# Patient Record
Sex: FEMALE | Race: WHITE | NOT HISPANIC OR LATINO | Employment: OTHER | URBAN - METROPOLITAN AREA
[De-identification: names, ages, dates, MRNs, and addresses within clinical notes are randomized per-mention and may not be internally consistent; named-entity substitution may affect disease eponyms.]

---

## 2017-01-05 ENCOUNTER — OFFICE VISIT (OUTPATIENT)
Dept: FAMILY MEDICINE | Facility: CLINIC | Age: 42
End: 2017-01-05
Payer: COMMERCIAL

## 2017-01-05 VITALS
OXYGEN SATURATION: 99 % | TEMPERATURE: 97.9 F | HEIGHT: 66 IN | DIASTOLIC BLOOD PRESSURE: 76 MMHG | BODY MASS INDEX: 26.36 KG/M2 | SYSTOLIC BLOOD PRESSURE: 114 MMHG | WEIGHT: 164 LBS | HEART RATE: 78 BPM

## 2017-01-05 DIAGNOSIS — N89.8 VAGINAL DISCHARGE: Primary | ICD-10-CM

## 2017-01-05 LAB
MICRO REPORT STATUS: NORMAL
SPECIMEN SOURCE: NORMAL
WET PREP SPEC: NORMAL

## 2017-01-05 PROCEDURE — 87210 SMEAR WET MOUNT SALINE/INK: CPT | Performed by: PHYSICIAN ASSISTANT

## 2017-01-05 PROCEDURE — 87491 CHLMYD TRACH DNA AMP PROBE: CPT | Mod: 90 | Performed by: PHYSICIAN ASSISTANT

## 2017-01-05 PROCEDURE — 99213 OFFICE O/P EST LOW 20 MIN: CPT | Performed by: PHYSICIAN ASSISTANT

## 2017-01-05 PROCEDURE — 87591 N.GONORRHOEAE DNA AMP PROB: CPT | Mod: 90 | Performed by: PHYSICIAN ASSISTANT

## 2017-01-05 PROCEDURE — 99000 SPECIMEN HANDLING OFFICE-LAB: CPT | Performed by: PHYSICIAN ASSISTANT

## 2017-01-05 NOTE — PROGRESS NOTES
"  SUBJECTIVE:                                                    Sosa Wolf is a 41 year old female who presents to clinic today for the following health issues:      Vaginal Symptoms     Onset: 2 days      Description:  Vaginal Discharge: none   Itching (Pruritis): YES  Burning sensation:  YES  Odor: YES    Accompanying Signs & Symptoms:  Pain with Urination: no   Abdominal Pain: YES  Fever: no    History:   Sexually active: YES  New Partner: YES  Possibility of Pregnancy:  No    Precipitating factors:   Recent Antibiotic Use: YES    Alleviating factors:  Nothing      Therapies Tried and outcome: used a monistat 1 day over 1 week ago.     Had chlamydia recently and now has a new partner and worried he cheated on her.   No real discharge.   Problem list and histories reviewed & adjusted, as indicated.  Additional history: as documented    Problem list, Medication list, Allergies, and Medical/Social/Surgical histories reviewed in EPIC and updated as appropriate.    ROS:  Constitutional, HEENT, cardiovascular, pulmonary, gi and gu systems are negative, except as otherwise noted.    OBJECTIVE:                                                    /76 mmHg  Pulse 78  Temp(Src) 97.9  F (36.6  C) (Oral)  Ht 5' 5.7\" (1.669 m)  Wt 164 lb (74.39 kg)  BMI 26.71 kg/m2  SpO2 99%  LMP 12/21/2016  Body mass index is 26.71 kg/(m^2).  GENERAL: healthy, alert and no distress   (female): normal female external genitalia, normal urethral meatus, vaginal mucosa, normal cervix with thick white discharge    Diagnostic Test Results:  Results for orders placed or performed in visit on 01/05/17 (from the past 24 hour(s))   Wet prep   Result Value Ref Range    Specimen Description Vagina     Wet Prep       No Trichomonas seen  No clue cells seen  No yeast seen      Micro Report Status FINAL 01/05/2017         ASSESSMENT/PLAN:                                                        ICD-10-CM    1. Vaginal discharge N89.8 " Chlamydia trachomatis PCR     Neisseria gonorrhoeae PCR     Wet prep   Wet prep was negative. Await chlamydia and gonorrhea testing.     See Patient Instructions    Arlene Gomez PA-C  [facility]

## 2017-01-05 NOTE — NURSING NOTE
"Chief Complaint   Patient presents with     Vaginal Problem       Initial /76 mmHg  Pulse 78  Temp(Src) 97.9  F (36.6  C) (Oral)  Ht 5' 5.7\" (1.669 m)  Wt 164 lb (74.39 kg)  BMI 26.71 kg/m2  SpO2 99%  LMP 12/21/2016 Estimated body mass index is 26.71 kg/(m^2) as calculated from the following:    Height as of this encounter: 5' 5.7\" (1.669 m).    Weight as of this encounter: 164 lb (74.39 kg).  BP completed using cuff size: nancie Wyman CMA      "

## 2017-01-06 LAB
C TRACH DNA SPEC QL NAA+PROBE: NORMAL
N GONORRHOEA DNA SPEC QL NAA+PROBE: NORMAL
SPECIMEN SOURCE: NORMAL
SPECIMEN SOURCE: NORMAL

## 2017-01-13 DIAGNOSIS — Z79.899 HIGH RISK MEDICATION USE: ICD-10-CM

## 2017-01-13 DIAGNOSIS — M35.9 UNDIFFERENTIATED CONNECTIVE TISSUE DISEASE (H): ICD-10-CM

## 2017-01-13 LAB
ALBUMIN SERPL-MCNC: 3.9 G/DL (ref 3.4–5)
ALBUMIN UR-MCNC: NEGATIVE MG/DL
ALP SERPL-CCNC: 26 U/L (ref 40–150)
ALT SERPL W P-5'-P-CCNC: 37 U/L (ref 0–50)
ANION GAP SERPL CALCULATED.3IONS-SCNC: 8 MMOL/L (ref 3–14)
APPEARANCE UR: CLEAR
AST SERPL W P-5'-P-CCNC: 26 U/L (ref 0–45)
BASOPHILS # BLD AUTO: 0 10E9/L (ref 0–0.2)
BASOPHILS NFR BLD AUTO: 0.4 %
BILIRUB SERPL-MCNC: 0.3 MG/DL (ref 0.2–1.3)
BILIRUB UR QL STRIP: NEGATIVE
BUN SERPL-MCNC: 21 MG/DL (ref 7–30)
CALCIUM SERPL-MCNC: 8.9 MG/DL (ref 8.5–10.1)
CHLORIDE SERPL-SCNC: 104 MMOL/L (ref 94–109)
CK SERPL-CCNC: 174 U/L (ref 30–225)
CO2 SERPL-SCNC: 25 MMOL/L (ref 20–32)
COLOR UR AUTO: YELLOW
CREAT SERPL-MCNC: 0.68 MG/DL (ref 0.52–1.04)
CRP SERPL-MCNC: <2.9 MG/L (ref 0–8)
DIFFERENTIAL METHOD BLD: NORMAL
EOSINOPHIL # BLD AUTO: 0.3 10E9/L (ref 0–0.7)
EOSINOPHIL NFR BLD AUTO: 3.4 %
ERYTHROCYTE [DISTWIDTH] IN BLOOD BY AUTOMATED COUNT: 14 % (ref 10–15)
ERYTHROCYTE [SEDIMENTATION RATE] IN BLOOD BY WESTERGREN METHOD: 6 MM/H (ref 0–20)
GFR SERPL CREATININE-BSD FRML MDRD: ABNORMAL ML/MIN/1.7M2
GLUCOSE SERPL-MCNC: 87 MG/DL (ref 70–99)
GLUCOSE UR STRIP-MCNC: NEGATIVE MG/DL
HCT VFR BLD AUTO: 38.7 % (ref 35–47)
HGB BLD-MCNC: 13.4 G/DL (ref 11.7–15.7)
HGB UR QL STRIP: NEGATIVE
KETONES UR STRIP-MCNC: NEGATIVE MG/DL
LEUKOCYTE ESTERASE UR QL STRIP: NEGATIVE
LYMPHOCYTES # BLD AUTO: 2.3 10E9/L (ref 0.8–5.3)
LYMPHOCYTES NFR BLD AUTO: 30.3 %
MCH RBC QN AUTO: 28.9 PG (ref 26.5–33)
MCHC RBC AUTO-ENTMCNC: 34.6 G/DL (ref 31.5–36.5)
MCV RBC AUTO: 84 FL (ref 78–100)
MONOCYTES # BLD AUTO: 0.6 10E9/L (ref 0–1.3)
MONOCYTES NFR BLD AUTO: 7.9 %
NEUTROPHILS # BLD AUTO: 4.3 10E9/L (ref 1.6–8.3)
NEUTROPHILS NFR BLD AUTO: 58 %
NITRATE UR QL: NEGATIVE
NON-SQ EPI CELLS #/AREA URNS LPF: NORMAL /LPF
PH UR STRIP: 6.5 PH (ref 5–7)
PLATELET # BLD AUTO: 215 10E9/L (ref 150–450)
POTASSIUM SERPL-SCNC: 4 MMOL/L (ref 3.4–5.3)
PROT SERPL-MCNC: 6.9 G/DL (ref 6.8–8.8)
PROT UR-MCNC: 0.07 G/L
PROT/CREAT 24H UR: 0.27 G/G CR (ref 0–0.2)
RBC # BLD AUTO: 4.63 10E12/L (ref 3.8–5.2)
RBC #/AREA URNS AUTO: NORMAL /HPF (ref 0–2)
SODIUM SERPL-SCNC: 137 MMOL/L (ref 133–144)
SP GR UR STRIP: 1.01 (ref 1–1.03)
URN SPEC COLLECT METH UR: NORMAL
UROBILINOGEN UR STRIP-ACNC: 0.2 EU/DL (ref 0.2–1)
WBC # BLD AUTO: 7.5 10E9/L (ref 4–11)
WBC #/AREA URNS AUTO: NORMAL /HPF (ref 0–2)

## 2017-01-13 PROCEDURE — 85652 RBC SED RATE AUTOMATED: CPT | Performed by: FAMILY MEDICINE

## 2017-01-13 PROCEDURE — 84156 ASSAY OF PROTEIN URINE: CPT | Performed by: FAMILY MEDICINE

## 2017-01-13 PROCEDURE — 81001 URINALYSIS AUTO W/SCOPE: CPT | Performed by: FAMILY MEDICINE

## 2017-01-13 PROCEDURE — 80053 COMPREHEN METABOLIC PANEL: CPT | Performed by: FAMILY MEDICINE

## 2017-01-13 PROCEDURE — 36415 COLL VENOUS BLD VENIPUNCTURE: CPT | Performed by: FAMILY MEDICINE

## 2017-01-13 PROCEDURE — 86160 COMPLEMENT ANTIGEN: CPT | Performed by: FAMILY MEDICINE

## 2017-01-13 PROCEDURE — 82550 ASSAY OF CK (CPK): CPT | Performed by: FAMILY MEDICINE

## 2017-01-13 PROCEDURE — 86140 C-REACTIVE PROTEIN: CPT | Performed by: FAMILY MEDICINE

## 2017-01-13 PROCEDURE — 85025 COMPLETE CBC W/AUTO DIFF WBC: CPT | Performed by: FAMILY MEDICINE

## 2017-01-13 PROCEDURE — 86225 DNA ANTIBODY NATIVE: CPT | Performed by: FAMILY MEDICINE

## 2017-01-16 LAB
C3 SERPL-MCNC: 87 MG/DL (ref 76–169)
C4 SERPL-MCNC: 22 MG/DL (ref 15–50)

## 2017-01-17 LAB — DSDNA AB SER-ACNC: 45 IU/ML

## 2017-01-18 ENCOUNTER — OFFICE VISIT (OUTPATIENT)
Dept: RHEUMATOLOGY | Facility: CLINIC | Age: 42
End: 2017-01-18
Payer: COMMERCIAL

## 2017-01-18 VITALS
TEMPERATURE: 97.3 F | DIASTOLIC BLOOD PRESSURE: 68 MMHG | SYSTOLIC BLOOD PRESSURE: 120 MMHG | HEIGHT: 66 IN | OXYGEN SATURATION: 100 % | HEART RATE: 77 BPM | BODY MASS INDEX: 27.58 KG/M2 | WEIGHT: 171.6 LBS

## 2017-01-18 DIAGNOSIS — F17.200 TOBACCO USE DISORDER: ICD-10-CM

## 2017-01-18 DIAGNOSIS — M32.9 SYSTEMIC LUPUS ERYTHEMATOSUS (H): Primary | ICD-10-CM

## 2017-01-18 DIAGNOSIS — Z79.899 HIGH RISK MEDICATION USE: ICD-10-CM

## 2017-01-18 PROCEDURE — 99214 OFFICE O/P EST MOD 30 MIN: CPT | Performed by: INTERNAL MEDICINE

## 2017-01-18 RX ORDER — METHOTREXATE 2.5 MG/1
15 TABLET ORAL WEEKLY
Qty: 32 TABLET | Refills: 3 | Status: SHIPPED | OUTPATIENT
Start: 2017-01-18 | End: 2017-09-21

## 2017-01-18 RX ORDER — HYDROXYCHLOROQUINE SULFATE 200 MG/1
400 TABLET, FILM COATED ORAL DAILY
Qty: 180 TABLET | Refills: 3 | Status: SHIPPED | OUTPATIENT
Start: 2017-01-18 | End: 2017-06-01

## 2017-01-18 RX ORDER — FOLIC ACID 1 MG/1
1 TABLET ORAL DAILY
Qty: 100 TABLET | Refills: 3 | Status: SHIPPED | OUTPATIENT
Start: 2017-01-18 | End: 2017-09-21

## 2017-01-18 NOTE — MR AVS SNAPSHOT
After Visit Summary   1/18/2017    Sosa Wolf    MRN: [id number]           Patient Information     Date Of Birth          1975        Visit Information        Provider Department      1/18/2017 11:20 AM Jack Arvizu MD [facility]        Today's Diagnoses     Systemic lupus erythematosus (H)    -  1     Undifferentiated connective tissue disease (H)         High risk medication use            Follow-ups after your visit        Your next 10 appointments already scheduled     Apr 14, 2017  9:15 AM   LAB with [facility] LAB   [facility]    [address] [id number] [phone]           Patient must bring picture ID.  Patient should be prepared to give a urine specimen  Please do not eat 10-12 hours before your appointment if you are coming in fasting for labs on lipids, cholesterol, or glucose (sugar).  Pregnant women should follow their Care Team instructions. Water with medications is okay. Do not drink coffee or other fluids.   If you have concerns about taking  your medications, please ask at office or if scheduling via PrimeRevenue, send a message by clicking on Secure Messaging, Message Your Care Team.            Apr 21, 2017  9:20 AM   Return Visit with MD [facility] ([facility] [place])    [address]  [place] [address] [phone]              Future tests that were ordered for you today     Open Future Orders        Priority Expected Expires Ordered    Complement C3 Routine 4/11/2017 5/6/2017 1/18/2017    DNA double stranded antibodies Routine 4/11/2017 5/6/2017 1/18/2017    CRP inflammation Routine 4/11/2017 5/6/2017 1/18/2017    Comprehensive metabolic panel Routine 4/11/2017 5/6/2017 1/18/2017    Complement C4 Routine 4/11/2017 5/6/2017 1/18/2017    Erythrocyte sedimentation rate auto Routine 4/11/2017 5/6/2017 1/18/2017    Protein  " random urine Routine 4/11/2017 5/6/2017 1/18/2017    UA with Microscopic reflex to Culture Routine 4/11/2017 5/6/2017 1/18/2017    CBC with platelets differential Routine 4/11/2017 5/6/2017 1/18/2017    CK total Routine 4/11/2017 5/6/2017 1/18/2017            Who to contact     If you have questions or need follow up information about today's clinic visit or your schedule please contact [facility] [place] directly at [phone].  Normal or non-critical lab and imaging results will be communicated to you by Saiseihart, letter or phone within 4 business days after the clinic has received the results. If you do not hear from us within 7 days, please contact the clinic through Idea2t or phone. If you have a critical or abnormal lab result, we will notify you by phone as soon as possible.  Submit refill requests through EnWave or call your pharmacy and they will forward the refill request to us. Please allow 3 business days for your refill to be completed.          Additional Information About Your Visit        SaiseiharYouTern Information     EnWave gives you secure access to your electronic health record. If you see a primary care provider, you can also send messages to your care team and make appointments. If you have questions, please call your primary care clinic.  If you do not have a primary care provider, please call [phone] and they will assist you.        Care EveryWhere ID     This is your Care EveryWhere ID. This could be used by other organizations to access your [facility] medical records  [id number]        Your Vitals Were     Pulse Temperature Height BMI (Body Mass Index) Pulse Oximetry Last Period    77 97.3  F (36.3  C) (Oral) 1.67 m (5' 5.75\") 27.91 kg/m2 100% 12/21/2016       Blood Pressure from Last 3 Encounters:   01/18/17 120/68   01/05/17 114/76   11/04/16 102/68    Weight from Last 3 Encounters:   01/18/17 77.837 kg (171 lb 9.6 oz)   01/05/17 74.39 kg (164 lb)   11/04/16 65.318 kg (144 lb) "                 Today's Medication Changes          These changes are accurate as of: 1/18/17 11:43 AM.  If you have any questions, ask your nurse or doctor.               Start taking these medicines.        Dose/Directions    folic acid 1 MG tablet   Commonly known as:  FOLVITE   Used for:  Systemic lupus erythematosus (H)   Started by:  Jack Arvizu MD        Dose:  1 mg   Take 1 tablet (1 mg) by mouth daily   Quantity:  100 tablet   Refills:  3       methotrexate sodium 2.5 MG Tabs   Used for:  Systemic lupus erythematosus (H)   Started by:  Jack Arvizu MD        Dose:  15 mg   Take 15 mg by mouth once a week . Take all 6 tablets on the same day of each week.   Quantity:  32 tablet   Refills:  3         These medicines have changed or have updated prescriptions.        Dose/Directions    hydroxychloroquine 200 MG tablet   Commonly known as:  PLAQUENIL   This may have changed:    - how much to take  - when to take this   Used for:  Undifferentiated connective tissue disease (H), High risk medication use, Systemic lupus erythematosus (H)   Changed by:  Jack Arvizu MD        Dose:  400 mg   Take 2 tablets (400 mg) by mouth daily   Quantity:  180 tablet   Refills:  3            Where to get your medicines      These medications were sent to [facility] [address] [facility] - [place], [place] - [address]  [address] [place] [address]     Phone:  [phone]    - folic acid 1 MG tablet  - hydroxychloroquine 200 MG tablet  - methotrexate sodium 2.5 MG Tabs             Primary Care Provider Office Phone # Fax #    Cytnhia Scott PA-C [phone]       [facility] [address]  [facility] [address]        Thank you!     Thank you for choosing [facility]  for your care. Our goal is always to provide you with excellent care. Hearing back from our patients is one way we can continue to improve our services. Please take a few minutes to complete the written  survey that you may receive in the mail after your visit with us. Thank you!             Your Updated Medication List - Protect others around you: Learn how to safely use, store and throw away your medicines at www.disposemymeds.org.          This list is accurate as of: 1/18/17 11:43 AM.  Always use your most recent med list.                   Brand Name Dispense Instructions for use    ABILIFY 10 MG tablet   Generic drug:  ARIPiprazole      Take 10 mg by mouth daily       buPROPion 150 MG 12 hr tablet    WELLBUTRIN SR     Take 150 mg by mouth daily       folic acid 1 MG tablet    FOLVITE    100 tablet    Take 1 tablet (1 mg) by mouth daily       hydroxychloroquine 200 MG tablet    PLAQUENIL    180 tablet    Take 2 tablets (400 mg) by mouth daily       medroxyPROGESTERone 150 MG/ML injection    DEPO-PROVERA    1 mL    Inject 1 mL (150 mg) into the muscle every 3 months       methotrexate sodium 2.5 MG Tabs     32 tablet    Take 15 mg by mouth once a week . Take all 6 tablets on the same day of each week.       sertraline 100 MG tablet    ZOLOFT     2 TABLET DAILY       valACYclovir 500 MG tablet    VALTREX    6 tablet    Take 1 tablet (500 mg) by mouth 2 times daily

## 2017-01-18 NOTE — PROGRESS NOTES
Rheumatology Clinic Visit      Sosa Wolf MRN# [id number]   YOB: 1975 Age: 41 year old      Date of visit: 1/18/17   PCP: Cynthia Scott    Chief Complaint   Patient presents with:  RECHECK: 3 follow up. right hip has been bothering her when she goes to bed.      Assessment and Plan     1. Systemic Lupus Erythematosus (JESSE+, dsDNA+, arthralgias, oral sores, proteinuria): Initially with arthralgias and morning stiffness lasted for 15-30 minutes.  Now with synovitis on exam, improvement with Plaquenil. Continue hydroxychloroquine. Ophthalmology hydroxychloroquine toxicity monitor exam pending currently. Start methotrexate. Note that she has oral sores that may be secondary to SLE or HSV. There is a risk for worsening oral sores with methotrexate and this was reviewed with her today.  - Start methotrexate 15 mg once weekly  - Start folic acid 1 mg daily  - Continue hydroxychloroquine 400 mg daily  - Chest X-ray today  - Labs monthly ×2 months: CBC, creatinine, hepatic panel  - Labs 1 week prior to next rheumatology clinic visit: CBC, CMP, ESR, CRP, CK, C3, C4, dsDNA, UA, Uprotein:creatinine    # Methotrexate Risks and Benefits: The risks and benefits of methotrexate were discussed in detail and the patient verbalized understanding.  The risks discussed include, but are not limited to, the risk for hypersensitivity, anaphylaxis, anaphylactoid reactions, infections, bone marrow suppression, renal toxicity, hepatotoxicity, pulmonary toxicity, malignancy, impaired fertility, GI upset, alopecia, and oral and nasal sores.  Folic acid supplementation is recommended during methotrexate therapy to help prevent some of the side effects. Pregnancy prevention and planning was discussed; it is recommended that women of childbearing potential use reliable contraception during therapy.  Routine laboratory monitoring is required during methotrexate therapy. Taking MTX once weekly, all within a 24 hour period was  stressed and the patient verbalized this instruction back to me.  I encouraged reviewing the package insert and asking any questions about the medication.    2. Cigarette Smoking:  Encouraged complete cessation and discussed the health benefits.      3. Cocaine use: Currently in outpatient treatment now and is sober.      Ms. Wolf verbalized agreement with and understanding of the rational for the diagnosis and treatment plan.  All questions were answered to best of my ability and the patient's satisfaction. Ms. Wolf was advised to contact the clinic with any questions that may arise after the clinic visit.      Thank you for involving me in the care of the patient    Return to clinic: 3 months      HPI   Sosa Wolf is a 41 year old female with a medical history significant for tobacco abuse, genital warts, and HSV type II who presents for follow-up of systemic lupus erythematosus.    Today, she reports doing better since being on hydroxychloroquine. She also reports doing better since stopping cocaine use. She is currently in an outpatient treatment program for the cocaine use. She continues to smoke cigarettes and is thinking about stopping after she has been sober from cocaine use. She still is sober with regards to alcohol use. More recently with pain and swelling in her bilateral MCPs but is worse in the morning and improves with activity. Morning stiffness for about 30 minutes. Positive gelling phenomenon. Also with some right hip pain that is worse when she lies down.  Occasional oral sore area    Denies fevers, chills, nausea, vomiting, constipation, diarrhea. No abdominal pain. No chest pain/pressure, palpitations, or shortness of breath. No LE swelling. No neck pain.   No rash. No sicca symptoms. No photosensitivity or photophobia. No eye pain or redness. No history of inflammatory eye disease.  No history of DVT, pulmonary embolism, or miscarriage.   No history of serositis.  No history of Raynaud's  Phenomenon.  No seizure history.  No known renal disorder.      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 No oral or nasal ulcers.  CV: No chest pain, pressure, palpitations, or dyspnea on exertion.  PULM: No SOB, wheeze, cough.  GI: No nausea, vomiting, constipation, diarrhea. No blood in stool. No abdominal pain.  : No blood in urine.  MSK: See HPI.  NEURO: No numbness, tingling, or weakness.  ENDO: No cold intolerance; see history of present illness   EXT: No LE swelling  PSYCH: Negative    Active Problem List     Patient Active Problem List   Diagnosis     Herpes simplex type 2 infection     Genital warts     CARDIOVASCULAR SCREENING; LDL GOAL LESS THAN 160     H/O intravenous drug use in remission     Tobacco abuse     Surveillance for Depo-Provera contraception     JESSE positive     Multiple joint pain     Cocaine use     Past Medical History     Past Medical History   Diagnosis Date     H/O: depression      SUICIDE ATTEMPT AGE 15     HPV (human papillomavirus)      Bipolar disorder (H) 12/04     Dx'd     Past Surgical History     Past Surgical History   Procedure Laterality Date     Bunionectomy rt/lt       Hc enlarge breast with implant  2000     Allergy   No Known Allergies  Current Medication List     Current Outpatient Prescriptions   Medication Sig     hydroxychloroquine (PLAQUENIL) 200 MG tablet Take 1 tablet (200 mg) by mouth 2 times daily     ARIPiprazole (ABILIFY) 10 MG tablet Take 10 mg by mouth daily     buPROPion (WELLBUTRIN SR) 150 MG 12 hr tablet Take 150 mg by mouth daily     SERTRALINE  MG PO TABS 2 TABLET DAILY     valACYclovir (VALTREX) 500 MG tablet Take 1 tablet (500 mg) by mouth 2 times daily     medroxyPROGESTERone (DEPO-PROVERA) 150 MG/ML injection Inject 1 mL (150  "mg) into the muscle every 3 months     No current facility-administered medications for this visit.     Social History   See HPI    Family History     Family History   Problem Relation Age of Onset     CANCER Father      lung cancer     DIABETES Maternal Grandmother      Depression Maternal Grandmother      Lipids Maternal Grandmother      CEREBROVASCULAR DISEASE Maternal Grandfather      Lipids Maternal Grandfather      Circulatory Maternal Grandfather      MI in 50's     Depression Brother      depression and OCD     GASTROINTESTINAL DISEASE Brother      IBS     Depression Mother      Thyroid Disease No family hx of      Asthma No family hx of      Denies family history of autoimmune disease     Physical Exam     Temp Readings from Last 3 Encounters:   01/18/17 97.3  F (36.3  C) Oral   01/05/17 97.9  F (36.6  C) Oral   11/04/16 98.3  F (36.8  C) Oral     BP Readings from Last 5 Encounters:   01/18/17 120/68   01/05/17 114/76   11/04/16 102/68   10/19/16 109/68   10/11/16 116/70     Pulse Readings from Last 1 Encounters:   01/18/17 77     Resp Readings from Last 1 Encounters:   11/04/16 18     Estimated body mass index is 27.91 kg/(m^2) as calculated from the following:    Height as of this encounter: 1.67 m (5' 5.75\").    Weight as of this encounter: 77.837 kg (171 lb 9.6 oz).    GEN: NAD  HEENT: MMM. No oral lesions. Anicteric, noninjected sclera  CV: S1, S2. RRR. No m/r/g.  PULM: CTA bilaterally. No w/c.  MSK:  Tenderness to palpation with synovial swelling of the bilateral second and third MCPs. Tenderness to palpation without synovial swelling of the bilateral second-fourth PIPs. Wrists, elbows, and shoulders without swelling or tenderness to palpation. Hips nontender to palpation or with range of motion; mildly tender to palpation just lateral to the right ASIS.  Knees and ankles without swelling or tenderness to palpation. Positive MTP squeeze bilaterally, but no swelling of the MTPs.   NEURO: UE and LE " strengths 5/5 and equal bilaterally.   SKIN: No rash  EXT: No LE edema  PSYCH: Alert. Appropriate.    Labs / Imaging (select studies)   RF/CCP  Recent Labs   Lab Test  10/11/16   1043  08/10/16   1059   CCPIGG  1   --    RHF   --   <20     JESSE/RNP/Sm/SSA/SSB/dsDNA  Recent Labs   Lab Test  01/13/17   1015  10/11/16   1043  08/10/16   1059  12/10/13   1118   ROBYN   --    --   1.7*   --    RNPIGG   --   0.3   --    --    SMIGG   --   <0.2  Negative   Antibody index (AI) values reflect qualitative changes in antibody   concentration that cannot be directly associated with clinical condition or   disease state.     --    --    SSAIGG   --   <0.2  Negative   Antibody index (AI) values reflect qualitative changes in antibody   concentration that cannot be directly associated with clinical condition or   disease state.     --    --    SSBIGG   --   <0.2  Negative   Antibody index (AI) values reflect qualitative changes in antibody   concentration that cannot be directly associated with clinical condition or   disease state.     --    --    SCLIGG   --   <0.2  Negative   Antibody index (AI) values reflect qualitative changes in antibody   concentration that cannot be directly associated with clinical condition or   disease state.     --    --    TREPAB   --    --    --   Negative   V3YTJQV  87  83   --    --    R5PWBFT  22  15   --    --      Recent Labs   Lab Test  01/13/17   1015  10/11/16   1043   DNA  45*  70*     CBC  Recent Labs   Lab Test  01/13/17   1015  10/11/16   1043  08/10/16   1059   WBC  7.5  8.8  8.0   RBC  4.63  4.88  4.70   HGB  13.4  13.9  13.5   HCT  38.7  39.8  39.0   MCV  84  82  83   RDW  14.0  13.9  13.5   PLT  215  223  196   MCH  28.9  28.5  28.7   MCHC  34.6  34.9  34.6   NEUTROPHIL  58.0  62.1   --    LYMPH  30.3  30.3   --    MONOCYTE  7.9  4.3   --    EOSINOPHIL  3.4  3.0   --    BASOPHIL  0.4  0.3   --    ANEU  4.3  5.5   --    ALYM  2.3  2.7   --    DAQUAN  0.6  0.4   --    AEOS  0.3  0.3   --     ABAS  0.0  0.0   --      CMP  Recent Labs   Lab Test  01/13/17   1015  10/11/16   1043  08/31/16   0950  08/10/16   1059   NA  137   --   141  142   POTASSIUM  4.0   --   3.5  3.3*   CHLORIDE  104   --   110*  110*   CO2  25   --   25  24   ANIONGAP  8   --   6  8   GLC  87   --   60*  80   BUN  21   --   18  14   CR  0.68  0.74  0.91  0.85   GFRESTIMATED  >90  Non  GFR Calc    86  67  73   GFRESTBLACK  >90   GFR Calc    >90   GFR Calc    82  89   RICHARD  8.9   --   9.1  8.6   BILITOTAL  0.3  0.3   --   0.3   ALBUMIN  3.9  4.0   --   3.7   PROTTOTAL  6.9  7.2   --   6.5*   ALKPHOS  26*  29*   --   31*   AST  26  14   --   11   ALT  37  24   --   17     HgA1c  Recent Labs   Lab Test  04/14/09   1404   A1C  5.1     Calcium/VitaminD  Recent Labs   Lab Test  01/13/17   1015  08/31/16   0950  08/10/16   1059   RICHARD  8.9  9.1  8.6     ESR/CRP  Recent Labs   Lab Test  01/13/17   1015  08/10/16   1059  10/16/14   0834   SED  6  6  4   CRP  <2.9  <2.9  <2.9     CK/Aldolase  Recent Labs   Lab Test  01/13/17   1015  10/11/16   1043   CKT  174  65   ALDOLASE   --   4.5     TSH/T4  Recent Labs   Lab Test  10/11/16   1043  04/14/09   1404   TSH  1.48  0.44     Lipid Panel  Recent Labs   Lab Test  03/06/13   1411  06/18/10   1021  04/14/09   1404   CHOL  190  185  176   TRIG  187*  106  74   HDL  38*  44*  49*   LDL  114  120  112   VLDL  37*  21  15   CHOLHDLRATIO  5.0  4.2  3.6     Hepatitis B  Recent Labs   Lab Test  10/11/16   1043   HBCAB  Nonreactive   HEPBANG  Nonreactive     Hepatitis C  Recent Labs   Lab Test  10/11/16   1043  12/10/13   1118  03/06/13   1411   HCVAB  Nonreactive   Assay performance characteristics have not been established for newborns,   infants, and children    Negative  Negative     HIV Screening  Recent Labs   Lab Test  10/11/16   1043   HIAGAB  Nonreactive   HIV-1 p24 Ag & HIV-1/HIV-2 Ab Not Detected       UA  Recent Labs   Lab Test  01/13/17   1033    COLOR  Yellow   APPEARANCE  Clear   URINEGLC  Negative   URINEBILI  Negative   SG  1.010   URINEPH  6.5   PROTEIN  Negative   UROBILINOGEN  0.2   NITRITE  Negative   UBLD  Negative   LEUKEST  Negative   WBCU  O - 2   RBCU  O - 2   SQUAMOUSEPI  Few     Urine Microscopic  Recent Labs   Lab Test  01/13/17   1033   WBCU  O - 2   RBCU  O - 2   SQUAMOUSEPI  Few     Urine Protein  Recent Labs   Lab Test  01/13/17   1033   UTP  0.07   UTPG  0.27*        Recent Results (from the past 744 hour(s))   XR Hand Bilateral G/E 3 Views    Narrative    XR HAND BILATERAL G/E 3 VW 10/11/2016 12:41 PM    HISTORY: Pain in unspecified joint      Impression    IMPRESSION: Negative.    JULIAN LORD MD   XR Foot Bilateral G/E 3 Views    Narrative    XR FOOT BILATERAL G/E 3 VW 10/11/2016 12:39 PM    HISTORY: Pain in unspecified joint      Impression    IMPRESSION: Deformity of the head of the right first metatarsal most  likely related to previous surgery or trauma. Some erosive change in  this area cannot be excluded. Bilateral exam otherwise unremarkable.    JULIAN LORD MD   XR Hip Left 2-3 Views    Narrative    XR HIP LEFT 2-3 VIEWS 10/11/2016 12:39 PM    HISTORY: Pain in unspecified joint      Impression    IMPRESSION: Negative.    JULIAN LORD MD       Immunization History     Immunization History   Administered Date(s) Administered     Influenza Vaccine IM 3yrs+ 4 Valent IIV4 12/19/2014, 10/16/2015, 11/04/2016     TD (ADULT, 7+) 02/23/2005     TDAP (ADACEL AGES 11-64) 04/14/2009          Chart documentation done in part with Dragon Voice recognition Software. Although reviewed after completion, some word and grammatical error may remain.    Jack Arvizu MD

## 2017-01-18 NOTE — NURSING NOTE
"Chief Complaint   Patient presents with     RECHECK     3 follow up. right hip has been bothering her when she goes to bed.       Initial /68 mmHg  Pulse 77  Temp(Src) 97.3  F (36.3  C) (Oral)  Ht 1.67 m (5' 5.75\")  Wt 77.837 kg (171 lb 9.6 oz)  BMI 27.91 kg/m2  SpO2 100%  LMP 12/21/2016 Estimated body mass index is 27.91 kg/(m^2) as calculated from the following:    Height as of this encounter: 1.67 m (5' 5.75\").    Weight as of this encounter: 77.837 kg (171 lb 9.6 oz).  BP completed using cuff size: regular  Shana Lion MA           RAPID3 (0-30) Cumulative Score  18.2          RAPID3 Weighted Score (divide #4 by 3 and that is the weighted score)  6.1             "

## 2017-01-30 ENCOUNTER — OFFICE VISIT (OUTPATIENT)
Dept: FAMILY MEDICINE | Facility: CLINIC | Age: 42
End: 2017-01-30
Payer: COMMERCIAL

## 2017-01-30 VITALS
SYSTOLIC BLOOD PRESSURE: 102 MMHG | WEIGHT: 171 LBS | BODY MASS INDEX: 27.48 KG/M2 | DIASTOLIC BLOOD PRESSURE: 61 MMHG | TEMPERATURE: 98.2 F | HEART RATE: 74 BPM | HEIGHT: 66 IN | OXYGEN SATURATION: 95 %

## 2017-01-30 DIAGNOSIS — M32.9 SYSTEMIC LUPUS ERYTHEMATOSUS (H): ICD-10-CM

## 2017-01-30 DIAGNOSIS — J20.9 ACUTE BRONCHITIS WITH SYMPTOMS > 10 DAYS: Primary | ICD-10-CM

## 2017-01-30 PROCEDURE — 99213 OFFICE O/P EST LOW 20 MIN: CPT | Performed by: FAMILY MEDICINE

## 2017-01-30 RX ORDER — ALBUTEROL SULFATE 90 UG/1
AEROSOL, METERED RESPIRATORY (INHALATION)
Qty: 1 INHALER | Refills: 0 | Status: SHIPPED | OUTPATIENT
Start: 2017-01-30 | End: 2018-09-10

## 2017-01-30 RX ORDER — AZITHROMYCIN 250 MG/1
TABLET, FILM COATED ORAL
Qty: 6 TABLET | Refills: 0 | Status: SHIPPED | OUTPATIENT
Start: 2017-01-30 | End: 2017-09-29

## 2017-01-30 NOTE — NURSING NOTE
"Chief Complaint   Patient presents with     URI       Initial /61 mmHg  Pulse 74  Temp(Src) 98.2  F (36.8  C) (Oral)  Ht 5' 5.75\" (1.67 m)  Wt 171 lb (77.565 kg)  BMI 27.81 kg/m2  SpO2 95%  LMP 12/21/2016  Breastfeeding? No Estimated body mass index is 27.81 kg/(m^2) as calculated from the following:    Height as of this encounter: 5' 5.75\" (1.67 m).    Weight as of this encounter: 171 lb (77.565 kg).  BP completed using cuff size: regular  Ramonita Phipps CMA       "

## 2017-01-30 NOTE — PROGRESS NOTES
"  SUBJECTIVE:                                                    Sosa Wolf is a 41 year old female who presents to clinic today for the following health issues:    Acute Illness   Acute illness concerns: URI  Onset: one and half weeks ago     Fever: YES    Chills/Sweats: YES    Headache (location?): YES    Sinus Pressure:no    Conjunctivitis:  no    Ear Pain: no    Rhinorrhea: no    Congestion: YES    Sore Throat: no     Cough: YES-productive of yellow sputum, productive of green sputum    Wheeze: YES    Decreased Appetite: YES    Nausea: no    Vomiting: no    Diarrhea:  YES    Dysuria/Freq.: no    Fatigue/Achiness: YES    Sick/Strep Exposure: YES- momther     Therapies Tried and outcome: OTC cough meds, tylenol, cough drops    Pt was started on Methotrexate last week.     Problem list and histories reviewed & adjusted, as indicated.  Additional history: as documented    BP Readings from Last 3 Encounters:   01/30/17 102/61   01/18/17 120/68   01/05/17 114/76    Wt Readings from Last 3 Encounters:   01/30/17 171 lb (77.565 kg)   01/18/17 171 lb 9.6 oz (77.837 kg)   01/05/17 164 lb (74.39 kg)         Problem list, Medication list, Allergies, and Medical/Social/Surgical histories reviewed in [facility] and updated as appropriate.    ROS:  Constitutional, HEENT, cardiovascular, pulmonary, gi and gu systems are negative, except as otherwise noted.    OBJECTIVE:                                                    /61 mmHg  Pulse 74  Temp(Src) 98.2  F (36.8  C) (Oral)  Ht 5' 5.75\" (1.67 m)  Wt 171 lb (77.565 kg)  BMI 27.81 kg/m2  SpO2 95%  LMP 12/21/2016  Breastfeeding? No  Body mass index is 27.81 kg/(m^2).  GENERAL: healthy, alert and no distress  HENT: ear canals and TM's normal, nose and mouth without ulcers or lesions  NECK: no adenopathy, no asymmetry, masses, or scars and thyroid normal to palpation  RESP: lungs with bilateral expiratory wheezes and rales.   CV: regular rate and rhythm.   "     ASSESSMENT/PLAN:                                                        ICD-10-CM    1. Acute bronchitis with symptoms > 10 days J20.9 azithromycin (ZITHROMAX) 250 MG tablet     albuterol (PROAIR HFA/PROVENTIL HFA/VENTOLIN HFA) 108 (90 BASE) MCG/ACT Inhaler   2. Systemic lupus erythematosus (H) M32.9        Follow up if not better.     Nadia Black MD  [facility]

## 2017-01-30 NOTE — MR AVS SNAPSHOT
After Visit Summary   1/30/2017    Sosa Wolf    MRN: [id number]           Patient Information     Date Of Birth          1975        Visit Information        Provider Department      1/30/2017 11:40 AM Nadia Black MD [facility]        Today's Diagnoses     Acute bronchitis with symptoms > 10 days    -  1     Systemic lupus erythematosus (H)            Follow-ups after your visit        Your next 10 appointments already scheduled     Apr 14, 2017  9:15 AM   LAB with CP LAB   [facility] ([facility])    [address] [id number] [phone]           Patient must bring picture ID.  Patient should be prepared to give a urine specimen  Please do not eat 10-12 hours before your appointment if you are coming in fasting for labs on lipids, cholesterol, or glucose (sugar).  Pregnant women should follow their Care Team instructions. Water with medications is okay. Do not drink coffee or other fluids.   If you have concerns about taking  your medications, please ask at office or if scheduling via [employer], send a message by clicking on Secure Messaging, Message Your Care Team.            Apr 21, 2017  9:20 AM   Return Visit with Jack Arvizu MD   [facility]    [address] [place] [id number] [phone]              Who to contact     If you have questions or need follow up information about today's clinic visit or your schedule please contact [facility] directly at [phone].  Normal or non-critical lab and imaging results will be communicated to you by MyChart, letter or phone within 4 business days after the clinic has received the results. If you do not hear from us within 7 days, please contact the clinic through MyChart or phone. If you have a critical or abnormal lab result, we will notify you by  "phone as soon as possible.  Submit refill requests through [employer] or call your pharmacy and they will forward the refill request to us. Please allow 3 business days for your refill to be completed.          Additional Information About Your Visit        [employer]nis Information     [employer] gives you secure access to your electronic health record. If you see a primary care provider, you can also send messages to your care team and make appointments. If you have questions, please call your primary care clinic.  If you do not have a primary care provider, please call [phone] and they will assist you.        Care EveryWhere ID     This is your Care EveryWhere ID. This could be used by other organizations to access your [place] medical records  [id number]        Your Vitals Were     Pulse Temperature Height BMI (Body Mass Index) Pulse Oximetry Last Period    74 98.2  F (36.8  C) (Oral) 5' 5.75\" (1.67 m) 27.81 kg/m2 95% 12/21/2016    Breastfeeding?                   No            Blood Pressure from Last 3 Encounters:   01/30/17 102/61   01/18/17 120/68   01/05/17 114/76    Weight from Last 3 Encounters:   01/30/17 171 lb (77.565 kg)   01/18/17 171 lb 9.6 oz (77.837 kg)   01/05/17 164 lb (74.39 kg)              Today, you had the following     No orders found for display         Today's Medication Changes          These changes are accurate as of: 1/30/17 12:19 PM.  If you have any questions, ask your nurse or doctor.               Start taking these medicines.        Dose/Directions    albuterol 108 (90 BASE) MCG/ACT Inhaler   Commonly known as:  PROAIR HFA/PROVENTIL HFA/VENTOLIN HFA   Used for:  Acute bronchitis with symptoms > 10 days   Started by:  Nadia Black MD        2 puffs in mouth every 6 hrs for next 2 days and then every 6 hrs as needed for shortness of breath and wheezing.   Quantity:  1 Inhaler   Refills:  0       azithromycin 250 MG tablet   Commonly known as:  ZITHROMAX   Used for:  Acute " bronchitis with symptoms > 10 days   Started by:  Nadia Black MD        Two tablets first day, then one tablet daily for four days.   Quantity:  6 tablet   Refills:  0            Where to get your medicines      These [place] [facility] [place] [address]  [address], [facility] [address]     Phone:  [phone]    - albuterol 108 (90 BASE) MCG/ACT Inhaler  - azithromycin 250 MG tablet             Primary Care Provider Office Phone # Fax #    Cynthia Scott PA-C [phone]       [facility] [address]  [facility] [address]        Thank you!     Thank you for choosing [facility]  for your care. Our goal is always to provide you with excellent care. Hearing back from our patients is one way we can continue to improve our services. Please take a few minutes to complete the written survey that you may receive in the mail after your visit with us. Thank you!             Your Updated Medication List - Protect others around you: Learn how to safely use, store and throw away your medicines at www.disposemymeds.org.          This list is accurate as of: 1/30/17 12:19 PM.  Always use your most recent med list.                   Brand Name Dispense Instructions for use    ABILIFY 10 MG tablet   Generic drug:  ARIPiprazole      Take 10 mg by mouth daily       albuterol 108 (90 BASE) MCG/ACT Inhaler    PROAIR HFA/PROVENTIL HFA/VENTOLIN HFA    1 Inhaler    2 puffs in mouth every 6 hrs for next 2 days and then every 6 hrs as needed for shortness of breath and wheezing.       azithromycin 250 MG tablet    ZITHROMAX    6 tablet    Two tablets first day, then one tablet daily for four days.       buPROPion 150 MG 12 hr tablet    WELLBUTRIN SR     Take 150 mg by mouth daily       folic acid 1 MG tablet    FOLVITE    100 tablet    Take 1 tablet (1 mg) by mouth daily        hydroxychloroquine 200 MG tablet    PLAQUENIL    180 tablet    Take 2 tablets (400 mg) by mouth daily       medroxyPROGESTERone 150 MG/ML injection    DEPO-PROVERA    1 mL    Inject 1 mL (150 mg) into the muscle every 3 months       methotrexate sodium 2.5 MG Tabs     32 tablet    Take 15 mg by mouth once a week . Take all 6 tablets on the same day of each week.       sertraline 100 MG tablet    ZOLOFT     2 TABLET DAILY       valACYclovir 500 MG tablet    VALTREX    6 tablet    Take 1 tablet (500 mg) by mouth 2 times daily

## 2017-03-10 ENCOUNTER — TELEPHONE (OUTPATIENT)
Dept: FAMILY MEDICINE | Facility: CLINIC | Age: 42
End: 2017-03-10

## 2017-03-10 NOTE — TELEPHONE ENCOUNTER
Reason for Call:  Depo    Detailed comments: Patient is past her 3 months for her depo shot. I advised her she would need to schedule with a doctor to get that started up again and she said she didn't have to last time, that she just saw the nurse. Please call to advise patient.    Phone Number Patient can be reached at: Cell number on file:    Telephone Information:   Mobile [phone]       Best Time: anytime    Can we leave a detailed message on this number? YES    Call taken on 3/10/2017 at 10:50 AM by Peggy Tapia

## 2017-03-10 NOTE — TELEPHONE ENCOUNTER
Routed to provider to advise if patient needs to come in for a doctor's appointment to get her depo shot as she is past her 3 months. She says she would rather not have to come in to see a doc for it. Can she just see ancillary?    Tiffany James RN  [facility]

## 2017-03-13 NOTE — TELEPHONE ENCOUNTER
Message left on home number for patient to call back TC line.  NTBS with Ancillary for Depo injection.    Tracy GARCIA

## 2017-03-13 NOTE — TELEPHONE ENCOUNTER
She doesn't.  She can be on axillary's schedule and they can order the pregnancy test.    Cynthia Scott PA-C

## 2017-03-17 ENCOUNTER — ALLIED HEALTH/NURSE VISIT (OUTPATIENT)
Dept: NURSING | Facility: CLINIC | Age: 42
End: 2017-03-17
Payer: COMMERCIAL

## 2017-03-17 LAB — BETA HCG QUAL IFA URINE: NEGATIVE

## 2017-03-17 PROCEDURE — 84703 CHORIONIC GONADOTROPIN ASSAY: CPT | Performed by: PHYSICIAN ASSISTANT

## 2017-03-17 PROCEDURE — 99207 ZZC NO CHARGE NURSE ONLY: CPT

## 2017-03-17 NOTE — MR AVS SNAPSHOT
After Visit Summary   3/17/2017    Sosa Wolf    MRN: [id number]           Patient Information     Date Of Birth          1975        Visit Information        Provider Department      3/17/2017 10:15 AM CP ANCILLARY [facility]        Today's Diagnoses     Contraception    -  1       Follow-ups after your visit        Your next 10 appointments already scheduled     Mar 22, 2017 10:15 AM CDT   Nurse Only with CP ANCILLARY   [facility] ([facility])    [address] [facility] [id number] [phone]            Apr 14, 2017  9:15 AM CDT   LAB with CP LAB   [facility] ([facility])    [address] [address]   [phone]           Patient must bring picture ID.  Patient should be prepared to give a urine specimen  Please do not eat 10-12 hours before your appointment if you are coming in fasting for labs on lipids, cholesterol, or glucose (sugar).  Pregnant women should follow their Care Team instructions. Water with medications is okay. Do not drink coffee or other fluids.   If you have concerns about taking  your medications, please ask at office or if scheduling via Flit, send a message by clicking on Secure Messaging, Message Your Care Team.            Apr 21, 2017  9:20 AM CDT   Return Visit with Jack Arvizu MD   [facility] [place] ([facility])    [address] [place] [address]  [place] [id number] [phone]              Who to contact     If you have questions or need follow up information about today's clinic visit or your schedule please contact [facility] directly at [phone].  Normal or non-critical lab and imaging results will be communicated to you by MyChart, letter or phone within 4 business days after the clinic has received the results. If you do not hear  from us within 7 days, please contact the clinic through [employer] or phone. If you have a critical or abnormal lab result, we will notify you by phone as soon as possible.  Submit refill requests through [employer] or call your pharmacy and they will forward the refill request to us. Please allow 3 business days for your refill to be completed.          Additional Information About Your Visit        [employer] Information     [employer] gives you secure access to your electronic health record. If you see a primary care provider, you can also send messages to your care team and make appointments. If you have questions, please call your primary care clinic.  If you do not have a primary care provider, please call [phone] and they will assist you.        Care EveryWhere ID     This is your Care EveryWhere ID. This could be used by other organizations to access your [place] medical records  [id number]         Blood Pressure from Last 3 Encounters:   01/30/17 102/61   01/18/17 120/68   01/05/17 114/76    Weight from Last 3 Encounters:   01/30/17 171 lb (77.6 kg)   01/18/17 171 lb 9.6 oz (77.8 kg)   01/05/17 164 lb (74.4 kg)              We Performed the Following     Beta HCG qual IFA urine        Primary Care Provider Office Phone # Fax #    Cynthia Scott PA-C [phone]       [facility] [address] [facility] [address]        Thank you!     Thank you for choosing [facility]  for your care. Our goal is always to provide you with excellent care. Hearing back from our patients is one way we can continue to improve our services. Please take a few minutes to complete the written survey that you may receive in the mail after your visit with us. Thank you!             Your Updated Medication List - Protect others around you: Learn how to safely use, store and throw away your medicines at www.disposemymeds.org.          This list is accurate as of:  3/17/17 10:49 AM.  Always use your most recent med list.                   Brand Name Dispense Instructions for use    ABILIFY 10 MG tablet   Generic drug:  ARIPiprazole      Take 10 mg by mouth daily       albuterol 108 (90 BASE) MCG/ACT Inhaler    PROAIR HFA/PROVENTIL HFA/VENTOLIN HFA    1 Inhaler    2 puffs in mouth every 6 hrs for next 2 days and then every 6 hrs as needed for shortness of breath and wheezing.       azithromycin 250 MG tablet    ZITHROMAX    6 tablet    Two tablets first day, then one tablet daily for four days.       buPROPion 150 MG 12 hr tablet    WELLBUTRIN SR     Take 150 mg by mouth daily       folic acid 1 MG tablet    FOLVITE    100 tablet    Take 1 tablet (1 mg) by mouth daily       hydroxychloroquine 200 MG tablet    PLAQUENIL    180 tablet    Take 2 tablets (400 mg) by mouth daily       medroxyPROGESTERone 150 MG/ML injection    DEPO-PROVERA    1 mL    Inject 1 mL (150 mg) into the muscle every 3 months       methotrexate sodium 2.5 MG Tabs     32 tablet    Take 15 mg by mouth once a week . Take all 6 tablets on the same day of each week.       sertraline 100 MG tablet    ZOLOFT     2 TABLET DAILY       valACYclovir 500 MG tablet    VALTREX    6 tablet    Take 1 tablet (500 mg) by mouth 2 times daily

## 2017-03-17 NOTE — NURSING NOTE
"Completed UPT it was negative. However, pt left before receiving her Depo shot. Called patient she stated she has another appointment and rescheduled her for another Depo appointment with ancillary on 3/22/17.  Linda Garcia MA      Called pt back to inform her that she would have to complete another pregnancy test at her next appt too. She asked \"Why\", I stated because we have to have a negative pregnancy test the day we give you your depo. She said \"I'm not even fucking have sex, so just fucking forget it\". She then hung op the phone on me.   Linda Garcia MA    "

## 2017-04-26 DIAGNOSIS — Z30.42 SURVEILLANCE FOR DEPO-PROVERA CONTRACEPTION: Primary | ICD-10-CM

## 2017-04-26 RX ORDER — MEDROXYPROGESTERONE ACETATE 150 MG/ML
150 INJECTION, SUSPENSION INTRAMUSCULAR
Qty: 1 ML | Refills: 3 | OUTPATIENT
Start: 2017-04-26 | End: 2018-09-10

## 2017-04-26 NOTE — TELEPHONE ENCOUNTER
Patient is coming in tomorrow for a UPT and depo injection. Her order in the chart is 08/31/2016 but without an end date we can not give this medication. I pended a new order with end date could you sign please and thank you  Rosario Ponce CMA

## 2017-04-26 NOTE — NURSING NOTE
BLOOD PRESSURE: 114/72    DATE OF LAST PAP or ANNUAL EXAM:   Lab Results   Component Value Date    PAP NIL 10/16/2015     URINE HCG:negative    The following medication was given:     MEDICATION: Depo Provera 150mg  ROUTE: IM  SITE: Deltoid - Left  : [employer]  LOT #: [id number]  EXPIRATION:11/30/19  NEXT INJECTION DUE: July 17-31  Provider: Sonia Su MA

## 2017-05-01 ENCOUNTER — ALLIED HEALTH/NURSE VISIT (OUTPATIENT)
Dept: NURSING | Facility: CLINIC | Age: 42
End: 2017-05-01
Payer: COMMERCIAL

## 2017-05-01 VITALS — WEIGHT: 147.9 LBS | SYSTOLIC BLOOD PRESSURE: 114 MMHG | BODY MASS INDEX: 24.05 KG/M2 | DIASTOLIC BLOOD PRESSURE: 72 MMHG

## 2017-05-01 DIAGNOSIS — Z32.00 PREGNANCY EXAMINATION OR TEST, PREGNANCY UNCONFIRMED: Primary | ICD-10-CM

## 2017-05-01 DIAGNOSIS — M32.9 SYSTEMIC LUPUS ERYTHEMATOSUS (H): ICD-10-CM

## 2017-05-01 DIAGNOSIS — Z30.42 SURVEILLANCE FOR DEPO-PROVERA CONTRACEPTION: ICD-10-CM

## 2017-05-01 LAB
ALBUMIN SERPL-MCNC: 3.6 G/DL (ref 3.4–5)
ALBUMIN UR-MCNC: ABNORMAL MG/DL
ALP SERPL-CCNC: 24 U/L (ref 40–150)
ALT SERPL W P-5'-P-CCNC: 23 U/L (ref 0–50)
ANION GAP SERPL CALCULATED.3IONS-SCNC: 8 MMOL/L (ref 3–14)
APPEARANCE UR: CLEAR
AST SERPL W P-5'-P-CCNC: 16 U/L (ref 0–45)
BASOPHILS # BLD AUTO: 0 10E9/L (ref 0–0.2)
BASOPHILS NFR BLD AUTO: 0.5 %
BETA HCG QUAL IFA URINE: NEGATIVE
BILIRUB SERPL-MCNC: 0.4 MG/DL (ref 0.2–1.3)
BILIRUB UR QL STRIP: ABNORMAL
BUN SERPL-MCNC: 16 MG/DL (ref 7–30)
CALCIUM SERPL-MCNC: 8.9 MG/DL (ref 8.5–10.1)
CHLORIDE SERPL-SCNC: 110 MMOL/L (ref 94–109)
CK SERPL-CCNC: 81 U/L (ref 30–225)
CO2 SERPL-SCNC: 24 MMOL/L (ref 20–32)
COLOR UR AUTO: YELLOW
CREAT SERPL-MCNC: 0.83 MG/DL (ref 0.52–1.04)
CREAT UR-MCNC: 324 MG/DL
CRP SERPL-MCNC: <2.9 MG/L (ref 0–8)
DIFFERENTIAL METHOD BLD: NORMAL
EOSINOPHIL # BLD AUTO: 0.2 10E9/L (ref 0–0.7)
EOSINOPHIL NFR BLD AUTO: 2.5 %
ERYTHROCYTE [DISTWIDTH] IN BLOOD BY AUTOMATED COUNT: 13.8 % (ref 10–15)
ERYTHROCYTE [SEDIMENTATION RATE] IN BLOOD BY WESTERGREN METHOD: 4 MM/H (ref 0–20)
GFR SERPL CREATININE-BSD FRML MDRD: 75 ML/MIN/1.7M2
GLUCOSE SERPL-MCNC: 91 MG/DL (ref 70–99)
GLUCOSE UR STRIP-MCNC: NEGATIVE MG/DL
HCT VFR BLD AUTO: 40.6 % (ref 35–47)
HGB BLD-MCNC: 14 G/DL (ref 11.7–15.7)
HGB UR QL STRIP: ABNORMAL
KETONES UR STRIP-MCNC: ABNORMAL MG/DL
LEUKOCYTE ESTERASE UR QL STRIP: NEGATIVE
LYMPHOCYTES # BLD AUTO: 2 10E9/L (ref 0.8–5.3)
LYMPHOCYTES NFR BLD AUTO: 33.2 %
MCH RBC QN AUTO: 29.3 PG (ref 26.5–33)
MCHC RBC AUTO-ENTMCNC: 34.5 G/DL (ref 31.5–36.5)
MCV RBC AUTO: 85 FL (ref 78–100)
MONOCYTES # BLD AUTO: 0.4 10E9/L (ref 0–1.3)
MONOCYTES NFR BLD AUTO: 6.2 %
MUCOUS THREADS #/AREA URNS LPF: PRESENT /LPF
NEUTROPHILS # BLD AUTO: 3.5 10E9/L (ref 1.6–8.3)
NEUTROPHILS NFR BLD AUTO: 57.6 %
NITRATE UR QL: NEGATIVE
NON-SQ EPI CELLS #/AREA URNS LPF: ABNORMAL /LPF
PH UR STRIP: 6 PH (ref 5–7)
PLATELET # BLD AUTO: 163 10E9/L (ref 150–450)
POTASSIUM SERPL-SCNC: 3.6 MMOL/L (ref 3.4–5.3)
PROT SERPL-MCNC: 6.5 G/DL (ref 6.8–8.8)
PROT UR-MCNC: 1.14 G/L
PROT/CREAT 24H UR: 0.35 G/G CR (ref 0–0.2)
RBC # BLD AUTO: 4.78 10E12/L (ref 3.8–5.2)
RBC #/AREA URNS AUTO: ABNORMAL /HPF (ref 0–2)
SODIUM SERPL-SCNC: 142 MMOL/L (ref 133–144)
SP GR UR STRIP: >1.03 (ref 1–1.03)
URN SPEC COLLECT METH UR: ABNORMAL
UROBILINOGEN UR STRIP-ACNC: 0.2 EU/DL (ref 0.2–1)
WBC # BLD AUTO: 6 10E9/L (ref 4–11)
WBC #/AREA URNS AUTO: ABNORMAL /HPF (ref 0–2)

## 2017-05-01 PROCEDURE — 85652 RBC SED RATE AUTOMATED: CPT | Performed by: FAMILY MEDICINE

## 2017-05-01 PROCEDURE — 86225 DNA ANTIBODY NATIVE: CPT | Performed by: FAMILY MEDICINE

## 2017-05-01 PROCEDURE — 81001 URINALYSIS AUTO W/SCOPE: CPT | Performed by: FAMILY MEDICINE

## 2017-05-01 PROCEDURE — 80053 COMPREHEN METABOLIC PANEL: CPT | Performed by: FAMILY MEDICINE

## 2017-05-01 PROCEDURE — 82550 ASSAY OF CK (CPK): CPT | Performed by: FAMILY MEDICINE

## 2017-05-01 PROCEDURE — 86160 COMPLEMENT ANTIGEN: CPT | Performed by: FAMILY MEDICINE

## 2017-05-01 PROCEDURE — 36415 COLL VENOUS BLD VENIPUNCTURE: CPT | Performed by: FAMILY MEDICINE

## 2017-05-01 PROCEDURE — 99207 ZZC NO CHARGE NURSE ONLY: CPT

## 2017-05-01 PROCEDURE — 96372 THER/PROPH/DIAG INJ SC/IM: CPT

## 2017-05-01 PROCEDURE — 86140 C-REACTIVE PROTEIN: CPT | Performed by: FAMILY MEDICINE

## 2017-05-01 PROCEDURE — 85025 COMPLETE CBC W/AUTO DIFF WBC: CPT | Performed by: FAMILY MEDICINE

## 2017-05-01 PROCEDURE — 84156 ASSAY OF PROTEIN URINE: CPT | Performed by: FAMILY MEDICINE

## 2017-05-01 PROCEDURE — 84703 CHORIONIC GONADOTROPIN ASSAY: CPT | Performed by: PHYSICIAN ASSISTANT

## 2017-05-01 NOTE — MR AVS SNAPSHOT
After Visit Summary   5/1/2017    Sosa Wolf    MRN: [id number]           Patient Information     Date Of Birth          1975        Visit Information        Provider Department      5/1/2017 9:45 AM CP ANCILLARY [facility]        Today's Diagnoses     Pregnancy examination or test, pregnancy unconfirmed    -  1    Surveillance for Depo-Provera contraception           Follow-ups after your visit        Your next 10 appointments already scheduled     May 04, 2017  9:20 AM CDT   Return Visit with Jack Arvizu MD   [facility] ([facility])    [address]  [place] [id number] [phone]              Who to contact     If you have questions or need follow up information about today's clinic visit or your schedule please contact [facility] directly at [phone].  Normal or non-critical lab and imaging results will be communicated to you by MyChart, letter or phone within 4 business days after the clinic has received the results. If you do not hear from us within 7 days, please contact the clinic through MyChart or phone. If you have a critical or abnormal lab result, we will notify you by phone as soon as possible.  Submit refill requests through [employer] or call your pharmacy and they will forward the refill request to us. Please allow 3 business days for your refill to be completed.          Additional Information About Your Visit        MyChart Information     [employer] gives you secure access to your electronic health record. If you see a primary care provider, you can also send messages to your care team and make appointments. If you have questions, please call your primary care clinic.  If you do not have a primary care provider, please call [phone] and they will assist you.        Care EveryWhere ID     This is your Care EveryWhere ID. This could be used by other organizations to access your  [place] medical records  [id number]        Your Vitals Were     BMI (Body Mass Index)                   24.05 kg/m2            Blood Pressure from Last 3 Encounters:   05/01/17 114/72   01/30/17 102/61   01/18/17 120/68    Weight from Last 3 Encounters:   05/01/17 147 lb 14.4 oz (67.1 kg)   01/30/17 171 lb (77.6 kg)   01/18/17 171 lb 9.6 oz (77.8 kg)              We Performed the Following     Beta HCG qual IFA urine     Medroxyprogesterone inj/1mg (Depo Provera J-Code)        Primary Care Provider Office Phone # Fax #    Cynthia Scott PA-C [phone]       [facility] [address]  [facility] [place] [address]        Thank you!     Thank you for choosing [facility]  for your care. Our goal is always to provide you with excellent care. Hearing back from our patients is one way we can continue to improve our services. Please take a few minutes to complete the written survey that you may receive in the mail after your visit with us. Thank you!             Your Updated Medication List - Protect others around you: Learn how to safely use, store and throw away your medicines at www.disposemymeds.org.          This list is accurate as of: 5/1/17 10:16 AM.  Always use your most recent med list.                   Brand Name Dispense Instructions for use    ABILIFY 10 MG tablet   Generic drug:  ARIPiprazole      Take 10 mg by mouth daily       albuterol 108 (90 BASE) MCG/ACT Inhaler    PROAIR HFA/PROVENTIL HFA/VENTOLIN HFA    1 Inhaler    2 puffs in mouth every 6 hrs for next 2 days and then every 6 hrs as needed for shortness of breath and wheezing.       azithromycin 250 MG tablet    ZITHROMAX    6 tablet    Two tablets first day, then one tablet daily for four days.       buPROPion 150 MG 12 hr tablet    WELLBUTRIN SR     Take 150 mg by mouth daily       folic acid 1 MG tablet    FOLVITE    100 tablet    Take 1 tablet (1 mg) by mouth daily        hydroxychloroquine 200 MG tablet    PLAQUENIL    180 tablet    Take 2 tablets (400 mg) by mouth daily       * medroxyPROGESTERone 150 MG/ML injection    DEPO-PROVERA    1 mL    Inject 1 mL (150 mg) into the muscle every 3 months       * medroxyPROGESTERone 150 MG/ML injection    DEPO-PROVERA    1 mL    Inject 1 mL (150 mg) into the muscle every 3 months       methotrexate sodium 2.5 MG Tabs     32 tablet    Take 15 mg by mouth once a week . Take all 6 tablets on the same day of each week.       sertraline 100 MG tablet    ZOLOFT     2 TABLET DAILY       valACYclovir 500 MG tablet    VALTREX    6 tablet    Take 1 tablet (500 mg) by mouth 2 times daily       * Notice:  This list has 2 medication(s) that are the same as other medications prescribed for you. Read the directions carefully, and ask your doctor or other care provider to review them with you.

## 2017-05-02 LAB
C3 SERPL-MCNC: 80 MG/DL (ref 76–169)
C4 SERPL-MCNC: 17 MG/DL (ref 15–50)
DSDNA AB SER-ACNC: 37 IU/ML

## 2017-05-05 DIAGNOSIS — R80.9 PROTEINURIA: ICD-10-CM

## 2017-05-05 DIAGNOSIS — M32.9 SYSTEMIC LUPUS ERYTHEMATOSUS (H): Primary | ICD-10-CM

## 2017-05-05 NOTE — PROGRESS NOTES
Rheumatology team: Please call to notify Ms. Wolf that her labs showed a slightly elevated urine protein level that will need to be re-evaluated with repeat urine studies; orders have been placed; please assist with scheduling a lab appointment.  It is reassuring that her creatinine is stable.  Also, she is overdue for follow up; please see if she would like to schedule an appointment.    Jack Arvizu MD  5/5/2017 6:27 AM

## 2017-06-01 DIAGNOSIS — Z79.899 HIGH RISK MEDICATION USE: ICD-10-CM

## 2017-06-01 DIAGNOSIS — M32.9 SYSTEMIC LUPUS ERYTHEMATOSUS (H): ICD-10-CM

## 2017-06-01 NOTE — TELEPHONE ENCOUNTER
Hydroxychloroquine      Last Written Prescription Date:  1/18/2017  Last Fill Quantity: 180,   # refills: 3  Last Office Visit with [facility], [facility] or  Health prescribing provider: 1/18/2017  Future Office visit:       Routing refill request to provider for review/approval because:  Drug not on the [facility], [facility] or  [employer] refill protocol or controlled substance

## 2017-06-05 RX ORDER — HYDROXYCHLOROQUINE SULFATE 200 MG/1
400 TABLET, FILM COATED ORAL DAILY
Qty: 60 TABLET | Refills: 0 | Status: SHIPPED | OUTPATIENT
Start: 2017-06-05 | End: 2017-07-13

## 2017-06-05 NOTE — TELEPHONE ENCOUNTER
Rheumatology team: Please call to notify Ms. Wolf that the refill request for hydroxychloroquine was provided for one month. She needs a follow-up appointment for additional refills.    Jack Arvizu MD  6/5/2017 5:42 PM

## 2017-06-06 NOTE — TELEPHONE ENCOUNTER
Left voice message for patient (ok per demographics) that Dr. Arvizu did refill her hydroxychloroquine for 1 month but will need to make a follow up appointment for any more refills.  Bailey Resendez CMA  6/6/2017 10:58 AM

## 2017-07-07 ENCOUNTER — TELEPHONE (OUTPATIENT)
Dept: RHEUMATOLOGY | Facility: CLINIC | Age: 42
End: 2017-07-07

## 2017-07-07 NOTE — TELEPHONE ENCOUNTER
hydroxychloroquine (PLAQUENIL) 200 MG tablet      Last Written Prescription Date:  6/5/17  Last Fill Quantity: 60,   # refills: 0  Last Office Visit with [facility], [facility] or [facility] prescribing provider: 1/18/17  Future Office visit:    Next 5 appointments (look out 90 days)     Jul 17, 2017  9:00 AM CDT   Nurse Only with CP ANCILLARY   [facility] ([facility]    [address] [place] [id number] [phone]                   Routing refill request to provider for review/approval because:  Drug not active on patient's medication list

## 2017-07-07 NOTE — TELEPHONE ENCOUNTER
Routing refill request to provider for review/approval because:  Ysabel given x1 and patient did not follow up, please advise  Patient needs to be seen because:  Patient is due for f/u and did not schedule.     Calista Silver RN

## 2017-07-13 DIAGNOSIS — M32.19 OTHER SYSTEMIC LUPUS ERYTHEMATOSUS WITH OTHER ORGAN INVOLVEMENT (H): ICD-10-CM

## 2017-07-13 DIAGNOSIS — Z79.899 HIGH RISK MEDICATION USE: ICD-10-CM

## 2017-07-13 RX ORDER — HYDROXYCHLOROQUINE SULFATE 200 MG/1
400 TABLET, FILM COATED ORAL DAILY
Qty: 60 TABLET | Refills: 2 | Status: SHIPPED | OUTPATIENT
Start: 2017-07-13 | End: 2017-09-21

## 2017-07-13 NOTE — TELEPHONE ENCOUNTER
Rheumatology team: please inform Ms. Wolf that a refill of hydroxychloroquine was sent to her pharmacy.     Jack Arvizu MD  7/13/2017 4:54 AM

## 2017-07-13 NOTE — TELEPHONE ENCOUNTER
Called and left detailed VM with messages below that Rx was refilled and needs to schedule lab appt for further refills. Gave instructions on how to schedule lab appt.    Loulou Avina RN

## 2017-07-13 NOTE — TELEPHONE ENCOUNTER
Rheumatology team: Please inform Ms. Wolf that repeat labs are still needed.  Follow up appointment is needed.     Jack Arvizu MD  7/13/2017 4:52 AM

## 2017-07-17 ENCOUNTER — ALLIED HEALTH/NURSE VISIT (OUTPATIENT)
Dept: NURSING | Facility: CLINIC | Age: 42
End: 2017-07-17
Payer: COMMERCIAL

## 2017-07-17 VITALS
DIASTOLIC BLOOD PRESSURE: 72 MMHG | SYSTOLIC BLOOD PRESSURE: 113 MMHG | HEART RATE: 76 BPM | WEIGHT: 134.4 LBS | BODY MASS INDEX: 21.86 KG/M2

## 2017-07-17 DIAGNOSIS — Z30.42 SURVEILLANCE FOR DEPO-PROVERA CONTRACEPTION: Primary | ICD-10-CM

## 2017-07-17 PROCEDURE — 99207 ZZC NO CHARGE NURSE ONLY: CPT

## 2017-07-17 PROCEDURE — 96372 THER/PROPH/DIAG INJ SC/IM: CPT

## 2017-07-17 NOTE — MR AVS SNAPSHOT
After Visit Summary   7/17/2017    Sosa Wolf    MRN: [id number]           Patient Information     Date Of Birth          1975        Visit Information        Provider Department      7/17/2017 9:00 AM CP ANCILLARY [facility]        Today's Diagnoses     Surveillance for Depo-Provera contraception    -  1       Follow-ups after your visit        Your next 10 appointments already scheduled     Jul 17, 2017  9:00 AM CDT   Nurse Only with CP ANCILLARY   [facility] ([facility])    [address] [facility] [id number] [phone]              Who to contact     If you have questions or need follow up information about today's clinic visit or your schedule please contact [facility] directly at [phone].  Normal or non-critical lab and imaging results will be communicated to you by MyChart, letter or phone within 4 business days after the clinic has received the results. If you do not hear from us within 7 days, please contact the clinic through MyChart or phone. If you have a critical or abnormal lab result, we will notify you by phone as soon as possible.  Submit refill requests through Brightfish or call your pharmacy and they will forward the refill request to us. Please allow 3 business days for your refill to be completed.          Additional Information About Your Visit        MyChart Information     Brightfish gives you secure access to your electronic health record. If you see a primary care provider, you can also send messages to your care team and make appointments. If you have questions, please call your primary care clinic.  If you do not have a primary care provider, please call [phone] and they will assist you.        Care EveryWhere ID     This is your Care EveryWhere ID. This could be used by other organizations to access your [facility]  records  [id number]        Your Vitals Were     Pulse BMI (Body Mass Index)                76 21.86 kg/m2           Blood Pressure from Last 3 Encounters:   07/17/17 113/72   05/01/17 114/72   01/30/17 102/61    Weight from Last 3 Encounters:   07/17/17 134 lb 6.4 oz (61 kg)   05/01/17 147 lb 14.4 oz (67.1 kg)   01/30/17 171 lb (77.6 kg)              We Performed the Following     MEDROXYPROGESTERONE INJ        Primary Care Provider Office Phone # Fax #    Cynthia Scott PA-C [phone]       [facility] [address]  [facility] [place] [address]        Equal Access to Services     MAI OLMEDO : Hadii aad ku hadasho Soomaali, waaxda luqadaha, qaybta kaalmada adeegyada, waxay idiin hayyurin mary bah . So [facility] [phone].    ATENCIÓN: Si habla español, tiene a bernstein disposición servicios gratuitos de asistencia lingüística. Llame al [phone].    We comply with applicable federal civil rights laws and [place] laws. We do not discriminate on the basis of race, color, national origin, age, disability sex, sexual orientation or gender identity.            Thank you!     Thank you for choosing [facility]  for your care. Our goal is always to provide you with excellent care. Hearing back from our patients is one way we can continue to improve our services. Please take a few minutes to complete the written survey that you may receive in the mail after your visit with us. Thank you!             Your Updated Medication List - Protect others around you: Learn how to safely use, store and throw away your medicines at www.disposemymeds.org.          This list is accurate as of: 7/17/17  8:52 AM.  Always use your most recent med list.                   Brand Name Dispense Instructions for use Diagnosis    ABILIFY 10 MG tablet   Generic drug:  ARIPiprazole      Take 10 mg by mouth daily        albuterol 108 (90 BASE) MCG/ACT Inhaler    PROAIR  HFA/PROVENTIL HFA/VENTOLIN HFA    1 Inhaler    2 puffs in mouth every 6 hrs for next 2 days and then every 6 hrs as needed for shortness of breath and wheezing.    Acute bronchitis with symptoms > 10 days       azithromycin 250 MG tablet    ZITHROMAX    6 tablet    Two tablets first day, then one tablet daily for four days.    Acute bronchitis with symptoms > 10 days       buPROPion 150 MG 12 hr tablet    WELLBUTRIN SR     Take 150 mg by mouth daily        folic acid 1 MG tablet    FOLVITE    100 tablet    Take 1 tablet (1 mg) by mouth daily    Systemic lupus erythematosus (H)       hydroxychloroquine 200 MG tablet    PLAQUENIL    60 tablet    Take 2 tablets (400 mg) by mouth daily . Rheumatology clinic follow up and labs are needed prior to future refills.    High risk medication use, Other systemic lupus erythematosus with other organ involvement (H)       * medroxyPROGESTERone 150 MG/ML injection    DEPO-PROVERA    1 mL    Inject 1 mL (150 mg) into the muscle every 3 months    Encounter for initial prescription of injectable contraceptive       * medroxyPROGESTERone 150 MG/ML injection    DEPO-PROVERA    1 mL    Inject 1 mL (150 mg) into the muscle every 3 months    Surveillance for Depo-Provera contraception       methotrexate sodium 2.5 MG Tabs     32 tablet    Take 15 mg by mouth once a week . Take all 6 tablets on the same day of each week.    Systemic lupus erythematosus (H)       sertraline 100 MG tablet    ZOLOFT     2 TABLET DAILY        valACYclovir 500 MG tablet    VALTREX    6 tablet    Take 1 tablet (500 mg) by mouth 2 times daily    Herpes simplex type 2 infection       * Notice:  This list has 2 medication(s) that are the same as other medications prescribed for you. Read the directions carefully, and ask your doctor or other care provider to review them with you.

## 2017-07-17 NOTE — NURSING NOTE
BLOOD PRESSURE: Data Unavailable    DATE OF LAST PAP or ANNUAL EXAM:   Lab Results   Component Value Date    PAP NIL 10/16/2015     URINE HCG:not indicated    The following medication was given:     MEDICATION: Depo Provera 150mg  ROUTE: IM  SITE: Deltoid - Right  : Gogo  LOT #: [id number]  EXPIRATION:1/30/2020  NEXT INJECTION DUE: 10/2-10/16  Provider: Sonia Su MA

## 2017-08-14 ENCOUNTER — NURSE TRIAGE (OUTPATIENT)
Dept: NURSING | Facility: CLINIC | Age: 42
End: 2017-08-14

## 2017-08-14 NOTE — TELEPHONE ENCOUNTER
"\"Everything is blurry and I'm having trouble focusing.\"   Onset 08/12/17 of change in vision. Patient states she has had similar symptoms in the past, but has not reported to physician.     Reason for Disposition    [1] Blurred vision or visual changes AND [2] present now AND [3] sudden onset or new (e.g., minutes, hours, days)  (Exception: previously diagnosed migraine headaches with same symptoms)    Additional Information    Negative: Followed getting substance in the eye    Negative: Foreign body or object is or was lodged in the eye    Negative: Followed an eye injury    Negative: Followed sun lamp or sun exposure (UV keratitis)    Negative: Yellow or green discharge (pus) in the eye    Negative: Pregnant    Negative: Postpartum    Negative: Complete loss of vision in 1 or both eyes    Negative: Severe eye pain    Negative: Severe headache    Negative: Double vision    Protocols used: VISION LOSS OR CHANGE-ADULT-AH    "

## 2017-08-28 ENCOUNTER — DOCUMENTATION ONLY (OUTPATIENT)
Dept: LAB | Facility: CLINIC | Age: 42
End: 2017-08-28

## 2017-08-28 NOTE — PROGRESS NOTES
Hi! Sosa is coming to the [facility] on  for lab orders from Dr. Arvizu. All orders in Epic have . Please review her chart and place what labs are needed. Thank you! Hayley

## 2017-08-30 NOTE — PROGRESS NOTES
Left detailed message on patient's cell phone per OK in demographics section with message below.     Loulou Avina RN

## 2017-08-30 NOTE — PROGRESS NOTES
Ms. Wolf will need to be seen in clinic prior to additional labs.  Please advise her that the previsit lab appointment is not needed for rheumatology.    Jack Arvizu MD  8/29/2017 10:22 PM

## 2017-09-21 ENCOUNTER — OFFICE VISIT (OUTPATIENT)
Dept: RHEUMATOLOGY | Facility: CLINIC | Age: 42
End: 2017-09-21
Payer: COMMERCIAL

## 2017-09-21 VITALS
HEIGHT: 66 IN | WEIGHT: 155.2 LBS | SYSTOLIC BLOOD PRESSURE: 119 MMHG | DIASTOLIC BLOOD PRESSURE: 75 MMHG | TEMPERATURE: 97.1 F | HEART RATE: 94 BPM | BODY MASS INDEX: 24.94 KG/M2 | OXYGEN SATURATION: 98 %

## 2017-09-21 DIAGNOSIS — M35.00 SICCA SYNDROME (H): ICD-10-CM

## 2017-09-21 DIAGNOSIS — Z79.899 HIGH RISK MEDICATIONS (NOT ANTICOAGULANTS) LONG-TERM USE: ICD-10-CM

## 2017-09-21 DIAGNOSIS — M32.19 OTHER SYSTEMIC LUPUS ERYTHEMATOSUS WITH OTHER ORGAN INVOLVEMENT (H): Primary | ICD-10-CM

## 2017-09-21 LAB
ALBUMIN SERPL-MCNC: 3.6 G/DL (ref 3.4–5)
ALBUMIN UR-MCNC: NEGATIVE MG/DL
ALP SERPL-CCNC: 30 U/L (ref 40–150)
ALT SERPL W P-5'-P-CCNC: 30 U/L (ref 0–50)
ANION GAP SERPL CALCULATED.3IONS-SCNC: 4 MMOL/L (ref 3–14)
APPEARANCE UR: CLEAR
AST SERPL W P-5'-P-CCNC: 12 U/L (ref 0–45)
BASOPHILS # BLD AUTO: 0 10E9/L (ref 0–0.2)
BASOPHILS NFR BLD AUTO: 0.2 %
BILIRUB SERPL-MCNC: 0.5 MG/DL (ref 0.2–1.3)
BILIRUB UR QL STRIP: NEGATIVE
BUN SERPL-MCNC: 19 MG/DL (ref 7–30)
CALCIUM SERPL-MCNC: 8.9 MG/DL (ref 8.5–10.1)
CHLORIDE SERPL-SCNC: 104 MMOL/L (ref 94–109)
CK SERPL-CCNC: 62 U/L (ref 30–225)
CO2 SERPL-SCNC: 29 MMOL/L (ref 20–32)
COLOR UR AUTO: YELLOW
CREAT SERPL-MCNC: 0.68 MG/DL (ref 0.52–1.04)
CREAT UR-MCNC: 100 MG/DL
CRP SERPL-MCNC: 55.1 MG/L (ref 0–8)
DIFFERENTIAL METHOD BLD: ABNORMAL
EOSINOPHIL # BLD AUTO: 0.2 10E9/L (ref 0–0.7)
EOSINOPHIL NFR BLD AUTO: 1.6 %
ERYTHROCYTE [DISTWIDTH] IN BLOOD BY AUTOMATED COUNT: 13.6 % (ref 10–15)
ERYTHROCYTE [SEDIMENTATION RATE] IN BLOOD BY WESTERGREN METHOD: 15 MM/H (ref 0–20)
GFR SERPL CREATININE-BSD FRML MDRD: >90 ML/MIN/1.7M2
GLUCOSE SERPL-MCNC: 88 MG/DL (ref 70–99)
GLUCOSE UR STRIP-MCNC: NEGATIVE MG/DL
HCT VFR BLD AUTO: 37.2 % (ref 35–47)
HGB BLD-MCNC: 12.5 G/DL (ref 11.7–15.7)
HGB UR QL STRIP: ABNORMAL
KETONES UR STRIP-MCNC: NEGATIVE MG/DL
LEUKOCYTE ESTERASE UR QL STRIP: NEGATIVE
LYMPHOCYTES # BLD AUTO: 1.4 10E9/L (ref 0.8–5.3)
LYMPHOCYTES NFR BLD AUTO: 11.9 %
MCH RBC QN AUTO: 28.5 PG (ref 26.5–33)
MCHC RBC AUTO-ENTMCNC: 33.6 G/DL (ref 31.5–36.5)
MCV RBC AUTO: 85 FL (ref 78–100)
MONOCYTES # BLD AUTO: 1.2 10E9/L (ref 0–1.3)
MONOCYTES NFR BLD AUTO: 9.8 %
NEUTROPHILS # BLD AUTO: 9 10E9/L (ref 1.6–8.3)
NEUTROPHILS NFR BLD AUTO: 76.5 %
NITRATE UR QL: NEGATIVE
NON-SQ EPI CELLS #/AREA URNS LPF: ABNORMAL /LPF
PH UR STRIP: 6 PH (ref 5–7)
PLATELET # BLD AUTO: 194 10E9/L (ref 150–450)
POTASSIUM SERPL-SCNC: 4.3 MMOL/L (ref 3.4–5.3)
PROT SERPL-MCNC: 7.2 G/DL (ref 6.8–8.8)
PROT UR-MCNC: 0.25 G/L
PROT/CREAT 24H UR: 0.25 G/G CR (ref 0–0.2)
RBC # BLD AUTO: 4.39 10E12/L (ref 3.8–5.2)
RBC #/AREA URNS AUTO: ABNORMAL /HPF
SODIUM SERPL-SCNC: 137 MMOL/L (ref 133–144)
SOURCE: ABNORMAL
SP GR UR STRIP: >1.03 (ref 1–1.03)
UROBILINOGEN UR STRIP-ACNC: 0.2 EU/DL (ref 0.2–1)
WBC # BLD AUTO: 11.8 10E9/L (ref 4–11)
WBC #/AREA URNS AUTO: ABNORMAL /HPF

## 2017-09-21 PROCEDURE — 86225 DNA ANTIBODY NATIVE: CPT | Performed by: INTERNAL MEDICINE

## 2017-09-21 PROCEDURE — 85025 COMPLETE CBC W/AUTO DIFF WBC: CPT | Performed by: INTERNAL MEDICINE

## 2017-09-21 PROCEDURE — 80053 COMPREHEN METABOLIC PANEL: CPT | Performed by: INTERNAL MEDICINE

## 2017-09-21 PROCEDURE — 86160 COMPLEMENT ANTIGEN: CPT | Performed by: INTERNAL MEDICINE

## 2017-09-21 PROCEDURE — 36415 COLL VENOUS BLD VENIPUNCTURE: CPT | Performed by: INTERNAL MEDICINE

## 2017-09-21 PROCEDURE — 86140 C-REACTIVE PROTEIN: CPT | Performed by: INTERNAL MEDICINE

## 2017-09-21 PROCEDURE — 81001 URINALYSIS AUTO W/SCOPE: CPT | Performed by: INTERNAL MEDICINE

## 2017-09-21 PROCEDURE — 82550 ASSAY OF CK (CPK): CPT | Performed by: INTERNAL MEDICINE

## 2017-09-21 PROCEDURE — 85652 RBC SED RATE AUTOMATED: CPT | Performed by: INTERNAL MEDICINE

## 2017-09-21 PROCEDURE — 99214 OFFICE O/P EST MOD 30 MIN: CPT | Performed by: INTERNAL MEDICINE

## 2017-09-21 PROCEDURE — 84156 ASSAY OF PROTEIN URINE: CPT | Performed by: INTERNAL MEDICINE

## 2017-09-21 RX ORDER — LEFLUNOMIDE 20 MG/1
20 TABLET ORAL DAILY
Qty: 30 TABLET | Refills: 2 | Status: SHIPPED | OUTPATIENT
Start: 2017-09-21 | End: 2018-09-10

## 2017-09-21 NOTE — NURSING NOTE
"Chief Complaint   Patient presents with     Systemic Lupus Erythematous     patient has had a cold, been tired       Initial /75 (BP Location: Left arm, Patient Position: Chair, Cuff Size: Adult Regular)  Pulse 94  Temp 97.1  F (36.2  C) (Oral)  Ht 1.67 m (5' 5.75\")  Wt 70.4 kg (155 lb 3.2 oz)  SpO2 98%  BMI 25.24 kg/m2 Estimated body mass index is 25.24 kg/(m^2) as calculated from the following:    Height as of this encounter: 1.67 m (5' 5.75\").    Weight as of this encounter: 70.4 kg (155 lb 3.2 oz).  BP completed using cuff size: regular         RAPID3 (0-30) Cumulative Score  18.0          RAPID3 Weighted Score (divide #4 by 3 and that is the weighted score)  6.0         "

## 2017-09-21 NOTE — MR AVS SNAPSHOT
After Visit Summary   9/21/2017    Sosa Wolf    MRN: [id number]           Patient Information     Date Of Birth          1975        Visit Information        Provider Department      9/21/2017 7:40 AM Jack Arvizu MD [facility]        Today's Diagnoses     Other systemic lupus erythematosus with other organ involvement (H)    -  1    High risk medications (not anticoagulants) long-term use           Follow-ups after your visit        Your next 10 appointments already scheduled     Oct 17, 2017  7:15 AM CDT   LAB with [facility] LAB   [facility]    [address] [address]   [phone]           Patient must bring picture ID. Patient should be prepared to give a urine specimen  Please do not eat 10-12 hours before your appointment if you are coming in fasting for labs on lipids, cholesterol, or glucose (sugar). Pregnant women should follow their Care Team instructions. Water with medications is okay. Do not drink coffee or other fluids. If you have concerns about taking  your medications, please ask at office or if scheduling via Siperian, send a message by clicking on Secure Messaging, Message Your Care Team.            Nov 21, 2017  7:15 AM CST   LAB with [facility] LAB   [facility]    [address] [address]   [phone]           Patient must bring picture ID. Patient should be prepared to give a urine specimen  Please do not eat 10-12 hours before your appointment if you are coming in fasting for labs on lipids, cholesterol, or glucose (sugar). Pregnant women should follow their Care Team instructions. Water with medications is okay. Do not drink coffee or other fluids. If you have concerns about taking  your medications, please ask at office or if scheduling via [employer], send a message by clicking on Secure Messaging, Message Your Care Team.            Dec 14,  2017  7:15 AM CST   LAB with [facility] LAB   [facility] ([facility])    [address]  [place] [address]   [phone]           Patient must bring picture ID. Patient should be prepared to give a urine specimen  Please do not eat 10-12 hours before your appointment if you are coming in fasting for labs on lipids, cholesterol, or glucose (sugar). Pregnant women should follow their Care Team instructions. Water with medications is okay. Do not drink coffee or other fluids. If you have concerns about taking  your medications, please ask at office or if scheduling via [facility] [employer], send a message by clicking on Secure Messaging, Message Your Care Team.            Dec 21, 2017  7:20 AM CST   Return Visit with Jack Arvizu MD   [facility] ([facility])    [address]  [place] [address]   [phone]              Future tests that were ordered for you today     Open Standing Orders        Priority Remaining Interval Expires Ordered    CBC with platelets differential Routine 2/2 Every 4 Weeks 3/20/2018 9/21/2017    Creatinine Routine 2/2 Every 4 Weeks 3/20/2018 9/21/2017    Hepatic panel Routine 2/2 Every 4 Weeks 3/20/2018 9/21/2017          Open Future Orders        Priority Expected Expires Ordered    CBC with platelets differential Routine 12/13/2017 12/30/2017 9/21/2017    CK total Routine 12/13/2017 12/30/2017 9/21/2017    Complement C3 Routine 12/13/2017 12/30/2017 9/21/2017    DNA double stranded antibodies Routine 12/13/2017 12/30/2017 9/21/2017    CRP inflammation Routine 12/13/2017 12/30/2017 9/21/2017    Comprehensive metabolic panel Routine 12/13/2017 12/30/2017 9/21/2017    Complement C4 Routine 12/13/2017 12/30/2017 9/21/2017    Erythrocyte sedimentation rate auto Routine 12/13/2017 12/30/2017 9/21/2017    Protein  random urine with Creat Ratio Routine 12/13/2017 12/30/2017 9/21/2017    UA with Microscopic reflex to Culture Routine  "12/13/2017 12/30/2017 9/21/2017            Who to contact     If you have questions or need follow up information about today's clinic visit or your schedule please contact [facility] directly at [phone].  Normal or non-critical lab and imaging results will be communicated to you by MyChart, letter or phone within 4 business days after the clinic has received the results. If you do not hear from us within 7 days, please contact the clinic through Instapagehart or phone. If you have a critical or abnormal lab result, we will notify you by phone as soon as possible.  Submit refill requests through Moviepilot or call your pharmacy and they will forward the refill request to us. Please allow 3 business days for your refill to be completed.          Additional Information About Your Visit        InstapageharTherative Information     Moviepilot gives you secure access to your electronic health record. If you see a primary care provider, you can also send messages to your care team and make appointments. If you have questions, please call your primary care clinic.  If you do not have a primary care provider, please call [phone] and they will assist you.        Care EveryWhere ID     This is your Care EveryWhere ID. This could be used by other organizations to access your [facility] medical records  [id number]        Your Vitals Were     Pulse Temperature Height Pulse Oximetry BMI (Body Mass Index)       94 97.1  F (36.2  C) (Oral) 1.67 m (5' 5.75\") 98% 25.24 kg/m2        Blood Pressure from Last 3 Encounters:   09/21/17 119/75   07/17/17 113/72   05/01/17 114/72    Weight from Last 3 Encounters:   09/21/17 70.4 kg (155 lb 3.2 oz)   07/17/17 61 kg (134 lb 6.4 oz)   05/01/17 67.1 kg (147 lb 14.4 oz)              We Performed the Following     CBC with platelets differential     CK total     Complement C3     Complement C4     Comprehensive metabolic panel     CRP inflammation     DNA double stranded antibodies     Erythrocyte " sedimentation rate auto     Protein  random urine with Creat Ratio     UA with Microscopic reflex to Culture          Today's Medication Changes          These changes are accurate as of: 9/21/17  8:14 AM.  If you have any questions, ask your nurse or doctor.               Start taking these medicines.        Dose/Directions    leflunomide 20 MG tablet   Commonly known as:  ARAVA   Used for:  Other systemic lupus erythematosus with other organ involvement (H)   Started by:  Jack Arvizu MD        Dose:  20 mg   Take 1 tablet (20 mg) by mouth daily   Quantity:  30 tablet   Refills:  2         Stop taking these medicines if you haven't already. Please contact your care team if you have questions.     hydroxychloroquine 200 MG tablet   Commonly known as:  PLAQUENIL   Stopped by:  Jack Arvizu MD                Where to get your medicines      These medications were sent to [facility] - [place], [place] - [address]  [address], [facility] [address]     Phone:  [phone]     leflunomide 20 MG tablet                Primary Care Provider Office Phone # Fax #    Cynthia Scott PA-C [phone]       [address] [address]        Equal Access to Services     IRISH OLMEDO AH: Hadii aad ku hadasho Soomaali, waaxda luqadaha, qaybta kaalmada adeegyada, key iqbal. So [facility] [phone].    ATENCIÓN: Si habla español, tiene a bernstein disposición servicios gratuitos de asistencia lingüística. Tonya al [phone].    We comply with applicable federal civil rights laws and [place] laws. We do not discriminate on the basis of race, color, national origin, age, disability sex, sexual orientation or gender identity.            Thank you!     Thank you for choosing [facility]  for your care. Our goal is always to provide you with excellent care. Hearing back from our patients is one way we can  continue to improve our services. Please take a few minutes to complete the written survey that you may receive in the mail after your visit with us. Thank you!             Your Updated Medication List - Protect others around you: Learn how to safely use, store and throw away your medicines at www.disposemymeds.org.          This list is accurate as of: 9/21/17  8:14 AM.  Always use your most recent med list.                   Brand Name Dispense Instructions for use Diagnosis    ABILIFY 10 MG tablet   Generic drug:  ARIPiprazole      Take 10 mg by mouth daily        albuterol 108 (90 BASE) MCG/ACT Inhaler    PROAIR HFA/PROVENTIL HFA/VENTOLIN HFA    1 Inhaler    2 puffs in mouth every 6 hrs for next 2 days and then every 6 hrs as needed for shortness of breath and wheezing.    Acute bronchitis with symptoms > 10 days       azithromycin 250 MG tablet    ZITHROMAX    6 tablet    Two tablets first day, then one tablet daily for four days.    Acute bronchitis with symptoms > 10 days       buPROPion 150 MG 12 hr tablet    WELLBUTRIN SR     Take 150 mg by mouth daily        folic acid 1 MG tablet    FOLVITE    100 tablet    Take 1 tablet (1 mg) by mouth daily    Systemic lupus erythematosus (H)       leflunomide 20 MG tablet    ARAVA    30 tablet    Take 1 tablet (20 mg) by mouth daily    Other systemic lupus erythematosus with other organ involvement (H)       * medroxyPROGESTERone 150 MG/ML injection    DEPO-PROVERA    1 mL    Inject 1 mL (150 mg) into the muscle every 3 months    Encounter for initial prescription of injectable contraceptive       * medroxyPROGESTERone 150 MG/ML injection    DEPO-PROVERA    1 mL    Inject 1 mL (150 mg) into the muscle every 3 months    Surveillance for Depo-Provera contraception       methotrexate sodium 2.5 MG Tabs     32 tablet    Take 15 mg by mouth once a week . Take all 6 tablets on the same day of each week.    Systemic lupus erythematosus (H)       sertraline 100 MG tablet     ZOLOFT     2 TABLET DAILY        valACYclovir 500 MG tablet    VALTREX    6 tablet    Take 1 tablet (500 mg) by mouth 2 times daily    Herpes simplex type 2 infection       * Notice:  This list has 2 medication(s) that are the same as other medications prescribed for you. Read the directions carefully, and ask your doctor or other care provider to review them with you.

## 2017-09-22 LAB
C3 SERPL-MCNC: 124 MG/DL (ref 76–169)
C4 SERPL-MCNC: 24 MG/DL (ref 15–50)
DSDNA AB SER-ACNC: 25 IU/ML

## 2017-09-22 NOTE — PROGRESS NOTES
"Rheumatology team: I don't see any Cornice messages being read by Ms. Wolf, so please call her to relay the information below.     Cornice message sent:  \"Ms. Wolf,    Labs show an elevated white blood cell count and CRP that is consistent with your current upper respiratory infection.      Sincerely,  Jack Arvizu MD  9/22/2017 6:32 AM\""

## 2017-09-25 ENCOUNTER — TELEPHONE (OUTPATIENT)
Dept: RHEUMATOLOGY | Facility: CLINIC | Age: 42
End: 2017-09-25

## 2017-09-25 NOTE — TELEPHONE ENCOUNTER
Reason for Call:  Other FYI    Detailed comments: patient wants provider to know that she has Pneumonia. Patient was diagnosed on Saturday and now taking anti-biotics. No return call needed. Please advise.    Phone Number Patient can be reached at: Cell number on file:    Telephone Information:   Mobile [phone]       Best Time: any time    Can we leave a detailed message on this number? YES    Call taken on 9/25/2017 at 4:06 PM by Adelina Cowan

## 2017-09-26 NOTE — TELEPHONE ENCOUNTER
Sheyladled with Dr. Arvizu:  Hope you feel better soon. You may continue the leflunomide.  Bailey Resendez CMA  9/26/2017 3:32 PM

## 2017-09-26 NOTE — TELEPHONE ENCOUNTER
Left message for patient to return call to the [facility] location, provided clinic phone number [phone], and deepa Lion

## 2017-09-29 ENCOUNTER — OFFICE VISIT (OUTPATIENT)
Dept: FAMILY MEDICINE | Facility: CLINIC | Age: 42
End: 2017-09-29
Payer: COMMERCIAL

## 2017-09-29 VITALS
WEIGHT: 151 LBS | TEMPERATURE: 98.3 F | DIASTOLIC BLOOD PRESSURE: 69 MMHG | BODY MASS INDEX: 24.56 KG/M2 | HEART RATE: 79 BPM | OXYGEN SATURATION: 98 % | SYSTOLIC BLOOD PRESSURE: 112 MMHG

## 2017-09-29 DIAGNOSIS — J18.9 PNEUMONIA DUE TO INFECTIOUS ORGANISM, UNSPECIFIED LATERALITY, UNSPECIFIED PART OF LUNG: Primary | ICD-10-CM

## 2017-09-29 DIAGNOSIS — M32.19 OTHER SYSTEMIC LUPUS ERYTHEMATOSUS WITH OTHER ORGAN INVOLVEMENT (H): ICD-10-CM

## 2017-09-29 PROCEDURE — 99213 OFFICE O/P EST LOW 20 MIN: CPT | Performed by: PHYSICIAN ASSISTANT

## 2017-09-29 ASSESSMENT — PAIN SCALES - GENERAL: PAINLEVEL: NO PAIN (0)

## 2017-09-29 NOTE — PROGRESS NOTES
SUBJECTIVE:   Sosa Wolf is a 42 year old female who presents to clinic today for the following health issues:    Was dx about 6 days ago and xray showed pneumonia.  Was sick 5 days before that.  Was on zpack and was better and now worse again.  Chest tightness today.  Before was having this and pain and cough, fatigue, fever.  Cough is better.  Not using albuterol.    Needs a letter for return to work   Recently seen at [facility] for pnuemonia        Problem list and histories reviewed & adjusted, as indicated.  Additional history: as documented    Patient Active Problem List   Diagnosis     Herpes simplex type 2 infection     Genital warts     CARDIOVASCULAR SCREENING; LDL GOAL LESS THAN 160     H/O intravenous drug use in remission     Tobacco abuse     Surveillance for Depo-Provera contraception     JESSE positive     Multiple joint pain     Cocaine use     Systemic lupus erythematosus (H)     High risk medications (not anticoagulants) long-term use     Sicca syndrome (H)     Past Surgical History:   Procedure Laterality Date     BUNIONECTOMY RT/LT       HC ENLARGE BREAST WITH IMPLANT  2000       Social History   Substance Use Topics     Smoking status: Current Every Day Smoker     Packs/day: 0.10     Years: 23.00     Types: Cigarettes     Last attempt to quit: 3/3/2013     Smokeless tobacco: Never Used     Alcohol use No      Comment: Sober as of 5/16/10     Family History   Problem Relation Age of Onset     CANCER Father      lung cancer     DIABETES Maternal Grandmother      Depression Maternal Grandmother      Lipids Maternal Grandmother      CEREBROVASCULAR DISEASE Maternal Grandfather      Lipids Maternal Grandfather      Circulatory Maternal Grandfather      MI in 50's     Depression Brother      depression and OCD     GASTROINTESTINAL DISEASE Brother      IBS     Depression Mother      Thyroid Disease No family hx of      Asthma No family hx of              Reviewed and updated as needed  this visit by clinical staffTobacco  Allergies  Meds  Med Hx  Surg Hx  Fam Hx  Soc Hx      Reviewed and updated as needed this visit by Provider         ROS:  As above    OBJECTIVE:     /69 (BP Location: Right arm, Patient Position: Chair, Cuff Size: Adult Regular)  Pulse 79  Temp 98.3  F (36.8  C) (Oral)  Wt 151 lb (68.5 kg)  SpO2 98%  Breastfeeding? No  BMI 24.56 kg/m2  Body mass index is 24.56 kg/(m^2).  GENERAL: healthy, alert and no distress  HENT: ear canals and TM's normal, oropharynx clear and oral mucous membranes moist  NECK: no adenopathy and no asymmetry, masses, or scars  RESP: lungs clear to auscultation - no rales, rhonchi or wheezes  CV: regular rates and rhythm, normal S1 S2, no S3 or S4 and no murmur, click or rub    Diagnostic Test Results:  none     ASSESSMENT/PLAN:       1. Pneumonia due to infectious organism, unspecified laterality, unspecified part of lung  Seems to be resolving.  Use albuterol every 6 hours for the next 1-2 days.  If not continuing to improve or worsening return to clinic.      2. Other systemic lupus erythematosus with other organ involvement (H)  zpack likely effective but may need longer treatment.        FUTURE APPOINTMENTS:       - Follow-up for annual visit or as needed    Cynthia Scott PA-C  [facility]

## 2017-09-29 NOTE — MR AVS SNAPSHOT
After Visit Summary   9/29/2017    Sosa Wolf    MRN: [id number]           Patient Information     Date Of Birth          1975        Visit Information        Provider Department      9/29/2017 1:40 PM Cynthia Scott PA-C [facility]        Care Instructions    Call to get immunizations-can be just put on the ancillary schedule           Follow-ups after your visit        Your next 10 appointments already scheduled     Oct 17, 2017  7:15 AM CDT   LAB with FZ LAB   [facility]    [address] [address]   [phone]           Patient must bring picture ID. Patient should be prepared to give a urine specimen  Please do not eat 10-12 hours before your appointment if you are coming in fasting for labs on lipids, cholesterol, or glucose (sugar). Pregnant women should follow their Care Team instructions. Water with medications is okay. Do not drink coffee or other fluids. If you have concerns about taking  your medications, please ask at office or if scheduling via [employer], send a message by clicking on Secure Messaging, Message Your Care Team.            Oct 20, 2017  8:45 AM CDT   Return Visit with Josefa Contreras MD   [facility] ([facility]    [address] [address]   [phone]            Nov 21, 2017  7:15 AM CST   LAB with FZ LAB   [facility] ([facility])    [address] [address]   [phone]           Patient must bring picture ID. Patient should be prepared to give a urine specimen  Please do not eat 10-12 hours before your appointment if you are coming in fasting for labs on lipids, cholesterol, or glucose (sugar). Pregnant women should follow their Care Team instructions. Water with medications is okay. Do not drink coffee or other fluids. If you have concerns about taking  your  medications, please ask at office or if scheduling via [employer], send a message by clicking on Secure Messaging, Message Your Care Team.            Dec 14, 2017  7:15 AM CST   LAB with FZ LAB   [place] [id number] [facility]  [place] [address]   [phone]           Patient must bring picture ID. Patient should be prepared to give a urine specimen  Please do not eat 10-12 hours before your appointment if you are coming in fasting for labs on lipids, cholesterol, or glucose (sugar). Pregnant women should follow their Care Team instructions. Water with medications is okay. Do not drink coffee or other fluids. If you have concerns about taking  your medications, please ask at office or if scheduling via [employer], send a message by clicking on Secure Messaging, Message Your Care Team.            Dec 21, 2017  7:20 AM CST   Return Visit with Jack Arvizu MD   [facility] ([address]  [place] [id number] [phone]              Who to contact     If you have questions or need follow up information about today's clinic visit or your schedule please contact [facility] directly at [phone].  Normal or non-critical lab and imaging results will be communicated to you by ITYZhart, letter or phone within 4 business days after the clinic has received the results. If you do not hear from us within 7 days, please contact the clinic through MyChart or phone. If you have a critical or abnormal lab result, we will notify you by phone as soon as possible.  Submit refill requests through [employer] or call your pharmacy and they will forward the refill request to us. Please allow 3 business days for your refill to be completed.          Additional Information About Your Visit        [employer] Information     [employer] gives you secure access to your electronic health record. If you see a primary care  provider, you can also send messages to your care team and make appointments. If you have questions, please call your primary care clinic.  If you do not have a primary care provider, please call [phone] and they will assist you.        Care EveryWhere ID     This is your Care EveryWhere ID. This could be used by other organizations to access your [place] medical records  [id number]        Your Vitals Were     Pulse Temperature Pulse Oximetry Breastfeeding? BMI (Body Mass Index)       79 98.3  F (36.8  C) (Oral) 98% No 24.56 kg/m2        Blood Pressure from Last 3 Encounters:   09/29/17 112/69   09/21/17 119/75   07/17/17 113/72    Weight from Last 3 Encounters:   09/29/17 151 lb (68.5 kg)   09/21/17 155 lb 3.2 oz (70.4 kg)   07/17/17 134 lb 6.4 oz (61 kg)              Today, you had the following     No orders found for display         Today's Medication Changes          These changes are accurate as of: 9/29/17  2:04 PM.  If you have any questions, ask your nurse or doctor.               These medicines have changed or have updated prescriptions.        Dose/Directions    medroxyPROGESTERone 150 MG/ML injection   Commonly known as:  DEPO-PROVERA   This may have changed:  Another medication with the same name was removed. Continue taking this medication, and follow the directions you see here.   Used for:  Surveillance for Depo-Provera contraception   Changed by:  Cynthia Scott PA-C        Dose:  150 mg   Inject 1 mL (150 mg) into the muscle every 3 months   Quantity:  1 mL   Refills:  3                Primary Care Provider Office Phone # Fax #    Cynthia Scott PA-C [phone]       [address] [address]        Equal Access to Services     MAI OLMEDO : Jen Whittaker, brett ferrer, key jones. So [facility] [phone].    ATENCIÓN: Si habla español, tiene a bernstein disposición  servicios gratuitos de asistencia lingüística. Tonya plata [phone].    We comply with applicable federal civil rights laws and [place] laws. We do not discriminate on the basis of race, color, national origin, age, disability, sex, sexual orientation, or gender identity.            Thank you!     Thank you for choosing [facility]  for your care. Our goal is always to provide you with excellent care. Hearing back from our patients is one way we can continue to improve our services. Please take a few minutes to complete the written survey that you may receive in the mail after your visit with us. Thank you!             Your Updated Medication List - Protect others around you: Learn how to safely use, store and throw away your medicines at www.disposemymeds.org.          This list is accurate as of: 9/29/17  2:04 PM.  Always use your most recent med list.                   Brand Name Dispense Instructions for use Diagnosis    ABILIFY 10 MG tablet   Generic drug:  ARIPiprazole      Take 10 mg by mouth daily        albuterol 108 (90 BASE) MCG/ACT Inhaler    PROAIR HFA/PROVENTIL HFA/VENTOLIN HFA    1 Inhaler    2 puffs in mouth every 6 hrs for next 2 days and then every 6 hrs as needed for shortness of breath and wheezing.    Acute bronchitis with symptoms > 10 days       buPROPion 150 MG 12 hr tablet    WELLBUTRIN SR     Take 150 mg by mouth daily        leflunomide 20 MG tablet    ARAVA    30 tablet    Take 1 tablet (20 mg) by mouth daily    Other systemic lupus erythematosus with other organ involvement (H)       medroxyPROGESTERone 150 MG/ML injection    DEPO-PROVERA    1 mL    Inject 1 mL (150 mg) into the muscle every 3 months    Surveillance for Depo-Provera contraception       sertraline 100 MG tablet    ZOLOFT     2 TABLET DAILY

## 2017-09-29 NOTE — NURSING NOTE
"Chief Complaint   Patient presents with     Form Request     Work letter        Initial /69 (BP Location: Right arm, Patient Position: Chair, Cuff Size: Adult Regular)  Pulse 79  Temp 98.3  F (36.8  C) (Oral)  Wt 151 lb (68.5 kg)  SpO2 98%  Breastfeeding? No  BMI 24.56 kg/m2 Estimated body mass index is 24.56 kg/(m^2) as calculated from the following:    Height as of 9/21/17: 5' 5.75\" (1.67 m).    Weight as of this encounter: 151 lb (68.5 kg).  Medication Reconciliation: complete   Linda Garcia MA      "

## 2017-09-29 NOTE — LETTER
September 29, 2017        Sosa Wolf  [address]   [place] [address]          To whom it may concern:    RE: Sosa Wolf    Patient was seen and treated today at our clinic.  She was seen at urgent care 9/23/17 and she can return to work on 10/2/17.    Please contact me for questions or concerns.      Sincerely,        Cynthia Scott PA-C

## 2017-10-16 ENCOUNTER — ALLIED HEALTH/NURSE VISIT (OUTPATIENT)
Dept: NURSING | Facility: CLINIC | Age: 42
End: 2017-10-16
Payer: COMMERCIAL

## 2017-10-16 DIAGNOSIS — Z30.9 ENCOUNTER FOR CONTRACEPTIVE MANAGEMENT: Primary | ICD-10-CM

## 2017-10-16 PROCEDURE — 99207 ZZC NO CHARGE NURSE ONLY: CPT

## 2017-10-16 PROCEDURE — 96372 THER/PROPH/DIAG INJ SC/IM: CPT

## 2017-10-17 DIAGNOSIS — Z79.899 HIGH RISK MEDICATIONS (NOT ANTICOAGULANTS) LONG-TERM USE: ICD-10-CM

## 2017-10-17 DIAGNOSIS — M32.19 OTHER SYSTEMIC LUPUS ERYTHEMATOSUS WITH OTHER ORGAN INVOLVEMENT (H): ICD-10-CM

## 2017-10-17 LAB
ALBUMIN SERPL-MCNC: 4 G/DL (ref 3.4–5)
ALP SERPL-CCNC: 27 U/L (ref 40–150)
ALT SERPL W P-5'-P-CCNC: 32 U/L (ref 0–50)
AST SERPL W P-5'-P-CCNC: 17 U/L (ref 0–45)
BASOPHILS # BLD AUTO: 0 10E9/L (ref 0–0.2)
BASOPHILS NFR BLD AUTO: 0.3 %
BILIRUB DIRECT SERPL-MCNC: 0.1 MG/DL (ref 0–0.2)
BILIRUB SERPL-MCNC: 0.3 MG/DL (ref 0.2–1.3)
CREAT SERPL-MCNC: 0.99 MG/DL (ref 0.52–1.04)
DIFFERENTIAL METHOD BLD: NORMAL
EOSINOPHIL # BLD AUTO: 0.3 10E9/L (ref 0–0.7)
EOSINOPHIL NFR BLD AUTO: 4.1 %
ERYTHROCYTE [DISTWIDTH] IN BLOOD BY AUTOMATED COUNT: 14.1 % (ref 10–15)
GFR SERPL CREATININE-BSD FRML MDRD: 61 ML/MIN/1.7M2
HCT VFR BLD AUTO: 37.2 % (ref 35–47)
HGB BLD-MCNC: 12.5 G/DL (ref 11.7–15.7)
LYMPHOCYTES # BLD AUTO: 2.2 10E9/L (ref 0.8–5.3)
LYMPHOCYTES NFR BLD AUTO: 35.5 %
MCH RBC QN AUTO: 28.2 PG (ref 26.5–33)
MCHC RBC AUTO-ENTMCNC: 33.6 G/DL (ref 31.5–36.5)
MCV RBC AUTO: 84 FL (ref 78–100)
MONOCYTES # BLD AUTO: 0.6 10E9/L (ref 0–1.3)
MONOCYTES NFR BLD AUTO: 8.9 %
NEUTROPHILS # BLD AUTO: 3.2 10E9/L (ref 1.6–8.3)
NEUTROPHILS NFR BLD AUTO: 51.2 %
PLATELET # BLD AUTO: 198 10E9/L (ref 150–450)
PROT SERPL-MCNC: 7.1 G/DL (ref 6.8–8.8)
RBC # BLD AUTO: 4.44 10E12/L (ref 3.8–5.2)
WBC # BLD AUTO: 6.2 10E9/L (ref 4–11)

## 2017-10-17 PROCEDURE — 82565 ASSAY OF CREATININE: CPT | Performed by: INTERNAL MEDICINE

## 2017-10-17 PROCEDURE — 80076 HEPATIC FUNCTION PANEL: CPT | Performed by: INTERNAL MEDICINE

## 2017-10-17 PROCEDURE — 85025 COMPLETE CBC W/AUTO DIFF WBC: CPT | Performed by: INTERNAL MEDICINE

## 2017-10-17 PROCEDURE — 36415 COLL VENOUS BLD VENIPUNCTURE: CPT | Performed by: INTERNAL MEDICINE

## 2017-10-18 ENCOUNTER — TELEPHONE (OUTPATIENT)
Dept: RHEUMATOLOGY | Facility: CLINIC | Age: 42
End: 2017-10-18

## 2017-10-18 ENCOUNTER — OFFICE VISIT (OUTPATIENT)
Dept: OPHTHALMOLOGY | Facility: CLINIC | Age: 42
End: 2017-10-18
Payer: COMMERCIAL

## 2017-10-18 DIAGNOSIS — Z79.899 HIGH RISK MEDICATION USE: Primary | ICD-10-CM

## 2017-10-18 DIAGNOSIS — M32.19 OTHER SYSTEMIC LUPUS ERYTHEMATOSUS WITH OTHER ORGAN INVOLVEMENT (H): Primary | ICD-10-CM

## 2017-10-18 DIAGNOSIS — M32.19 OTHER SYSTEMIC LUPUS ERYTHEMATOSUS WITH OTHER ORGAN INVOLVEMENT (H): ICD-10-CM

## 2017-10-18 PROCEDURE — 92083 EXTENDED VISUAL FIELD XM: CPT | Performed by: STUDENT IN AN ORGANIZED HEALTH CARE EDUCATION/TRAINING PROGRAM

## 2017-10-18 PROCEDURE — 92134 CPTRZ OPH DX IMG PST SGM RTA: CPT | Performed by: STUDENT IN AN ORGANIZED HEALTH CARE EDUCATION/TRAINING PROGRAM

## 2017-10-18 PROCEDURE — 92012 INTRM OPH EXAM EST PATIENT: CPT | Performed by: STUDENT IN AN ORGANIZED HEALTH CARE EDUCATION/TRAINING PROGRAM

## 2017-10-18 RX ORDER — HYDROXYCHLOROQUINE SULFATE 200 MG/1
200 TABLET, FILM COATED ORAL DAILY
Qty: 180 TABLET | Refills: 1 | Status: SHIPPED | OUTPATIENT
Start: 2017-10-18 | End: 2017-12-26

## 2017-10-18 ASSESSMENT — CUP TO DISC RATIO
OS_RATIO: 0.45 UD
OD_RATIO: 0.4 UD

## 2017-10-18 ASSESSMENT — VISUAL ACUITY
OS_SC+: -2
METHOD: SNELLEN - LINEAR
OS_SC: 20/25
OD_SC+: -2
OD_SC: 20/25

## 2017-10-18 ASSESSMENT — SLIT LAMP EXAM - LIDS
COMMENTS: NORMAL
COMMENTS: NORMAL

## 2017-10-18 ASSESSMENT — EXTERNAL EXAM - RIGHT EYE: OD_EXAM: NORMAL

## 2017-10-18 ASSESSMENT — EXTERNAL EXAM - LEFT EYE: OS_EXAM: NORMAL

## 2017-10-18 NOTE — Clinical Note
Ms. Wolf's Plaquenil eye tests looked normal today. Her symptoms of having an episode of blurred vision for a couple of hours isn't a common Plaquenil side effect, so from an ophthalmic perspective, it's ok to resume Plaquenil. Thanks!

## 2017-10-18 NOTE — PATIENT INSTRUCTIONS
"Ok to resume medication (will let Dr. Arvizu know)  Use artificial tears up to 4 times per day (Refresh Plus, Systane Balance, or generic artificial tears are ok. Avoid \"get the red out\" drops).     Josefa Contreras MD  [phone]    "

## 2017-10-18 NOTE — TELEPHONE ENCOUNTER
Rheumatology team: Please call to notify Ms. Wolf that I received the results of the ophthalmology exam. I have prescribed hydroxychloroquine 200 mg daily to the [facility] pharmacy.    Jack Arvizu MD  10/18/2017 5:28 PM

## 2017-10-18 NOTE — PROGRESS NOTES
" Current Eye Medications:  Artificial tears as needed.      Subjective:  Follow up Plaquenil.  Patient is here for a Plaquenil visual field and OCT.   She was taken off Plaquenil by Dr. Arvizu, pending her eye tests, because she had an episode of an inability to focus.  About 1.5 months ago, while driving, her eyes would not focus which remained for about 1-2 hours.  She called Dr. Arvizu and the nurse told her to discontinue the Plaquenil until she had her eyes checked.  She has had 2 other similar episodes in the past.       Objective:  See Ophthalmology Exam.      Assessment:  Sosa Wolf is a 42 year old female who presents with:   Encounter Diagnoses   Name Primary?     High risk medication use - Plaquenil, started ~9/2016 OCT and Dueñas visual field (HVF) within normal limits both eyes.   Normal appearing optic disc and macula in both eyes.      Other systemic lupus erythematosus with other organ involvement (H)        Plan:  Ok to resume medication (will let Dr. Arvizu know)  Use artificial tears up to 4 times per day (Refresh Plus, Systane Balance, or generic artificial tears are ok. Avoid \"get the red out\" drops).     Josefa Contreras MD  [phone]      "

## 2017-10-18 NOTE — MR AVS SNAPSHOT
"              After Visit Summary   10/18/2017    Sosa Wolf    MRN: [id number]           Patient Information     Date Of Birth          1975        Visit Information        Provider Department      10/18/2017 12:45 PM Josefa Contreras MD [facility]        Care Instructions    Ok to resume medication  Use artificial tears up to 4 times per day (Refresh Plus, Systane Balance, or generic artificial tears are ok. Avoid \"get the red out\" drops).     Josefa Contreras MD  [phone]            Follow-ups after your visit        Follow-up notes from your care team     Return if symptoms worsen or fail to improve.      Your next 10 appointments already scheduled     Oct 27, 2017  7:00 AM CDT   MA SCREENING DIGITAL BILATERAL with FKMA1   [facility] ([facility])    [address] [id number] [phone]           Do not use any powder, lotion or deodorant under your arms or on your breast. If you do, we will ask you to remove it before your exam.  Wear comfortable, two-piece clothing.  If you have any allergies, tell your care team.  Bring any previous mammograms from other facilities or have them mailed to the breast center.            Nov 21, 2017  7:15 AM CST   LAB with FZ LAB   [facility] ([facility])    [address] [address] [phone]           Patient must bring picture ID. Patient should be prepared to give a urine specimen  Please do not eat 10-12 hours before your appointment if you are coming in fasting for labs on lipids, cholesterol, or glucose (sugar). Pregnant women should follow their Care Team instructions. Water with medications is okay. Do not drink coffee or other fluids. If you have concerns about taking  your medications, please ask at office or if scheduling via [employer], send a message by clicking on Secure Messaging, Message Your Care Team.            Dec 14, 2017  " 7:15 AM CST   LAB with FZ LAB   [facility]    [address]  [place] [address] [phone]           Patient must bring picture ID. Patient should be prepared to give a urine specimen  Please do not eat 10-12 hours before your appointment if you are coming in fasting for labs on lipids, cholesterol, or glucose (sugar). Pregnant women should follow their Care Team instructions. Water with medications is okay. Do not drink coffee or other fluids. If you have concerns about taking  your medications, please ask at office or if scheduling via [employer], send a message by clicking on Secure Messaging, Message Your Care Team.            Dec 21, 2017  7:20 AM CST   Return Visit with Jack Arvizu MD   [facility]    [address]  [place] [address] [phone]              Who to contact     If you have questions or need follow up information about today's clinic visit or your schedule please contact [facility] directly at [phone].  Normal or non-critical lab and imaging results will be communicated to you by Caring.comhart, letter or phone within 4 business days after the clinic has received the results. If you do not hear from us within 7 days, please contact the clinic through [employer] or phone. If you have a critical or abnormal lab result, we will notify you by phone as soon as possible.  Submit refill requests through [employer] or call your pharmacy and they will forward the refill request to us. Please allow 3 business days for your refill to be completed.          Additional Information About Your Visit        [email or web address] Information     [employer] gives you secure access to your electronic health record. If you see a primary care provider, you can also send messages to your care team and make appointments. If you have questions, please call your primary care clinic.  If you do not have a primary care  provider, please call [phone] and they will assist you.        Care EveryWhere ID     This is your Care EveryWhere ID. This could be used by other organizations to access your [place] medical records  [id number]         Blood Pressure from Last 3 Encounters:   09/29/17 112/69   09/21/17 119/75   07/17/17 113/72    Weight from Last 3 Encounters:   09/29/17 68.5 kg (151 lb)   09/21/17 70.4 kg (155 lb 3.2 oz)   07/17/17 61 kg (134 lb 6.4 oz)              Today, you had the following     No orders found for display       Primary Care Provider Office Phone # Fax #    Cynthia Scott PA-C [phone]       [address] [facility] [address]        Equal Access to Services     MAI OLMEDO : Hadii aad ku hadasho Soomaali, waaxda luqadaha, qaybta kaalmada adeegyada, key bah . So [facility] [phone].    ATENCIÓN: Si habla español, tiene a bernstein disposición servicios gratuitos de asistencia lingüística. [facility] [phone].    We comply with applicable federal civil rights laws and [place] laws. We do not discriminate on the basis of race, color, national origin, age, disability, sex, sexual orientation, or gender identity.            Thank you!     Thank you for choosing [facility] [place]  for your care. Our goal is always to provide you with excellent care. Hearing back from our patients is one way we can continue to improve our services. Please take a few minutes to complete the written survey that you may receive in the mail after your visit with us. Thank you!             Your Updated Medication List - Protect others around you: Learn how to safely use, store and throw away your medicines at www.disposemymeds.org.          This list is accurate as of: 10/18/17  1:43 PM.  Always use your most recent med list.                   Brand Name Dispense Instructions for use Diagnosis    ABILIFY 10 MG tablet   Generic drug:  ARIPiprazole      Take 10  mg by mouth daily        albuterol 108 (90 BASE) MCG/ACT Inhaler    PROAIR HFA/PROVENTIL HFA/VENTOLIN HFA    1 Inhaler    2 puffs in mouth every 6 hrs for next 2 days and then every 6 hrs as needed for shortness of breath and wheezing.    Acute bronchitis with symptoms > 10 days       buPROPion 150 MG 12 hr tablet    WELLBUTRIN SR     Take 150 mg by mouth daily        leflunomide 20 MG tablet    ARAVA    30 tablet    Take 1 tablet (20 mg) by mouth daily    Other systemic lupus erythematosus with other organ involvement (H)       medroxyPROGESTERone 150 MG/ML injection    DEPO-PROVERA    1 mL    Inject 1 mL (150 mg) into the muscle every 3 months    Surveillance for Depo-Provera contraception       sertraline 100 MG tablet    ZOLOFT     2 TABLET DAILY

## 2017-10-19 NOTE — TELEPHONE ENCOUNTER
Called and left detailed message for patient (per demographics) for patient that her hydroxychloroquine.  Bailey Resendez CMA  10/19/2017 2:38 PM

## 2018-01-29 NOTE — MR AVS SNAPSHOT
After Visit Summary   1/5/2017    Sosa Wolf    MRN: [id number]           Patient Information     Date Of Birth          1975        Visit Information        Provider Department      1/5/2017 11:20 AM Arlene Gomez PA-C [facility]        Today's Diagnoses     Vaginal discharge    -  1        Follow-ups after your visit        Your next 10 appointments already scheduled     Jan 11, 2017 10:00 AM   LAB with CP LAB   [facility] ([facility])    [address] [facility] [id number] [phone]           Patient must bring picture ID.  Patient should be prepared to give a urine specimen  Please do not eat 10-12 hours before your appointment if you are coming in fasting for labs on lipids, cholesterol, or glucose (sugar).  Pregnant women should follow their Care Team instructions. Water with medications is okay. Do not drink coffee or other fluids.   If you have concerns about taking  your medications, please ask at office or if scheduling via [employer], send a message by clicking on Secure Messaging, Message Your Care Team.            Jan 18, 2017 11:20 AM   Return Visit with Jack Arvizu MD   [facility]    [address] [place] [id number] [phone]              Who to contact     If you have questions or need follow up information about today's clinic visit or your schedule please contact [facility] directly at [phone].  Normal or non-critical lab and imaging results will be communicated to you by MyChart, letter or phone within 4 business days after the clinic has received the results. If you do not hear from us within 7 days, please contact the clinic through MyChart or phone. If you have a critical or abnormal lab result, we will notify you by phone as soon as possible.  Submit refill requests through [employer] or  ----- Message from BEVERLY Bass sent at 1/26/2018  8:39 AM CST -----  Regarding: FW: Monique      ----- Message -----  From: BEVERLY Hawkins  Sent: 1/24/2018   2:04 PM  To: BEVERLY Bass  Subject: FW: Monique                                            ----- Message -----  From: Sandy Donis  Sent: 1/24/2018   1:54 PM  To:  Benefits Verification Msg Pool  Subject: Monique                                            The benefits listed below are for internal use only and should not be disclosed to anyone, including patients.    Please advise callers to direct questions about benefits directly to the insurance carrier.    Per Giovani at [facility]  Phone # [phone]  Effective date of coverage 01/01/18    No hearing aid coverage    No discount program    Reference # [id number]    Hold Harmless   Member can be billed for copayments, coinsurance and deductibles.  Non-covered services (which include experimental, investigational &  unproven services denials) can be billed to the member.  \"No prior authorization / notification\" denials can be billed to the patient.  For medical necessity denials, if [facility]'s Utilization Review disagrees with  the denial, the claim should be appealed. If  agrees with the denial, the  patient should not be billed for the service.    ##Sent message/info to HA Benefit Verification pool##    Thank you Sandy   "call your pharmacy and they will forward the refill request to us. Please allow 3 business days for your refill to be completed.          Additional Information About Your Visit        [employer] Information     [employer] gives you secure access to your electronic health record. If you see a primary care provider, you can also send messages to your care team and make appointments. If you have questions, please call your primary care clinic.  If you do not have a primary care provider, please call [phone] and they will assist you.        Care EveryWhere ID     This is your Care EveryWhere ID. This could be used by other organizations to access your [facility] medical records  [id number]        Your Vitals Were     Pulse Temperature Height BMI (Body Mass Index) Pulse Oximetry Last Period    78 97.9  F (36.6  C) (Oral) 5' 5.7\" (1.669 m) 26.71 kg/m2 99% 12/21/2016       Blood Pressure from Last 3 Encounters:   01/05/17 114/76   11/04/16 102/68   10/19/16 109/68    Weight from Last 3 Encounters:   01/05/17 164 lb (74.39 kg)   11/04/16 144 lb (65.318 kg)   10/19/16 147 lb (66.679 kg)              We Performed the Following     Chlamydia trachomatis PCR     Neisseria gonorrhoeae PCR     Wet prep        Primary Care Provider Office Phone # Fax #    Cynthia Scott PA-C [phone]       [facility] [address] [facility] [address]        Thank you!     Thank you for choosing [facility]  for your care. Our goal is always to provide you with excellent care. Hearing back from our patients is one way we can continue to improve our services. Please take a few minutes to complete the written survey that you may receive in the mail after your visit with us. Thank you!             Your Updated Medication List - Protect others around you: Learn how to safely use, store and throw away your medicines at www.disposemymeds.org.          This list is accurate as of: " 1/5/17 11:37 AM.  Always use your most recent med list.                   Brand Name Dispense Instructions for use    ABILIFY 10 MG tablet   Generic drug:  ARIPiprazole      Take 10 mg by mouth daily       buPROPion 150 MG 12 hr tablet    WELLBUTRIN SR     Take 150 mg by mouth daily       hydroxychloroquine 200 MG tablet    PLAQUENIL    180 tablet    Take 1 tablet (200 mg) by mouth 2 times daily       medroxyPROGESTERone 150 MG/ML injection    DEPO-PROVERA    1 mL    Inject 1 mL (150 mg) into the muscle every 3 months       sertraline 100 MG tablet    ZOLOFT     2 TABLET DAILY       valACYclovir 500 MG tablet    VALTREX    6 tablet    Take 1 tablet (500 mg) by mouth 2 times daily

## 2018-06-20 DIAGNOSIS — M32.19 OTHER SYSTEMIC LUPUS ERYTHEMATOSUS WITH OTHER ORGAN INVOLVEMENT (H): ICD-10-CM

## 2018-06-20 DIAGNOSIS — Z79.899 HIGH RISK MEDICATIONS (NOT ANTICOAGULANTS) LONG-TERM USE: ICD-10-CM

## 2018-06-20 LAB
ALBUMIN SERPL-MCNC: 3.9 G/DL (ref 3.4–5)
ALP SERPL-CCNC: 30 U/L (ref 40–150)
ALT SERPL W P-5'-P-CCNC: 17 U/L (ref 0–50)
AST SERPL W P-5'-P-CCNC: 15 U/L (ref 0–45)
BASOPHILS # BLD AUTO: 0 10E9/L (ref 0–0.2)
BASOPHILS NFR BLD AUTO: 0.3 %
BILIRUB DIRECT SERPL-MCNC: <0.1 MG/DL (ref 0–0.2)
BILIRUB SERPL-MCNC: 0.3 MG/DL (ref 0.2–1.3)
CREAT SERPL-MCNC: 0.87 MG/DL (ref 0.52–1.04)
DIFFERENTIAL METHOD BLD: NORMAL
EOSINOPHIL # BLD AUTO: 0.2 10E9/L (ref 0–0.7)
EOSINOPHIL NFR BLD AUTO: 2.3 %
ERYTHROCYTE [DISTWIDTH] IN BLOOD BY AUTOMATED COUNT: 13.3 % (ref 10–15)
GFR SERPL CREATININE-BSD FRML MDRD: 71 ML/MIN/1.7M2
HCT VFR BLD AUTO: 38.8 % (ref 35–47)
HGB BLD-MCNC: 13.2 G/DL (ref 11.7–15.7)
LYMPHOCYTES # BLD AUTO: 3.7 10E9/L (ref 0.8–5.3)
LYMPHOCYTES NFR BLD AUTO: 34.3 %
MCH RBC QN AUTO: 28.7 PG (ref 26.5–33)
MCHC RBC AUTO-ENTMCNC: 34 G/DL (ref 31.5–36.5)
MCV RBC AUTO: 84 FL (ref 78–100)
MONOCYTES # BLD AUTO: 0.9 10E9/L (ref 0–1.3)
MONOCYTES NFR BLD AUTO: 8.6 %
NEUTROPHILS # BLD AUTO: 5.8 10E9/L (ref 1.6–8.3)
NEUTROPHILS NFR BLD AUTO: 54.5 %
PLATELET # BLD AUTO: 211 10E9/L (ref 150–450)
PROT SERPL-MCNC: 6.9 G/DL (ref 6.8–8.8)
RBC # BLD AUTO: 4.6 10E12/L (ref 3.8–5.2)
WBC # BLD AUTO: 10.6 10E9/L (ref 4–11)

## 2018-06-20 PROCEDURE — 80076 HEPATIC FUNCTION PANEL: CPT | Performed by: INTERNAL MEDICINE

## 2018-06-20 PROCEDURE — 36415 COLL VENOUS BLD VENIPUNCTURE: CPT | Performed by: INTERNAL MEDICINE

## 2018-06-20 PROCEDURE — 82565 ASSAY OF CREATININE: CPT | Performed by: INTERNAL MEDICINE

## 2018-06-20 PROCEDURE — 85025 COMPLETE CBC W/AUTO DIFF WBC: CPT | Performed by: INTERNAL MEDICINE

## 2018-07-30 ENCOUNTER — ALLIED HEALTH/NURSE VISIT (OUTPATIENT)
Dept: NURSING | Facility: CLINIC | Age: 43
End: 2018-07-30
Payer: COMMERCIAL

## 2018-07-30 DIAGNOSIS — Z11.1 SCREENING EXAMINATION FOR PULMONARY TUBERCULOSIS: Primary | ICD-10-CM

## 2018-07-30 PROCEDURE — 99207 ZZC NO CHARGE LOS: CPT

## 2018-07-30 PROCEDURE — 90471 IMMUNIZATION ADMIN: CPT

## 2018-07-30 PROCEDURE — 86580 TB INTRADERMAL TEST: CPT

## 2018-07-30 NOTE — MR AVS SNAPSHOT
After Visit Summary   7/30/2018    Sosa Wolf    MRN: [id number]           Patient Information     Date Of Birth          1975        Visit Information        Provider Department      7/30/2018 10:30 AM FRANCISCO ANCILLARY [facility]        Today's Diagnoses     Screening examination for pulmonary tuberculosis    -  1       Follow-ups after your visit        Your next 10 appointments already scheduled     Aug 01, 2018 10:30 AM CDT   Nurse Only with FRANCISCO RN   [facility] ([facility]    [address] [facility]  [place] [address]   [phone]            Nov 13, 2018  5:45 PM CST   LAB with FRANCISCO LAB   [facility] ([facility]    [address] [facility] [address] [facility] [address] [phone]           Please do not eat 10-12 hours before your appointment if you are coming in fasting for labs on lipids, cholesterol, or glucose (sugar). This does not apply to pregnant women. Water, hot tea and black coffee (with nothing added) are okay. Do not drink other fluids, diet soda or chew gum.            Nov 21, 2018  2:40 PM CST   Return Visit with Jack Arvizu MD   [facility]    [address]  [place] [address] [phone]              Who to contact     If you have questions or need follow up information about today's clinic visit or your schedule please contact [facility] directly at [phone].  Normal or non-critical lab and imaging results will be communicated to you by MyChart, letter or phone within 4 business days after the clinic has received the results. If you do not hear from us within 7 days, please contact the clinic through MyChart or phone. If you have a critical or abnormal lab result, we will notify you by phone as soon as possible.  Submit refill requests through "[employer] or call your pharmacy and they will forward the refill request to us. Please allow 3  business days for your refill to be completed.          Additional Information About Your Visit        MyChart Information     [employer] gives you secure access to your electronic health record. If you see a primary care provider, you can also send messages to your care team and make appointments. If you have questions, please call your primary care clinic.  If you do not have a primary care provider, please call [phone] and they will assist you.        Care EveryWhere ID     This is your Care EveryWhere ID. This could be used by other organizations to access your [place] medical records  [id number]         Blood Pressure from Last 3 Encounters:   09/29/17 112/69   09/21/17 119/75   07/17/17 113/72    Weight from Last 3 Encounters:   09/29/17 151 lb (68.5 kg)   09/21/17 155 lb 3.2 oz (70.4 kg)   07/17/17 134 lb 6.4 oz (61 kg)              We Performed the Following     ADMIN 1st VACCINE     TB INTRADERMAL TEST        Primary Care Provider Office Phone # Fax #    Cynthia Scott PA-C [phone]       [address] [facility] [address]        Equal Access to Services     [facility]: Hadii aad ku hadasho Soomaali, waaxda luqadaha, qaybta kaalmada adeegyada, key medinain hayaan adeantoinette bah . So [facility] [phone].    ATENCIÓN: Si habla español, tiene a bernstein disposición servicios gratuitos de asistencia lingüística. LlK[facility]tering [facility] [phone].    We comply with applicable federal civil rights laws and [place] laws. We do not discriminate on the basis of race, color, national origin, age, disability, sex, sexual orientation, or gender identity.            Thank you!     Thank you for choosing [facility]  for your care. Our goal is always to provide you with excellent care. Hearing back from our patients is one way we can continue to improve our services. Please take a few minutes to complete the written survey that you may receive in the mail after your visit  with us. Thank you!             Your Updated Medication List - Protect others around you: Learn how to safely use, store and throw away your medicines at www.disposemymeds.org.          This list is accurate as of 7/30/18 10:37 AM.  Always use your most recent med list.                   Brand Name Dispense Instructions for use Diagnosis    ABILIFY 10 MG tablet   Generic drug:  ARIPiprazole      Take 10 mg by mouth daily        albuterol 108 (90 Base) MCG/ACT Inhaler    PROAIR HFA/PROVENTIL HFA/VENTOLIN HFA    1 Inhaler    2 puffs in mouth every 6 hrs for next 2 days and then every 6 hrs as needed for shortness of breath and wheezing.    Acute bronchitis with symptoms > 10 days       buPROPion 150 MG 12 hr tablet    WELLBUTRIN SR     Take 150 mg by mouth daily        hydroxychloroquine 200 MG tablet    PLAQUENIL    90 tablet    Take 1 tablet (200 mg) by mouth daily    Other systemic lupus erythematosus with other organ involvement (H)       leflunomide 20 MG tablet    ARAVA    30 tablet    Take 1 tablet (20 mg) by mouth daily    Other systemic lupus erythematosus with other organ involvement (H)       medroxyPROGESTERone 150 MG/ML injection    DEPO-PROVERA    1 mL    Inject 1 mL (150 mg) into the muscle every 3 months    Surveillance for Depo-Provera contraception       sertraline 100 MG tablet    ZOLOFT     2 TABLET DAILY

## 2018-07-30 NOTE — NURSING NOTE
Prior to injection verified patient identity using patient's name and date of birth.    The patient is asked the following questions today and these are her answers:    -Have you had a mantoux administered in the past 30 days?    No  -Have you had a previous positive Mantoux.  No  -Have you received BCG in the past.  No  -Have you had a live vaccine  (MMR, Varicella, OPV, Yellow Fever) in the last 6 weeks.  No  -Have you had and active  viral or bacterial infection in the past 6 weeks.  No  -Have you received corticosteroids or immunosuppressive agents in the past 6 weeks.  No  -Have you been diagnosed with HIV?  No  -Do you have a maglinancy?  No   Azul CHEUNG CMA ([facility])

## 2018-08-02 ENCOUNTER — ALLIED HEALTH/NURSE VISIT (OUTPATIENT)
Dept: NURSING | Facility: CLINIC | Age: 43
End: 2018-08-02
Payer: COMMERCIAL

## 2018-08-02 DIAGNOSIS — Z11.1 SCREENING EXAMINATION FOR PULMONARY TUBERCULOSIS: Primary | ICD-10-CM

## 2018-08-02 LAB
PPDINDURATION: 0 MM (ref 0–5)
PPDREDNESS: 0 MM

## 2018-08-02 PROCEDURE — 99207 ZZC NO CHARGE NURSE ONLY: CPT

## 2018-08-02 NOTE — PATIENT INSTRUCTIONS
Mantoux result:  Lab Results   Component Value Date    PPDREDNESS 0 08/02/2018    PPDINDURATIO 0 08/02/2018     Calista Silver RN

## 2018-08-02 NOTE — MR AVS SNAPSHOT
After Visit Summary   8/2/2018    Sosa Wolf    MRN: [id number]           Patient Information     Date Of Birth          1975        Visit Information        Provider Department      8/2/2018 8:00 AM FRANCISCO OLIVEIRA [facility] [place]        Today's Diagnoses     Screening examination for pulmonary tuberculosis    -  1      Care Instructions    Mantoux result:  Lab Results   Component Value Date    PPDREDNESS 0 08/02/2018    PPDINDURATIO 0 08/02/2018     Calista Silver RN            Follow-ups after your visit        Your next 10 appointments already scheduled     Aug 02, 2018  8:00 AM CDT   Nurse Only with FRANCISCO OLIVEIRA   [facility] [place] ([facility])    [address] [facility] [address]  [place] [address]   [phone]            Nov 13, 2018  5:45 PM CST   LAB with FRANCISCO LAB   [facility] [place] ([facility])    [address] [place] [place] [address] [address]   [phone]           Please do not eat 10-12 hours before your appointment if you are coming in fasting for labs on lipids, cholesterol, or glucose (sugar). This does not apply to pregnant women. Water, hot tea and black coffee (with nothing added) are okay. Do not drink other fluids, diet soda or chew gum.            Nov 21, 2018  2:40 PM CST   Return Visit with Jack Arvizu MD   [facility] [place] ([facility])    [address] [place] [address]  [place] [address]   [phone]              Who to contact     If you have questions or need follow up information about today's clinic visit or your schedule please contact [facility] [place] directly at [phone].  Normal or non-critical lab and imaging results will be communicated to you by MyChart, letter or phone within 4 business days after the clinic has received the results. If you do not hear from us within 7 days, please contact the clinic through MyChart or phone. If you have a critical or abnormal lab result, we will notify you by phone as  soon as possible.  Submit refill requests through [employer] or call your pharmacy and they will forward the refill request to us. Please allow 3 business days for your refill to be completed.          Additional Information About Your Visit        "[employer] Fast [employer]t [employer] App"hart Information     [employer] gives you secure access to your electronic health record. If you see a primary care provider, you can also send messages to your care team and make appointments. If you have questions, please call your primary care clinic.  If you do not have a primary care provider, please call [phone] and they will assist you.        Care EveryWhere ID     This is your Care EveryWhere ID. This could be used by other organizations to access your [place] medical records  [id number]         Blood Pressure from Last 3 Encounters:   09/29/17 112/69   09/21/17 119/75   07/17/17 113/72    Weight from Last 3 Encounters:   09/29/17 151 lb (68.5 kg)   09/21/17 155 lb 3.2 oz (70.4 kg)   07/17/17 134 lb 6.4 oz (61 kg)              Today, you had the following     No orders found for display       Primary Care Provider Office Phone # Fax #    Cynthia Scott PA-C [phone]       [address] \[facility]"" [id number]        Equal Access to Services     [facility] : Hadii aad ku hadasho Soomaali, waaxda luqadaha, qaybta kaalmada adeegyada, waxchristiano fuentes hayteodoro iqbal. So [facility] [phone].    ATENCIÓN: Si habla español, tiene a bernstein disposición servicios gratuitos de asistencia lingüística. Llame al [phone].    We comply with applicable federal civil rights laws and [place] laws. We do not discriminate on the basis of race, color, national origin, age, disability, sex, sexual orientation, or gender identity.            Thank you!     Thank you for choosing [facility] [place]  for your care. Our goal is always to provide you with excellent care. Hearing back from our patients is one way we can continue to  improve our services. Please take a few minutes to complete the written survey that you may receive in the mail after your visit with us. Thank you!             Your Updated Medication List - Protect others around you: Learn how to safely use, store and throw away your medicines at www.disposemymeds.org.          This list is accurate as of 8/2/18  7:59 AM.  Always use your most recent med list.                   Brand Name Dispense Instructions for use Diagnosis    ABILIFY 10 MG tablet   Generic drug:  ARIPiprazole      Take 10 mg by mouth daily        albuterol 108 (90 Base) MCG/ACT Inhaler    PROAIR HFA/PROVENTIL HFA/VENTOLIN HFA    1 Inhaler    2 puffs in mouth every 6 hrs for next 2 days and then every 6 hrs as needed for shortness of breath and wheezing.    Acute bronchitis with symptoms > 10 days       buPROPion 150 MG 12 hr tablet    WELLBUTRIN SR     Take 150 mg by mouth daily        hydroxychloroquine 200 MG tablet    PLAQUENIL    90 tablet    Take 1 tablet (200 mg) by mouth daily    Other systemic lupus erythematosus with other organ involvement (H)       leflunomide 20 MG tablet    ARAVA    30 tablet    Take 1 tablet (20 mg) by mouth daily    Other systemic lupus erythematosus with other organ involvement (H)       medroxyPROGESTERone 150 MG/ML injection    DEPO-PROVERA    1 mL    Inject 1 mL (150 mg) into the muscle every 3 months    Surveillance for Depo-Provera contraception       sertraline 100 MG tablet    ZOLOFT     2 TABLET DAILY

## 2018-09-10 ENCOUNTER — OFFICE VISIT (OUTPATIENT)
Dept: FAMILY MEDICINE | Facility: CLINIC | Age: 43
End: 2018-09-10
Payer: COMMERCIAL

## 2018-09-10 VITALS
WEIGHT: 158 LBS | BODY MASS INDEX: 25.7 KG/M2 | OXYGEN SATURATION: 96 % | HEART RATE: 71 BPM | TEMPERATURE: 98.3 F | DIASTOLIC BLOOD PRESSURE: 55 MMHG | SYSTOLIC BLOOD PRESSURE: 90 MMHG

## 2018-09-10 DIAGNOSIS — L98.9 SKIN LESION: ICD-10-CM

## 2018-09-10 DIAGNOSIS — F33.41 RECURRENT MAJOR DEPRESSIVE DISORDER, IN PARTIAL REMISSION (H): ICD-10-CM

## 2018-09-10 DIAGNOSIS — M32.19 OTHER SYSTEMIC LUPUS ERYTHEMATOSUS WITH OTHER ORGAN INVOLVEMENT (H): Primary | ICD-10-CM

## 2018-09-10 DIAGNOSIS — F51.04 PSYCHOPHYSIOLOGICAL INSOMNIA: ICD-10-CM

## 2018-09-10 DIAGNOSIS — Z79.899 HIGH RISK MEDICATIONS (NOT ANTICOAGULANTS) LONG-TERM USE: ICD-10-CM

## 2018-09-10 LAB
ALBUMIN SERPL-MCNC: 4 G/DL (ref 3.4–5)
ALP SERPL-CCNC: 26 U/L (ref 40–150)
ALT SERPL W P-5'-P-CCNC: 29 U/L (ref 0–50)
ANION GAP SERPL CALCULATED.3IONS-SCNC: 7 MMOL/L (ref 3–14)
AST SERPL W P-5'-P-CCNC: 16 U/L (ref 0–45)
BILIRUB DIRECT SERPL-MCNC: <0.1 MG/DL (ref 0–0.2)
BILIRUB SERPL-MCNC: 0.4 MG/DL (ref 0.2–1.3)
BUN SERPL-MCNC: 17 MG/DL (ref 7–30)
CALCIUM SERPL-MCNC: 8.8 MG/DL (ref 8.5–10.1)
CHLORIDE SERPL-SCNC: 106 MMOL/L (ref 94–109)
CO2 SERPL-SCNC: 25 MMOL/L (ref 20–32)
CREAT SERPL-MCNC: 0.72 MG/DL (ref 0.52–1.04)
ERYTHROCYTE [DISTWIDTH] IN BLOOD BY AUTOMATED COUNT: 13.4 % (ref 10–15)
ERYTHROCYTE [SEDIMENTATION RATE] IN BLOOD BY WESTERGREN METHOD: 4 MM/H (ref 0–20)
GFR SERPL CREATININE-BSD FRML MDRD: 88 ML/MIN/1.7M2
GLUCOSE SERPL-MCNC: 93 MG/DL (ref 70–99)
HCT VFR BLD AUTO: 41.8 % (ref 35–47)
HGB BLD-MCNC: 14.4 G/DL (ref 11.7–15.7)
MCH RBC QN AUTO: 29 PG (ref 26.5–33)
MCHC RBC AUTO-ENTMCNC: 34.4 G/DL (ref 31.5–36.5)
MCV RBC AUTO: 84 FL (ref 78–100)
PLATELET # BLD AUTO: 206 10E9/L (ref 150–450)
POTASSIUM SERPL-SCNC: 4 MMOL/L (ref 3.4–5.3)
PROT SERPL-MCNC: 7.2 G/DL (ref 6.8–8.8)
RBC # BLD AUTO: 4.96 10E12/L (ref 3.8–5.2)
SODIUM SERPL-SCNC: 138 MMOL/L (ref 133–144)
WBC # BLD AUTO: 8 10E9/L (ref 4–11)

## 2018-09-10 PROCEDURE — 99214 OFFICE O/P EST MOD 30 MIN: CPT | Performed by: FAMILY MEDICINE

## 2018-09-10 PROCEDURE — 36415 COLL VENOUS BLD VENIPUNCTURE: CPT | Performed by: FAMILY MEDICINE

## 2018-09-10 PROCEDURE — 85652 RBC SED RATE AUTOMATED: CPT | Performed by: FAMILY MEDICINE

## 2018-09-10 PROCEDURE — 80048 BASIC METABOLIC PNL TOTAL CA: CPT | Performed by: FAMILY MEDICINE

## 2018-09-10 PROCEDURE — 80076 HEPATIC FUNCTION PANEL: CPT | Performed by: FAMILY MEDICINE

## 2018-09-10 PROCEDURE — 85027 COMPLETE CBC AUTOMATED: CPT | Performed by: FAMILY MEDICINE

## 2018-09-10 RX ORDER — TRIAMCINOLONE ACETONIDE 1 MG/G
OINTMENT TOPICAL
Qty: 80 G | Refills: 0 | Status: SHIPPED | OUTPATIENT
Start: 2018-09-10 | End: 2021-01-25

## 2018-09-10 RX ORDER — BUPROPION HYDROCHLORIDE 150 MG/1
150 TABLET, EXTENDED RELEASE ORAL DAILY
Qty: 90 TABLET | Refills: 0 | Status: SHIPPED | OUTPATIENT
Start: 2018-09-10 | End: 2018-12-21

## 2018-09-10 RX ORDER — HYDROXYCHLOROQUINE SULFATE 200 MG/1
200 TABLET, FILM COATED ORAL DAILY
Qty: 90 TABLET | Refills: 0 | Status: SHIPPED | OUTPATIENT
Start: 2018-09-10 | End: 2018-09-24

## 2018-09-10 RX ORDER — TRAZODONE HYDROCHLORIDE 50 MG/1
50 TABLET, FILM COATED ORAL
Qty: 30 TABLET | Refills: 1 | Status: SHIPPED | OUTPATIENT
Start: 2018-09-10 | End: 2018-12-21

## 2018-09-10 ASSESSMENT — PAIN SCALES - GENERAL: PAINLEVEL: NO PAIN (0)

## 2018-09-10 NOTE — PROGRESS NOTES
SUBJECTIVE:   Sosa Wolf is a 42 year old female who presents to clinic today for the following health issues:    Medication Followup of Hydrocychloroquine    Taking Medication as prescribed: yes    Side Effects:  Itching    Medication Helping Symptoms:  yes     Hasn't been to rheumatology in a long time. Needs labs and meds refilled prior to that appointment.   Using cocaine, sober now but not for long.   Living with parents.     Depression and Anxiety Follow-Up    Status since last visit: No change    Other associated symptoms:None    Complicating factors: RA, pain issues    Significant life event: Yes-  Recent sobriety     Current substance abuse: Cocaine    No flowsheet data found.  No flowsheet data found.  In the past two weeks have you had thoughts of suicide or self-harm?  No.    Do you have concerns about your personal safety or the safety of others?   No  PHQ-9  English  PHQ-9   Any Language  FREDY-7  Suicide Assessment Five-step Evaluation and Treatment (SAFE-T)    Problem list and histories reviewed & adjusted, as indicated.  Additional history: as documented    Patient Active Problem List   Diagnosis     Herpes simplex type 2 infection     Genital warts     CARDIOVASCULAR SCREENING; LDL GOAL LESS THAN 160     H/O intravenous drug use in remission     Tobacco abuse     Surveillance for Depo-Provera contraception     JESSE positive     Multiple joint pain     Cocaine use     Systemic lupus erythematosus (H)     High risk medications (not anticoagulants) long-term use     Sicca syndrome (H)     Past Surgical History:   Procedure Laterality Date     BUNIONECTOMY RT/LT       HC ENLARGE BREAST WITH IMPLANT  2000       Social History   Substance Use Topics     Smoking status: Former Smoker     Packs/day: 0.10     Years: 23.00     Types: Cigarettes     Quit date: 3/3/2013     Smokeless tobacco: Never Used      Comment: now using a vap     Alcohol use No      Comment: Sober as of 5/16/10     Family History    Problem Relation Age of Onset     Cancer Father      lung cancer     Diabetes Maternal Grandmother      Depression Maternal Grandmother      Lipids Maternal Grandmother      Cerebrovascular Disease Maternal Grandfather      Lipids Maternal Grandfather      Circulatory Maternal Grandfather      MI in 50's     Depression Brother      depression and OCD     GASTROINTESTINAL DISEASE Brother      IBS     Depression Mother      Thyroid Disease No family hx of      Asthma No family hx of            Reviewed and updated as needed this visit by clinical staff  Tobacco  Allergies  Meds  Med Hx  Surg Hx  Fam Hx  Soc Hx      Reviewed and updated as needed this visit by Provider         ROS:  Constitutional, HEENT, cardiovascular, pulmonary, gi and gu systems are negative, except as otherwise noted.    OBJECTIVE:     BP 90/55 (BP Location: Right arm, Patient Position: Chair, Cuff Size: Adult Regular)  Pulse 71  Temp 98.3  F (36.8  C) (Oral)  Wt 158 lb (71.7 kg)  LMP 08/27/2018  SpO2 96%  BMI 25.7 kg/m2  Body mass index is 25.7 kg/(m^2).  GENERAL: healthy, alert and no distress  NECK: no adenopathy, no asymmetry, masses, or scars and thyroid normal to palpation  RESP: lungs clear to auscultation - no rales, rhonchi or wheezes  CV: regular rate and rhythm, normal S1 S2, no S3 or S4, no murmur, click or rub, no peripheral edema and peripheral pulses strong  ABDOMEN: soft, nontender, no hepatosplenomegaly, no masses and bowel sounds normal  MS: no gross musculoskeletal defects noted, no edema  PSYCH: mentation appears normal, tangential, affect normal/bright, judgement and insight intact and appearance disheveled    Diagnostic Test Results:  No results found for this or any previous visit (from the past 24 hour(s)).    ASSESSMENT/PLAN:       ICD-10-CM    1. Other systemic lupus erythematosus with other organ involvement (H) M32.19 hydroxychloroquine (PLAQUENIL) 200 MG tablet     CBC with platelets     Basic metabolic  panel     ESR: Erythrocyte sedimentation rate   2. High risk medications (not anticoagulants) long-term use Z79.899 Hepatic panel (Albumin, ALT, AST, Bili, Alk Phos, TP)   3. Recurrent major depressive disorder, in partial remission (H) F33.41 buPROPion (WELLBUTRIN SR) 150 MG 12 hr tablet     traZODone (DESYREL) 50 MG tablet   4. Psychophysiological insomnia F51.04    5. Skin lesion L98.9 triamcinolone (KENALOG) 0.1 % ointment     Hepatic panel (Albumin, ALT, AST, Bili, Alk Phos, TP)     Advised that I will only fill meds until she sees her rheumatologist in November.   Labs as above given high risk med use.   Continue prozac, wellbutrin for depression, trazodone at bedtime.   Long discussion regarding sobriety resources, she declines outpatient addiction med consult.     See Patient Instructions    Lauren A. Engelmann, MD  [facility]

## 2018-09-10 NOTE — MR AVS SNAPSHOT
After Visit Summary   9/10/2018    Sosa Wolf    MRN: [id number]           Patient Information     Date Of Birth          1975        Visit Information        Provider Department      9/10/2018 1:20 PM Engelmann, Lauren Anneliese, MD [facility]        Today's Diagnoses     Other systemic lupus erythematosus with other organ involvement (H)    -  1    High risk medications (not anticoagulants) long-term use        Recurrent major depressive disorder, in partial remission (H)        Psychophysiological insomnia        Skin lesion          Care Instructions    Keep your follow up with Dr. Arvizu.   We will call with lab results.           Follow-ups after your visit        Follow-up notes from your care team     Return in about 3 months (around 12/10/2018) for Mood Followup.      Your next 10 appointments already scheduled     Nov 13, 2018  5:45 PM CST   LAB with  LAB   [facility]    [address] [place] [id number] [phone]           Please do not eat 10-12 hours before your appointment if you are coming in fasting for labs on lipids, cholesterol, or glucose (sugar). This does not apply to pregnant women. Water, hot tea and black coffee (with nothing added) are okay. Do not drink other fluids, diet soda or chew gum.            Nov 21, 2018  2:40 PM CST   Return Visit with Jack Arvizu MD   [facility] ([facility])    [address] [place] [id number] [phone]              Who to contact     If you have questions or need follow up information about today's clinic visit or your schedule please contact [facility] directly at [phone].  Normal or non-critical lab and imaging results will be communicated to you by MyChart, letter or phone within 4 business days after the clinic has received the results. If you do not hear from us within 7 days,  please contact the clinic through [employer] or phone. If you have a critical or abnormal lab result, we will notify you by phone as soon as possible.  Submit refill requests through [employer] or call your pharmacy and they will forward the refill request to us. Please allow 3 business days for your refill to be completed.          Additional Information About Your Visit        [employer] Information     [employer] gives you secure access to your electronic health record. If you see a primary care provider, you can also send messages to your care team and make appointments. If you have questions, please call your primary care clinic.  If you do not have a primary care provider, please call [phone] and they will assist you.        Care EveryWhere ID     This is your Care EveryWhere ID. This could be used by other organizations to access your [facility] medical records  [id number]        Your Vitals Were     Pulse Temperature Last Period Pulse Oximetry BMI (Body Mass Index)       71 98.3  F (36.8  C) (Oral) 08/27/2018 96% 25.7 kg/m2        Blood Pressure from Last 3 Encounters:   09/10/18 90/55   09/29/17 112/69   09/21/17 119/75    Weight from Last 3 Encounters:   09/10/18 158 lb (71.7 kg)   09/29/17 151 lb (68.5 kg)   09/21/17 155 lb 3.2 oz (70.4 kg)              We Performed the Following     Basic metabolic panel     CBC with platelets     ESR: Erythrocyte sedimentation rate     Hepatic panel (Albumin, ALT, AST, Bili, Alk Phos, TP)          Today's Medication Changes          These changes are accurate as of 9/10/18  2:03 PM.  If you have any questions, ask your nurse or doctor.               Start taking these medicines.        Dose/Directions    traZODone 50 MG tablet   Commonly known as:  DESYREL   Used for:  Recurrent major depressive disorder, in partial remission (H)   Started by:  Engelmann, Lauren Anneliese, MD        Dose:  50 mg   Take 1 tablet (50 mg) by mouth nightly as needed for sleep   Quantity:  30  tablet   Refills:  1       triamcinolone 0.1 % ointment   Commonly known as:  KENALOG   Used for:  Skin lesion   Started by:  Engelmann, Lauren Anneliese, MD        Apply sparingly to affected area three times daily for 14 days.   Quantity:  80 g   Refills:  0            Where to get your medicines      [place] [facility] [place] [address]  [address], [facility] [address]     Phone:  [phone]     buPROPion 150 MG 12 hr tablet    hydroxychloroquine 200 MG tablet    traZODone 50 MG tablet    triamcinolone 0.1 % ointment                Primary Care Provider Office Phone # Fax #    Cynthia Scott PA-C [phone]       [address]  [facility] [address]        Equal Access to Services     MAI OLMEDO : Hadii jia ku hadasho Soomaali, waaxda luqadaha, qaybta kaalmada adeegyada, waxay adamin hayteodoro bah . So [facility] [phone].    ATENCIÓN: Si habla español, tiene a bernstein disposición servicios gratuitos de asistencia lingüística. [facility] [phone].    We comply with applicable federal civil rights laws and [place] laws. We do not discriminate on the basis of race, color, national origin, age, disability, sex, sexual orientation, or gender identity.            Thank you!     Thank you for choosing [facility]  for your care. Our goal is always to provide you with excellent care. Hearing back from our patients is one way we can continue to improve our services. Please take a few minutes to complete the written survey that you may receive in the mail after your visit with us. Thank you!             Your Updated Medication List - Protect others around you: Learn how to safely use, store and throw away your medicines at www.disposemymeds.org.          This list is accurate as of 9/10/18  2:03 PM.  Always use your most recent med list.                   Brand  Name Dispense Instructions for use Diagnosis    buPROPion 150 MG 12 hr tablet    WELLBUTRIN SR    90 tablet    Take 1 tablet (150 mg) by mouth daily    Recurrent major depressive disorder, in partial remission (H)       hydroxychloroquine 200 MG tablet    PLAQUENIL    90 tablet    Take 1 tablet (200 mg) by mouth daily    Other systemic lupus erythematosus with other organ involvement (H)       PROZAC PO      Take 40 mg by mouth        traZODone 50 MG tablet    DESYREL    30 tablet    Take 1 tablet (50 mg) by mouth nightly as needed for sleep    Recurrent major depressive disorder, in partial remission (H)       triamcinolone 0.1 % ointment    KENALOG    80 g    Apply sparingly to affected area three times daily for 14 days.    Skin lesion

## 2018-09-24 DIAGNOSIS — M32.19 OTHER SYSTEMIC LUPUS ERYTHEMATOSUS WITH OTHER ORGAN INVOLVEMENT (H): ICD-10-CM

## 2018-09-24 NOTE — TELEPHONE ENCOUNTER
Patient returned the call and stated she did schedule with rheumatology apt for 11/21/18 however she has not had a recent eye exam. Encouraged her to get an eye exam and have the results sent to us. Patient stated she will do this and has no further questions.

## 2018-09-24 NOTE — TELEPHONE ENCOUNTER
/Requested Prescriptions/   Pending Prescriptions Disp Refills     hydroxychloroquine (PLAQUENIL) 200 MG tablet 90 tablet 0     Sig: Take 1 tablet (200 mg) by mouth daily    There is no refill protocol information for this order        Last Written Prescription Date:  9/10/2018  Last Fill Quantity: 90,  # refills: 0   Last office visit: 9/21/2017 with prescribing provider:  Ally   Future Office Visit:   Next 5 appointments (look out 90 days)     Nov 21, 2018  2:40 PM CST   Return Visit with Jack Arvizu MD   [facility] [place] ([facility])    [address] [place] [address]  [place] [address]   [phone]

## 2018-09-24 NOTE — TELEPHONE ENCOUNTER
Patient no showed her last 2 apts and was last seen on 9/21/17.  There is also no evidence of a recent eye exam.  Left  for pt to return call to [phone].    Joel Carrington RN....9/24/2018 4:28 PM

## 2018-11-06 RX ORDER — HYDROXYCHLOROQUINE SULFATE 200 MG/1
200 TABLET, FILM COATED ORAL DAILY
Qty: 90 TABLET | Refills: 0 | Status: SHIPPED | OUTPATIENT
Start: 2018-11-06 | End: 2018-12-13

## 2018-11-06 NOTE — TELEPHONE ENCOUNTER
Rheumatology team: Please call to notify Ms. Wolf that hydroxychloroquine has been refilled. Rheumatology follow-up needed.  Jack Arvizu MD  11/6/2018 4:36 PM

## 2018-11-09 NOTE — TELEPHONE ENCOUNTER
Left  notifying patient of Dr. Arvizu's message below.    Joel Carrington RN....11/9/2018 1:09 PM

## 2018-11-16 DIAGNOSIS — F39 MOOD DISORDER (H): ICD-10-CM

## 2018-11-16 RX ORDER — FLUOXETINE 40 MG/1
40 CAPSULE ORAL DAILY
Qty: 30 CAPSULE | Refills: 3 | Status: SHIPPED | OUTPATIENT
Start: 2018-11-16 | End: 2019-06-13

## 2018-11-16 NOTE — TELEPHONE ENCOUNTER
"Requested Prescriptions   Pending Prescriptions Disp Refills     FLUoxetine (PROZAC) 40 MG capsule 30 capsule 0    Last Written Prescription Date:  10/10/18  Last Fill Quantity: 30,  # refills: 0   Last office visit: 9/10/2018 with prescribing provider:     Future Office Visit:   Next 5 appointments (look out 90 days)     Nov 21, 2018  2:40 PM CST   Return Visit with Jack Arvizu MD   [facility] ([place] [id number] [facility] [address]   [phone]                  Sig: Take 1 capsule (40 mg) by mouth daily    SSRIs Protocol Passed    11/16/2018  4:23 PM       Passed - Recent (12 mo) or future (30 days) visit within the authorizing provider's specialty    Patient had office visit in the last 12 months or has a visit in the next 30 days with authorizing provider or within the authorizing provider's specialty.  See \"Patient Info\" tab in inbasket, or \"Choose Columns\" in Meds & Orders section of the refill encounter.             Passed - Patient is age 18 or older       Passed - No active pregnancy on record       Passed - No positive pregnancy test in last 12 months          "

## 2018-11-16 NOTE — TELEPHONE ENCOUNTER
"Requested Prescriptions   Pending Prescriptions Disp Refills     FLUoxetine (PROZAC) 40 MG capsule 30 capsule 0     Sig: Take 1 capsule (40 mg) by mouth daily    SSRIs Protocol Passed    11/16/2018  4:23 PM       Passed - Recent (12 mo) or future (30 days) visit within the authorizing provider's specialty    Patient had office visit in the last 12 months or has a visit in the next 30 days with authorizing provider or within the authorizing provider's specialty.  See \"Patient Info\" tab in inbasket, or \"Choose Columns\" in Meds & Orders section of the refill encounter.             Passed - Patient is age 18 or older       Passed - No active pregnancy on record       Passed - No positive pregnancy test in last 12 months          "

## 2018-11-19 ENCOUNTER — DOCUMENTATION ONLY (OUTPATIENT)
Dept: LAB | Facility: CLINIC | Age: 43
End: 2018-11-19

## 2018-11-20 NOTE — PROGRESS NOTES
Ms. Wolf has not been seen in >1year.  Needs clinic visit.  I have not ordered labs.    Jack Arvizu MD  11/20/2018 7:14 AM

## 2018-12-13 ENCOUNTER — OFFICE VISIT (OUTPATIENT)
Dept: RHEUMATOLOGY | Facility: CLINIC | Age: 43
End: 2018-12-13
Payer: COMMERCIAL

## 2018-12-13 VITALS
SYSTOLIC BLOOD PRESSURE: 118 MMHG | WEIGHT: 158.8 LBS | TEMPERATURE: 97.3 F | OXYGEN SATURATION: 98 % | HEART RATE: 90 BPM | DIASTOLIC BLOOD PRESSURE: 67 MMHG | BODY MASS INDEX: 25.83 KG/M2

## 2018-12-13 DIAGNOSIS — M32.9 SYSTEMIC LUPUS ERYTHEMATOSUS, UNSPECIFIED SLE TYPE, UNSPECIFIED ORGAN INVOLVEMENT STATUS (H): Primary | ICD-10-CM

## 2018-12-13 DIAGNOSIS — Z23 NEED FOR PNEUMOCOCCAL VACCINATION: ICD-10-CM

## 2018-12-13 DIAGNOSIS — M35.00 SECONDARY SJOGREN'S SYNDROME (H): ICD-10-CM

## 2018-12-13 DIAGNOSIS — Z23 NEED FOR PROPHYLACTIC VACCINATION AND INOCULATION AGAINST INFLUENZA: ICD-10-CM

## 2018-12-13 DIAGNOSIS — R06.02 SHORTNESS OF BREATH: ICD-10-CM

## 2018-12-13 DIAGNOSIS — L93.0 DISCOID LUPUS: ICD-10-CM

## 2018-12-13 LAB
ALBUMIN SERPL-MCNC: 3.7 G/DL (ref 3.4–5)
ALBUMIN UR-MCNC: NEGATIVE MG/DL
ALP SERPL-CCNC: 31 U/L (ref 40–150)
ALT SERPL W P-5'-P-CCNC: 18 U/L (ref 0–50)
ANION GAP SERPL CALCULATED.3IONS-SCNC: 8 MMOL/L (ref 3–14)
APPEARANCE UR: CLEAR
AST SERPL W P-5'-P-CCNC: 16 U/L (ref 0–45)
BASOPHILS # BLD AUTO: 0 10E9/L (ref 0–0.2)
BASOPHILS NFR BLD AUTO: 0.3 %
BILIRUB SERPL-MCNC: 0.3 MG/DL (ref 0.2–1.3)
BILIRUB UR QL STRIP: NEGATIVE
BUN SERPL-MCNC: 17 MG/DL (ref 7–30)
CALCIUM SERPL-MCNC: 8.5 MG/DL (ref 8.5–10.1)
CHLORIDE SERPL-SCNC: 106 MMOL/L (ref 94–109)
CK SERPL-CCNC: 89 U/L (ref 30–225)
CO2 SERPL-SCNC: 25 MMOL/L (ref 20–32)
COLOR UR AUTO: YELLOW
CREAT SERPL-MCNC: 0.82 MG/DL (ref 0.52–1.04)
CRP SERPL-MCNC: <2.9 MG/L (ref 0–8)
DIFFERENTIAL METHOD BLD: NORMAL
EOSINOPHIL # BLD AUTO: 0.1 10E9/L (ref 0–0.7)
EOSINOPHIL NFR BLD AUTO: 1.5 %
ERYTHROCYTE [DISTWIDTH] IN BLOOD BY AUTOMATED COUNT: 13.4 % (ref 10–15)
ERYTHROCYTE [SEDIMENTATION RATE] IN BLOOD BY WESTERGREN METHOD: 5 MM/H (ref 0–20)
GFR SERPL CREATININE-BSD FRML MDRD: 76 ML/MIN/1.7M2
GLUCOSE SERPL-MCNC: 70 MG/DL (ref 70–99)
GLUCOSE UR STRIP-MCNC: NEGATIVE MG/DL
HCT VFR BLD AUTO: 39.7 % (ref 35–47)
HGB BLD-MCNC: 13.5 G/DL (ref 11.7–15.7)
HGB UR QL STRIP: ABNORMAL
KETONES UR STRIP-MCNC: NEGATIVE MG/DL
LEUKOCYTE ESTERASE UR QL STRIP: NEGATIVE
LYMPHOCYTES # BLD AUTO: 1.6 10E9/L (ref 0.8–5.3)
LYMPHOCYTES NFR BLD AUTO: 18.2 %
MCH RBC QN AUTO: 29 PG (ref 26.5–33)
MCHC RBC AUTO-ENTMCNC: 34 G/DL (ref 31.5–36.5)
MCV RBC AUTO: 85 FL (ref 78–100)
MONOCYTES # BLD AUTO: 0.8 10E9/L (ref 0–1.3)
MONOCYTES NFR BLD AUTO: 8.7 %
NEUTROPHILS # BLD AUTO: 6.4 10E9/L (ref 1.6–8.3)
NEUTROPHILS NFR BLD AUTO: 71.3 %
NITRATE UR QL: NEGATIVE
NON-SQ EPI CELLS #/AREA URNS LPF: ABNORMAL /LPF
PH UR STRIP: 5.5 PH (ref 5–7)
PLATELET # BLD AUTO: 226 10E9/L (ref 150–450)
POTASSIUM SERPL-SCNC: 3.6 MMOL/L (ref 3.4–5.3)
PROT SERPL-MCNC: 6.8 G/DL (ref 6.8–8.8)
PROT UR-MCNC: 0.25 G/L
PROT/CREAT 24H UR: 0.17 G/G CR (ref 0–0.2)
RBC # BLD AUTO: 4.66 10E12/L (ref 3.8–5.2)
RBC #/AREA URNS AUTO: ABNORMAL /HPF
SODIUM SERPL-SCNC: 139 MMOL/L (ref 133–144)
SOURCE: ABNORMAL
SP GR UR STRIP: >1.03 (ref 1–1.03)
UROBILINOGEN UR STRIP-ACNC: 0.2 EU/DL (ref 0.2–1)
WBC # BLD AUTO: 9 10E9/L (ref 4–11)
WBC #/AREA URNS AUTO: ABNORMAL /HPF

## 2018-12-13 PROCEDURE — 86140 C-REACTIVE PROTEIN: CPT | Performed by: INTERNAL MEDICINE

## 2018-12-13 PROCEDURE — 86160 COMPLEMENT ANTIGEN: CPT | Performed by: INTERNAL MEDICINE

## 2018-12-13 PROCEDURE — 90686 IIV4 VACC NO PRSV 0.5 ML IM: CPT | Performed by: INTERNAL MEDICINE

## 2018-12-13 PROCEDURE — 99215 OFFICE O/P EST HI 40 MIN: CPT | Mod: 25 | Performed by: INTERNAL MEDICINE

## 2018-12-13 PROCEDURE — 85652 RBC SED RATE AUTOMATED: CPT | Performed by: INTERNAL MEDICINE

## 2018-12-13 PROCEDURE — 81001 URINALYSIS AUTO W/SCOPE: CPT | Performed by: INTERNAL MEDICINE

## 2018-12-13 PROCEDURE — 90471 IMMUNIZATION ADMIN: CPT | Performed by: INTERNAL MEDICINE

## 2018-12-13 PROCEDURE — 86225 DNA ANTIBODY NATIVE: CPT | Performed by: INTERNAL MEDICINE

## 2018-12-13 PROCEDURE — 82550 ASSAY OF CK (CPK): CPT | Performed by: INTERNAL MEDICINE

## 2018-12-13 PROCEDURE — 90670 PCV13 VACCINE IM: CPT | Performed by: INTERNAL MEDICINE

## 2018-12-13 PROCEDURE — 84156 ASSAY OF PROTEIN URINE: CPT | Performed by: INTERNAL MEDICINE

## 2018-12-13 PROCEDURE — 85025 COMPLETE CBC W/AUTO DIFF WBC: CPT | Performed by: INTERNAL MEDICINE

## 2018-12-13 PROCEDURE — 90472 IMMUNIZATION ADMIN EACH ADD: CPT | Performed by: INTERNAL MEDICINE

## 2018-12-13 PROCEDURE — 36415 COLL VENOUS BLD VENIPUNCTURE: CPT | Performed by: INTERNAL MEDICINE

## 2018-12-13 PROCEDURE — 80053 COMPREHEN METABOLIC PANEL: CPT | Performed by: INTERNAL MEDICINE

## 2018-12-13 RX ORDER — HYDROXYCHLOROQUINE SULFATE 200 MG/1
200 TABLET, FILM COATED ORAL DAILY
Qty: 90 TABLET | Refills: 1 | Status: SHIPPED | OUTPATIENT
Start: 2018-12-13 | End: 2019-04-04

## 2018-12-13 RX ORDER — MYCOPHENOLATE MOFETIL 500 MG/1
500 TABLET ORAL 2 TIMES DAILY
Qty: 60 TABLET | Refills: 3 | Status: SHIPPED | OUTPATIENT
Start: 2018-12-13 | End: 2019-04-04

## 2018-12-13 ASSESSMENT — PAIN SCALES - GENERAL: PAINLEVEL: SEVERE PAIN (6)

## 2018-12-13 NOTE — PROGRESS NOTES

## 2018-12-13 NOTE — PROGRESS NOTES
"Rheumatology Clinic Visit      Sosa Wolf MRN# [id number]   YOB: 1975 Age: 43 year old      Date of visit: 12/13/18   PCP: Cynthia Scott    Chief Complaint   Patient presents with:  Autoimmune Disease: Recheck lupus. last seen 9/2017. Things have got a lot worse. Sxs: Recently been having chest pain, Joint pain, Muscle fatigue, Itchy, fatigue      Assessment and Plan     1. Systemic Lupus Erythematosus (JESSE+, dsDNA+, arthritis, oral sores [SLE versus HSV related], proteinuria): Dx'd 10/2016; initially with arthritis that improved with HCQ.  Previously on MTX (fatigue, effective), leflunomide (effective for arthritis, stopped when she ran out and did not return to clinic for a while).  Currently with active inflammatory arthritis, \"brain fog\" referring to forgetting things often; and skin lesions on her left leg and left hand consistent with discoid lesions.  Based on these findings will start MMF.  Also see #2.  - Continue hydroxychloroquine 200mg daily (last eye exam 10/17/2017; advised that this be updated ASAP)  - Start mycophenolate mofetil 500 mg twice daily  - Labs: CBC, CMP, ESR, CRP, CK, C3, C4, dsDNA, UA, Uprotein:creatinine  - Labs monthly [id number]: CBC, Cr, Hepatic Panel  - Labs in 3 months: CBC, CMP, ESR, CRP, CK, C3, C4, dsDNA, UA, Uprotein:creatinine    # Hydroxychloroquine (Plaquenil) Risks and Benefits:  The risks and benefits of hydroxychloroquine were discussed in detail and the patient verbalized understanding; the patient also verbalized agreement to get the required ophthalmologic toxicity monitoring.  The risks discussed include, but are not limited to, the risk for hypersensitivity, anaphylaxis, anaphylactoid reactions, irreversible retinal damage, rare hematologic reactions, and rare cardiomyopathy.  Patients with G6PD deficiency or hepatic impairment may be at an increased risk for adverse effects.  I encouraged reviewing the package insert and asking any questions about " the medication.      # Mycophenolate Mofetil and Mycophenolic Acid Risks and Benefits: The risks and benefits were discussed in detail and the patient verbalized understanding.  The risks discussed include, but are not limited to, the risk for hypersensitivity, anaphylaxis, anaphylactoid reactions, infections, bone marrow suppression, renal toxicity, hepatotoxicity, malignancy, nausea, vomiting, and GI upset.  Pregnancy prevention and planning was discussed; it is recommended that women of childbearing potential use reliable contraception during therapy. I encouraged reviewing the package insert and asking any questions about the medication.      # Pregnancy Plans: on birth control (depo-provera)    2.  Shortness of breath and chest discomfort when she lies flat: Has been a symptom for about 1 year now per patient.  Concern for cardiac involvement and will check an echocardiogram.  Advised that she follow-up with her PCP ASAP.  Noted that she denies dyspnea on exertion, lightheadedness, dizziness, chest pressure throughout the day, nausea, or exercise intolerance.  If she lays flat for a long enough time the symptom resolves.   - Echocardiogram    3.  Secondary Sjogren's syndrome: Mild symptoms that may be due to other factors such as caffeine intake, smoking, etc., but secondary Sjogren's Syndrome is possible.  We discussed the importance of good oral hygiene, frequent sips of water, avoiding high sugar foods and liquids, avoiding oral irritants such as coffee, alcohol, and nicotine, and avoiding acidic drinks such as carbonated and high sugar beverages. She is already seeing a dentist regularly. Dry eyes are not being treated with anything right now; I advised her to use artificial tears as needed.     4. Discoid lupus: Lesions on her left hand and left lower leg are consistent with discoid lesions.  Advised that she see her dermatologist for further evaluation    5. Cigarette Smoking:  Encouraged complete  cessation and discussed the health benefits.  She has already cut down significantly; positive reinforcement given.     6. Cocaine use history: No sober    7. Vaccinations: Vaccinations reviewed with Ms. Wolf.  Risks and benefits of vaccinations were discussed.  - Influenza: will receive today  - [id number]: will receive today  - [id number]: to receive at least 8 weeks after [id number] is administered     8. Noncompliance history: I stressed the importance of compliance and toxicity monitoring today.  She says that before her disease is not so bad so she was not so worried about things but now she recognizes the importance of following through with her treatment plan.    9.  Depression: Advised that she follow-up with her PCP for this issue.     Ms. Wolf verbalized agreement with and understanding of the rational for the diagnosis and treatment plan.  All questions were answered to best of my ability and the patient's satisfaction. Ms. Wolf was advised to contact the clinic with any questions that may arise after the clinic visit.      Thank you for involving me in the care of the patient    Return to clinic: 3 months      HPI   Sosa Wolf is a 43 year old female with a medical history significant for tobacco abuse, genital warts, and HSV type II who presents for follow-up of systemic lupus erythematosus.    Ms. Wolf was last seen in January 2017 and then Sep 2017.      Today, Ms. Wolf reports that when she was first diagnosed with lupus she did not have many symptoms so she was not that worried about it.  Now she is more symptomatic and realizes the importance of following up and being treated.  She is currently on hydroxychloroquine and says that she needs to update her eye exam.  She is not taking leflunomide anymore because she ran out of it; it did work for her arthritis.  Currently with pain in her hands, wrists, and right knee that are worse in the morning, and associated with morning stiffness for at least  1 hour.  Pain and stiffness improved with activity and worsened with inactivity.  She also has round skin lesions that are nonpainful appear to have thickened skin on the dorsal aspect of her left hand and of her left lower leg.  She feels more depressed recently.  She has a headache daily.  She has some chest discomfort and shortness of breath when she lays down flat, but if she lays down flat long enough this symptom resolves.  Other than the symptoms she has when she first lays down, she does not experience chest pain, shortness of breath, nausea, lightheadedness, dizziness, exertion, or exercise intolerance today.  Dry eyes are treated with artificial tears as needed, which is not very frequently, typically less than once a week.  Dry mouth is treated with frequent sips of water and she says that she follows with a dentist regularly.      Denies fevers, chills, nausea, vomiting, constipation, diarrhea. No abdominal pain. No LE swelling. No neck pain.   No photosensitivity or photophobia. No eye pain or redness. No history of inflammatory eye disease.  No history of DVT, pulmonary embolism, or miscarriage.   No history of serositis.  No history of Raynaud's Phenomenon.  No seizure history.  No known renal disorder.      Tobacco: trying to quit, rare cigarette now, using vapor  EtOH: none, sober since 5/16/2010  Drugs: cocaine history; now sober  Occupation: counselor for mentally disabled people    ROS   GEN: No fevers, chills, night sweats, or weight change  SKIN: See HPI  HEENT: No oral or nasal ulcers.  CV:  see HPI  PULM: See HPI  GI: No nausea, vomiting, constipation, diarrhea. No blood in stool. No abdominal pain.  : No blood in urine.  MSK: See HPI.  NEURO: No numbness, tingling, or weakness.  ENDO: No cold intolerance; see history of present illness   EXT: No LE swelling  PSYCH: Depression    Active Problem List     Patient Active Problem List   Diagnosis     Herpes simplex type 2 infection     Genital  warts     CARDIOVASCULAR SCREENING; LDL GOAL LESS THAN 160     H/O intravenous drug use in remission     Tobacco abuse     Surveillance for Depo-Provera contraception     JESSE positive     Multiple joint pain     Cocaine use     Systemic lupus erythematosus (H)     High risk medications (not anticoagulants) long-term use     Sicca syndrome (H)     Past Medical History     Past Medical History:   Diagnosis Date     Bipolar disorder (H) 12/04    Dx'd     H/O: depression     SUICIDE ATTEMPT AGE 15     HPV (human papillomavirus)      Past Surgical History     Past Surgical History:   Procedure Laterality Date     BUNIONECTOMY RT/LT       HC ENLARGE BREAST WITH IMPLANT  2000     Allergy   No Known Allergies  Current Medication List     Current Outpatient Medications   Medication Sig     Aspirin-Acetaminophen-Caffeine (EXCEDRIN PO)      buPROPion (WELLBUTRIN SR) 150 MG 12 hr tablet Take 1 tablet (150 mg) by mouth daily     FLUoxetine (PROZAC) 40 MG capsule Take 1 capsule (40 mg) by mouth daily     hydroxychloroquine (PLAQUENIL) 200 MG tablet Take 1 tablet (200 mg) by mouth daily . Ophthalmology toxicity monitoring exam needed. Rheumatology follow-up appointment needed.     IBUPROFEN PO      traZODone (DESYREL) 50 MG tablet Take 1 tablet (50 mg) by mouth nightly as needed for sleep     triamcinolone (KENALOG) 0.1 % ointment Apply sparingly to affected area three times daily for 14 days.     No current facility-administered medications for this visit.      Social History   See HPI    Family History     Family History   Problem Relation Age of Onset     Cancer Father         lung cancer     Diabetes Maternal Grandmother      Depression Maternal Grandmother      Lipids Maternal Grandmother      Cerebrovascular Disease Maternal Grandfather      Lipids Maternal Grandfather      Circulatory Maternal Grandfather         MI in 50's     Depression Brother         depression and OCD     Gastrointestinal Disease Brother         IBS  "    Depression Mother      Thyroid Disease No family hx of      Asthma No family hx of      Denies family history of autoimmune disease     Physical Exam     Temp Readings from Last 3 Encounters:   12/13/18 97.3  F (36.3  C) (Oral)   09/10/18 98.3  F (36.8  C) (Oral)   09/29/17 98.3  F (36.8  C) (Oral)     BP Readings from Last 5 Encounters:   12/13/18 118/67   09/10/18 90/55   09/29/17 112/69   09/21/17 119/75   07/17/17 113/72     Pulse Readings from Last 1 Encounters:   12/13/18 90     Resp Readings from Last 1 Encounters:   11/04/16 18     Estimated body mass index is 25.83 kg/m  as calculated from the following:    Height as of 9/21/17: 1.67 m (5' 5.75\").    Weight as of this encounter: 72 kg (158 lb 12.8 oz).    GEN: NAD  HEENT: MMM. No oral lesions. Anicteric, noninjected sclera  CV: S1, S2. RRR. No m/r/g.  PULM: Clear to auscultation bilaterally.  No wheeze/cough.  MSK:  Synovial swelling and tenderness to palpation of the bilateral second-third MCPs and bilateral second-fourth PIPs.  Wrists tender to palpation but without swelling.  Elbows, shoulders, knees, ankles, and MTPs without swelling or tenderness to palpation.  Hips nontender to palpation.     NEURO: UE and LE strengths 5/5 and equal bilaterally.   SKIN: Circular erythematous lesion of quarter dollar size that was nontender and without increased warmth on the dorsal aspect of the left hand on and on the left lower leg.  EXT: No LE edema  PSYCH: Alert. Appropriate.    Labs / Imaging (select studies)   RF/CCP  Recent Labs   Lab Test  10/11/16   1043  08/10/16   1059   CCPIGG  1   --    RHF   --   <20     JESSE  Recent Labs   Lab Test  08/10/16   1059   ROBYN  1.7*     RNP/Sm/SSA/SSB  Recent Labs   Lab Test  10/11/16   1043  12/10/13   1118   RNPIGG  0.3   --    SMIGG  <0.2  Negative   Antibody index (AI) values reflect qualitative changes in antibody   concentration that cannot be directly associated with clinical condition or   disease state.     --  "   SSAIGG  <0.2  Negative   Antibody index (AI) values reflect qualitative changes in antibody   concentration that cannot be directly associated with clinical condition or   disease state.     --    SSBIGG  <0.2  Negative   Antibody index (AI) values reflect qualitative changes in antibody   concentration that cannot be directly associated with clinical condition or   disease state.     --    SCLIGG  <0.2  Negative   Antibody index (AI) values reflect qualitative changes in antibody   concentration that cannot be directly associated with clinical condition or   disease state.     --    TREPAB   --   Negative     dsDNA  Recent Labs   Lab Test  05/01/17 0936 01/13/17   1015  10/11/16   1043   DNA  37*  45*  70*     C3/C4  Recent Labs   Lab Test  05/01/17 0936  01/13/17   1015  10/11/16   1043   [id number]  80  87  83   [id number]  17  22  15     CBC  Recent Labs   Lab Test  05/01/17 0936 01/13/17   1015  10/11/16   1043   WBC  6.0  7.5  8.8   RBC  4.78  4.63  4.88   HGB  14.0  13.4  13.9   HCT  40.6  38.7  39.8   MCV  85  84  82   RDW  13.8  14.0  13.9   PLT  163  215  223   MCH  29.3  28.9  28.5   MCHC  34.5  34.6  34.9   NEUTROPHIL  57.6  58.0  62.1   LYMPH  33.2  30.3  30.3   MONOCYTE  6.2  7.9  4.3   EOSINOPHIL  2.5  3.4  3.0   BASOPHIL  0.5  0.4  0.3   ANEU  3.5  4.3  5.5   ALYM  2.0  2.3  2.7   DAQUAN  0.4  0.6  0.4   AEOS  0.2  0.3  0.3   ABAS  0.0  0.0  0.0     CMP  Recent Labs   Lab Test  05/01/17 0936  01/13/17   1015  10/11/16   1043  08/31/16   0950  08/10/16   1059   NA  142  137   --   141  142   POTASSIUM  3.6  4.0   --   3.5  3.3*   CHLORIDE  110*  104   --   110*  110*   CO2  24  25   --   25  24   ANIONGAP  8  8   --   6  8   GLC  91  87   --   60*  80   BUN  16  21   --   18  14   CR  0.83  0.68  0.74  0.91  0.85   GFRESTIMATED  75  >90  Non  GFR Calc    86  67  73   GFRESTBLACK  >90   GFR Calc    >90   GFR Calc    >90   GFR Calc     82  89   RICHARD  8.9  8.9   --   9.1  8.6   BILITOTAL  0.4  0.3  0.3   --   0.3   ALBUMIN  3.6  3.9  4.0   --   3.7   PROTTOTAL  6.5*  6.9  7.2   --   6.5*   ALKPHOS  24*  26*  29*   --   31*   AST  16  26  14   --   11   ALT  23  37  24   --   17     Calcium/VitaminD  Recent Labs   Lab Test  05/01/17   0936  01/13/17   1015  08/31/16   0950   RICHARD  8.9  8.9  9.1     ESR/CRP  Recent Labs   Lab Test  05/01/17   0936  01/13/17   1015  08/10/16   1059   SED  4  6  6   CRP  <2.9  <2.9  <2.9     CK/Aldolase  Recent Labs   Lab Test  05/01/17   0936  01/13/17   1015  10/11/16   1043   CKT  81  174  65   ALDOLASE   --    --   4.5     TSH/T4  Recent Labs   Lab Test  10/11/16   1043   TSH  1.48     Hepatitis B  Recent Labs   Lab Test  10/11/16   1043   HBCAB  Nonreactive   HEPBANG  Nonreactive     Hepatitis C  Recent Labs   Lab Test  10/11/16   1043  12/10/13   1118  03/06/13   1411   HCVAB  Nonreactive   Assay performance characteristics have not been established for newborns,   infants, and children    Negative  Negative     HIV Screening  Recent Labs   Lab Test  10/11/16   1043   HIAGAB  Nonreactive   HIV-1 p24 Ag & HIV-1/HIV-2 Ab Not Detected       UA  Recent Labs   Lab Test  05/01/17   0954  01/13/17   1033   COLOR  Yellow  Yellow   APPEARANCE  Clear  Clear   URINEGLC  Negative  Negative   URINEBILI  Small  This is an unconfirmed screening test result. A positive result may be false.  *  Negative   SG  >1.030  1.010   URINEPH  6.0  6.5   PROTEIN  Trace*  Negative   UROBILINOGEN  0.2  0.2   NITRITE  Negative  Negative   UBLD  Small*  Negative   LEUKEST  Negative  Negative   WBCU  O - 2  O - 2   RBCU  O - 2  O - 2   SQUAMOUSEPI  Few  Few   MUCOUS  Present*   --      Urine Microscopic  Recent Labs   Lab Test  05/01/17   0954  01/13/17   1033   WBCU  O - 2  O - 2   RBCU  O - 2  O - 2   SQUAMOUSEPI  Few  Few   MUCOUS  Present*   --      Urine Protein  Recent Labs   Lab Test  05/01/17   0954  01/13/17   1033   UTP  1.14  0.07    UTPG  0.35*  0.27*   UCRR  324   --         Recent Results (from the past 744 hour(s))   XR Hand Bilateral G/E 3 Views    Narrative    XR HAND BILATERAL G/E 3 VW 10/11/2016 12:41 PM    HISTORY: Pain in unspecified joint      Impression    IMPRESSION: Negative.    JULIAN LORD MD   XR Foot Bilateral G/E 3 Views    Narrative    XR FOOT BILATERAL G/E 3 VW 10/11/2016 12:39 PM    HISTORY: Pain in unspecified joint      Impression    IMPRESSION: Deformity of the head of the right first metatarsal most  likely related to previous surgery or trauma. Some erosive change in  this area cannot be excluded. Bilateral exam otherwise unremarkable.    JULIAN LORD MD   XR Hip Left 2-3 Views    Narrative    XR HIP LEFT 2-3 VIEWS 10/11/2016 12:39 PM    HISTORY: Pain in unspecified joint      Impression    IMPRESSION: Negative.    JULIAN LORD MD       Immunization History     Immunization History   Administered Date(s) Administered     Influenza Vaccine IM 3yrs+ 4 Valent IIV4 12/19/2014, 10/16/2015, 11/04/2016     Mantoux Tuberculin Skin Test 07/30/2018     TD (ADULT, 7+) 02/23/2005     TDAP Vaccine (Adacel) 04/14/2009          Chart documentation done in part with Dragon Voice recognition Software. Although reviewed after completion, some word and grammatical error may remain.    Jack Arvizu MD

## 2018-12-14 ENCOUNTER — TELEPHONE (OUTPATIENT)
Dept: RHEUMATOLOGY | Facility: CLINIC | Age: 43
End: 2018-12-14

## 2018-12-14 LAB
C3 SERPL-MCNC: 80 MG/DL (ref 76–169)
C4 SERPL-MCNC: 19 MG/DL (ref 15–50)
DSDNA AB SER-ACNC: 19 IU/ML

## 2018-12-14 NOTE — TELEPHONE ENCOUNTER
Reason for Call: Request for an order or referral:    Order or referral being requested: Echocardiogram    Date needed: as soon as possible    Has the patient been seen by the PCP for this problem? YES    Additional comments: Patient was to schedule an echo and she called but there is no order. Please place order and let patient know    Phone number Patient can be reached at:  Other phone number:  [phone]    Best Time:  ASAP    Can we leave a detailed message on this number?  YES    Call taken on 12/14/2018 at 8:51 AM by Mesha Caal

## 2018-12-17 NOTE — TELEPHONE ENCOUNTER
Echo order signed; I communicated with Cait James via [employer] messaging and she says that she will reach out to Ms. Wolf.     Jack Arvizu MD  12/17/2018 4:34 PM

## 2018-12-21 ENCOUNTER — ANCILLARY PROCEDURE (OUTPATIENT)
Dept: CARDIOLOGY | Facility: CLINIC | Age: 43
End: 2018-12-21
Attending: INTERNAL MEDICINE
Payer: COMMERCIAL

## 2018-12-21 ENCOUNTER — OFFICE VISIT (OUTPATIENT)
Dept: FAMILY MEDICINE | Facility: CLINIC | Age: 43
End: 2018-12-21
Payer: COMMERCIAL

## 2018-12-21 VITALS
WEIGHT: 159 LBS | OXYGEN SATURATION: 97 % | SYSTOLIC BLOOD PRESSURE: 109 MMHG | DIASTOLIC BLOOD PRESSURE: 62 MMHG | HEART RATE: 78 BPM | BODY MASS INDEX: 25.86 KG/M2 | TEMPERATURE: 98.1 F

## 2018-12-21 DIAGNOSIS — Z23 NEED FOR SHINGLES VACCINE: ICD-10-CM

## 2018-12-21 DIAGNOSIS — Z79.899 HIGH RISK MEDICATION USE: Primary | ICD-10-CM

## 2018-12-21 DIAGNOSIS — F33.41 RECURRENT MAJOR DEPRESSIVE DISORDER, IN PARTIAL REMISSION (H): ICD-10-CM

## 2018-12-21 DIAGNOSIS — M32.9 SYSTEMIC LUPUS ERYTHEMATOSUS, UNSPECIFIED SLE TYPE, UNSPECIFIED ORGAN INVOLVEMENT STATUS (H): ICD-10-CM

## 2018-12-21 DIAGNOSIS — F39 MOOD DISORDER (H): ICD-10-CM

## 2018-12-21 DIAGNOSIS — M32.19 OTHER SYSTEMIC LUPUS ERYTHEMATOSUS WITH OTHER ORGAN INVOLVEMENT (H): ICD-10-CM

## 2018-12-21 LAB — BETA HCG QUAL IFA URINE: NEGATIVE

## 2018-12-21 PROCEDURE — 90750 HZV VACC RECOMBINANT IM: CPT | Performed by: FAMILY MEDICINE

## 2018-12-21 PROCEDURE — 99214 OFFICE O/P EST MOD 30 MIN: CPT | Mod: 25 | Performed by: FAMILY MEDICINE

## 2018-12-21 PROCEDURE — 40000264 ZZHC STATISTIC IV PUSH SINGLE INITIAL SUBSTANCE: Performed by: INTERNAL MEDICINE

## 2018-12-21 PROCEDURE — 96372 THER/PROPH/DIAG INJ SC/IM: CPT | Performed by: FAMILY MEDICINE

## 2018-12-21 PROCEDURE — 93306 TTE W/DOPPLER COMPLETE: CPT | Performed by: INTERNAL MEDICINE

## 2018-12-21 PROCEDURE — 84703 CHORIONIC GONADOTROPIN ASSAY: CPT | Performed by: FAMILY MEDICINE

## 2018-12-21 PROCEDURE — 90471 IMMUNIZATION ADMIN: CPT | Performed by: FAMILY MEDICINE

## 2018-12-21 RX ORDER — BUPROPION HYDROCHLORIDE 150 MG/1
150 TABLET, EXTENDED RELEASE ORAL DAILY
Qty: 90 TABLET | Refills: 1 | Status: SHIPPED | OUTPATIENT
Start: 2018-12-21 | End: 2019-09-02

## 2018-12-21 RX ORDER — TRAZODONE HYDROCHLORIDE 50 MG/1
50 TABLET, FILM COATED ORAL
Qty: 90 TABLET | Refills: 1 | Status: SHIPPED | OUTPATIENT
Start: 2018-12-21 | End: 2019-04-09 | Stop reason: SINTOL

## 2018-12-21 RX ORDER — MEDROXYPROGESTERONE ACETATE 150 MG/ML
150 INJECTION, SUSPENSION INTRAMUSCULAR
Status: DISCONTINUED | OUTPATIENT
Start: 2018-12-21 | End: 2019-09-10

## 2018-12-21 RX ORDER — CYCLOBENZAPRINE HCL 10 MG
10 TABLET ORAL
Qty: 30 TABLET | Refills: 1 | Status: SHIPPED | OUTPATIENT
Start: 2018-12-21 | End: 2019-03-25

## 2018-12-21 RX ORDER — FLUOXETINE 40 MG/1
40 CAPSULE ORAL DAILY
Qty: 30 CAPSULE | Refills: 3 | Status: CANCELLED | OUTPATIENT
Start: 2018-12-21

## 2018-12-21 RX ADMIN — Medication 3 ML: at 11:30

## 2018-12-21 RX ADMIN — MEDROXYPROGESTERONE ACETATE 150 MG: 150 INJECTION, SUSPENSION INTRAMUSCULAR at 14:43

## 2018-12-21 ASSESSMENT — PAIN SCALES - GENERAL: PAINLEVEL: EXTREME PAIN (8)

## 2018-12-21 NOTE — PROGRESS NOTES
SUBJECTIVE:   Sosa Wolf is a 43 year old female who presents to clinic today for the following health issues:    Here to discuss birth control options as she was initiated on cellcept therapy by rheumatology.  Doesn't want LARC, not interested in OCPs. Prefers depo Provera as this is what she had in the past, was happy with it. Denies weight gain.     Also requesting Shingrix vaccination due to cellcept initiation.     Sober from cocaine for a few months. Proud of herself for this. On psychiatric meds and overall feels well. Needs refills of wellbutrin.     She is requesting refills of flexeril for chronic intermittent back pain.     Problem list and histories reviewed & adjusted, as indicated.  Additional history: as documented    Patient Active Problem List   Diagnosis     Herpes simplex type 2 infection     Genital warts     CARDIOVASCULAR SCREENING; LDL GOAL LESS THAN 160     H/O intravenous drug use in remission     Tobacco abuse     Surveillance for Depo-Provera contraception     JESSE positive     Multiple joint pain     Cocaine use     Systemic lupus erythematosus (H)     High risk medications (not anticoagulants) long-term use     Sicca syndrome (H)     Past Surgical History:   Procedure Laterality Date     BUNIONECTOMY RT/LT       HC ENLARGE BREAST WITH IMPLANT         Social History     Tobacco Use     Smoking status: Current Every Day Smoker     Packs/day: 0.10     Years: 23.00     Pack years: 2.30     Types: Cigarettes     Last attempt to quit: 3/3/2013     Years since quittin.8     Smokeless tobacco: Never Used     Tobacco comment: now using a vap   Substance Use Topics     Alcohol use: No     Comment: Sober as of 5/16/10     Family History   Problem Relation Age of Onset     Cancer Father         lung cancer     Diabetes Maternal Grandmother      Depression Maternal Grandmother      Lipids Maternal Grandmother      Cerebrovascular Disease Maternal Grandfather      Lipids Maternal  Grandfather      Circulatory Maternal Grandfather         MI in 50's     Depression Brother         depression and OCD     Gastrointestinal Disease Brother         IBS     Depression Mother      Thyroid Disease No family hx of      Asthma No family hx of            Reviewed and updated as needed this visit by clinical staff  Tobacco  Allergies  Meds  Med Hx  Surg Hx  Fam Hx  Soc Hx      Reviewed and updated as needed this visit by Provider         ROS:  Constitutional, HEENT, cardiovascular, pulmonary, gi and gu systems are negative, except as otherwise noted.    OBJECTIVE:     /62 (BP Location: Left arm, Patient Position: Chair, Cuff Size: Adult Regular)   Pulse 78   Temp 98.1  F (36.7  C) (Oral)   Wt 72.1 kg (159 lb)   LMP 11/21/2018   SpO2 97%   BMI 25.86 kg/m    Body mass index is 25.86 kg/m .  GENERAL: healthy, alert and no distress  NECK: no adenopathy, no asymmetry, masses, or scars and thyroid normal to palpation  RESP: lungs clear to auscultation - no rales, rhonchi or wheezes  CV: regular rate and rhythm, normal S1 S2, no S3 or S4, no murmur, click or rub, no peripheral edema and peripheral pulses strong  ABDOMEN: soft, nontender, no hepatosplenomegaly, no masses and bowel sounds normal  NEURO: Normal strength and tone, mentation intact and speech normal  PSYCH: mentation appears normal, affect normal/bright    Diagnostic Test Results:  No results found for this or any previous visit (from the past 24 hour(s)).    ASSESSMENT/PLAN:       ICD-10-CM    1. High risk medication use Z79.899 medroxyPROGESTERone (DEPO-PROVERA) injection 150 mg   2. Mood disorder (H) F39    3. Recurrent major depressive disorder, in partial remission (H) F33.41 buPROPion (WELLBUTRIN SR) 150 MG 12 hr tablet     traZODone (DESYREL) 50 MG tablet   4. Need for shingles vaccine Z23 VACCINE ADMINISTRATION, INITIAL   5. Other systemic lupus erythematosus with other organ involvement (H) M32.19 cyclobenzaprine (FLEXERIL)  10 MG tablet     Beta HCG Qual, Urine - [facility] and [place] ([id number])     INJECTION INTRAMUSCULAR OR SUB-Q     Ok to start depo provera today. I advised her to consider LARC such as IUD. She declines at this time.   shingrix given.   Refilled trazodone and wellbutrin as requested. Also refilled flexeril.     Regular exercise  See Patient Instructions    Lauren A. Engelmann, MD  [facility]

## 2018-12-21 NOTE — NURSING NOTE
BP: 109/62    LAST PAP/EXAM:   Lab Results   Component Value Date    PAP NIL 10/16/2015     URINE HCG:negative    The following medication was given:     MEDICATION: Depo Provera 150mg  ROUTE: IM  SITE: Arm - Left  : [place]   LOT #: [id number]  EXP:04/20  NEXT INJECTION DUE: 3/8/19 - 3/22/19   Provider: Dr. Engelmann Ashlee Paquette, MA

## 2019-01-14 DIAGNOSIS — M32.9 SYSTEMIC LUPUS ERYTHEMATOSUS, UNSPECIFIED SLE TYPE, UNSPECIFIED ORGAN INVOLVEMENT STATUS (H): ICD-10-CM

## 2019-01-14 LAB
ALBUMIN SERPL-MCNC: 4.1 G/DL (ref 3.4–5)
ALP SERPL-CCNC: 25 U/L (ref 40–150)
ALT SERPL W P-5'-P-CCNC: 17 U/L (ref 0–50)
AST SERPL W P-5'-P-CCNC: 13 U/L (ref 0–45)
BASOPHILS # BLD AUTO: 0 10E9/L (ref 0–0.2)
BASOPHILS NFR BLD AUTO: 0.2 %
BILIRUB DIRECT SERPL-MCNC: <0.1 MG/DL (ref 0–0.2)
BILIRUB SERPL-MCNC: 0.2 MG/DL (ref 0.2–1.3)
CREAT SERPL-MCNC: 0.9 MG/DL (ref 0.52–1.04)
DIFFERENTIAL METHOD BLD: NORMAL
EOSINOPHIL # BLD AUTO: 0.2 10E9/L (ref 0–0.7)
EOSINOPHIL NFR BLD AUTO: 1.5 %
ERYTHROCYTE [DISTWIDTH] IN BLOOD BY AUTOMATED COUNT: 13.3 % (ref 10–15)
GFR SERPL CREATININE-BSD FRML MDRD: 78 ML/MIN/{1.73_M2}
HCT VFR BLD AUTO: 41.6 % (ref 35–47)
HGB BLD-MCNC: 14.3 G/DL (ref 11.7–15.7)
LYMPHOCYTES # BLD AUTO: 2.7 10E9/L (ref 0.8–5.3)
LYMPHOCYTES NFR BLD AUTO: 24.3 %
MCH RBC QN AUTO: 28.8 PG (ref 26.5–33)
MCHC RBC AUTO-ENTMCNC: 34.4 G/DL (ref 31.5–36.5)
MCV RBC AUTO: 84 FL (ref 78–100)
MONOCYTES # BLD AUTO: 0.7 10E9/L (ref 0–1.3)
MONOCYTES NFR BLD AUTO: 6.8 %
NEUTROPHILS # BLD AUTO: 7.4 10E9/L (ref 1.6–8.3)
NEUTROPHILS NFR BLD AUTO: 67.2 %
PLATELET # BLD AUTO: 246 10E9/L (ref 150–450)
PROT SERPL-MCNC: 7.6 G/DL (ref 6.8–8.8)
RBC # BLD AUTO: 4.97 10E12/L (ref 3.8–5.2)
WBC # BLD AUTO: 10.9 10E9/L (ref 4–11)

## 2019-01-14 PROCEDURE — 82565 ASSAY OF CREATININE: CPT | Performed by: INTERNAL MEDICINE

## 2019-01-14 PROCEDURE — 36415 COLL VENOUS BLD VENIPUNCTURE: CPT | Performed by: INTERNAL MEDICINE

## 2019-01-14 PROCEDURE — 85025 COMPLETE CBC W/AUTO DIFF WBC: CPT | Performed by: INTERNAL MEDICINE

## 2019-01-14 PROCEDURE — 80076 HEPATIC FUNCTION PANEL: CPT | Performed by: INTERNAL MEDICINE

## 2019-01-22 ENCOUNTER — OFFICE VISIT (OUTPATIENT)
Dept: OPHTHALMOLOGY | Facility: CLINIC | Age: 44
End: 2019-01-22
Payer: COMMERCIAL

## 2019-01-22 DIAGNOSIS — Z79.899 HIGH RISK MEDICATION USE: Primary | ICD-10-CM

## 2019-01-22 DIAGNOSIS — M32.19 OTHER SYSTEMIC LUPUS ERYTHEMATOSUS WITH OTHER ORGAN INVOLVEMENT (H): ICD-10-CM

## 2019-01-22 PROCEDURE — 92083 EXTENDED VISUAL FIELD XM: CPT | Performed by: STUDENT IN AN ORGANIZED HEALTH CARE EDUCATION/TRAINING PROGRAM

## 2019-01-22 PROCEDURE — 92012 INTRM OPH EXAM EST PATIENT: CPT | Performed by: STUDENT IN AN ORGANIZED HEALTH CARE EDUCATION/TRAINING PROGRAM

## 2019-01-22 PROCEDURE — 92134 CPTRZ OPH DX IMG PST SGM RTA: CPT | Performed by: STUDENT IN AN ORGANIZED HEALTH CARE EDUCATION/TRAINING PROGRAM

## 2019-01-22 ASSESSMENT — CUP TO DISC RATIO
OS_RATIO: 0.45 UD
OD_RATIO: 0.4 UD

## 2019-01-22 ASSESSMENT — VISUAL ACUITY
OD_SC: 20/30
METHOD: SNELLEN - LINEAR
OS_SC: 20/30

## 2019-01-22 ASSESSMENT — REFRACTION_MANIFEST
OS_CYLINDER: +0.75
OD_CYLINDER: +1.00
OD_SPHERE: -0.75
OS_SPHERE: -0.75
OS_ADD: +2.75
OD_ADD: +2.50
OD_AXIS: 180
OS_AXIS: 155

## 2019-01-22 ASSESSMENT — TONOMETRY
OD_IOP_MMHG: 17
IOP_METHOD: TONOPEN
OS_IOP_MMHG: 17

## 2019-01-22 ASSESSMENT — EXTERNAL EXAM - LEFT EYE: OS_EXAM: NORMAL

## 2019-01-22 ASSESSMENT — SLIT LAMP EXAM - LIDS
COMMENTS: NORMAL
COMMENTS: NORMAL

## 2019-01-22 ASSESSMENT — EXTERNAL EXAM - RIGHT EYE: OD_EXAM: NORMAL

## 2019-01-22 NOTE — PROGRESS NOTES
" Current Eye Medications:  Plaquenil 200 mg daily.     Subjective: Plaquenil 10-2 HVF and OCT. Vision is ok during day in distance. Having a little trouble driving at night. Starting to have trouble at near thinking she may need to schedule complete eye exam. Itching both eyes, sometimes hurts both eyes. Patient has Sjogren's     Objective:  See Ophthalmology Exam.       Assessment:  Sosa Wolf is a 43 year old female who presents with:   Encounter Diagnoses   Name Primary?     High risk medication use - Plaquenil, started ~9/2016 Macular SD-OCT within normal limits both eyes.    Dueñas visual field (HVF) 10-2 within normal limits both eyes.    No signs of Plaquenil retinal toxicity at present.         Other systemic lupus erythematosus with other organ involvement (H)        Plan:  Use artificial tears up to four times a day (like Refresh Optive, Systane Balance, TheraTears, or generic artificial tears are ok. Avoid \"get the red out\" drops).     Josefa Contreras MD  [phone]      "

## 2019-01-22 NOTE — PATIENT INSTRUCTIONS
"Use artificial tears up to four times a day (like Refresh Optive, Systane Balance, TheraTears, or generic artificial tears are ok. Avoid \"get the red out\" drops).     Josefa Contreras MD  [phone]    "

## 2019-01-22 NOTE — LETTER
"    1/22/2019         RE: Sosa Wolf  [address]   [place] [address]        Dear Dr. Arvizu,    Thank you for referring your patient, Sosa Wolf, to the [facility].     Her Plaquenil eye tests were normal today. Plan to repeat tests in 1 year.  Please see a copy of my visit note below.     Current Eye Medications:  Plaquenil 200 mg daily.     Subjective: Plaquenil 10-2 HVF and OCT. Vision is ok during day in distance. Having a little trouble driving at night. Starting to have trouble at near thinking she may need to schedule complete eye exam. Itching both eyes, sometimes hurts both eyes. Patient has Sjogren's     Objective:  See Ophthalmology Exam.       Assessment:  Sosa Wolf is a 43 year old female who presents with:   Encounter Diagnoses   Name Primary?     High risk medication use - Plaquenil, started ~9/2016 Macular SD-OCT within normal limits both eyes.    Dueñas visual field (HVF) 10-2 within normal limits both eyes.    No signs of Plaquenil retinal toxicity at present.         Other systemic lupus erythematosus with other organ involvement (H)        Plan:  Use artificial tears up to four times a day (like Refresh Optive, Systane Balance, TheraTears, or generic artificial tears are ok. Avoid \"get the red out\" drops).     Josefa Contreras MD  [phone]        Again, thank you for allowing me to participate in the care of your patient.        Sincerely,        Josefa Contreras MD    "

## 2019-03-11 ENCOUNTER — ALLIED HEALTH/NURSE VISIT (OUTPATIENT)
Dept: NURSING | Facility: CLINIC | Age: 44
End: 2019-03-11
Payer: COMMERCIAL

## 2019-03-11 VITALS
HEART RATE: 90 BPM | SYSTOLIC BLOOD PRESSURE: 122 MMHG | BODY MASS INDEX: 27.32 KG/M2 | DIASTOLIC BLOOD PRESSURE: 81 MMHG | WEIGHT: 168 LBS

## 2019-03-11 DIAGNOSIS — Z30.42 SURVEILLANCE FOR DEPO-PROVERA CONTRACEPTION: Primary | ICD-10-CM

## 2019-03-11 PROCEDURE — 96372 THER/PROPH/DIAG INJ SC/IM: CPT

## 2019-03-11 RX ADMIN — MEDROXYPROGESTERONE ACETATE 150 MG: 150 INJECTION, SUSPENSION INTRAMUSCULAR at 15:20

## 2019-03-11 NOTE — NURSING NOTE
BP: 122/81    LAST PAP/EXAM:   Lab Results   Component Value Date    PAP NIL 10/16/2015     URINE HCG:not indicated    The following medication was given:     MEDICATION: Depo Provera 150mg  ROUTE: IM  SITE: Deltoid - Right  : Mylan  LOT #: [id number]  EXP:07/2020  NEXT INJECTION DUE: 5/27/19 - 6/10/19     Provider: Dr. Scott     Prior to injection, verified patient identity using patient's name and date of birth.  Due to injection administration, patient instructed to remain in clinic for 15 minutes  afterwards, and to report any adverse reaction to me immediately.    BP: 122/81    LAST PAP/EXAM:   Lab Results   Component Value Date    PAP NIL 10/16/2015     URINE HCG:not indicated    NEXT INJECTION DUE: 5/27/19 - 6/10/19       Drug Amount Wasted:  None.  Vial/Syringe: Single dose vial  Expiration Date:  07/2020  Laura Polo MA on 3/11/2019 at 3:28 PM

## 2019-04-04 ENCOUNTER — OFFICE VISIT (OUTPATIENT)
Dept: RHEUMATOLOGY | Facility: CLINIC | Age: 44
End: 2019-04-04
Payer: COMMERCIAL

## 2019-04-04 VITALS
OXYGEN SATURATION: 100 % | WEIGHT: 169.8 LBS | HEART RATE: 83 BPM | BODY MASS INDEX: 27.29 KG/M2 | DIASTOLIC BLOOD PRESSURE: 72 MMHG | HEIGHT: 66 IN | SYSTOLIC BLOOD PRESSURE: 114 MMHG

## 2019-04-04 DIAGNOSIS — Z23 NEED FOR PROPHYLACTIC VACCINATION AGAINST STREPTOCOCCUS PNEUMONIAE (PNEUMOCOCCUS): ICD-10-CM

## 2019-04-04 DIAGNOSIS — L93.0 DISCOID LUPUS: ICD-10-CM

## 2019-04-04 DIAGNOSIS — R53.83 FATIGUE, UNSPECIFIED TYPE: ICD-10-CM

## 2019-04-04 DIAGNOSIS — Z23 NEED FOR SHINGLES VACCINE: ICD-10-CM

## 2019-04-04 DIAGNOSIS — M32.9 SYSTEMIC LUPUS ERYTHEMATOSUS, UNSPECIFIED SLE TYPE, UNSPECIFIED ORGAN INVOLVEMENT STATUS (H): Primary | ICD-10-CM

## 2019-04-04 LAB
ALBUMIN UR-MCNC: NEGATIVE MG/DL
APPEARANCE UR: CLEAR
BASOPHILS # BLD AUTO: 0 10E9/L (ref 0–0.2)
BASOPHILS NFR BLD AUTO: 0.2 %
BILIRUB UR QL STRIP: NEGATIVE
COLOR UR AUTO: YELLOW
DIFFERENTIAL METHOD BLD: NORMAL
EOSINOPHIL # BLD AUTO: 0.2 10E9/L (ref 0–0.7)
EOSINOPHIL NFR BLD AUTO: 2.4 %
ERYTHROCYTE [DISTWIDTH] IN BLOOD BY AUTOMATED COUNT: 14 % (ref 10–15)
ERYTHROCYTE [SEDIMENTATION RATE] IN BLOOD BY WESTERGREN METHOD: 10 MM/H (ref 0–20)
GLUCOSE UR STRIP-MCNC: NEGATIVE MG/DL
HCT VFR BLD AUTO: 41.2 % (ref 35–47)
HGB BLD-MCNC: 13.5 G/DL (ref 11.7–15.7)
HGB UR QL STRIP: NEGATIVE
KETONES UR STRIP-MCNC: NEGATIVE MG/DL
LEUKOCYTE ESTERASE UR QL STRIP: NEGATIVE
LYMPHOCYTES # BLD AUTO: 1.9 10E9/L (ref 0.8–5.3)
LYMPHOCYTES NFR BLD AUTO: 23.1 %
MCH RBC QN AUTO: 27.7 PG (ref 26.5–33)
MCHC RBC AUTO-ENTMCNC: 32.8 G/DL (ref 31.5–36.5)
MCV RBC AUTO: 85 FL (ref 78–100)
MONOCYTES # BLD AUTO: 0.6 10E9/L (ref 0–1.3)
MONOCYTES NFR BLD AUTO: 7.1 %
NEUTROPHILS # BLD AUTO: 5.5 10E9/L (ref 1.6–8.3)
NEUTROPHILS NFR BLD AUTO: 67.2 %
NITRATE UR QL: NEGATIVE
PH UR STRIP: 7 PH (ref 5–7)
PLATELET # BLD AUTO: 289 10E9/L (ref 150–450)
RBC # BLD AUTO: 4.87 10E12/L (ref 3.8–5.2)
SOURCE: NORMAL
SP GR UR STRIP: 1.02 (ref 1–1.03)
UROBILINOGEN UR STRIP-ACNC: 0.2 EU/DL (ref 0.2–1)
WBC # BLD AUTO: 8.3 10E9/L (ref 4–11)

## 2019-04-04 PROCEDURE — 85025 COMPLETE CBC W/AUTO DIFF WBC: CPT | Performed by: INTERNAL MEDICINE

## 2019-04-04 PROCEDURE — 90472 IMMUNIZATION ADMIN EACH ADD: CPT | Performed by: INTERNAL MEDICINE

## 2019-04-04 PROCEDURE — 90750 HZV VACC RECOMBINANT IM: CPT | Performed by: INTERNAL MEDICINE

## 2019-04-04 PROCEDURE — 90471 IMMUNIZATION ADMIN: CPT | Performed by: INTERNAL MEDICINE

## 2019-04-04 PROCEDURE — 86160 COMPLEMENT ANTIGEN: CPT | Performed by: INTERNAL MEDICINE

## 2019-04-04 PROCEDURE — 81003 URINALYSIS AUTO W/O SCOPE: CPT | Performed by: INTERNAL MEDICINE

## 2019-04-04 PROCEDURE — 84156 ASSAY OF PROTEIN URINE: CPT | Performed by: INTERNAL MEDICINE

## 2019-04-04 PROCEDURE — 85652 RBC SED RATE AUTOMATED: CPT | Performed by: INTERNAL MEDICINE

## 2019-04-04 PROCEDURE — 80053 COMPREHEN METABOLIC PANEL: CPT | Performed by: INTERNAL MEDICINE

## 2019-04-04 PROCEDURE — 36415 COLL VENOUS BLD VENIPUNCTURE: CPT | Performed by: INTERNAL MEDICINE

## 2019-04-04 PROCEDURE — 86140 C-REACTIVE PROTEIN: CPT | Performed by: INTERNAL MEDICINE

## 2019-04-04 PROCEDURE — 90732 PPSV23 VACC 2 YRS+ SUBQ/IM: CPT | Performed by: INTERNAL MEDICINE

## 2019-04-04 PROCEDURE — 86225 DNA ANTIBODY NATIVE: CPT | Performed by: INTERNAL MEDICINE

## 2019-04-04 PROCEDURE — 82550 ASSAY OF CK (CPK): CPT | Performed by: INTERNAL MEDICINE

## 2019-04-04 PROCEDURE — 99214 OFFICE O/P EST MOD 30 MIN: CPT | Mod: 25 | Performed by: INTERNAL MEDICINE

## 2019-04-04 RX ORDER — MYCOPHENOLATE MOFETIL 500 MG/1
TABLET ORAL
Qty: 90 TABLET | Refills: 3 | Status: SHIPPED | OUTPATIENT
Start: 2019-04-04 | End: 2019-07-18

## 2019-04-04 RX ORDER — HYDROXYCHLOROQUINE SULFATE 200 MG/1
200 TABLET, FILM COATED ORAL DAILY
Qty: 90 TABLET | Refills: 3 | Status: SHIPPED | OUTPATIENT
Start: 2019-04-04 | End: 2020-06-02

## 2019-04-04 ASSESSMENT — MIFFLIN-ST. JEOR: SCORE: 1432.99

## 2019-04-04 NOTE — NURSING NOTE
RAPID3 (0-30) Cumulative Score  16.0          RAPID3 Weighted Score (divide #4 by 3 and that is the weighted score)  5.3       Bailey Resendez [facility] Rheumatology  4/4/2019 3:53 PM

## 2019-04-04 NOTE — NURSING NOTE
Screening Questionnaire for Adult Immunization    Are you sick today?   No   Do you have allergies to medications, food, a vaccine component or latex?   No   Have you ever had a serious reaction after receiving a vaccination?   No   Do you have a long-term health problem with heart disease, lung disease, asthma, kidney disease, metabolic disease (e.g. diabetes), anemia, or other blood disorder?   No   Do you have cancer, leukemia, HIV/AIDS, or any other immune system problem?   No   In the past 3 months, have you taken medications that affect  your immune system, such as prednisone, other steroids, or anticancer drugs; drugs for the treatment of rheumatoid arthritis, Crohn s disease, or psoriasis; or have you had radiation treatments?   Yes   Have you had a seizure, or a brain or other nervous system problem?   No   During the past year, have you received a transfusion of blood or blood     products, or been given immune (gamma) globulin or antiviral drug?   No   For women: Are you pregnant or is there a chance you could become        pregnant during the next month?   No   Have you received any vaccinations in the past 4 weeks?   No     Immunization questionnaire was positive for at least one answer.  Notified Dr Arvizu.        Per orders of Dr. Arvizu, injection of Shingrix and Pneumovax 23 given by Bailey Resendez. Patient instructed to remain in clinic for 15 minutes afterwards, and to report any adverse reaction to me immediately.       Screening performed by Bailey Resendez on 4/4/2019 at 4:31 PM.

## 2019-04-04 NOTE — PATIENT INSTRUCTIONS
Rheumatology    Dr. Jack Arvizu         [facility]   (Monday)  [address]  [place] [address]       [facility]   (Tuesday)  [address]  [place] [address]    [facility]   (Wed., Thurs., and Friday)  [address] [place] [address]    Phone number: [phone]  Thank you for choosing [facility].  Bailey Resendez CMA

## 2019-04-04 NOTE — PROGRESS NOTES
"Rheumatology Clinic Visit      Sosa Wolf MRN# [id number]   YOB: 1975 Age: 43 year old      Date of visit: 4/04/19   PCP: Cynthia Scott    Chief Complaint   Patient presents with:  Systemic Lupus Erythematous: Patient is feeling jenelle shitty. New medication works good, but feeling very exhausted.      Assessment and Plan     1. Systemic Lupus Erythematosus (JESSE+, dsDNA+, arthritis, oral sores [SLE versus HSV related], proteinuria): Dx'd 10/2016; initially with arthritis that improved with HCQ.  Previously on MTX (fatigue, effective), leflunomide (effective for arthritis, stopped when she ran out and did not return to clinic for a while).  Currently with active inflammatory arthritis, \"brain fog\" referring to forgetting things often; and skin lesions on her left leg and left hand consistent with discoid lesions.  MMF 500mg BID started 12/13/2018 with significant improvement but persistent arthritis today so will increase the dose.   - Continue hydroxychloroquine 200mg daily (last eye exam 10/17/2017; advised that this be updated ASAP)  - Increase mycophenolate mofetil from 500mg BID; to 1000mg qAM and 500mg qPM  - Labs: CBC, CMP, ESR, CRP, CK, C3, C4, dsDNA, UA, Uprotein:creatinine  - Labs monthly [id number]: CBC, Cr, Hepatic Panel  - Labs in 3 months: CBC, CMP, ESR, CRP, CK, C3, C4, dsDNA, UA, Uprotein:creatinine    # Pregnancy Plans: on birth control (depo-provera)    2.  Shortness of breath and chest discomfort when she lies flat: Has been a symptom for >1 year now per patient.  Echocardiogram normal. Note that she denies dyspnea on exertion, lightheadedness, dizziness, chest pressure throughout the day, nausea, or exercise intolerance.  If she lays flat for a long enough time the symptom resolves.  Advised that she follow-up with her PCP for his issue and she says that she has.     3.  Secondary Sjogren's syndrome: Mild symptoms that may be due to other factors such as caffeine intake, smoking, " etc., but secondary Sjogren's Syndrome is possible.  We discussed the importance of good oral hygiene, frequent sips of water, avoiding high sugar foods and liquids, avoiding oral irritants such as coffee, alcohol, and nicotine, and avoiding acidic drinks such as carbonated and high sugar beverages. She is already seeing a dentist regularly. Dry eyes improved with preservative free artificial tears.     4. Discoid lupus: Lesions on her left hand and left lower leg are consistent with discoid lesions.  Advised that she see her dermatologist for further evaluation    5. Cigarette Smoking:  Encouraged complete cessation and discussed the health benefits.      6. Cocaine use history: Now sober    7. Vaccinations: Vaccinations reviewed with Ms. Wolf.  Risks and benefits of vaccinations were discussed.  - Influenza: up to date  - [id number]: up to date  - [id number]: will receive today   - Shingrix: will receive today    8. Poor sleep: snores, wakes up tired, tired all day.  Will refer for sleep eval.  Suspect stress is playing a role too.  She follows with her PCP for mental health    Ms. Wolf verbalized agreement with and understanding of the rational for the diagnosis and treatment plan.  All questions were answered to best of my ability and the patient's satisfaction. Ms. Wolf was advised to contact the clinic with any questions that may arise after the clinic visit.      Thank you for involving me in the care of the patient    Return to clinic: 3 months      HPI   Sosa Wolf is a 43 year old female with a medical history significant for tobacco abuse, genital warts, and HSV type II who presents for follow-up of systemic lupus erythematosus.    Ms. Wolf was last seen in January 2017 and then Sep 2017.      Today, Ms. Wolf reports that she is doing much better on CellCept 500 mg twice daily.  Joint pain is dramatically better.  Morning stiffness for about 30 minutes.  She notes that the biggest issue right now is  fatigue.  She is always tired.  She says that her previous boyfriend said that she snored and should have a sleep study but she never had this time.  Wakes up tired.  Would like to nap during the day.  Falls asleep quickly but wakes up several times at night.  Still with some chest discomfort when she initially lays down flat, similar to how it has been in the past; she says that she has spoken with her PCP who she says was not concerned regarding this.  Artificial tears have been very helpful.  Sometimes itchy eyes but has not started an antihistamine    Denies fevers, chills, nausea, vomiting, constipation, diarrhea. No abdominal pain. No LE swelling. No neck pain.   No photosensitivity or photophobia. No history of inflammatory eye disease.  No history of DVT, pulmonary embolism, or miscarriage.   No history of serositis.  No history of Raynaud's Phenomenon.  No seizure history.  No known renal disorder.      Tobacco: trying to quit, rare cigarette now, using vapor  EtOH: none, sober since 5/16/2010  Drugs: cocaine history; now sober  Occupation: counselor for mentally disabled people    ROS   GEN: No fevers, chills, night sweats, or weight change  SKIN: See HPI  HEENT: No oral or nasal ulcers.  CV:  see HPI  PULM: See HPI  GI: No nausea, vomiting, constipation, diarrhea. No blood in stool. No abdominal pain.  : No blood in urine.  MSK: See HPI.  NEURO: No numbness, tingling, or weakness.  ENDO: No cold intolerance; see history of present illness   EXT: No LE swelling  PSYCH: Depression    Active Problem List     Patient Active Problem List   Diagnosis     Herpes simplex type 2 infection     Genital warts     CARDIOVASCULAR SCREENING; LDL GOAL LESS THAN 160     H/O intravenous drug use in remission     Tobacco abuse     Surveillance for Depo-Provera contraception     JESSE positive     Multiple joint pain     Cocaine use     Systemic lupus erythematosus (H)     High risk medications (not anticoagulants) long-term  use     Sicca syndrome (H)     Past Medical History     Past Medical History:   Diagnosis Date     Bipolar disorder (H) 12/04    Dx'd     H/O: depression     SUICIDE ATTEMPT AGE 15     HPV (human papillomavirus)      Past Surgical History     Past Surgical History:   Procedure Laterality Date     BUNIONECTOMY RT/LT       HC ENLARGE BREAST WITH IMPLANT  2000     Allergy   No Known Allergies  Current Medication List     Current Outpatient Medications   Medication Sig     Aspirin-Acetaminophen-Caffeine (EXCEDRIN PO)      buPROPion (WELLBUTRIN SR) 150 MG 12 hr tablet Take 1 tablet (150 mg) by mouth daily     cyclobenzaprine (FLEXERIL) 10 MG tablet TAKE ONE TABLET BY MOUTH AT BEDTIME AS NEEDED FOR MUSCLE SPASMS     FLUoxetine (PROZAC) 40 MG capsule Take 1 capsule (40 mg) by mouth daily     hydroxychloroquine (PLAQUENIL) 200 MG tablet Take 1 tablet (200 mg) by mouth daily     IBUPROFEN PO      mycophenolate (GENERIC EQUIVALENT) 500 MG tablet Take 1 tablet (500 mg) by mouth 2 times daily     traZODone (DESYREL) 50 MG tablet Take 1 tablet (50 mg) by mouth nightly as needed for sleep     triamcinolone (KENALOG) 0.1 % ointment Apply sparingly to affected area three times daily for 14 days.     Current Facility-Administered Medications   Medication     medroxyPROGESTERone (DEPO-PROVERA) injection 150 mg     Social History   See HPI    Family History     Family History   Problem Relation Age of Onset     Cancer Father         lung cancer     Diabetes Maternal Grandmother      Depression Maternal Grandmother      Lipids Maternal Grandmother      Cerebrovascular Disease Maternal Grandfather      Lipids Maternal Grandfather      Circulatory Maternal Grandfather         MI in 50's     Depression Brother         depression and OCD     Gastrointestinal Disease Brother         IBS     Depression Mother      Thyroid Disease No family hx of      Asthma No family hx of      Denies family history of autoimmune disease     Physical Exam  "    Temp Readings from Last 3 Encounters:   12/21/18 98.1  F (36.7  C) (Oral)   12/13/18 97.3  F (36.3  C) (Oral)   09/10/18 98.3  F (36.8  C) (Oral)     BP Readings from Last 5 Encounters:   03/11/19 122/81   12/21/18 109/62   12/13/18 118/67   09/10/18 90/55   09/29/17 112/69     Pulse Readings from Last 1 Encounters:   03/11/19 90     Resp Readings from Last 1 Encounters:   11/04/16 18     Estimated body mass index is 27.32 kg/m  as calculated from the following:    Height as of 9/21/17: 1.67 m (5' 5.75\").    Weight as of 3/11/19: 76.2 kg (168 lb).    GEN: NAD  HEENT: MMM. No oral lesions. Anicteric, noninjected sclera  CV: S1, S2. RRR. No m/r/g.  PULM: Clear to auscultation bilaterally.  No wheeze/cough.  MSK:  Synovial swelling and tenderness to palpation of the bilateral second-third MCPs, worse on the right.  PIPs without swelling or tenderness to palpation.  Wrists, elbows, shoulders, knees, ankles, and MTPs without swelling or tenderness to palpation.  Hips nontender to palpation.   NEURO: UE and LE strengths 5/5 and equal bilaterally.   SKIN: No rash  EXT: No LE edema  PSYCH: Alert. Appropriate.    Labs / Imaging (select studies)   RF/CCP  Recent Labs   Lab Test 10/11/16  1043 08/10/16  1059   CCPIGG 1  --    RHF  --  <20     JESSE  Recent Labs   Lab Test 08/10/16  1059   ROBYN 1.7*     RNP/Sm/SSA/SSB  Recent Labs   Lab Test 10/11/16  1043 12/10/13  1118   RNPIGG 0.3  --    SMIGG <0.2  Negative   Antibody index (AI) values reflect qualitative changes in antibody   concentration that cannot be directly associated with clinical condition or   disease state.    --    SSAIGG <0.2  Negative   Antibody index (AI) values reflect qualitative changes in antibody   concentration that cannot be directly associated with clinical condition or   disease state.    --    SSBIGG <0.2  Negative   Antibody index (AI) values reflect qualitative changes in antibody   concentration that cannot be directly associated with clinical " condition or   disease state.    --    SCLIGG <0.2  Negative   Antibody index (AI) values reflect qualitative changes in antibody   concentration that cannot be directly associated with clinical condition or   disease state.    --    TREPAB  --  Negative     dsDNA  Recent Labs   Lab Test 12/13/18 1354 09/21/17 0818 05/01/17  0936 01/13/17  1015 10/11/16  1043   DNA 19* 25* 37* 45* 70*     C3/C4  Recent Labs   Lab Test 12/13/18 1354 09/21/17 0818 05/01/17  0936 01/13/17  1015 10/11/16  1043   F8KGIAK 80 124 80 87 83   C9FLCVW 19 24 17 22 15     CBC  Recent Labs   Lab Test 01/14/19  1158 12/13/18  1354 09/10/18  1406 06/20/18  0728   WBC 10.9 9.0 8.0 10.6   RBC 4.97 4.66 4.96 4.60   HGB 14.3 13.5 14.4 13.2   HCT 41.6 39.7 41.8 38.8   MCV 84 85 84 84   RDW 13.3 13.4 13.4 13.3    226 206 211   MCH 28.8 29.0 29.0 28.7   MCHC 34.4 34.0 34.4 34.0   NEUTROPHIL 67.2 71.3  --  54.5   LYMPH 24.3 18.2  --  34.3   MONOCYTE 6.8 8.7  --  8.6   EOSINOPHIL 1.5 1.5  --  2.3   BASOPHIL 0.2 0.3  --  0.3   ANEU 7.4 6.4  --  5.8   ALYM 2.7 1.6  --  3.7   DAQUAN 0.7 0.8  --  0.9   AEOS 0.2 0.1  --  0.2   ABAS 0.0 0.0  --  0.0     CMP  Recent Labs   Lab Test 01/14/19  1158 12/13/18  1354 09/10/18  1406 06/20/18  0728  09/21/17  0818 05/01/17  0936 01/13/17  1015  08/31/16  0950   NA  --  139 138  --   --  137 142 137  --  141   POTASSIUM  --  3.6 4.0  --   --  4.3 3.6 4.0  --  3.5   CHLORIDE  --  106 106  --   --  104 110* 104  --  110*   CO2  --  25 25  --   --  29 24 25  --  25   ANIONGAP  --  8 7  --   --  4 8 8  --  6   GLC  --  70 93  --   --  88 91 87  --  60*   BUN  --  17 17  --   --  19 16 21  --  18   CR 0.90 0.82 0.72 0.87   < > 0.68 0.83 0.68   < > 0.91   GFRESTIMATED 78 76 88 71   < > >90 75 >90  Non  GFR Calc     < > 67   GFRESTBLACK >90 >90 >90 86   < > >90 >90   GFR Calc   >90   GFR Calc     < > 82   RICHARD  --  8.5 8.8  --   --  8.9 8.9 8.9  --  9.1   BILITOTAL 0.2 0.3  0.4 0.3   < > 0.5 0.4 0.3   < >  --    ALBUMIN 4.1 3.7 4.0 3.9   < > 3.6 3.6 3.9   < >  --    PROTTOTAL 7.6 6.8 7.2 6.9   < > 7.2 6.5* 6.9   < >  --    ALKPHOS 25* 31* 26* 30*   < > 30* 24* 26*   < >  --    AST 13 16 16 15   < > 12 16 26   < >  --    ALT 17 18 29 17   < > 30 23 37   < >  --     < > = values in this interval not displayed.       Calcium/VitaminD  Recent Labs   Lab Test 12/13/18  1354 09/10/18  1406 09/21/17  0818   RICHARD 8.5 8.8 8.9     ESR/CRP  Recent Labs   Lab Test 12/13/18  1354 09/10/18  1406 09/21/17  0818 05/01/17  0936   SED 5 4 15 4   CRP <2.9  --  55.1* <2.9     CK/Aldolase  Recent Labs   Lab Test 12/13/18  1354 09/21/17  0818 05/01/17  0936  10/11/16  1043   CKT 89 62 81   < > 65   ALDOLASE  --   --   --   --  4.5    < > = values in this interval not displayed.     TSH/T4  Recent Labs   Lab Test 10/11/16  1043   TSH 1.48     Lipid Panel  Recent Labs   Lab Test 03/06/13  1411   CHOL 190   TRIG 187*   HDL 38*      VLDL 37*   CHOLHDLRATIO 5.0     Hepatitis B  Recent Labs   Lab Test 10/11/16  1043   HBCAB Nonreactive   HEPBANG Nonreactive     Hepatitis C  Recent Labs   Lab Test 10/11/16  1043 12/10/13  1118 03/06/13  1411   HCVAB Nonreactive   Assay performance characteristics have not been established for newborns,   infants, and children   Negative Negative     HIV Screening  Recent Labs   Lab Test 10/11/16  1043   HIAGAB Nonreactive   HIV-1 p24 Ag & HIV-1/HIV-2 Ab Not Detected       UA  Recent Labs   Lab Test 12/13/18  1355 09/21/17  0815 05/01/17  0954   COLOR Yellow Yellow Yellow   APPEARANCE Clear Clear Clear   URINEGLC Negative Negative Negative   URINEBILI Negative Negative Small  This is an unconfirmed screening test result. A positive result may be false.  *   SG >1.030 >1.030 >1.030   URINEPH 5.5 6.0 6.0   PROTEIN Negative Negative Trace*   UROBILINOGEN 0.2 0.2 0.2   NITRITE Negative Negative Negative   UBLD Small* Trace* Small*   LEUKEST Negative Negative Negative   WBCU 0 - 5  O - 2 O - 2   RBCU O - 2 O - 2 O - 2   SQUAMOUSEPI Moderate* Few Few   MUCOUS  --   --  Present*     Urine Microscopic  Recent Labs   Lab Test 12/13/18  1355 09/21/17  0815 05/01/17  0954   WBCU 0 - 5 O - 2 O - 2   RBCU O - 2 O - 2 O - 2   SQUAMOUSEPI Moderate* Few Few   MUCOUS  --   --  Present*     Urine Protein  Recent Labs   Lab Test 12/13/18  1355 09/21/17  0815 05/01/17  0954   UTP 0.25 0.25 1.14   UTPG 0.17 0.25* 0.35*   UCRR  --  100 324            Recent Results (from the past 744 hour(s))   XR Hand Bilateral G/E 3 Views    Narrative    XR HAND BILATERAL G/E 3 VW 10/11/2016 12:41 PM    HISTORY: Pain in unspecified joint      Impression    IMPRESSION: Negative.    JULIAN LORD MD   XR Foot Bilateral G/E 3 Views    Narrative    XR FOOT BILATERAL G/E 3 VW 10/11/2016 12:39 PM    HISTORY: Pain in unspecified joint      Impression    IMPRESSION: Deformity of the head of the right first metatarsal most  likely related to previous surgery or trauma. Some erosive change in  this area cannot be excluded. Bilateral exam otherwise unremarkable.    JULIAN LORD MD   XR Hip Left 2-3 Views    Narrative    XR HIP LEFT 2-3 VIEWS 10/11/2016 12:39 PM    HISTORY: Pain in unspecified joint      Impression    IMPRESSION: Negative.    JULIAN LORD MD       Immunization History     Immunization History   Administered Date(s) Administered     Influenza Vaccine IM 3yrs+ 4 Valent IIV4 12/19/2014, 10/16/2015, 11/04/2016, 12/13/2018     Mantoux Tuberculin Skin Test 07/30/2018     Pneumo Conj 13-V (2010&after) 12/13/2018     TD (ADULT, 7+) 02/23/2005     TDAP Vaccine (Adacel) 04/14/2009     Zoster vaccine recombinant adjuvanted (SHINGRIX) 12/21/2018          Chart documentation done in part with Dragon Voice recognition Software. Although reviewed after completion, some word and grammatical error may remain.    Jack Arvizu MD

## 2019-04-05 LAB
ALBUMIN SERPL-MCNC: 3.9 G/DL (ref 3.4–5)
ALP SERPL-CCNC: 34 U/L (ref 40–150)
ALT SERPL W P-5'-P-CCNC: 36 U/L (ref 0–50)
ANION GAP SERPL CALCULATED.3IONS-SCNC: 6 MMOL/L (ref 3–14)
AST SERPL W P-5'-P-CCNC: 23 U/L (ref 0–45)
BILIRUB SERPL-MCNC: 0.2 MG/DL (ref 0.2–1.3)
BUN SERPL-MCNC: 15 MG/DL (ref 7–30)
C3 SERPL-MCNC: 120 MG/DL (ref 76–169)
C4 SERPL-MCNC: 26 MG/DL (ref 15–50)
CALCIUM SERPL-MCNC: 9.1 MG/DL (ref 8.5–10.1)
CHLORIDE SERPL-SCNC: 110 MMOL/L (ref 94–109)
CK SERPL-CCNC: 237 U/L (ref 30–225)
CO2 SERPL-SCNC: 26 MMOL/L (ref 20–32)
CREAT SERPL-MCNC: 0.69 MG/DL (ref 0.52–1.04)
CREAT UR-MCNC: 100 MG/DL
CRP SERPL-MCNC: 8.1 MG/L (ref 0–8)
GFR SERPL CREATININE-BSD FRML MDRD: >90 ML/MIN/{1.73_M2}
GLUCOSE SERPL-MCNC: 83 MG/DL (ref 70–99)
POTASSIUM SERPL-SCNC: 4.2 MMOL/L (ref 3.4–5.3)
PROT SERPL-MCNC: 7.2 G/DL (ref 6.8–8.8)
PROT UR-MCNC: 0.11 G/L
PROT/CREAT 24H UR: 0.11 G/G CR (ref 0–0.2)
SODIUM SERPL-SCNC: 142 MMOL/L (ref 133–144)

## 2019-04-08 LAB — DSDNA AB SER-ACNC: 14 IU/ML

## 2019-04-09 ENCOUNTER — OFFICE VISIT (OUTPATIENT)
Dept: FAMILY MEDICINE | Facility: CLINIC | Age: 44
End: 2019-04-09
Payer: COMMERCIAL

## 2019-04-09 VITALS
TEMPERATURE: 98 F | DIASTOLIC BLOOD PRESSURE: 72 MMHG | OXYGEN SATURATION: 98 % | SYSTOLIC BLOOD PRESSURE: 112 MMHG | WEIGHT: 162 LBS | HEART RATE: 81 BPM | BODY MASS INDEX: 26.35 KG/M2

## 2019-04-09 DIAGNOSIS — G47.00 INSOMNIA, UNSPECIFIED TYPE: ICD-10-CM

## 2019-04-09 DIAGNOSIS — L81.9 HYPERPIGMENTED SKIN LESION: ICD-10-CM

## 2019-04-09 DIAGNOSIS — Z72.0 TOBACCO ABUSE DISORDER: ICD-10-CM

## 2019-04-09 DIAGNOSIS — Z23 NEED FOR TD VACCINE: ICD-10-CM

## 2019-04-09 DIAGNOSIS — Z91.09 ALLERGY TO ENVIRONMENTAL FACTORS: ICD-10-CM

## 2019-04-09 DIAGNOSIS — M32.19 OTHER SYSTEMIC LUPUS ERYTHEMATOSUS WITH OTHER ORGAN INVOLVEMENT (H): Primary | ICD-10-CM

## 2019-04-09 DIAGNOSIS — F39 MOOD DISORDER (H): ICD-10-CM

## 2019-04-09 PROCEDURE — 90471 IMMUNIZATION ADMIN: CPT | Performed by: PHYSICIAN ASSISTANT

## 2019-04-09 PROCEDURE — 99214 OFFICE O/P EST MOD 30 MIN: CPT | Mod: 25 | Performed by: PHYSICIAN ASSISTANT

## 2019-04-09 PROCEDURE — 90714 TD VACC NO PRESV 7 YRS+ IM: CPT | Performed by: PHYSICIAN ASSISTANT

## 2019-04-09 RX ORDER — LORATADINE 10 MG/1
10 TABLET ORAL DAILY
Qty: 90 TABLET | Refills: 1 | Status: SHIPPED | OUTPATIENT
Start: 2019-04-09 | End: 2019-12-28

## 2019-04-09 RX ORDER — NICOTINE 21 MG/24HR
1 PATCH, TRANSDERMAL 24 HOURS TRANSDERMAL EVERY 24 HOURS
Qty: 28 PATCH | Refills: 1 | Status: SHIPPED | OUTPATIENT
Start: 2019-04-09 | End: 2021-01-25

## 2019-04-09 RX ORDER — CYCLOBENZAPRINE HCL 10 MG
TABLET ORAL
Qty: 30 TABLET | Refills: 1 | Status: SHIPPED | OUTPATIENT
Start: 2019-04-09 | End: 2019-09-10

## 2019-04-09 ASSESSMENT — PATIENT HEALTH QUESTIONNAIRE - PHQ9: SUM OF ALL RESPONSES TO PHQ QUESTIONS 1-9: 7

## 2019-04-09 NOTE — PROGRESS NOTES
SUBJECTIVE:   Sosa Wolf is a 43 year old female who presents to clinic today for the following   health issues:    Check 2 moles -L leg and L foot x 6 months, wants to quit smoking,patient requests Nicorette Patch.    Quit on the patch before 20 years ago.  Doesn't want chantix.    Getting itchy skin and watery eyes.  Wants to try an allergy medication. Can happen all year.  Itchy eyes worse in spring.  claritin helps     Trouble sleeping but trazodone at 50mg too tired.  Flexeril helps at night for sore muscles and sleep        Additional history: as documented    Reviewed  and updated as needed this visit by clinical staff  Tobacco  Allergies  Meds  Med Hx  Surg Hx  Fam Hx  Soc Hx        Reviewed and updated as needed this visit by Provider  Allergies         Patient Active Problem List   Diagnosis     Herpes simplex type 2 infection     Genital warts     CARDIOVASCULAR SCREENING; LDL GOAL LESS THAN 160     H/O intravenous drug use in remission     Tobacco abuse     Surveillance for Depo-Provera contraception     JESSE positive     Multiple joint pain     Cocaine use     Systemic lupus erythematosus (H)     High risk medications (not anticoagulants) long-term use     Sicca syndrome (H)     Mood disorder (H)     Past Surgical History:   Procedure Laterality Date     BUNIONECTOMY RT/LT       HC ENLARGE BREAST WITH IMPLANT         Social History     Tobacco Use     Smoking status: Current Every Day Smoker     Packs/day: 0.10     Years: 23.00     Pack years: 2.30     Types: Cigarettes     Last attempt to quit: 3/3/2013     Years since quittin.1     Smokeless tobacco: Never Used     Tobacco comment: now using a vap   Substance Use Topics     Alcohol use: No     Comment: Sober as of 5/16/10     Family History   Problem Relation Age of Onset     Cancer Father         lung cancer     Diabetes Maternal Grandmother      Depression Maternal Grandmother      Lipids Maternal Grandmother      Cerebrovascular  Disease Maternal Grandfather      Lipids Maternal Grandfather      Circulatory Maternal Grandfather         MI in 50's     Depression Brother         depression and OCD     Gastrointestinal Disease Brother         IBS     Depression Mother      Thyroid Disease No family hx of      Asthma No family hx of            ROS:  As above    OBJECTIVE:     /72   Pulse 81   Temp 98  F (36.7  C) (Oral)   Wt 73.5 kg (162 lb)   SpO2 98%   BMI 26.35 kg/m    Body mass index is 26.35 kg/m .  GENERAL: healthy, alert and no distress  RESP: lungs clear to auscultation - no rales, rhonchi or wheezes  CV: regular rates and rhythm, normal S1 S2, no S3 or S4 and no murmur, click or rub  SKIN: one small flesh colored papule on left lower leg, flat dark macular lesion on sole of left foot    Diagnostic Test Results:  none     ASSESSMENT/PLAN:       1. Other systemic lupus erythematosus with other organ involvement (H)  Refilled.  Ok to use at night for sleep   - cyclobenzaprine (FLEXERIL) 10 MG tablet; TAKE ONE TABLET BY MOUTH AT BEDTIME AS NEEDED FOR MUSCLE SPASMS  Dispense: 30 tablet; Refill: 1    2. Mood disorder (H)  Stable.  Monitor     3. Tobacco abuse disorder    - nicotine (NICODERM CQ) 21 MG/24HR 24 hr patch; Place 1 patch onto the skin every 24 hours  Dispense: 28 patch; Refill: 1  - nicotine (NICORETTE) 2 MG gum; Place 1 each (2 mg) inside cheek as needed for smoking cessation  Dispense: 100 each; Refill: 3    4. Allergy to environmental factors    - loratadine (CLARITIN) 10 MG tablet; Take 1 tablet (10 mg) by mouth daily  Dispense: 90 tablet; Refill: 1    5. Hyperpigmented skin lesion  Follow up a needed  - DERMATOLOGY REFERRAL    6. Insomnia, unspecified type  As above.  Keep sleep study.    - cyclobenzaprine (FLEXERIL) 10 MG tablet; TAKE ONE TABLET BY MOUTH AT BEDTIME AS NEEDED FOR MUSCLE SPASMS  Dispense: 30 tablet; Refill: 1    7. Need for Td vaccine    - TD PRESERV FREE, IM (7+ YRS)    FUTURE APPOINTMENTS:       -  Follow-up visit in 6 months    Cynthia Scott PA-C  [facility]

## 2019-04-09 NOTE — NURSING NOTE
Screening Questionnaire for Adult Immunization    Are you sick today?   No   Do you have allergies to medications, food, a vaccine component or latex?   No   Have you ever had a serious reaction after receiving a vaccination?   No   Do you have a long-term health problem with heart disease, lung disease, asthma, kidney disease, metabolic disease (e.g. diabetes), anemia, or other blood disorder?   No   Do you have cancer, leukemia, HIV/AIDS, or any other immune system problem?   No   In the past 3 months, have you taken medications that affect  your immune system, such as prednisone, other steroids, or anticancer drugs; drugs for the treatment of rheumatoid arthritis, Crohn s disease, or psoriasis; or have you had radiation treatments?   No   Have you had a seizure, or a brain or other nervous system problem?   No   During the past year, have you received a transfusion of blood or blood     products, or been given immune (gamma) globulin or antiviral drug?   No   For women: Are you pregnant or is there a chance you could become        pregnant during the next month?   No   Have you received any vaccinations in the past 4 weeks?   No     Immunization questionnaire answers were all negative.        Per orders of Dr. Scott , injection of Td  given by Yaz Vázquez. Patient instructed to remain in clinic for 15 minutes afterwards, and to report any adverse reaction to me immediately.       Screening performed by Yaz Vázquez on 4/9/2019 at 6:48 PM.

## 2019-06-13 DIAGNOSIS — F39 MOOD DISORDER (H): ICD-10-CM

## 2019-06-13 NOTE — TELEPHONE ENCOUNTER
"Requested Prescriptions   Pending Prescriptions Disp Refills     FLUoxetine (PROZAC) 40 MG capsule [Pharmacy Med Name: FLUOXETINE HCL 40MG CAPS] 30 capsule 3     Sig: TAKE ONE CAPSULE BY MOUTH EVERY DAY       SSRIs Protocol Failed - 6/13/2019  3:30 PM        Failed - Recent (12 mo) or future (30 days) visit within the authorizing provider's specialty     Patient had office visit in the last 12 months or has a visit in the next 30 days with authorizing provider or within the authorizing provider's specialty.  See \"Patient Info\" tab in inbasket, or \"Choose Columns\" in Meds & Orders section of the refill encounter.              Passed - Medication is active on med list        Passed - Patient is age 18 or older        Passed - No active pregnancy on record        Passed - No positive pregnancy test in last 12 months        Last Written Prescription Date:  11/16/18  Last Fill Quantity: 30,  # refills: 3   Last office visit: 4/9/2019 with prescribing provider:     Future Office Visit:   Next 5 appointments (look out 90 days)    Jul 18, 2019  3:40 PM CDT  Return Visit with Jakc Arvizu MD  [facility] [place] ([facility] [place]) [address]  [place] [address]  [phone]           "

## 2019-06-14 RX ORDER — FLUOXETINE 40 MG/1
40 CAPSULE ORAL DAILY
Qty: 30 CAPSULE | Refills: 0 | Status: SHIPPED | OUTPATIENT
Start: 2019-06-14 | End: 2019-07-17

## 2019-07-15 DIAGNOSIS — M32.9 SYSTEMIC LUPUS ERYTHEMATOSUS, UNSPECIFIED SLE TYPE, UNSPECIFIED ORGAN INVOLVEMENT STATUS (H): ICD-10-CM

## 2019-07-15 LAB
ALBUMIN SERPL-MCNC: 4.3 G/DL (ref 3.4–5)
ALP SERPL-CCNC: 28 U/L (ref 40–150)
ALT SERPL W P-5'-P-CCNC: 19 U/L (ref 0–50)
AST SERPL W P-5'-P-CCNC: 16 U/L (ref 0–45)
BASOPHILS # BLD AUTO: 0 10E9/L (ref 0–0.2)
BASOPHILS NFR BLD AUTO: 0.3 %
BILIRUB DIRECT SERPL-MCNC: 0.1 MG/DL (ref 0–0.2)
BILIRUB SERPL-MCNC: 0.4 MG/DL (ref 0.2–1.3)
CREAT SERPL-MCNC: 0.67 MG/DL (ref 0.52–1.04)
DIFFERENTIAL METHOD BLD: NORMAL
EOSINOPHIL # BLD AUTO: 0.3 10E9/L (ref 0–0.7)
EOSINOPHIL NFR BLD AUTO: 3.4 %
ERYTHROCYTE [DISTWIDTH] IN BLOOD BY AUTOMATED COUNT: 13.6 % (ref 10–15)
GFR SERPL CREATININE-BSD FRML MDRD: >90 ML/MIN/{1.73_M2}
HCT VFR BLD AUTO: 39.1 % (ref 35–47)
HGB BLD-MCNC: 13.1 G/DL (ref 11.7–15.7)
LYMPHOCYTES # BLD AUTO: 2.8 10E9/L (ref 0.8–5.3)
LYMPHOCYTES NFR BLD AUTO: 35.2 %
MCH RBC QN AUTO: 27.7 PG (ref 26.5–33)
MCHC RBC AUTO-ENTMCNC: 33.5 G/DL (ref 31.5–36.5)
MCV RBC AUTO: 83 FL (ref 78–100)
MONOCYTES # BLD AUTO: 0.7 10E9/L (ref 0–1.3)
MONOCYTES NFR BLD AUTO: 8.7 %
NEUTROPHILS # BLD AUTO: 4.1 10E9/L (ref 1.6–8.3)
NEUTROPHILS NFR BLD AUTO: 52.4 %
PLATELET # BLD AUTO: 253 10E9/L (ref 150–450)
PROT SERPL-MCNC: 7.1 G/DL (ref 6.8–8.8)
RBC # BLD AUTO: 4.73 10E12/L (ref 3.8–5.2)
WBC # BLD AUTO: 7.8 10E9/L (ref 4–11)

## 2019-07-15 PROCEDURE — 36415 COLL VENOUS BLD VENIPUNCTURE: CPT | Performed by: INTERNAL MEDICINE

## 2019-07-15 PROCEDURE — 80076 HEPATIC FUNCTION PANEL: CPT | Performed by: INTERNAL MEDICINE

## 2019-07-15 PROCEDURE — 85025 COMPLETE CBC W/AUTO DIFF WBC: CPT | Performed by: INTERNAL MEDICINE

## 2019-07-15 PROCEDURE — 82565 ASSAY OF CREATININE: CPT | Performed by: INTERNAL MEDICINE

## 2019-07-18 ENCOUNTER — OFFICE VISIT (OUTPATIENT)
Dept: RHEUMATOLOGY | Facility: CLINIC | Age: 44
End: 2019-07-18
Payer: COMMERCIAL

## 2019-07-18 VITALS
OXYGEN SATURATION: 99 % | BODY MASS INDEX: 26.35 KG/M2 | DIASTOLIC BLOOD PRESSURE: 83 MMHG | SYSTOLIC BLOOD PRESSURE: 125 MMHG | HEIGHT: 66 IN | HEART RATE: 75 BPM

## 2019-07-18 DIAGNOSIS — M32.9 SYSTEMIC LUPUS ERYTHEMATOSUS, UNSPECIFIED SLE TYPE, UNSPECIFIED ORGAN INVOLVEMENT STATUS (H): Primary | ICD-10-CM

## 2019-07-18 DIAGNOSIS — Z79.899 HIGH RISK MEDICATIONS (NOT ANTICOAGULANTS) LONG-TERM USE: ICD-10-CM

## 2019-07-18 PROCEDURE — 99214 OFFICE O/P EST MOD 30 MIN: CPT | Performed by: INTERNAL MEDICINE

## 2019-07-18 RX ORDER — PREDNISONE 5 MG/1
TABLET ORAL
Qty: 141 TABLET | Refills: 0 | Status: SHIPPED | OUTPATIENT
Start: 2019-07-18 | End: 2019-09-10

## 2019-07-18 RX ORDER — LEFLUNOMIDE 20 MG/1
20 TABLET ORAL DAILY
Qty: 30 TABLET | Refills: 3 | Status: SHIPPED | OUTPATIENT
Start: 2019-07-18 | End: 2019-11-07

## 2019-07-18 NOTE — PROGRESS NOTES
"Rheumatology Clinic Visit      Sosa Wolf MRN# [id number]   YOB: 1975 Age: 43 year old      Date of visit: 7/18/19   PCP: Cynthia Scott    Chief Complaint   Patient presents with:  Systemic Lupus Erythematous      Assessment and Plan     1. Systemic Lupus Erythematosus (JESSE+, dsDNA+, arthritis, oral sores [SLE versus HSV related], proteinuria): Dx'd 10/2016; initially with arthritis that improved with HCQ.  Previously on MTX (fatigue, effective), leflunomide (effective for arthritis, stopped when she ran out and did not return to clinic for a while).  She then represented with active inflammatory arthritis, \"brain fog\" referring to forgetting things often; and skin lesions on her left leg and left hand consistent with discoid lesions;  mg twice daily was started December 2018 with significant improvement but persistent arthritis so the dose was increased to 1000 mg in the morning and 500 mg in the evening.  Arthritis has not improved.  Her rash has resolved and the proteinuria also resolved.  Therefore, given that she has worsening arthritis that responded well to leflunomide in the past, one option is to stop MMF and start leflunomide; another option is to increase MMF and/or add Benlysta.  Because of her positive response to leflunomide in the past and predominant arthritis symptoms at this time will change to leflunomide.  Stressed the importance of monthly toxicity monitoring labs.  - Continue hydroxychloroquine 200mg daily (last eye exam 1/22/2019)  - Discontinue mycophenolate mofetil 1000mg qAM and 500mg qPM  - Start Prednisone 40mg daily x2days, then 20mg daily x5days, then 15mg daily x5days, then 10mg daily x30days, then 5mg daily x30days, then stop.    - Start leflunomide 20 mg daily  - Labs monthly [id number]: CBC, Cr, Hepatic Panel  - Labs in 3months: CBC, CMP, ESR, CRP, CK, C3, C4, dsDNA, UA, Uprotein:creatinine    # Leflunomide (Arava) Risks and Benefits: The risks and benefits " of leflunomide were discussed in detail and the patient verbalized understanding.  The risks discussed include, but are not limited to, the risk for hypersensitivity, anaphylaxis, anaphylactoid reactions, hepatotoxicity, infections, interstitial lung disease, alopecia, rash, nausea, and diarrhea.  The impact on malignancies is not fully defined.   Alcohol should be avoided while taking leflunomide.  Pregnancy prevention and planning was discussed; it is recommended that women of childbearing potential use reliable contraception before, during, and for a period of 2 years after treatment with leflunomide; if pregnancy is planned within 2 years of discontinuing medication then women should undergo drug elimination procedures (i.e. cholestyramine). Routine laboratory monitoring is required during leflunomide therapy. I encouraged reviewing the package insert and asking any questions about the medication.      # Prednisone Risks and Benefits: The risks and benefits of prednisone were discussed in detail and the patient verbalized understanding.  The risks discussed include, but are not limited to, weight gain, fluid retention, impaired wound healing, hyperglycemia, adrenal suppression, GI upset, peptic ulcer, hepatotoxicity, aseptic necrosis of the femoral and humeral heads, osteoporosis, myopathy, tendon rupture (particularly Achilles tendon), ocular changes including an increased intraocular pressure.  I encouraged reviewing the package insert and asking any questions about the medication.      # Pregnancy Plans: on birth control (depo-provera)    2.  Secondary Sjogren's syndrome: Mild symptoms that may be due to other factors such as caffeine intake, smoking, etc., but secondary Sjogren's Syndrome is possible.  We discussed the importance of good oral hygiene, frequent sips of water, avoiding high sugar foods and liquids, avoiding oral irritants such as coffee, alcohol, and nicotine, and avoiding acidic drinks such  as carbonated and high sugar beverages. She is already seeing a dentist regularly. Dry eyes improved with preservative free artificial tears.     3. 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 Cigarette Smoking:  Encouraged complete cessation and discussed the health benefits.      5. Cocaine use history: Positive reinforcement for continued cessation    Ms. Wolf verbalized agreement with and understanding of the rational for the diagnosis and treatment plan.  All questions were answered to best of my ability and the patient's satisfaction. Ms. Wolf was advised to contact the clinic with any questions that may arise after the clinic visit.      Thank you for involving me in the care of the patient    Return to clinic: 3 months      HPI   Sosa Wolf is a 43 year old female with a medical history significant for tobacco abuse, genital warts, and HSV type II who presents for follow-up of systemic lupus erythematosus.    Note that Ms. Wolf showed up for her 3:40 PM appointment today but was not sure to him at the .  Because her encounter had already been closed and a new encounter was opened for an appointment slot this morning, but she was actually seen at 4:05 PM today after was brought to her attention that she has been waiting.    Today, Ms. Wolf reports that she is doing great except for arthritis.  She does not feel like CellCept has been helping with her arthritis is much now as it had in the past.  She is not missing any doses of CellCept.  Pain and swelling in her MCPs and PIPs, and MTPs that is worse in the morning and improves with time and activity.  She has had several flares where the arthritis prevents her from doing her daily activities.  Right now she is doing okay but still has pain.  Morning stiffness most of the day, improves with activity and worsens with rest.  No other concerns.    Denies fevers, chills, nausea, vomiting,  constipation, diarrhea. No abdominal pain. No LE swelling. No neck pain.   No photosensitivity or photophobia. No history of inflammatory eye disease.  No history of DVT, pulmonary embolism, or miscarriage.   No history of serositis.  No history of Raynaud's Phenomenon.  No seizure history.  No known renal disorder.      Tobacco: trying to quit, rare cigarette now, using vapor  EtOH: none, sober since 5/16/2010  Drugs: cocaine history; now sober  Occupation: counselor for mentally disabled people    ROS   GEN: No fevers, chills, night sweats, or weight change  SKIN: See HPI  HEENT: No oral or nasal ulcers.  CV:  see HPI  PULM: See HPI  GI: No nausea, vomiting, constipation, diarrhea. No blood in stool. No abdominal pain.  : No blood in urine.  MSK: See HPI.  NEURO: No numbness, tingling, or weakness.  ENDO: No cold intolerance; see history of present illness   EXT: No LE swelling  PSYCH: Depression    Active Problem List     Patient Active Problem List   Diagnosis     Herpes simplex type 2 infection     Genital warts     CARDIOVASCULAR SCREENING; LDL GOAL LESS THAN 160     H/O intravenous drug use in remission     Tobacco abuse     Surveillance for Depo-Provera contraception     JESSE positive     Multiple joint pain     Cocaine use     Systemic lupus erythematosus (H)     High risk medications (not anticoagulants) long-term use     Sicca syndrome (H)     Mood disorder (H)     Past Medical History     Past Medical History:   Diagnosis Date     Bipolar disorder (H) 12/04    Dx'd     H/O: depression     SUICIDE ATTEMPT AGE 15     HPV (human papillomavirus)      Past Surgical History     Past Surgical History:   Procedure Laterality Date     BUNIONECTOMY RT/LT       HC ENLARGE BREAST WITH IMPLANT  2000     Allergy   No Known Allergies  Current Medication List     Current Outpatient Medications   Medication Sig     Aspirin-Acetaminophen-Caffeine (EXCEDRIN PO)      buPROPion (WELLBUTRIN SR) 150 MG 12 hr tablet Take 1  tablet (150 mg) by mouth daily     cyclobenzaprine (FLEXERIL) 10 MG tablet TAKE ONE TABLET BY MOUTH AT BEDTIME AS NEEDED FOR MUSCLE SPASMS     FLUoxetine (PROZAC) 40 MG capsule Take 1 capsule (40 mg) by mouth daily Please schedule an office visit for further refills     hydroxychloroquine (PLAQUENIL) 200 MG tablet Take 1 tablet (200 mg) by mouth daily     IBUPROFEN PO      leflunomide (ARAVA) 20 MG tablet Take 1 tablet (20 mg) by mouth daily     loratadine (CLARITIN) 10 MG tablet Take 1 tablet (10 mg) by mouth daily     nicotine (NICODERM CQ) 21 MG/24HR 24 hr patch Place 1 patch onto the skin every 24 hours     nicotine (NICORETTE) 2 MG gum Place 1 each (2 mg) inside cheek as needed for smoking cessation     predniSONE (DELTASONE) 5 MG tablet Prednisone 40mg daily x2days, then 20mg daily x5days, then 15mg daily x5days, then 10mg daily x30days, then 5mg daily x30days, then stop.     triamcinolone (KENALOG) 0.1 % ointment Apply sparingly to affected area three times daily for 14 days.     Current Facility-Administered Medications   Medication     medroxyPROGESTERone (DEPO-PROVERA) injection 150 mg     Social History   See HPI    Family History     Family History   Problem Relation Age of Onset     Cancer Father         lung cancer     Diabetes Maternal Grandmother      Depression Maternal Grandmother      Lipids Maternal Grandmother      Cerebrovascular Disease Maternal Grandfather      Lipids Maternal Grandfather      Circulatory Maternal Grandfather         MI in 50's     Depression Brother         depression and OCD     Gastrointestinal Disease Brother         IBS     Depression Mother      Thyroid Disease No family hx of      Asthma No family hx of      Denies family history of autoimmune disease     Physical Exam     Temp Readings from Last 3 Encounters:   04/09/19 98  F (36.7  C) (Oral)   12/21/18 98.1  F (36.7  C) (Oral)   12/13/18 97.3  F (36.3  C) (Oral)     BP Readings from Last 5 Encounters:   07/18/19  "125/83   04/09/19 112/72   04/04/19 114/72   03/11/19 122/81   12/21/18 109/62     Pulse Readings from Last 1 Encounters:   07/18/19 75     Resp Readings from Last 1 Encounters:   11/04/16 18     Estimated body mass index is 26.35 kg/m  as calculated from the following:    Height as of this encounter: 1.67 m (5' 5.75\").    Weight as of 4/9/19: 73.5 kg (162 lb).    GEN: NAD  HEENT: MMM. No oral lesions. Anicteric, noninjected sclera  CV: S1, S2. RRR. No m/r/g.  PULM: Clear to auscultation bilaterally.  No wheeze/cough.  MSK:  Synovial swelling and tenderness to palpation of the bilateral second-third MCPs and second-fourth PIPs.  Wrists, elbows, shoulders, knees, ankles, and MTPs without swelling or tenderness to palpation.  Hips nontender to palpation.   NEURO: UE and LE strengths 5/5 and equal bilaterally.   SKIN: No rash  EXT: No LE edema  PSYCH: Alert. Appropriate.    Labs / Imaging (select studies)   RF/CCP  Recent Labs   Lab Test 10/11/16  1043 08/10/16  1059   CCPIGG 1  --    RHF  --  <20     JESSE  Recent Labs   Lab Test 08/10/16  1059   ROBYN 1.7*     RNP/Sm/SSA/SSB  Recent Labs   Lab Test 10/11/16  1043 12/10/13  1118   RNPIGG 0.3  --    SMIGG <0.2  Negative   Antibody index (AI) values reflect qualitative changes in antibody   concentration that cannot be directly associated with clinical condition or   disease state.    --    SSAIGG <0.2  Negative   Antibody index (AI) values reflect qualitative changes in antibody   concentration that cannot be directly associated with clinical condition or   disease state.    --    SSBIGG <0.2  Negative   Antibody index (AI) values reflect qualitative changes in antibody   concentration that cannot be directly associated with clinical condition or   disease state.    --    SCLIGG <0.2  Negative   Antibody index (AI) values reflect qualitative changes in antibody   concentration that cannot be directly associated with clinical condition or   disease state.    --    TREPAB  " --  Negative     dsDNA  Recent Labs   Lab Test 04/04/19  1627 12/13/18  1354 09/21/17  0818 05/01/17  0936 01/13/17  1015 10/11/16  1043   DNA 14* 19* 25* 37* 45* 70*     C3/C4  Recent Labs   Lab Test 04/04/19  1627 12/13/18  1354 09/21/17  0818 05/01/17  0936 01/13/17  1015 10/11/16  1043   Q7HLRHR 120 80 124 80 87 83   Y5YVXUM 26 19 24 17 22 15     CBC  Recent Labs   Lab Test 07/15/19  1116 04/04/19  1627 01/14/19  1158   WBC 7.8 8.3 10.9   RBC 4.73 4.87 4.97   HGB 13.1 13.5 14.3   HCT 39.1 41.2 41.6   MCV 83 85 84   RDW 13.6 14.0 13.3    289 246   MCH 27.7 27.7 28.8   MCHC 33.5 32.8 34.4   NEUTROPHIL 52.4 67.2 67.2   LYMPH 35.2 23.1 24.3   MONOCYTE 8.7 7.1 6.8   EOSINOPHIL 3.4 2.4 1.5   BASOPHIL 0.3 0.2 0.2   ANEU 4.1 5.5 7.4   ALYM 2.8 1.9 2.7   DAQUAN 0.7 0.6 0.7   AEOS 0.3 0.2 0.2   ABAS 0.0 0.0 0.0     CMP  Recent Labs   Lab Test 07/15/19  1116 04/04/19  1627 01/14/19  1158 12/13/18  1354 09/10/18  1406  09/21/17  0818 05/01/17  0936 01/13/17  1015   NA  --  142  --  139 138  --  137 142 137   POTASSIUM  --  4.2  --  3.6 4.0  --  4.3 3.6 4.0   CHLORIDE  --  110*  --  106 106  --  104 110* 104   CO2  --  26  --  25 25  --  29 24 25   ANIONGAP  --  6  --  8 7  --  4 8 8   GLC  --  83  --  70 93  --  88 91 87   BUN  --  15  --  17 17  --  19 16 21   CR 0.67 0.69 0.90 0.82 0.72   < > 0.68 0.83 0.68   GFRESTIMATED >90 >90 78 76 88   < > >90 75 >90  Non  GFR Calc     GFRESTBLACK >90 >90 >90 >90 >90   < > >90 >90   GFR Calc   >90   GFR Calc     RICHARD  --  9.1  --  8.5 8.8  --  8.9 8.9 8.9   BILITOTAL 0.4 0.2 0.2 0.3 0.4   < > 0.5 0.4 0.3   ALBUMIN 4.3 3.9 4.1 3.7 4.0   < > 3.6 3.6 3.9   PROTTOTAL 7.1 7.2 7.6 6.8 7.2   < > 7.2 6.5* 6.9   ALKPHOS 28* 34* 25* 31* 26*   < > 30* 24* 26*   AST 16 23 13 16 16   < > 12 16 26   ALT 19 36 17 18 29   < > 30 23 37    < > = values in this interval not displayed.       Calcium/VitaminD  Recent Labs   Lab Test 04/04/19  6672  12/13/18  1354 09/10/18  1406   RICHARD 9.1 8.5 8.8     ESR/CRP  Recent Labs   Lab Test 04/04/19  1627 12/13/18  1354 09/10/18  1406 09/21/17  0818   SED 10 5 4 15   CRP 8.1* <2.9  --  55.1*     CK/Aldolase  Recent Labs   Lab Test 04/04/19  1627 12/13/18  1354 09/21/17  0818  10/11/16  1043   * 89 62   < > 65   ALDOLASE  --   --   --   --  4.5    < > = values in this interval not displayed.     TSH/T4  Recent Labs   Lab Test 10/11/16  1043   TSH 1.48     Hepatitis B  Recent Labs   Lab Test 10/11/16  1043   HBCAB Nonreactive   HEPBANG Nonreactive     Hepatitis C  Recent Labs   Lab Test 10/11/16  1043 12/10/13  1118 03/06/13  1411   HCVAB Nonreactive   Assay performance characteristics have not been established for newborns,   infants, and children   Negative Negative     HIV Screening  Recent Labs   Lab Test 10/11/16  1043   HIAGAB Nonreactive   HIV-1 p24 Ag & HIV-1/HIV-2 Ab Not Detected       UA  Recent Labs   Lab Test 04/04/19  1625 12/13/18  1355 09/21/17  0815 05/01/17  0954   COLOR Yellow Yellow Yellow Yellow   APPEARANCE Clear Clear Clear Clear   URINEGLC Negative Negative Negative Negative   URINEBILI Negative Negative Negative Small  This is an unconfirmed screening test result. A positive result may be false.  *   SG 1.020 >1.030 >1.030 >1.030   URINEPH 7.0 5.5 6.0 6.0   PROTEIN Negative Negative Negative Trace*   UROBILINOGEN 0.2 0.2 0.2 0.2   NITRITE Negative Negative Negative Negative   UBLD Negative Small* Trace* Small*   LEUKEST Negative Negative Negative Negative   WBCU  --  0 - 5 O - 2 O - 2   RBCU  --  O - 2 O - 2 O - 2   SQUAMOUSEPI  --  Moderate* Few Few   MUCOUS  --   --   --  Present*     Urine Microscopic  Recent Labs   Lab Test 12/13/18  1355 09/21/17  0815 05/01/17  0954   WBCU 0 - 5 O - 2 O - 2   RBCU O - 2 O - 2 O - 2   SQUAMOUSEPI Moderate* Few Few   MUCOUS  --   --  Present*     Urine Protein  Recent Labs   Lab Test 04/04/19  1625 12/13/18  1355 09/21/17  0815 05/01/17  0954   UTP 0.11  0.25 0.25 1.14   UTPG 0.11 0.17 0.25* 0.35*   UCRR 100  --  100 324          Recent Results (from the past 744 hour(s))   XR Hand Bilateral G/E 3 Views    Narrative    XR HAND BILATERAL G/E 3 VW 10/11/2016 12:41 PM    HISTORY: Pain in unspecified joint      Impression    IMPRESSION: Negative.    JULIAN LORD MD   XR Foot Bilateral G/E 3 Views    Narrative    XR FOOT BILATERAL G/E 3 VW 10/11/2016 12:39 PM    HISTORY: Pain in unspecified joint      Impression    IMPRESSION: Deformity of the head of the right first metatarsal most  likely related to previous surgery or trauma. Some erosive change in  this area cannot be excluded. Bilateral exam otherwise unremarkable.    JULIAN LORD MD   XR Hip Left 2-3 Views    Narrative    XR HIP LEFT 2-3 VIEWS 10/11/2016 12:39 PM    HISTORY: Pain in unspecified joint      Impression    IMPRESSION: Negative.    JULIAN LORD MD       Immunization History     Immunization History   Administered Date(s) Administered     Influenza Vaccine IM 3yrs+ 4 Valent IIV4 12/19/2014, 10/16/2015, 11/04/2016, 12/13/2018     Mantoux Tuberculin Skin Test 07/30/2018     Pneumo Conj 13-V (2010&after) 12/13/2018     Pneumococcal 23 valent 04/04/2019     TD (ADULT, 7+) 02/23/2005, 04/09/2019     TDAP Vaccine (Adacel) 04/14/2009     Zoster vaccine recombinant adjuvanted (SHINGRIX) 12/21/2018, 04/04/2019          Chart documentation done in part with Dragon Voice recognition Software. Although reviewed after completion, some word and grammatical error may remain.    Jack Arvizu MD

## 2019-07-18 NOTE — NURSING NOTE
RAPID3 (0-30) Cumulative Score  15.3          RAPID3 Weighted Score (divide #4 by 3 and that is the weighted score)  5.1     Bailey Resendez [facility] Rheumatology  7/18/2019 4:32 PM

## 2019-07-25 ENCOUNTER — OFFICE VISIT (OUTPATIENT)
Dept: FAMILY MEDICINE | Facility: CLINIC | Age: 44
End: 2019-07-25
Payer: COMMERCIAL

## 2019-07-25 ENCOUNTER — ANCILLARY PROCEDURE (OUTPATIENT)
Dept: GENERAL RADIOLOGY | Facility: CLINIC | Age: 44
End: 2019-07-25
Attending: FAMILY MEDICINE
Payer: COMMERCIAL

## 2019-07-25 VITALS
WEIGHT: 175 LBS | BODY MASS INDEX: 28.12 KG/M2 | TEMPERATURE: 97.2 F | HEART RATE: 97 BPM | SYSTOLIC BLOOD PRESSURE: 115 MMHG | DIASTOLIC BLOOD PRESSURE: 75 MMHG | HEIGHT: 66 IN | OXYGEN SATURATION: 97 %
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
## 2019-07-25 DIAGNOSIS — R07.81 RIB PAIN ON LEFT SIDE: ICD-10-CM

## 2019-07-25 DIAGNOSIS — S20.212A RIB CONTUSION, LEFT, INITIAL ENCOUNTER: ICD-10-CM
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
## 2019-07-25 DIAGNOSIS — Z72.0 TOBACCO ABUSE: ICD-10-CM

## 2019-07-25 PROCEDURE — 71101 X-RAY EXAM UNILAT RIBS/CHEST: CPT | Mod: LT

## 2019-07-25 PROCEDURE — 99214 OFFICE O/P EST MOD 30 MIN: CPT | Performed by: FAMILY MEDICINE

## 2019-07-25 RX ORDER — TRAMADOL HYDROCHLORIDE 50 MG/1
50 TABLET ORAL
Qty: 10 TABLET | Refills: 0 | Status: SHIPPED | OUTPATIENT
Start: 2019-07-25 | End: 2019-07-28

## 2019-07-25 ASSESSMENT — PAIN SCALES - GENERAL: PAINLEVEL: EXTREME PAIN (8)

## 2019-07-25 ASSESSMENT — MIFFLIN-ST. JEOR: SCORE: 1456.54

## 2019-07-25 NOTE — PATIENT INSTRUCTIONS
Patient Education     How to Quit Smoking  Smoking is one of the hardest habits to break. About half of all people who have ever smoked have been able to quit. Most people who still smoke want to quit. Here are some of the best ways to stop smoking.    Keep trying  Most smokers make many attempts at quitting before they are successful. It s important not to give up.  Go cold turkey  Most former smokers quit cold turkey (all at once). Trying to cut back gradually doesn't seem to work as well, perhaps because it continues the smoking habit. Also, it is possible to inhale more while smoking fewer cigarettes. This results in the same amount of nicotine in your body.  Get support  Support programs can be a big help, especially for heavy smokers. These groups offer lectures, ways to change behavior, and peer support. Here are some ways to find a support program:    Free national quitline: 800-QUIT-NOW (156-822-5498).    [facility] quit-smoking programs.    American Lung Association: (208.673.5442).    American Cancer Society (426-435-6632).  Support at home is important too. Nonsmokers can offer praise and encouragement. If the smoker in your life finds it hard to quit, encourage them to keep trying.  Over-the-counter medicines  Nicotine replacement therapy may make quitting easier. Certain aids, such as the nicotine patch, gum, and lozenges, are available without a prescription. It is best to use these under a doctor s care, though. The skin patch provides a steady supply of nicotine. Nicotine gum and lozenges give temporary bursts of low levels of nicotine. Both methods reduce the craving for cigarettes. Warning: If you have nausea, vomiting, dizziness, weakness, or a fast heartbeat, stop using these products and see your doctor.  Prescription medicines  After reviewing your smoking patterns and past attempts to quit, your doctor may offer a prescription medicine such as bupropion, varenicline, a nicotine inhaler, or  "nasal spray. Each has advantages and side effects. Your doctor can review these with you.  Health benefits of quitting  The benefits of quitting start right away and keep improving the longer you go without smoking. These benefits occur at any age.  So whether you are 17 or 70, quitting is a good decision. Some of the benefits include:    20 minutes: Blood pressure and pulse return to normal.    8 hours: Oxygen levels return to normal.    2 days: Ability to smell and taste begin to improve as damaged nerves regrow.    2 to 3 weeks: Circulation and lung function improve.    1 to 9 months: Coughing, congestion, and shortness of breath decrease; tiredness decreases.    1 year: Risk of heart attack decreases by half.    5 years: Risk of lung cancer decreases by half; risk of stroke becomes the same as a nonsmoker s.  For more on how to quit smoking, try these online resources:     Smokefree.gov    \"Clearing the Air\" booklet from the National Cancer [place]: smokefree.gov/sites/default/files/pdf/clearing-the-air-accessible.pdf  Date Last Reviewed: 3/1/2017    5017-0313 [employer]. [address] [place] [address] 12841. All rights reserved. This information is not intended as a substitute for professional medical care. Always follow your healthcare professional's instructions.           "
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
## 2019-08-15 DIAGNOSIS — Z79.899 HIGH RISK MEDICATIONS (NOT ANTICOAGULANTS) LONG-TERM USE: ICD-10-CM

## 2019-08-15 DIAGNOSIS — M32.9 SYSTEMIC LUPUS ERYTHEMATOSUS, UNSPECIFIED SLE TYPE, UNSPECIFIED ORGAN INVOLVEMENT STATUS (H): ICD-10-CM

## 2019-08-15 LAB
ALBUMIN SERPL-MCNC: 4.1 G/DL (ref 3.4–5)
ALP SERPL-CCNC: 25 U/L (ref 40–150)
ALT SERPL W P-5'-P-CCNC: 28 U/L (ref 0–50)
AST SERPL W P-5'-P-CCNC: 16 U/L (ref 0–45)
BASOPHILS # BLD AUTO: 0 10E9/L (ref 0–0.2)
BASOPHILS NFR BLD AUTO: 0.2 %
BILIRUB DIRECT SERPL-MCNC: <0.1 MG/DL (ref 0–0.2)
BILIRUB SERPL-MCNC: 0.2 MG/DL (ref 0.2–1.3)
CREAT SERPL-MCNC: 0.7 MG/DL (ref 0.52–1.04)
DIFFERENTIAL METHOD BLD: ABNORMAL
EOSINOPHIL # BLD AUTO: 0.2 10E9/L (ref 0–0.7)
EOSINOPHIL NFR BLD AUTO: 1.4 %
ERYTHROCYTE [DISTWIDTH] IN BLOOD BY AUTOMATED COUNT: 14.2 % (ref 10–15)
GFR SERPL CREATININE-BSD FRML MDRD: >90 ML/MIN/{1.73_M2}
HCT VFR BLD AUTO: 40.2 % (ref 35–47)
HGB BLD-MCNC: 13.5 G/DL (ref 11.7–15.7)
LYMPHOCYTES # BLD AUTO: 1.3 10E9/L (ref 0.8–5.3)
LYMPHOCYTES NFR BLD AUTO: 12.4 %
MCH RBC QN AUTO: 28.7 PG (ref 26.5–33)
MCHC RBC AUTO-ENTMCNC: 33.6 G/DL (ref 31.5–36.5)
MCV RBC AUTO: 86 FL (ref 78–100)
MONOCYTES # BLD AUTO: 0.5 10E9/L (ref 0–1.3)
MONOCYTES NFR BLD AUTO: 5.1 %
NEUTROPHILS # BLD AUTO: 8.6 10E9/L (ref 1.6–8.3)
NEUTROPHILS NFR BLD AUTO: 80.9 %
PLATELET # BLD AUTO: 227 10E9/L (ref 150–450)
PROT SERPL-MCNC: 7.2 G/DL (ref 6.8–8.8)
RBC # BLD AUTO: 4.7 10E12/L (ref 3.8–5.2)
WBC # BLD AUTO: 10.7 10E9/L (ref 4–11)

## 2019-08-15 PROCEDURE — 80076 HEPATIC FUNCTION PANEL: CPT | Performed by: INTERNAL MEDICINE

## 2019-08-15 PROCEDURE — 82565 ASSAY OF CREATININE: CPT | Performed by: INTERNAL MEDICINE

## 2019-08-15 PROCEDURE — 85025 COMPLETE CBC W/AUTO DIFF WBC: CPT | Performed by: INTERNAL MEDICINE

## 2019-08-15 PROCEDURE — 36415 COLL VENOUS BLD VENIPUNCTURE: CPT | Performed by: INTERNAL MEDICINE

## 2019-09-10 ENCOUNTER — OFFICE VISIT (OUTPATIENT)
Dept: FAMILY MEDICINE | Facility: CLINIC | Age: 44
End: 2019-09-10
Payer: COMMERCIAL

## 2019-09-10 VITALS
TEMPERATURE: 98.2 F | WEIGHT: 166 LBS | BODY MASS INDEX: 27 KG/M2 | SYSTOLIC BLOOD PRESSURE: 100 MMHG | HEART RATE: 88 BPM | DIASTOLIC BLOOD PRESSURE: 67 MMHG

## 2019-09-10 DIAGNOSIS — L90.0 LICHEN SCLEROSUS: ICD-10-CM

## 2019-09-10 DIAGNOSIS — F39 MOOD DISORDER (H): ICD-10-CM

## 2019-09-10 DIAGNOSIS — N92.0 MENORRHAGIA WITH REGULAR CYCLE: Primary | ICD-10-CM

## 2019-09-10 PROCEDURE — 99214 OFFICE O/P EST MOD 30 MIN: CPT | Performed by: PHYSICIAN ASSISTANT

## 2019-09-10 RX ORDER — CLOBETASOL PROPIONATE 0.5 MG/G
CREAM TOPICAL 2 TIMES DAILY
Qty: 60 G | Refills: 3 | Status: SHIPPED | OUTPATIENT
Start: 2019-09-10 | End: 2020-10-30

## 2019-09-10 RX ORDER — ONDANSETRON 4 MG/1
4 TABLET, FILM COATED ORAL EVERY 8 HOURS PRN
Qty: 20 TABLET | Refills: 0 | Status: SHIPPED | OUTPATIENT
Start: 2019-09-10 | End: 2021-01-25

## 2019-09-10 ASSESSMENT — ENCOUNTER SYMPTOMS
HEARTBURN: 0
ACTIVITY CHANGE: 0
CHILLS: 0
FREQUENCY: 0
SORE THROAT: 0
COUGH: 0
UNEXPECTED WEIGHT CHANGE: 0
RHINORRHEA: 0
CONSTIPATION: 0
NAUSEA: 0
PALPITATIONS: 0
EYE PAIN: 0
FEVER: 0
HEADACHES: 1
DYSURIA: 0
DIARRHEA: 0
CHEST TIGHTNESS: 0
DIZZINESS: 0
SHORTNESS OF BREATH: 0
JOINT SWELLING: 0
NECK STIFFNESS: 0
NECK PAIN: 0

## 2019-09-10 ASSESSMENT — PATIENT HEALTH QUESTIONNAIRE - PHQ9: SUM OF ALL RESPONSES TO PHQ QUESTIONS 1-9: 15

## 2019-09-10 NOTE — PROGRESS NOTES
Subjective     Sosa Wolf is a 43 year old female who presents to clinic today for the following health issues:    HPI   Vaginal Bleeding (Dysmenorrhea)  Onset: 1 month     Description:   Duration of bleeding episodes: 2 weeks   Frequency between periods:  2-3 days   Describe bleeding/flow:   Clots: YES  Number of pads/hour: 1  Cramping: moderate    Accompanying Signs & Symptoms:  Weakness: YES  Lightheadedness: no   Hot flashes: YES  Nosebleeds/Easy bruising: no   Vaginal Discharge: no     History:  No LMP recorded.  Previous normal periods: YES  Contraceptive use: NO  Possibility of Pregnancy: no   Any bleeding after intercourse: no   Age of first period (menarche): 13  Abnormal PAP Smears: YES    Precipitating factors:   None     Alleviating factors:  None     Therapies Tried and outcome: none         Patient Active Problem List   Diagnosis     Herpes simplex type 2 infection     Genital warts     CARDIOVASCULAR SCREENING; LDL GOAL LESS THAN 160     H/O intravenous drug use in remission     Surveillance for Depo-Provera contraception     JESSE positive     Multiple joint pain     Cocaine use     Systemic lupus erythematosus (H)     High risk medications (not anticoagulants) long-term use     Sicca syndrome (H)     Mood disorder (H)     Past Surgical History:   Procedure Laterality Date     BUNIONECTOMY RT/LT       HC ENLARGE BREAST WITH IMPLANT         Social History     Tobacco Use     Smoking status: Former Smoker     Packs/day: 0.10     Years: 23.00     Pack years: 2.30     Types: Cigarettes     Last attempt to quit: 3/3/2013     Years since quittin.5     Smokeless tobacco: Never Used     Tobacco comment: now using a vap   Substance Use Topics     Alcohol use: No     Comment: Sober as of 5/16/10     Family History   Problem Relation Age of Onset     Cancer Father         lung cancer     Diabetes Maternal Grandmother      Depression Maternal Grandmother      Lipids Maternal Grandmother       Cerebrovascular Disease Maternal Grandfather      Lipids Maternal Grandfather      Circulatory Maternal Grandfather         MI in 50's     Depression Brother         depression and OCD     Gastrointestinal Disease Brother         IBS     Depression Mother      Thyroid Disease No family hx of      Asthma No family hx of              Reviewed and updated as needed this visit by Provider  Meds  Problems         Review of Systems   Constitutional: Negative for activity change, chills, fever and unexpected weight change.   HENT: Negative for congestion, ear pain, mouth sores, rhinorrhea and sore throat.    Eyes: Negative for pain.   Respiratory: Negative for cough, chest tightness and shortness of breath.    Cardiovascular: Negative for chest pain and palpitations.   Gastrointestinal: Negative for constipation, diarrhea, heartburn and nausea.   Genitourinary: Positive for vaginal bleeding and vaginal pain (sometimes with intercourse and sometimes itching ). Negative for dysuria, frequency and urgency.   Musculoskeletal: Negative for joint swelling, neck pain and neck stiffness.   Neurological: Positive for headaches. Negative for dizziness and syncope.   Psychiatric/Behavioral: Negative for mood changes and suicidal ideas.            Objective    /67   Pulse 88   Temp 98.2  F (36.8  C) (Oral)   Wt 75.3 kg (166 lb)   BMI 27.00 kg/m    Body mass index is 27 kg/m .  Physical Exam   Constitutional: She appears well-developed and well-nourished.   HENT:   Head: Normocephalic.   Eyes: Pupils are equal, round, and reactive to light. Conjunctivae are normal.   Neck: Neck supple. No thyromegaly present.   Cardiovascular: Normal rate, regular rhythm and normal heart sounds. Exam reveals no gallop and no friction rub.   No murmur heard.  Pulmonary/Chest: No respiratory distress. She has no rales.   Abdominal: She exhibits no distension and no mass. There is no guarding.   Lymphadenopathy:     She has no cervical  "adenopathy.   Psychiatric: She has a normal mood and affect. Her behavior is normal. Judgment and thought content normal.          Diagnostic Test Results:        Assessment & Plan   Menorrhagia  Lichen planus    High dose OCP to stop the bleeding and reset the menstrual cycle.  Zofran to prevent nausea and vomiting from th high dose OCP  Clobetasol for the vulvar lichen sclerosis    Start therapy for mood   Was on abilify but felt numb and more likely to restart using drugs on this medication.  doesn't want changes at this time.        ICD-10-CM    1. Menorrhagia with regular cycle N92.0 norethindrone-ethinyl estradiol (ORTHO-NOVUM 1-35 TAB,NORTREL 1-35 TAB) 1-35 MG-MCG tablet     ondansetron (ZOFRAN) 4 MG tablet   2. Lichen sclerosus L90.0 clobetasol (TEMOVATE) 0.05 % external cream   3. Mood disorder (H) F39 MENTAL HEALTH REFERRAL  - Adult; Outpatient Treatment; Individual/Couples/Family/Group Therapy/Health Psychology; [facility]: [facility] [phone]; We will contact you to schedule the appointment or please call with any questions        BMI:   Estimated body mass index is 27 kg/m  as calculated from the following:    Height as of 7/25/19: 1.67 m (5' 5.75\").    Weight as of this encounter: 75.3 kg (166 lb).        Milo CORTEZ-2        Return in about 2 weeks (around 9/24/2019) for if not improving.  The above student acted as scribe and the encounter documented above was completely performed by myself and the documentation reflects the work I have performed today.    Cynthia Scott PA-C  [facility]      "

## 2019-10-16 DIAGNOSIS — Z79.899 HIGH RISK MEDICATIONS (NOT ANTICOAGULANTS) LONG-TERM USE: ICD-10-CM

## 2019-10-16 DIAGNOSIS — M32.9 SYSTEMIC LUPUS ERYTHEMATOSUS, UNSPECIFIED SLE TYPE, UNSPECIFIED ORGAN INVOLVEMENT STATUS (H): ICD-10-CM

## 2019-10-16 LAB
BASOPHILS # BLD AUTO: 0 10E9/L (ref 0–0.2)
BASOPHILS NFR BLD AUTO: 0.4 %
DIFFERENTIAL METHOD BLD: NORMAL
EOSINOPHIL # BLD AUTO: 0.3 10E9/L (ref 0–0.7)
EOSINOPHIL NFR BLD AUTO: 3.3 %
ERYTHROCYTE [DISTWIDTH] IN BLOOD BY AUTOMATED COUNT: 12.8 % (ref 10–15)
ERYTHROCYTE [SEDIMENTATION RATE] IN BLOOD BY WESTERGREN METHOD: 6 MM/H (ref 0–20)
HCT VFR BLD AUTO: 38.8 % (ref 35–47)
HGB BLD-MCNC: 12.7 G/DL (ref 11.7–15.7)
LYMPHOCYTES # BLD AUTO: 1.9 10E9/L (ref 0.8–5.3)
LYMPHOCYTES NFR BLD AUTO: 25 %
MCH RBC QN AUTO: 27.7 PG (ref 26.5–33)
MCHC RBC AUTO-ENTMCNC: 32.7 G/DL (ref 31.5–36.5)
MCV RBC AUTO: 85 FL (ref 78–100)
MONOCYTES # BLD AUTO: 0.8 10E9/L (ref 0–1.3)
MONOCYTES NFR BLD AUTO: 9.9 %
NEUTROPHILS # BLD AUTO: 4.7 10E9/L (ref 1.6–8.3)
NEUTROPHILS NFR BLD AUTO: 61.4 %
PLATELET # BLD AUTO: 245 10E9/L (ref 150–450)
RBC # BLD AUTO: 4.58 10E12/L (ref 3.8–5.2)
WBC # BLD AUTO: 7.6 10E9/L (ref 4–11)

## 2019-10-16 PROCEDURE — 82565 ASSAY OF CREATININE: CPT | Mod: 59 | Performed by: INTERNAL MEDICINE

## 2019-10-16 PROCEDURE — 36415 COLL VENOUS BLD VENIPUNCTURE: CPT | Performed by: INTERNAL MEDICINE

## 2019-10-16 PROCEDURE — 80053 COMPREHEN METABOLIC PANEL: CPT | Performed by: INTERNAL MEDICINE

## 2019-10-16 PROCEDURE — 85652 RBC SED RATE AUTOMATED: CPT | Performed by: INTERNAL MEDICINE

## 2019-10-16 PROCEDURE — 86140 C-REACTIVE PROTEIN: CPT | Performed by: INTERNAL MEDICINE

## 2019-10-16 PROCEDURE — 85025 COMPLETE CBC W/AUTO DIFF WBC: CPT | Performed by: INTERNAL MEDICINE

## 2019-10-16 PROCEDURE — 82550 ASSAY OF CK (CPK): CPT | Performed by: INTERNAL MEDICINE

## 2019-10-16 PROCEDURE — 80076 HEPATIC FUNCTION PANEL: CPT | Mod: 59 | Performed by: INTERNAL MEDICINE

## 2019-10-16 PROCEDURE — 86160 COMPLEMENT ANTIGEN: CPT | Performed by: INTERNAL MEDICINE

## 2019-10-16 PROCEDURE — 86160 COMPLEMENT ANTIGEN: CPT | Mod: 91 | Performed by: INTERNAL MEDICINE

## 2019-10-16 PROCEDURE — 86225 DNA ANTIBODY NATIVE: CPT | Performed by: INTERNAL MEDICINE

## 2019-10-17 LAB
ALBUMIN SERPL-MCNC: 4.2 G/DL (ref 3.4–5)
ALBUMIN SERPL-MCNC: 4.2 G/DL (ref 3.4–5)
ALP SERPL-CCNC: 19 U/L (ref 40–150)
ALP SERPL-CCNC: 23 U/L (ref 40–150)
ALT SERPL W P-5'-P-CCNC: 23 U/L (ref 0–50)
ALT SERPL W P-5'-P-CCNC: 25 U/L (ref 0–50)
ANION GAP SERPL CALCULATED.3IONS-SCNC: 6 MMOL/L (ref 3–14)
AST SERPL W P-5'-P-CCNC: 18 U/L (ref 0–45)
AST SERPL W P-5'-P-CCNC: 19 U/L (ref 0–45)
BILIRUB DIRECT SERPL-MCNC: <0.1 MG/DL (ref 0–0.2)
BILIRUB SERPL-MCNC: 0.3 MG/DL (ref 0.2–1.3)
BILIRUB SERPL-MCNC: 0.3 MG/DL (ref 0.2–1.3)
BUN SERPL-MCNC: 17 MG/DL (ref 7–30)
C3 SERPL-MCNC: 90 MG/DL (ref 76–169)
C4 SERPL-MCNC: 19 MG/DL (ref 15–50)
CALCIUM SERPL-MCNC: 9.1 MG/DL (ref 8.5–10.1)
CHLORIDE SERPL-SCNC: 109 MMOL/L (ref 94–109)
CK SERPL-CCNC: 121 U/L (ref 30–225)
CO2 SERPL-SCNC: 24 MMOL/L (ref 20–32)
CREAT SERPL-MCNC: 0.69 MG/DL (ref 0.52–1.04)
CREAT SERPL-MCNC: 0.75 MG/DL (ref 0.52–1.04)
CRP SERPL-MCNC: <2.9 MG/L (ref 0–8)
GFR SERPL CREATININE-BSD FRML MDRD: >90 ML/MIN/{1.73_M2}
GFR SERPL CREATININE-BSD FRML MDRD: >90 ML/MIN/{1.73_M2}
GLUCOSE SERPL-MCNC: 83 MG/DL (ref 70–99)
POTASSIUM SERPL-SCNC: 3.9 MMOL/L (ref 3.4–5.3)
PROT SERPL-MCNC: 7.3 G/DL (ref 6.8–8.8)
PROT SERPL-MCNC: 7.3 G/DL (ref 6.8–8.8)
SODIUM SERPL-SCNC: 139 MMOL/L (ref 133–144)

## 2019-10-18 ENCOUNTER — TELEPHONE (OUTPATIENT)
Dept: RHEUMATOLOGY | Facility: CLINIC | Age: 44
End: 2019-10-18

## 2019-10-18 LAB — DSDNA AB SER-ACNC: 14 IU/ML

## 2019-10-18 NOTE — TELEPHONE ENCOUNTER
Per Dr. Arvizu's last office visit notes:    - Labs monthly [id number]: CBC, Cr, Hepatic Panel  - Labs in 3months: CBC, CMP, ESR, CRP, CK, C3, C4, dsDNA, UA, Uprotein:creatinine    Patient missed her lab apt in September and 3 month labs were done on 10/16/19 it appears. Only missing her UA and Uprotein:creatinine for which orders are in place. Called and advised patient of this.    Joel Carrington RN....10/18/2019 12:29 PM

## 2019-10-18 NOTE — TELEPHONE ENCOUNTER
Reason for call:  Orders for lab work  Patient called regarding (reason for call): Patient would like standing orders placed for her lab work  Additional comments: Patient is scheduled at [facility] for labs on 11-4-19 and wants to make sure that the orders are there. Thank you.    Phone number to reach patient:  Cell number on file:    Telephone Information:   Mobile [phone]       Best Time:  any    Can we leave a detailed message on this number?  YES

## 2019-10-24 ENCOUNTER — TELEPHONE (OUTPATIENT)
Dept: RHEUMATOLOGY | Facility: CLINIC | Age: 44
End: 2019-10-24

## 2019-10-24 DIAGNOSIS — M32.9 SYSTEMIC LUPUS ERYTHEMATOSUS, UNSPECIFIED SLE TYPE, UNSPECIFIED ORGAN INVOLVEMENT STATUS (H): Primary | ICD-10-CM

## 2019-10-24 NOTE — TELEPHONE ENCOUNTER
Spoke with patient.  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  States her forefinger and middle finger are swollen, red and painful in the two large joints.  Also states she has felt more tired than normal.  Patient is requesting medication to help with flare.  Notified her Dr. Arvizu is out of clinic the rest of the day, but will forward to him to address tomorrow.    Pinky Salvador RN

## 2019-10-24 NOTE — TELEPHONE ENCOUNTER
RN attempted to reach patient by phone.  Left voicemail to please return call to clinic at [phone] and ask to speak to specialty nurse.    Pinky Salvador RN

## 2019-10-24 NOTE — TELEPHONE ENCOUNTER
Reason for call:  Symptom   Symptom or request: Right hand pain, and can hardly move it.    Duration (how long have symptoms been present): this afternoon  Have you been treated for this before? Yes    Additional comments: Patient states this just flared up this afternoon. Call to discuss. 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     Phone number to reach patient:  Cell number on file:    Telephone Information:   Mobile [phone]       Best Time:  Before 7:00 am or after 4:00 pm    Can we leave a detailed message on this number?  YES

## 2019-10-25 RX ORDER — PREDNISONE 5 MG/1
TABLET ORAL
Qty: 50 TABLET | Refills: 0 | Status: SHIPPED | OUTPATIENT
Start: 2019-10-25 | End: 2021-01-25

## 2019-10-25 NOTE — TELEPHONE ENCOUNTER
RN: please call to notify Ms. Wolf that this may be a flare of inflammatory arthritis and therefore I have sent a prescription for a prednisone taper (Prednisone 20mg daily x5days, then 15mg daily x5days, then 10mg daily x5days, then 5mg daily x5days, then stop).  If no improvement of her joint pain within 5 days then she will need to be evaluated in clinic.     Jack Arvizu MD  10/25/2019 1:00 PM

## 2019-11-04 DIAGNOSIS — M32.9 SYSTEMIC LUPUS ERYTHEMATOSUS, UNSPECIFIED SLE TYPE, UNSPECIFIED ORGAN INVOLVEMENT STATUS (H): ICD-10-CM

## 2019-11-04 DIAGNOSIS — Z79.899 HIGH RISK MEDICATIONS (NOT ANTICOAGULANTS) LONG-TERM USE: ICD-10-CM

## 2019-11-04 LAB
ALBUMIN UR-MCNC: ABNORMAL MG/DL
APPEARANCE UR: CLEAR
BACTERIA #/AREA URNS HPF: ABNORMAL /HPF
BASOPHILS # BLD AUTO: 0 10E9/L (ref 0–0.2)
BASOPHILS NFR BLD AUTO: 0.2 %
BILIRUB UR QL STRIP: NEGATIVE
COLOR UR AUTO: YELLOW
DIFFERENTIAL METHOD BLD: NORMAL
EOSINOPHIL # BLD AUTO: 0.2 10E9/L (ref 0–0.7)
EOSINOPHIL NFR BLD AUTO: 2.8 %
ERYTHROCYTE [DISTWIDTH] IN BLOOD BY AUTOMATED COUNT: 12.4 % (ref 10–15)
GLUCOSE UR STRIP-MCNC: NEGATIVE MG/DL
HCT VFR BLD AUTO: 40.8 % (ref 35–47)
HGB BLD-MCNC: 13.3 G/DL (ref 11.7–15.7)
HGB UR QL STRIP: NEGATIVE
KETONES UR STRIP-MCNC: NEGATIVE MG/DL
LEUKOCYTE ESTERASE UR QL STRIP: NEGATIVE
LYMPHOCYTES # BLD AUTO: 2.7 10E9/L (ref 0.8–5.3)
LYMPHOCYTES NFR BLD AUTO: 31.6 %
MCH RBC QN AUTO: 27.4 PG (ref 26.5–33)
MCHC RBC AUTO-ENTMCNC: 32.6 G/DL (ref 31.5–36.5)
MCV RBC AUTO: 84 FL (ref 78–100)
MONOCYTES # BLD AUTO: 0.9 10E9/L (ref 0–1.3)
MONOCYTES NFR BLD AUTO: 10 %
NEUTROPHILS # BLD AUTO: 4.8 10E9/L (ref 1.6–8.3)
NEUTROPHILS NFR BLD AUTO: 55.4 %
NITRATE UR QL: NEGATIVE
NON-SQ EPI CELLS #/AREA URNS LPF: ABNORMAL /LPF
PH UR STRIP: 8 PH (ref 5–7)
PLATELET # BLD AUTO: 240 10E9/L (ref 150–450)
RBC # BLD AUTO: 4.86 10E12/L (ref 3.8–5.2)
RBC #/AREA URNS AUTO: ABNORMAL /HPF
SOURCE: ABNORMAL
SP GR UR STRIP: 1.01 (ref 1–1.03)
UROBILINOGEN UR STRIP-ACNC: 0.2 EU/DL (ref 0.2–1)
WBC # BLD AUTO: 8.6 10E9/L (ref 4–11)
WBC #/AREA URNS AUTO: ABNORMAL /HPF

## 2019-11-04 PROCEDURE — 36415 COLL VENOUS BLD VENIPUNCTURE: CPT | Performed by: INTERNAL MEDICINE

## 2019-11-04 PROCEDURE — 85025 COMPLETE CBC W/AUTO DIFF WBC: CPT | Performed by: INTERNAL MEDICINE

## 2019-11-04 PROCEDURE — 84156 ASSAY OF PROTEIN URINE: CPT | Performed by: INTERNAL MEDICINE

## 2019-11-04 PROCEDURE — 81001 URINALYSIS AUTO W/SCOPE: CPT | Performed by: INTERNAL MEDICINE

## 2019-11-05 LAB
PROT UR-MCNC: 0.21 G/L
PROT/CREAT 24H UR: 0.16 G/G CR (ref 0–0.2)

## 2019-11-06 ENCOUNTER — HEALTH MAINTENANCE LETTER (OUTPATIENT)
Age: 44
End: 2019-11-06

## 2019-11-07 ENCOUNTER — OFFICE VISIT (OUTPATIENT)
Dept: RHEUMATOLOGY | Facility: CLINIC | Age: 44
End: 2019-11-07
Payer: COMMERCIAL

## 2019-11-07 VITALS
OXYGEN SATURATION: 99 % | WEIGHT: 168.4 LBS | SYSTOLIC BLOOD PRESSURE: 120 MMHG | HEIGHT: 66 IN | DIASTOLIC BLOOD PRESSURE: 82 MMHG | BODY MASS INDEX: 27.06 KG/M2 | HEART RATE: 88 BPM

## 2019-11-07 DIAGNOSIS — Z79.899 HIGH RISK MEDICATION USE: ICD-10-CM

## 2019-11-07 DIAGNOSIS — M32.9 SYSTEMIC LUPUS ERYTHEMATOSUS, UNSPECIFIED SLE TYPE, UNSPECIFIED ORGAN INVOLVEMENT STATUS (H): Primary | ICD-10-CM

## 2019-11-07 DIAGNOSIS — Z23 NEED FOR PROPHYLACTIC VACCINATION AND INOCULATION AGAINST INFLUENZA: ICD-10-CM

## 2019-11-07 DIAGNOSIS — R20.0 NUMBNESS OF RIGHT HAND: ICD-10-CM

## 2019-11-07 PROCEDURE — 90686 IIV4 VACC NO PRSV 0.5 ML IM: CPT | Performed by: INTERNAL MEDICINE

## 2019-11-07 PROCEDURE — 99214 OFFICE O/P EST MOD 30 MIN: CPT | Mod: 25 | Performed by: INTERNAL MEDICINE

## 2019-11-07 PROCEDURE — 90471 IMMUNIZATION ADMIN: CPT | Performed by: INTERNAL MEDICINE

## 2019-11-07 RX ORDER — LEFLUNOMIDE 20 MG/1
20 TABLET ORAL DAILY
Qty: 90 TABLET | Refills: 1 | Status: SHIPPED | OUTPATIENT
Start: 2019-11-07 | End: 2020-09-18

## 2019-11-07 ASSESSMENT — MIFFLIN-ST. JEOR: SCORE: 1421.64

## 2019-11-07 NOTE — PROGRESS NOTES
Rheumatology Clinic Visit      Sosa Wolf MRN# [id number]   YOB: 1975 Age: 44 year old      Date of visit: 11/07/19   PCP: Cynthia Scott    Chief Complaint   Patient presents with:  RECHECK: Lupus numbness on and off R hand      Assessment and Plan     1. Systemic Lupus Erythematosus (JESSE+, dsDNA+, arthritis, oral sores [SLE versus HSV related], proteinuria): Dx'd 10/2016; initially with arthritis that improved with HCQ.  Previously on MTX (fatigue, effective), leflunomide (effective for arthritis, stopped when she ran out and did not return to clinic for a while), MMF (resolved arthritis, rash, and brain fog; arthritis persisted though and eventually changed back to leflunomide).  Currently on HCQ 200mg daily and leflunomide 20mg daily.   - Continue hydroxychloroquine 200mg daily (last eye exam 1/22/2019)  - Continue leflunomide 20 mg daily  - Labs in mid-Jan: CBC, CMP, ESR, CRP, UA, Uprotein:creatinine    # Pregnancy Plans: on birth control (depo-provera)    2.  Secondary Sjogren's syndrome: Mild symptoms that may be due to other factors such as caffeine intake, smoking, etc., but secondary Sjogren's Syndrome is possible.  We discussed the importance of good oral hygiene, frequent sips of water, avoiding high sugar foods and liquids, avoiding oral irritants such as coffee, alcohol, and nicotine, and avoiding acidic drinks such as carbonated and high sugar beverages. She is already seeing a dentist regularly. Dry eyes improved with preservative free artificial tears.     3. 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 Cigarette Smoking:  Encouraged complete cessation and discussed the health benefits.      5. Cocaine use history: Positive reinforcement for continued cessation    6. Intermittent numbness of the right 2nd-5th fingers: ?carpal tunnel syndrome ?ulnar neuropathy ?other.  Refer for NCS.  - EMG/NCS ordered; advised that she call  to schedule    Ms. Wolf verbalized agreement with and understanding of the rational for the diagnosis and treatment plan.  All questions were answered to best of my ability and the patient's satisfaction. Ms. Wolf was advised to contact the clinic with any questions that may arise after the clinic visit.      Thank you for involving me in the care of the patient    Return to clinic: 3-4 months      HPI   Sosa Wolf is a 44 year old female with a medical history significant for tobacco abuse, genital warts, and HSV type II who presents for follow-up of systemic lupus erythematosus.    Today, Ms. Wolf reports that she is doing much better on leflunomide. Tolerating leflunomide well.  No joint pain; morning stiffness for <10 min.  Didn't take the prednisone that was Rx'd between visits because the pain she was experiencing resolved spontaneously.  Intermittent numbness of her right second-fifth fingers; occurring for about 2 months now on a more frequent basis but has been an issue for >1year per patient.     Denies fevers, chills, nausea, vomiting, constipation, diarrhea. No abdominal pain. No LE swelling. No neck pain.   No photosensitivity or photophobia. No history of inflammatory eye disease.  No history of DVT, pulmonary embolism, or miscarriage.   No history of serositis.  No history of Raynaud's Phenomenon.  No seizure history.  No known renal disorder.      Tobacco: smoking cigarettes; trying to quick by slowly cutting down  EtOH: none, sober since 5/16/2010  Drugs: cocaine history; now sober  Occupation: counselor for mentally disabled people    ROS   GEN: No fevers, chills, night sweats, or weight change  SKIN: See HPI  HEENT: No oral or nasal ulcers.  CV:  see HPI  PULM: See HPI  GI: No nausea, vomiting, constipation, diarrhea. No blood in stool. No abdominal pain.  : No blood in urine.  MSK: See HPI.  NEURO: No numbness, tingling, or weakness.  ENDO: No cold intolerance; see history of present illness    EXT: No LE swelling  PSYCH: Depression    Active Problem List     Patient Active Problem List   Diagnosis     Herpes simplex type 2 infection     Genital warts     CARDIOVASCULAR SCREENING; LDL GOAL LESS THAN 160     H/O intravenous drug use in remission     Surveillance for Depo-Provera contraception     JESSE positive     Multiple joint pain     Cocaine use     Systemic lupus erythematosus (H)     High risk medications (not anticoagulants) long-term use     Sicca syndrome (H)     Mood disorder (H)     Lichen sclerosus     Past Medical History     Past Medical History:   Diagnosis Date     Bipolar disorder (H) 12/04    Dx'd     H/O: depression     SUICIDE ATTEMPT AGE 15     HPV (human papillomavirus)      Past Surgical History     Past Surgical History:   Procedure Laterality Date     BUNIONECTOMY RT/LT       HC ENLARGE BREAST WITH IMPLANT  2000     Allergy   No Known Allergies  Current Medication List     Current Outpatient Medications   Medication Sig     Aspirin-Acetaminophen-Caffeine (EXCEDRIN PO)      buPROPion (WELLBUTRIN SR) 150 MG 12 hr tablet TAKE ONE TABLET BY MOUTH ONCE DAILY     clobetasol (TEMOVATE) 0.05 % external cream Apply topically 2 times daily To vaginal area     cyclobenzaprine (FLEXERIL) 10 MG tablet TAKE ONE TABLET BY MOUTH EVERY NIGHT AT BEDTIME AS NEEDED FOR MUSCLE SPASMS     FLUoxetine (PROZAC) 40 MG capsule TAKE ONE CAPSULE BY MOUTH ONCE DAILY     hydroxychloroquine (PLAQUENIL) 200 MG tablet Take 1 tablet (200 mg) by mouth daily     IBUPROFEN PO      leflunomide (ARAVA) 20 MG tablet Take 1 tablet (20 mg) by mouth daily     loratadine (CLARITIN) 10 MG tablet Take 1 tablet (10 mg) by mouth daily     nicotine (NICODERM CQ) 21 MG/24HR 24 hr patch Place 1 patch onto the skin every 24 hours     nicotine (NICORETTE) 2 MG gum Place 1 each (2 mg) inside cheek as needed for smoking cessation     norethindrone-ethinyl estradiol (ORTHO-NOVUM 1-35 TAB,NORTREL 1-35 TAB) 1-35 MG-MCG tablet 5 pills on  "day one, 4 on day 2, 3 on day 3, 2 on day 2 and then finish the pack out.     ondansetron (ZOFRAN) 4 MG tablet Take 1 tablet (4 mg) by mouth every 8 hours as needed for nausea     triamcinolone (KENALOG) 0.1 % ointment Apply sparingly to affected area three times daily for 14 days.     predniSONE (DELTASONE) 5 MG tablet Prednisone 20mg daily x5days, then 15mg daily x5days, then 10mg daily x5days, then 5mg daily x5days, then stop. (Patient not taking: Reported on 11/7/2019)     No current facility-administered medications for this visit.      Social History   See HPI    Family History     Family History   Problem Relation Age of Onset     Cancer Father         lung cancer     Diabetes Maternal Grandmother      Depression Maternal Grandmother      Lipids Maternal Grandmother      Cerebrovascular Disease Maternal Grandfather      Lipids Maternal Grandfather      Circulatory Maternal Grandfather         MI in 50's     Depression Brother         depression and OCD     Gastrointestinal Disease Brother         IBS     Depression Mother      Thyroid Disease No family hx of      Asthma No family hx of      Denies family history of autoimmune disease     Physical Exam     Temp Readings from Last 3 Encounters:   09/10/19 98.2  F (36.8  C) (Oral)   07/25/19 97.2  F (36.2  C) (Oral)   04/09/19 98  F (36.7  C) (Oral)     BP Readings from Last 5 Encounters:   11/07/19 120/82   09/10/19 100/67   07/25/19 115/75   07/18/19 125/83   04/09/19 112/72     Pulse Readings from Last 1 Encounters:   11/07/19 88     Resp Readings from Last 1 Encounters:   11/04/16 18     Estimated body mass index is 27.39 kg/m  as calculated from the following:    Height as of this encounter: 1.67 m (5' 5.75\").    Weight as of this encounter: 76.4 kg (168 lb 6.4 oz).    GEN: NAD  HEENT: MMM. No oral lesions. Anicteric, noninjected sclera  CV: S1, S2. RRR. No m/r/g.  PULM: Clear to auscultation bilaterally.  No wheeze/cough.  MSK:  MPCs, PIPs, DIPs, wrists, " elbows, shoulders, knees, ankles, and MTPs without swelling or tenderness to palpation.  Hips nontender to palpation.   NEURO: UE and LE strengths 5/5 and equal bilaterally.   SKIN: No rash  EXT: No LE edema  PSYCH: Alert. Appropriate.    Labs / Imaging (select studies)   RF/CCP  Recent Labs   Lab Test 10/11/16  1043 08/10/16  1059   CCPIGG 1  --    RHF  --  <20     JESSE  Recent Labs   Lab Test 08/10/16  1059   ROBYN 1.7*     RNP/Sm/SSA/SSB  Recent Labs   Lab Test 10/11/16  1043 12/10/13  1118   RNPIGG 0.3  --    SMIGG <0.2  Negative   Antibody index (AI) values reflect qualitative changes in antibody   concentration that cannot be directly associated with clinical condition or   disease state.    --    SSAIGG <0.2  Negative   Antibody index (AI) values reflect qualitative changes in antibody   concentration that cannot be directly associated with clinical condition or   disease state.    --    SSBIGG <0.2  Negative   Antibody index (AI) values reflect qualitative changes in antibody   concentration that cannot be directly associated with clinical condition or   disease state.    --    SCLIGG <0.2  Negative   Antibody index (AI) values reflect qualitative changes in antibody   concentration that cannot be directly associated with clinical condition or   disease state.    --    TREPAB  --  Negative     dsDNA  Recent Labs   Lab Test 10/16/19  1707 04/04/19  1627 12/13/18  1354 09/21/17  0818 05/01/17  0936 01/13/17  1015 10/11/16  1043   DNA 14* 14* 19* 25* 37* 45* 70*     C3/C4  Recent Labs   Lab Test 10/16/19  1707 04/04/19  1627 12/13/18  1354 09/21/17  0818 05/01/17  0936 01/13/17  1015 10/11/16  1043   E3WRMGR 90 120 80 124 80 87 83   W1HMVGN 19 26 19 24 17 22 15     CBC  Recent Labs   Lab Test 11/04/19  1747 10/16/19  1707 08/15/19  1152   WBC 8.6 7.6 10.7   RBC 4.86 4.58 4.70   HGB 13.3 12.7 13.5   HCT 40.8 38.8 40.2   MCV 84 85 86   RDW 12.4 12.8 14.2    245 227   MCH 27.4 27.7 28.7   MCHC 32.6 32.7 33.6    NEUTROPHIL 55.4 61.4 80.9   LYMPH 31.6 25.0 12.4   MONOCYTE 10.0 9.9 5.1   EOSINOPHIL 2.8 3.3 1.4   BASOPHIL 0.2 0.4 0.2   ANEU 4.8 4.7 8.6*   ALYM 2.7 1.9 1.3   DAQUAN 0.9 0.8 0.5   AEOS 0.2 0.3 0.2   ABAS 0.0 0.0 0.0     CMP  Recent Labs   Lab Test 10/16/19  1708 10/16/19  1707 08/15/19  1152 07/15/19  1116 04/04/19  1627  12/13/18  1354 09/10/18  1406  09/21/17  0818 05/01/17  0936     --   --   --  142  --  139 138  --  137 142   POTASSIUM 3.9  --   --   --  4.2  --  3.6 4.0  --  4.3 3.6   CHLORIDE 109  --   --   --  110*  --  106 106  --  104 110*   CO2 24  --   --   --  26  --  25 25  --  29 24   ANIONGAP 6  --   --   --  6  --  8 7  --  4 8   GLC 83  --   --   --  83  --  70 93  --  88 91   BUN 17  --   --   --  15  --  17 17  --  19 16   CR 0.75 0.69 0.70 0.67 0.69   < > 0.82 0.72   < > 0.68 0.83   GFRESTIMATED >90 >90 >90 >90 >90   < > 76 88   < > >90 75   GFRESTBLACK >90 >90 >90 >90 >90   < > >90 >90   < > >90 >90  African American GFR Calc     RICHARD 9.1  --   --   --  9.1  --  8.5 8.8  --  8.9 8.9   BILITOTAL 0.3 0.3 0.2 0.4 0.2   < > 0.3 0.4   < > 0.5 0.4   ALBUMIN 4.2 4.2 4.1 4.3 3.9   < > 3.7 4.0   < > 3.6 3.6   PROTTOTAL 7.3 7.3 7.2 7.1 7.2   < > 6.8 7.2   < > 7.2 6.5*   ALKPHOS 23* 19* 25* 28* 34*   < > 31* 26*   < > 30* 24*   AST 18 19 16 16 23   < > 16 16   < > 12 16   ALT 23 25 28 19 36   < > 18 29   < > 30 23    < > = values in this interval not displayed.     Calcium/VitaminD  Recent Labs   Lab Test 10/16/19  1708 04/04/19  1627 12/13/18  1354   RICHARD 9.1 9.1 8.5     ESR/CRP  Recent Labs   Lab Test 10/16/19  1707 04/04/19  1627 12/13/18  1354   SED 6 10 5   CRP <2.9 8.1* <2.9     CK/Aldolase  Recent Labs   Lab Test 10/16/19  1707 04/04/19  1627 12/13/18  1354  10/11/16  1043    237* 89   < > 65   ALDOLASE  --   --   --   --  4.5    < > = values in this interval not displayed.     TSH/T4  Recent Labs   Lab Test 10/11/16  1043   TSH 1.48     Hepatitis B  Recent Labs   Lab Test  10/11/16  1043   HBCAB Nonreactive   HEPBANG Nonreactive     Hepatitis C  Recent Labs   Lab Test 10/11/16  1043 12/10/13  1118 03/06/13  1411   HCVAB Nonreactive   Assay performance characteristics have not been established for newborns,   infants, and children   Negative Negative     HIV Screening  Recent Labs   Lab Test 10/11/16  1043   HIAGAB Nonreactive   HIV-1 p24 Ag & HIV-1/HIV-2 Ab Not Detected       UA  Recent Labs   Lab Test 11/04/19 1747 04/04/19 1625 12/13/18 1355 09/21/17  0815 05/01/17  0954   COLOR Yellow Yellow Yellow Yellow Yellow   APPEARANCE Clear Clear Clear Clear Clear   URINEGLC Negative Negative Negative Negative Negative   URINEBILI Negative Negative Negative Negative Small  This is an unconfirmed screening test result. A positive result may be false.  *   SG 1.015 1.020 >1.030 >1.030 >1.030   URINEPH 8.0* 7.0 5.5 6.0 6.0   PROTEIN Trace* Negative Negative Negative Trace*   UROBILINOGEN 0.2 0.2 0.2 0.2 0.2   NITRITE Negative Negative Negative Negative Negative   UBLD Negative Negative Small* Trace* Small*   LEUKEST Negative Negative Negative Negative Negative   WBCU 0 - 5  --  0 - 5 O - 2 O - 2   RBCU O - 2  --  O - 2 O - 2 O - 2   SQUAMOUSEPI Few  --  Moderate* Few Few   BACTERIA Few*  --   --   --   --    MUCOUS  --   --   --   --  Present*     Urine Microscopic  Recent Labs   Lab Test 11/04/19 1747 12/13/18 1355 09/21/17  0815 05/01/17  0954   WBCU 0 - 5 0 - 5 O - 2 O - 2   RBCU O - 2 O - 2 O - 2 O - 2   SQUAMOUSEPI Few Moderate* Few Few   BACTERIA Few*  --   --   --    MUCOUS  --   --   --  Present*     Urine Protein  Recent Labs   Lab Test 11/04/19 1747 04/04/19 1625 12/13/18 1355 09/21/17  0815 05/01/17  0954   UTP 0.21 0.11 0.25 0.25 1.14   UTPG 0.16 0.11 0.17 0.25* 0.35*   UCRR  --  100  --  100 324       Immunization History     Immunization History   Administered Date(s) Administered     Influenza Vaccine IM > 6 months Valent IIV4 12/19/2014, 10/16/2015, 11/04/2016,  12/13/2018     Mantoux Tuberculin Skin Test 07/30/2018     Pneumo Conj 13-V (2010&after) 12/13/2018     Pneumococcal 23 valent 04/04/2019     TD (ADULT, 7+) 02/23/2005, 04/09/2019     TDAP Vaccine (Adacel) 04/14/2009     Zoster vaccine recombinant adjuvanted (SHINGRIX) 12/21/2018, 04/04/2019          Chart documentation done in part with Dragon Voice recognition Software. Although reviewed after completion, some word and grammatical error may remain.    Jack Arvizu MD

## 2019-11-07 NOTE — NURSING NOTE
RAPID3 (0-30) Cumulative Score  8.7          RAPID3 Weighted Score (divide #4 by 3 and that is the weighted score)  2.9

## 2019-11-07 NOTE — PATIENT INSTRUCTIONS
Rheumatology    Dr. Jack Arvizu       [facility] [address] in [place]   (Monday)  [address]  [place] [address]       [facility] [address] in [place]   (Tuesday)  [address] [place] [address]    [facility] in [place]   (Wed., Thurs., and Friday)  [address] [place] [address]    Phone number: [phone]  Thank you for choosing [facility].  SHALINI Pérez RMA        Call to schedule EMG #[phone]

## 2019-11-15 ENCOUNTER — OFFICE VISIT (OUTPATIENT)
Dept: FAMILY MEDICINE | Facility: CLINIC | Age: 44
End: 2019-11-15
Payer: COMMERCIAL

## 2019-11-15 VITALS
DIASTOLIC BLOOD PRESSURE: 70 MMHG | OXYGEN SATURATION: 97 % | HEART RATE: 90 BPM | SYSTOLIC BLOOD PRESSURE: 100 MMHG | TEMPERATURE: 96.7 F | WEIGHT: 162 LBS | BODY MASS INDEX: 26.35 KG/M2

## 2019-11-15 DIAGNOSIS — Z72.0 TOBACCO ABUSE: ICD-10-CM

## 2019-11-15 DIAGNOSIS — R68.89 FLU-LIKE SYMPTOMS: Primary | ICD-10-CM

## 2019-11-15 PROCEDURE — 99213 OFFICE O/P EST LOW 20 MIN: CPT | Performed by: FAMILY MEDICINE

## 2019-11-15 NOTE — PROGRESS NOTES
Subjective     Sosa Wolf is a 44 year old female who presents to clinic today for the following health issues:    HPI   Patient is here for possible flu, not eat much, feeling like heading spin and aches   Patient reports she has been having flulike symptoms for the past 2 to 3 days.  She did have her flu vaccine November 7.  She reports her son has similar symptoms a week ago.  She denies any diarrhea, denies vomiting, she does report chills, cough.  Denies wheezing, shortness of breath.  Denies lower extremity edema, has no abdominal pain, no back pain, denies frequency or urgency.    Denies recent history of travel.  She continues to smoke, denies wheezing.  She was not able to go to work for the past 2 days.  She is requesting a note to be off work until next Monday, November 18.    Reviewed and updated as needed this visit by Provider         Review of Systems   ROS COMP: Constitutional, HEENT, cardiovascular, pulmonary, gi and gu systems are negative, except as otherwise noted.      Objective    There were no vitals taken for this visit.  There is no height or weight on file to calculate BMI.  Physical Exam   GENERAL: healthy, alert and no distress  HENT: ear canals and TM's normal, nose and mouth without ulcers or lesions  NECK: no adenopathy, no asymmetry, masses, or scars and thyroid normal to palpation  RESP: lungs clear to auscultation - no rales, rhonchi or wheezes  CV: regular rate and rhythm, normal S1 S2, no S3 or S4, no murmur, click or rub, no peripheral edema and peripheral pulses strong  ABDOMEN: soft, nontender, no hepatosplenomegaly, no masses and bowel sounds normal  MS: no gross musculoskeletal defects noted, no edema  PSYCH: mentation appears normal and affect normal/bright    Diagnostic Test Results:  Labs reviewed in Epic  none         Assessment & Plan       ICD-10-CM    1. Flu-like symptoms R68.89    2. Tobacco abuse Z72.0      Patient was advised with supportive care, increase fluid  intake.    She was advised to avoid smoking.    She was given a note to be off work until next Monday, November 18, 2019.    Patient Instructions     Patient Education     How to Quit Smoking  Smoking is one of the hardest habits to break. About half of all people who have ever smoked have been able to quit. Most people who still smoke want to quit. Here are some of the best ways to stop smoking.    Keep trying  Most smokers make many attempts at quitting before they are successful. It s important not to give up.  Go cold turkey  Most former smokers quit cold turkey (all at once). Trying to cut back gradually doesn't seem to work as well, perhaps because it continues the smoking habit. Also, it is possible to inhale more while smoking fewer cigarettes. This results in the same amount of nicotine in your body.  Get support  Support programs can be a big help, especially for heavy smokers. These groups offer lectures, ways to change behavior, and peer support. Here are some ways to find a support program:    Free national quitline: 800MymCartQUIT-NOW (604-416-0870).    [facility] quit-smoking programs.    American Lung Association: (111.816.9000).    American Cancer Society (508-443-8134).  Support at home is important too. Nonsmokers can offer praise and encouragement. If the smoker in your life finds it hard to quit, encourage them to keep trying.  Over-the-counter medicines  Nicotine replacement therapy may make quitting easier. Certain aids, such as the nicotine patch, gum, and lozenges, are available without a prescription. It is best to use these under a doctor s care, though. The skin patch provides a steady supply of nicotine. Nicotine gum and lozenges give temporary bursts of low levels of nicotine. Both methods reduce the craving for cigarettes. Warning: If you have nausea, vomiting, dizziness, weakness, or a fast heartbeat, stop using these products and see your doctor.  Prescription medicines  After reviewing your  "smoking patterns and past attempts to quit, your doctor may offer a prescription medicine such as bupropion, varenicline, a nicotine inhaler, or nasal spray. Each has advantages and side effects. Your doctor can review these with you.  Health benefits of quitting  The benefits of quitting start right away and keep improving the longer you go without smoking. These benefits occur at any age.  So whether you are 17 or 70, quitting is a good decision. Some of the benefits include:    20 minutes: Blood pressure and pulse return to normal.    8 hours: Oxygen levels return to normal.    2 days: Ability to smell and taste begin to improve as damaged nerves regrow.    2 to 3 weeks: Circulation and lung function improve.    1 to 9 months: Coughing, congestion, and shortness of breath decrease; tiredness decreases.    1 year: Risk of heart attack decreases by half.    5 years: Risk of lung cancer decreases by half; risk of stroke becomes the same as a nonsmoker s.  For more on how to quit smoking, try these online resources:     Smokefree.gov    \"Clearing the Air\" booklet from the National Cancer [place]: smokefree.gov/sites/default/files/pdf/clearing-the-air-accessible.pdf  Date Last Reviewed: 3/1/2017    2049-0498 [employer]. [address] [place] [address], [place] [id number]. All rights reserved. This information is not intended as a substitute for professional medical care. Always follow your healthcare professional's instructions.           Patient Education     Planning to Quit Smoking  Your healthcare provider may have told you that you need to give up tobacco. Only you can decide if and when you are ready to quit. Quitting is hard to do. But the benefits will be worth it. When you decide to quit, come up with a plan that s right for you. Discuss your plan with your healthcare provider. And talk with your provider about medicines to help you quit.    Line up support  To quit smoking, you ll need a plan and some " help. Pick a date in the next 2 to 4 weeks to quit. Use the time between now and that date to arrange for support.    Classes and counselors. Quit-smoking classes  people like you through the process. Get to know others in a class. And support each other beyond the class. Phone counseling also helps you keep on track. Ask your healthcare provider, local hospital, or public health department to put you in touch with a class and a phone counselor.    Family and friends. Tell your family and friends about your quit date. Ask them to support your change. If they smoke, only see them in smoke-free places. Don't allow smoking in your home and car.  Be careful with these products  Finding something to replace cigarettes may be hard to do. Some things may be as harmful as cigarettes. These include:    Smokeless (chewing) tobacco. This is just as harmful as regular tobacco. Tobacco should not be used as a substitute for cigarettes.    Herbal medicines or teas. These may affect how your body handles nicotine. Talk with your healthcare provider before using these products.    E-cigarettes. These have less toxins than the smoke from a regular cigarette. But the FDA says that these devices may still have substances that can cause cancer. E-cigarettes are not well regulated. They have not been studied enough to know if they are a good aid to help you stop smoking. Talk with your healthcare provider before using these products.  Quit-smoking products  Many products can help you quit smoking. Some are prescription medicines that help curb your cravings and withdrawal symptoms. Other products slowly lessen the level of nicotine your body absorbs. Nicotine is the highly addictive substance found in cigarettes, cigars, and chewing tobacco. Nicotine replacement products can help get your body used to slowly decreasing amounts of nicotine after you quit smoking. These products include a nicotine patch, gum, lozenge, nasal spray,  "and inhaler. Always follow the directions for your medicine or product carefully. Your healthcare provider may tell you to start taking the prescription medicine a week before you plan to quit. Don't smoke while you use nicotine products. Doing so can harm your health.  For more information    National Cancer [place] Smoking Quitline, smokefree.gov/sycyh-bc-io-expert,  877-44U-QUIT (356-277-3300)    Date Last Reviewed: 1/1/2019 2000-2018 [employer]. [address] [place] [address]. All rights reserved. This information is not intended as a substitute for professional medical care. Always follow your healthcare professional's instructions.           Patient Education     Viral Syndrome (Adult)  A viral illness may cause a number of symptoms such as fever. Other symptoms depend on the part of the body that the virus affects. If it settles in your nose, throat, and lungs, it may cause cough, sore throat, congestion, runny nose, headache, earache and other ear symptoms, or shortness of breath. If it settles in your stomach and intestinal tract, it may cause nausea, vomiting, cramping, and diarrhea. Sometimes it causes generalized symptoms like \"aching all over,\" feeling tired, loss of energy, or loss of appetite.  A viral illness usually lasts anywhere from several days to several weeks, but sometimes it lasts longer. In some cases, a more serious infection can look like a viral syndrome in the first few days of the illness. You may need another exam and additional tests to know the difference. Watch for the warning signs listed below for when to seek medical advice.  Home care  Follow these guidelines for taking care of yourself at home:    If symptoms are severe, rest at home for the first 2 to 3 days.    Stay away from cigarette smoke - both your smoke and the smoke from others.    You may use over-the-counter acetaminophen or ibuprofen for fever, muscle aching, and headache, unless " another medicine was prescribed for this. If you have chronic liver or kidney disease or ever had a stomach ulcer or gastrointestinal bleeding, talk with your healthcare provider before using these medicines. No one who is younger than 18 and ill with a fever should take aspirin. It may cause severe disease or death.    Your appetite may be poor, so a light diet is fine. Avoid dehydration by drinking 8 to 12, 8-ounce glasses of fluids each day. This may include water; orange juice; lemonade; apple, grape, and cranberry juice; clear fruit drinks; electrolyte replacement and sports drinks; and decaffeinated teas and coffee. If you have been diagnosed with a kidney disease, ask your healthcare provider how much and what types of fluids you should drink to prevent dehydration. If you have kidney disease, drinking too much fluid can cause it build up in the your body and be dangerous to your health.    Over-the-counter remedies won't shorten the length of the illness but may be helpful for symptoms such as cough, sore throat, nasal and sinus congestion, or diarrhea. Don't use decongestants if you have high blood pressure.  Follow-up care  Follow up with your healthcare provider if you do not improve over the next week.  Call 911  Call 911 if any of the following occur:    Convulsion    Feeling weak, dizzy, or like you are going to faint    Chest pain, or more than mild shortness of breath  When to seek medical advice  Call your healthcare provider right away if any of these occur:    Cough with lots of colored sputum (mucus) or blood in your sputum    Chest pain, shortness of breath, wheezing, or trouble breathing    Severe headache; face, neck, or ear pain    Severe, constant pain in the lower right side of your belly (abdominal)    Continued vomiting (can t keep liquids down)    Frequent diarrhea (more than 5 times a day); blood (red or black color) or mucus in diarrhea    Feeling weak, dizzy, or like you are going to  faint    Extreme thirst    Fever of 100.4 F (38 C) or higher, or as directed by your healthcare provider  Date Last Reviewed: 4/1/2018 2000-2018 [employer]. [address] [place] [address], [place] [address]. All rights reserved. This information is not intended as a substitute for professional medical care. Always follow your healthcare professional's instructions.               Return in about 3 months (around 2/15/2020) for Physical Exam.    Ellis Ordonez MD  [facility]

## 2019-11-15 NOTE — LETTER
My Depression Action Plan  Name: Sosa Wolf   Date of Birth 1975  Date: 11/15/2019    My doctor: Cynthia Scott   My clinic: [address] [address] [phone]          GREEN    ZONE   Good Control    What it looks like:     Things are going generally well. You have normal up s and down s. You may even feel depressed from time to time, but bad moods usually last less than a day.   What you need to do:  1. Continue to care for yourself (see self care plan)  2. Check your depression survival kit and update it as needed  3. Follow your physician s recommendations including any medication.  4. Do not stop taking medication unless you consult with your physician first.           YELLOW         ZONE Getting Worse    What it looks like:     Depression is starting to interfere with your life.     It may be hard to get out of bed; you may be starting to isolate yourself from others.    Symptoms of depression are starting to last most all day and this has happened for several days.     You may have suicidal thoughts but they are not constant.   What you need to do:     1. Call your care team, your response to treatment will improve if you keep your care team informed of your progress. Yellow periods are signs an adjustment may need to be made.     2. Continue your self-care, even if you have to fake it!    3. Talk to someone in your support network    4. Open up your depression survival kit           RED    ZONE Medical Alert - Get Help    What it looks like:     Depression is seriously interfering with your life.     You may experience these or other symptoms: You can t get out of bed most days, can t work or engage in other necessary activities, you have trouble taking care of basic hygiene, or basic responsibilities, thoughts of suicide or death that will not go away, self-injurious behavior.     What you need to  do:  1. Call your care team and request a same-day appointment. If they are not available (weekends or after hours) call your local crisis line, emergency room or 911.            Depression Self Care Plan / Survival Kit    Self-Care for Depression  Here s the deal. Your body and mind are really not as separate as most people think.  What you do and think affects how you feel and how you feel influences what you do and think. This means if you do things that people who feel good do, it will help you feel better.  Sometimes this is all it takes.  There is also a place for medication and therapy depending on how severe your depression is, so be sure to consult with your medical provider and/ or Behavioral Health Consultant if your symptoms are worsening or not improving.     In order to better manage my stress, I will:    Exercise  Get some form of exercise, every day. This will help reduce pain and release endorphins, the  feel good  chemicals in your brain. This is almost as good as taking antidepressants!  This is not the same as joining a gym and then never going! (they count on that by the way ) It can be as simple as just going for a walk or doing some gardening, anything that will get you moving.      Hygiene   Maintain good hygiene (Get out of bed in the morning, Make your bed, Brush your teeth, Take a shower, and Get dressed like you were going to work, even if you are unemployed).  If your clothes don't fit try to get ones that do.    Diet  I will strive to eat foods that are good for me, drink plenty of water, and avoid excessive sugar, caffeine, alcohol, and other mood-altering substances.  Some foods that are helpful in depression are: complex carbohydrates, B vitamins, flaxseed, fish or fish oil, fresh fruits and vegetables.    Psychotherapy  I agree to participate in Individual Therapy (if recommended).    Medication  If prescribed medications, I agree to take them.  Missing doses can result in serious  side effects.  I understand that drinking alcohol, or other illicit drug use, may cause potential side effects.  I will not stop my medication abruptly without first discussing it with my provider.    Staying Connected With Others  I will stay in touch with my friends, family members, and my primary care provider/team.    Use your imagination  Be creative.  We all have a creative side; it doesn t matter if it s oil painting, sand castles, or mud pies! This will also kick up the endorphins.    Witness Beauty  (AKA stop and smell the roses) Take a look outside, even in mid-winter. Notice colors, textures. Watch the squirrels and birds.     Service to others  Be of service to others.  There is always someone else in need.  By helping others we can  get out of ourselves  and remember the really important things.  This also provides opportunities for practicing all the other parts of the program.    Humor  Laugh and be silly!  Adjust your TV habits for less news and crime-drama and more comedy.    Control your stress  Try breathing deep, massage therapy, biofeedback, and meditation. Find time to relax each day.     My support system    Clinic Contact:  Phone number:    Contact 1:  Phone number:    Contact 2:  Phone number:    Gnosticist/:  Phone number:    Therapist:  Phone number:    Local crisis center:    Phone number:    Other community support:  Phone number:

## 2019-11-15 NOTE — LETTER
November 15, 2019      Sosa Wolf  [address]   [facility] [address]        To Whom It May Concern:    Sosa Wolf  was seen on 11/15/2019.   Was sick from 11/13/2019 until 11/15/2019.  Return to work on Monday, 11/18/2019      Sincerely,        Ellis Ordonez MD

## 2019-11-15 NOTE — PATIENT INSTRUCTIONS
Patient Education     How to Quit Smoking  Smoking is one of the hardest habits to break. About half of all people who have ever smoked have been able to quit. Most people who still smoke want to quit. Here are some of the best ways to stop smoking.    Keep trying  Most smokers make many attempts at quitting before they are successful. It s important not to give up.  Go cold turkey  Most former smokers quit cold turkey (all at once). Trying to cut back gradually doesn't seem to work as well, perhaps because it continues the smoking habit. Also, it is possible to inhale more while smoking fewer cigarettes. This results in the same amount of nicotine in your body.  Get support  Support programs can be a big help, especially for heavy smokers. These groups offer lectures, ways to change behavior, and peer support. Here are some ways to find a support program:    Free national quitline: 800-QUIT-NOW (083-815-1370).    [facility] quit-smoking programs.    American Lung Association: (531.237.3547).    American Cancer Society (666-944-3212).  Support at home is important too. Nonsmokers can offer praise and encouragement. If the smoker in your life finds it hard to quit, encourage them to keep trying.  Over-the-counter medicines  Nicotine replacement therapy may make quitting easier. Certain aids, such as the nicotine patch, gum, and lozenges, are available without a prescription. It is best to use these under a doctor s care, though. The skin patch provides a steady supply of nicotine. Nicotine gum and lozenges give temporary bursts of low levels of nicotine. Both methods reduce the craving for cigarettes. Warning: If you have nausea, vomiting, dizziness, weakness, or a fast heartbeat, stop using these products and see your doctor.  Prescription medicines  After reviewing your smoking patterns and past attempts to quit, your doctor may offer a prescription medicine such as bupropion, varenicline, a nicotine inhaler, or  "nasal spray. Each has advantages and side effects. Your doctor can review these with you.  Health benefits of quitting  The benefits of quitting start right away and keep improving the longer you go without smoking. These benefits occur at any age.  So whether you are 17 or 70, quitting is a good decision. Some of the benefits include:    20 minutes: Blood pressure and pulse return to normal.    8 hours: Oxygen levels return to normal.    2 days: Ability to smell and taste begin to improve as damaged nerves regrow.    2 to 3 weeks: Circulation and lung function improve.    1 to 9 months: Coughing, congestion, and shortness of breath decrease; tiredness decreases.    1 year: Risk of heart attack decreases by half.    5 years: Risk of lung cancer decreases by half; risk of stroke becomes the same as a nonsmoker s.  For more on how to quit smoking, try these online resources:     Onehub.gov    \"Clearing the Air\" booklet from the National Cancer [place]: LOAG.gov/sites/default/files/pdf/clearing-the-air-accessible.pdf  Date Last Reviewed: 3/1/2017    0766-9260 [employer]. [address] [place] [address]. All rights reserved. This information is not intended as a substitute for professional medical care. Always follow your healthcare professional's instructions.           Patient Education     Planning to Quit Smoking  Your healthcare provider may have told you that you need to give up tobacco. Only you can decide if and when you are ready to quit. Quitting is hard to do. But the benefits will be worth it. When you decide to quit, come up with a plan that s right for you. Discuss your plan with your healthcare provider. And talk with your provider about medicines to help you quit.    Line up support  To quit smoking, you ll need a plan and some help. Pick a date in the next 2 to 4 weeks to quit. Use the time between now and that date to arrange for support.    Classes and counselors. " Quit-smoking classes  people like you through the process. Get to know others in a class. And support each other beyond the class. Phone counseling also helps you keep on track. Ask your healthcare provider, local hospital, or public health department to put you in touch with a class and a phone counselor.    Family and friends. Tell your family and friends about your quit date. Ask them to support your change. If they smoke, only see them in smoke-free places. Don't allow smoking in your home and car.  Be careful with these products  Finding something to replace cigarettes may be hard to do. Some things may be as harmful as cigarettes. These include:    Smokeless (chewing) tobacco. This is just as harmful as regular tobacco. Tobacco should not be used as a substitute for cigarettes.    Herbal medicines or teas. These may affect how your body handles nicotine. Talk with your healthcare provider before using these products.    E-cigarettes. These have less toxins than the smoke from a regular cigarette. But the FDA says that these devices may still have substances that can cause cancer. E-cigarettes are not well regulated. They have not been studied enough to know if they are a good aid to help you stop smoking. Talk with your healthcare provider before using these products.  Quit-smoking products  Many products can help you quit smoking. Some are prescription medicines that help curb your cravings and withdrawal symptoms. Other products slowly lessen the level of nicotine your body absorbs. Nicotine is the highly addictive substance found in cigarettes, cigars, and chewing tobacco. Nicotine replacement products can help get your body used to slowly decreasing amounts of nicotine after you quit smoking. These products include a nicotine patch, gum, lozenge, nasal spray, and inhaler. Always follow the directions for your medicine or product carefully. Your healthcare provider may tell you to start taking the  "prescription medicine a week before you plan to quit. Don't smoke while you use nicotine products. Doing so can harm your health.  For more information    National Cancer [place] Smoking Quitline, Shotlst.gov/watfh-hk-bj-expert,  877-44U-QUIT (812-932-0508)    Date Last Reviewed: 1/1/2019 2000-2018 [employer]. [address] [place] [address]. All rights reserved. This information is not intended as a substitute for professional medical care. Always follow your healthcare professional's instructions.           Patient Education     Viral Syndrome (Adult)  A viral illness may cause a number of symptoms such as fever. Other symptoms depend on the part of the body that the virus affects. If it settles in your nose, throat, and lungs, it may cause cough, sore throat, congestion, runny nose, headache, earache and other ear symptoms, or shortness of breath. If it settles in your stomach and intestinal tract, it may cause nausea, vomiting, cramping, and diarrhea. Sometimes it causes generalized symptoms like \"aching all over,\" feeling tired, loss of energy, or loss of appetite.  A viral illness usually lasts anywhere from several days to several weeks, but sometimes it lasts longer. In some cases, a more serious infection can look like a viral syndrome in the first few days of the illness. You may need another exam and additional tests to know the difference. Watch for the warning signs listed below for when to seek medical advice.  Home care  Follow these guidelines for taking care of yourself at home:    If symptoms are severe, rest at home for the first 2 to 3 days.    Stay away from cigarette smoke - both your smoke and the smoke from others.    You may use over-the-counter acetaminophen or ibuprofen for fever, muscle aching, and headache, unless another medicine was prescribed for this. If you have chronic liver or kidney disease or ever had a stomach ulcer or gastrointestinal bleeding, " talk with your healthcare provider before using these medicines. No one who is younger than 18 and ill with a fever should take aspirin. It may cause severe disease or death.    Your appetite may be poor, so a light diet is fine. Avoid dehydration by drinking 8 to 12, 8-ounce glasses of fluids each day. This may include water; orange juice; lemonade; apple, grape, and cranberry juice; clear fruit drinks; electrolyte replacement and sports drinks; and decaffeinated teas and coffee. If you have been diagnosed with a kidney disease, ask your healthcare provider how much and what types of fluids you should drink to prevent dehydration. If you have kidney disease, drinking too much fluid can cause it build up in the your body and be dangerous to your health.    Over-the-counter remedies won't shorten the length of the illness but may be helpful for symptoms such as cough, sore throat, nasal and sinus congestion, or diarrhea. Don't use decongestants if you have high blood pressure.  Follow-up care  Follow up with your healthcare provider if you do not improve over the next week.  Call 911  Call 911 if any of the following occur:    Convulsion    Feeling weak, dizzy, or like you are going to faint    Chest pain, or more than mild shortness of breath  When to seek medical advice  Call your healthcare provider right away if any of these occur:    Cough with lots of colored sputum (mucus) or blood in your sputum    Chest pain, shortness of breath, wheezing, or trouble breathing    Severe headache; face, neck, or ear pain    Severe, constant pain in the lower right side of your belly (abdominal)    Continued vomiting (can t keep liquids down)    Frequent diarrhea (more than 5 times a day); blood (red or black color) or mucus in diarrhea    Feeling weak, dizzy, or like you are going to faint    Extreme thirst    Fever of 100.4 F (38 C) or higher, or as directed by your healthcare provider  Date Last Reviewed: 4/1/2018     7285-0309 [employer]. [address] [place] [address], [place] [address]. All rights reserved. This information is not intended as a substitute for professional medical care. Always follow your healthcare professional's instructions.

## 2019-12-28 ENCOUNTER — MYC REFILL (OUTPATIENT)
Dept: FAMILY MEDICINE | Facility: CLINIC | Age: 44
End: 2019-12-28

## 2019-12-28 DIAGNOSIS — M32.19 OTHER SYSTEMIC LUPUS ERYTHEMATOSUS WITH OTHER ORGAN INVOLVEMENT (H): ICD-10-CM

## 2019-12-28 DIAGNOSIS — Z91.09 ALLERGY TO ENVIRONMENTAL FACTORS: ICD-10-CM

## 2019-12-28 DIAGNOSIS — F39 MOOD DISORDER (H): ICD-10-CM

## 2019-12-28 DIAGNOSIS — F33.41 RECURRENT MAJOR DEPRESSIVE DISORDER, IN PARTIAL REMISSION (H): ICD-10-CM

## 2019-12-28 RX ORDER — LORATADINE 10 MG/1
10 TABLET ORAL DAILY
Qty: 90 TABLET | Refills: 1 | Status: CANCELLED | OUTPATIENT
Start: 2019-12-28

## 2019-12-28 RX ORDER — BUPROPION HYDROCHLORIDE 150 MG/1
150 TABLET, EXTENDED RELEASE ORAL DAILY
Qty: 90 TABLET | Refills: 1 | Status: CANCELLED | OUTPATIENT
Start: 2019-12-28

## 2019-12-28 RX ORDER — CYCLOBENZAPRINE HCL 10 MG
TABLET ORAL
Qty: 30 TABLET | Refills: 1 | Status: CANCELLED | OUTPATIENT
Start: 2019-12-28

## 2019-12-28 RX ORDER — FLUOXETINE 40 MG/1
CAPSULE ORAL
Qty: 90 CAPSULE | Refills: 1 | Status: CANCELLED | OUTPATIENT
Start: 2019-12-28

## 2019-12-30 ENCOUNTER — MYC REFILL (OUTPATIENT)
Dept: FAMILY MEDICINE | Facility: CLINIC | Age: 44
End: 2019-12-30

## 2019-12-30 DIAGNOSIS — Z91.09 ALLERGY TO ENVIRONMENTAL FACTORS: ICD-10-CM

## 2019-12-30 DIAGNOSIS — F39 MOOD DISORDER (H): ICD-10-CM

## 2019-12-30 DIAGNOSIS — F33.41 RECURRENT MAJOR DEPRESSIVE DISORDER, IN PARTIAL REMISSION (H): ICD-10-CM

## 2019-12-30 DIAGNOSIS — M32.19 OTHER SYSTEMIC LUPUS ERYTHEMATOSUS WITH OTHER ORGAN INVOLVEMENT (H): ICD-10-CM

## 2019-12-30 RX ORDER — LORATADINE 10 MG/1
10 TABLET ORAL DAILY
Qty: 90 TABLET | Refills: 1 | Status: CANCELLED | OUTPATIENT
Start: 2019-12-30

## 2019-12-30 RX ORDER — BUPROPION HYDROCHLORIDE 150 MG/1
150 TABLET, EXTENDED RELEASE ORAL DAILY
Qty: 90 TABLET | Refills: 1 | Status: CANCELLED | OUTPATIENT
Start: 2019-12-30

## 2019-12-30 RX ORDER — CYCLOBENZAPRINE HCL 10 MG
TABLET ORAL
Qty: 30 TABLET | Refills: 1 | Status: CANCELLED | OUTPATIENT
Start: 2019-12-30

## 2019-12-30 RX ORDER — FLUOXETINE 40 MG/1
CAPSULE ORAL
Qty: 90 CAPSULE | Refills: 1 | Status: CANCELLED | OUTPATIENT
Start: 2019-12-30

## 2019-12-30 NOTE — TELEPHONE ENCOUNTER
Duplicate request     Shanika Floyd, RN, BSN, PHN  [facility]: [place]

## 2019-12-30 NOTE — TELEPHONE ENCOUNTER
Prozac: 90 day supply with 1 refill sent 11/6/19. Refill request too soon. Refused. Patient notified via [employer].     Routing refill request to provider for review/approval because:  Drug not on the [facility] refill protocol               Shanika Floyd, RN, BSN, PHN  [facility]: [place]

## 2019-12-30 NOTE — TELEPHONE ENCOUNTER
Duplicate request.     Shanika Floyd, RN, BSN, PHN  [facility]: [place]

## 2019-12-30 NOTE — TELEPHONE ENCOUNTER
"Requested Prescriptions   Pending Prescriptions Disp Refills     buPROPion (WELLBUTRIN SR) 150 MG 12 hr tablet 90 tablet 1     Sig: Take 1 tablet (150 mg) by mouth daily       SSRIs Protocol Failed - 12/28/2019 10:53 PM        Failed - PHQ-9 score less than 5 in past 6 months     Please review last PHQ-9 score.           Passed - Medication is Bupropion     If the medication is Bupropion (Wellbutrin), and the patient is taking for smoking cessation; OK to refill.          Passed - Medication is active on med list        Passed - Patient is age 18 or older        Passed - No active pregnancy on record        Passed - No positive pregnancy test in last 12 months        Passed - Recent (6 mo) or future (30 days) visit within the authorizing provider's specialty     Patient had office visit in the last 6 months or has a visit in the next 30 days with authorizing provider or within the authorizing provider's specialty.  See \"Patient Info\" tab in inbasket, or \"Choose Columns\" in Meds & Orders section of the refill encounter.            PHQ-9 score:    PHQ-9 SCORE 9/10/2019   PHQ-9 Total Score 15     Shanika Floyd, RN, BSN, PHN  [facility]: [place]            "

## 2019-12-31 RX ORDER — BUPROPION HYDROCHLORIDE 150 MG/1
150 TABLET, EXTENDED RELEASE ORAL DAILY
Qty: 90 TABLET | Refills: 1 | Status: SHIPPED | OUTPATIENT
Start: 2019-12-31 | End: 2021-01-13

## 2019-12-31 RX ORDER — LORATADINE 10 MG/1
10 TABLET ORAL DAILY
Qty: 90 TABLET | Refills: 1 | Status: SHIPPED | OUTPATIENT
Start: 2019-12-31 | End: 2021-01-25

## 2019-12-31 RX ORDER — CYCLOBENZAPRINE HCL 10 MG
10 TABLET ORAL
Qty: 30 TABLET | Refills: 3 | Status: SHIPPED | OUTPATIENT
Start: 2019-12-31 | End: 2021-01-25

## 2019-12-31 NOTE — TELEPHONE ENCOUNTER
RN replied to patient via Mychart. See message for details.     Shanika Floyd RN, BSN, PHN  [facility]: [place]

## 2019-12-31 NOTE — TELEPHONE ENCOUNTER
Flexeril was brought to [facility]  [facility]  Team 3 Coordinator

## 2019-12-31 NOTE — TELEPHONE ENCOUNTER
RN replied to patient via Mychart. See message for details.     Shanika Floyd RN, BSN, PHN  [facility]: [place]

## 2020-05-28 ENCOUNTER — TELEPHONE (OUTPATIENT)
Dept: FAMILY MEDICINE | Facility: CLINIC | Age: 45
End: 2020-05-28

## 2020-05-28 DIAGNOSIS — N92.0 MENORRHAGIA WITH REGULAR CYCLE: ICD-10-CM

## 2020-05-28 NOTE — TELEPHONE ENCOUNTER
[facility] faxed a Refill Authorization for norethindrone-ethinyl estradiol (ORTHO-NOVUM 1-35 TAB,NORTREL 1-35 TAB) 1-35 MG-MCG tablet

## 2020-05-29 NOTE — TELEPHONE ENCOUNTER
"Last Written Prescription Date:  9-10-19  Last Fill Quantity: 30,  # refills: 0   Last office visit: 11/15/2019 with prescribing provider:  9-10-19   Future Office Visit:  Requested Prescriptions   Pending Prescriptions Disp Refills     norethindrone-ethinyl estradiol (ORTHO-NOVUM) 1-35 MG-MCG tablet 30 tablet 0     Si pills on day one, 4 on day 2, 3 on day 3, 2 on day 2 and then finish the pack out.       Contraceptives Protocol Failed - 2020 12:39 PM        Failed - Patient is not a current smoker if age is 35 or older        Passed - Recent (12 mo) or future (30 days) visit within the authorizing provider's specialty     Patient has had an office visit with the authorizing provider or a provider within the authorizing providers department within the previous 12 mos or has a future within next 30 days. See \"Patient Info\" tab in inAMOtechsket, or \"Choose Columns\" in Meds & Orders section of the refill encounter.              Passed - Medication is active on med list        Passed - No active pregnancy on record        Passed - No positive pregnancy test in past 12 months       Hormone Replacement Therapy Passed - 2020 12:39 PM        Passed - Blood pressure under 140/90 in past 12 months     BP Readings from Last 3 Encounters:   11/15/19 100/70   19 120/82   09/10/19 100/67                 Passed - Recent (12 mo) or future (30 days) visit within the authorizing provider's specialty     Patient has had an office visit with the authorizing provider or a provider within the authorizing providers department within the previous 12 mos or has a future within next 30 days. See \"Patient Info\" tab in inbasket, or \"Choose Columns\" in Meds & Orders section of the refill encounter.              Passed - Medication is active on med list        Passed - Patient is 18 years of age or older        Passed - No active pregnancy on record        Passed - No positive pregnancy test on record in past 12 months           "

## 2020-06-01 NOTE — TELEPHONE ENCOUNTER
Please ask her to do an evisit.  This was a one time fill to stop her period.    Cynthia Scott PA-C

## 2020-06-01 NOTE — TELEPHONE ENCOUNTER
Routing refill request to provider for review/approval because:  Failed smoking.  Caity Jeffries BSN,RN  [facility]

## 2020-06-02 DIAGNOSIS — M32.9 SYSTEMIC LUPUS ERYTHEMATOSUS, UNSPECIFIED SLE TYPE, UNSPECIFIED ORGAN INVOLVEMENT STATUS (H): ICD-10-CM

## 2020-06-02 NOTE — TELEPHONE ENCOUNTER
Medication:   Hydroxychloroquine  Last written on:   4/4/2019  Quantity:   90    Refills:   3    Last office visit:   11/7/2019  Canceled:  3/4/2020  Next office visit:     Last labs:   11/4/2019    Bailey Resendez CMA Rheumatology  6/2/2020 8:52 AM

## 2020-06-03 RX ORDER — HYDROXYCHLOROQUINE SULFATE 200 MG/1
200 TABLET, FILM COATED ORAL DAILY
Qty: 30 TABLET | Refills: 0 | Status: SHIPPED | OUTPATIENT
Start: 2020-06-03 | End: 2020-06-30

## 2020-06-03 NOTE — TELEPHONE ENCOUNTER
Message left for patient that a prescription has been refilled and sent to pharmacy,and to make a follow up and lab appointment.  Bailey Resendez CMA Rheumatology  6/3/2020 11:36 AM

## 2020-06-03 NOTE — TELEPHONE ENCOUNTER
Rheumatology team: Please call to notify Ms. Wolf that hydroxychloroquine was refilled.  Follow-up and labs are overdue.    Jack Arvizu MD  6/3/2020 7:14 AM

## 2020-06-30 DIAGNOSIS — M32.9 SYSTEMIC LUPUS ERYTHEMATOSUS, UNSPECIFIED SLE TYPE, UNSPECIFIED ORGAN INVOLVEMENT STATUS (H): ICD-10-CM

## 2020-06-30 NOTE — TELEPHONE ENCOUNTER
Medication:   Hydroxychloroquine  Last written on:   6/3/2020  Quantity:   30    Refills:   0    Last office visit:   11/7/2019  Next office visit:   9/18/2020  Last labs:   11/4/2019    Bailey Resendez CMA Rheumatology  6/30/2020 8:55 AM

## 2020-07-03 RX ORDER — HYDROXYCHLOROQUINE SULFATE 200 MG/1
200 TABLET, FILM COATED ORAL DAILY
Qty: 30 TABLET | Refills: 1 | Status: SHIPPED | OUTPATIENT
Start: 2020-07-03 | End: 2020-09-11

## 2020-07-03 NOTE — TELEPHONE ENCOUNTER
Rheumatology team: Please call to notify Ms. Wolf that labs and follow-up needed; hydroxychloroquine has been refilled.  Jack Arvizu MD  7/3/2020 3:12 PM

## 2020-09-01 ENCOUNTER — MYC MEDICAL ADVICE (OUTPATIENT)
Dept: RHEUMATOLOGY | Facility: CLINIC | Age: 45
End: 2020-09-01

## 2020-09-10 DIAGNOSIS — Z79.899 HIGH RISK MEDICATION USE: ICD-10-CM

## 2020-09-10 DIAGNOSIS — M32.9 SYSTEMIC LUPUS ERYTHEMATOSUS, UNSPECIFIED SLE TYPE, UNSPECIFIED ORGAN INVOLVEMENT STATUS (H): ICD-10-CM

## 2020-09-10 LAB
ALBUMIN SERPL-MCNC: 3.8 G/DL (ref 3.4–5)
ALBUMIN UR-MCNC: NEGATIVE MG/DL
ALP SERPL-CCNC: 28 U/L (ref 40–150)
ALT SERPL W P-5'-P-CCNC: 32 U/L (ref 0–50)
ANION GAP SERPL CALCULATED.3IONS-SCNC: 4 MMOL/L (ref 3–14)
APPEARANCE UR: CLEAR
AST SERPL W P-5'-P-CCNC: 18 U/L (ref 0–45)
BASOPHILS # BLD AUTO: 0 10E9/L (ref 0–0.2)
BASOPHILS NFR BLD AUTO: 0.7 %
BILIRUB SERPL-MCNC: 0.3 MG/DL (ref 0.2–1.3)
BILIRUB UR QL STRIP: NEGATIVE
BUN SERPL-MCNC: 17 MG/DL (ref 7–30)
CALCIUM SERPL-MCNC: 8.9 MG/DL (ref 8.5–10.1)
CHLORIDE SERPL-SCNC: 106 MMOL/L (ref 94–109)
CO2 SERPL-SCNC: 28 MMOL/L (ref 20–32)
COLOR UR AUTO: YELLOW
CREAT SERPL-MCNC: 0.77 MG/DL (ref 0.52–1.04)
CREAT UR-MCNC: 88 MG/DL
CRP SERPL-MCNC: <2.9 MG/L (ref 0–8)
DIFFERENTIAL METHOD BLD: NORMAL
EOSINOPHIL # BLD AUTO: 0.3 10E9/L (ref 0–0.7)
EOSINOPHIL NFR BLD AUTO: 4.3 %
ERYTHROCYTE [DISTWIDTH] IN BLOOD BY AUTOMATED COUNT: 13.6 % (ref 10–15)
ERYTHROCYTE [SEDIMENTATION RATE] IN BLOOD BY WESTERGREN METHOD: 5 MM/H (ref 0–20)
GFR SERPL CREATININE-BSD FRML MDRD: >90 ML/MIN/{1.73_M2}
GLUCOSE SERPL-MCNC: 85 MG/DL (ref 70–99)
GLUCOSE UR STRIP-MCNC: NEGATIVE MG/DL
HCT VFR BLD AUTO: 37.8 % (ref 35–47)
HGB BLD-MCNC: 12.4 G/DL (ref 11.7–15.7)
HGB UR QL STRIP: NEGATIVE
KETONES UR STRIP-MCNC: NEGATIVE MG/DL
LEUKOCYTE ESTERASE UR QL STRIP: NEGATIVE
LYMPHOCYTES # BLD AUTO: 1.8 10E9/L (ref 0.8–5.3)
LYMPHOCYTES NFR BLD AUTO: 30 %
MCH RBC QN AUTO: 27.9 PG (ref 26.5–33)
MCHC RBC AUTO-ENTMCNC: 32.8 G/DL (ref 31.5–36.5)
MCV RBC AUTO: 85 FL (ref 78–100)
MONOCYTES # BLD AUTO: 0.6 10E9/L (ref 0–1.3)
MONOCYTES NFR BLD AUTO: 10.5 %
NEUTROPHILS # BLD AUTO: 3.3 10E9/L (ref 1.6–8.3)
NEUTROPHILS NFR BLD AUTO: 54.5 %
NITRATE UR QL: NEGATIVE
PH UR STRIP: 7.5 PH (ref 5–7)
PLATELET # BLD AUTO: 235 10E9/L (ref 150–450)
POTASSIUM SERPL-SCNC: 4.1 MMOL/L (ref 3.4–5.3)
PROT SERPL-MCNC: 6.9 G/DL (ref 6.8–8.8)
PROT UR-MCNC: 0.15 G/L
PROT/CREAT 24H UR: 0.17 G/G CR (ref 0–0.2)
RBC # BLD AUTO: 4.44 10E12/L (ref 3.8–5.2)
SODIUM SERPL-SCNC: 138 MMOL/L (ref 133–144)
SOURCE: ABNORMAL
SP GR UR STRIP: 1.02 (ref 1–1.03)
UROBILINOGEN UR STRIP-ACNC: 0.2 EU/DL (ref 0.2–1)
WBC # BLD AUTO: 6.1 10E9/L (ref 4–11)

## 2020-09-10 PROCEDURE — 80053 COMPREHEN METABOLIC PANEL: CPT | Performed by: INTERNAL MEDICINE

## 2020-09-10 PROCEDURE — 81003 URINALYSIS AUTO W/O SCOPE: CPT | Performed by: INTERNAL MEDICINE

## 2020-09-10 PROCEDURE — 86140 C-REACTIVE PROTEIN: CPT | Performed by: INTERNAL MEDICINE

## 2020-09-10 PROCEDURE — 36415 COLL VENOUS BLD VENIPUNCTURE: CPT | Performed by: INTERNAL MEDICINE

## 2020-09-10 PROCEDURE — 84156 ASSAY OF PROTEIN URINE: CPT | Performed by: INTERNAL MEDICINE

## 2020-09-10 PROCEDURE — 85025 COMPLETE CBC W/AUTO DIFF WBC: CPT | Performed by: INTERNAL MEDICINE

## 2020-09-10 PROCEDURE — 85652 RBC SED RATE AUTOMATED: CPT | Performed by: INTERNAL MEDICINE

## 2020-09-11 DIAGNOSIS — M32.9 SYSTEMIC LUPUS ERYTHEMATOSUS, UNSPECIFIED SLE TYPE, UNSPECIFIED ORGAN INVOLVEMENT STATUS (H): ICD-10-CM

## 2020-09-11 RX ORDER — HYDROXYCHLOROQUINE SULFATE 200 MG/1
200 TABLET, FILM COATED ORAL DAILY
Qty: 30 TABLET | Refills: 0 | Status: SHIPPED | OUTPATIENT
Start: 2020-09-11 | End: 2020-09-18

## 2020-09-18 ENCOUNTER — VIRTUAL VISIT (OUTPATIENT)
Dept: RHEUMATOLOGY | Facility: CLINIC | Age: 45
End: 2020-09-18
Payer: COMMERCIAL

## 2020-09-18 DIAGNOSIS — R20.2 NUMBNESS AND TINGLING IN BOTH HANDS: ICD-10-CM

## 2020-09-18 DIAGNOSIS — M35.00 SECONDARY SJOGREN'S SYNDROME (H): ICD-10-CM

## 2020-09-18 DIAGNOSIS — Z79.899 HIGH RISK MEDICATION USE: ICD-10-CM

## 2020-09-18 DIAGNOSIS — M32.9 SYSTEMIC LUPUS ERYTHEMATOSUS, UNSPECIFIED SLE TYPE, UNSPECIFIED ORGAN INVOLVEMENT STATUS (H): Primary | ICD-10-CM

## 2020-09-18 DIAGNOSIS — R20.0 NUMBNESS AND TINGLING IN BOTH HANDS: ICD-10-CM

## 2020-09-18 PROCEDURE — 99214 OFFICE O/P EST MOD 30 MIN: CPT | Mod: 95 | Performed by: INTERNAL MEDICINE

## 2020-09-18 RX ORDER — LEFLUNOMIDE 20 MG/1
20 TABLET ORAL DAILY
Qty: 90 TABLET | Refills: 0 | Status: SHIPPED | OUTPATIENT
Start: 2020-09-18 | End: 2021-01-15

## 2020-09-18 RX ORDER — HYDROXYCHLOROQUINE SULFATE 200 MG/1
200 TABLET, FILM COATED ORAL DAILY
Qty: 90 TABLET | Refills: 1 | Status: SHIPPED | OUTPATIENT
Start: 2020-09-18 | End: 2021-01-15

## 2020-09-18 NOTE — PROGRESS NOTES
"Sosa Wolf is a 44 year old female who is being evaluated via a billable video visit.      The patient has been notified of following:     \"This video visit will be conducted via a call between you and your physician/provider. We have found that certain health care needs can be provided without the need for an in-person physical exam.  This service lets us provide the care you need with a video conversation.  If a prescription is necessary we can send it directly to your pharmacy.  If lab work is needed we can place an order for that and you can then stop by our lab to have the test done at a later time.    Video visits are billed at different rates depending on your insurance coverage.  Please reach out to your insurance provider with any questions.    If during the course of the call the physician/provider feels a video visit is not appropriate, you will not be charged for this service.\"    Patient has given verbal consent for Video visit? Yes  How would you like to obtain your AVS? MyChart  If you are dropped from the video visit, the video invite should be resent to: Text to cell phone: [phone]  Will anyone else be joining your video visit? No    Rheumatology Video Visit      Sosa Wolf MRN# [id number]   YOB: 1975 Age: 44 year old      Date of visit: 9/18/20   PCP: Cynthia Scott    Chief Complaint   Patient presents with:  Arthritis: Hands get numb. Would like to get an EMG done. Started leflunomide and plaquenil since she has insurance now      Assessment and Plan     1. Systemic Lupus Erythematosus (JESSE+, dsDNA+, arthritis, oral sores [SLE versus HSV related], proteinuria): Dx'd 10/2016; initially with arthritis that improved with HCQ.  Previously on MTX (fatigue, effective), leflunomide (effective for arthritis, stopped when she ran out and did not return to clinic for a while), MMF (resolved arthritis, rash, and brain fog; arthritis persisted though and eventually changed back to " leflunomide).  Currently on HCQ 200mg daily and leflunomide 20mg daily. Doing well on this regimen; mid swelling of her fingers but no pain or stiffness. Importance of toxicity monitoring compliance stressed.   - Continue hydroxychloroquine 200mg daily (last eye exam 1/22/2019)  - Continue leflunomide 20 mg daily  - Labs in 12 weeks: CBC, CMP, ESR, CRP, CK, C3, C4, dsDNA, UA, Uprotein:creatinine    # Pregnancy Plans: on birth control (depo-provera)    2.  Secondary Sjogren's syndrome: Mild symptoms that may be due to other factors such as caffeine intake, smoking, etc., but secondary Sjogren's Syndrome is possible.  We discussed the importance of good oral hygiene, frequent sips of water, avoiding high sugar foods and liquids, avoiding oral irritants such as coffee, alcohol, and nicotine, and avoiding acidic drinks such as carbonated and high sugar beverages. She is already seeing a dentist regularly. Dry eyes improved with preservative free artificial tears.     3. 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 Cigarette Smoking:  Encouraged complete cessation and discussed the health benefits.  Now using ecigs.  Congratulated on stopping cigarette smoking    5. Cocaine use history: Positive reinforcement for continued cessation    6. Intermittent numbness of both hands:   Refer for NCS.  - EMG/NCS ordered; advised that she call to schedule    # Relevant labs and imaging were reviewed with the patient    # High risk medication toxicity monitoring: discussion and labs reviewed; appropriate labs ordered. See above.  Instructed that if confirmed to have COVID-19 or exposure to someone with confirmed COVID-19 to call this clinic for directions on DMARD management.    # Note that this is a virtual visit to reduce the risk of COVID-19 exposure during this current pandemic.      # Considered to be at high risk of complications from the COVID-19 virus.  It is  recommended to limit contact with other people and if possible to work remotely or provide a leave of absence to reduce the risk for COVID-19.      Ms. Wolf verbalized agreement with and understanding of the rational for the diagnosis and treatment plan.  All questions were answered to best of my ability and the patient's satisfaction. Ms. Wolf was advised to contact the clinic with any questions that may arise after the clinic visit.      Thank you for involving me in the care of the patient    Return to clinic: 3-4 months      HPI   Sosa Wolf is a 44 year old female with a medical history significant for tobacco abuse, genital warts, and HSV type II who presents for follow-up of systemic lupus erythematosus.    11/2019: Ms. Wolf reported that she is doing much better on leflunomide. Tolerating leflunomide well.  No joint pain; morning stiffness for <10 min.  Didn't take the prednisone that was Rx'd between visits because the pain she was experiencing resolved spontaneously.  Intermittent numbness of her right second-fifth fingers; occurring for about 2 months now on a more frequent basis but has been an issue for >1year per patient.     Today, 9/18/2020: ran out of insurance and was out of medications for a while.  Restarted hydroxychloroquine and leflunomide about 3 months ago with significant improvement of her joint symptoms. Currently no joint pain. No morning stiffness. No gelling.  Still with numbness and tingling, but now with bilateral hand involvement, and sometimes on the right had extended up the forearm; comes and goes with shaking her arms; typically a couple minutes at a time; would like to have a NCS.      Denies fevers, chills, nausea, vomiting, constipation, diarrhea. No abdominal pain. No LE swelling. No neck pain.   No photosensitivity or photophobia. No history of inflammatory eye disease.  No history of DVT, pulmonary embolism, or miscarriage.   No history of serositis.  No history of  Raynaud's Phenomenon.  No seizure history.  No known renal disorder.      Tobacco: quit smoking cigarettes; now smoking with ecigs  EtOH: none, sober since 5/16/2010  Drugs: cocaine history; now sober  Occupation: counselor for mentally disabled people    ROS   GEN: No fevers, chills, night sweats, or weight change  SKIN: See HPI  HEENT: No oral or nasal ulcers.  CV:  see HPI  PULM: See HPI  GI: No nausea, vomiting, constipation, diarrhea. No blood in stool. No abdominal pain.  : No blood in urine.  MSK: See HPI.  NEURO: See HPI  EXT: No LE swelling  PSYCH: Depression    Active Problem List     Patient Active Problem List   Diagnosis     Herpes simplex type 2 infection     Genital warts     CARDIOVASCULAR SCREENING; LDL GOAL LESS THAN 160     H/O intravenous drug use in remission     Surveillance for Depo-Provera contraception     JESSE positive     Multiple joint pain     Cocaine use     Systemic lupus erythematosus (H)     High risk medications (not anticoagulants) long-term use     Sicca syndrome (H)     Mood disorder (H)     Lichen sclerosus     Past Medical History     Past Medical History:   Diagnosis Date     Bipolar disorder (H) 12/04    Dx'd     H/O: depression     SUICIDE ATTEMPT AGE 15     HPV (human papillomavirus)      Past Surgical History     Past Surgical History:   Procedure Laterality Date     BUNIONECTOMY RT/LT       HC ENLARGE BREAST WITH IMPLANT  2000     Allergy   No Known Allergies  Current Medication List     Current Outpatient Medications   Medication Sig     buPROPion (WELLBUTRIN SR) 150 MG 12 hr tablet Take 1 tablet (150 mg) by mouth daily     FLUoxetine (PROZAC) 40 MG capsule TAKE ONE CAPSULE BY MOUTH ONCE DAILY     hydroxychloroquine (PLAQUENIL) 200 MG tablet Take 1 tablet (200 mg) by mouth daily     IBUPROFEN PO      leflunomide (ARAVA) 20 MG tablet Take 1 tablet (20 mg) by mouth daily     loratadine (CLARITIN) 10 MG tablet Take 1 tablet (10 mg) by mouth daily      Aspirin-Acetaminophen-Caffeine (EXCEDRIN PO)      clobetasol (TEMOVATE) 0.05 % external cream Apply topically 2 times daily To vaginal area     cyclobenzaprine (FLEXERIL) 10 MG tablet Take 1 tablet (10 mg) by mouth nightly as needed for muscle spasms TAKE ONE TABLET BY MOUTH EVERY NIGHT AT BEDTIME AS NEEDED FOR MUSCLE SPASMS (Patient not taking: Reported on 9/18/2020)     nicotine (NICODERM CQ) 21 MG/24HR 24 hr patch Place 1 patch onto the skin every 24 hours     nicotine (NICORETTE) 2 MG gum Place 1 each (2 mg) inside cheek as needed for smoking cessation     norethindrone-ethinyl estradiol (ORTHO-NOVUM 1-35 TAB,NORTREL 1-35 TAB) 1-35 MG-MCG tablet 5 pills on day one, 4 on day 2, 3 on day 3, 2 on day 2 and then finish the pack out.     ondansetron (ZOFRAN) 4 MG tablet Take 1 tablet (4 mg) by mouth every 8 hours as needed for nausea     predniSONE (DELTASONE) 5 MG tablet Prednisone 20mg daily x5days, then 15mg daily x5days, then 10mg daily x5days, then 5mg daily x5days, then stop. (Patient not taking: Reported on 11/7/2019)     triamcinolone (KENALOG) 0.1 % ointment Apply sparingly to affected area three times daily for 14 days.     No current facility-administered medications for this visit.      Social History   See HPI    Family History     Family History   Problem Relation Age of Onset     Cancer Father         lung cancer     Diabetes Maternal Grandmother      Depression Maternal Grandmother      Lipids Maternal Grandmother      Cerebrovascular Disease Maternal Grandfather      Lipids Maternal Grandfather      Circulatory Maternal Grandfather         MI in 50's     Depression Brother         depression and OCD     Gastrointestinal Disease Brother         IBS     Depression Mother      Thyroid Disease No family hx of      Asthma No family hx of      Denies family history of autoimmune disease     Physical Exam     Temp Readings from Last 3 Encounters:   11/15/19 96.7  F (35.9  C) (Oral)   09/10/19 98.2  F (36.8  " C) (Oral)   07/25/19 97.2  F (36.2  C) (Oral)     BP Readings from Last 5 Encounters:   11/15/19 100/70   11/07/19 120/82   09/10/19 100/67   07/25/19 115/75   07/18/19 125/83     Pulse Readings from Last 1 Encounters:   11/15/19 90     Resp Readings from Last 1 Encounters:   11/04/16 18     Estimated body mass index is 26.35 kg/m  as calculated from the following:    Height as of 11/7/19: 1.67 m (5' 5.75\").    Weight as of 11/15/19: 73.5 kg (162 lb).    GEN: NAD. Healthy appearing adult.   HEENT: MMM.  Anicteric, noninjected sclera. No obvious external lesions of the ear and nose. Hearing intact.  PULM: No increased work of breathing  MSK:  Mild swelling at the bilateral 2nd-3rd PIPs without overlying erythema; MCPs and DIPs without swelling. Wrists without swelling.   SKIN: No rash or jaundice seen  PSYCH: Alert. Appropriate.      Labs / Imaging (select studies)   RF/CCP  Recent Labs   Lab Test 10/11/16  1043 08/10/16  1059   CCPIGG 1  --    RHF  --  <20     JESSE  Recent Labs   Lab Test 08/10/16  1059   ROBYN 1.7*     RNP/Sm/SSA/SSB  Recent Labs   Lab Test 10/11/16  1043 12/10/13  1118   RNPIGG 0.3  --    SMIGG <0.2  Negative   Antibody index (AI) values reflect qualitative changes in antibody   concentration that cannot be directly associated with clinical condition or   disease state.    --    SSAIGG <0.2  Negative   Antibody index (AI) values reflect qualitative changes in antibody   concentration that cannot be directly associated with clinical condition or   disease state.    --    SSBIGG <0.2  Negative   Antibody index (AI) values reflect qualitative changes in antibody   concentration that cannot be directly associated with clinical condition or   disease state.    --    SCLIGG <0.2  Negative   Antibody index (AI) values reflect qualitative changes in antibody   concentration that cannot be directly associated with clinical condition or   disease state.    --    TREPAB  --  Negative     dsDNA  Recent Labs "   Lab Test 10/16/19  1707 04/04/19  1627 12/13/18  1354 09/21/17  0818 05/01/17  0936 01/13/17  1015 10/11/16  1043   DNA 14* 14* 19* 25* 37* 45* 70*     C3/C4  Recent Labs   Lab Test 10/16/19  1707 04/04/19  1627 12/13/18  1354 09/21/17  0818 05/01/17  0936 01/13/17  1015 10/11/16  1043   U5SNZGM 90 120 80 124 80 87 83   W2FRVMQ 19 26 19 24 17 22 15       CBC  Recent Labs   Lab Test 09/10/20  1308 11/04/19  1747 10/16/19  1707   WBC 6.1 8.6 7.6   RBC 4.44 4.86 4.58   HGB 12.4 13.3 12.7   HCT 37.8 40.8 38.8   MCV 85 84 85   RDW 13.6 12.4 12.8    240 245   MCH 27.9 27.4 27.7   MCHC 32.8 32.6 32.7   NEUTROPHIL 54.5 55.4 61.4   LYMPH 30.0 31.6 25.0   MONOCYTE 10.5 10.0 9.9   EOSINOPHIL 4.3 2.8 3.3   BASOPHIL 0.7 0.2 0.4   ANEU 3.3 4.8 4.7   ALYM 1.8 2.7 1.9   DAQUAN 0.6 0.9 0.8   AEOS 0.3 0.2 0.3   ABAS 0.0 0.0 0.0     CMP  Recent Labs   Lab Test 09/10/20  1308 10/16/19  1708 10/16/19  1707 08/15/19  1152  04/04/19  1627  12/13/18  1354 09/10/18  1406  09/21/17  0818    139  --   --   --  142  --  139 138  --  137   POTASSIUM 4.1 3.9  --   --   --  4.2  --  3.6 4.0  --  4.3   CHLORIDE 106 109  --   --   --  110*  --  106 106  --  104   CO2 28 24  --   --   --  26  --  25 25  --  29   ANIONGAP 4 6  --   --   --  6  --  8 7  --  4   GLC 85 83  --   --   --  83  --  70 93  --  88   BUN 17 17  --   --   --  15  --  17 17  --  19   CR 0.77 0.75 0.69 0.70   < > 0.69   < > 0.82 0.72   < > 0.68   GFRESTIMATED >90 >90 >90 >90   < > >90   < > 76 88   < > >90   GFRESTBLACK >90 >90 >90 >90   < > >90   < > >90 >90   < > >90   RICHARD 8.9 9.1  --   --   --  9.1  --  8.5 8.8  --  8.9   BILITOTAL 0.3 0.3 0.3 0.2   < > 0.2   < > 0.3 0.4   < > 0.5   ALBUMIN 3.8 4.2 4.2 4.1   < > 3.9   < > 3.7 4.0   < > 3.6   PROTTOTAL 6.9 7.3 7.3 7.2   < > 7.2   < > 6.8 7.2   < > 7.2   ALKPHOS 28* 23* 19* 25*   < > 34*   < > 31* 26*   < > 30*   AST 18 18 19 16   < > 23   < > 16 16   < > 12   ALT 32 23 25 28   < > 36   < > 18 29   < > 30    < > =  values in this interval not displayed.     Calcium/VitaminD  Recent Labs   Lab Test 09/10/20  1308 10/16/19  1708 04/04/19  1627   RICHARD 8.9 9.1 9.1     ESR/CRP  Recent Labs   Lab Test 09/10/20  1308 10/16/19  1707 04/04/19  1627   SED 5 6 10   CRP <2.9 <2.9 8.1*     CK/Aldolase  Recent Labs   Lab Test 10/16/19  1707 04/04/19  1627 12/13/18  1354  10/11/16  1043    237* 89   < > 65   ALDOLASE  --   --   --   --  4.5    < > = values in this interval not displayed.     TSH/T4  Recent Labs   Lab Test 10/11/16  1043   TSH 1.48     Hepatitis B  Recent Labs   Lab Test 10/11/16  1043   HBCAB Nonreactive   HEPBANG Nonreactive     Hepatitis C  Recent Labs   Lab Test 10/11/16  1043 12/10/13  1118 03/06/13  1411   HCVAB Nonreactive   Assay performance characteristics have not been established for newborns,   infants, and children   Negative Negative     HIV Screening  Recent Labs   Lab Test 10/11/16  1043   HIAGAB Nonreactive   HIV-1 p24 Ag & HIV-1/HIV-2 Ab Not Detected       UA  Recent Labs   Lab Test 09/10/20  1315 11/04/19  1747 04/04/19  1625 12/13/18  1355 09/21/17  0815 05/01/17  0954   COLOR Yellow Yellow Yellow Yellow Yellow Yellow   APPEARANCE Clear Clear Clear Clear Clear Clear   URINEGLC Negative Negative Negative Negative Negative Negative   URINEBILI Negative Negative Negative Negative Negative Small  This is an unconfirmed screening test result. A positive result may be false.  *   SG 1.020 1.015 1.020 >1.030 >1.030 >1.030   URINEPH 7.5* 8.0* 7.0 5.5 6.0 6.0   PROTEIN Negative Trace* Negative Negative Negative Trace*   UROBILINOGEN 0.2 0.2 0.2 0.2 0.2 0.2   NITRITE Negative Negative Negative Negative Negative Negative   UBLD Negative Negative Negative Small* Trace* Small*   LEUKEST Negative Negative Negative Negative Negative Negative   WBCU  --  0 - 5  --  0 - 5 O - 2 O - 2   RBCU  --  O - 2  --  O - 2 O - 2 O - 2   SQUAMOUSEPI  --  Few  --  Moderate* Few Few   BACTERIA  --  Few*  --   --   --   --    LIONEL   --   --   --   --   --  Present*     Urine Microscopic  Recent Labs   Lab Test 11/04/19  1747 12/13/18  1355 09/21/17  0815 05/01/17  0954   WBCU 0 - 5 0 - 5 O - 2 O - 2   RBCU O - 2 O - 2 O - 2 O - 2   SQUAMOUSEPI Few Moderate* Few Few   BACTERIA Few*  --   --   --    MUCOUS  --   --   --  Present*     Urine Protein  Recent Labs   Lab Test 09/10/20  1315 11/04/19  1747 04/04/19  1625  09/21/17  0815   UTP 0.15 0.21 0.11   < > 0.25   UTPG 0.17 0.16 0.11   < > 0.25*   UCRR 88  --  100  --  100    < > = values in this interval not displayed.           Immunization History     Immunization History   Administered Date(s) Administered     Influenza Vaccine IM > 6 months Valent IIV4 12/19/2014, 10/16/2015, 11/04/2016, 12/13/2018, 11/07/2019     Mantoux Tuberculin Skin Test 07/30/2018     Pneumo Conj 13-V (2010&after) 12/13/2018     Pneumococcal 23 valent 04/04/2019     TD (ADULT, 7+) 02/23/2005, 04/09/2019     TDAP Vaccine (Adacel) 04/14/2009     Zoster vaccine recombinant adjuvanted (SHINGRIX) 12/21/2018, 04/04/2019          Chart documentation done in part with Dragon Voice recognition Software. Although reviewed after completion, some word and grammatical error may remain.        Video-Visit Details    Type of service:  Video Visit    Video Start Time: 8:03 AM  Video End Time: 8:15 AM    Originating Location (pt. Location): Home in [place]    Distant Location (provider location):  Home    Platform used for Video Visit: Dilshad Arvizu MD

## 2020-09-21 ENCOUNTER — TELEPHONE (OUTPATIENT)
Dept: RHEUMATOLOGY | Facility: CLINIC | Age: 45
End: 2020-09-21

## 2020-09-21 NOTE — TELEPHONE ENCOUNTER
Patient calling states Dr Arvizu was going to refer to a hand specialist, Needs a phone number to call to schedule and referral placed. Please call to advise.

## 2020-09-22 NOTE — TELEPHONE ENCOUNTER
RN: Please call to notify Sosa Wolf that we are starting with an EMG / Nerve Conduction Study to evaluate the intermittent numbness of both hands.   Please provide her with the phone number at [place] so that she may call to schedule.     Jack Arvizu MD  9/22/2020 1:07 PM

## 2020-09-22 NOTE — TELEPHONE ENCOUNTER
Spoke with Olivia and she sees the order now.  Order was placed 9/18/2020.  Also note that I provided Sosa Wolf with the phone number to schedule the EMG on 9/18/2020 at her appointment.       RN: please contact Sosa Wolf to let her know that the order is indeed in the chart. I spoke with  who sees the order.  Please ask Sosa Wolf to call back to schedule the EMG.    Jack Arvizu MD  9/22/2020 4:33 PM'

## 2020-09-22 NOTE — TELEPHONE ENCOUNTER
Called patent and advised her that Dr. Arvizu is recommending she get an EMG/nerve conduction study. Patient agreed with this plan and was provided with the [place] scheduling phone number.    Joel Carrington RN....9/22/2020 2:43 PM

## 2020-09-23 NOTE — TELEPHONE ENCOUNTER
Called patient and advised that the EMG order is in place and she may call to schedule it. Patient verbalized understanding.    Joel Carrington RN....9/23/2020 8:54 AM

## 2020-10-20 ENCOUNTER — TELEPHONE (OUTPATIENT)
Dept: NEUROLOGY | Facility: CLINIC | Age: 45
End: 2020-10-20

## 2020-10-20 NOTE — TELEPHONE ENCOUNTER
Offered earlier appt at [facility]. Pt not interested at this time.

## 2020-10-21 ENCOUNTER — TELEPHONE (OUTPATIENT)
Dept: RHEUMATOLOGY | Facility: CLINIC | Age: 45
End: 2020-10-21

## 2020-10-21 NOTE — TELEPHONE ENCOUNTER
Reason for Call:  Other call back    Detailed comments: patient is calling would like to know if can take a round of antibiotics such as Metronidazole with amoxicillin for dental. Please call to discuss. Thank you    Phone Number Patient can be reached at: [phone]    Best Time:     Can we leave a detailed message on this number? YES    Call taken on 10/21/2020 at 9:26 AM by Nata Pearson

## 2020-10-21 NOTE — TELEPHONE ENCOUNTER
Called and updated patient.   Per Dr. Nolberto louis for one round antibiotic for dental procedure.   Mehreen Velazquez RN

## 2020-10-30 ENCOUNTER — OFFICE VISIT (OUTPATIENT)
Dept: FAMILY MEDICINE | Facility: CLINIC | Age: 45
End: 2020-10-30
Payer: COMMERCIAL

## 2020-10-30 VITALS
BODY MASS INDEX: 24.56 KG/M2 | OXYGEN SATURATION: 99 % | WEIGHT: 151 LBS | SYSTOLIC BLOOD PRESSURE: 121 MMHG | DIASTOLIC BLOOD PRESSURE: 76 MMHG | TEMPERATURE: 97.9 F | HEART RATE: 69 BPM

## 2020-10-30 DIAGNOSIS — L90.0 LICHEN SCLEROSUS: ICD-10-CM

## 2020-10-30 DIAGNOSIS — Z30.011 ENCOUNTER FOR INITIAL PRESCRIPTION OF CONTRACEPTIVE PILLS: ICD-10-CM

## 2020-10-30 DIAGNOSIS — N92.0 MENORRHAGIA WITH REGULAR CYCLE: Primary | ICD-10-CM

## 2020-10-30 DIAGNOSIS — M32.9 SYSTEMIC LUPUS ERYTHEMATOSUS, UNSPECIFIED SLE TYPE, UNSPECIFIED ORGAN INVOLVEMENT STATUS (H): ICD-10-CM

## 2020-10-30 LAB — HCG UR QL: NEGATIVE

## 2020-10-30 PROCEDURE — 81025 URINE PREGNANCY TEST: CPT | Performed by: PHYSICIAN ASSISTANT

## 2020-10-30 PROCEDURE — 99214 OFFICE O/P EST MOD 30 MIN: CPT | Performed by: PHYSICIAN ASSISTANT

## 2020-10-30 RX ORDER — CLOBETASOL PROPIONATE 0.5 MG/G
CREAM TOPICAL 2 TIMES DAILY
Qty: 60 G | Refills: 3 | Status: SHIPPED | OUTPATIENT
Start: 2020-10-30 | End: 2023-08-11

## 2020-10-30 RX ORDER — MEDROXYPROGESTERONE ACETATE 150 MG/ML
150 INJECTION, SUSPENSION INTRAMUSCULAR
Status: CANCELLED | OUTPATIENT
Start: 2020-10-30 | End: 2021-10-25

## 2020-10-30 NOTE — PROGRESS NOTES
Subjective     Sosa Wolf is a 45 year old female who presents to clinic today for the following health issues:    HPI         Concern - skin itching   Onset: 2 years  Description: arms, face and on back   Intensity: severe  Progression of Symptoms:  Worsening in the winter  Accompanying Signs & Symptoms: none  Previous history of similar problem: none  Precipitating factors:        Worsened by: since patient have lupus   Alleviating factors:        Improved by: creams help  Therapies tried and outcome:  none     Has tried hydroxyzine for itching in the past - didn't like that medication. The cream works well - she doesn't like using it.    Patient would like to discuss heavy menstrual cycle. She states she is going through super tampons in 20 minutes. She has been on depo provera in the past. She has also had a pill pack about one year ago for heavy bleeding that she reports worked well - she knows this isn't recommended for people with lupus, but would like to do this again.    Review of Systems   Constitutional, HEENT, cardiovascular, pulmonary, gi and gu systems are negative, except as otherwise noted.      Objective    /76 (BP Location: Right arm, Patient Position: Chair, Cuff Size: Adult Regular)   Pulse 69   Temp 97.9  F (36.6  C) (Oral)   Wt 68.5 kg (151 lb)   LMP 10/30/2020   SpO2 99%   Breastfeeding No   BMI 24.56 kg/m    Body mass index is 24.56 kg/m .  Physical Exam   GENERAL: healthy, alert and no distress  NECK: no adenopathy, no asymmetry, masses, or scars and thyroid normal to palpation  RESP: lungs clear to auscultation - no rales, rhonchi or wheezes  CV: regular rate and rhythm, normal S1 S2, no S3 or S4, no murmur, click or rub, no peripheral edema and peripheral pulses strong  ABDOMEN: soft, nontender, no hepatosplenomegaly, no masses and bowel sounds normal  MS: no gross musculoskeletal defects noted, no edema    No results found for this or any previous visit (from the past 24  "hour(s)).        Assessment & Plan     Menorrhagia with regular cycle  Encounter for initial prescription of contraceptive pills  Patient has had very heavy bleeding. She reports she has done well with pills in the past. Patient aware of side effects and increased risk with lupus. Discussed IUD may be a better option for her at this time. She is interested in this and will make an appointment with OB/GYN.  - norethindrone-ethinyl estradiol (ORTHO-NOVUM) 1-35 MG-MCG tablet; 5 pills on day one, 4 on day 2, 3 on day 3, 2 on day 2 and then finish the pack out.  - HCG Qual, Urine ([id number])  - OB/GYN REFERRAL    Systemic lupus erythematosus, unspecified SLE type, unspecified organ involvement status (H)  Lichen sclerosus  Lupus with lichen sclerosus - recommended steroid cream. Can establish care with dermatology to discuss long term options for lupus related skin concerns.  - clobetasol (TEMOVATE) 0.05 % external cream; Apply topically 2 times daily  - DERMATOLOGY ADULT REFERRAL; Future     Tobacco Cessation:   reports that she has been smoking other. She has a 2.30 pack-year smoking history. She has never used smokeless tobacco.  Tobacco Cessation Action Plan: Information offered: Patient not interested at this time      BMI:   Estimated body mass index is 24.56 kg/m  as calculated from the following:    Height as of 11/7/19: 1.67 m (5' 5.75\").    Weight as of this encounter: 68.5 kg (151 lb).            Return if symptoms worsen or fail to improve.    Mable Goodson PA-C  [facility]    "

## 2020-11-02 ENCOUNTER — OFFICE VISIT (OUTPATIENT)
Dept: NEUROLOGY | Facility: CLINIC | Age: 45
End: 2020-11-02
Payer: COMMERCIAL

## 2020-11-02 DIAGNOSIS — R20.9 DISTURBANCE OF SKIN SENSATION: Primary | ICD-10-CM

## 2020-11-02 PROCEDURE — 95912 NRV CNDJ TEST 11-12 STUDIES: CPT | Performed by: PSYCHIATRY & NEUROLOGY

## 2020-11-02 PROCEDURE — 95886 MUSC TEST DONE W/N TEST COMP: CPT | Performed by: PSYCHIATRY & NEUROLOGY

## 2020-11-02 NOTE — PROGRESS NOTES
[facility]   EMG Laboratory      Nerve Conduction & EMG Report          Patient:       Sosa Patel  Patient ID:    [id number]  Gender:        Female  YOB: 1975  Age:           45 Years 1 Months      History and Examination:  Sosa Patel is a 45 year old woman with lupus and secondary Sjogren s syndrome who presents with right, followed by left, hand paresthesias radiating proximally, most notably in the lateral aspect of the hand. She denies a radicular pattern of symptoms. Examination demonstrates normal strength. She is referred for further evaluation of these symptoms.    Techniques:  Motor conduction studies were done with surface recording electrodes. Sensory conduction studies were performed with surface electrodes, unless indicated otherwise by (n), designating the use of subdermal recording electrodes. Temperature was monitored and recorded throughout the study. Upper extremities were maintained at a temperature of 32 degrees Centigrade or higher.  EMG was done with a concentric needle electrode.     Results:  Bilateral median and ulnar motor and sensory conduction studies were normal. Electromyography of the right upper limb was normal.     Interpretation:  This is a normal study. In particular, there is no electrophysiologic evidence of entrapment neuropathy in either upper limb or radiculopathy on the right.    Louis Morrison MD    Sensory NCS      Nerve / Sites Rec. Site Onset Peak Ref. NP Amp Ref. PP Amp Dist Lazaro Ref. Temp     ms ms ms  V  V  V cm m/s m/s  C   L MEDIAN - Dig II Anti      Wrist Dig II 2.50 3.39  17.8 10.0 29.4 14 56.0 48.0 33.4   R MEDIAN - Dig II Anti      Wrist Dig II 2.55 3.44  25.9 10.0 38.1 14 54.9 48.0 33.4   L ULNAR - Dig V Anti      Wrist Dig V 2.34 3.02  11.0 8.0 25.6 12.5 53.3 48.0 33.3   R ULNAR - Dig V Anti      Wrist Dig V 2.45 3.13  16.8 8.0 47.8 12.5 51.1 48.0 33.4   L MEDIAN - Ulnar - Palmar      Median Wrist 1.46 1.93 2.40 51.2  52.1 8 54.9   33.4      Ulnar Wrist 1.41 1.88 2.40 11.3  14.6 8 56.9  33.4   R MEDIAN - Ulnar - Palmar      Median Wrist 1.46 1.93 2.40 75.6  73.3 8 54.9  33.6      Ulnar Wrist 1.46 1.82 2.40 13.4  19.3 8 54.9  33.6       Motor NCS      Nerve / Sites Rec. Site Lat Ref. Amp Ref. Rel Amp Dist Lazaro Ref. Dur. Area Temp.     ms ms mV mV % cm m/s m/s ms %  C   L MEDIAN - APB      Wrist APB 3.33 4.40 9.2 5.0 100 8   6.20 100 33.4      Elbow APB 6.88  8.8  95.3 20 56.5 48.0 6.25 90.1 33.4   R MEDIAN - APB      Wrist APB 3.23 4.40 8.4 5.0 100 8   4.84 100 33.6      Elbow APB 7.03  7.9  93.7 21 55.2 48.0 4.79 92.9 33.5   L ULNAR - ADM      Wrist ADM 2.81 3.50 9.6 5.0 100 8   5.89 100 33.3      B.Elbow ADM 5.89  9.2  96.2 20 65.1 48.0 5.94 97.3 33.1      A.Elbow ADM 7.14  9.0  94.4 8 64.0 48.0 6.15 94.4 33.3   R ULNAR - ADM      Wrist ADM 2.60 3.50 9.9 5.0 100 8   6.15 100 33.5      B.Elbow ADM 5.94  9.2  93.1 20 60.0 48.0 5.83 94.9 33.4      A.Elbow ADM 7.24  9.4  94.8 8 61.4 48.0 5.99 95.4 33.4   L MEDIAN - II Lumb      Median II Lumb 3.49  0.7  100 10   8.07 100 33.4      Ulnar Palm Int 3.49  3.3  494 10   5.26 322 33.5   R MEDIAN - II Lumb      Median II Lumb 3.39  0.9  100 10   7.50 100 33.6      Ulnar Palm Int 3.13  2.4  259 10   5.94 174 33.6       EMG Summary Table     Spontaneous MUAP Recruitment    IA Fib PSW Fasc H.F. Amp Dur. PPP Pattern   R. PRON TERES N None None None None N N N N   R. EXT DIG COMM N None None None None N N N N   R. FLEX CARPI ULN N None None None None N N N N   R. FIRST D INTEROSS N None None None None N N N N   R. BICEPS N None None None None N N N N   R. TRICEPS N None None None None N N N N   R. C6 PSP N None None None None N N N N

## 2020-11-02 NOTE — LETTER
11/2/2020         RE: Sosa Patel  [address]   [place] [address]        Dear Colleague,    Thank you for referring your patient, Sosa Patel, to the [facility] [place]. Please see a copy of my visit note below.    [facility]   EMG Laboratory      Nerve Conduction & EMG Report          Patient:       Sosa Patel  Patient ID:    [id number]  Gender:        Female  YOB: 1975  Age:           45 Years 1 Months      History and Examination:  Sosa Patel is a 45 year old woman with lupus and secondary Sjogren s syndrome who presents with right, followed by left, hand paresthesias radiating proximally, most notably in the lateral aspect of the hand. She denies a radicular pattern of symptoms. Examination demonstrates normal strength. She is referred for further evaluation of these symptoms.    Techniques:  Motor conduction studies were done with surface recording electrodes. Sensory conduction studies were performed with surface electrodes, unless indicated otherwise by (n), designating the use of subdermal recording electrodes. Temperature was monitored and recorded throughout the study. Upper extremities were maintained at a temperature of 32 degrees Centigrade or higher.  EMG was done with a concentric needle electrode.     Results:  Bilateral median and ulnar motor and sensory conduction studies were normal. Electromyography of the right upper limb was normal.     Interpretation:  This is a normal study. In particular, there is no electrophysiologic evidence of entrapment neuropathy in either upper limb or radiculopathy on the right.    Louis Morrison MD    Sensory NCS      Nerve / Sites Rec. Site Onset Peak Ref. NP Amp Ref. PP Amp Dist Lazaro Ref. Temp     ms ms ms  V  V  V cm m/s m/s  C   L MEDIAN - Dig II Anti      Wrist Dig II 2.50 3.39  17.8 10.0 29.4 14 56.0 48.0 33.4   R MEDIAN - Dig II Anti      Wrist Dig II 2.55 3.44  25.9 10.0 38.1 14 54.9  48.0 33.4   L ULNAR - Dig V Anti      Wrist Dig V 2.34 3.02  11.0 8.0 25.6 12.5 53.3 48.0 33.3   R ULNAR - Dig V Anti      Wrist Dig V 2.45 3.13  16.8 8.0 47.8 12.5 51.1 48.0 33.4   L MEDIAN - Ulnar - Palmar      Median Wrist 1.46 1.93 2.40 51.2  52.1 8 54.9  33.4      Ulnar Wrist 1.41 1.88 2.40 11.3  14.6 8 56.9  33.4   R MEDIAN - Ulnar - Palmar      Median Wrist 1.46 1.93 2.40 75.6  73.3 8 54.9  33.6      Ulnar Wrist 1.46 1.82 2.40 13.4  19.3 8 54.9  33.6       Motor NCS      Nerve / Sites Rec. Site Lat Ref. Amp Ref. Rel Amp Dist Lazaro Ref. Dur. Area Temp.     ms ms mV mV % cm m/s m/s ms %  C   L MEDIAN - APB      Wrist APB 3.33 4.40 9.2 5.0 100 8   6.20 100 33.4      Elbow APB 6.88  8.8  95.3 20 56.5 48.0 6.25 90.1 33.4   R MEDIAN - APB      Wrist APB 3.23 4.40 8.4 5.0 100 8   4.84 100 33.6      Elbow APB 7.03  7.9  93.7 21 55.2 48.0 4.79 92.9 33.5   L ULNAR - ADM      Wrist ADM 2.81 3.50 9.6 5.0 100 8   5.89 100 33.3      B.Elbow ADM 5.89  9.2  96.2 20 65.1 48.0 5.94 97.3 33.1      A.Elbow ADM 7.14  9.0  94.4 8 64.0 48.0 6.15 94.4 33.3   R ULNAR - ADM      Wrist ADM 2.60 3.50 9.9 5.0 100 8   6.15 100 33.5      B.Elbow ADM 5.94  9.2  93.1 20 60.0 48.0 5.83 94.9 33.4      A.Elbow ADM 7.24  9.4  94.8 8 61.4 48.0 5.99 95.4 33.4   L MEDIAN - II Lumb      Median II Lumb 3.49  0.7  100 10   8.07 100 33.4      Ulnar Palm Int 3.49  3.3  494 10   5.26 322 33.5   R MEDIAN - II Lumb      Median II Lumb 3.39  0.9  100 10   7.50 100 33.6      Ulnar Palm Int 3.13  2.4  259 10   5.94 174 33.6       EMG Summary Table     Spontaneous MUAP Recruitment    IA Fib PSW Fasc H.F. Amp Dur. PPP Pattern   R. PRON TERES N None None None None N N N N   R. EXT DIG COMM N None None None None N N N N   R. FLEX CARPI ULN N None None None None N N N N   R. FIRST D INTEROSS N None None None None N N N N   R. BICEPS N None None None None N N N N   R. TRICEPS N None None None None N N N N   R. C6 PSP N None None None None N N N N                                       Again, thank you for allowing me to participate in the care of your patient.        Sincerely,        Louis Morrison MD

## 2020-11-29 ENCOUNTER — HEALTH MAINTENANCE LETTER (OUTPATIENT)
Age: 45
End: 2020-11-29

## 2020-12-14 DIAGNOSIS — F39 MOOD DISORDER (H): ICD-10-CM

## 2020-12-17 RX ORDER — FLUOXETINE 40 MG/1
CAPSULE ORAL
Qty: 90 CAPSULE | Refills: 0 | Status: SHIPPED | OUTPATIENT
Start: 2020-12-17 | End: 2021-01-11

## 2020-12-17 ASSESSMENT — PATIENT HEALTH QUESTIONNAIRE - PHQ9: SUM OF ALL RESPONSES TO PHQ QUESTIONS 1-9: 4

## 2020-12-17 NOTE — TELEPHONE ENCOUNTER
Called and spoke with patient, completed PHQ9.    PHQ-9 score:    PHQ 12/17/2020   PHQ-9 Total Score 4   Q9: Thoughts of better off dead/self-harm past 2 weeks Not at all   F/U: Thoughts of suicide or self-harm -   F/U: Safety concerns -       Routing to ROXANNE Garcia MA

## 2020-12-17 NOTE — TELEPHONE ENCOUNTER
Routing refill request to provider for review/approval because:  Drug interaction warning      Svetlana Garcia RN

## 2021-01-11 ENCOUNTER — MYC REFILL (OUTPATIENT)
Dept: FAMILY MEDICINE | Facility: CLINIC | Age: 46
End: 2021-01-11

## 2021-01-11 DIAGNOSIS — M32.9 SYSTEMIC LUPUS ERYTHEMATOSUS, UNSPECIFIED SLE TYPE, UNSPECIFIED ORGAN INVOLVEMENT STATUS (H): ICD-10-CM

## 2021-01-11 DIAGNOSIS — Z79.899 HIGH RISK MEDICATION USE: ICD-10-CM

## 2021-01-11 DIAGNOSIS — F39 MOOD DISORDER (H): ICD-10-CM

## 2021-01-11 LAB
ALBUMIN SERPL-MCNC: 4 G/DL (ref 3.4–5)
ALP SERPL-CCNC: 22 U/L (ref 40–150)
ALT SERPL W P-5'-P-CCNC: 35 U/L (ref 0–50)
ANION GAP SERPL CALCULATED.3IONS-SCNC: 3 MMOL/L (ref 3–14)
AST SERPL W P-5'-P-CCNC: 18 U/L (ref 0–45)
BASOPHILS # BLD AUTO: 0 10E9/L (ref 0–0.2)
BASOPHILS NFR BLD AUTO: 0.3 %
BILIRUB SERPL-MCNC: 0.3 MG/DL (ref 0.2–1.3)
BUN SERPL-MCNC: 15 MG/DL (ref 7–30)
CALCIUM SERPL-MCNC: 8.7 MG/DL (ref 8.5–10.1)
CHLORIDE SERPL-SCNC: 110 MMOL/L (ref 94–109)
CK SERPL-CCNC: 88 U/L (ref 30–225)
CO2 SERPL-SCNC: 26 MMOL/L (ref 20–32)
CREAT SERPL-MCNC: 0.67 MG/DL (ref 0.52–1.04)
CRP SERPL-MCNC: <2.9 MG/L (ref 0–8)
DIFFERENTIAL METHOD BLD: NORMAL
EOSINOPHIL # BLD AUTO: 0.2 10E9/L (ref 0–0.7)
EOSINOPHIL NFR BLD AUTO: 3.5 %
ERYTHROCYTE [DISTWIDTH] IN BLOOD BY AUTOMATED COUNT: 13 % (ref 10–15)
ERYTHROCYTE [SEDIMENTATION RATE] IN BLOOD BY WESTERGREN METHOD: 5 MM/H (ref 0–20)
GFR SERPL CREATININE-BSD FRML MDRD: >90 ML/MIN/{1.73_M2}
GLUCOSE SERPL-MCNC: 78 MG/DL (ref 70–99)
HCT VFR BLD AUTO: 39.1 % (ref 35–47)
HGB BLD-MCNC: 12.7 G/DL (ref 11.7–15.7)
LYMPHOCYTES # BLD AUTO: 1.6 10E9/L (ref 0.8–5.3)
LYMPHOCYTES NFR BLD AUTO: 26.4 %
MCH RBC QN AUTO: 28.5 PG (ref 26.5–33)
MCHC RBC AUTO-ENTMCNC: 32.5 G/DL (ref 31.5–36.5)
MCV RBC AUTO: 88 FL (ref 78–100)
MONOCYTES # BLD AUTO: 0.6 10E9/L (ref 0–1.3)
MONOCYTES NFR BLD AUTO: 10.3 %
NEUTROPHILS # BLD AUTO: 3.7 10E9/L (ref 1.6–8.3)
NEUTROPHILS NFR BLD AUTO: 59.5 %
PLATELET # BLD AUTO: 202 10E9/L (ref 150–450)
POTASSIUM SERPL-SCNC: 3.9 MMOL/L (ref 3.4–5.3)
PROT SERPL-MCNC: 6.9 G/DL (ref 6.8–8.8)
RBC # BLD AUTO: 4.46 10E12/L (ref 3.8–5.2)
SODIUM SERPL-SCNC: 139 MMOL/L (ref 133–144)
WBC # BLD AUTO: 6.2 10E9/L (ref 4–11)

## 2021-01-11 PROCEDURE — 86225 DNA ANTIBODY NATIVE: CPT | Performed by: INTERNAL MEDICINE

## 2021-01-11 PROCEDURE — 86140 C-REACTIVE PROTEIN: CPT | Performed by: INTERNAL MEDICINE

## 2021-01-11 PROCEDURE — 86160 COMPLEMENT ANTIGEN: CPT | Performed by: INTERNAL MEDICINE

## 2021-01-11 PROCEDURE — 80053 COMPREHEN METABOLIC PANEL: CPT | Performed by: INTERNAL MEDICINE

## 2021-01-11 PROCEDURE — 36415 COLL VENOUS BLD VENIPUNCTURE: CPT | Performed by: INTERNAL MEDICINE

## 2021-01-11 PROCEDURE — 82550 ASSAY OF CK (CPK): CPT | Performed by: INTERNAL MEDICINE

## 2021-01-11 PROCEDURE — 85025 COMPLETE CBC W/AUTO DIFF WBC: CPT | Performed by: INTERNAL MEDICINE

## 2021-01-11 PROCEDURE — 85652 RBC SED RATE AUTOMATED: CPT | Performed by: INTERNAL MEDICINE

## 2021-01-11 RX ORDER — FLUOXETINE 40 MG/1
CAPSULE ORAL
Qty: 90 CAPSULE | Refills: 0 | Status: SHIPPED | OUTPATIENT
Start: 2021-01-11 | End: 2021-01-25

## 2021-01-11 NOTE — TELEPHONE ENCOUNTER
Prescription approved per [facility] Refill Protocol.  Hayley Veloz RN

## 2021-01-12 LAB
C3 SERPL-MCNC: 109 MG/DL (ref 81–157)
C4 SERPL-MCNC: 25 MG/DL (ref 13–39)
DSDNA AB SER-ACNC: 17 IU/ML

## 2021-01-13 ENCOUNTER — MYC REFILL (OUTPATIENT)
Dept: RHEUMATOLOGY | Facility: CLINIC | Age: 46
End: 2021-01-13

## 2021-01-13 DIAGNOSIS — F33.41 RECURRENT MAJOR DEPRESSIVE DISORDER, IN PARTIAL REMISSION (H): ICD-10-CM

## 2021-01-13 DIAGNOSIS — M32.9 SYSTEMIC LUPUS ERYTHEMATOSUS, UNSPECIFIED SLE TYPE, UNSPECIFIED ORGAN INVOLVEMENT STATUS (H): ICD-10-CM

## 2021-01-13 RX ORDER — LEFLUNOMIDE 20 MG/1
20 TABLET ORAL DAILY
Qty: 90 TABLET | Refills: 0 | OUTPATIENT
Start: 2021-01-13

## 2021-01-13 RX ORDER — BUPROPION HYDROCHLORIDE 150 MG/1
TABLET, EXTENDED RELEASE ORAL
Qty: 90 TABLET | Refills: 0 | Status: SHIPPED | OUTPATIENT
Start: 2021-01-13 | End: 2021-01-25

## 2021-01-13 NOTE — LETTER
January 13, 2021    Sosa Patel  [address]   [place] [address]      Dear Sosa Patel:      Your provider has sent a  priscilla refill of Wellbutrin. You are due for an appointment for further refills.  Please contact the clinic to schedule a physical for further refills.           If you have any questions regarding this notice, please contact me at  [phone].   Sincerely,        [employer]  [facility]

## 2021-01-14 NOTE — PROGRESS NOTES
Sosa Patel  is a 45 year old year old female who is being evaluated via a billable video visit.      How would you like to obtain your AVS? MyChart  If the video visit is dropped, the invitation should be resent by: Text to cell phone: [phone]  Will anyone else be joining your video visit? No      Rheumatology Video Visit      Sosa Patel  MRN# [id number]   YOB: 1975 Age: 45 year old      Date of visit: 1/15/21   PCP: Cynthia Scott    Chief Complaint   Patient presents with:  Systemic Lupus Erythematous      Assessment and Plan     1. Systemic Lupus Erythematosus (JESSE+, dsDNA+, arthritis, oral sores [SLE versus HSV related], proteinuria): Dx'd 10/2016; initially with arthritis that improved with HCQ.  Previously on MTX (fatigue, effective), MMF (resolved arthritis, rash, and brain fog; arthritis persisted though and eventually changed back to leflunomide).  Currently on HCQ 200mg daily and leflunomide 20mg daily. Fatigue and arthritis persist. Inflammatory arthritis affecting her bilateral second-third MCPs and PIPs. Considering fatigue that is likely secondary to her poor sleep but could be related to lupus, and her inflammatory arthritis symptoms it is reasonable to escalate therapy with Benlysta and she is in agreement with this. Risks and side effects of Benlysta were reviewed in detail, including drug reaction, infection, death, etc.  - Start Benlysta SQ if labs and chest x-ray are okay  - Continue hydroxychloroquine 200mg daily (last eye exam 1/22/2019)  - Continue leflunomide 20 mg daily  - Ophthalmology referral for hydroxychloroquine toxicity monitoring  - Chest x-ray now  - Lab now: QuantiFERON-TB gold plus   - Labs in 3 months: CBC, CMP, ESR, CRP, CK, C3, C4, dsDNA, UA, Uprotein:creatinine    # Pregnancy Plans: on birth control (depo-provera)    Recent labs reviewed, including CBC, CMP, C3, C4, dsDNA, and others, and independent interpretation of these tests reveal no evidence  of medication toxicity.  Additional labs were ordered for HIGH RISK MEDICATION TOXICITY MONITORING and disease activity evaluation.  Management options discussed and implemented after shared medical decision making with the patient.      2.  Secondary Sjogren's syndrome: Mild symptoms that may be due to other factors such as caffeine intake, smoking, etc., but secondary Sjogren's Syndrome is possible.  We discussed the importance of good oral hygiene, frequent sips of water, avoiding high sugar foods and liquids, avoiding oral irritants such as coffee, alcohol, and nicotine, and avoiding acidic drinks such as carbonated and high sugar beverages. She is already seeing a dentist regularly. Dry eyes improved with preservative free artificial tears but she often forgets to take them. Discussed that when she sees ophthalmology she may benefit from punctal plugs if they think that is reasonable. She will discuss with her ophthalmologist when she goes in for hydroxychloroquine toxicity monitoring exam  - Advised discussing punctal plugs with her ophthalmologist.     3. 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 E-cigarette Smoking:  Encouraged complete cessation and discussed the health benefits.        5. Fatigue: Possibly related to lupus. 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 She also consumes a lot of caffeine. Advised that she discuss with her PCP and consider seeing a sleep specialist. I am not sure how to adjust her schedule to improve her sleep quality considering that she says she is the only adult at home.    # Relevant labs and imaging were reviewed with the patient    # High risk medication toxicity monitoring: discussion and labs reviewed; appropriate labs ordered. See above.  Instructed that if  confirmed to have COVID-19 or exposure to someone with confirmed COVID-19 to call this clinic for directions on DMARD management.    # Considered to be at high risk of complications from the COVID-19 virus.  It is recommended to limit contact with other people and if possible to work remotely or provide a leave of absence to reduce the risk for COVID-19.      Total minutes spent in evaluation with patient, review of pertinent lab tests and chart notes, and documentation: 31      Sosa Patel verbalized agreement with and understanding of the rational for the diagnosis and treatment plan.  All questions were answered to best of my ability and the patient's satisfaction. Ms. Wolf was advised to contact the clinic with any questions that may arise after the clinic visit.      Thank you for involving me in the care of the patient    Return to clinic: 3-4 months      HPI   Sosa Patel  is a 45 year old female with a medical history significant for tobacco abuse, genital warts, and HSV type II who presents for follow-up of systemic lupus erythematosus.    Today, 1/15/2021: reports being tired all the time.  Works overnight (11:30 PM - 8 AM) and when getting home she wants to help her son with school - 13 yo doing distance learning. Sleeping from noon - 8 PM, but she says that she also will wake up to make dinner.  Wakes up every 1.5 hours. Off and on quitting smoking; now using an e-cig and weaning off of it.  MIld joint pain in the R>L 2nd and 3rd MCPs and PIPs but not always.  No rashes.   Had a couple weeks of oral sores that have since resolved.  Overall says that she is much better on hydroxychloroquine and leflunomide. Dry eyes tx'd with nothing; doesn't use the artificial tears.     Denies fevers, chills, nausea, vomiting, constipation, diarrhea. No abdominal pain. No LE swelling. No neck pain.   No photosensitivity or photophobia. No history of inflammatory eye disease.  No history of DVT, pulmonary embolism,  or miscarriage.   No history of serositis.  No history of Raynaud's Phenomenon.  No seizure history.  No known renal disorder.      Tobacco: quit smoking cigarettes; now smoking with ecigs  EtOH: none, sober since 5/16/2010  Drugs: cocaine history; now sober  Occupation: counselor for mentally disabled people; adult foster care    ROS   GEN: No fevers, chills, night sweats, or weight change  SKIN: See HPI  HEENT: No oral or nasal ulcers at this time  CV:  see HPI  PULM: See HPI  GI: No nausea, vomiting, constipation, diarrhea. No blood in stool. No abdominal pain.  : No blood in urine.  MSK: See HPI.  NEURO: See HPI  EXT: No LE swelling  PSYCH: Depression    Active Problem List     Patient Active Problem List   Diagnosis     Herpes simplex type 2 infection     Genital warts     CARDIOVASCULAR SCREENING; LDL GOAL LESS THAN 160     H/O intravenous drug use in remission     Surveillance for Depo-Provera contraception     JESSE positive     Multiple joint pain     Cocaine use     Systemic lupus erythematosus (H)     High risk medications (not anticoagulants) long-term use     Sicca syndrome (H)     Mood disorder (H)     Lichen sclerosus     Past Medical History     Past Medical History:   Diagnosis Date     Bipolar disorder (H) 12/04    Dx'd     H/O: depression     SUICIDE ATTEMPT AGE 15     HPV (human papillomavirus)      Past Surgical History     Past Surgical History:   Procedure Laterality Date     BUNIONECTOMY RT/LT       HC ENLARGE BREAST WITH IMPLANT  2000     Allergy   No Known Allergies  Current Medication List     Current Outpatient Medications   Medication Sig     Aspirin-Acetaminophen-Caffeine (EXCEDRIN PO)      buPROPion (WELLBUTRIN SR) 150 MG 12 hr tablet TAKE ONE TABLET BY MOUTH ONCE DAILY     clobetasol (TEMOVATE) 0.05 % external cream Apply topically 2 times daily     FLUoxetine (PROZAC) 40 MG capsule TAKE ONE CAPSULE BY MOUTH ONCE DAILY     hydroxychloroquine (PLAQUENIL) 200 MG tablet Take 1 tablet  (200 mg) by mouth daily     IBUPROFEN PO      leflunomide (ARAVA) 20 MG tablet Take 1 tablet (20 mg) by mouth daily ; labs required every 12 weeks     loratadine (CLARITIN) 10 MG tablet Take 1 tablet (10 mg) by mouth daily     norethindrone-ethinyl estradiol (ORTHO-NOVUM) 1-35 MG-MCG tablet 5 pills on day one, 4 on day 2, 3 on day 3, 2 on day 2 and then finish the pack out.     cyclobenzaprine (FLEXERIL) 10 MG tablet Take 1 tablet (10 mg) by mouth nightly as needed for muscle spasms TAKE ONE TABLET BY MOUTH EVERY NIGHT AT BEDTIME AS NEEDED FOR MUSCLE SPASMS (Patient not taking: Reported on 9/18/2020)     nicotine (NICODERM CQ) 21 MG/24HR 24 hr patch Place 1 patch onto the skin every 24 hours     nicotine (NICORETTE) 2 MG gum Place 1 each (2 mg) inside cheek as needed for smoking cessation     ondansetron (ZOFRAN) 4 MG tablet Take 1 tablet (4 mg) by mouth every 8 hours as needed for nausea     predniSONE (DELTASONE) 5 MG tablet Prednisone 20mg daily x5days, then 15mg daily x5days, then 10mg daily x5days, then 5mg daily x5days, then stop. (Patient not taking: Reported on 11/7/2019)     triamcinolone (KENALOG) 0.1 % ointment Apply sparingly to affected area three times daily for 14 days.     No current facility-administered medications for this visit.      Social History   See HPI    Family History     Family History   Problem Relation Age of Onset     Cancer Father         lung cancer     Diabetes Maternal Grandmother      Depression Maternal Grandmother      Lipids Maternal Grandmother      Cerebrovascular Disease Maternal Grandfather      Lipids Maternal Grandfather      Circulatory Maternal Grandfather         MI in 50's     Depression Brother         depression and OCD     Gastrointestinal Disease Brother         IBS     Depression Mother      Thyroid Disease No family hx of      Asthma No family hx of      Denies family history of autoimmune disease     Physical Exam     Temp Readings from Last 3 Encounters:  "  10/30/20 97.9  F (36.6  C) (Oral)   11/15/19 96.7  F (35.9  C) (Oral)   09/10/19 98.2  F (36.8  C) (Oral)     BP Readings from Last 5 Encounters:   10/30/20 121/76   11/15/19 100/70   11/07/19 120/82   09/10/19 100/67   07/25/19 115/75     Pulse Readings from Last 1 Encounters:   10/30/20 69     Resp Readings from Last 1 Encounters:   11/04/16 18     Estimated body mass index is 24.56 kg/m  as calculated from the following:    Height as of 11/7/19: 1.67 m (5' 5.75\").    Weight as of 10/30/20: 68.5 kg (151 lb).    GEN: NAD. Healthy appearing adult.   HEENT: MMM.  Anicteric, noninjected sclera. No obvious external lesions of the ear and nose. Hearing intact.  PULM: No increased work of breathing  MSK:  Mild swelling at the bilateral 2nd-3rd PIPs and MCPs, R>L. Other MCPs, PIPs, DIPs, wrists without swelling or tenderness to palpation.   SKIN: No rash or jaundice seen  PSYCH: Alert. Appropriate.      Labs / Imaging (select studies)   RF/CCP  Recent Labs   Lab Test 10/11/16  1043 08/10/16  1059   CCPIGG 1  --    RHF  --  <20     JESSE  Recent Labs   Lab Test 08/10/16  1059   ROBYN 1.7*     RNP/Sm/SSA/SSB  Recent Labs   Lab Test 10/11/16  1043 12/10/13  1118   RNPIGG 0.3  --    SMIGG <0.2  Negative   Antibody index (AI) values reflect qualitative changes in antibody   concentration that cannot be directly associated with clinical condition or   disease state.    --    SSAIGG <0.2  Negative   Antibody index (AI) values reflect qualitative changes in antibody   concentration that cannot be directly associated with clinical condition or   disease state.    --    SSBIGG <0.2  Negative   Antibody index (AI) values reflect qualitative changes in antibody   concentration that cannot be directly associated with clinical condition or   disease state.    --    SCLIGG <0.2  Negative   Antibody index (AI) values reflect qualitative changes in antibody   concentration that cannot be directly associated with clinical condition or   " disease state.    --    TREPAB  --  Negative     dsDNA  Recent Labs   Lab Test 01/11/21  1315 10/16/19  1707 04/04/19 1627 12/13/18  1354 09/21/17  0818 05/01/17  0936 01/13/17  1015 10/11/16  1043   DNA 17* 14* 14* 19* 25* 37* 45* 70*     C3/C4  Recent Labs   Lab Test 01/11/21  1315 10/16/19  1707 04/04/19  1627 12/13/18  1354 09/21/17  0818 05/01/17  0936 01/13/17  1015 10/11/16  1043   A4BNRXD 109 90 120 80 124 80 87 83   V4KHEWV 25 19 26 19 24 17 22 15     CBC  Recent Labs   Lab Test 01/11/21  1315 09/10/20  1308 11/04/19  1747   WBC 6.2 6.1 8.6   RBC 4.46 4.44 4.86   HGB 12.7 12.4 13.3   HCT 39.1 37.8 40.8   MCV 88 85 84   RDW 13.0 13.6 12.4    235 240   MCH 28.5 27.9 27.4   MCHC 32.5 32.8 32.6   NEUTROPHIL 59.5 54.5 55.4   LYMPH 26.4 30.0 31.6   MONOCYTE 10.3 10.5 10.0   EOSINOPHIL 3.5 4.3 2.8   BASOPHIL 0.3 0.7 0.2   ANEU 3.7 3.3 4.8   ALYM 1.6 1.8 2.7   DAQUAN 0.6 0.6 0.9   AEOS 0.2 0.3 0.2   ABAS 0.0 0.0 0.0     CMP  Recent Labs   Lab Test 01/11/21  1315 09/10/20  1308 10/16/19  1708 10/16/19  1707 04/04/19  1627 04/04/19  1627 12/13/18  1354 12/13/18  1354 09/10/18  1406    138 139  --   --  142  --  139 138   POTASSIUM 3.9 4.1 3.9  --   --  4.2  --  3.6 4.0   CHLORIDE 110* 106 109  --   --  110*  --  106 106   CO2 26 28 24  --   --  26  --  25 25   ANIONGAP 3 4 6  --   --  6  --  8 7   GLC 78 85 83  --   --  83  --  70 93   BUN 15 17 17  --   --  15  --  17 17   CR 0.67 0.77 0.75 0.69   < > 0.69   < > 0.82 0.72   GFRESTIMATED >90 >90 >90 >90   < > >90   < > 76 88   GFRESTBLACK >90 >90 >90 >90   < > >90   < > >90 >90   RICHARD 8.7 8.9 9.1  --   --  9.1  --  8.5 8.8   BILITOTAL 0.3 0.3 0.3 0.3   < > 0.2   < > 0.3 0.4   ALBUMIN 4.0 3.8 4.2 4.2   < > 3.9   < > 3.7 4.0   PROTTOTAL 6.9 6.9 7.3 7.3   < > 7.2   < > 6.8 7.2   ALKPHOS 22* 28* 23* 19*   < > 34*   < > 31* 26*   AST 18 18 18 19   < > 23   < > 16 16   ALT 35 32 23 25   < > 36   < > 18 29    < > = values in this interval not displayed.        Calcium/VitaminD  Recent Labs   Lab Test 01/11/21  1315 09/10/20  1308 10/16/19  1708   RICHARD 8.7 8.9 9.1     ESR/CRP  Recent Labs   Lab Test 01/11/21  1315 09/10/20  1308 10/16/19  1707   SED 5 5 6   CRP <2.9 <2.9 <2.9     CK/Aldolase  Recent Labs   Lab Test 01/11/21  1315 10/16/19  1707 04/04/19  1627 10/11/16  1043 10/11/16  1043   CKT 88 121 237*   < > 65   ALDOLASE  --   --   --   --  4.5    < > = values in this interval not displayed.     TSH/T4  Recent Labs   Lab Test 10/11/16  1043   TSH 1.48     Hepatitis B  Recent Labs   Lab Test 10/11/16  1043   HBCAB Nonreactive   HEPBANG Nonreactive     Hepatitis C  Recent Labs   Lab Test 10/11/16  1043 12/10/13  1118 03/06/13  1411   HCVAB Nonreactive   Assay performance characteristics have not been established for newborns,   infants, and children   Negative Negative     HIV Screening  Recent Labs   Lab Test 10/11/16  1043   HIAGAB Nonreactive   HIV-1 p24 Ag & HIV-1/HIV-2 Ab Not Detected       UA  Recent Labs   Lab Test 09/10/20  1315 11/04/19  1747 04/04/19  1625 12/13/18  1355 09/21/17  0815 05/01/17  0954   COLOR Yellow Yellow Yellow Yellow Yellow Yellow   APPEARANCE Clear Clear Clear Clear Clear Clear   URINEGLC Negative Negative Negative Negative Negative Negative   URINEBILI Negative Negative Negative Negative Negative Small  This is an unconfirmed screening test result. A positive result may be false.  *   SG 1.020 1.015 1.020 >1.030 >1.030 >1.030   URINEPH 7.5* 8.0* 7.0 5.5 6.0 6.0   PROTEIN Negative Trace* Negative Negative Negative Trace*   UROBILINOGEN 0.2 0.2 0.2 0.2 0.2 0.2   NITRITE Negative Negative Negative Negative Negative Negative   UBLD Negative Negative Negative Small* Trace* Small*   LEUKEST Negative Negative Negative Negative Negative Negative   WBCU  --  0 - 5  --  0 - 5 O - 2 O - 2   RBCU  --  O - 2  --  O - 2 O - 2 O - 2   SQUAMOUSEPI  --  Few  --  Moderate* Few Few   BACTERIA  --  Few*  --   --   --   --    MUCOUS  --   --   --   --    --  Present*     Urine Microscopic  Recent Labs   Lab Test 11/04/19  1747 12/13/18  1355 09/21/17  0815 05/01/17  0954   WBCU 0 - 5 0 - 5 O - 2 O - 2   RBCU O - 2 O - 2 O - 2 O - 2   SQUAMOUSEPI Few Moderate* Few Few   BACTERIA Few*  --   --   --    MUCOUS  --   --   --  Present*     Urine Protein  Recent Labs   Lab Test 09/10/20  1315 11/04/19  1747 04/04/19  1625 09/21/17  0815 09/21/17  0815   UTP 0.15 0.21 0.11   < > 0.25   UTPG 0.17 0.16 0.11   < > 0.25*   UCRR 88  --  100  --  100    < > = values in this interval not displayed.     Immunization History     Immunization History   Administered Date(s) Administered     Influenza Vaccine IM > 6 months Valent IIV4 12/19/2014, 10/16/2015, 11/04/2016, 12/13/2018, 11/07/2019     Mantoux Tuberculin Skin Test 07/30/2018     Pneumo Conj 13-V (2010&after) 12/13/2018     Pneumococcal 23 valent 04/04/2019     TD (ADULT, 7+) 02/23/2005, 04/09/2019     TDAP Vaccine (Adacel) 04/14/2009     Zoster vaccine recombinant adjuvanted (SHINGRIX) 12/21/2018, 04/04/2019          Chart documentation done in part with Dragon Voice recognition Software. Although reviewed after completion, some word and grammatical error may remain.      Video-Visit Details    Type of service:  Video Visit    Video Start Time: 7:44 AM    Video End Time: 8:06 AM    Originating Location (pt. Location): [place], [place]    Distant Location (provider location):  Home    Platform used for Video Visit: Dilshad Arvizu MD

## 2021-01-15 ENCOUNTER — VIRTUAL VISIT (OUTPATIENT)
Dept: RHEUMATOLOGY | Facility: CLINIC | Age: 46
End: 2021-01-15
Payer: COMMERCIAL

## 2021-01-15 ENCOUNTER — HEALTH MAINTENANCE LETTER (OUTPATIENT)
Age: 46
End: 2021-01-15

## 2021-01-15 DIAGNOSIS — M32.9 SYSTEMIC LUPUS ERYTHEMATOSUS, UNSPECIFIED SLE TYPE, UNSPECIFIED ORGAN INVOLVEMENT STATUS (H): Primary | ICD-10-CM

## 2021-01-15 DIAGNOSIS — Z79.899 HIGH RISK MEDICATION USE: ICD-10-CM

## 2021-01-15 DIAGNOSIS — M35.00 SECONDARY SJOGREN'S SYNDROME (H): ICD-10-CM

## 2021-01-15 DIAGNOSIS — R53.83 FATIGUE, UNSPECIFIED TYPE: ICD-10-CM

## 2021-01-15 PROCEDURE — 99214 OFFICE O/P EST MOD 30 MIN: CPT | Mod: 95 | Performed by: INTERNAL MEDICINE

## 2021-01-15 RX ORDER — HYDROXYCHLOROQUINE SULFATE 200 MG/1
200 TABLET, FILM COATED ORAL DAILY
Qty: 90 TABLET | Refills: 1 | Status: SHIPPED | OUTPATIENT
Start: 2021-01-15 | End: 2021-04-23

## 2021-01-15 RX ORDER — LEFLUNOMIDE 20 MG/1
20 TABLET ORAL DAILY
Qty: 90 TABLET | Refills: 0 | Status: SHIPPED | OUTPATIENT
Start: 2021-01-15 | End: 2021-04-15

## 2021-01-25 ENCOUNTER — VIRTUAL VISIT (OUTPATIENT)
Dept: FAMILY MEDICINE | Facility: CLINIC | Age: 46
End: 2021-01-25
Payer: COMMERCIAL

## 2021-01-25 DIAGNOSIS — F33.41 RECURRENT MAJOR DEPRESSIVE DISORDER, IN PARTIAL REMISSION (H): ICD-10-CM

## 2021-01-25 DIAGNOSIS — F39 MOOD DISORDER (H): ICD-10-CM

## 2021-01-25 DIAGNOSIS — Z12.31 ENCOUNTER FOR SCREENING MAMMOGRAM FOR BREAST CANCER: Primary | ICD-10-CM

## 2021-01-25 PROCEDURE — 99213 OFFICE O/P EST LOW 20 MIN: CPT | Mod: 95 | Performed by: PHYSICIAN ASSISTANT

## 2021-01-25 RX ORDER — BUPROPION HYDROCHLORIDE 150 MG/1
150 TABLET, EXTENDED RELEASE ORAL DAILY
Qty: 90 TABLET | Refills: 0 | Status: SHIPPED | OUTPATIENT
Start: 2021-01-25 | End: 2021-08-01

## 2021-01-25 RX ORDER — FLUOXETINE 40 MG/1
CAPSULE ORAL
Qty: 90 CAPSULE | Refills: 0 | Status: SHIPPED | OUTPATIENT
Start: 2021-01-25 | End: 2021-06-30

## 2021-01-25 ASSESSMENT — ANXIETY QUESTIONNAIRES
7. FEELING AFRAID AS IF SOMETHING AWFUL MIGHT HAPPEN: NOT AT ALL
3. WORRYING TOO MUCH ABOUT DIFFERENT THINGS: NOT AT ALL
IF YOU CHECKED OFF ANY PROBLEMS ON THIS QUESTIONNAIRE, HOW DIFFICULT HAVE THESE PROBLEMS MADE IT FOR YOU TO DO YOUR WORK, TAKE CARE OF THINGS AT HOME, OR GET ALONG WITH OTHER PEOPLE: NOT DIFFICULT AT ALL
6. BECOMING EASILY ANNOYED OR IRRITABLE: NOT AT ALL
GAD7 TOTAL SCORE: 0
1. FEELING NERVOUS, ANXIOUS, OR ON EDGE: NOT AT ALL
2. NOT BEING ABLE TO STOP OR CONTROL WORRYING: NOT AT ALL
5. BEING SO RESTLESS THAT IT IS HARD TO SIT STILL: NOT AT ALL

## 2021-01-25 ASSESSMENT — PATIENT HEALTH QUESTIONNAIRE - PHQ9
SUM OF ALL RESPONSES TO PHQ QUESTIONS 1-9: 2
5. POOR APPETITE OR OVEREATING: NOT AT ALL

## 2021-01-25 NOTE — PROGRESS NOTES
Sosa is a 45 year old who is being evaluated via a billable telephone visit.    9:30-9:37  What phone number would you like to be contacted at? [phone]   How would you like to obtain your AVS? MyChart  Assessment & Plan     Recurrent major depressive disorder, in partial remission (H)  Doing well.  No changes  - buPROPion (WELLBUTRIN SR) 150 MG 12 hr tablet; Take 1 tablet (150 mg) by mouth daily    Mood disorder (H)  As above  - FLUoxetine (PROZAC) 40 MG capsule; TAKE ONE CAPSULE BY MOUTH ONCE DAILY    Encounter for screening mammogram for breast cancer  Follow up with physical   - MA Screening Digital Bilateral; Future                             Return in about 4 weeks (around 2021) for Physical Exam.    MANOHAR [facility]    Wendy Swan is a 45 year old who presents to clinic today for the following health issues     HPI       Depression and Anxiety Follow-Up    How are you doing with your depression since your last visit? Improved     How are you doing with your anxiety since your last visit?  Improved     Are you having other symptoms that might be associated with depression or anxiety? No    Have you had a significant life event? No     Do you have any concerns with your use of alcohol or other drugs? No     Doing well.  Has a new job and this is good.  Her energy level is low and her joints continue to ache.  Might be changing her lupus medications.    Only uses birth control when she gets her periods.  Gets every few months and it is heavy.  Using condom.    Has cut back on smoking-now using ecig.      Social History     Tobacco Use     Smoking status: Current Every Day Smoker     Packs/day: 0.10     Years: 23.00     Pack years: 2.30     Types: Other     Last attempt to quit: 3/3/2013     Years since quittin.9     Smokeless tobacco: Never Used   Substance Use Topics     Alcohol use: Yes     Comment: rare     Drug use: Yes     Comment:  Cocaine     PHQ 9/10/2019 12/17/2020 1/25/2021   PHQ-9 Total Score 15 4 2   Q9: Thoughts of better off dead/self-harm past 2 weeks Several days Not at all Not at all   F/U: Thoughts of suicide or self-harm - - -   F/U: Safety concerns - - -     FREDY-7 SCORE 1/25/2021   Total Score 0     Last PHQ-9 1/25/2021   1.  Little interest or pleasure in doing things 0   2.  Feeling down, depressed, or hopeless 0   3.  Trouble falling or staying asleep, or sleeping too much 1   4.  Feeling tired or having little energy 1   5.  Poor appetite or overeating 0   6.  Feeling bad about yourself 0   7.  Trouble concentrating 0   8.  Moving slowly or restless 0   Q9: Thoughts of better off dead/self-harm past 2 weeks 0   PHQ-9 Total Score 2   Difficulty at work, home, or with people Not difficult at all   In the past two weeks have you had thoughts of suicide or self harm? -   Do you have concerns about your personal safety or the safety of others? -     FREDY-7  1/25/2021   1. Feeling nervous, anxious, or on edge 0   2. Not being able to stop or control worrying 0   3. Worrying too much about different things 0   4. Trouble relaxing 0   5. Being so restless that it is hard to sit still 0   6. Becoming easily annoyed or irritable 0   7. Feeling afraid, as if something awful might happen 0   FREDY-7 Total Score 0   If you checked any problems, how difficult have they made it for you to do your work, take care of things at home, or get along with other people? Not difficult at all       Suicide Assessment Five-step Evaluation and Treatment (SAFE-T)      How many servings of fruits and vegetables do you eat daily? 1-2    On average, how many sweetened beverages do you drink each day (Examples: soda, juice, sweet tea, etc.  Do NOT count diet or artificially sweetened beverages)?   3    How many days per week do you exercise enough to make your heart beat faster? 3 or less    How many minutes a day do you exercise enough to make your heart beat  faster? 30 - 60    How many days per week do you miss taking your medication? 0        Review of Systems   As above      Objective           Vitals:  No vitals were obtained today due to virtual visit.    Physical Exam   healthy, alert and no distress  PSYCH: Alert and oriented times 3; coherent speech, normal   rate and volume, able to articulate logical thoughts, able   to abstract reason, no tangential thoughts, no hallucinations   or delusions  Her affect is normal  RESP: No cough, no audible wheezing, able to talk in full sentences  Remainder of exam unable to be completed due to telephone visits                Phone call duration: 7 minutes

## 2021-01-26 ASSESSMENT — ANXIETY QUESTIONNAIRES: GAD7 TOTAL SCORE: 0

## 2021-02-13 ENCOUNTER — HEALTH MAINTENANCE LETTER (OUTPATIENT)
Age: 46
End: 2021-02-13

## 2021-02-19 DIAGNOSIS — Z79.899 HIGH RISK MEDICATION USE: ICD-10-CM

## 2021-02-19 DIAGNOSIS — M32.9 SYSTEMIC LUPUS ERYTHEMATOSUS, UNSPECIFIED SLE TYPE, UNSPECIFIED ORGAN INVOLVEMENT STATUS (H): Primary | ICD-10-CM

## 2021-02-19 DIAGNOSIS — M32.9 SYSTEMIC LUPUS ERYTHEMATOSUS, UNSPECIFIED SLE TYPE, UNSPECIFIED ORGAN INVOLVEMENT STATUS (H): ICD-10-CM

## 2021-02-22 ENCOUNTER — ANCILLARY PROCEDURE (OUTPATIENT)
Dept: GENERAL RADIOLOGY | Facility: CLINIC | Age: 46
End: 2021-02-22
Attending: INTERNAL MEDICINE
Payer: COMMERCIAL

## 2021-02-22 DIAGNOSIS — Z79.899 HIGH RISK MEDICATION USE: ICD-10-CM

## 2021-02-22 DIAGNOSIS — M32.9 SYSTEMIC LUPUS ERYTHEMATOSUS, UNSPECIFIED SLE TYPE, UNSPECIFIED ORGAN INVOLVEMENT STATUS (H): ICD-10-CM

## 2021-02-22 PROCEDURE — 71046 X-RAY EXAM CHEST 2 VIEWS: CPT | Performed by: RADIOLOGY

## 2021-02-23 DIAGNOSIS — Z12.31 ENCOUNTER FOR SCREENING MAMMOGRAM FOR BREAST CANCER: ICD-10-CM

## 2021-02-23 PROCEDURE — 77067 SCR MAMMO BI INCL CAD: CPT | Mod: GC | Performed by: RADIOLOGY

## 2021-02-26 DIAGNOSIS — M32.9 SYSTEMIC LUPUS ERYTHEMATOSUS, UNSPECIFIED SLE TYPE, UNSPECIFIED ORGAN INVOLVEMENT STATUS (H): ICD-10-CM

## 2021-02-26 LAB
ALBUMIN UR-MCNC: NEGATIVE MG/DL
APPEARANCE UR: CLEAR
BILIRUB UR QL STRIP: NEGATIVE
COLOR UR AUTO: YELLOW
GLUCOSE UR STRIP-MCNC: NEGATIVE MG/DL
HBV CORE AB SERPL QL IA: NONREACTIVE
HBV SURFACE AG SERPL QL IA: NONREACTIVE
HCV AB SERPL QL IA: NONREACTIVE
HGB UR QL STRIP: NEGATIVE
KETONES UR STRIP-MCNC: NEGATIVE MG/DL
LEUKOCYTE ESTERASE UR QL STRIP: NEGATIVE
NITRATE UR QL: NEGATIVE
PH UR STRIP: 7 PH (ref 5–7)
SOURCE: NORMAL
SP GR UR STRIP: 1.01 (ref 1–1.03)
UROBILINOGEN UR STRIP-ACNC: 0.2 EU/DL (ref 0.2–1)

## 2021-02-26 PROCEDURE — 36415 COLL VENOUS BLD VENIPUNCTURE: CPT | Performed by: INTERNAL MEDICINE

## 2021-02-26 PROCEDURE — 86704 HEP B CORE ANTIBODY TOTAL: CPT | Performed by: INTERNAL MEDICINE

## 2021-02-26 PROCEDURE — 81003 URINALYSIS AUTO W/O SCOPE: CPT | Performed by: INTERNAL MEDICINE

## 2021-02-26 PROCEDURE — 86803 HEPATITIS C AB TEST: CPT | Performed by: INTERNAL MEDICINE

## 2021-02-26 PROCEDURE — 86481 TB AG RESPONSE T-CELL SUSP: CPT | Performed by: INTERNAL MEDICINE

## 2021-02-26 PROCEDURE — 87340 HEPATITIS B SURFACE AG IA: CPT | Performed by: INTERNAL MEDICINE

## 2021-03-01 LAB
GAMMA INTERFERON BACKGROUND BLD IA-ACNC: 0.07 IU/ML
M TB IFN-G CD4+ BCKGRND COR BLD-ACNC: 9.93 IU/ML
M TB TUBERC IFN-G BLD QL: NEGATIVE
MITOGEN IGNF BCKGRD COR BLD-ACNC: 0 IU/ML
MITOGEN IGNF BCKGRD COR BLD-ACNC: 0.02 IU/ML

## 2021-03-02 DIAGNOSIS — M32.9 SYSTEMIC LUPUS ERYTHEMATOSUS, UNSPECIFIED SLE TYPE, UNSPECIFIED ORGAN INVOLVEMENT STATUS (H): Primary | ICD-10-CM

## 2021-03-03 ENCOUNTER — TELEPHONE (OUTPATIENT)
Dept: RHEUMATOLOGY | Facility: CLINIC | Age: 46
End: 2021-03-03

## 2021-03-11 ENCOUNTER — OFFICE VISIT (OUTPATIENT)
Dept: FAMILY MEDICINE | Facility: CLINIC | Age: 46
End: 2021-03-11
Payer: COMMERCIAL

## 2021-03-11 VITALS
BODY MASS INDEX: 24.23 KG/M2 | DIASTOLIC BLOOD PRESSURE: 77 MMHG | HEART RATE: 105 BPM | SYSTOLIC BLOOD PRESSURE: 125 MMHG | WEIGHT: 149 LBS | TEMPERATURE: 97.6 F | OXYGEN SATURATION: 98 %

## 2021-03-11 DIAGNOSIS — M32.9 SYSTEMIC LUPUS ERYTHEMATOSUS, UNSPECIFIED SLE TYPE, UNSPECIFIED ORGAN INVOLVEMENT STATUS (H): ICD-10-CM

## 2021-03-11 DIAGNOSIS — K13.79 MOUTH SORE: Primary | ICD-10-CM

## 2021-03-11 LAB
HSV1 DNA SPEC QL NAA+PROBE: NOT DETECTED
HSV2 DNA SPEC QL NAA+PROBE: NOT DETECTED
LABORATORY COMMENT REPORT: NORMAL
SPECIMEN SOURCE: NORMAL

## 2021-03-11 PROCEDURE — 99213 OFFICE O/P EST LOW 20 MIN: CPT | Performed by: NURSE PRACTITIONER

## 2021-03-11 PROCEDURE — 87529 HSV DNA AMP PROBE: CPT | Performed by: NURSE PRACTITIONER

## 2021-03-11 RX ORDER — DIPHENHYDRAMINE HYDROCHLORIDE AND LIDOCAINE HYDROCHLORIDE AND ALUMINUM HYDROXIDE AND MAGNESIUM HYDRO
5-10 KIT EVERY 6 HOURS PRN
Qty: 237 ML | Refills: 0 | Status: SHIPPED | OUTPATIENT
Start: 2021-03-11 | End: 2022-11-30

## 2021-03-11 RX ORDER — TRIAMCINOLONE ACETONIDE 0.1 %
PASTE (GRAM) DENTAL 2 TIMES DAILY
Qty: 5 G | Refills: 0 | Status: SHIPPED | OUTPATIENT
Start: 2021-03-11 | End: 2021-08-02

## 2021-03-11 NOTE — PROGRESS NOTES
Assessment & Plan     Mouth sore  Differential include HSV lesions, SLE ulcerations, apthous ulcers. Symptoms most consistent with herpes simplex, will start high dose Valtrex, use ibuprofen/Tylenol and magic mouthwash for pain control. Encouraged her to push fluids- reviewed warning signs of dehydration and instructed to present to Emergency Room for IV fluids if unable to rehydrate at home.  - valACYclovir (VALTREX) 50 mg/mL SUSP; Take 20 mLs (1,000 mg) by mouth 2 times daily for 10 days  - Herpes Simplex Virus 1&2 PCR Swab  - magic mouthwash suspension, diphenhydrAMINE, lidocaine, aluminum-magnesium & simethicone, (FIRST-MOUTHWASH BLM) compounding kit; Swish and swallow 5-10 mLs in mouth every 6 hours as needed for mouth sores    Systemic lupus erythematosus, unspecified SLE type, unspecified organ involvement status (H)  As above, patient following with Rheumatology         See Patient Instructions    Return in about 1 week (around 3/18/2021) for if not improving.    MILO Cowart [facility] INDER Swan is a 45 year old who presents for the following health issues     HPI       Concern - Mouth/Lip Problem  Onset: 3 days  Description: mouth sores  Intensity: moderate  Progression of Symptoms:  worsening  Accompanying Signs & Symptoms: hurts to talk, eat, swallow, swollen neck lymph nodes, feels weak  Previous history of similar problem: yes, has previously had  Precipitating factors:        Worsened by: eating  Alleviating factors:        Improved by: ice cream  Therapies tried and outcome: prednisone x 3 days which hasn't helped    Patient with SLE and Sjogren's reports mouth sores x3 days, which intially appeared as blisters or pimples. Excruciating pain and unable to eat/drink, had one small void this morning and urinating much less than usual. This is more painful than prior sores from SLE, which she usually has in the nose. Boyfriend had a cold sore 1  month ago.    Review of Systems   Constitutional, HEENT, cardiovascular, pulmonary, gi and gu systems are negative, except as otherwise noted.      Objective    /77 (BP Location: Left arm, Patient Position: Chair, Cuff Size: Adult Regular)   Pulse 105   Temp 97.6  F (36.4  C) (Oral)   Wt 67.6 kg (149 lb)   SpO2 98%   BMI 24.23 kg/m    Body mass index is 24.23 kg/m .  Physical Exam   GENERAL: healthy, alert and no distress  HENT: Tender ulcerations midline hard palate, left lateral tongue  NECK: bilateral anterior cervical adenopathy    No results found for this or any previous visit (from the past 24 hour(s)).

## 2021-03-12 ENCOUNTER — TELEPHONE (OUTPATIENT)
Dept: FAMILY MEDICINE | Facility: CLINIC | Age: 46
End: 2021-03-12

## 2021-03-12 NOTE — TELEPHONE ENCOUNTER
Prior Authorization Retail Medication Request    Medication/Dose: triamcinolone paste  ICD code (if different than what is on RX):  , m329  Previously Tried and Failed:  unknown  Rationale: unknown    Insurance Name:  [facility]  Insurance ID:  [id number]      Pharmacy Information (if different than what is on RX)  Name:  [facility]  Phone:  [phone]    Thank you,  Cassia Cespedes, PharmD  [facility]  [phone]

## 2021-03-15 NOTE — TELEPHONE ENCOUNTER
Prior Authorization Approval    Authorization Effective Date: 2/13/2021  Authorization Expiration Date: 3/15/2022  Medication: triamcinolone paste-APPROVED  Approved Dose/Quantity: 5 GM  Reference #: [id number]   Insurance Company: [employer] - Phone [phone] Fax [phone]  Expected CoPay:       CoPay Card Available:      Foundation Assistance Needed:    Which Pharmacy is filling the prescription (Not needed for infusion/clinic administered): [facility] [address]  Pharmacy Notified: Yes  Patient Notified: Yes            Central Prior Authorization Team  Phone: [phone]       negative...

## 2021-03-15 NOTE — TELEPHONE ENCOUNTER
PA Initiation    Medication: triamcinolone paste-INITIATED  Insurance Company: [employer] - Phone [phone] Fax [phone]  Pharmacy Filling the Rx: [facility] [address] [facility]  Filling Pharmacy Phone: [phone]  Filling Pharmacy Fax: [phone]  Start Date: 3/15/2021

## 2021-04-14 DIAGNOSIS — M32.9 SYSTEMIC LUPUS ERYTHEMATOSUS, UNSPECIFIED SLE TYPE, UNSPECIFIED ORGAN INVOLVEMENT STATUS (H): ICD-10-CM

## 2021-04-15 RX ORDER — LEFLUNOMIDE 20 MG/1
20 TABLET ORAL DAILY
Qty: 30 TABLET | Refills: 0 | Status: SHIPPED | OUTPATIENT
Start: 2021-04-15 | End: 2021-04-23

## 2021-04-15 NOTE — TELEPHONE ENCOUNTER
Last labs: 2/26/21- no evidence of med toxicity.   Follow up visit is scheduled for 4/23/21  Refilled 30 day supply to last patient until f/u apt.    Joel HOBBS RN....4/15/2021 1:12 PM

## 2021-04-19 DIAGNOSIS — M32.9 SYSTEMIC LUPUS ERYTHEMATOSUS, UNSPECIFIED SLE TYPE, UNSPECIFIED ORGAN INVOLVEMENT STATUS (H): ICD-10-CM

## 2021-04-19 RX ORDER — LEFLUNOMIDE 20 MG/1
20 TABLET ORAL DAILY
Qty: 30 TABLET | Refills: 0 | Status: CANCELLED | OUTPATIENT
Start: 2021-04-19

## 2021-04-19 NOTE — TELEPHONE ENCOUNTER
The pt lost her leflunomide 20mg and needs a early refill.  Thank You,  Summer [facility] [place]

## 2021-04-20 DIAGNOSIS — M32.9 SYSTEMIC LUPUS ERYTHEMATOSUS, UNSPECIFIED SLE TYPE, UNSPECIFIED ORGAN INVOLVEMENT STATUS (H): ICD-10-CM

## 2021-04-20 DIAGNOSIS — Z79.899 HIGH RISK MEDICATION USE: ICD-10-CM

## 2021-04-20 LAB
ALBUMIN SERPL-MCNC: 3.8 G/DL (ref 3.4–5)
ALP SERPL-CCNC: 24 U/L (ref 40–150)
ALT SERPL W P-5'-P-CCNC: 23 U/L (ref 0–50)
ANION GAP SERPL CALCULATED.3IONS-SCNC: 7 MMOL/L (ref 3–14)
AST SERPL W P-5'-P-CCNC: 13 U/L (ref 0–45)
BASOPHILS # BLD AUTO: 0 10E9/L (ref 0–0.2)
BASOPHILS NFR BLD AUTO: 0.3 %
BILIRUB SERPL-MCNC: 0.4 MG/DL (ref 0.2–1.3)
BUN SERPL-MCNC: 25 MG/DL (ref 7–30)
CALCIUM SERPL-MCNC: 8.3 MG/DL (ref 8.5–10.1)
CHLORIDE SERPL-SCNC: 108 MMOL/L (ref 94–109)
CK SERPL-CCNC: 71 U/L (ref 30–225)
CO2 SERPL-SCNC: 23 MMOL/L (ref 20–32)
CREAT SERPL-MCNC: 0.95 MG/DL (ref 0.52–1.04)
CRP SERPL-MCNC: <2.9 MG/L (ref 0–8)
DIFFERENTIAL METHOD BLD: NORMAL
EOSINOPHIL # BLD AUTO: 0.2 10E9/L (ref 0–0.7)
EOSINOPHIL NFR BLD AUTO: 3.6 %
ERYTHROCYTE [DISTWIDTH] IN BLOOD BY AUTOMATED COUNT: 12.9 % (ref 10–15)
ERYTHROCYTE [SEDIMENTATION RATE] IN BLOOD BY WESTERGREN METHOD: 7 MM/H (ref 0–20)
GFR SERPL CREATININE-BSD FRML MDRD: 72 ML/MIN/{1.73_M2}
GLUCOSE SERPL-MCNC: 117 MG/DL (ref 70–99)
HCT VFR BLD AUTO: 35.7 % (ref 35–47)
HGB BLD-MCNC: 12.1 G/DL (ref 11.7–15.7)
LYMPHOCYTES # BLD AUTO: 1.9 10E9/L (ref 0.8–5.3)
LYMPHOCYTES NFR BLD AUTO: 31.6 %
MCH RBC QN AUTO: 28.3 PG (ref 26.5–33)
MCHC RBC AUTO-ENTMCNC: 33.9 G/DL (ref 31.5–36.5)
MCV RBC AUTO: 83 FL (ref 78–100)
MONOCYTES # BLD AUTO: 0.6 10E9/L (ref 0–1.3)
MONOCYTES NFR BLD AUTO: 9.9 %
NEUTROPHILS # BLD AUTO: 3.3 10E9/L (ref 1.6–8.3)
NEUTROPHILS NFR BLD AUTO: 54.6 %
PLATELET # BLD AUTO: 213 10E9/L (ref 150–450)
POTASSIUM SERPL-SCNC: 3.6 MMOL/L (ref 3.4–5.3)
PROT SERPL-MCNC: 6.6 G/DL (ref 6.8–8.8)
RBC # BLD AUTO: 4.28 10E12/L (ref 3.8–5.2)
SODIUM SERPL-SCNC: 138 MMOL/L (ref 133–144)
WBC # BLD AUTO: 6 10E9/L (ref 4–11)

## 2021-04-20 PROCEDURE — 85652 RBC SED RATE AUTOMATED: CPT | Performed by: INTERNAL MEDICINE

## 2021-04-20 PROCEDURE — 86140 C-REACTIVE PROTEIN: CPT | Performed by: INTERNAL MEDICINE

## 2021-04-20 PROCEDURE — 86160 COMPLEMENT ANTIGEN: CPT | Performed by: INTERNAL MEDICINE

## 2021-04-20 PROCEDURE — 36415 COLL VENOUS BLD VENIPUNCTURE: CPT | Performed by: INTERNAL MEDICINE

## 2021-04-20 PROCEDURE — 85025 COMPLETE CBC W/AUTO DIFF WBC: CPT | Performed by: INTERNAL MEDICINE

## 2021-04-20 PROCEDURE — 80053 COMPREHEN METABOLIC PANEL: CPT | Performed by: INTERNAL MEDICINE

## 2021-04-20 PROCEDURE — 86225 DNA ANTIBODY NATIVE: CPT | Performed by: INTERNAL MEDICINE

## 2021-04-20 PROCEDURE — 82550 ASSAY OF CK (CPK): CPT | Performed by: INTERNAL MEDICINE

## 2021-04-21 LAB
C3 SERPL-MCNC: 103 MG/DL (ref 81–157)
C4 SERPL-MCNC: 27 MG/DL (ref 13–39)
DSDNA AB SER-ACNC: 11 IU/ML

## 2021-04-23 ENCOUNTER — TELEPHONE (OUTPATIENT)
Dept: RHEUMATOLOGY | Facility: CLINIC | Age: 46
End: 2021-04-23

## 2021-04-23 ENCOUNTER — VIRTUAL VISIT (OUTPATIENT)
Dept: RHEUMATOLOGY | Facility: CLINIC | Age: 46
End: 2021-04-23
Payer: COMMERCIAL

## 2021-04-23 DIAGNOSIS — Z79.899 HIGH RISK MEDICATION USE: ICD-10-CM

## 2021-04-23 DIAGNOSIS — M32.9 SYSTEMIC LUPUS ERYTHEMATOSUS, UNSPECIFIED SLE TYPE, UNSPECIFIED ORGAN INVOLVEMENT STATUS (H): Primary | ICD-10-CM

## 2021-04-23 DIAGNOSIS — M35.00 SECONDARY SJOGREN'S SYNDROME (H): ICD-10-CM

## 2021-04-23 PROCEDURE — 99214 OFFICE O/P EST MOD 30 MIN: CPT | Mod: 95 | Performed by: INTERNAL MEDICINE

## 2021-04-23 RX ORDER — CYCLOSPORINE 0.5 MG/ML
1 EMULSION OPHTHALMIC 2 TIMES DAILY
Qty: 11 ML | Refills: 2 | Status: SHIPPED | OUTPATIENT
Start: 2021-04-23 | End: 2021-11-08

## 2021-04-23 RX ORDER — HYDROXYCHLOROQUINE SULFATE 200 MG/1
200 TABLET, FILM COATED ORAL DAILY
Qty: 90 TABLET | Refills: 1 | Status: SHIPPED | OUTPATIENT
Start: 2021-04-23 | End: 2021-08-27

## 2021-04-23 RX ORDER — LEFLUNOMIDE 20 MG/1
20 TABLET ORAL DAILY
Qty: 90 TABLET | Refills: 0 | Status: SHIPPED | OUTPATIENT
Start: 2021-04-23 | End: 2021-08-27

## 2021-04-23 NOTE — PATIENT INSTRUCTIONS
RHEUMATOLOGY    Dr. Jack Arvizu    [facility]  [address] [place] [address]    Our new phone number for Rheumatology is [phone], this number will be able to help you schedule appointments for Dr. Arvizu or if you have any message you would like sent to us.    Thank you for choosing [facility]!    Bailey Resendez [facility]

## 2021-04-23 NOTE — TELEPHONE ENCOUNTER
Prior Authorization Retail Medication Request    Medication/Dose: restasis 0.05% emul  ICD code (if different than what is on RX):  M35.00  Previously Tried and Failed:  N  Rationale:  Pa required    Insurance Name:  [facility] [facility]  Insurance ID:  [id number]  Phone number: [phone]      Pharmacy Information (if different than what is on RX)  Name:  [facility]  Phone:  [phone]    Thank you   Geetha Beltre. Pharmacy Technician   [facility] [place]

## 2021-04-23 NOTE — PROGRESS NOTES
Sosa Patel  is a 45 year old year old female who is being evaluated via a billable video visit.      How would you like to obtain your AVS? MyChart  If the video visit is dropped, the invitation should be resent by: Text to cell phone: [phone]  Will anyone else be joining your video visit? No     Rheumatology Video Visit      Sosa Patel  MRN# [id number]   YOB: 1975 Age: 45 year old      Date of visit: 4/23/21   PCP: Cynthia Scott    Chief Complaint   Patient presents with:  Systemic Lupus Erythematous    Assessment and Plan     1. Systemic Lupus Erythematosus (JESSE+, dsDNA+, arthritis, oral sores [SLE versus HSV related? - One-time had a culture that was negative], proteinuria, photosensitivity [fatigue]): Dx'd 10/2016; initially with arthritis that improved with HCQ.  Previously on MTX (fatigue, effective), MMF (resolved arthritis, rash, and brain fog; arthritis persisted though and eventually changed back to leflunomide).  Currently on HCQ 200mg daily, leflunomide 20mg daily, and Benlysta (SQ, starting mid March 2021; effective for arthritis and fatigue).  Improved with Benlysta that was started in mid March.  Oral sores suspected to be lupus related considering the negative herpes culture and improvement with Magic mouthwash and topical steroids.  Hopefully there will be less frequent oral sores since starting Benlysta.  Fatigue has improved with Benlysta.  Arthritis has improved with Benlysta.  Not quite at goal but overall improving.  Chronic illness, progressive.    - Continue Benlysta SQ every 7 days  - Continue hydroxychloroquine 200mg daily (last eye exam 1/22/2019)  - Continue leflunomide 20 mg daily  - Ophthalmology referral for hydroxychloroquine toxicity monitoring  - Labs in 3 months: CBC, CMP, ESR, CRP, CK, C3, C4, dsDNA, UA, Uprotein:creatinine    # Pregnancy Plans: on birth control (depo-provera)    High risk medication requiring intensive toxicity monitoring at least  quarterly: labs ordered include CBC, Creatinine, Hepatic panel to monitor for cytopenia and hepatotoxicity; checking creatinine as it affects clearance of medication.       2.  Secondary Sjogren's syndrome: Mild symptoms that may be due to other factors such as caffeine intake, smoking, etc., but secondary Sjogren's Syndrome is possible.  We discussed the importance of good oral hygiene, frequent sips of water, avoiding high sugar foods and liquids, avoiding oral irritants such as coffee, alcohol, and nicotine, and avoiding acidic drinks such as carbonated and high sugar beverages. She is already seeing a dentist regularly. Dry eyes improved with preservative free artificial tears but she often forgets to take them; was given Restasis by her father who is a pharmacist and is helped significantly.  She did not get punctal plugs from ophthalmology.  More dry eyes since not being on Restasis again as the sample given ran out.  Chronic illness, progressive.    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 E-cigarette Smoking:  Encouraged complete cessation and discussed the health benefits.        #Advised that she receive the second dose of the Pfizer COVID-19 vaccine    Total minutes spent in evaluation with patient, documentation, , and review of pertinent studies and chart notes: 29     Sosa Patel verbalized agreement with and understanding of the rational for the diagnosis and treatment plan.  All questions were answered to best of my ability and the patient's satisfaction. Ms. Wolf was advised to contact the clinic with any questions that may arise after the clinic visit.      Thank you for involving me in the care of the patient    Return to clinic: 3-4 months      HPI   Sosa Patel  is a 45 year old female with a medical history significant for tobacco abuse, genital warts, and HSV type II  who presents for follow-up of systemic lupus erythematosus.    Today, 4/23/2021: had been doing well for a while but then in early March her mouth sores were out of control; she was seen by her PCP who thought it was herpes, per patient, but no etiology found. Sores on the tongue and roof of mouth. She was tx'd with magic mouthwash and a triamcinolone paste with improvement and eventual resolution of the sores.  Benlysta was started about 5 weeks ago, 2 weeks after the sores had started.  She notes significant improvement with regard to her fatigue since starting Benlysta.  Photosensitivity: Fatigue.  Joints are doing well.  Lost her prescription of leflunomide and needs a new one sent in.. Benlysta started 5 weeks ago.   Started Restasis, that was given to her as a sample from her father who is a pharmacist, as she noticed significant provement of the dry eyes so she would like to start Restasis.  She did not get punctal plugs.  She has not seen ophthalmology since we last met.    Denies fevers, chills, nausea, vomiting, constipation, diarrhea. No abdominal pain. No LE swelling. No neck pain.   No photosensitivity or photophobia. No history of inflammatory eye disease.  No history of DVT, pulmonary embolism, or miscarriage.   No history of serositis.  No history of Raynaud's Phenomenon.  No seizure history.  No known renal disorder.      Tobacco: quit smoking cigarettes; now smoking with ecigs  EtOH: none, sober since 5/16/2010  Drugs: cocaine history; now sober  Occupation: counselor for mentally disabled people; adult foster care    ROS   12 point review of system was completed and negative except as noted in the HPI     Active Problem List     Patient Active Problem List   Diagnosis     Herpes simplex type 2 infection     Genital warts     CARDIOVASCULAR SCREENING; LDL GOAL LESS THAN 160     H/O intravenous drug use in remission     Surveillance for Depo-Provera contraception     JESSE positive     Multiple joint  pain     Cocaine use     Systemic lupus erythematosus (H)     High risk medications (not anticoagulants) long-term use     Sicca syndrome (H)     Mood disorder (H)     Lichen sclerosus     Past Medical History     Past Medical History:   Diagnosis Date     Bipolar disorder (H) 12/04    Dx'd     H/O: depression     SUICIDE ATTEMPT AGE 15     HPV (human papillomavirus)      Past Surgical History     Past Surgical History:   Procedure Laterality Date     BUNIONECTOMY RT/LT       HC ENLARGE BREAST WITH IMPLANT  2000     Allergy   No Known Allergies  Current Medication List     Current Outpatient Medications   Medication Sig     Aspirin-Acetaminophen-Caffeine (EXCEDRIN PO)      buPROPion (WELLBUTRIN SR) 150 MG 12 hr tablet Take 1 tablet (150 mg) by mouth daily     clobetasol (TEMOVATE) 0.05 % external cream Apply topically 2 times daily     FLUoxetine (PROZAC) 40 MG capsule TAKE ONE CAPSULE BY MOUTH ONCE DAILY     hydroxychloroquine (PLAQUENIL) 200 MG tablet Take 1 tablet (200 mg) by mouth daily     IBUPROFEN PO      leflunomide (ARAVA) 20 MG tablet Take 1 tablet (20 mg) by mouth daily ; labs required every 12 weeks     magic mouthwash suspension, diphenhydrAMINE, lidocaine, aluminum-magnesium & simethicone, (FIRST-MOUTHWASH BLM) compounding kit Swish and swallow 5-10 mLs in mouth every 6 hours as needed for mouth sores     triamcinolone (KENALOG) 0.1 % paste Take by mouth 2 times daily     Belimumab 200 MG/ML SOAJ Inject 200 mg Subcutaneous every 7 days . Hold for signs of infection and seek medical attention. (Patient not taking: Reported on 3/11/2021)     No current facility-administered medications for this visit.      Social History   See HPI    Family History     Family History   Problem Relation Age of Onset     Cancer Father         lung cancer     Diabetes Maternal Grandmother      Depression Maternal Grandmother      Lipids Maternal Grandmother      Cerebrovascular Disease Maternal Grandfather      Lipids  "Maternal Grandfather      Circulatory Maternal Grandfather         MI in 50's     Depression Brother         depression and OCD     Gastrointestinal Disease Brother         IBS     Depression Mother      Thyroid Disease No family hx of      Asthma No family hx of      Denies family history of autoimmune disease     Physical Exam     Temp Readings from Last 3 Encounters:   03/11/21 97.6  F (36.4  C) (Oral)   10/30/20 97.9  F (36.6  C) (Oral)   11/15/19 96.7  F (35.9  C) (Oral)     BP Readings from Last 5 Encounters:   03/11/21 125/77   10/30/20 121/76   11/15/19 100/70   11/07/19 120/82   09/10/19 100/67     Pulse Readings from Last 1 Encounters:   03/11/21 105     Resp Readings from Last 1 Encounters:   11/04/16 18     Estimated body mass index is 24.23 kg/m  as calculated from the following:    Height as of 11/7/19: 1.67 m (5' 5.75\").    Weight as of 3/11/21: 67.6 kg (149 lb).      GEN: NAD. Healthy appearing adult.   HEENT: Dry mucous membranes.  Anicteric, noninjected sclera. No obvious external lesions of the ear and nose. Hearing intact.  PULM: No increased work of breathing  MSK:  Hands and wrists without swelling.   SKIN: No rash or jaundice seen  PSYCH: Alert. Appropriate.         Labs / Imaging (select studies)     RF/CCP  Recent Labs   Lab Test 10/11/16  1043 08/10/16  1059   CCPIGG 1  --    RHF  --  <20     JESSE  Recent Labs   Lab Test 08/10/16  1059   ROBYN 1.7*     RNP/Sm/SSA/SSB  Recent Labs   Lab Test 10/11/16  1043 12/10/13  1118   RNPIGG 0.3  --    SMIGG <0.2  Negative   Antibody index (AI) values reflect qualitative changes in antibody   concentration that cannot be directly associated with clinical condition or   disease state.    --    SSAIGG <0.2  Negative   Antibody index (AI) values reflect qualitative changes in antibody   concentration that cannot be directly associated with clinical condition or   disease state.    --    SSBIGG <0.2  Negative   Antibody index (AI) values reflect qualitative " changes in antibody   concentration that cannot be directly associated with clinical condition or   disease state.    --    SCLIGG <0.2  Negative   Antibody index (AI) values reflect qualitative changes in antibody   concentration that cannot be directly associated with clinical condition or   disease state.    --    TREPAB  --  Negative     dsDNA  Recent Labs   Lab Test 04/20/21  1241 01/11/21  1315 10/16/19  1707 04/04/19 1627 12/13/18  1354 09/21/17  0818 05/01/17  0936 01/13/17  1015 10/11/16  1043   DNA 11* 17* 14* 14* 19* 25* 37* 45* 70*     C3/C4  Recent Labs   Lab Test 04/20/21  1241 01/11/21  1315 10/16/19  1707 04/04/19  1627 12/13/18  1354 09/21/17  0818 05/01/17  0936 01/13/17  1015 10/11/16  1043   G2ACFJN 103 109 90 120 80 124 80 87 83   C8RYFEO 27 25 19 26 19 24 17 22 15     CBC  Recent Labs   Lab Test 04/20/21  1241 01/11/21  1315 09/10/20  1308   WBC 6.0 6.2 6.1   RBC 4.28 4.46 4.44   HGB 12.1 12.7 12.4   HCT 35.7 39.1 37.8   MCV 83 88 85   RDW 12.9 13.0 13.6    202 235   MCH 28.3 28.5 27.9   MCHC 33.9 32.5 32.8   NEUTROPHIL 54.6 59.5 54.5   LYMPH 31.6 26.4 30.0   MONOCYTE 9.9 10.3 10.5   EOSINOPHIL 3.6 3.5 4.3   BASOPHIL 0.3 0.3 0.7   ANEU 3.3 3.7 3.3   ALYM 1.9 1.6 1.8   DAQUAN 0.6 0.6 0.6   AEOS 0.2 0.2 0.3   ABAS 0.0 0.0 0.0     CMP  Recent Labs   Lab Test 04/20/21  1241 01/11/21  1315 09/10/20  1308 10/16/19  1708 04/04/19  1627 04/04/19 1627 12/13/18  1354 12/13/18  1354    139 138 139  --  142  --  139   POTASSIUM 3.6 3.9 4.1 3.9  --  4.2  --  3.6   CHLORIDE 108 110* 106 109  --  110*  --  106   CO2 23 26 28 24  --  26  --  25   ANIONGAP 7 3 4 6  --  6  --  8   * 78 85 83  --  83  --  70   BUN 25 15 17 17  --  15  --  17   CR 0.95 0.67 0.77 0.75   < > 0.69   < > 0.82   GFRESTIMATED 72 >90 >90 >90   < > >90   < > 76   GFRESTBLACK 83 >90 >90 >90   < > >90   < > >90   RICHARD 8.3* 8.7 8.9 9.1  --  9.1  --  8.5   BILITOTAL 0.4 0.3 0.3 0.3   < > 0.2   < > 0.3   ALBUMIN 3.8 4.0 3.8  4.2   < > 3.9   < > 3.7   PROTTOTAL 6.6* 6.9 6.9 7.3   < > 7.2   < > 6.8   ALKPHOS 24* 22* 28* 23*   < > 34*   < > 31*   AST 13 18 18 18   < > 23   < > 16   ALT 23 35 32 23   < > 36   < > 18    < > = values in this interval not displayed.     Calcium/VitaminD  Recent Labs   Lab Test 04/20/21  1241 01/11/21  1315 09/10/20  1308   RICHARD 8.3* 8.7 8.9     ESR/CRP  Recent Labs   Lab Test 04/20/21  1241 01/11/21  1315 09/10/20  1308   SED 7 5 5   CRP <2.9 <2.9 <2.9     CK/Aldolase  Recent Labs   Lab Test 04/20/21  1241 01/11/21  1315 10/16/19  1707 10/11/16  1043 10/11/16  1043   CKT 71 88 121   < > 65   ALDOLASE  --   --   --   --  4.5    < > = values in this interval not displayed.     TSH/T4  Recent Labs   Lab Test 10/11/16  1043   TSH 1.48     Lipid Panel  Recent Labs   Lab Test 03/06/13  1411   CHOL 190   TRIG 187*   HDL 38*      VLDL 37*   CHOLHDLRATIO 5.0     Hepatitis B  Recent Labs   Lab Test 02/26/21  0927 10/11/16  1043   HBCAB Nonreactive Nonreactive   HEPBANG Nonreactive Nonreactive     Hepatitis C  Recent Labs   Lab Test 02/26/21  0927 10/11/16  1043 12/10/13  1118   HCVAB Nonreactive Nonreactive   Assay performance characteristics have not been established for newborns,   infants, and children   Negative     Tuberculosis Screening  Recent Labs   Lab Test 02/26/21  0927   TBRST Negative     HIV Screening  Recent Labs   Lab Test 10/11/16  1043   HIAGAB Nonreactive   HIV-1 p24 Ag & HIV-1/HIV-2 Ab Not Detected       UA  Recent Labs   Lab Test 02/26/21  0937 09/10/20  1315 11/04/19  1747 12/13/18  1355 12/13/18  1355 09/21/17  0815 05/01/17  0954   COLOR Yellow Yellow Yellow   < > Yellow Yellow Yellow   APPEARANCE Clear Clear Clear   < > Clear Clear Clear   URINEGLC Negative Negative Negative   < > Negative Negative Negative   URINEBILI Negative Negative Negative   < > Negative Negative Small  This is an unconfirmed screening test result. A positive result may be false.  *   SG 1.015 1.020 1.015   < >  >1.030 >1.030 >1.030   URINEPH 7.0 7.5* 8.0*   < > 5.5 6.0 6.0   PROTEIN Negative Negative Trace*   < > Negative Negative Trace*   UROBILINOGEN 0.2 0.2 0.2   < > 0.2 0.2 0.2   NITRITE Negative Negative Negative   < > Negative Negative Negative   UBLD Negative Negative Negative   < > Small* Trace* Small*   LEUKEST Negative Negative Negative   < > Negative Negative Negative   WBCU  --   --  0 - 5  --  0 - 5 O - 2 O - 2   RBCU  --   --  O - 2  --  O - 2 O - 2 O - 2   SQUAMOUSEPI  --   --  Few  --  Moderate* Few Few   BACTERIA  --   --  Few*  --   --   --   --    MUCOUS  --   --   --   --   --   --  Present*    < > = values in this interval not displayed.     Urine Microscopic  Recent Labs   Lab Test 11/04/19  1747 12/13/18  1355 09/21/17  0815 05/01/17  0954   WBCU 0 - 5 0 - 5 O - 2 O - 2   RBCU O - 2 O - 2 O - 2 O - 2   SQUAMOUSEPI Few Moderate* Few Few   BACTERIA Few*  --   --   --    MUCOUS  --   --   --  Present*     Urine Protein  Recent Labs   Lab Test 09/10/20  1315 11/04/19  1747 04/04/19  1625 09/21/17  0815 09/21/17  0815   UTP 0.15 0.21 0.11   < > 0.25   UTPG 0.17 0.16 0.11   < > 0.25*   UCRR 88  --  100  --  100    < > = values in this interval not displayed.     Immunization History     Immunization History   Administered Date(s) Administered     Influenza Vaccine IM > 6 months Valent IIV4 12/19/2014, 10/16/2015, 11/04/2016, 12/13/2018, 11/07/2019     Mantoux Tuberculin Skin Test 07/30/2018     Pneumo Conj 13-V (2010&after) 12/13/2018     Pneumococcal 23 valent 04/04/2019     TD (ADULT, 7+) 02/23/2005, 04/09/2019     TDAP Vaccine (Adacel) 04/14/2009     Zoster vaccine recombinant adjuvanted (SHINGRIX) 12/21/2018, 04/04/2019          Chart documentation done in part with Dragon Voice recognition Software. Although reviewed after completion, some word and grammatical error may remain.      Video-Visit Details    Type of service:  Video Visit    Video Start Time: 9:10 AM    Video End Time: 9:33  AM    Originating Location (pt. Location): [place], [place]    Distant Location (provider location):  Home    Platform used for Video Visit: Dilshad Arvizu MD

## 2021-04-27 NOTE — TELEPHONE ENCOUNTER
Prior Authorization Approval    Authorization Effective Date: 3/28/2021  Authorization Expiration Date: 4/27/2022  Medication: restasis 0.05% emul- APPROVED   Approved Dose/Quantity:   Reference #:     Insurance Company: [employer] - Phone [phone] Fax [phone]  Expected CoPay:       CoPay Card Available:      Foundation Assistance Needed:    Which Pharmacy is filling the prescription (Not needed for infusion/clinic administered): [facility] - [address]  Pharmacy Notified: Yes  Patient Notified:  **Instructed pharmacy to notify patient when script is ready to /ship.**

## 2021-04-27 NOTE — TELEPHONE ENCOUNTER
Central Prior Authorization Team  Phone: [phone]    PA Initiation    Medication: restasis 0.05% emul  Insurance Company: [employer] - Phone [phone] Fax [phone]  Pharmacy Filling the Rx: [facility] - [address]  Filling Pharmacy Phone: [phone]  Filling Pharmacy Fax:    Start Date: 4/27/2021

## 2021-06-01 ENCOUNTER — TELEPHONE (OUTPATIENT)
Dept: FAMILY MEDICINE | Facility: CLINIC | Age: 46
End: 2021-06-01

## 2021-06-01 DIAGNOSIS — Z11.1 SCREENING EXAMINATION FOR PULMONARY TUBERCULOSIS: Primary | ICD-10-CM

## 2021-06-01 NOTE — TELEPHONE ENCOUNTER
Reason for Call: Request for an order or referral:    Order or referral being requested: TB blood test for employer    Date needed: within two weeks    Has the patient been seen by the PCP for this problem? Not Applicable    Additional comments: Patient would like to come in on June 20th for a TB blood test, please place orders    Phone number Patient can be reached at:  Home number on file [phone] (home)    Best Time:  any    Can we leave a detailed message on this number?  YES    Call taken on 6/1/2021 at 2:27 PM by Alana Portillo

## 2021-06-14 NOTE — TELEPHONE ENCOUNTER
Medication:   Hydroxychloroquine  Last written on:   7/3/2020  Quantity:   30    Refills:   1    Last office visit:   11/7/2019  Next office visit:   9/18/2020  Last labs:   9/10/2020    Bailey Resendez CMA Rheumatology  9/11/2020 8:00 AM            
Pt aware of refill      Em Pedraza MA    
Rheumatology team: Please call to notify Ms. Wolf that hydroxychloroquine has been refilled.  Jack Arvizu MD  9/11/2020 4:47 PM     
[Time Spent: ___ minutes] : I have spent [unfilled] minutes of time on the encounter.

## 2021-06-19 ENCOUNTER — TELEPHONE (OUTPATIENT)
Dept: FAMILY MEDICINE | Facility: CLINIC | Age: 46
End: 2021-06-19

## 2021-06-19 NOTE — LETTER
Dear Corin,     We have been unable to reach you by phone or Mychart regarding a request we received.      Please call the [facility] to speak with a nurse on staff at [phone] and we will be happy to help assist you.     Thank you and take care,     Your [facility] Care Team

## 2021-06-19 NOTE — TELEPHONE ENCOUNTER
Reason for Call: Request for an order or referral:    Order or referral being requested: titer test    Date needed: as soon as possible    Has the patient been seen by the PCP for this problem? 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     Phone number Patient can be reached at:  Cell number on file:    Telephone Information:   Mobile [phone]       Best Time:  any    Can we leave a detailed message on this number?  YES    Call taken on 6/19/2021 at 1:29 PM by Tiffany Kelley

## 2021-06-21 ENCOUNTER — TELEPHONE (OUTPATIENT)
Dept: FAMILY MEDICINE | Facility: CLINIC | Age: 46
End: 2021-06-21

## 2021-06-21 NOTE — TELEPHONE ENCOUNTER
Called patient. Left voice message to return call at [phone].     Need to clarify what titers she is looking to have done.

## 2021-06-21 NOTE — TELEPHONE ENCOUNTER
Patient has ancillary appointment 6/23/2021 for lab draw. Appointment needs to be changed to lab only appointment. Please help schedule lab only appointment if patient returns call.  Azul CHEUNG CMA ([facility])

## 2021-06-23 NOTE — TELEPHONE ENCOUNTER
Attempted to call pt at home and cell. This was the second attempt at calling and voice message left at both numbers to return call to clinic at [phone]. Also sent [employer] message.    Gema LOUIS RN, BSN  [facility]

## 2021-06-28 DIAGNOSIS — Z11.1 SCREENING EXAMINATION FOR PULMONARY TUBERCULOSIS: ICD-10-CM

## 2021-06-28 PROCEDURE — 36415 COLL VENOUS BLD VENIPUNCTURE: CPT | Performed by: PHYSICIAN ASSISTANT

## 2021-06-28 PROCEDURE — 86481 TB AG RESPONSE T-CELL SUSP: CPT | Performed by: PHYSICIAN ASSISTANT

## 2021-06-28 NOTE — TELEPHONE ENCOUNTER
Routing to TC to send letter to pt, unable to reach pt:    Dear Corin,    We have been unable to reach you by phone or Mychart regarding a request we received.     Please call the [facility] to speak with a nurse on staff at [phone] and we will be happy to help assist you.    Thank you and take care,    Your [facility] Care Team    Gema LOUIS RN, BSN  [facility]

## 2021-06-30 DIAGNOSIS — F39 MOOD DISORDER (H): ICD-10-CM

## 2021-06-30 LAB
GAMMA INTERFERON BACKGROUND BLD IA-ACNC: 0.03 IU/ML
M TB IFN-G CD4+ BCKGRND COR BLD-ACNC: 9.97 IU/ML
M TB TUBERC IFN-G BLD QL: NEGATIVE
MITOGEN IGNF BCKGRD COR BLD-ACNC: 0.01 IU/ML
MITOGEN IGNF BCKGRD COR BLD-ACNC: 0.02 IU/ML

## 2021-06-30 RX ORDER — FLUOXETINE 40 MG/1
CAPSULE ORAL
Qty: 90 CAPSULE | Refills: 0 | Status: SHIPPED | OUTPATIENT
Start: 2021-06-30 | End: 2021-09-07

## 2021-06-30 NOTE — TELEPHONE ENCOUNTER
Routing refill request to provider for review/approval because:  Drug not on the [facility] refill protocol   A break in medication          Pending Prescriptions:                       Disp   Refills    FLUoxetine (PROZAC) 40 MG capsule          90 cap*0        Sig: TAKE ONE CAPSULE BY MOUTH ONCE DAILY        Tom Guevara RN

## 2021-07-29 ENCOUNTER — TELEPHONE (OUTPATIENT)
Dept: LAB | Facility: CLINIC | Age: 46
End: 2021-07-29
Payer: COMMERCIAL

## 2021-07-29 DIAGNOSIS — F33.41 RECURRENT MAJOR DEPRESSIVE DISORDER, IN PARTIAL REMISSION (H): ICD-10-CM

## 2021-07-29 RX ORDER — BUPROPION HYDROCHLORIDE 150 MG/1
150 TABLET, EXTENDED RELEASE ORAL DAILY
Qty: 90 TABLET | Refills: 0 | Status: CANCELLED | OUTPATIENT
Start: 2021-07-29

## 2021-08-02 DIAGNOSIS — M32.9 SYSTEMIC LUPUS ERYTHEMATOSUS, UNSPECIFIED SLE TYPE, UNSPECIFIED ORGAN INVOLVEMENT STATUS (H): ICD-10-CM

## 2021-08-02 DIAGNOSIS — K13.79 MOUTH SORE: ICD-10-CM

## 2021-08-02 RX ORDER — TRIAMCINOLONE ACETONIDE 0.1 %
PASTE (GRAM) DENTAL 2 TIMES DAILY
Qty: 30 G | Refills: 1 | Status: SHIPPED | OUTPATIENT
Start: 2021-08-02 | End: 2023-09-29

## 2021-08-02 NOTE — TELEPHONE ENCOUNTER
Routing refill request to provider for review/approval because:  Drug not on the [facility] refill protocol           Pending Prescriptions:                       Disp   Refills    triamcinolone (KENALOG) 0.1 % paste [Pharm*       0        Sig: Take by mouth 2 times daily        Tom Guevara RN

## 2021-08-19 ENCOUNTER — LAB (OUTPATIENT)
Dept: LAB | Facility: CLINIC | Age: 46
End: 2021-08-19
Payer: COMMERCIAL

## 2021-08-19 DIAGNOSIS — M32.9 SYSTEMIC LUPUS ERYTHEMATOSUS, UNSPECIFIED SLE TYPE, UNSPECIFIED ORGAN INVOLVEMENT STATUS (H): ICD-10-CM

## 2021-08-19 DIAGNOSIS — Z79.899 HIGH RISK MEDICATION USE: ICD-10-CM

## 2021-08-19 LAB
ALBUMIN UR-MCNC: NEGATIVE MG/DL
APPEARANCE UR: CLEAR
BASOPHILS # BLD AUTO: 0 10E3/UL (ref 0–0.2)
BASOPHILS NFR BLD AUTO: 0 %
BILIRUB UR QL STRIP: NEGATIVE
COLOR UR AUTO: YELLOW
CREAT UR-MCNC: 137 MG/DL
CRP SERPL-MCNC: <2.9 MG/L (ref 0–8)
EOSINOPHIL # BLD AUTO: 0.4 10E3/UL (ref 0–0.7)
EOSINOPHIL NFR BLD AUTO: 4 %
ERYTHROCYTE [DISTWIDTH] IN BLOOD BY AUTOMATED COUNT: 13.5 % (ref 10–15)
ERYTHROCYTE [SEDIMENTATION RATE] IN BLOOD BY WESTERGREN METHOD: 8 MM/HR (ref 0–20)
GLUCOSE UR STRIP-MCNC: NEGATIVE MG/DL
HCT VFR BLD AUTO: 39.6 % (ref 35–47)
HGB BLD-MCNC: 13.1 G/DL (ref 11.7–15.7)
HGB UR QL STRIP: NEGATIVE
KETONES UR STRIP-MCNC: NEGATIVE MG/DL
LEUKOCYTE ESTERASE UR QL STRIP: NEGATIVE
LYMPHOCYTES # BLD AUTO: 1.7 10E3/UL (ref 0.8–5.3)
LYMPHOCYTES NFR BLD AUTO: 19 %
MCH RBC QN AUTO: 28.8 PG (ref 26.5–33)
MCHC RBC AUTO-ENTMCNC: 33.1 G/DL (ref 31.5–36.5)
MCV RBC AUTO: 87 FL (ref 78–100)
MONOCYTES # BLD AUTO: 1 10E3/UL (ref 0–1.3)
MONOCYTES NFR BLD AUTO: 11 %
NEUTROPHILS # BLD AUTO: 6.2 10E3/UL (ref 1.6–8.3)
NEUTROPHILS NFR BLD AUTO: 66 %
NITRATE UR QL: NEGATIVE
PH UR STRIP: 6 [PH] (ref 5–7)
PLATELET # BLD AUTO: 298 10E3/UL (ref 150–450)
PROT UR-MCNC: 0.16 G/L
PROT/CREAT 24H UR: 0.12 G/G CR (ref 0–0.2)
RBC # BLD AUTO: 4.55 10E6/UL (ref 3.8–5.2)
SP GR UR STRIP: 1.02 (ref 1–1.03)
UROBILINOGEN UR STRIP-ACNC: 0.2 E.U./DL
WBC # BLD AUTO: 9.3 10E3/UL (ref 4–11)

## 2021-08-19 PROCEDURE — 80053 COMPREHEN METABOLIC PANEL: CPT

## 2021-08-19 PROCEDURE — 81003 URINALYSIS AUTO W/O SCOPE: CPT

## 2021-08-19 PROCEDURE — 85652 RBC SED RATE AUTOMATED: CPT

## 2021-08-19 PROCEDURE — 36415 COLL VENOUS BLD VENIPUNCTURE: CPT

## 2021-08-19 PROCEDURE — 86160 COMPLEMENT ANTIGEN: CPT

## 2021-08-19 PROCEDURE — 86140 C-REACTIVE PROTEIN: CPT

## 2021-08-19 PROCEDURE — 86225 DNA ANTIBODY NATIVE: CPT

## 2021-08-19 PROCEDURE — 84156 ASSAY OF PROTEIN URINE: CPT

## 2021-08-19 PROCEDURE — 85025 COMPLETE CBC W/AUTO DIFF WBC: CPT

## 2021-08-19 PROCEDURE — 82550 ASSAY OF CK (CPK): CPT

## 2021-08-20 LAB
ALBUMIN SERPL-MCNC: 3.9 G/DL (ref 3.4–5)
ALP SERPL-CCNC: 29 U/L (ref 40–150)
ALT SERPL W P-5'-P-CCNC: 28 U/L (ref 0–50)
ANION GAP SERPL CALCULATED.3IONS-SCNC: 8 MMOL/L (ref 3–14)
AST SERPL W P-5'-P-CCNC: 25 U/L (ref 0–45)
BILIRUB SERPL-MCNC: 0.4 MG/DL (ref 0.2–1.3)
BUN SERPL-MCNC: 17 MG/DL (ref 7–30)
C3 SERPL-MCNC: 113 MG/DL (ref 81–157)
C4 SERPL-MCNC: 28 MG/DL (ref 13–39)
CALCIUM SERPL-MCNC: 8.8 MG/DL (ref 8.5–10.1)
CHLORIDE BLD-SCNC: 106 MMOL/L (ref 94–109)
CK SERPL-CCNC: 106 U/L (ref 30–225)
CO2 SERPL-SCNC: 22 MMOL/L (ref 20–32)
CREAT SERPL-MCNC: 0.81 MG/DL (ref 0.52–1.04)
GFR SERPL CREATININE-BSD FRML MDRD: 87 ML/MIN/1.73M2
GLUCOSE BLD-MCNC: 92 MG/DL (ref 70–99)
POTASSIUM BLD-SCNC: 4.1 MMOL/L (ref 3.4–5.3)
PROT SERPL-MCNC: 6.9 G/DL (ref 6.8–8.8)
SODIUM SERPL-SCNC: 136 MMOL/L (ref 133–144)

## 2021-08-23 LAB — DSDNA AB SER-ACNC: 11 IU/ML

## 2021-08-27 ENCOUNTER — VIRTUAL VISIT (OUTPATIENT)
Dept: RHEUMATOLOGY | Facility: CLINIC | Age: 46
End: 2021-08-27
Payer: COMMERCIAL

## 2021-08-27 DIAGNOSIS — F17.200 NICOTINE USE DISORDER: ICD-10-CM

## 2021-08-27 DIAGNOSIS — Z72.820 POOR SLEEP: ICD-10-CM

## 2021-08-27 DIAGNOSIS — M32.9 SYSTEMIC LUPUS ERYTHEMATOSUS, UNSPECIFIED SLE TYPE, UNSPECIFIED ORGAN INVOLVEMENT STATUS (H): Primary | ICD-10-CM

## 2021-08-27 DIAGNOSIS — Z79.899 HIGH RISK MEDICATION USE: ICD-10-CM

## 2021-08-27 DIAGNOSIS — M35.00 SECONDARY SJOGREN'S SYNDROME (H): ICD-10-CM

## 2021-08-27 PROCEDURE — 99214 OFFICE O/P EST MOD 30 MIN: CPT | Mod: 95 | Performed by: INTERNAL MEDICINE

## 2021-08-27 RX ORDER — CELECOXIB 100 MG/1
100 CAPSULE ORAL DAILY PRN
Qty: 30 CAPSULE | Refills: 3 | Status: SHIPPED | OUTPATIENT
Start: 2021-08-27 | End: 2021-12-01

## 2021-08-27 RX ORDER — LEFLUNOMIDE 20 MG/1
20 TABLET ORAL DAILY
Qty: 90 TABLET | Refills: 0 | Status: SHIPPED | OUTPATIENT
Start: 2021-08-27 | End: 2021-12-01

## 2021-08-27 RX ORDER — HYDROXYCHLOROQUINE SULFATE 200 MG/1
TABLET, FILM COATED ORAL
Qty: 135 TABLET | Refills: 0 | Status: SHIPPED | OUTPATIENT
Start: 2021-08-27 | End: 2021-12-30

## 2021-08-27 NOTE — PATIENT INSTRUCTIONS
RHEUMATOLOGY    Dr. Jack Arvizu    [address] [place] [address]  Phone number: [phone]  Fax number: [phone]      Thank you for choosing [facility]!    Bailey Resendez CMA Rheumatology    ----------------------

## 2021-08-27 NOTE — PROGRESS NOTES
Sosa Patel  is a 45 year old year old female who is being evaluated via a billable video visit.      How would you like to obtain your AVS? MyChart  If the video visit is dropped, the invitation should be resent by: Text to cell phone: [phone]  Will anyone else be joining your video visit? No     Rheumatology Video Visit      Sosa Patel  MRN# [id number]   YOB: 1975 Age: 45 year old      Date of visit: 8/27/21   PCP: Cynthia Scott    Chief Complaint   Patient presents with:  Systemic Lupus Erythematous    Assessment and Plan     1. Systemic Lupus Erythematosus (JESSE+, dsDNA+, arthritis, oral sores [SLE versus HSV related? - One-time had a culture that was negative], proteinuria, photosensitivity [fatigue]): Dx'd 10/2016; initially with arthritis that improved with HCQ.  Previously on MTX (fatigue, effective), MMF (resolved arthritis, rash, and brain fog; arthritis persisted though and eventually changed back to leflunomide).  Currently on HCQ 200mg daily, leflunomide 20mg daily, and Benlysta (SQ, starting mid March 2021; effective for arthritis and fatigue).  Improved with Benlysta that was started in mid March.  Oral sores suspected to be lupus related considering the negative herpes culture and improvement with Magic mouthwash and topical steroids.  Significant for metabolic fatigue and oral sores with initiation of Benlysta.  Only issue now is persistent mild ache at the MCPs and PIPs that is inflammatory in nature.  Discussed increasing hydroxychloroquine now and then depending on response may change leflunomide to azathioprine.  She has had a significant response to leflunomide though and I cannot guarantee that azathioprine will be as effective.  Currently using ibuprofen 800 mg 4 times daily and advised that we adjust her NSAID regimen in the short-term with hopes that hydroxychloroquine will be sufficient.  Discontinue ibuprofen and use Celebrex 100 mg twice daily instead.  She also  "notices benefit from using Excedrin, that could be related to the caffeine given her poor sleep, Tylenol that could be effective her joint pain, and/or aspirin that could be effective for joint pain.  Advised that she try using Tylenol in addition to NSAIDs to see if that is effective in combination with NSAIDs, but to limit Tylenol intake to no more than 4000 mg within a 24-hour period.  Hydroxychloroquine toxicity monitoring eye exam is required to safely continue.  Chronic illness, progressive.     - Continue Benlysta SQ every 7 days \"of the  - Increase hydroxychloroquine from 200mg daily; to 200 mg daily with an additional 200 mg every other day (last eye exam 1/22/2019)  - Continue leflunomide 20 mg daily  - Ophthalmology referral for hydroxychloroquine toxicity monitoring given previously and I stressed the importance of having this done; if the eye exam is not completed to safely continue hydroxychloroquine then it will need to be stopped; I stressed the importance and benefits of hydroxychloroquine for lupus.  - Discontinue ibuprofen  - Start Celebrex 100 mg twice daily as needed  - Labs in 3 months: CBC, CMP, ESR, CRP, C3, C4, dsDNA, UA, Uprotein:creatinine    High risk medication requiring intensive toxicity monitoring at least quarterly: labs ordered include CBC, Creatinine, Hepatic panel to monitor for cytopenia and hepatotoxicity; checking creatinine as it affects clearance of medication.      # Pregnancy Plans: on birth control (depo-provera)    # NSAIDs:  The risks and benefits of NSAIDs were discussed in detail and the patient verbalized understanding.  The risks discussed include, but are not limited to, the risk for hypersensitivity, anaphylaxis, GI upset, gastric ulcer, nephropathy, and an increased risk for cardiovascular events.     2.  Secondary Sjogren's syndrome: Mild symptoms that may be due to other factors such as caffeine intake, smoking, etc., but secondary Sjogren's Syndrome is " possible.  We discussed the importance of good oral hygiene, frequent sips of water, avoiding high sugar foods and liquids, avoiding oral irritants such as coffee, alcohol, and nicotine, and avoiding acidic drinks such as carbonated and high sugar beverages. She is already seeing a dentist regularly. Dry eyes improved with preservative free artificial tears but she often forgets to take them; was given Restasis by her father who is a pharmacist and is helped significantly.  She did not get punctal plugs from ophthalmology.  More dry eyes since not being on Restasis again as the sample given ran out.   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 E-cigarette Smoking:  Encouraged complete cessation and discussed the health benefits.        - COVID-19: has received the Pfizer COVID-19 vaccine on 2/12/2021 and 5/20/2021    A single additional dose of Pfizer COVID-19 vaccine or Moderna COVID-19 vaccine is recommended at least 28 days after the completion of the 2-dose mRNA vaccine series for patients receiving any immunosuppressive or immunomodulatory therapy. Attempts should be made to match the additional mRNA dose type to the type given in the mRNA primary series; however, if that is not feasible, a booster dose with the alternative mRNA vaccine is permitted.    Based on our current understanding (and this may change over time as we learn more), medications should be adjusted as noted below, if disease activity allows:  - NSAIDs and Acetaminophen: hold for 24 hours prior to vaccination if able to do so  - Leflunomide should be held for 1 week after the COVID19 booster vaccine    Total minutes spent in evaluation with patient, documentation, , and review of pertinent studies and chart notes: 32      Sosa Patel verbalized agreement with and understanding of the rational for the  diagnosis and treatment plan.  All questions were answered to best of my ability and the patient's satisfaction. Ms. Wolf was advised to contact the clinic with any questions that may arise after the clinic visit.      Thank you for involving me in the care of the patient    Return to clinic: 3-4 months      HPI   Sosa Patel  is a 45 year old female with a medical history significant for tobacco abuse, genital warts, and HSV type II who presents for follow-up of systemic lupus erythematosus.    Today, 8/27/2021: Has a whole lot more energy since being on Benlysta.  Feels great except for an infrequent sore in her mouth, and aches at her MCPs and PIPs that has improved with the initiation of Benlysta but is still there.  MCP and PIP pain is worse in the morning and improves with time activity; worse with inactivity.  No swelling of the joints.  No rash.  Still with fatigue that has not had a sleep evaluation but says that she will do so.  Says that she will also get her eye exam completed.    Denies fevers, chills, nausea, vomiting, constipation, diarrhea. No abdominal pain. No LE swelling. No neck pain.   No photosensitivity or photophobia. No history of inflammatory eye disease.  No history of DVT, pulmonary embolism, or miscarriage.   No history of serositis.  No history of Raynaud's Phenomenon.  No seizure history.  No known renal disorder.      Tobacco: quit smoking cigarettes; now smoking with ecigs  EtOH: rare, sober since 5/16/2010  Drugs: cocaine history; now sober  Occupation: counselor for mentally disabled people; adult foster care    ROS   12 point review of system was completed and negative except as noted in the HPI     Active Problem List     Patient Active Problem List   Diagnosis     Herpes simplex type 2 infection     Genital warts     CARDIOVASCULAR SCREENING; LDL GOAL LESS THAN 160     H/O intravenous drug use in remission     Surveillance for Depo-Provera contraception     JESSE positive      Multiple joint pain     Cocaine use     Systemic lupus erythematosus (H)     High risk medications (not anticoagulants) long-term use     Sicca syndrome (H)     Mood disorder (H)     Lichen sclerosus     Past Medical History     Past Medical History:   Diagnosis Date     Bipolar disorder (H) 12/04    Dx'd     H/O: depression     SUICIDE ATTEMPT AGE 15     HPV (human papillomavirus)      Past Surgical History     Past Surgical History:   Procedure Laterality Date     BUNIONECTOMY RT/LT       HC ENLARGE BREAST WITH IMPLANT  2000     Allergy   No Known Allergies  Current Medication List     Current Outpatient Medications   Medication Sig     Aspirin-Acetaminophen-Caffeine (EXCEDRIN PO)      Belimumab 200 MG/ML SOAJ Inject 200 mg Subcutaneous every 7 days . Hold for signs of infection and seek medical attention.     buPROPion (WELLBUTRIN SR) 150 MG 12 hr tablet Take 1 tablet (150 mg) by mouth daily     clobetasol (TEMOVATE) 0.05 % external cream Apply topically 2 times daily     cycloSPORINE (RESTASIS) 0.05 % ophthalmic emulsion Place 1 drop into both eyes 2 times daily     FLUoxetine (PROZAC) 40 MG capsule TAKE ONE CAPSULE BY MOUTH ONCE DAILY     hydroxychloroquine (PLAQUENIL) 200 MG tablet Take 1 tablet (200 mg) by mouth daily     IBUPROFEN PO      leflunomide (ARAVA) 20 MG tablet Take 1 tablet (20 mg) by mouth daily ; labs required every 12 weeks     magic mouthwash suspension, diphenhydrAMINE, lidocaine, aluminum-magnesium & simethicone, (FIRST-MOUTHWASH BLM) compounding kit Swish and swallow 5-10 mLs in mouth every 6 hours as needed for mouth sores     triamcinolone (KENALOG) 0.1 % paste TAKE BY MOUTH 2 TIMES DAILY     No current facility-administered medications for this visit.     Social History   See HPI    Family History     Family History   Problem Relation Age of Onset     Cancer Father         lung cancer     Diabetes Maternal Grandmother      Depression Maternal Grandmother      Lipids Maternal Grandmother   "    Cerebrovascular Disease Maternal Grandfather      Lipids Maternal Grandfather      Circulatory Maternal Grandfather         MI in 50's     Depression Brother         depression and OCD     Gastrointestinal Disease Brother         IBS     Depression Mother      Thyroid Disease No family hx of      Asthma No family hx of      Denies family history of autoimmune disease     Physical Exam     Temp Readings from Last 3 Encounters:   03/11/21 97.6  F (36.4  C) (Oral)   10/30/20 97.9  F (36.6  C) (Oral)   11/15/19 96.7  F (35.9  C) (Oral)     BP Readings from Last 5 Encounters:   03/11/21 125/77   10/30/20 121/76   11/15/19 100/70   11/07/19 120/82   09/10/19 100/67     Pulse Readings from Last 1 Encounters:   03/11/21 105     Resp Readings from Last 1 Encounters:   11/04/16 18     Estimated body mass index is 24.23 kg/m  as calculated from the following:    Height as of 11/7/19: 1.67 m (5' 5.75\").    Weight as of 3/11/21: 67.6 kg (149 lb).    GEN: NAD. Healthy appearing adult.   HEENT: MMM.  Anicteric, noninjected sclera. No obvious external lesions of the ear and nose. Hearing intact.  PULM: No increased work of breathing  MSK:  Hands and wrists without swelling.   SKIN: No rash or jaundice seen  PSYCH: Alert. Appropriate.      Labs / Imaging (select studies)     RF/CCP  Recent Labs   Lab Test 10/11/16  1043 08/10/16  1059   CCPIGG 1  --    RHF  --  <20     JESSE  Recent Labs   Lab Test 08/10/16  1059   ROBYN 1.7*     RNP/Sm/SSA/SSB  Recent Labs   Lab Test 10/11/16  1043 12/10/13  1118   RNPIGG 0.3  --    SMIGG <0.2  Negative   Antibody index (AI) values reflect qualitative changes in antibody   concentration that cannot be directly associated with clinical condition or   disease state.    --    SSAIGG <0.2  Negative   Antibody index (AI) values reflect qualitative changes in antibody   concentration that cannot be directly associated with clinical condition or   disease state.    --    SSBIGG <0.2  Negative   Antibody " index (AI) values reflect qualitative changes in antibody   concentration that cannot be directly associated with clinical condition or   disease state.    --    SCLIGG <0.2  Negative   Antibody index (AI) values reflect qualitative changes in antibody   concentration that cannot be directly associated with clinical condition or   disease state.    --    TREPAB  --  Negative     dsDNA  Recent Labs   Lab Test 08/19/21  1056 04/20/21  1241 01/11/21  1315 10/16/19  1707 04/04/19  1627 12/13/18  1354 09/21/17  0818 05/01/17  0936 01/13/17  1015   DNA 11.0* 11* 17* 14* 14* 19* 25* 37* 45*     C3/C4  Recent Labs   Lab Test 08/19/21  1056 04/20/21  1241 01/11/21  1315 10/16/19  1707 04/04/19  1627 12/13/18  1354 09/21/17  0818 05/01/17  0936 01/13/17  1015   G5HMPOZ 113 103 109 90 120 80 124 80 87   X0UJJRB 28 27 25 19 26 19 24 17 22     CBC  Recent Labs   Lab Test 08/19/21  1057 04/20/21  1241 01/11/21  1315 09/10/20  1308   WBC 9.3 6.0 6.2 6.1   RBC 4.55 4.28 4.46 4.44   HGB 13.1 12.1 12.7 12.4   HCT 39.6 35.7 39.1 37.8   MCV 87 83 88 85   RDW 13.5 12.9 13.0 13.6    213 202 235   MCH 28.8 28.3 28.5 27.9   MCHC 33.1 33.9 32.5 32.8   NEUTROPHIL 66 54.6 59.5 54.5   LYMPH 19 31.6 26.4 30.0   MONOCYTE 11 9.9 10.3 10.5   EOSINOPHIL 4 3.6 3.5 4.3   BASOPHIL 0 0.3 0.3 0.7   ANEU  --  3.3 3.7 3.3   ALYM  --  1.9 1.6 1.8   DAQUAN  --  0.6 0.6 0.6   AEOS  --  0.2 0.2 0.3   ABAS  --  0.0 0.0 0.0   ANEUTAUTO 6.2  --   --   --    ALYMPAUTO 1.7  --   --   --    AMONOAUTO 1.0  --   --   --    AEOSAUTO 0.4  --   --   --    ABSBASO 0.0  --   --   --      CMP  Recent Labs   Lab Test 08/19/21  1056 04/20/21  1241 01/11/21  1315 09/10/20  1308 10/16/19  1708 04/04/19  1627    138 139 138 139 142   POTASSIUM 4.1 3.6 3.9 4.1 3.9 4.2   CHLORIDE 106 108 110* 106 109 110*   CO2 22 23 26 28 24 26   ANIONGAP 8 7 3 4 6 6   GLC 92 117* 78 85 83 83   BUN 17 25 15 17 17 15   CR 0.81 0.95 0.67 0.77 0.75 0.69   GFRESTIMATED 87 72 >90 >90 >90  >90   GFRESTBLACK  --  83 >90 >90 >90 >90   RICHARD 8.8 8.3* 8.7 8.9 9.1 9.1   BILITOTAL 0.4 0.4 0.3 0.3 0.3 0.2   ALBUMIN 3.9 3.8 4.0 3.8 4.2 3.9   PROTTOTAL 6.9 6.6* 6.9 6.9 7.3 7.2   ALKPHOS 29* 24* 22* 28* 23* 34*   AST 25 13 18 18 18 23   ALT 28 23 35 32 23 36     Calcium/VitaminD  Recent Labs   Lab Test 08/19/21  1056 04/20/21  1241 01/11/21  1315   RICHARD 8.8 8.3* 8.7     ESR/CRP  Recent Labs   Lab Test 08/19/21  1057 08/19/21  1056 04/20/21  1241 01/11/21  1315   SED 8  --  7 5   CRP  --  <2.9 <2.9 <2.9     CK/Aldolase  Recent Labs   Lab Test 08/19/21  1056 04/20/21  1241 01/11/21  1315 10/11/16  1043    71 88 65   ALDOLASE  --   --   --  4.5     TSH/T4  Recent Labs   Lab Test 10/11/16  1043   TSH 1.48     Hepatitis B  Recent Labs   Lab Test 02/26/21  0927 10/11/16  1043   HBCAB Nonreactive Nonreactive   HEPBANG Nonreactive Nonreactive     Hepatitis C  Recent Labs   Lab Test 02/26/21  0927 10/11/16  1043 12/10/13  1118   HCVAB Nonreactive Nonreactive   Assay performance characteristics have not been established for newborns,   infants, and children   Negative     Tuberculosis Screening  Recent Labs   Lab Test 06/28/21  1700 02/26/21  0927   TBRST Negative Negative     HIV Screening  Recent Labs   Lab Test 10/11/16  1043   HIAGAB Nonreactive   HIV-1 p24 Ag & HIV-1/HIV-2 Ab Not Detected       UA  Recent Labs   Lab Test 08/19/21  1058 02/26/21  0937 09/10/20  1315 11/04/19  1747 12/13/18  1355 09/21/17  0815 05/01/17  0954   COLOR Yellow Yellow Yellow Yellow Yellow Yellow Yellow   APPEARANCE Clear Clear Clear Clear Clear Clear Clear   URINEGLC Negative Negative Negative Negative Negative Negative Negative   URINEBILI Negative Negative Negative Negative Negative Negative Small  This is an unconfirmed screening test result. A positive result may be false.  *   SG 1.025 1.015 1.020 1.015 >1.030 >1.030 >1.030   URINEPH 6.0 7.0 7.5* 8.0* 5.5 6.0 6.0   PROTEIN Negative Negative Negative Trace* Negative Negative  Trace*   UROBILINOGEN 0.2 0.2 0.2 0.2 0.2 0.2 0.2   NITRITE Negative Negative Negative Negative Negative Negative Negative   UBLD Negative Negative Negative Negative Small* Trace* Small*   LEUKEST Negative Negative Negative Negative Negative Negative Negative   WBCU  --   --   --  0 - 5 0 - 5 O - 2 O - 2   RBCU  --   --   --  O - 2 O - 2 O - 2 O - 2   SQUAMOUSEPI  --   --   --  Few Moderate* Few Few   BACTERIA  --   --   --  Few*  --   --   --    MUCOUS  --   --   --   --   --   --  Present*     Urine Microscopic  Recent Labs   Lab Test 11/04/19  1747 12/13/18  1355 09/21/17  0815 05/01/17  0954   WBCU 0 - 5 0 - 5 O - 2 O - 2   RBCU O - 2 O - 2 O - 2 O - 2   SQUAMOUSEPI Few Moderate* Few Few   BACTERIA Few*  --   --   --    MUCOUS  --   --   --  Present*     Urine Protein  Recent Labs   Lab Test 08/19/21  1058 09/10/20  1315 11/04/19  1747 04/04/19  1625   UTP 0.16 0.15 0.21 0.11   UTPG 0.12 0.17 0.16 0.11   UCRR 137 88  --  100     Immunization History     Immunization History   Administered Date(s) Administered     Influenza Vaccine IM > 6 months Valent IIV4 12/19/2014, 10/16/2015, 11/04/2016, 12/13/2018, 11/07/2019     Mantoux Tuberculin Skin Test 07/30/2018     Pneumo Conj 13-V (2010&after) 12/13/2018     Pneumococcal 23 valent 04/04/2019     TD (ADULT, 7+) 02/23/2005, 04/09/2019     TDAP Vaccine (Adacel) 04/14/2009     Zoster vaccine recombinant adjuvanted (SHINGRIX) 12/21/2018, 04/04/2019          Chart documentation done in part with Dragon Voice recognition Software. Although reviewed after completion, some word and grammatical error may remain.      Video-Visit Details    Type of service:  Video Visit    Video Start Time: 9:45 AM    Video End Time:10 AM    Originating Location (pt. Location): [place], [place]    Distant Location (provider location):  Home    Platform used for Video Visit: Dilshad Arvizu MD

## 2021-08-30 ENCOUNTER — TELEPHONE (OUTPATIENT)
Dept: LAB | Facility: CLINIC | Age: 46
End: 2021-08-30
Payer: COMMERCIAL

## 2021-08-30 DIAGNOSIS — M19.042 PRIMARY OSTEOARTHRITIS OF BOTH HANDS: Primary | ICD-10-CM

## 2021-08-30 DIAGNOSIS — M19.041 PRIMARY OSTEOARTHRITIS OF BOTH HANDS: Primary | ICD-10-CM

## 2021-08-30 NOTE — TELEPHONE ENCOUNTER
Patient came to the pharmacy asking about a gel for her hands that the doctor talked about in her appt.  I have a feeling it is Voltaren but don't see a prescription sent.     Please advise.     Thank you,  Cassia Cespedes, PharmD  [facility]  [phone]

## 2021-09-02 NOTE — TELEPHONE ENCOUNTER
1. Primary osteoarthritis of both hands  - diclofenac (VOLTAREN) 1 % topical gel; Apply up to 2 grams of 1% gel to hands up to 4 times daily as needed for joint pain (maximum: 8 g per upper extremity per day)  Dispense: 200 g; Refill: 2    Jack Arvizu MD  9/2/2021 12:59 AM

## 2021-09-07 ENCOUNTER — VIRTUAL VISIT (OUTPATIENT)
Dept: FAMILY MEDICINE | Facility: CLINIC | Age: 46
End: 2021-09-07
Payer: COMMERCIAL

## 2021-09-07 DIAGNOSIS — F33.41 RECURRENT MAJOR DEPRESSIVE DISORDER, IN PARTIAL REMISSION (H): ICD-10-CM

## 2021-09-07 DIAGNOSIS — F39 MOOD DISORDER (H): ICD-10-CM

## 2021-09-07 DIAGNOSIS — N92.0 MENORRHAGIA WITH REGULAR CYCLE: Primary | ICD-10-CM

## 2021-09-07 PROCEDURE — 99214 OFFICE O/P EST MOD 30 MIN: CPT | Mod: 95 | Performed by: PHYSICIAN ASSISTANT

## 2021-09-07 RX ORDER — FLUOXETINE 40 MG/1
CAPSULE ORAL
Qty: 90 CAPSULE | Refills: 0 | Status: SHIPPED | OUTPATIENT
Start: 2021-09-07 | End: 2022-01-21

## 2021-09-07 RX ORDER — BUPROPION HYDROCHLORIDE 150 MG/1
150 TABLET, EXTENDED RELEASE ORAL DAILY
Qty: 30 TABLET | Refills: 0 | Status: CANCELLED | OUTPATIENT
Start: 2021-09-07

## 2021-09-07 RX ORDER — BUPROPION HYDROCHLORIDE 150 MG/1
150 TABLET, EXTENDED RELEASE ORAL DAILY
Qty: 90 TABLET | Refills: 1 | Status: SHIPPED | OUTPATIENT
Start: 2021-09-07 | End: 2022-03-30

## 2021-09-07 ASSESSMENT — ANXIETY QUESTIONNAIRES
2. NOT BEING ABLE TO STOP OR CONTROL WORRYING: SEVERAL DAYS
5. BEING SO RESTLESS THAT IT IS HARD TO SIT STILL: SEVERAL DAYS
IF YOU CHECKED OFF ANY PROBLEMS ON THIS QUESTIONNAIRE, HOW DIFFICULT HAVE THESE PROBLEMS MADE IT FOR YOU TO DO YOUR WORK, TAKE CARE OF THINGS AT HOME, OR GET ALONG WITH OTHER PEOPLE: SOMEWHAT DIFFICULT
7. FEELING AFRAID AS IF SOMETHING AWFUL MIGHT HAPPEN: SEVERAL DAYS
GAD7 TOTAL SCORE: 5
6. BECOMING EASILY ANNOYED OR IRRITABLE: SEVERAL DAYS
1. FEELING NERVOUS, ANXIOUS, OR ON EDGE: SEVERAL DAYS
3. WORRYING TOO MUCH ABOUT DIFFERENT THINGS: NOT AT ALL

## 2021-09-07 ASSESSMENT — PATIENT HEALTH QUESTIONNAIRE - PHQ9
SUM OF ALL RESPONSES TO PHQ QUESTIONS 1-9: 8
5. POOR APPETITE OR OVEREATING: NOT AT ALL
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
## 2021-09-07 NOTE — LETTER
September 7, 2021      Sosa Patel  [address]   [place] [address]        To Whom It May Concern,      Document ID [id number]   Issue ID # [id number]    Sosa is able to work without restrictions.        Sincerely,        Cynthia Scott PA-C

## 2021-09-08 ASSESSMENT — ANXIETY QUESTIONNAIRES: GAD7 TOTAL SCORE: 5

## 2021-09-19 ENCOUNTER — HEALTH MAINTENANCE LETTER (OUTPATIENT)
Age: 46
End: 2021-09-19

## 2021-11-01 ENCOUNTER — VIRTUAL VISIT (OUTPATIENT)
Dept: FAMILY MEDICINE | Facility: CLINIC | Age: 46
End: 2021-11-01
Payer: COMMERCIAL

## 2021-11-01 DIAGNOSIS — M79.671 PAIN OF RIGHT HEEL: Primary | ICD-10-CM

## 2021-11-01 PROCEDURE — 99213 OFFICE O/P EST LOW 20 MIN: CPT | Mod: 95 | Performed by: FAMILY MEDICINE

## 2021-11-01 NOTE — PROGRESS NOTES
Sosa is a 46 year old who is being evaluated via a billable video visit.    How would you like to obtain your AVS? MyChart  If the video visit is dropped, the invitation should be resent by: Text to cell phone: [phone]  Will anyone else be joining your video visit? No      Video Start Time: 10.10 AM    Assessment & Plan     Pain of right heel  Advised stretching  Probable Plantar fasciitis  Avoid High Impact exercise Like Treadmill or Jogging or walking Long distance  Wear a Good shoes Like a Good Cross Maria G  Aleve otc  Follow up PMD 10 days if not better    Return in about 10 days (around 11/11/2021) for recheck/Please schedule appointment.    Angelita Ramirez MD  [facility]    Wendy Swan is a 46 year old who presents for the following health issues   HPI   Chief Complaint   Patient presents with     Heel Pain     Patient wants to discuss right heel pain that started about two days ago and is pretty constant    started 2 days ago   Comes and Goes  It is a constant ache.stabbing  It Hurts to Drive  Tried elevating this and Put Ice  Was on the Treadmill 2 days ago    No other trauma    Review of Systems   Rest of the ROS is Negative except see above and Problem list [stable]        Objective           Vitals:  No vitals were obtained today due to virtual visit.    Physical Exam   GENERAL: Healthy, alert and no distress  RESP: No audible wheeze, cough, or visible cyanosis.  No visible retractions or increased work of breathing.    SKIN: Visible skin clear. No significant rash, abnormal pigmentation or lesions.  NEURO: Cranial nerves grossly intact.  Mentation and speech appropriate for age.  PSYCH: Mentation appears normal, affect normal/bright, judgement and insight intact, normal speech and appearance well-groomed.  Right foot  Pt points to the medial aspect Foot and plantara fascii where she has pain  No erythema or swelling seen on video    none    Video-Visit Details    Type  of service:  Video Visit    Video End Time:10:13 AM    Originating Location (pt. Location): Home    Distant Location (provider location):  [facility]     Platform used for Video Visit: [employer]   10:17 AM -visit complete

## 2021-11-04 DIAGNOSIS — M35.00 SECONDARY SJOGREN'S SYNDROME (H): ICD-10-CM

## 2021-11-04 DIAGNOSIS — M32.9 SYSTEMIC LUPUS ERYTHEMATOSUS, UNSPECIFIED SLE TYPE, UNSPECIFIED ORGAN INVOLVEMENT STATUS (H): ICD-10-CM

## 2021-11-04 NOTE — TELEPHONE ENCOUNTER
Routing refill request to provider for review/approval because:  Drug not on the [facility] refill protocol     LOV: 8/27/21  Next f/u: 12/1/21    Joel HOBBS RN....11/4/2021 2:42 PM

## 2021-11-08 DIAGNOSIS — F39 MOOD DISORDER (H): ICD-10-CM

## 2021-11-08 RX ORDER — CYCLOSPORINE 0.5 MG/ML
1 EMULSION OPHTHALMIC 2 TIMES DAILY
Qty: 11 ML | Refills: 2 | Status: SHIPPED | OUTPATIENT
Start: 2021-11-08 | End: 2022-06-09

## 2021-11-10 DIAGNOSIS — M32.9 SYSTEMIC LUPUS ERYTHEMATOSUS, UNSPECIFIED SLE TYPE, UNSPECIFIED ORGAN INVOLVEMENT STATUS (H): ICD-10-CM

## 2021-11-10 DIAGNOSIS — M35.00 SECONDARY SJOGREN'S SYNDROME (H): ICD-10-CM

## 2021-11-10 NOTE — TELEPHONE ENCOUNTER
"Prescription approved per [facility] Refill Protocol.  Requested Prescriptions   Pending Prescriptions Disp Refills     FLUoxetine (PROZAC) 20 MG capsule [Pharmacy Med Name: FLUOXETINE HCL 20MG CAPS] 30 capsule 1     Sig: TAKE 1 CAPSULE (20 MG) BY MOUTH DAILY TAKE WITH 40MG       [facility] Protocol Passed - 11/8/2021  9:47 AM        Passed - Recent (12 mo) or future (30 days) visit within the authorizing provider's specialty     Patient has had an office visit with the authorizing provider or a provider within the authorizing providers department within the previous 12 mos or has a future within next 30 days. See \"Patient Info\" tab in inbasket, or \"Choose Columns\" in Meds & Orders section of the refill encounter.              Passed - Medication is active on med list        Passed - Patient is age 18 or older        Passed - No active pregnancy on record        Passed - No positive pregnancy test in last 12 months           Celia Magallon RN on 11/10/2021 at 2:17 PM    "

## 2021-11-11 RX ORDER — CYCLOSPORINE 0.5 MG/ML
1 EMULSION OPHTHALMIC 2 TIMES DAILY
Qty: 11 ML | Refills: 2 | OUTPATIENT
Start: 2021-11-11

## 2021-11-14 ENCOUNTER — HEALTH MAINTENANCE LETTER (OUTPATIENT)
Age: 46
End: 2021-11-14

## 2021-11-30 ENCOUNTER — LAB (OUTPATIENT)
Dept: LAB | Facility: CLINIC | Age: 46
End: 2021-11-30
Payer: COMMERCIAL

## 2021-11-30 DIAGNOSIS — M32.9 SYSTEMIC LUPUS ERYTHEMATOSUS, UNSPECIFIED SLE TYPE, UNSPECIFIED ORGAN INVOLVEMENT STATUS (H): ICD-10-CM

## 2021-11-30 DIAGNOSIS — Z79.899 HIGH RISK MEDICATION USE: ICD-10-CM

## 2021-11-30 LAB
BASOPHILS # BLD AUTO: 0 10E3/UL (ref 0–0.2)
BASOPHILS NFR BLD AUTO: 0 %
CRP SERPL-MCNC: <2.9 MG/L (ref 0–8)
EOSINOPHIL # BLD AUTO: 0.3 10E3/UL (ref 0–0.7)
EOSINOPHIL NFR BLD AUTO: 5 %
ERYTHROCYTE [DISTWIDTH] IN BLOOD BY AUTOMATED COUNT: 14.4 % (ref 10–15)
ERYTHROCYTE [SEDIMENTATION RATE] IN BLOOD BY WESTERGREN METHOD: 8 MM/HR (ref 0–20)
HCT VFR BLD AUTO: 37.3 % (ref 35–47)
HGB BLD-MCNC: 12.1 G/DL (ref 11.7–15.7)
LYMPHOCYTES # BLD AUTO: 1.5 10E3/UL (ref 0.8–5.3)
LYMPHOCYTES NFR BLD AUTO: 23 %
MCH RBC QN AUTO: 26.1 PG (ref 26.5–33)
MCHC RBC AUTO-ENTMCNC: 32.4 G/DL (ref 31.5–36.5)
MCV RBC AUTO: 81 FL (ref 78–100)
MONOCYTES # BLD AUTO: 0.6 10E3/UL (ref 0–1.3)
MONOCYTES NFR BLD AUTO: 9 %
NEUTROPHILS # BLD AUTO: 4 10E3/UL (ref 1.6–8.3)
NEUTROPHILS NFR BLD AUTO: 63 %
PLATELET # BLD AUTO: 276 10E3/UL (ref 150–450)
RBC # BLD AUTO: 4.63 10E6/UL (ref 3.8–5.2)
WBC # BLD AUTO: 6.4 10E3/UL (ref 4–11)

## 2021-11-30 PROCEDURE — 86225 DNA ANTIBODY NATIVE: CPT

## 2021-11-30 PROCEDURE — 86160 COMPLEMENT ANTIGEN: CPT | Mod: 59

## 2021-11-30 PROCEDURE — 36415 COLL VENOUS BLD VENIPUNCTURE: CPT

## 2021-11-30 PROCEDURE — 80053 COMPREHEN METABOLIC PANEL: CPT

## 2021-11-30 PROCEDURE — 85652 RBC SED RATE AUTOMATED: CPT

## 2021-11-30 PROCEDURE — 85025 COMPLETE CBC W/AUTO DIFF WBC: CPT

## 2021-11-30 PROCEDURE — 86140 C-REACTIVE PROTEIN: CPT

## 2021-11-30 PROCEDURE — 86160 COMPLEMENT ANTIGEN: CPT

## 2021-12-01 ENCOUNTER — OFFICE VISIT (OUTPATIENT)
Dept: RHEUMATOLOGY | Facility: CLINIC | Age: 46
End: 2021-12-01
Payer: COMMERCIAL

## 2021-12-01 VITALS
WEIGHT: 160.2 LBS | SYSTOLIC BLOOD PRESSURE: 117 MMHG | HEART RATE: 79 BPM | DIASTOLIC BLOOD PRESSURE: 74 MMHG | BODY MASS INDEX: 25.75 KG/M2 | OXYGEN SATURATION: 93 % | HEIGHT: 66 IN

## 2021-12-01 DIAGNOSIS — M32.9 SYSTEMIC LUPUS ERYTHEMATOSUS, UNSPECIFIED SLE TYPE, UNSPECIFIED ORGAN INVOLVEMENT STATUS (H): Primary | ICD-10-CM

## 2021-12-01 DIAGNOSIS — G47.19 EXCESSIVE DAYTIME SLEEPINESS: ICD-10-CM

## 2021-12-01 DIAGNOSIS — Z79.899 HIGH RISK MEDICATION USE: ICD-10-CM

## 2021-12-01 DIAGNOSIS — Z23 NEED FOR PROPHYLACTIC VACCINATION AND INOCULATION AGAINST INFLUENZA: ICD-10-CM

## 2021-12-01 LAB
ALBUMIN SERPL-MCNC: 3.8 G/DL (ref 3.4–5)
ALP SERPL-CCNC: 31 U/L (ref 40–150)
ALT SERPL W P-5'-P-CCNC: 38 U/L (ref 0–50)
ANION GAP SERPL CALCULATED.3IONS-SCNC: 5 MMOL/L (ref 3–14)
AST SERPL W P-5'-P-CCNC: 16 U/L (ref 0–45)
BILIRUB SERPL-MCNC: 0.3 MG/DL (ref 0.2–1.3)
BUN SERPL-MCNC: 24 MG/DL (ref 7–30)
C3 SERPL-MCNC: 120 MG/DL (ref 81–157)
C4 SERPL-MCNC: 30 MG/DL (ref 13–39)
CALCIUM SERPL-MCNC: 9.7 MG/DL (ref 8.5–10.1)
CHLORIDE BLD-SCNC: 109 MMOL/L (ref 94–109)
CO2 SERPL-SCNC: 27 MMOL/L (ref 20–32)
CREAT SERPL-MCNC: 0.83 MG/DL (ref 0.52–1.04)
DSDNA AB SER-ACNC: 13 IU/ML
GFR SERPL CREATININE-BSD FRML MDRD: 84 ML/MIN/1.73M2
GLUCOSE BLD-MCNC: 93 MG/DL (ref 70–99)
POTASSIUM BLD-SCNC: 4 MMOL/L (ref 3.4–5.3)
PROT SERPL-MCNC: 7.2 G/DL (ref 6.8–8.8)
SODIUM SERPL-SCNC: 141 MMOL/L (ref 133–144)

## 2021-12-01 PROCEDURE — 99214 OFFICE O/P EST MOD 30 MIN: CPT | Performed by: INTERNAL MEDICINE

## 2021-12-01 RX ORDER — LEFLUNOMIDE 20 MG/1
20 TABLET ORAL DAILY
Qty: 90 TABLET | Refills: 0 | Status: SHIPPED | OUTPATIENT
Start: 2021-12-01 | End: 2022-04-05

## 2021-12-01 RX ORDER — CELECOXIB 100 MG/1
100 CAPSULE ORAL 2 TIMES DAILY
Qty: 180 CAPSULE | Refills: 1 | Status: SHIPPED | OUTPATIENT
Start: 2021-12-01 | End: 2022-04-12

## 2021-12-01 ASSESSMENT — MIFFLIN-ST. JEOR: SCORE: 1374.44

## 2021-12-01 NOTE — PROGRESS NOTES
Rheumatology Clinic Visit      Sosa Patel  MRN# [id number]   YOB: 1975 Age: 46 year old      Date of visit: 12/01/21   PCP: Cynthia Scott    Chief Complaint   Patient presents with:  Systemic Lupus Erythematous: Still a little tired but otherwise good. Would like to know if she can do celebrex twice a day instead of once.    Assessment and Plan     1. Systemic Lupus Erythematosus (JESSE+, dsDNA+, arthritis, oral sores [SLE versus HSV related? - One-time had a culture that was negative], proteinuria, photosensitivity [fatigue]): Dx'd 10/2016; initially with arthritis that improved with HCQ.  Previously on MTX (fatigue, effective), MMF (resolved arthritis, rash, and brain fog; arthritis persisted though and eventually changed back to leflunomide).  Currently on HCQ 200mg daily, leflunomide 20mg daily, and Benlysta (SQ, starting mid March 2021; effective for arthritis and fatigue).  Also using Celebrex 100 mg daily with improvement but would like to use it twice daily and this would be okay.  Arthritis is controlled.  Still with fatigue that is likely sleep related; see #5.  Eye exam is required to continue hydroxychloroquine; she has canceled multiple appointments with ophthalmology.  Chronic illness, stable.     - Continue Benlysta SQ every 7 days   - If eye exam, including 10-2 VF and SD-OCT, is okay then may continue hydroxychloroquine from 200mg daily with an additional 200 mg every other day (last eye exam 1/22/2019; no further refills until eye exam completed)  - Continue leflunomide 20 mg daily  - Ophthalmology referral reprinted for her and I advised that she call to schedule  - Increase Celebrex from 100 mg daily as needed, 200 mg twice daily as needed.    - Labs in 3 months: CBC, CMP, ESR, CRP, C3, C4, dsDNA, UA, Uprotein:creatinine    High risk medication requiring intensive toxicity monitoring at least quarterly: labs ordered include CBC, Creatinine, Hepatic panel to monitor for  cytopenia and hepatotoxicity; checking creatinine as it affects clearance of medication.      # Pregnancy Plans: on birth control (depo-provera)    # Hydroxychloroquine (Plaquenil) Risks and Benefits:  The risks and benefits of hydroxychloroquine were discussed in detail and the patient verbalized understanding; the patient also verbalized agreement to get the required ophthalmologic toxicity monitoring.  The risks discussed include, but are not limited to, the risk for hypersensitivity, anaphylaxis, anaphylactoid reactions, irreversible retinal damage, rare hematologic reactions, and rare cardiomyopathy.  Patients with G6PD deficiency or hepatic impairment may be at an increased risk for adverse effects.  I encouraged reviewing the package insert and asking any questions about the medication.       2.  Secondary Sjogren's syndrome: Mild symptoms that may be due to other factors such as caffeine intake, smoking, etc., but secondary Sjogren's Syndrome is possible.  We discussed the importance of good oral hygiene, frequent sips of water, avoiding high sugar foods and liquids, avoiding oral irritants such as coffee, alcohol, and nicotine, and avoiding acidic drinks such as carbonated and high sugar beverages. She is already seeing a dentist regularly. Dry eyes improved with preservative free artificial tears but she often forgets to take them; was given Restasis by her father who is a pharmacist and is helped significantly.  She did not get punctal plugs from ophthalmology.  More dry eyes since not being on Restasis again as the sample given ran out.   Chronic illness  -Continue artificial tears and Restasis    3. 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 E-cigarette Smoking:  Encouraged complete cessation and discussed the health benefits.        5.  Excessive daytime sleepiness, fatigue: Wakes up tired, reports witnessed apneic episodes at night.  Snores.   Referred for sleep evaluation previously but she has not scheduled this appointment.  Referral reprinted for her today and I advised that she call to schedule an appointment.    6.  Vaccinations: Vaccinations reviewed with Ms. Patel.  Risks and benefits of vaccinations were discussed.    - Influenza: To receive today  - [id number]: up to date  - [id number]: up to date  - Shingrix: Up to date  - COVID-19: has received the Pfizer COVID-19 vaccine on 2/12/2021 and 5/20/2021    A single additional dose of the Pfizer or Moderna COVID-19 vaccine is recommended at least 28 days after the completion of the 2-dose mRNA vaccine series for patients receiving any immunosuppressive or immunomodulatory therapy. Attempts should be made to match the additional mRNA dose type to the type given in the mRNA primary series; however, if that is not feasible, a booster dose with the alternative mRNA vaccine is permitted.    Based on our current understanding (and this may change over time as we learn more), medications should be adjusted as noted below, if disease activity allows:  - NSAIDs and Acetaminophen: hold for 24 hours prior to vaccination if able to do so  - Leflunomide should be held for 1 week after the COVID19 booster vaccine    Total minutes spent in evaluation with patient, documentation, , and review of pertinent studies and chart notes: 23     Sosa Patel verbalized agreement with and understanding of the rational for the diagnosis and treatment plan.  All questions were answered to best of my ability and the patient's satisfaction. Ms. Wolf was advised to contact the clinic with any questions that may arise after the clinic visit.      Thank you for involving me in the care of the patient    Return to clinic: 3-4 months      HPI   Sosa Patel  is a 46 year old female with a medical history significant for tobacco abuse, genital warts, and HSV type II who presents for follow-up of systemic lupus  erythematosus.    Today, 12/1/2021: Overall improved since being on Benlysta.  Has had one sore in her nose that was painful and lasted about a week.  Morning stiffness for about 30 minutes.  No rash.  Fatigue; has not had a sleep evaluation yet; she reports that now she wakes up tired, snores, and has a witnessed apneic episode.  Had eye appointments for hydroxychloroquine toxicity monitoring but she reports that she had to cancel those appointments and is now overdue for monitoring.    Denies fevers, chills, nausea, vomiting, constipation, diarrhea. No abdominal pain. No LE swelling. No neck pain.   No photosensitivity or photophobia. No history of inflammatory eye disease.  No history of DVT, pulmonary embolism, or miscarriage.   No history of serositis.  No history of Raynaud's Phenomenon.  No seizure history.  No known renal disorder.      Tobacco: quit smoking cigarettes; now smoking with ecigs  EtOH: rare, sober since 5/16/2010  Drugs: cocaine history; now sober  Occupation: counselor for mentally disabled people; adult foster care    ROS   12 point review of system was completed and negative except as noted in the HPI     Active Problem List     Patient Active Problem List   Diagnosis     Herpes simplex type 2 infection     Genital warts     CARDIOVASCULAR SCREENING; LDL GOAL LESS THAN 160     H/O intravenous drug use in remission     Surveillance for Depo-Provera contraception     JESSE positive     Multiple joint pain     Cocaine use     Systemic lupus erythematosus (H)     High risk medications (not anticoagulants) long-term use     Sicca syndrome (H)     Mood disorder (H)     Lichen sclerosus     Past Medical History     Past Medical History:   Diagnosis Date     Bipolar disorder (H) 12/04    Dx'd     H/O: depression     SUICIDE ATTEMPT AGE 15     HPV (human papillomavirus)      Past Surgical History     Past Surgical History:   Procedure Laterality Date     BUNIONECTOMY RT/LT       HC ENLARGE BREAST WITH  IMPLANT  2000     Allergy   No Known Allergies  Current Medication List     Current Outpatient Medications   Medication Sig     Aspirin-Acetaminophen-Caffeine (EXCEDRIN PO)      Belimumab 200 MG/ML SOAJ Inject 200 mg Subcutaneous every 7 days . Hold for signs of infection and seek medical attention.     buPROPion (WELLBUTRIN SR) 150 MG 12 hr tablet Take 1 tablet (150 mg) by mouth daily     celecoxib (CELEBREX) 100 MG capsule Take 1 capsule (100 mg) by mouth daily as needed for pain     clobetasol (TEMOVATE) 0.05 % external cream Apply topically 2 times daily     cycloSPORINE (RESTASIS) 0.05 % ophthalmic emulsion Place 1 drop into both eyes 2 times daily     diclofenac (VOLTAREN) 1 % topical gel Apply up to 2 grams of 1% gel to hands up to 4 times daily as needed for joint pain (maximum: 8 g per upper extremity per day)     FLUoxetine (PROZAC) 20 MG capsule TAKE 1 CAPSULE (20 MG) BY MOUTH DAILY TAKE WITH 40MG     FLUoxetine (PROZAC) 40 MG capsule TAKE ONE CAPSULE BY MOUTH ONCE DAILY     hydroxychloroquine (PLAQUENIL) 200 MG tablet Hydroxychloroquine 200 mg daily with an additional 200 mg every other day.  Ophthalmology toxicity monitoring eye exam is required yearly, including 10-2 VF and SD-OCT     leflunomide (ARAVA) 20 MG tablet Take 1 tablet (20 mg) by mouth daily ; labs required every 12 weeks     magic mouthwash suspension, diphenhydrAMINE, lidocaine, aluminum-magnesium & simethicone, (FIRST-MOUTHWASH BLM) compounding kit Swish and swallow 5-10 mLs in mouth every 6 hours as needed for mouth sores     norethindrone-ethinyl estradiol (ORTHO-NOVUM) 1-35 MG-MCG tablet Take 3 pills on day 1 and then taper down until bleeding stops then finish out pack and then stop     triamcinolone (KENALOG) 0.1 % paste TAKE BY MOUTH 2 TIMES DAILY     No current facility-administered medications for this visit.     Social History   See HPI    Family History     Family History   Problem Relation Age of Onset     Cancer Father         " lung cancer     Diabetes Maternal Grandmother      Depression Maternal Grandmother      Lipids Maternal Grandmother      Cerebrovascular Disease Maternal Grandfather      Lipids Maternal Grandfather      Circulatory Maternal Grandfather         MI in 50's     Depression Brother         depression and OCD     Gastrointestinal Disease Brother         IBS     Depression Mother      Thyroid Disease No family hx of      Asthma No family hx of      Denies family history of autoimmune disease     Physical Exam     Temp Readings from Last 3 Encounters:   03/11/21 97.6  F (36.4  C) (Oral)   10/30/20 97.9  F (36.6  C) (Oral)   11/15/19 96.7  F (35.9  C) (Oral)     BP Readings from Last 5 Encounters:   12/01/21 117/74   03/11/21 125/77   10/30/20 121/76   11/15/19 100/70   11/07/19 120/82     Pulse Readings from Last 1 Encounters:   12/01/21 79     Resp Readings from Last 1 Encounters:   11/04/16 18     Estimated body mass index is 26.05 kg/m  as calculated from the following:    Height as of this encounter: 1.67 m (5' 5.75\").    Weight as of this encounter: 72.7 kg (160 lb 3.2 oz).    GEN: NAD.   HEENT:  Anicteric, noninjected sclera. No obvious external lesions of the ear and nose. Hearing intact.  CV: S1, S2. RRR. No m/r/g  PULM: No increased work of breathing. CTA bilaterally   MSK: MCPs, PIPs, DIPs without swelling or tenderness to palpation. Wrists without swelling or tenderness to palpation.  Elbows and shoulders without swelling or tenderness to palpation. Knees, ankles, and MTPs without swelling or tenderness to palpation.  Bony hypertrophy of the right first MTP  SKIN: No rash or jaundice seen  PSYCH: Alert. Appropriate.       Labs / Imaging (select studies)     RF/CCP  Recent Labs   Lab Test 10/11/16  1043 08/10/16  1059   CCPIGG 1  --    RHF  --  <20     JESSE  Recent Labs   Lab Test 08/10/16  1059   ROBYN 1.7*     RNP/Sm/SSA/SSB  Recent Labs   Lab Test 10/11/16  1043 12/10/13  1118   RNPIGG 0.3  --    SMIGG " <0.2  Negative   Antibody index (AI) values reflect qualitative changes in antibody   concentration that cannot be directly associated with clinical condition or   disease state.    --    SSAIGG <0.2  Negative   Antibody index (AI) values reflect qualitative changes in antibody   concentration that cannot be directly associated with clinical condition or   disease state.    --    SSBIGG <0.2  Negative   Antibody index (AI) values reflect qualitative changes in antibody   concentration that cannot be directly associated with clinical condition or   disease state.    --    SCLIGG <0.2  Negative   Antibody index (AI) values reflect qualitative changes in antibody   concentration that cannot be directly associated with clinical condition or   disease state.    --    TREPAB  --  Negative     dsDNA  Recent Labs   Lab Test 11/30/21  1245 08/19/21  1056 04/20/21  1241 01/11/21  1315 10/16/19  1707 04/04/19  1627 12/13/18  1354 09/21/17  0818 05/01/17  0936   DNA 13.0* 11.0* 11* 17* 14* 14* 19* 25* 37*     C3/C4  Recent Labs   Lab Test 11/30/21  1245 08/19/21  1056 04/20/21  1241 01/11/21  1315 10/16/19  1707 04/04/19  1627 12/13/18  1354 09/21/17  0818 05/01/17  0936   Y7BZGFP 120 113 103 109 90 120 80 124 80   X7DEIZN 30 28 27 25 19 26 19 24 17     CBC  Recent Labs   Lab Test 11/30/21  1245 08/19/21  1057 04/20/21  1241 01/11/21  1315 09/10/20  1308   WBC 6.4 9.3 6.0 6.2 6.1   RBC 4.63 4.55 4.28 4.46 4.44   HGB 12.1 13.1 12.1 12.7 12.4   HCT 37.3 39.6 35.7 39.1 37.8   MCV 81 87 83 88 85   RDW 14.4 13.5 12.9 13.0 13.6    298 213 202 235   MCH 26.1* 28.8 28.3 28.5 27.9   MCHC 32.4 33.1 33.9 32.5 32.8   NEUTROPHIL 63 66 54.6 59.5 54.5   LYMPH 23 19 31.6 26.4 30.0   MONOCYTE 9 11 9.9 10.3 10.5   EOSINOPHIL 5 4 3.6 3.5 4.3   BASOPHIL 0 0 0.3 0.3 0.7   ANEU  --   --  3.3 3.7 3.3   ALYM  --   --  1.9 1.6 1.8   DAQUAN  --   --  0.6 0.6 0.6   AEOS  --   --  0.2 0.2 0.3   ABAS  --   --  0.0 0.0 0.0   ANEUTAUTO 4.0 6.2  --   --    --    ALYMPAUTO 1.5 1.7  --   --   --    AMONOAUTO 0.6 1.0  --   --   --    AEOSAUTO 0.3 0.4  --   --   --    ABSBASO 0.0 0.0  --   --   --      CMP  Recent Labs   Lab Test 08/19/21  1056 04/20/21  1241 01/11/21  1315 09/10/20  1308 10/16/19  1708 07/15/19  1116 04/04/19  1627    138 139 138 139  --  142   POTASSIUM 4.1 3.6 3.9 4.1 3.9  --  4.2   CHLORIDE 106 108 110* 106 109  --  110*   CO2 22 23 26 28 24  --  26   ANIONGAP 8 7 3 4 6  --  6   GLC 92 117* 78 85 83  --  83   BUN 17 25 15 17 17  --  15   CR 0.81 0.95 0.67 0.77 0.75   < > 0.69   GFRESTIMATED 87 72 >90 >90 >90   < > >90   GFRESTBLACK  --  83 >90 >90 >90   < > >90   RICHARD 8.8 8.3* 8.7 8.9 9.1  --  9.1   BILITOTAL 0.4 0.4 0.3 0.3 0.3   < > 0.2   ALBUMIN 3.9 3.8 4.0 3.8 4.2   < > 3.9   PROTTOTAL 6.9 6.6* 6.9 6.9 7.3   < > 7.2   ALKPHOS 29* 24* 22* 28* 23*   < > 34*   AST 25 13 18 18 18   < > 23   ALT 28 23 35 32 23   < > 36    < > = values in this interval not displayed.     Calcium/VitaminD  Recent Labs   Lab Test 08/19/21  1056 04/20/21  1241 01/11/21  1315   RICHARD 8.8 8.3* 8.7     ESR/CRP  Recent Labs   Lab Test 11/30/21  1245 08/19/21  1057 08/19/21  1056 04/20/21  1241   SED 8 8  --  7   CRP <2.9  --  <2.9 <2.9     CK/Aldolase  Recent Labs   Lab Test 08/19/21  1056 04/20/21  1241 01/11/21  1315 01/13/17  1015 10/11/16  1043    71 88   < > 65   ALDOLASE  --   --   --   --  4.5    < > = values in this interval not displayed.     TSH/T4  Recent Labs   Lab Test 10/11/16  1043   TSH 1.48     Hepatitis B  Recent Labs   Lab Test 02/26/21  0927 10/11/16  1043   HBCAB Nonreactive Nonreactive   HEPBANG Nonreactive Nonreactive     Hepatitis C  Recent Labs   Lab Test 02/26/21  0927 10/11/16  1043 12/10/13  1118   HCVAB Nonreactive Nonreactive   Assay performance characteristics have not been established for newborns,   infants, and children   Negative     Tuberculosis Screening  Recent Labs   Lab Test 06/28/21  1700 02/26/21  0927   TBRST Negative  Negative     HIV Screening  Recent Labs   Lab Test 10/11/16  1043   HIAGAB Nonreactive   HIV-1 p24 Ag & HIV-1/HIV-2 Ab Not Detected       UA  Recent Labs   Lab Test 08/19/21  1058 02/26/21  0937 09/10/20  1315 11/04/19 1747 04/04/19  1625 12/13/18  1355 09/21/17  0815 05/01/17  0954   COLOR Yellow Yellow Yellow Yellow   < > Yellow Yellow Yellow   APPEARANCE Clear Clear Clear Clear   < > Clear Clear Clear   URINEGLC Negative Negative Negative Negative   < > Negative Negative Negative   URINEBILI Negative Negative Negative Negative   < > Negative Negative Small  This is an unconfirmed screening test result. A positive result may be false.  *   SG 1.025 1.015 1.020 1.015   < > >1.030 >1.030 >1.030   URINEPH 6.0 7.0 7.5* 8.0*   < > 5.5 6.0 6.0   PROTEIN Negative Negative Negative Trace*   < > Negative Negative Trace*   UROBILINOGEN 0.2 0.2 0.2 0.2   < > 0.2 0.2 0.2   NITRITE Negative Negative Negative Negative   < > Negative Negative Negative   UBLD Negative Negative Negative Negative   < > Small* Trace* Small*   LEUKEST Negative Negative Negative Negative   < > Negative Negative Negative   WBCU  --   --   --  0 - 5  --  0 - 5 O - 2 O - 2   RBCU  --   --   --  O - 2  --  O - 2 O - 2 O - 2   SQUAMOUSEPI  --   --   --  Few  --  Moderate* Few Few   BACTERIA  --   --   --  Few*  --   --   --   --    MUCOUS  --   --   --   --   --   --   --  Present*    < > = values in this interval not displayed.     Urine Microscopic  Recent Labs   Lab Test 11/04/19  1747 12/13/18  1355 09/21/17  0815 05/01/17  0954   WBCU 0 - 5 0 - 5 O - 2 O - 2   RBCU O - 2 O - 2 O - 2 O - 2   SQUAMOUSEPI Few Moderate* Few Few   BACTERIA Few*  --   --   --    MUCOUS  --   --   --  Present*     Urine Protein  Recent Labs   Lab Test 08/19/21  1058 09/10/20  1315 11/04/19  1747 04/04/19  1625   UTP 0.16 0.15 0.21 0.11   UTPG 0.12 0.17 0.16 0.11    88  --  100     Immunization History     Immunization History   Administered Date(s) Administered      Influenza Vaccine IM > 6 months Valent IIV4 (Alfuria,Fluzone) 12/19/2014, 10/16/2015, 11/04/2016, 12/13/2018, 11/07/2019     Mantoux Tuberculin Skin Test 07/30/2018     Pneumo Conj 13-V (2010&after) 12/13/2018     Pneumococcal 23 valent 04/04/2019     TD (ADULT, 7+) 02/23/2005, 04/09/2019     TDAP Vaccine (Adacel) 04/14/2009     Zoster vaccine recombinant adjuvanted (SHINGRIX) 12/21/2018, 04/04/2019          Chart documentation done in part with Dragon Voice recognition Software. Although reviewed after completion, some word and grammatical error may remain.    Jack Arvizu MD

## 2021-12-01 NOTE — PATIENT INSTRUCTIONS
RHEUMATOLOGY    Dr. Jack Arvizu    [facility] [place]  [address] [place] [address]  Phone number: [phone]  Fax number: [phone]    Thank you for choosing [facility]!    Bailey Resendez CMA Rheumatology    --------------    A single additional dose of the Pfizer or Moderna COVID-19 vaccine is recommended at least 28 days after the completion of the 2-dose mRNA vaccine series for patients receiving any immunosuppressive or immunomodulatory therapy. Attempts should be made to match the additional mRNA dose type to the type given in the mRNA primary series; however, if that is not feasible, a booster dose with the alternative mRNA vaccine is permitted.     Based on our current understanding (and this may change over time as we learn more), medications should be adjusted as noted below, if disease activity allows:  - NSAIDs and Acetaminophen: hold for 24 hours prior to vaccination if able to do so  - Leflunomide should be held for 1 week after the COVID19 vaccine

## 2021-12-08 DIAGNOSIS — M32.9 SYSTEMIC LUPUS ERYTHEMATOSUS, UNSPECIFIED SLE TYPE, UNSPECIFIED ORGAN INVOLVEMENT STATUS (H): ICD-10-CM

## 2021-12-09 RX ORDER — HYDROXYCHLOROQUINE SULFATE 200 MG/1
TABLET, FILM COATED ORAL
Qty: 135 TABLET | Refills: 0 | OUTPATIENT
Start: 2021-12-09

## 2021-12-09 NOTE — TELEPHONE ENCOUNTER
Refill request DENIED due to:    Patient is overdue for hydroxychloroquine toxicity eye exam. Last visit with MD 12/01/2021.     MD Plan of care:  If eye exam, including 10-2 VF and SD-OCT, is okay then may continue hydroxychloroquine from 200mg daily with an additional 200 mg every other day (last eye exam 1/22/2019; no further refills until eye exam completed).    Trevor OROZCO RN [facility]

## 2021-12-30 ENCOUNTER — OFFICE VISIT (OUTPATIENT)
Dept: OPHTHALMOLOGY | Facility: CLINIC | Age: 46
End: 2021-12-30
Payer: COMMERCIAL

## 2021-12-30 ENCOUNTER — TELEPHONE (OUTPATIENT)
Dept: RHEUMATOLOGY | Facility: CLINIC | Age: 46
End: 2021-12-30

## 2021-12-30 DIAGNOSIS — H52.223 MYOPIA OF BOTH EYES WITH REGULAR ASTIGMATISM AND PRESBYOPIA: ICD-10-CM

## 2021-12-30 DIAGNOSIS — M32.9 SYSTEMIC LUPUS ERYTHEMATOSUS, UNSPECIFIED SLE TYPE, UNSPECIFIED ORGAN INVOLVEMENT STATUS (H): ICD-10-CM

## 2021-12-30 DIAGNOSIS — Z01.00 EXAMINATION OF EYES AND VISION: Primary | ICD-10-CM

## 2021-12-30 DIAGNOSIS — M35.00 SICCA SYNDROME (H): ICD-10-CM

## 2021-12-30 DIAGNOSIS — Z79.899 HIGH RISK MEDICATIONS (NOT ANTICOAGULANTS) LONG-TERM USE: ICD-10-CM

## 2021-12-30 DIAGNOSIS — H52.13 MYOPIA OF BOTH EYES WITH REGULAR ASTIGMATISM AND PRESBYOPIA: ICD-10-CM

## 2021-12-30 DIAGNOSIS — H52.4 MYOPIA OF BOTH EYES WITH REGULAR ASTIGMATISM AND PRESBYOPIA: ICD-10-CM

## 2021-12-30 PROCEDURE — 92015 DETERMINE REFRACTIVE STATE: CPT | Performed by: STUDENT IN AN ORGANIZED HEALTH CARE EDUCATION/TRAINING PROGRAM

## 2021-12-30 PROCEDURE — 92083 EXTENDED VISUAL FIELD XM: CPT | Performed by: STUDENT IN AN ORGANIZED HEALTH CARE EDUCATION/TRAINING PROGRAM

## 2021-12-30 PROCEDURE — 92134 CPTRZ OPH DX IMG PST SGM RTA: CPT | Performed by: STUDENT IN AN ORGANIZED HEALTH CARE EDUCATION/TRAINING PROGRAM

## 2021-12-30 PROCEDURE — 92014 COMPRE OPH EXAM EST PT 1/>: CPT | Performed by: STUDENT IN AN ORGANIZED HEALTH CARE EDUCATION/TRAINING PROGRAM

## 2021-12-30 RX ORDER — HYDROXYCHLOROQUINE SULFATE 200 MG/1
TABLET, FILM COATED ORAL
Qty: 135 TABLET | Refills: 0 | Status: SHIPPED | OUTPATIENT
Start: 2021-12-30 | End: 2022-03-29

## 2021-12-30 ASSESSMENT — REFRACTION_MANIFEST
OD_CYLINDER: +0.75
OD_ADD: +2.00
OD_AXIS: 005
OD_SPHERE: -1.00
OS_ADD: +2.00
OS_SPHERE: -0.75
OS_AXIS: 170
OS_CYLINDER: +0.50

## 2021-12-30 ASSESSMENT — VISUAL ACUITY
OD_SC+: -1
OD_SC: 20/25
METHOD: SNELLEN - LINEAR
OS_SC+: +1
OS_SC: 20/25

## 2021-12-30 ASSESSMENT — CUP TO DISC RATIO
OS_RATIO: 0.45
OD_RATIO: 0.4

## 2021-12-30 ASSESSMENT — CONF VISUAL FIELD
OD_NORMAL: 1
OS_NORMAL: 1

## 2021-12-30 ASSESSMENT — SLIT LAMP EXAM - LIDS
COMMENTS: NORMAL
COMMENTS: NORMAL

## 2021-12-30 ASSESSMENT — TONOMETRY
IOP_METHOD: APPLANATION
OD_IOP_MMHG: 15
OS_IOP_MMHG: 12

## 2021-12-30 ASSESSMENT — EXTERNAL EXAM - RIGHT EYE: OD_EXAM: NORMAL

## 2021-12-30 ASSESSMENT — EXTERNAL EXAM - LEFT EYE: OS_EXAM: NORMAL

## 2021-12-30 NOTE — PROGRESS NOTES
Current Eye Medications:  Restasis twice daily both eyes     Subjective:  Here for comprehensive eye exam with Plaquenil testing. Pt notes decreased distance and near vision. Has difficulty with night vision. Denies floaters and flashes.      Objective:  See Ophthalmology Exam.      Assessment:  Sosa Patel is a 46 year old female who presents with:   Encounter Diagnoses   Name Primary?     Examination of eyes and vision      Myopia of both eyes with regular astigmatism and presbyopia        High risk medications (not anticoagulants) long-term use Plaquenil since 9/2016 (5+ years).     Macular SD-OCT within normal limits both eyes.    Dueñas visual field (HVF) 10-2 within normal limits both eyes; mild scattered loss right>left.    No signs of Plaquenil retinal toxicity at present.        Systemic lupus erythematosus, unspecified SLE type, unspecified organ involvement status (H)      Sicca syndrome (H)      Plan:  Consider glasses to help with night driving. Otherwise ok to use with over the counter readers as needed (around +1.50)    Continue Restasis twice a day in both eyes     Normal Plaquenil eye tests today.    Josefa Contreras MD  [phone]              with patient

## 2021-12-30 NOTE — TELEPHONE ENCOUNTER
Reason for Call: Request for an order or referral:    Order or referral being requested: Patient is requesting a refill on her Hydroxychlorioquine to be sent here at the [facility]. Patient is here today this morning  For an eye exam and if possible she would like to pick it up when she is done with her appt.     Date needed: this morning    Has the patient been seen by the PCP for this problem? YES    Additional comments: patient states she has been  Out of the medication for 3 weeks    Phone number Patient can be reached at:  Cell number on file:    Telephone Information:   Mobile [phone]       Best Time:  anytime    Can we leave a detailed message on this number?  YES    Call taken on 12/30/2021 at 7:56 AM by Renetta Silverio

## 2021-12-30 NOTE — TELEPHONE ENCOUNTER
Patient had eye exam done today with showed no evidence of Plaquenil toxicity. Called and left a message informing patient that her prescription was refilled.    Joel HOBBS RN....12/30/2021 8:57 AM

## 2021-12-30 NOTE — LETTER
12/30/2021         RE: Sosa Patel  [address]   [place] [address]        Dear Colleague,    [facility] you [facility], [facility]osa Patel[facility] Please see a copy of my visit note below.     Current Eye Medications:  Restasis twice daily both eyes     Subjective:  Here for comprehensive eye exam with Plaquenil testing. Pt notes decreased distance and near vision. Has difficulty with night vision. Denies floaters and flashes.      Objective:  See Ophthalmology Exam.      Assessment:  Sosa Patel is a 46 year old female who presents with:   Encounter Diagnoses   Name Primary?     Examination of eyes and vision      Myopia of both eyes with regular astigmatism and presbyopia        High risk medications (not anticoagulants) long-term use Plaquenil since 9/2016 (5+ years).     Macular SD-OCT within normal limits both eyes.    Dueñas visual field (HVF) 10-2 within normal limits both eyes; mild scattered loss right>left.    No signs of Plaquenil retinal toxicity at present.        Systemic lupus erythematosus, unspecified SLE type, unspecified organ involvement status (H)      Sicca syndrome (H)      Plan:  Consider glasses to help with night driving. Otherwise ok to use with over the counter readers as needed (around +1.50)    Continue Restasis twice a day in both eyes     Normal Plaquenil eye tests today.    Josefa Contreras MD  [phone]                 Again, thank you for allowing me to participate in the care of your patient.        Sincerely,        Josefa Contreras MD

## 2021-12-30 NOTE — PATIENT INSTRUCTIONS
Consider glasses to help with night driving. Otherwise ok to use with over the counter readers as needed (around +1.50)    Continue Restasis twice a day in both eyes     Normal Plaquenil eye tests today.    Josefa Contrersa MD  [phone]

## 2021-12-31 ENCOUNTER — IMMUNIZATION (OUTPATIENT)
Dept: NURSING | Facility: CLINIC | Age: 46
End: 2021-12-31
Payer: COMMERCIAL

## 2021-12-31 PROCEDURE — 0003A PR COVID VAC PFIZER DIL RECON 30 MCG/0.3 ML IM: CPT

## 2021-12-31 PROCEDURE — 91300 PR COVID VAC PFIZER DIL RECON 30 MCG/0.3 ML IM: CPT

## 2022-01-09 ENCOUNTER — HEALTH MAINTENANCE LETTER (OUTPATIENT)
Age: 47
End: 2022-01-09

## 2022-01-18 DIAGNOSIS — F39 MOOD DISORDER (H): ICD-10-CM

## 2022-01-19 NOTE — TELEPHONE ENCOUNTER
"Requested Prescriptions   Pending Prescriptions Disp Refills     FLUoxetine (PROZAC) 40 MG capsule [Pharmacy Med Name: FLUOXETINE HCL 40MG CAPS] 90 capsule 0     Sig: TAKE ONE CAPSULE BY MOUTH ONCE DAILY       SSRIs Protocol Passed - 1/18/2022  7:02 AM        Passed - Recent (12 mo) or future (30 days) visit within the authorizing provider's specialty     Patient has had an office visit with the authorizing provider or a provider within the authorizing providers department within the previous 12 mos or has a future within next 30 days. See \"Patient Info\" tab in inbasket, or \"Choose Columns\" in Meds & Orders section of the refill encounter.            Passed - Medication is active on med list        Passed - Patient is age 18 or older        Passed - No active pregnancy on record        Passed - No positive pregnancy test in last 12 months             copied from office visit dated 9/7/21  Mood disorder (H)  Increase prozac to 60mg.  Consider increasing Wellbutrin in the future.    - FLUoxetine (PROZAC) 40 MG capsule; TAKE ONE CAPSULE BY MOUTH ONCE DAILY  - FLUoxetine (PROZAC) 20 MG capsule; Take 1 capsule (20 mg) by mouth daily Take with 40mg     Recurrent major depressive disorder, in partial remission (H)  As above  - buPROPion (WELLBUTRIN SR) 150 MG 12 hr tablet; Take 1 tablet (150 mg) by mouth daily    Return in about 3 weeks (around 9/28/2021) for mood.     Cynthia Scott PA-C  [facility]    Left message on patient's unidentified voice mail.  Advised patient to call [phone] at her earliest convenience and schedule a follow-up appointment.  "

## 2022-01-21 RX ORDER — FLUOXETINE 40 MG/1
CAPSULE ORAL
Qty: 90 CAPSULE | Refills: 0 | Status: SHIPPED | OUTPATIENT
Start: 2022-01-21 | End: 2022-04-28

## 2022-01-21 NOTE — TELEPHONE ENCOUNTER
"Prescription approved per [facility] Refill Protocol.  Requested Prescriptions   Pending Prescriptions Disp Refills     FLUoxetine (PROZAC) 40 MG capsule [Pharmacy Med Name: FLUOXETINE HCL 40MG CAPS] 90 capsule 0     Sig: TAKE ONE CAPSULE BY MOUTH ONCE DAILY       SSRIs Protocol Passed - 1/21/2022  2:08 PM        Passed - Recent (12 mo) or future (30 days) visit within the authorizing provider's specialty     Patient has had an office visit with the authorizing provider or a provider within the authorizing providers department within the previous 12 mos or has a future within next 30 days. See \"Patient Info\" tab in inbasket, or \"Choose Columns\" in Meds & Orders section of the refill encounter.              Passed - Medication is active on med list        Passed - Patient is age 18 or older        Passed - No active pregnancy on record        Passed - No positive pregnancy test in last 12 months           Celia Magallon RN on 1/21/2022 at 2:38 PM    "

## 2022-02-18 DIAGNOSIS — F39 MOOD DISORDER (H): ICD-10-CM

## 2022-02-21 NOTE — TELEPHONE ENCOUNTER
Prescription approved per [facility] [place].    Calista Che RN

## 2022-03-17 ENCOUNTER — TELEPHONE (OUTPATIENT)
Dept: RHEUMATOLOGY | Facility: CLINIC | Age: 47
End: 2022-03-17

## 2022-03-17 NOTE — TELEPHONE ENCOUNTER
Prior Authorization Approval    Authorization Effective Date: 2/15/2022  Authorization Expiration Date: 3/17/2023  Medication: Benlysta - Renewal Approved through 3/17/23  Insurance Company: [employer] - Phone [phone] Fax [phone]  Which Pharmacy is filling the prescription (Not needed for infusion/clinic administered): [facility] - [place], [place] - [address]  Pharmacy Notified: Yes  Patient Notified: Yes

## 2022-03-23 ENCOUNTER — LAB (OUTPATIENT)
Dept: LAB | Facility: CLINIC | Age: 47
End: 2022-03-23
Payer: COMMERCIAL

## 2022-03-23 DIAGNOSIS — Z79.899 HIGH RISK MEDICATION USE: ICD-10-CM

## 2022-03-23 DIAGNOSIS — M32.9 SYSTEMIC LUPUS ERYTHEMATOSUS, UNSPECIFIED SLE TYPE, UNSPECIFIED ORGAN INVOLVEMENT STATUS (H): ICD-10-CM

## 2022-03-23 LAB
BASOPHILS # BLD AUTO: 0 10E3/UL (ref 0–0.2)
BASOPHILS NFR BLD AUTO: 1 %
CRP SERPL-MCNC: <2.9 MG/L (ref 0–8)
EOSINOPHIL # BLD AUTO: 0.2 10E3/UL (ref 0–0.7)
EOSINOPHIL NFR BLD AUTO: 3 %
ERYTHROCYTE [DISTWIDTH] IN BLOOD BY AUTOMATED COUNT: 14.2 % (ref 10–15)
ERYTHROCYTE [SEDIMENTATION RATE] IN BLOOD BY WESTERGREN METHOD: 18 MM/HR (ref 0–20)
HCT VFR BLD AUTO: 26.7 % (ref 35–47)
HGB BLD-MCNC: 8.3 G/DL (ref 11.7–15.7)
LYMPHOCYTES # BLD AUTO: 1.3 10E3/UL (ref 0.8–5.3)
LYMPHOCYTES NFR BLD AUTO: 25 %
MCH RBC QN AUTO: 25.4 PG (ref 26.5–33)
MCHC RBC AUTO-ENTMCNC: 31.1 G/DL (ref 31.5–36.5)
MCV RBC AUTO: 82 FL (ref 78–100)
MONOCYTES # BLD AUTO: 0.7 10E3/UL (ref 0–1.3)
MONOCYTES NFR BLD AUTO: 13 %
NEUTROPHILS # BLD AUTO: 3.2 10E3/UL (ref 1.6–8.3)
NEUTROPHILS NFR BLD AUTO: 59 %
PLATELET # BLD AUTO: 309 10E3/UL (ref 150–450)
RBC # BLD AUTO: 3.27 10E6/UL (ref 3.8–5.2)
WBC # BLD AUTO: 5.4 10E3/UL (ref 4–11)

## 2022-03-23 PROCEDURE — 86225 DNA ANTIBODY NATIVE: CPT

## 2022-03-23 PROCEDURE — 86160 COMPLEMENT ANTIGEN: CPT | Mod: 59

## 2022-03-23 PROCEDURE — 86140 C-REACTIVE PROTEIN: CPT

## 2022-03-23 PROCEDURE — 85652 RBC SED RATE AUTOMATED: CPT

## 2022-03-23 PROCEDURE — 36415 COLL VENOUS BLD VENIPUNCTURE: CPT

## 2022-03-23 PROCEDURE — 80053 COMPREHEN METABOLIC PANEL: CPT

## 2022-03-23 PROCEDURE — 86160 COMPLEMENT ANTIGEN: CPT

## 2022-03-23 PROCEDURE — 85025 COMPLETE CBC W/AUTO DIFF WBC: CPT

## 2022-03-24 LAB
ALBUMIN SERPL-MCNC: 3.2 G/DL (ref 3.4–5)
ALP SERPL-CCNC: 25 U/L (ref 40–150)
ALT SERPL W P-5'-P-CCNC: 35 U/L (ref 0–50)
ANION GAP SERPL CALCULATED.3IONS-SCNC: 9 MMOL/L (ref 3–14)
AST SERPL W P-5'-P-CCNC: 24 U/L (ref 0–45)
BILIRUB SERPL-MCNC: 0.2 MG/DL (ref 0.2–1.3)
BUN SERPL-MCNC: 21 MG/DL (ref 7–30)
C3 SERPL-MCNC: 103 MG/DL (ref 81–157)
C4 SERPL-MCNC: 25 MG/DL (ref 13–39)
CALCIUM SERPL-MCNC: 8.5 MG/DL (ref 8.5–10.1)
CHLORIDE BLD-SCNC: 109 MMOL/L (ref 94–109)
CO2 SERPL-SCNC: 22 MMOL/L (ref 20–32)
CREAT SERPL-MCNC: 0.69 MG/DL (ref 0.52–1.04)
DSDNA AB SER-ACNC: 7.2 IU/ML
GFR SERPL CREATININE-BSD FRML MDRD: >90 ML/MIN/1.73M2
GLUCOSE BLD-MCNC: 99 MG/DL (ref 70–99)
POTASSIUM BLD-SCNC: 3.5 MMOL/L (ref 3.4–5.3)
PROT SERPL-MCNC: 6.5 G/DL (ref 6.8–8.8)
SODIUM SERPL-SCNC: 140 MMOL/L (ref 133–144)

## 2022-03-28 DIAGNOSIS — F33.41 RECURRENT MAJOR DEPRESSIVE DISORDER, IN PARTIAL REMISSION (H): ICD-10-CM

## 2022-03-29 DIAGNOSIS — M32.9 SYSTEMIC LUPUS ERYTHEMATOSUS, UNSPECIFIED SLE TYPE, UNSPECIFIED ORGAN INVOLVEMENT STATUS (H): ICD-10-CM

## 2022-03-29 RX ORDER — HYDROXYCHLOROQUINE SULFATE 200 MG/1
TABLET, FILM COATED ORAL
Qty: 135 TABLET | Refills: 1 | Status: SHIPPED | OUTPATIENT
Start: 2022-03-29 | End: 2022-07-15

## 2022-03-29 NOTE — TELEPHONE ENCOUNTER
Need updated PHQ-9. Will send on ,y chart.  Hayley Veloz RN    PHQ-9 score:    PHQ 9/7/2021   PHQ-9 Total Score 8   Q9: Thoughts of better off dead/self-harm past 2 weeks Not at all   F/U: Thoughts of suicide or self-harm -   F/U: Safety concerns -

## 2022-03-29 NOTE — TELEPHONE ENCOUNTER
Medication:   Hydroxychloroquine  Last written on:   12-  Quantity:   135    Refills:   0    Last office visit:   12-1-2021  No show:  3-  Next office visit:     Last labs:   3-

## 2022-03-30 RX ORDER — BUPROPION HYDROCHLORIDE 150 MG/1
150 TABLET, EXTENDED RELEASE ORAL DAILY
Qty: 90 TABLET | Refills: 0 | Status: SHIPPED | OUTPATIENT
Start: 2022-03-30 | End: 2022-08-02

## 2022-03-30 NOTE — TELEPHONE ENCOUNTER
"Routing refill request to provider for review/approval because:  PHQ9 out of range.     PHQ 1/25/2021 9/7/2021 3/29/2022   PHQ-9 Total Score 2 8 13   Q9: Thoughts of better off dead/self-harm past 2 weeks Not at all Not at all Not at all   F/U: Thoughts of suicide or self-harm - - -   F/U: Safety concerns - - -       Requested Prescriptions   Pending Prescriptions Disp Refills    buPROPion (WELLBUTRIN SR) 150 MG 12 hr tablet [Pharmacy Med Name: BUPROPION HCL ER (SR) 150MG TB12] 90 tablet 1     Sig: Take 1 tablet (150 mg) by mouth daily        SSRIs Protocol Failed - 3/29/2022  3:10 PM        Failed - PHQ-9 score less than 5 in past 6 months     Please review last PHQ-9 score.           Passed - Medication is Bupropion     If the medication is Bupropion (Wellbutrin), and the patient is taking for smoking cessation; OK to refill.            Passed - Medication is active on med list        Passed - Patient is age 18 or older        Passed - No active pregnancy on record        Passed - No positive pregnancy test in last 12 months        Passed - Recent (6 mo) or future (30 days) visit within the authorizing provider's specialty     Patient had office visit in the last 6 months or has a visit in the next 30 days with authorizing provider or within the authorizing provider's specialty.  See \"Patient Info\" tab in inbasket, or \"Choose Columns\" in Meds & Orders section of the refill encounter.                  Ce Martin RN   [facility]   "

## 2022-04-01 DIAGNOSIS — Z79.899 HIGH RISK MEDICATION USE: Primary | ICD-10-CM

## 2022-04-01 DIAGNOSIS — M32.9 SYSTEMIC LUPUS ERYTHEMATOSUS, UNSPECIFIED SLE TYPE, UNSPECIFIED ORGAN INVOLVEMENT STATUS (H): ICD-10-CM

## 2022-04-01 NOTE — TELEPHONE ENCOUNTER
Medication:   Leflunomide 20 mg  Last written on:   12/1/2021  Quantity:   90    Refills:   0    Last office visit:   12/1/2022  No show: 3/25/2022  Next office visit:   4/12/2022  Last labs:   3/23/2022

## 2022-04-05 RX ORDER — LEFLUNOMIDE 20 MG/1
20 TABLET ORAL DAILY
Qty: 30 TABLET | Refills: 0 | Status: SHIPPED | OUTPATIENT
Start: 2022-04-05 | End: 2022-04-12

## 2022-04-06 NOTE — TELEPHONE ENCOUNTER
Called patient and left a detailed message informing her that leflunomide has been refilled and that a repeat CBC and urine studies are needed prior to follow up visit.    Joel HOBBS RN....4/6/2022 2:25 PM

## 2022-04-06 NOTE — TELEPHONE ENCOUNTER
RN: Leflunomide refilled. Please ask her to have a repeat lab draw for repeat CBC, and to have urine studies completed prior to follow-up.   Jack Arvizu MD  4/5/2022 9:31 PM

## 2022-04-07 ENCOUNTER — LAB (OUTPATIENT)
Dept: LAB | Facility: CLINIC | Age: 47
End: 2022-04-07
Payer: COMMERCIAL

## 2022-04-07 DIAGNOSIS — Z79.899 HIGH RISK MEDICATION USE: ICD-10-CM

## 2022-04-07 DIAGNOSIS — M32.9 SYSTEMIC LUPUS ERYTHEMATOSUS, UNSPECIFIED SLE TYPE, UNSPECIFIED ORGAN INVOLVEMENT STATUS (H): ICD-10-CM

## 2022-04-07 LAB
BASOPHILS # BLD AUTO: 0 10E3/UL (ref 0–0.2)
BASOPHILS NFR BLD AUTO: 0 %
EOSINOPHIL # BLD AUTO: 0 10E3/UL (ref 0–0.7)
EOSINOPHIL NFR BLD AUTO: 1 %
ERYTHROCYTE [DISTWIDTH] IN BLOOD BY AUTOMATED COUNT: 14.2 % (ref 10–15)
HCT VFR BLD AUTO: 29.8 % (ref 35–47)
HGB BLD-MCNC: 9.4 G/DL (ref 11.7–15.7)
LYMPHOCYTES # BLD AUTO: 0.5 10E3/UL (ref 0.8–5.3)
LYMPHOCYTES NFR BLD AUTO: 10 %
MCH RBC QN AUTO: 24.2 PG (ref 26.5–33)
MCHC RBC AUTO-ENTMCNC: 31.5 G/DL (ref 31.5–36.5)
MCV RBC AUTO: 77 FL (ref 78–100)
MONOCYTES # BLD AUTO: 0.1 10E3/UL (ref 0–1.3)
MONOCYTES NFR BLD AUTO: 2 %
NEUTROPHILS # BLD AUTO: 4.1 10E3/UL (ref 1.6–8.3)
NEUTROPHILS NFR BLD AUTO: 88 %
PLATELET # BLD AUTO: 265 10E3/UL (ref 150–450)
RBC # BLD AUTO: 3.88 10E6/UL (ref 3.8–5.2)
WBC # BLD AUTO: 4.7 10E3/UL (ref 4–11)

## 2022-04-07 PROCEDURE — 85025 COMPLETE CBC W/AUTO DIFF WBC: CPT

## 2022-04-07 PROCEDURE — 36415 COLL VENOUS BLD VENIPUNCTURE: CPT

## 2022-04-12 ENCOUNTER — VIRTUAL VISIT (OUTPATIENT)
Dept: RHEUMATOLOGY | Facility: CLINIC | Age: 47
End: 2022-04-12
Payer: COMMERCIAL

## 2022-04-12 DIAGNOSIS — M32.9 SYSTEMIC LUPUS ERYTHEMATOSUS, UNSPECIFIED SLE TYPE, UNSPECIFIED ORGAN INVOLVEMENT STATUS (H): Primary | ICD-10-CM

## 2022-04-12 DIAGNOSIS — Z79.899 HIGH RISK MEDICATION USE: ICD-10-CM

## 2022-04-12 DIAGNOSIS — N92.1 MENORRHAGIA WITH IRREGULAR CYCLE: ICD-10-CM

## 2022-04-12 PROCEDURE — 99215 OFFICE O/P EST HI 40 MIN: CPT | Mod: 95 | Performed by: INTERNAL MEDICINE

## 2022-04-12 RX ORDER — LEFLUNOMIDE 20 MG/1
20 TABLET ORAL DAILY
Qty: 30 TABLET | Refills: 3 | Status: SHIPPED | OUTPATIENT
Start: 2022-04-12 | End: 2022-07-15

## 2022-04-12 NOTE — Clinical Note
Lester Marie, Hgb dropped and is recovering.  I think that this is from her heavy menstrual cycles -she says that she has to change her padding every 15 minutes for 3 days and that this occurs every 3-4 months for the past 1.5 years.  I asked her to see GYN, and for now to use iron supplementation.  Thanks! Jack

## 2022-04-12 NOTE — PROGRESS NOTES
Sosa Patel  is a 46 year old year old who is being evaluated via a billable video visit.      How would you like to obtain your AVS? MyChart  If the video visit is dropped, the invitation should be resent by: Text to cell phone: [phone]   Will anyone else be joining your video visit? No     Rheumatology Video Visit      Sosa Patel  MRN# [id number]   YOB: 1975 Age: 46 year old      Date of visit: 4/12/22   PCP: Cynthia Scott    Chief Complaint   Patient presents with:  Systemic Lupus Erythematous    Assessment and Plan     1. Systemic Lupus Erythematosus (JESSE+, dsDNA+, arthritis, oral sores [SLE versus HSV related? - One-time had a culture that was negative], proteinuria, photosensitivity [fatigue]): Dx'd 10/2016; initially with arthritis that improved with HCQ.  Previously on MTX (fatigue, effective), MMF (resolved arthritis, rash, and brain fog; arthritis persisted though and eventually changed back to leflunomide).  Currently on HCQ 200mg daily, leflunomide 20mg daily, and Benlysta (SQ, starting mid March 2021; effective for arthritis and fatigue).  Also using Celebrex 100 mg daily with improvement but would like to use it twice daily and this would be okay.  Arthritis is controlled.  Still with fatigue that is likely sleep related and likely anemia related; see #5.  Chronic illness, stable.     - Continue Benlysta SQ every 7 days   - Continue hydroxychloroquine from 200mg daily with an additional 200 mg every other day (last eye exam completed on 12/30/2021)  - Continue leflunomide 20 mg daily  - Ophthalmology referral reprinted for her and I advised that she call to schedule  - Increase Celebrex from 100 mg daily as needed, 200 mg twice daily as needed.    - Labs in 3 months: CBC, CMP, ESR, CRP, C3, C4, dsDNA, UA, Uprotein:creatinine, QuantiFERON-TB gold plus    High risk medication requiring intensive toxicity monitoring at least quarterly: labs ordered include CBC, Creatinine,  Hepatic panel to monitor for cytopenia and hepatotoxicity; checking creatinine as it affects clearance of medication.      # Pregnancy Plans: on birth control (depo-provera)    2.  Secondary Sjogren's syndrome: Mild symptoms that may be due to other factors such as caffeine intake, smoking, etc., but secondary Sjogren's Syndrome is possible.  We discussed the importance of good oral hygiene, frequent sips of water, avoiding high sugar foods and liquids, avoiding oral irritants such as coffee, alcohol, and nicotine, and avoiding acidic drinks such as carbonated and high sugar beverages. She is already seeing a dentist regularly. Dry eyes improved with preservative free artificial tears but she often forgets to take them; was given Restasis by her father who is a pharmacist and is helped significantly.  She did not get punctal plugs from ophthalmology.  More dry eyes since not being on Restasis again as the sample given ran out.   Chronic illness  - Continue artificial tears and Restasis    3. 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 E-cigarette Smoking:  Encouraged complete cessation and discussed the health benefits.        5.  Excessive daytime sleepiness, fatigue: Wakes up tired, reports witnessed apneic episodes at night.  Snores.  Referred for sleep evaluation previously but she has not scheduled this appointment.  She says that she will call to schedule this.    6. Heavy menstrual bleeding: Significant reduction in hemoglobin and from history discover that she has been having heavy menstrual bleeding every 3-4 months for the past 1.5 years where she requires changing padding every 15 minutes for about 3 days.  Hemoglobin is already recovered some.  I advised that she start taking ferrous sulfate 325 mg every other day.  Also will refer to GYN for evaluation.  If worsening symptoms prior to establishment with gynecology then she should discuss with her  primary care provider.  Chronic illness, progressive, severe  - GYN referral, phone number provided to the patient to call to schedule     7.  Vaccinations: Vaccinations reviewed with Ms. Patel.  Risks and benefits of vaccinations were discussed.    - Influenza: Encouraged yearly vaccination  - [id number]: up to date  - [id number]: up to date  - Shingrix: Up to date  - COVID-19: has received the Pfizer COVID-19 vaccine on 2/12/2021 and 5/20/2021 and 12/31/2022.  4th dose of the mRNA COVID-19 vaccination is recommended to be received 3 months after the third mRNA COVID-19 vaccination was received.  Based on our current understanding (and this may change over time as we learn more), medications should be adjusted as noted below, if disease activity allows:  - NSAIDs and Acetaminophen: hold for 24 hours prior to vaccination if able to do so  - Leflunomide should be held for 1 week after the COVID19 vaccine     Total minutes spent in evaluation with patient, documentation, , and review of pertinent studies and chart notes: 30     Sosa Patel verbalized agreement with and understanding of the rational for the diagnosis and treatment plan.  All questions were answered to best of my ability and the patient's satisfaction. Ms. Wolf was advised to contact the clinic with any questions that may arise after the clinic visit.      Thank you for involving me in the care of the patient    Return to clinic: 3-4 months      HPI   Sosa Patel  is a 46 year old female with a medical history significant for tobacco abuse, genital warts, and HSV type II who presents for follow-up of systemic lupus erythematosus.    Today, 4/12/2022: Joints are doing okay, but she has had more fatigue and more oral sores.  She says that her muscles have aches with increased physical activity, and her exercise tolerance has been reduced.  Morning stiffness for no more than 30 minutes.  No joint swelling.  Tolerating medications well.  She  notes that in the past 1.5 years she has had irregular menstrual cycles and when she does have menstrual bleeding she has severe menstrual bleeding where she has to change the padding every 15 minutes for 3 days.  She has not seen GYN.  She says that her primary care provider typically gets her birth control to help with the symptoms when she is having heavy bleeding.      Denies fevers, chills, nausea, vomiting, constipation, diarrhea. No abdominal pain. No LE swelling. No neck pain.   No photosensitivity or photophobia. No history of inflammatory eye disease.  No history of DVT, pulmonary embolism, or miscarriage.   No history of serositis.  No history of Raynaud's Phenomenon.  No seizure history.  No known renal disorder.      Tobacco: quit smoking cigarettes; now smoking with ecigs  EtOH: rare, sober since 5/16/2010  Drugs: cocaine history; now sober  Occupation: counselor for mentally disabled people; adult foster care    ROS   12 point review of system was completed and negative except as noted in the HPI     Active Problem List     Patient Active Problem List   Diagnosis     Herpes simplex type 2 infection     Genital warts     CARDIOVASCULAR SCREENING; LDL GOAL LESS THAN 160     H/O intravenous drug use in remission     Surveillance for Depo-Provera contraception     JESSE positive     Multiple joint pain     Cocaine use     Systemic lupus erythematosus (H)     High risk medications (not anticoagulants) long-term use     Sicca syndrome (H)     Mood disorder (H)     Lichen sclerosus     Past Medical History     Past Medical History:   Diagnosis Date     Bipolar disorder (H) 12/04    Dx'd     H/O: depression     SUICIDE ATTEMPT AGE 15     HPV (human papillomavirus)      Past Surgical History     Past Surgical History:   Procedure Laterality Date     BUNIONECTOMY RT/LT       HC ENLARGE BREAST WITH IMPLANT  2000     Allergy   No Known Allergies  Current Medication List     Current Outpatient Medications    Medication Sig     Aspirin-Acetaminophen-Caffeine (EXCEDRIN PO)      Belimumab 200 MG/ML SOAJ Inject 200 mg Subcutaneous every 7 days . Hold for signs of infection and seek medical attention.     buPROPion (WELLBUTRIN SR) 150 MG 12 hr tablet TAKE 1 TABLET (150 MG) BY MOUTH DAILY     clobetasol (TEMOVATE) 0.05 % external cream Apply topically 2 times daily     cycloSPORINE (RESTASIS) 0.05 % ophthalmic emulsion Place 1 drop into both eyes 2 times daily     diclofenac (VOLTAREN) 1 % topical gel Apply up to 2 grams of 1% gel to hands up to 4 times daily as needed for joint pain (maximum: 8 g per upper extremity per day)     FLUoxetine (PROZAC) 20 MG capsule TAKE 1 CAPSULE (20 MG) BY MOUTH DAILY TAKE WITH 40MG     FLUoxetine (PROZAC) 40 MG capsule TAKE ONE CAPSULE BY MOUTH ONCE DAILY     hydroxychloroquine (PLAQUENIL) 200 MG tablet Hydroxychloroquine 200 mg daily with an additional 200 mg every other day.  Ophthalmology toxicity monitoring eye exam, that includes 10-2 VF and SD-OCT, is required yearly.     leflunomide (ARAVA) 20 MG tablet Take 1 tablet (20 mg) by mouth in the morning. ; labs required every 12 weeks     magic mouthwash suspension, diphenhydrAMINE, lidocaine, aluminum-magnesium & simethicone, (FIRST-MOUTHWASH BLM) compounding kit Swish and swallow 5-10 mLs in mouth every 6 hours as needed for mouth sores     norethindrone-ethinyl estradiol (ORTHO-NOVUM) 1-35 MG-MCG tablet Take 3 pills on day 1 and then taper down until bleeding stops then finish out pack and then stop     triamcinolone (KENALOG) 0.1 % paste TAKE BY MOUTH 2 TIMES DAILY     No current facility-administered medications for this visit.     Social History   See HPI    Family History     Family History   Problem Relation Age of Onset     Cancer Father         lung cancer     Diabetes Maternal Grandmother      Depression Maternal Grandmother      Lipids Maternal Grandmother      Cerebrovascular Disease Maternal Grandfather      Lipids Maternal  "Grandfather      Circulatory Maternal Grandfather         MI in 50's     Depression Brother         depression and OCD     Gastrointestinal Disease Brother         IBS     Depression Mother      Thyroid Disease No family hx of      Asthma No family hx of      Denies family history of autoimmune disease     Physical Exam     Temp Readings from Last 3 Encounters:   03/11/21 97.6  F (36.4  C) (Oral)   10/30/20 97.9  F (36.6  C) (Oral)   11/15/19 96.7  F (35.9  C) (Oral)     BP Readings from Last 5 Encounters:   12/01/21 117/74   03/11/21 125/77   10/30/20 121/76   11/15/19 100/70   11/07/19 120/82     Pulse Readings from Last 1 Encounters:   12/01/21 79     Resp Readings from Last 1 Encounters:   11/04/16 18     Estimated body mass index is 26.05 kg/m  as calculated from the following:    Height as of 12/1/21: 1.67 m (5' 5.75\").    Weight as of 12/1/21: 72.7 kg (160 lb 3.2 oz).      GEN: NAD. Healthy appearing adult.   HEENT: MMM.  Anicteric, noninjected sclera. No obvious external lesions of the ear and nose. Hearing intact.  PULM: No increased work of breathing  MSK:  Hands and wrists without swelling.    SKIN: No rash or jaundice seen  PSYCH: Alert. Appropriate.         Labs / Imaging (select studies)     RF/CCP  Recent Labs   Lab Test 10/11/16  1043 08/10/16  1059   CCPIGG 1  --    RHF  --  <20     JESSE  Recent Labs   Lab Test 08/10/16  1059   ROBYN 1.7*     RNP/Sm/SSA/SSB  Recent Labs   Lab Test 10/11/16  1043   RNPIGG 0.3   SMIGG <0.2  Negative   Antibody index (AI) values reflect qualitative changes in antibody   concentration that cannot be directly associated with clinical condition or   disease state.     SSAIGG <0.2  Negative   Antibody index (AI) values reflect qualitative changes in antibody   concentration that cannot be directly associated with clinical condition or   disease state.     SSBIGG <0.2  Negative   Antibody index (AI) values reflect qualitative changes in antibody   concentration that cannot be " directly associated with clinical condition or   disease state.     SCLIGG <0.2  Negative   Antibody index (AI) values reflect qualitative changes in antibody   concentration that cannot be directly associated with clinical condition or   disease state.       dsDNA  Recent Labs   Lab Test 03/23/22  1204 11/30/21  1245 08/19/21  1056 04/20/21  1241 01/11/21  1315 10/16/19  1707 04/04/19  1627 12/13/18  1354 09/21/17  0818   DNA 7.2 13.0* 11.0* 11* 17* 14* 14* 19* 25*     C3/C4  Recent Labs   Lab Test 03/23/22  1204 11/30/21  1245 08/19/21  1056 04/20/21  1241 01/11/21  1315 10/16/19  1707 04/04/19  1627 12/13/18  1354 09/21/17  0818   D4FAOEP 103 120 113 103 109 90 120 80 124   W3HHSGC 25 30 28 27 25 19 26 19 24     CBC  Recent Labs   Lab Test 04/07/22  1415 03/23/22  1204 11/30/21  1245 08/19/21  1057 04/20/21  1241 01/11/21  1315 09/10/20  1308   WBC 4.7 5.4 6.4   < > 6.0 6.2 6.1   RBC 3.88 3.27* 4.63   < > 4.28 4.46 4.44   HGB 9.4* 8.3* 12.1   < > 12.1 12.7 12.4   HCT 29.8* 26.7* 37.3   < > 35.7 39.1 37.8   MCV 77* 82 81   < > 83 88 85   RDW 14.2 14.2 14.4   < > 12.9 13.0 13.6    309 276   < > 213 202 235   MCH 24.2* 25.4* 26.1*   < > 28.3 28.5 27.9   MCHC 31.5 31.1* 32.4   < > 33.9 32.5 32.8   NEUTROPHIL 88 59 63   < > 54.6 59.5 54.5   LYMPH 10 25 23   < > 31.6 26.4 30.0   MONOCYTE 2 13 9   < > 9.9 10.3 10.5   EOSINOPHIL 1 3 5   < > 3.6 3.5 4.3   BASOPHIL 0 1 0   < > 0.3 0.3 0.7   ANEU  --   --   --   --  3.3 3.7 3.3   ALYM  --   --   --   --  1.9 1.6 1.8   DAQUAN  --   --   --   --  0.6 0.6 0.6   AEOS  --   --   --   --  0.2 0.2 0.3   ABAS  --   --   --   --  0.0 0.0 0.0   ANEUTAUTO 4.1 3.2 4.0   < >  --   --   --    ALYMPAUTO 0.5* 1.3 1.5   < >  --   --   --    AMONOAUTO 0.1 0.7 0.6   < >  --   --   --    AEOSAUTO 0.0 0.2 0.3   < >  --   --   --    ABSBASO 0.0 0.0 0.0   < >  --   --   --     < > = values in this interval not displayed.     CMP  Recent Labs   Lab Test 03/23/22  1204 11/30/21  1242  08/19/21  1056 04/20/21  1241 01/11/21  1315 09/10/20  1308 10/16/19  1708    141 136 138 139 138 139   POTASSIUM 3.5 4.0 4.1 3.6 3.9 4.1 3.9   CHLORIDE 109 109 106 108 110* 106 109   CO2 22 27 22 23 26 28 24   ANIONGAP 9 5 8 7 3 4 6   GLC 99 93 92 117* 78 85 83   BUN 21 24 17 25 15 17 17   CR 0.69 0.83 0.81 0.95 0.67 0.77 0.75   GFRESTIMATED >90 84 87 72 >90 >90 >90   GFRESTBLACK  --   --   --  83 >90 >90 >90   RICHARD 8.5 9.7 8.8 8.3* 8.7 8.9 9.1   BILITOTAL 0.2 0.3 0.4 0.4 0.3 0.3 0.3   ALBUMIN 3.2* 3.8 3.9 3.8 4.0 3.8 4.2   PROTTOTAL 6.5* 7.2 6.9 6.6* 6.9 6.9 7.3   ALKPHOS 25* 31* 29* 24* 22* 28* 23*   AST 24 16 25 13 18 18 18   ALT 35 38 28 23 35 32 23     Calcium/VitaminD  Recent Labs   Lab Test 03/23/22  1204 11/30/21  1245 08/19/21  1056   RICHARD 8.5 9.7 8.8     ESR/CRP  Recent Labs   Lab Test 03/23/22  1204 11/30/21  1245 08/19/21  1057 08/19/21  1056   SED 18 8 8  --    CRP <2.9 <2.9  --  <2.9     CK/Aldolase  Recent Labs   Lab Test 08/19/21  1056 04/20/21  1241 01/11/21  1315 01/13/17  1015 10/11/16  1043    71 88   < > 65   ALDOLASE  --   --   --   --  4.5    < > = values in this interval not displayed.     TSH/T4  Recent Labs   Lab Test 10/11/16  1043   TSH 1.48     Hepatitis B  Recent Labs   Lab Test 02/26/21  0927 10/11/16  1043   HBCAB Nonreactive Nonreactive   HEPBANG Nonreactive Nonreactive     Hepatitis C  Recent Labs   Lab Test 02/26/21  0927 10/11/16  1043   HCVAB Nonreactive Nonreactive   Assay performance characteristics have not been established for newborns,   infants, and children       Tuberculosis Screening  Recent Labs   Lab Test 06/28/21  1700 02/26/21  0927   TBRST Negative Negative     HIV Screening  Recent Labs   Lab Test 10/11/16  1043   HIAGAB Nonreactive   HIV-1 p24 Ag & HIV-1/HIV-2 Ab Not Detected       UA  Recent Labs   Lab Test 08/19/21  1058 02/26/21  0937 09/10/20  1315 11/04/19  1747 04/04/19  1625 12/13/18  1355 09/21/17  0815 05/01/17  0954   COLOR Yellow Yellow  Yellow Yellow   < > Yellow Yellow Yellow   APPEARANCE Clear Clear Clear Clear   < > Clear Clear Clear   URINEGLC Negative Negative Negative Negative   < > Negative Negative Negative   URINEBILI Negative Negative Negative Negative   < > Negative Negative Small  This is an unconfirmed screening test result. A positive result may be false.  *   SG 1.025 1.015 1.020 1.015   < > >1.030 >1.030 >1.030   URINEPH 6.0 7.0 7.5* 8.0*   < > 5.5 6.0 6.0   PROTEIN Negative Negative Negative Trace*   < > Negative Negative Trace*   UROBILINOGEN 0.2 0.2 0.2 0.2   < > 0.2 0.2 0.2   NITRITE Negative Negative Negative Negative   < > Negative Negative Negative   UBLD Negative Negative Negative Negative   < > Small* Trace* Small*   LEUKEST Negative Negative Negative Negative   < > Negative Negative Negative   WBCU  --   --   --  0 - 5  --  0 - 5 O - 2 O - 2   RBCU  --   --   --  O - 2  --  O - 2 O - 2 O - 2   SQUAMOUSEPI  --   --   --  Few  --  Moderate* Few Few   BACTERIA  --   --   --  Few*  --   --   --   --    MUCOUS  --   --   --   --   --   --   --  Present*    < > = values in this interval not displayed.     Urine Microscopic  Recent Labs   Lab Test 11/04/19  1747 12/13/18  1355 09/21/17  0815 05/01/17  0954   WBCU 0 - 5 0 - 5 O - 2 O - 2   RBCU O - 2 O - 2 O - 2 O - 2   SQUAMOUSEPI Few Moderate* Few Few   BACTERIA Few*  --   --   --    MUCOUS  --   --   --  Present*     Urine Protein  Recent Labs   Lab Test 08/19/21  1058 09/10/20  1315 11/04/19  1747 04/04/19  1625   UTP 0.16 0.15 0.21 0.11   UTPG 0.12 0.17 0.16 0.11   UCRR 137 88  --  100     Immunization History     Immunization History   Administered Date(s) Administered     COVID-19,PF,Pfizer (12+ Yrs) 02/12/2021, 05/20/2021, 12/31/2021     Influenza Vaccine IM > 6 months Valent IIV4 (Alfuria,Fluzone) 12/19/2014, 10/16/2015, 11/04/2016, 12/13/2018, 11/07/2019     Mantoux Tuberculin Skin Test 07/30/2018     Pneumo Conj 13-V (2010&after) 12/13/2018     Pneumococcal 23 valent  04/04/2019     TD (ADULT, 7+) 02/23/2005, 04/09/2019     TDAP Vaccine (Adacel) 04/14/2009     Zoster vaccine recombinant adjuvanted (SHINGRIX) 12/21/2018, 04/04/2019          Chart documentation done in part with Dragon Voice recognition Software. Although reviewed after completion, some word and grammatical error may remain.      Video-Visit Details    Type of service:  Video Visit    Video Start Time: 7:46 AM    Video End Time:8:11 AM    Originating Location (pt. Location): [place], [place]    Distant Location (provider location):  [place]    Platform used for Video Visit: Dilshad Arvizu MD

## 2022-04-12 NOTE — PATIENT INSTRUCTIONS
RHEUMATOLOGY    Dr. Jack Arvizu    [address] [place] [address]  Phone number: [phone]  Fax number: [phone]      Thank you for choosing [facility]!    Bailey Resendez CMA Rheumatology

## 2022-05-01 ENCOUNTER — HEALTH MAINTENANCE LETTER (OUTPATIENT)
Age: 47
End: 2022-05-01

## 2022-05-04 ENCOUNTER — OFFICE VISIT (OUTPATIENT)
Dept: FAMILY MEDICINE | Facility: CLINIC | Age: 47
End: 2022-05-04
Payer: COMMERCIAL

## 2022-05-04 ENCOUNTER — TELEPHONE (OUTPATIENT)
Dept: FAMILY MEDICINE | Facility: CLINIC | Age: 47
End: 2022-05-04

## 2022-05-04 VITALS
TEMPERATURE: 98.7 F | HEIGHT: 66 IN | SYSTOLIC BLOOD PRESSURE: 137 MMHG | HEART RATE: 80 BPM | DIASTOLIC BLOOD PRESSURE: 70 MMHG | BODY MASS INDEX: 22.5 KG/M2 | WEIGHT: 140 LBS

## 2022-05-04 DIAGNOSIS — J31.0 CHRONIC RHINITIS: ICD-10-CM

## 2022-05-04 DIAGNOSIS — G25.81 RESTLESS LEG SYNDROME: ICD-10-CM

## 2022-05-04 DIAGNOSIS — L90.0 LICHEN SCLEROSUS: ICD-10-CM

## 2022-05-04 DIAGNOSIS — Z12.11 SCREEN FOR COLON CANCER: ICD-10-CM

## 2022-05-04 DIAGNOSIS — Z23 HIGH PRIORITY FOR 2019-NCOV VACCINE: ICD-10-CM

## 2022-05-04 DIAGNOSIS — Z00.00 ROUTINE HISTORY AND PHYSICAL EXAMINATION OF ADULT: Primary | ICD-10-CM

## 2022-05-04 DIAGNOSIS — F17.200 TOBACCO USE DISORDER: ICD-10-CM

## 2022-05-04 DIAGNOSIS — F39 MOOD DISORDER (H): ICD-10-CM

## 2022-05-04 DIAGNOSIS — Z12.4 CERVICAL CANCER SCREENING: ICD-10-CM

## 2022-05-04 DIAGNOSIS — F19.20: ICD-10-CM

## 2022-05-04 DIAGNOSIS — Z13.220 SCREENING FOR HYPERLIPIDEMIA: ICD-10-CM

## 2022-05-04 DIAGNOSIS — Z71.6 ENCOUNTER FOR TOBACCO USE CESSATION COUNSELING: ICD-10-CM

## 2022-05-04 PROCEDURE — 87624 HPV HI-RISK TYP POOLED RSLT: CPT | Performed by: PHYSICIAN ASSISTANT

## 2022-05-04 PROCEDURE — 91305 COVID-19,PF,PFIZER (12+ YRS): CPT | Performed by: PHYSICIAN ASSISTANT

## 2022-05-04 PROCEDURE — G0145 SCR C/V CYTO,THINLAYER,RESCR: HCPCS | Performed by: PHYSICIAN ASSISTANT

## 2022-05-04 PROCEDURE — 0054A COVID-19,PF,PFIZER (12+ YRS): CPT | Performed by: PHYSICIAN ASSISTANT

## 2022-05-04 PROCEDURE — 99213 OFFICE O/P EST LOW 20 MIN: CPT | Mod: 25 | Performed by: PHYSICIAN ASSISTANT

## 2022-05-04 PROCEDURE — 99396 PREV VISIT EST AGE 40-64: CPT | Mod: 25 | Performed by: PHYSICIAN ASSISTANT

## 2022-05-04 RX ORDER — ROPINIROLE 0.25 MG/1
0.25 TABLET, FILM COATED ORAL AT BEDTIME
Qty: 30 TABLET | Refills: 1 | Status: SHIPPED | OUTPATIENT
Start: 2022-05-04 | End: 2022-09-08

## 2022-05-04 RX ORDER — CETIRIZINE HYDROCHLORIDE 10 MG/1
20 TABLET ORAL DAILY
Qty: 60 TABLET | Refills: 5 | Status: SHIPPED | OUTPATIENT
Start: 2022-05-04 | End: 2022-11-23

## 2022-05-04 RX ORDER — FERROUS SULFATE 325(65) MG
325 TABLET ORAL EVERY OTHER DAY
COMMUNITY
End: 2024-02-01

## 2022-05-04 RX ORDER — VARENICLINE TARTRATE 1 MG/1
1 TABLET, FILM COATED ORAL 2 TIMES DAILY
Qty: 60 TABLET | Refills: 3 | Status: SHIPPED | OUTPATIENT
Start: 2022-05-04 | End: 2022-08-31

## 2022-05-04 RX ORDER — FLUTICASONE PROPIONATE 50 MCG
1 SPRAY, SUSPENSION (ML) NASAL DAILY
Qty: 16 G | Refills: 1 | Status: SHIPPED | OUTPATIENT
Start: 2022-05-04 | End: 2022-12-23

## 2022-05-04 ASSESSMENT — ENCOUNTER SYMPTOMS
WEAKNESS: 0
HEARTBURN: 0
DYSURIA: 0
EYE PAIN: 0
PARESTHESIAS: 0
CHILLS: 0
NERVOUS/ANXIOUS: 0
DIARRHEA: 0
HEADACHES: 0
BREAST MASS: 0
JOINT SWELLING: 0
FREQUENCY: 0
NAUSEA: 0
FEVER: 0
ABDOMINAL PAIN: 0
HEMATURIA: 0
ARTHRALGIAS: 1
COUGH: 0
SHORTNESS OF BREATH: 0
SORE THROAT: 0
CONSTIPATION: 0
MYALGIAS: 0
HEMATOCHEZIA: 0
PALPITATIONS: 0
DIZZINESS: 0

## 2022-05-04 NOTE — PATIENT INSTRUCTIONS
2 zyrtec every night (cetrizine)   Start flonase  Try requip at night for legs  Continue to take iron     Patient Education

## 2022-05-04 NOTE — PROGRESS NOTES
SUBJECTIVE:   CC: Sosa Patel is an 46 year old woman who presents for preventive health visit.     {Split Bill scripting  The purpose of this visit is to discuss your medical history and prevent health problems before you are sick. You may be responsible for a co-pay, coinsurance, or deductible if your visit today includes services such as checking on a sore throat, having an x-ray or lab test, or treating and evaluating a new or existing condition :160547}  {Patient advised of split billing (Optional):793428}  Healthy Habits:     Getting at least 3 servings of Calcium per day:  NO    Bi-annual eye exam:  Yes    Dental care twice a year:  Yes    Sleep apnea or symptoms of sleep apnea:  Daytime drowsiness and Excessive snoring    Diet:  Regular (no restrictions)    Frequency of exercise:  None    Taking medications regularly:  Yes    Medication side effects:  Muscle aches and Other    PHQ-2 Total Score: 2    Additional concerns today:  Yes    {Add if <65 person on Medicare  - Required Questions (Optional):635874}  {Outside tests to abstract? :092173}    {additional problems to add (Optional):928688}    Today's PHQ-2 Score:   PHQ-2 ( 1999 Pfizer) 5/4/2022   Q1: Little interest or pleasure in doing things 1   Q2: Feeling down, depressed or hopeless 1   PHQ-2 Score 2   PHQ-2 Total Score (12-17 Years)- Positive if 3 or more points; Administer PHQ-A if positive -   Q1: Little interest or pleasure in doing things Several days   Q2: Feeling down, depressed or hopeless Several days   PHQ-2 Score 2       Abuse: Current or Past (Physical, Sexual or Emotional) - { :965736}  Do you feel safe in your environment? { :894022}    Have you ever done Advance Care Planning? (For example, a Health Directive, POLST, or a discussion with a medical provider or your loved ones about your wishes): { :675569}    Social History     Tobacco Use     Smoking status: Current Every Day Smoker     Packs/day: 0.10     Years: 23.00     Pack  "years: 2.30     Types: Other     Last attempt to quit: 3/3/2013     Years since quittin.1     Smokeless tobacco: Never Used   Substance Use Topics     Alcohol use: Yes     Comment: rare     {Rooming Staff- Complete this question if Prescreen response is not shown below for today's visit. If you drink alcohol do you typically have >3 drinks per day or >7 drinks per week? (Optional):975872}    Alcohol Use 2022   Prescreen: >3 drinks/day or >7 drinks/week? No   Prescreen: >3 drinks/day or >7 drinks/week? -   {add AUDIT responses (Optional) (A score of 7 for adult men is an indication of hazardous drinking; a score of 8 or more is an indication of an alcohol use disorder.  A score of 7 or more for adult women is an indication of hazardous drinking or an alchohol use disorder):855443}    Reviewed orders with patient.  Reviewed health maintenance and updated orders accordingly - { :262221::\"Yes\"}  {Chronicprobdata (optional):969925}    Breast Cancer Screening:    FHS-7:   Breast CA Risk Assessment (FHS-7) 2021   Did any of your first-degree relatives have breast or ovarian cancer? No No No   Did any of your relatives have bilateral breast cancer? No No No   Did any man in your family have breast cancer? No No No   Did any woman in your family have breast and ovarian cancer? No No No   Did any woman in your family have breast cancer before age 50 y? No No No   Do you have 2 or more relatives with breast and/or ovarian cancer? No No No   Do you have 2 or more relatives with breast and/or bowel cancer? No No No     {If any of the questions to the BCRA (FHS-7) are answered yes, consider ordering referral for genetic counseling (Optional) :500848::\"click delete button to remove this line now\"}  {AMB Mammogram Decision Support (Optional) :984809}  Pertinent mammograms are reviewed under the imaging tab.    History of abnormal Pap smear: { :497538}  PAP / HPV Latest Ref Rng & Units 10/16/2015 " "3/6/2013 6/18/2010   PAP (Historical) - NIL NIL NIL   HPV16 NEG Negative - -   HPV18 NEG Negative - -   HRHPV NEG Negative - -     Reviewed and updated as needed this visit by clinical staff                    Reviewed and updated as needed this visit by Provider                   {HISTORY OPTIONS (Optional):364767}    Review of Systems   Constitutional: Negative for chills and fever.   HENT: Negative for congestion, ear pain, hearing loss and sore throat.    Eyes: Negative for pain and visual disturbance.   Respiratory: Negative for cough and shortness of breath.    Cardiovascular: Negative for chest pain, palpitations and peripheral edema.   Gastrointestinal: Negative for abdominal pain, constipation, diarrhea, heartburn, hematochezia and nausea.   Breasts:  Negative for tenderness, breast mass and discharge.   Genitourinary: Negative for dysuria, frequency, genital sores, hematuria, pelvic pain, urgency, vaginal bleeding and vaginal discharge.   Musculoskeletal: Positive for arthralgias. Negative for joint swelling and myalgias.   Skin: Negative for rash.   Neurological: Negative for dizziness, weakness, headaches and paresthesias.   Psychiatric/Behavioral: Positive for mood changes. The patient is not nervous/anxious.      {FEMALE ROS (Optional):188900}     OBJECTIVE:   There were no vitals taken for this visit.  Physical Exam  {Exam Choices (Optional):860753}    {Diagnostic Test Results (Optional):036517::\"Diagnostic Test Results:\",\"Labs reviewed in Epic\"}    ASSESSMENT/PLAN:   {Diag Picklist:715755}    {Patient advised of split billing (Optional):875557}    COUNSELING:  {FEMALE COUNSELING MESSAGES:043645::\"Reviewed preventive health counseling, as reflected in patient instructions\"}    Estimated body mass index is 26.05 kg/m  as calculated from the following:    Height as of 12/1/21: 1.67 m (5' 5.75\").    Weight as of 12/1/21: 72.7 kg (160 lb 3.2 oz).    {Weight Management Plan (ACO) Complete if BMI is " abnormal-  Ages 18-64  BMI >24.9.  Age 65+ with BMI <23 or >30 (Optional):416223}    She reports that she has been smoking other. She has a 2.30 pack-year smoking history. She has never used smokeless tobacco.  Tobacco Cessation Action Plan:   {TOBACCO CESSATION ACTION PLAN:195443}      Counseling Resources:  ATP IV Guidelines  Pooled Cohorts Equation Calculator  Breast Cancer Risk Calculator  BRCA-Related Cancer Risk Assessment: FHS-7 Tool  FRAX Risk Assessment  ICSI Preventive Guidelines  Dietary Guidelines for Americans, 2010  USDA's MyPlate  ASA Prophylaxis  Lung CA Screening    MANOHAR Palmer [facility]

## 2022-05-04 NOTE — PROGRESS NOTES
SUBJECTIVE:   CC: Sosa Patel is an 46 year old woman who presents for preventive health visit.       Patient has been advised of split billing requirements and indicates understanding: Yes  Healthy Habits:     Getting at least 3 servings of Calcium per day:  NO    Bi-annual eye exam:  Yes    Dental care twice a year:  Yes    Sleep apnea or symptoms of sleep apnea:  Daytime drowsiness and Excessive snoring    Diet:  Regular (no restrictions)    Frequency of exercise:  None    Taking medications regularly:  Yes    Medication side effects:  Muscle aches and Other    PHQ-2 Total Score: 2    Additional concerns today:  Yes          Referral for mental health and Rx for Chantix,wants to quit smoking   For a few months has noticed restless leg.  She has to move her legs at night and her boyfriend says she kicks her legs all night.  Started iron every other day a few weeks ago.  Might have helped some but is interested in other therapies.    Lichen was active recently and very painful.  Wondering if anything can help.  Sees gyn this month      Has itching all the time and runny nose always.        Today's PHQ-2 Score:   PHQ-2 ( 1999 Pfizer) 5/4/2022   Q1: Little interest or pleasure in doing things 1   Q2: Feeling down, depressed or hopeless 1   PHQ-2 Score 2   PHQ-2 Total Score (12-17 Years)- Positive if 3 or more points; Administer PHQ-A if positive -   Q1: Little interest or pleasure in doing things Several days   Q2: Feeling down, depressed or hopeless Several days   PHQ-2 Score 2       Abuse: Current or Past (Physical, Sexual or Emotional) - No  Do you feel safe in your environment? Yes    Have you ever done Advance Care Planning? (For example, a Health Directive, POLST, or a discussion with a medical provider or your loved ones about your wishes): No, advance care planning information given to patient to review.  Patient plans to discuss their wishes with loved ones or provider.      Social History     Tobacco  Use     Smoking status: Current Every Day Smoker     Packs/day: 0.10     Years: 23.00     Pack years: 2.30     Types: Other     Last attempt to quit: 3/3/2013     Years since quittin.1     Smokeless tobacco: Never Used   Substance Use Topics     Alcohol use: Yes     Comment: rare     If you drink alcohol do you typically have >3 drinks per day or >7 drinks per week? No    Alcohol Use 2022   Prescreen: >3 drinks/day or >7 drinks/week? No   Prescreen: >3 drinks/day or >7 drinks/week? -   No flowsheet data found.    Reviewed orders with patient.  Reviewed health maintenance and updated orders accordingly - Yes  Labs reviewed in EPIC    Breast Cancer Screening:    FHS-7:   Breast CA Risk Assessment (FHS-7) 2021   Did any of your first-degree relatives have breast or ovarian cancer? No No No   Did any of your relatives have bilateral breast cancer? No No No   Did any man in your family have breast cancer? No No No   Did any woman in your family have breast and ovarian cancer? No No No   Did any woman in your family have breast cancer before age 50 y? No No No   Do you have 2 or more relatives with breast and/or ovarian cancer? No No No   Do you have 2 or more relatives with breast and/or bowel cancer? No No No       Mammogram Screening: Recommended annual mammography  Pertinent mammograms are reviewed under the imaging tab.    History of abnormal Pap smear: NO - age 30-65 PAP every 5 years with negative HPV co-testing recommended  PAP / HPV Latest Ref Rng & Units 10/16/2015 3/6/2013 2010   PAP (Historical) - NIL NIL NIL   HPV16 NEG Negative - -   HPV18 NEG Negative - -   HRHPV NEG Negative - -     Reviewed and updated as needed this visit by clinical staff   Tobacco  Allergies  Meds                Reviewed and updated as needed this visit by Provider    Allergies  Meds                   Review of Systems   Constitutional: Negative for chills and fever.   HENT: Negative for  "congestion, ear pain, hearing loss and sore throat.    Eyes: Negative for pain and visual disturbance.   Respiratory: Negative for cough and shortness of breath.    Cardiovascular: Negative for chest pain, palpitations and peripheral edema.   Gastrointestinal: Negative for abdominal pain, constipation, diarrhea, heartburn, hematochezia and nausea.   Breasts:  Negative for tenderness, breast mass and discharge.   Genitourinary: Negative for dysuria, frequency, genital sores, hematuria, pelvic pain, urgency, vaginal bleeding and vaginal discharge.   Musculoskeletal: Positive for arthralgias. Negative for joint swelling and myalgias.   Skin: Negative for rash.   Neurological: Negative for dizziness, weakness, headaches and paresthesias.   Psychiatric/Behavioral: Positive for mood changes. The patient is not nervous/anxious.           OBJECTIVE:   /70   Pulse 80   Temp 98.7  F (37.1  C) (Oral)   Ht 1.664 m (5' 5.5\")   Wt 63.5 kg (140 lb)   BMI 22.94 kg/m    Physical Exam  Constitutional:       Appearance: She is well-developed.   HENT:      Right Ear: Tympanic membrane, ear canal and external ear normal.      Left Ear: Tympanic membrane, ear canal and external ear normal.      Mouth/Throat:      Pharynx: No oropharyngeal exudate.   Eyes:      Pupils: Pupils are equal, round, and reactive to light.   Neck:      Thyroid: No thyromegaly.   Cardiovascular:      Rate and Rhythm: Normal rate and regular rhythm.      Heart sounds: Normal heart sounds.   Pulmonary:      Effort: Pulmonary effort is normal.      Breath sounds: Normal breath sounds.   Chest:   Breasts:      Right: No mass, nipple discharge or skin change.      Left: No mass, nipple discharge or skin change.       Abdominal:      General: Bowel sounds are normal.      Palpations: Abdomen is soft.      Tenderness: There is no abdominal tenderness.   Genitourinary:     Vagina: Normal.      Cervix: Normal.   Lymphadenopathy:      Cervical: No cervical " adenopathy.   Skin:     General: Skin is warm and dry.      Findings: No rash.   Neurological:      Mental Status: She is alert and oriented to person, place, and time.   Psychiatric:         Behavior: Behavior normal.           Diagnostic Test Results:  Labs reviewed in Epic    ASSESSMENT/PLAN:   (Z00.00) Routine history and physical examination of adult  (primary encounter diagnosis)  Comment:   Plan: overall healthy.  Weight change has been on purpose     (Z12.11) Screen for colon cancer  Comment:   Plan: Adult Gastro Ref - Procedure Only            (Z13.220) Screening for hyperlipidemia  Comment:   Plan: Lipid panel reflex to direct LDL Fasting        Get labs next time she has lab work done     (Z12.4) Cervical cancer screening  Comment:   Plan: Pap Screen with HPV - recommended age 30 - 65         years            (G25.81) Restless leg syndrome  Comment:   Plan: rOPINIRole (REQUIP) 0.25 MG tablet        Continue to take iron     (L90.0) Lichen sclerosus  Comment:   Plan: see gyn and discuss if other treatment is appropriate     (Z71.6) Encounter for tobacco use cessation counseling  Comment:   Plan: ready to quit.  chantix sent     (F19.20) Combinations of drug dependence excluding opioid type drug (H)  Comment: has been sober   Plan: no changes     (F39) Mood disorder (H)  Comment:   Plan: she plans to call [facility] again to see if she can return there, overall doing ok     (J31.0) Chronic rhinitis  Comment:   Plan: fluticasone (FLONASE) 50 MCG/ACT nasal spray,         cetirizine (ZYRTEC) 10 MG tablet        Follow up as needed    (F17.200) Tobacco use disorder  Comment:   Plan: varenicline (CHANTIX MARY) 0.5 MG X 11 & 1 MG X         42 tablet, varenicline (CHANTIX) 1 MG tablet        As above    (Z23) High priority for 2019-nCoV vaccine  Comment:   Plan: COVID-19,PF,PFIZER (12+ Yrs GRAY LABEL)              Patient has been advised of split billing requirements and indicates understanding:  "Yes    COUNSELING:  Reviewed preventive health counseling, as reflected in patient instructions       Contraception       Colorectal Cancer Screening    Estimated body mass index is 22.94 kg/m  as calculated from the following:    Height as of this encounter: 1.664 m (5' 5.5\").    Weight as of this encounter: 63.5 kg (140 lb).        She reports that she has been smoking other. She has a 2.30 pack-year smoking history. She has never used smokeless tobacco.  Tobacco Cessation Action Plan:   Pharmacotherapies : Chantix      Counseling Resources:  ATP IV Guidelines  Pooled Cohorts Equation Calculator  Breast Cancer Risk Calculator  BRCA-Related Cancer Risk Assessment: FHS-7 Tool  FRAX Risk Assessment  ICSI Preventive Guidelines  Dietary Guidelines for Americans, 2010  USDA's MyPlate  ASA Prophylaxis  Lung CA Screening    Cynthia Scott PA-C  [facility]  "

## 2022-05-04 NOTE — TELEPHONE ENCOUNTER
Please verify cetirizine dose is intended to be 20mg day.  Usual dose is 10mg per day.    Thank you,    Saadia Santizo  Pharmacist, [facility]  [phone]

## 2022-05-06 LAB
BKR LAB AP GYN ADEQUACY: NORMAL
BKR LAB AP GYN INTERPRETATION: NORMAL
BKR LAB AP HPV REFLEX: NORMAL
BKR LAB AP PREVIOUS ABNORMAL: NORMAL
PATH REPORT.COMMENTS IMP SPEC: NORMAL
PATH REPORT.COMMENTS IMP SPEC: NORMAL
PATH REPORT.RELEVANT HX SPEC: NORMAL

## 2022-05-10 LAB
HUMAN PAPILLOMA VIRUS 16 DNA: NEGATIVE
HUMAN PAPILLOMA VIRUS 18 DNA: NEGATIVE
HUMAN PAPILLOMA VIRUS FINAL DIAGNOSIS: NORMAL
HUMAN PAPILLOMA VIRUS OTHER HR: NEGATIVE

## 2022-05-18 ENCOUNTER — ANCILLARY PROCEDURE (OUTPATIENT)
Dept: MAMMOGRAPHY | Facility: CLINIC | Age: 47
End: 2022-05-18
Attending: PHYSICIAN ASSISTANT
Payer: COMMERCIAL

## 2022-05-18 DIAGNOSIS — Z12.31 VISIT FOR SCREENING MAMMOGRAM: ICD-10-CM

## 2022-05-18 PROCEDURE — 77067 SCR MAMMO BI INCL CAD: CPT | Mod: TC | Performed by: RADIOLOGY

## 2022-06-13 ENCOUNTER — TELEPHONE (OUTPATIENT)
Dept: RHEUMATOLOGY | Facility: CLINIC | Age: 47
End: 2022-06-13
Payer: COMMERCIAL

## 2022-06-13 NOTE — TELEPHONE ENCOUNTER
[facility]  Fax:[phone]  Spec: [phone]  [place]: [phone]

## 2022-06-16 NOTE — TELEPHONE ENCOUNTER
Central Prior Authorization Team   Phone: [phone]      PA Initiation    Medication: Restasis multidose 0.05% emul--INITIATED  Insurance Company: [employer] - Phone [phone] Fax [phone]  Pharmacy Filling the Rx: [facility] - [place], [place] - [address]  Filling Pharmacy Phone: [phone]  Filling Pharmacy Fax:    Start Date: 6/16/2022

## 2022-07-01 ENCOUNTER — MYC MEDICAL ADVICE (OUTPATIENT)
Dept: RHEUMATOLOGY | Facility: CLINIC | Age: 47
End: 2022-07-01

## 2022-07-01 DIAGNOSIS — M32.9 SYSTEMIC LUPUS ERYTHEMATOSUS, UNSPECIFIED SLE TYPE, UNSPECIFIED ORGAN INVOLVEMENT STATUS (H): Primary | ICD-10-CM

## 2022-07-05 RX ORDER — PREDNISONE 5 MG/1
TABLET ORAL
Qty: 50 TABLET | Refills: 0 | Status: SHIPPED | OUTPATIENT
Start: 2022-07-05 | End: 2022-12-05

## 2022-07-05 NOTE — TELEPHONE ENCOUNTER
RN: Please notify Sosa Patel that prednisone has been sent, and notify her that prednisone raises the risk for COVID-19 infection so COVID-19 precautions should be taken (masking, social distancing, etc).  Some locations of her pain is likely not inflammatory (such as the back) and she needs to be evaluated in clinic.  Please see if she is able to change her upcoming appointment to be at 10:40 AM on Friday, July 15; this time slot is on hold with her MRN until 7/7/2022 at noon.      1. Systemic lupus erythematosus, unspecified SLE type, unspecified organ involvement status (H)  - predniSONE (DELTASONE) 5 MG tablet; Prednisone 20mg daily x5days, then 15mg daily x5days, then 10mg daily x5days, then 5mg daily x5days, then stop.  Dispense: 50 tablet; Refill: 0    Jack Arvizu MD  7/5/2022

## 2022-07-12 ENCOUNTER — LAB (OUTPATIENT)
Dept: LAB | Facility: CLINIC | Age: 47
End: 2022-07-12
Payer: COMMERCIAL

## 2022-07-12 DIAGNOSIS — Z79.899 HIGH RISK MEDICATION USE: ICD-10-CM

## 2022-07-12 DIAGNOSIS — Z13.220 SCREENING FOR HYPERLIPIDEMIA: ICD-10-CM

## 2022-07-12 DIAGNOSIS — M32.9 SYSTEMIC LUPUS ERYTHEMATOSUS, UNSPECIFIED SLE TYPE, UNSPECIFIED ORGAN INVOLVEMENT STATUS (H): ICD-10-CM

## 2022-07-12 LAB
ALBUMIN MFR UR ELPH: 9 MG/DL
ALBUMIN UR-MCNC: NEGATIVE MG/DL
APPEARANCE UR: CLEAR
BASOPHILS # BLD AUTO: 0 10E3/UL (ref 0–0.2)
BASOPHILS NFR BLD AUTO: 0 %
BILIRUB UR QL STRIP: NEGATIVE
COLOR UR AUTO: YELLOW
CREAT UR-MCNC: 118 MG/DL
CRP SERPL-MCNC: <3 MG/L
EOSINOPHIL # BLD AUTO: 0.5 10E3/UL (ref 0–0.7)
EOSINOPHIL NFR BLD AUTO: 4 %
ERYTHROCYTE [DISTWIDTH] IN BLOOD BY AUTOMATED COUNT: 17.8 % (ref 10–15)
ERYTHROCYTE [SEDIMENTATION RATE] IN BLOOD BY WESTERGREN METHOD: 4 MM/HR (ref 0–20)
GLUCOSE UR STRIP-MCNC: NEGATIVE MG/DL
HCT VFR BLD AUTO: 42 % (ref 35–47)
HGB BLD-MCNC: 13.7 G/DL (ref 11.7–15.7)
HGB UR QL STRIP: NEGATIVE
KETONES UR STRIP-MCNC: NEGATIVE MG/DL
LEUKOCYTE ESTERASE UR QL STRIP: NEGATIVE
LYMPHOCYTES # BLD AUTO: 3.3 10E3/UL (ref 0.8–5.3)
LYMPHOCYTES NFR BLD AUTO: 31 %
MCH RBC QN AUTO: 25.4 PG (ref 26.5–33)
MCHC RBC AUTO-ENTMCNC: 32.6 G/DL (ref 31.5–36.5)
MCV RBC AUTO: 78 FL (ref 78–100)
MONOCYTES # BLD AUTO: 0.9 10E3/UL (ref 0–1.3)
MONOCYTES NFR BLD AUTO: 9 %
NEUTROPHILS # BLD AUTO: 5.9 10E3/UL (ref 1.6–8.3)
NEUTROPHILS NFR BLD AUTO: 56 %
NITRATE UR QL: NEGATIVE
PH UR STRIP: 5.5 [PH] (ref 5–7)
PLATELET # BLD AUTO: 288 10E3/UL (ref 150–450)
PROT/CREAT 24H UR: 0.08 MG/MG CR (ref 0–0.2)
RBC # BLD AUTO: 5.4 10E6/UL (ref 3.8–5.2)
SP GR UR STRIP: >=1.03 (ref 1–1.03)
UROBILINOGEN UR STRIP-ACNC: 0.2 E.U./DL
WBC # BLD AUTO: 10.6 10E3/UL (ref 4–11)

## 2022-07-12 PROCEDURE — 82550 ASSAY OF CK (CPK): CPT

## 2022-07-12 PROCEDURE — 84156 ASSAY OF PROTEIN URINE: CPT

## 2022-07-12 PROCEDURE — 86160 COMPLEMENT ANTIGEN: CPT | Mod: 59

## 2022-07-12 PROCEDURE — 86481 TB AG RESPONSE T-CELL SUSP: CPT

## 2022-07-12 PROCEDURE — 86160 COMPLEMENT ANTIGEN: CPT

## 2022-07-12 PROCEDURE — 80061 LIPID PANEL: CPT

## 2022-07-12 PROCEDURE — 86140 C-REACTIVE PROTEIN: CPT

## 2022-07-12 PROCEDURE — 81003 URINALYSIS AUTO W/O SCOPE: CPT

## 2022-07-12 PROCEDURE — 80053 COMPREHEN METABOLIC PANEL: CPT

## 2022-07-12 PROCEDURE — 85025 COMPLETE CBC W/AUTO DIFF WBC: CPT

## 2022-07-12 PROCEDURE — 85652 RBC SED RATE AUTOMATED: CPT

## 2022-07-12 PROCEDURE — 86225 DNA ANTIBODY NATIVE: CPT

## 2022-07-12 PROCEDURE — 36415 COLL VENOUS BLD VENIPUNCTURE: CPT

## 2022-07-13 LAB
ALBUMIN SERPL-MCNC: 3.6 G/DL (ref 3.4–5)
ALP SERPL-CCNC: 28 U/L (ref 40–150)
ALT SERPL W P-5'-P-CCNC: 33 U/L (ref 0–50)
ANION GAP SERPL CALCULATED.3IONS-SCNC: 5 MMOL/L (ref 3–14)
AST SERPL W P-5'-P-CCNC: 16 U/L (ref 0–45)
BILIRUB SERPL-MCNC: 0.2 MG/DL (ref 0.2–1.3)
BUN SERPL-MCNC: 21 MG/DL (ref 7–30)
C3 SERPL-MCNC: 129 MG/DL (ref 81–157)
C4 SERPL-MCNC: 24 MG/DL (ref 13–39)
CALCIUM SERPL-MCNC: 8.8 MG/DL (ref 8.5–10.1)
CHLORIDE BLD-SCNC: 107 MMOL/L (ref 94–109)
CHOLEST SERPL-MCNC: 222 MG/DL
CK SERPL-CCNC: 32 U/L (ref 30–225)
CO2 SERPL-SCNC: 29 MMOL/L (ref 20–32)
CREAT SERPL-MCNC: 0.61 MG/DL (ref 0.52–1.04)
DSDNA AB SER-ACNC: 7.6 IU/ML
FASTING STATUS PATIENT QL REPORTED: ABNORMAL
GFR SERPL CREATININE-BSD FRML MDRD: >90 ML/MIN/1.73M2
GLUCOSE BLD-MCNC: 83 MG/DL (ref 70–99)
HDLC SERPL-MCNC: 70 MG/DL
LDLC SERPL CALC-MCNC: 118 MG/DL
NONHDLC SERPL-MCNC: 152 MG/DL
POTASSIUM BLD-SCNC: 4.1 MMOL/L (ref 3.4–5.3)
PROT SERPL-MCNC: 6.4 G/DL (ref 6.8–8.8)
SODIUM SERPL-SCNC: 141 MMOL/L (ref 133–144)
TRIGL SERPL-MCNC: 170 MG/DL

## 2022-07-14 LAB
GAMMA INTERFERON BACKGROUND BLD IA-ACNC: 0 IU/ML
M TB IFN-G BLD-IMP: NEGATIVE
M TB IFN-G CD4+ BCKGRND COR BLD-ACNC: 10 IU/ML
MITOGEN IGNF BCKGRD COR BLD-ACNC: 0 IU/ML
MITOGEN IGNF BCKGRD COR BLD-ACNC: 0 IU/ML
QUANTIFERON MITOGEN: 10 IU/ML
QUANTIFERON NIL TUBE: 0 IU/ML
QUANTIFERON TB1 TUBE: 0 IU/ML
QUANTIFERON TB2 TUBE: 0

## 2022-07-15 ENCOUNTER — OFFICE VISIT (OUTPATIENT)
Dept: RHEUMATOLOGY | Facility: CLINIC | Age: 47
End: 2022-07-15
Payer: COMMERCIAL

## 2022-07-15 VITALS
DIASTOLIC BLOOD PRESSURE: 88 MMHG | HEART RATE: 96 BPM | SYSTOLIC BLOOD PRESSURE: 133 MMHG | OXYGEN SATURATION: 98 % | WEIGHT: 151 LBS | BODY MASS INDEX: 24.75 KG/M2

## 2022-07-15 DIAGNOSIS — M54.50 CHRONIC MIDLINE LOW BACK PAIN WITHOUT SCIATICA: ICD-10-CM

## 2022-07-15 DIAGNOSIS — M54.6 CHRONIC MIDLINE THORACIC BACK PAIN: ICD-10-CM

## 2022-07-15 DIAGNOSIS — G89.29 CHRONIC MIDLINE THORACIC BACK PAIN: ICD-10-CM

## 2022-07-15 DIAGNOSIS — M32.9 SYSTEMIC LUPUS ERYTHEMATOSUS, UNSPECIFIED SLE TYPE, UNSPECIFIED ORGAN INVOLVEMENT STATUS (H): Primary | ICD-10-CM

## 2022-07-15 DIAGNOSIS — G89.29 CHRONIC MIDLINE LOW BACK PAIN WITHOUT SCIATICA: ICD-10-CM

## 2022-07-15 DIAGNOSIS — Z79.899 HIGH RISK MEDICATION USE: ICD-10-CM

## 2022-07-15 PROCEDURE — 99214 OFFICE O/P EST MOD 30 MIN: CPT | Performed by: INTERNAL MEDICINE

## 2022-07-15 RX ORDER — HYDROXYCHLOROQUINE SULFATE 200 MG/1
TABLET, FILM COATED ORAL
Qty: 135 TABLET | Refills: 2 | Status: SHIPPED | OUTPATIENT
Start: 2022-07-15 | End: 2023-02-15

## 2022-07-15 RX ORDER — LEFLUNOMIDE 20 MG/1
20 TABLET ORAL DAILY
Qty: 30 TABLET | Refills: 3 | Status: SHIPPED | OUTPATIENT
Start: 2022-07-15 | End: 2023-03-07

## 2022-07-15 NOTE — PROGRESS NOTES
Forms and OV faxed. Original in [facility] desk.    Cassia MCGOVERN RN Specialty Triage 7/15/2022 2:01 PM

## 2022-07-15 NOTE — NURSING NOTE
RAPID3 (0-30) Cumulative Score  19.3          RAPID3 Weighted Score (divide #4 by 3 and that is the weighted score)  6.4

## 2022-07-15 NOTE — PROGRESS NOTES
Rheumatology Clinic Visit      Sosa Patel  MRN# [id number]   YOB: 1975 Age: 46 year old      Date of visit: 7/15/22   PCP: Cynthia Scott    Chief Complaint   Patient presents with:  Systemic Lupus Erythematous: Doing a little better. [place] paperwork also    Assessment and Plan     1. Systemic Lupus Erythematosus (JESSE+, dsDNA+, arthritis, oral sores [SLE versus HSV related? - One-time had a culture that was negative], proteinuria, photosensitivity [fatigue]): Dx'd 10/2016; initially with arthritis that improved with HCQ.  Previously on MTX (fatigue, effective), MMF (resolved arthritis, rash, and brain fog; arthritis persisted though and eventually changed back to leflunomide).  Currently on HCQ 200mg daily, leflunomide 20mg daily, and Benlysta (SQ, starting mid March 2021; effective for arthritis and fatigue).  At one point she says she was feeling more depressed, forgot to refill Benlysta, went without Benlysta for 3-4 weeks and started to have more pain in her hands and feet.  She was given prednisone, currently on 10 mg daily, with significant improvement in the hands and feet.  She will continue tapering off prednisone: 10 mg daily x5 days, then 5 mg daily x5 days, then stop.  Celebrex discontinued because she found that it was not effective; and ibuprofen caused stomach upset when she retried it so she discontinued that as well.  Suspect that the flare was due to holding Benlysta for several weeks and hopefully the peripheral arthritis will remain well controlled that she tapers off prednisone.   Chronic illness, stable.     - Continue Benlysta SQ every 7 days   - Continue hydroxychloroquine from 200mg daily with an additional 200 mg every other day (last eye exam completed on 12/30/2021)  - Continue leflunomide 20 mg daily  - Discontinue Celebrex because she didn't feel like it helped and she has already discontinued it  - Labs in 3 months: CBC, CMP, ESR, CRP, C3, C4, dsDNA, UA,  Uprotein:creatinine    High risk medication requiring intensive toxicity monitoring at least quarterly: labs ordered include CBC, Creatinine, Hepatic panel to monitor for cytopenia and hepatotoxicity; checking creatinine as it affects clearance of medication.      # Pregnancy Plans: on birth control (depo-provera)    2.  Secondary Sjogren's syndrome: Mild symptoms that may be due to other factors such as caffeine intake, smoking, etc., but secondary Sjogren's Syndrome is possible.  We discussed the importance of good oral hygiene, frequent sips of water, avoiding high sugar foods and liquids, avoiding oral irritants such as coffee, alcohol, and nicotine, and avoiding acidic drinks such as carbonated and high sugar beverages. She is already seeing a dentist regularly. Dry eyes improved with preservative free artificial tears but she often forgets to take them; was given Restasis by her father who is a pharmacist and is helped significantly.  She did not get punctal plugs from ophthalmology.  More dry eyes since not being on Restasis again as the sample given ran out.   Chronic illness  - Continue artificial tears and Restasis    3. 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      4.  Cigarette smoking:  Encouraged complete cessation and discussed the health benefits.        5.  Excessive daytime sleepiness, fatigue: Wakes up tired, reports witnessed apneic episodes at night.  Snores.  Referred for sleep evaluation previously but she has not scheduled this appointment.  She says that she has benefited from melatonin but has not yet scheduled a sleep evaluation.  Advised that she call to schedule the sleep evaluation.    6. Heavy menstrual bleeding: Significant reduction in hemoglobin and from history discover that she has been having heavy menstrual bleeding every 3-4 months for the past 1.5 years where she requires changing padding every 15 minutes for about 3 days.  Previously referred to GYN but  she did not make the appointment and says that she has now stopped having menstrual bleeding.  She is to follow-up with her PCP and/or see GYN if this recurs.      7.  Upper lumbar and lower thoracic spine pain: Degenerative in nature, worse with activity and improves with rest.  Chronic issue per patient.  Check x-rays and likely will need physical therapy.  - X-ray: lumbar and thoracic spine  - PT referral    8.  Vaccinations: Vaccinations reviewed with Ms. Patel.  Risks and benefits of vaccinations were discussed.    - Influenza: Encouraged yearly vaccination  - [id number]: up to date  - [id number]: up to date  - Shingrix: Up to date  - COVID-19: has received the Pfizer COVID-19 vaccine on 2/12/2021 and 5/20/2021 and 12/31/2022, 5/4/22.  A 5th mRNA vaccine (2nd booster) may be received at least 4 months after the 4th dose. Discussed holding DMARDs to improve vaccine efficacy but she says that she flares each time they are held so will not hold DMARDs, knowing that vaccine efficacy may be reduced.     9. Return to work form: form completed for return to work on 8/8/2022. Anticipate that flare should resolve and she will be able to return to full duty on that date. Consent to send form and clinic note signed by patient.       Total minutes spent in evaluation with patient, documentation, , and review of pertinent studies and chart notes: Foster Patel verbalized agreement with and understanding of the rational for the diagnosis and treatment plan.  All questions were answered to best of my ability and the patient's satisfaction. Ms. Wolf was advised to contact the clinic with any questions that may arise after the clinic visit.      Thank you for involving me in the care of the patient    Return to clinic: 3-4 months      HPI   Sosa Patel  is a 46 year old female with a medical history significant for tobacco abuse, genital warts, and HSV type II who presents for follow-up of systemic lupus  erythematosus.    Today, 7/15/2022: Felt more depressed and therefore forgot to refill Benlysta; missed Benlysta for 3-4 weeks and started to have more pain.  Pain was at the MCPs and MTPs.  Prednisone was started and she is feeling much better.  Currently mid-course of the prednisone, taking 10 mg daily at this time.  Also with upper lumbar spine/lower thoracic spine pain that radiates to the left lower back, worse with activity, and improves with rest; reportedly this pain is chronic, and does not radiate to the legs.  Did not see GYN because menstrual bleeding has ceased.  Melatonin helps her sleep; she has not scheduled a sleep evaluation.    Denies fevers, chills, nausea, vomiting, constipation, diarrhea. No abdominal pain. No LE swelling. No neck pain.  No photosensitivity or photophobia. No history of inflammatory eye disease.  No history of DVT, pulmonary embolism, or miscarriage.   No history of serositis.  No history of Raynaud's Phenomenon.  No seizure history.  No known renal disorder.      Tobacco: smoking  EtOH: rare, sober since 5/16/2010  Drugs: cocaine history; now sober  Occupation: counselor for mentally disabled people; adult foster care    ROS   12 point review of system was completed and negative except as noted in the HPI     Active Problem List     Patient Active Problem List   Diagnosis     Herpes simplex type 2 infection     Genital warts     CARDIOVASCULAR SCREENING; LDL GOAL LESS THAN 160     H/O intravenous drug use in remission     JESSE positive     Multiple joint pain     Cocaine use     Systemic lupus erythematosus (H)     High risk medications (not anticoagulants) long-term use     Sicca syndrome (H)     Mood disorder (H)     Lichen sclerosus     Past Medical History     Past Medical History:   Diagnosis Date     Bipolar disorder (H) 12/04    Dx'd     Combinations of drug dependence excluding opioid type drug (H) 5/4/2022    In remission      H/O: depression     SUICIDE ATTEMPT AGE 15      HPV (human papillomavirus)      Past Surgical History     Past Surgical History:   Procedure Laterality Date     BUNIONECTOMY RT/LT       HC ENLARGE BREAST WITH IMPLANT  2000     Allergy   No Known Allergies  Current Medication List     Current Outpatient Medications   Medication Sig     Aspirin-Acetaminophen-Caffeine (EXCEDRIN PO)      Belimumab 200 MG/ML SOAJ Inject 200 mg Subcutaneous every 7 days . Hold for signs of infection and seek medical attention.     buPROPion (WELLBUTRIN SR) 150 MG 12 hr tablet TAKE 1 TABLET (150 MG) BY MOUTH DAILY     cetirizine (ZYRTEC) 10 MG tablet Take 2 tablets (20 mg) by mouth daily     clobetasol (TEMOVATE) 0.05 % external cream Apply topically 2 times daily     cycloSPORINE (RESTASIS) 0.05 % ophthalmic emulsion Place 1 drop into both eyes 2 times daily     diclofenac (VOLTAREN) 1 % topical gel Apply up to 2 grams of 1% gel to hands up to 4 times daily as needed for joint pain (maximum: 8 g per upper extremity per day)     ferrous sulfate (FEROSUL) 325 (65 Fe) MG tablet Take 325 mg by mouth every other day     FLUoxetine (PROZAC) 40 MG capsule TAKE ONE CAPSULE BY MOUTH ONCE DAILY     fluticasone (FLONASE) 50 MCG/ACT nasal spray Spray 1 spray into both nostrils daily     hydroxychloroquine (PLAQUENIL) 200 MG tablet Hydroxychloroquine 200 mg daily with an additional 200 mg every other day.  Ophthalmology toxicity monitoring eye exam, that includes 10-2 VF and SD-OCT, is required yearly.     leflunomide (ARAVA) 20 MG tablet Take 1 tablet (20 mg) by mouth in the morning. ; labs required every 12 weeks     predniSONE (DELTASONE) 5 MG tablet Prednisone 20mg daily x5days, then 15mg daily x5days, then 10mg daily x5days, then 5mg daily x5days, then stop.     rOPINIRole (REQUIP) 0.25 MG tablet Take 1 tablet (0.25 mg) by mouth At Bedtime     triamcinolone (KENALOG) 0.1 % paste TAKE BY MOUTH 2 TIMES DAILY     varenicline (CHANTIX MARY) 0.5 MG X 11 & 1 MG X 42 tablet Take 0.5 mg tab daily  "for 3 days, THEN 0.5 mg tab twice daily for 4 days, THEN 1 mg twice daily.     varenicline (CHANTIX) 1 MG tablet Take 1 tablet (1 mg) by mouth 2 times daily     magic mouthwash suspension, diphenhydrAMINE, lidocaine, aluminum-magnesium & simethicone, (FIRST-MOUTHWASH BLM) compounding kit Swish and swallow 5-10 mLs in mouth every 6 hours as needed for mouth sores (Patient not taking: No sig reported)     norethindrone-ethinyl estradiol (ORTHO-NOVUM) 1-35 MG-MCG tablet Take 3 pills on day 1 and then taper down until bleeding stops then finish out pack and then stop     No current facility-administered medications for this visit.     Social History   See HPI    Family History     Family History   Problem Relation Age of Onset     Cancer Father         lung cancer     Diabetes Maternal Grandmother      Depression Maternal Grandmother      Lipids Maternal Grandmother      Cerebrovascular Disease Maternal Grandfather      Lipids Maternal Grandfather      Circulatory Maternal Grandfather         MI in 50's     Depression Brother         depression and OCD     Gastrointestinal Disease Brother         IBS     Depression Mother      Thyroid Disease No family hx of      Asthma No family hx of      Denies family history of autoimmune disease     Physical Exam     Temp Readings from Last 3 Encounters:   05/04/22 98.7  F (37.1  C) (Oral)   03/11/21 97.6  F (36.4  C) (Oral)   10/30/20 97.9  F (36.6  C) (Oral)     BP Readings from Last 5 Encounters:   07/15/22 133/88   05/04/22 137/70   12/01/21 117/74   03/11/21 125/77   10/30/20 121/76     Pulse Readings from Last 1 Encounters:   07/15/22 96     Resp Readings from Last 1 Encounters:   11/04/16 18     Estimated body mass index is 24.75 kg/m  as calculated from the following:    Height as of 5/4/22: 1.664 m (5' 5.5\").    Weight as of this encounter: 68.5 kg (151 lb).      GEN: NAD. Healthy appearing adult.   HEENT:  Anicteric, noninjected sclera. No obvious external lesions of the " ear and nose. Hearing intact.  CV: S1, S2. RRR. No m/r/g  PULM: No increased work of breathing. CTA bilaterally   MSK: Mild tenderness palpation and synovial swelling of the bilateral second and third MCPs.  Other MCPs, PIPs, DIPs without swelling or tenderness to palpation.  Wrists without swelling or tenderness to palpation.  Elbows and shoulders without swelling or tenderness to palpation.  Knees, ankles, and MTPs without swelling or tenderness to palpation.  Lower thoracic spine tender to palpation.   SKIN: No rash or jaundice seen  PSYCH: Alert. Appropriate.      Labs / Imaging (select studies)     RF/CCP  Recent Labs   Lab Test 10/11/16  1043 08/10/16  1059   CCPIGG 1  --    RHF  --  <20     JESSE  Recent Labs   Lab Test 08/10/16  1059   ROBYN 1.7*     RNP/Sm/SSA/SSB  Recent Labs   Lab Test 10/11/16  1043   RNPIGG 0.3   SMIGG <0.2  Negative   Antibody index (AI) values reflect qualitative changes in antibody   concentration that cannot be directly associated with clinical condition or   disease state.     SSAIGG <0.2  Negative   Antibody index (AI) values reflect qualitative changes in antibody   concentration that cannot be directly associated with clinical condition or   disease state.     SSBIGG <0.2  Negative   Antibody index (AI) values reflect qualitative changes in antibody   concentration that cannot be directly associated with clinical condition or   disease state.     SCLIGG <0.2  Negative   Antibody index (AI) values reflect qualitative changes in antibody   concentration that cannot be directly associated with clinical condition or   disease state.       dsDNA  Recent Labs   Lab Test 07/12/22  1302 03/23/22  1204 11/30/21  1245 08/19/21  1056 04/20/21  1241 01/11/21  1315 10/16/19  1707 04/04/19  1627 12/13/18  1354   DNA 7.6 7.2 13.0* 11.0* 11* 17* 14* 14* 19*     C3/C4  Recent Labs   Lab Test 07/12/22  1302 03/23/22  1204 11/30/21  1245 08/19/21  1056 04/20/21  1241 01/11/21  1315 10/16/19  1707  04/04/19  1627 12/13/18  1354   W0HPYUH 129 103 120 113 103 109 90 120 80   K5CYOCU 24 25 30 28 27 25 19 26 19     CBC  Recent Labs   Lab Test 07/12/22  1302 04/07/22  1415 03/23/22  1204 08/19/21  1057 04/20/21  1241 01/11/21  1315 09/10/20  1308   WBC 10.6 4.7 5.4   < > 6.0 6.2 6.1   RBC 5.40* 3.88 3.27*   < > 4.28 4.46 4.44   HGB 13.7 9.4* 8.3*   < > 12.1 12.7 12.4   HCT 42.0 29.8* 26.7*   < > 35.7 39.1 37.8   MCV 78 77* 82   < > 83 88 85   RDW 17.8* 14.2 14.2   < > 12.9 13.0 13.6    265 309   < > 213 202 235   MCH 25.4* 24.2* 25.4*   < > 28.3 28.5 27.9   MCHC 32.6 31.5 31.1*   < > 33.9 32.5 32.8   NEUTROPHIL 56 88 59   < > 54.6 59.5 54.5   LYMPH 31 10 25   < > 31.6 26.4 30.0   MONOCYTE 9 2 13   < > 9.9 10.3 10.5   EOSINOPHIL 4 1 3   < > 3.6 3.5 4.3   BASOPHIL 0 0 1   < > 0.3 0.3 0.7   ANEU  --   --   --   --  3.3 3.7 3.3   ALYM  --   --   --   --  1.9 1.6 1.8   DAQUAN  --   --   --   --  0.6 0.6 0.6   AEOS  --   --   --   --  0.2 0.2 0.3   ABAS  --   --   --   --  0.0 0.0 0.0   ANEUTAUTO 5.9 4.1 3.2   < >  --   --   --    ALYMPAUTO 3.3 0.5* 1.3   < >  --   --   --    AMONOAUTO 0.9 0.1 0.7   < >  --   --   --    AEOSAUTO 0.5 0.0 0.2   < >  --   --   --    ABSBASO 0.0 0.0 0.0   < >  --   --   --     < > = values in this interval not displayed.     CMP  Recent Labs   Lab Test 07/12/22  1302 03/23/22  1204 11/30/21  1245 08/19/21  1056 04/20/21  1241 01/11/21  1315 09/10/20  1308 10/16/19  1708    140 141 136 138 139 138 139   POTASSIUM 4.1 3.5 4.0 4.1 3.6 3.9 4.1 3.9   CHLORIDE 107 109 109 106 108 110* 106 109   CO2 29 22 27 22 23 26 28 24   ANIONGAP 5 9 5 8 7 3 4 6   GLC 83 99 93 92 117* 78 85 83   BUN 21 21 24 17 25 15 17 17   CR 0.61 0.69 0.83 0.81 0.95 0.67 0.77 0.75   GFRESTIMATED >90 >90 84 87 72 >90 >90 >90   GFRESTBLACK  --   --   --   --  83 >90 >90 >90   RICHARD 8.8 8.5 9.7 8.8 8.3* 8.7 8.9 9.1   BILITOTAL 0.2 0.2 0.3 0.4 0.4 0.3 0.3 0.3   ALBUMIN 3.6 3.2* 3.8 3.9 3.8 4.0 3.8 4.2   PROTTOTAL 6.4*  6.5* 7.2 6.9 6.6* 6.9 6.9 7.3   ALKPHOS 28* 25* 31* 29* 24* 22* 28* 23*   AST 16 24 16 25 13 18 18 18   ALT 33 35 38 28 23 35 32 23     Calcium/VitaminD  Recent Labs   Lab Test 07/12/22  1302 03/23/22  1204 11/30/21  1245   RICHARD 8.8 8.5 9.7     ESR/CRP  Recent Labs   Lab Test 07/12/22  1302 03/23/22  1204 11/30/21  1245   SED 4 18 8   CRP <3.00 <2.9 <2.9     CK/Aldolase  Recent Labs   Lab Test 07/12/22  1302 08/19/21  1056 04/20/21  1241 01/13/17  1015 10/11/16  1043   CKT 32 106 71   < > 65   ALDOLASE  --   --   --   --  4.5    < > = values in this interval not displayed.     TSH/T4  Recent Labs   Lab Test 10/11/16  1043   TSH 1.48     Hepatitis B  Recent Labs   Lab Test 02/26/21  0927 10/11/16  1043   HBCAB Nonreactive Nonreactive   HEPBANG Nonreactive Nonreactive     Hepatitis C  Recent Labs   Lab Test 02/26/21  0927 10/11/16  1043   HCVAB Nonreactive Nonreactive   Assay performance characteristics have not been established for newborns,   infants, and children       Tuberculosis Screening  Recent Labs   Lab Test 07/12/22  1302 06/28/21  1700 02/26/21  0927   TBRES Negative  --   --    TBRST  --  Negative Negative     HIV Screening  Recent Labs   Lab Test 10/11/16  1043   HIAGAB Nonreactive   HIV-1 p24 Ag & HIV-1/HIV-2 Ab Not Detected       UA  Recent Labs   Lab Test 07/12/22  1302 08/19/21  1058 02/26/21  0937 09/10/20  1315 11/04/19  1747 04/04/19  1625 12/13/18  1355 09/21/17  0815 05/01/17  0954   COLOR Yellow Yellow Yellow   < > Yellow   < > Yellow Yellow Yellow   APPEARANCE Clear Clear Clear   < > Clear   < > Clear Clear Clear   URINEGLC Negative Negative Negative   < > Negative   < > Negative Negative Negative   URINEBILI Negative Negative Negative   < > Negative   < > Negative Negative Small  This is an unconfirmed screening test result. A positive result may be false.  *   SG >=1.030 1.025 1.015   < > 1.015   < > >1.030 >1.030 >1.030   URINEPH 5.5 6.0 7.0   < > 8.0*   < > 5.5 6.0 6.0   PROTEIN Negative  Negative Negative   < > Trace*   < > Negative Negative Trace*   UROBILINOGEN 0.2 0.2 0.2   < > 0.2   < > 0.2 0.2 0.2   NITRITE Negative Negative Negative   < > Negative   < > Negative Negative Negative   UBLD Negative Negative Negative   < > Negative   < > Small* Trace* Small*   LEUKEST Negative Negative Negative   < > Negative   < > Negative Negative Negative   WBCU  --   --   --   --  0 - 5  --  0 - 5 O - 2 O - 2   RBCU  --   --   --   --  O - 2  --  O - 2 O - 2 O - 2   SQUAMOUSEPI  --   --   --   --  Few  --  Moderate* Few Few   BACTERIA  --   --   --   --  Few*  --   --   --   --    MUCOUS  --   --   --   --   --   --   --   --  Present*    < > = values in this interval not displayed.     Urine Microscopic  Recent Labs   Lab Test 11/04/19  1747 12/13/18  1355 09/21/17  0815 05/01/17  0954   WBCU 0 - 5 0 - 5 O - 2 O - 2   RBCU O - 2 O - 2 O - 2 O - 2   SQUAMOUSEPI Few Moderate* Few Few   BACTERIA Few*  --   --   --    MUCOUS  --   --   --  Present*     Urine Protein  Recent Labs   Lab Test 07/12/22  1302 08/19/21  1058 09/10/20  1315 11/04/19  1747   UTP  --  0.16 0.15 0.21   UTPG  --  0.12 0.17 0.16   UCRR 118.0 137 88  --      Immunization History     Immunization History   Administered Date(s) Administered     COVID-19,PF,Pfizer (12+ Yrs) 02/12/2021, 05/20/2021, 12/31/2021     COVID-19,PF,Pfizer 12+ Yrs (2022 and After) 05/04/2022     Flu, Unspecified 11/01/2020, 12/01/2021     HepB, Unspecified 03/21/1997     Influenza Vaccine IM > 6 months Valent IIV4 (Alfuria,Fluzone) 12/19/2014, 10/16/2015, 11/04/2016, 12/13/2018, 11/07/2019     Mantoux Tuberculin Skin Test 07/30/2018     Pneumo Conj 13-V (2010&after) 12/13/2018     Pneumococcal 23 valent 04/04/2019     TD (ADULT, 7+) 02/23/2005, 04/09/2019     TDAP Vaccine (Adacel) 04/14/2009     Tdap (Adacel,Boostrix) 07/21/2016     Zoster vaccine recombinant adjuvanted (SHINGRIX) 12/21/2018, 04/04/2019          Chart documentation done in part with Dragon Voice  recognition Software. Although reviewed after completion, some word and grammatical error may remain.    Jack Arvizu MD

## 2022-07-15 NOTE — PATIENT INSTRUCTIONS
RHEUMATOLOGY    Dr. Jack Arvizu    [address] [place] [address]  Phone number: [phone]  Fax number: [phone]      Thank you for choosing [facility]!    Bailey Resendez CMA Rheumatology    ----------------------    COVID-19 vaccination   A 5th mRNA vaccine (2nd booster) may be received at least 4 months after the 4th dose.

## 2022-07-26 ENCOUNTER — TELEPHONE (OUTPATIENT)
Dept: GASTROENTEROLOGY | Facility: CLINIC | Age: 47
End: 2022-07-26

## 2022-07-26 ENCOUNTER — HOSPITAL ENCOUNTER (OUTPATIENT)
Facility: AMBULATORY SURGERY CENTER | Age: 47
End: 2022-07-26
Attending: INTERNAL MEDICINE
Payer: COMMERCIAL

## 2022-07-26 DIAGNOSIS — Z12.11 SCREENING FOR COLON CANCER: Primary | ICD-10-CM

## 2022-07-26 NOTE — TELEPHONE ENCOUNTER
Screening Questions    BlueKIND OF PREP RedLOCATION [review exclusion criteria] GreenSEDATION TYPE      1. Are you active on mychart? Y    2. What insurance is in the chart? [employer]     3.   Ordering/Referring Provider: Cynthia Scott PA-C     4. BMI   (If greater than 40 review exclusion criteria [PAC APPT IF [MAC] @ [facility])  25.1  [If yes, BMI OVER 40-EXTENDED PREP]      **(Sedation review/consideration needed)**  Do you have a legal guardian or Medical Power of    and/or are you able to give consent for your medical care?     Y    5. Have you had a positive covid test in the last 90 days?   N -     6.  Are you currently on dialysis?   N [ If yes, G-PREP & HOSPITAL setting ONLY]     7.  Do you have chronic kidney disease?  N [ If yes, G-PREP ]    8.   Do you have a diagnosis of diabetes?   N   [ If yes, G-PREP ]    9.  On a regular basis do you go 3-5 days between bowel movements?   Y   [ If yes, EXTENDED PREP]    10.  Are you taking any prescription pain medications on a routine schedule?    N -  [ If yes, EXTENDED PREP] [If yes, MAC]      11.   Do you have any chemical dependencies such as alcohol, street drugs, or methadone?    N [If yes, MAC]    12.   Do you have any history of post-traumatic stress syndrome, severe anxiety or history of psychosis?    N  [If yes, MAC]    13.  [FEMALES] Are you currently pregnant? N    If yes, how many weeks?       Respiratory/Heart Screening:  [If yes to any of the following HOSPITAL setting only]     14. Do you have Pulmonary Hypertension [Lungs]?   N       15. Do you have UNCONTROLLED asthma?   N     16.  Do you use daily home oxygen?  N      17. Do you have mod to severe Obstructive Sleep Apnea?         (OKAY @ [facility]  [facility]  [facility]  [facility]  [facility]  [facility] - if pt is not on OXYGEN)  N      18.   Have you had a heart or lung transplant?   N      19.   Have you had a stroke or Transient ischemic attack (TIA - aka  mini stroke ) within 6 months?  (If yes, please review  exclusion criteria)  N     20.   In the past 6 months, have you had any heart related issues including cardiomyopathy or heart attack?   N           If yes, did it require cardiac stenting or other implantable device?   NA      21.   Do you have any implantable devices in your body (pacemaker, defib, LVAD)? (If yes, please review exclusion criteria)  N     22.  Do you take the medication Phentermine?  N   Yes-> Hold for 7 days before procedure.  Please consult your prescribing provider if you have questions about holding this medication.     No-> Continue to next question.    23. Do you take nitroglycerin?   N           If yes, how often? NA  (if yes, HOSPITAL setting ONLY)    24.  Are you currently taking any blood thinners?    [If yes, INFORM patient to follw up w/ ORDERING PROVIDER FOR BRIDGING INSTRUCTIONS]     N    25.   Do you transfer independently?                (If NO, please HOSPITAL setting ONLY)  Y    26.   Preferred LOCAL Pharmacy for Pre Prescription:      [facility] [place] - [address]    Scheduling Details  (Please ask for phone number if not scheduled by patient)      Caller : Sosa Patel    Date of Procedure: 9/6  Surgeon: Gris  Location: [facility]        Sedation Type: CS l Per Protocol  Conscious Sedation- Needs  for 6 hours after the procedure  MAC/General-Needs  for 24 hours after procedure    N :[Pre-op Required] at [facility]  [facility] and OR for MAC sedation   (advise patient they will need a pre-op WITH IN 30 DAYS of procedure date)     Type of Procedure Scheduled:   Lower Endoscopy [Colonoscopy]    Which Colonoscopy Prep was Sent?:   Extended - Per Protocol    CAREY CF PATIENTS & GROEN'S PATIENTS NEEDS EXTENDED PREP       Informed patient they will need an adult  Y  Cannot take any type of public or medical transportation alone    Pre-Procedure Covid test to be completed [facility] or Externally: Y  **INFORMED OF HOME TESTING &  LAB OPTION**        Confirmed Nurse will call to complete assessment Y    Additional comments:

## 2022-07-27 ENCOUNTER — THERAPY VISIT (OUTPATIENT)
Dept: PHYSICAL THERAPY | Facility: CLINIC | Age: 47
End: 2022-07-27
Attending: INTERNAL MEDICINE
Payer: COMMERCIAL

## 2022-07-27 DIAGNOSIS — M54.50 CHRONIC MIDLINE LOW BACK PAIN WITHOUT SCIATICA: ICD-10-CM

## 2022-07-27 DIAGNOSIS — M54.6 PAIN IN THORACIC SPINE: ICD-10-CM

## 2022-07-27 DIAGNOSIS — G89.29 CHRONIC MIDLINE LOW BACK PAIN WITHOUT SCIATICA: ICD-10-CM

## 2022-07-27 DIAGNOSIS — G89.29 CHRONIC MIDLINE THORACIC BACK PAIN: ICD-10-CM

## 2022-07-27 DIAGNOSIS — M54.6 CHRONIC MIDLINE THORACIC BACK PAIN: ICD-10-CM

## 2022-07-27 DIAGNOSIS — M54.50 LUMBAR SPINE PAIN: ICD-10-CM

## 2022-07-27 PROCEDURE — 97161 PT EVAL LOW COMPLEX 20 MIN: CPT | Mod: GP | Performed by: PHYSICAL THERAPIST

## 2022-07-27 PROCEDURE — 97110 THERAPEUTIC EXERCISES: CPT | Mod: GP | Performed by: PHYSICAL THERAPIST

## 2022-07-27 NOTE — PROGRESS NOTES
[facility]    OUTPATIENT Physical Therapy ORTHOPEDIC EVALUATION  PLAN OF TREATMENT FOR OUTPATIENT REHABILITATION  (COMPLETE FOR INITIAL CLAIMS ONLY)  Patient's Last Name, First Name, M.I.  YOB: 1975  Sosa Patel    Provider s Name:  [facility]   Medical Record No.  [id number]   Start of Care Date:  07/27/22   Onset Date:   06/26/22   Type:     _X__PT   ___OT Medical Diagnosis:    Encounter Diagnoses   Name Primary?    Chronic midline low back pain without sciatica     Chronic midline thoracic back pain         Treatment Diagnosis:  Lumbar spine pain / Thoracic spine pain        Goals:     07/27/22 0500   Body Part   Goals listed below are for Lumbar and thoracic   Goal #1   Goal #1 lifting/carrying   Previous Functional Level No restrictions   Current Functional Level Cannot lift   Performance level 10 pounds without 6/10 pain   STG [employer] Performance Lift an item to counter height weighing   Performance level 10 pounds with 4/10 pain   Rationale for housework such as laundry, emptying garbage, use of    Due date 08/17/22   LTG [employer] Performance Lift an item to counter height weighing   Performance Level 50 pounds with 1-2/10 pain   Rationale to perform job duties at work   Due date 09/21/22   Goal #2   Goal #2 sleeping   Previous Functional Level No restrictions   Current Functional Level 1-2 hours without sleep per night   STG Target Performance Less than 1 hour without sleep per night   Rationale to establish restorative sleep pattern   LTG Target Performance   (Sleep through the night without waking up due to back pain)   Rationale to establish restorative sleep pattern   Due Date 09/21/22       Therapy Frequency:  1 time per week  Predicted Duration of Therapy Intervention:  8 weeks    Apolinar Blancas PT                 I CERTIFY THE NEED FOR  THESE SERVICES FURNISHED UNDER        THIS PLAN OF TREATMENT AND WHILE UNDER MY CARE     (Physician attestation of this document indicates review and certification of the therapy plan).                     Certification Date From:  07/27/22   Certification Date To:  09/21/22    Referring Provider:  Jack Arvizu    Initial Assessment        See Epic Evaluation SOC Date: 07/27/22

## 2022-07-27 NOTE — PROGRESS NOTES
Physical Therapy Initial Evaluation  Subjective:  The history is provided by the patient.   Patient Health History  Sosa Patel being seen for Middle/lower back pain.     Problem began: 6/26/2022.   Problem occurred: Just started hurting   Pain is reported as 6/10 on pain scale.  General health as reported by patient is fair.  Pertinent medical history includes: anemia, chest pain, depression, menopausal, mental illness, migraines/headaches, pain at night/rest, smoking and other. Other medical history details: Lupus SLE.     Medical allergies: none.   Surgeries include:  Other. Other surgery history details: Bunyon surgery Right, breast augmentation.    Current medications:  Anti-depressants, anti-inflammatory and sleep medication.       Primary job tasks include:  Lifting/carrying.                  Therapist Generated HPI Evaluation  Problem details: Started end of June. No injury. Does have lupus, but rheumatolgist doesn't think it's related. Gets pain across the middle of the back. When it first started, couldn't sleep, couldn't get out of bed. Tried ice and heat, both helped. Ice really helped. Some pain when taking deeper breaths. Didn't get pain wrapping around sides to front. Ibuprofen helps as well, but hard on stomach. On leave from work, lifts up to 50 pounds consistently. Returns on 8/8/2022..         Type of problem:  Lumbar and thoracic.      Condition occurred with:  Insidious onset.  Where condition occurred: for unknown reasons.  Patient reports pain:  Central thoracic spine, upper lumbar spine, thoracic spine left and thoracic spine right.  and is constant.  Pain radiates to:  No radiation. Pain is worse in the P.M..  Since onset symptoms are gradually improving.  Associated symptoms:  Loss of motion/stiffness. Symptoms are exacerbated by certain positions and lying down  and relieved by ice, NSAID's and heat.      Restrictions due to condition include:  Currently not working due to present  treatment.  Barriers include:  None as reported by patient.                        Objective:    Gait:    Gait Type:  Normal   Assistive Devices:  None                 Lumbar/SI Evaluation  ROM:  Arom wnl lumbar: Thoracic rotation limited to left > Right / Thoracic extension - Moderately limited / Thoracic flexion - Moderately limited.  AROM Lumbar:   Flexion:          Fingertips 12 inches from floor / Repeated motions - hurts in lumbar spine and thoracic spine  Ext:                    20 degrees with pain in thoracic spine   Side Bend:        Left:  Fingertips to knee joint    Right:  Fingertips to knee joint  Rotation:           Left:     Right:   Side Glide:        Left:     Right:           Lumbar Myotomes:  normal            Lumbar DTR's:    L4 (Quad):  Left:  1   Right:  1      Lumbar Dermtomes:  normal                Neural Tension/Mobility:  Lumbar:  Normal        Lumbar Palpation:  Palpation (lumbar): Tenderness to palpation at Thoracic paraspinals at T9-T10.          Spinal Segmental Conclusions: Hypomobility of T9-T10          SI joint/Sacrum:          Left negative at:    Thigh thrust and Sacral thrust    Right negative at:  Thigh thrust and Sacral thrust                                                 General     ROS    Assessment/Plan:    Patient is a 46 year old female with thoracic and lumbar complaints.    Patient has the following significant findings with corresponding treatment plan.                Diagnosis 1:  Lumbar spine pain / Thoracic spine pain  Pain -  electric stimulation, mechanical traction, manual therapy, self management, education and home program  Decreased ROM/flexibility - manual therapy, therapeutic exercise, therapeutic activity and home program  Decreased joint mobility - manual therapy, therapeutic exercise, therapeutic activity and home program  Decreased strength - therapeutic exercise, therapeutic activities and home program  Decreased function - therapeutic activities and  home program    Therapy Evaluation Codes:     Cumulative Therapy Evaluation is: Low complexity.    Previous and current functional limitations:  (See Goal Flow Sheet for this information)    Short term and Long term goals: (See Goal Flow Sheet for this information)     Communication ability:  Patient appears to be able to clearly communicate and understand verbal and written communication and follow directions correctly.  Treatment Explanation - The following has been discussed with the patient:   RX ordered/plan of care  Anticipated outcomes  Possible risks and side effects  This patient would benefit from PT intervention to resume normal activities.   Rehab potential is good.    Frequency:  1 X week, once daily  Duration:  for 8 weeks  Discharge Plan:  Achieve all LTG.  Independent in home treatment program.  Reach maximal therapeutic benefit.    Please refer to the daily flowsheet for treatment today, total treatment time and time spent performing 1:1 timed codes.

## 2022-07-28 ENCOUNTER — TELEPHONE (OUTPATIENT)
Dept: MULTI SPECIALTY CLINIC | Facility: CLINIC | Age: 47
End: 2022-07-28

## 2022-07-28 DIAGNOSIS — M32.9 SYSTEMIC LUPUS ERYTHEMATOSUS, UNSPECIFIED SLE TYPE, UNSPECIFIED ORGAN INVOLVEMENT STATUS (H): Primary | ICD-10-CM

## 2022-07-28 NOTE — TELEPHONE ENCOUNTER
Patient would like to go to the [facility] for a Rheumatology second opinion.  She called to set up a appt and they told her to have Dr. Arvizu call the [facility] at [phone].

## 2022-07-29 NOTE — TELEPHONE ENCOUNTER
"RN: please call the number listed in this encounter.  If rheumatology referral is needed then okay to place it for \"2nd opinion requested by patient\".     Jack Arvizu MD  7/29/2022    "

## 2022-07-30 DIAGNOSIS — F33.41 RECURRENT MAJOR DEPRESSIVE DISORDER, IN PARTIAL REMISSION (H): ICD-10-CM

## 2022-07-31 NOTE — TELEPHONE ENCOUNTER
Routing refill request to provider for review/approval because:  Drug not on the [facility] refill protocol   PHQ-9 score:    PHQ 3/29/2022   PHQ-9 Total Score 13   Q9: Thoughts of better off dead/self-harm past 2 weeks Not at all   F/U: Thoughts of suicide or self-harm -   F/U: Safety concerns -

## 2022-08-01 NOTE — TELEPHONE ENCOUNTER
Called x2 on hold for 10+ mins each time. Called pt and left vm to inquire if a faxed referral is okay.    Cassia MCGOVERN RN Specialty Triage 8/1/2022 2:18 PM

## 2022-08-02 RX ORDER — BUPROPION HYDROCHLORIDE 150 MG/1
150 TABLET, EXTENDED RELEASE ORAL DAILY
Qty: 30 TABLET | Refills: 0 | Status: SHIPPED | OUTPATIENT
Start: 2022-08-02 | End: 2022-08-31

## 2022-08-04 NOTE — TELEPHONE ENCOUNTER
called and left vm to inquire if okay to fax referral.    Cassia MCGOVERN RN Specialty Triage 8/4/2022 10:33 AM

## 2022-08-10 NOTE — TELEPHONE ENCOUNTER
Patient called back and stated the [facility] wouldn't give her a fax number to fax the referral and said they need the referral called in to [phone].  Patient doesn't care how the referral get to the [facility] as long as it gets there. Thanks!

## 2022-08-22 ENCOUNTER — TELEPHONE (OUTPATIENT)
Dept: GASTROENTEROLOGY | Facility: CLINIC | Age: 47
End: 2022-08-22

## 2022-08-22 NOTE — TELEPHONE ENCOUNTER
Caller: Sosa    Procedure: colonoscopy    Date, Location, and Surgeon of Procedure Cancelled: 9/6, Shanita VALLE    Ordering Provider: Cynthia Scott PA-C     Reason for cancel (please be detailed, any staff messages or encounters to note?): pt calendar work conflict        Rescheduled: Y     If rescheduled:    Date: 10/13   Location: [facility]   Prep Resent: EXTENDED (changes to prep?)   Covid Test Rescheduled: NB lab 10/10   Note any change or update to original order/sedation: still CS

## 2022-08-31 ENCOUNTER — VIRTUAL VISIT (OUTPATIENT)
Dept: FAMILY MEDICINE | Facility: CLINIC | Age: 47
End: 2022-08-31
Payer: COMMERCIAL

## 2022-08-31 DIAGNOSIS — F17.200 TOBACCO USE DISORDER: ICD-10-CM

## 2022-08-31 DIAGNOSIS — F33.41 RECURRENT MAJOR DEPRESSIVE DISORDER, IN PARTIAL REMISSION (H): ICD-10-CM

## 2022-08-31 PROBLEM — F39 MOOD DISORDER (H): Status: RESOLVED | Noted: 2019-04-09 | Resolved: 2022-08-31

## 2022-08-31 PROCEDURE — 99213 OFFICE O/P EST LOW 20 MIN: CPT | Mod: 95 | Performed by: PHYSICIAN ASSISTANT

## 2022-08-31 RX ORDER — VARENICLINE TARTRATE 1 MG/1
1 TABLET, FILM COATED ORAL 2 TIMES DAILY
Qty: 60 TABLET | Refills: 3 | Status: SHIPPED | OUTPATIENT
Start: 2022-08-31 | End: 2023-09-29

## 2022-08-31 RX ORDER — FLUOXETINE 40 MG/1
CAPSULE ORAL
Qty: 90 CAPSULE | Refills: 1 | Status: SHIPPED | OUTPATIENT
Start: 2022-08-31

## 2022-08-31 RX ORDER — BUPROPION HYDROCHLORIDE 150 MG/1
150 TABLET, EXTENDED RELEASE ORAL DAILY
Qty: 90 TABLET | Refills: 1 | Status: SHIPPED | OUTPATIENT
Start: 2022-08-31 | End: 2022-11-30

## 2022-08-31 ASSESSMENT — ANXIETY QUESTIONNAIRES
5. BEING SO RESTLESS THAT IT IS HARD TO SIT STILL: MORE THAN HALF THE DAYS
6. BECOMING EASILY ANNOYED OR IRRITABLE: SEVERAL DAYS
GAD7 TOTAL SCORE: 10
IF YOU CHECKED OFF ANY PROBLEMS ON THIS QUESTIONNAIRE, HOW DIFFICULT HAVE THESE PROBLEMS MADE IT FOR YOU TO DO YOUR WORK, TAKE CARE OF THINGS AT HOME, OR GET ALONG WITH OTHER PEOPLE: SOMEWHAT DIFFICULT
1. FEELING NERVOUS, ANXIOUS, OR ON EDGE: NEARLY EVERY DAY
7. FEELING AFRAID AS IF SOMETHING AWFUL MIGHT HAPPEN: SEVERAL DAYS
3. WORRYING TOO MUCH ABOUT DIFFERENT THINGS: SEVERAL DAYS
GAD7 TOTAL SCORE: 10
2. NOT BEING ABLE TO STOP OR CONTROL WORRYING: SEVERAL DAYS

## 2022-08-31 ASSESSMENT — PATIENT HEALTH QUESTIONNAIRE - PHQ9
SUM OF ALL RESPONSES TO PHQ QUESTIONS 1-9: 9
5. POOR APPETITE OR OVEREATING: SEVERAL DAYS

## 2022-08-31 NOTE — PROGRESS NOTES
Sosa is a 46 year old who is being evaluated via a billable telephone visit.    7:15-7:21  What phone number would you like to be contacted at? [phone]   How would you like to obtain your AVS? MyChart    Assessment & Plan     Tobacco use disorder  Tried to quit and would like to try again   - varenicline (CHANTIX MARY) 0.5 MG X 11 & 1 MG X 42 tablet; Take 0.5 mg tab daily for 3 days, THEN 0.5 mg tab twice daily for 4 days, THEN 1 mg twice daily.  - varenicline (CHANTIX) 1 MG tablet; Take 1 tablet (1 mg) by mouth 2 times daily    Recurrent major depressive disorder, in partial remission (H)  For now she doesn't want to change medication.  Refilled medications   - buPROPion (WELLBUTRIN SR) 150 MG 12 hr tablet; Take 1 tablet (150 mg) by mouth daily                   Return in about 8 months (around 5/1/2023) for Routine Visit.    Cynthia Scott PA-C  [facility]    Wendy   Sosa is a 46 year old accompanied by her self , presenting for the following health issues:  Recheck Medication      HPI     Medication Followup of  Wellbutrin     Taking Medication as prescribed: yes    Side Effects:  None    Medication Helping Symptoms:  Yes    Going back to psychiatrist end of Nov.  Thinks the dose is ok for now.  Not using.  Started therapy once weekly.      chantix did help but quit because she was around her boyfriend more who smokes.          Review of Systems   As above      Objective           Vitals:  No vitals were obtained today due to virtual visit.    Physical Exam   healthy, alert and no distress  PSYCH: Alert and oriented times 3; coherent speech, normal   rate and volume, able to articulate logical thoughts, able   to abstract reason, no tangential thoughts, no hallucinations   or delusions  Her affect is normal  RESP: No cough, no audible wheezing, able to talk in full sentences  Remainder of exam unable to be completed due to telephone visits                Phone call  duration: 6 minutes    .  ..

## 2022-09-07 ENCOUNTER — TELEPHONE (OUTPATIENT)
Dept: RHEUMATOLOGY | Facility: CLINIC | Age: 47
End: 2022-09-07

## 2022-09-07 DIAGNOSIS — G25.81 RESTLESS LEG SYNDROME: ICD-10-CM

## 2022-09-07 NOTE — TELEPHONE ENCOUNTER
Forms from employer ([employer]) Placed on MD desk.    Cassia MCGOVERN RN Specialty Triage 9/7/2022 10:52 AM

## 2022-09-08 RX ORDER — ROPINIROLE 0.25 MG/1
0.25 TABLET, FILM COATED ORAL AT BEDTIME
Qty: 30 TABLET | Refills: 1 | Status: SHIPPED | OUTPATIENT
Start: 2022-09-08 | End: 2022-11-30 | Stop reason: ALTCHOICE

## 2022-09-09 NOTE — TELEPHONE ENCOUNTER
"RN: Form reviewed.  Agree with initial form that was filled out - patient is not \"totally impaired from working\" based on exam on 7/15/2022 visit.  Patient had mild synovitis on 7/15/2022. Patient asked to not be released to full duty until 8/8/2022 and that was reasonable and put on the initial form.     Please notify patient.    Jack Arvizu MD  9/9/2022    "

## 2022-09-09 NOTE — TELEPHONE ENCOUNTER
"Prescription approved per [facility] Refill Protocol.  Requested Prescriptions   Pending Prescriptions Disp Refills     rOPINIRole (REQUIP) 0.25 MG tablet [Pharmacy Med Name: ROPINIROLE HCL 0.25MG TABS] 30 tablet 1     Sig: TAKE 1 TABLET (0.25 MG) BY MOUTH AT BEDTIME       Antiparkinson's Agents Protocol Passed - 9/7/2022  8:35 AM        Passed - Blood pressure under 140/90 in past 12 months     BP Readings from Last 3 Encounters:   07/15/22 133/88   05/04/22 137/70   12/01/21 117/74                 Passed - CBC on record in past 12 months     Recent Labs   Lab Test 07/12/22  1302   WBC 10.6   RBC 5.40*   HGB 13.7   HCT 42.0                    Passed - ALT on record in past 12 months         Recent Labs   Lab Test 07/12/22  1302   ALT 33             Passed - Serum Creatinine on file in past 12 months     Recent Labs   Lab Test 07/12/22  1302   CR 0.61       Ok to refill medication if creatinine is low          Passed - Medication is active on med list        Passed - Patient is age 18 or older        Passed - No active pregnancy on record        Passed - No positive pregnancy test in the past 12 months        Passed - Recent (6 mo) or future (30 days) visit within the authorizing provider's specialty     Patient had office visit in the last 6 months or has a visit in the next 30 days with authorizing provider or within the authorizing provider's specialty.  See \"Patient Info\" tab in inbasket, or \"Choose Columns\" in Meds & Orders section of the refill encounter.                 "

## 2022-09-20 PROBLEM — M54.50 LUMBAR SPINE PAIN: Status: RESOLVED | Noted: 2022-07-27 | Resolved: 2022-09-20

## 2022-09-20 PROBLEM — M54.6 PAIN IN THORACIC SPINE: Status: RESOLVED | Noted: 2022-07-27 | Resolved: 2022-09-20

## 2022-10-06 RX ORDER — BISACODYL 5 MG
TABLET, DELAYED RELEASE (ENTERIC COATED) ORAL
Qty: 4 TABLET | Refills: 0 | Status: SHIPPED | OUTPATIENT
Start: 2022-10-06 | End: 2022-10-12 | Stop reason: HOSPADM

## 2022-10-12 ENCOUNTER — TELEPHONE (OUTPATIENT)
Dept: GASTROENTEROLOGY | Facility: CLINIC | Age: 47
End: 2022-10-12

## 2022-10-12 NOTE — TELEPHONE ENCOUNTER
Caller: Sosa Patel      Procedure: COLON    Date, Location, and Surgeon of Procedure Cancelled: 10/13, SHAKIR VALEL    Ordering Provider:SHARRON    Reason for cancel (please be detailed, any staff messages or encounters to note?): PT REQUEST, STATES SHE LEFT VM OVER THE WEEKEND TO CX        Rescheduled: WILL CALL BACK     If rescheduled:    Date:    Location:    Prep Resent: (changes to prep?)   Covid Test Rescheduled:    Note any change or update to original order/sedation:

## 2022-11-21 ENCOUNTER — HEALTH MAINTENANCE LETTER (OUTPATIENT)
Age: 47
End: 2022-11-21

## 2022-11-22 DIAGNOSIS — M32.9 SYSTEMIC LUPUS ERYTHEMATOSUS, UNSPECIFIED SLE TYPE, UNSPECIFIED ORGAN INVOLVEMENT STATUS (H): ICD-10-CM

## 2022-11-22 DIAGNOSIS — J31.0 CHRONIC RHINITIS: ICD-10-CM

## 2022-11-22 DIAGNOSIS — M35.00 SECONDARY SJOGREN'S SYNDROME (H): ICD-10-CM

## 2022-11-23 RX ORDER — CETIRIZINE HYDROCHLORIDE 10 MG/1
20 TABLET ORAL DAILY
Qty: 60 TABLET | Refills: 0 | Status: SHIPPED | OUTPATIENT
Start: 2022-11-23 | End: 2023-01-12

## 2022-11-25 NOTE — TELEPHONE ENCOUNTER
LOV 7/15/22, next 2/15/23    Not on protocol, routing to provider    Shanna CORDOVA, Specialty RN 11/25/2022 11:16 AM

## 2022-11-30 ENCOUNTER — OFFICE VISIT (OUTPATIENT)
Dept: FAMILY MEDICINE | Facility: CLINIC | Age: 47
End: 2022-11-30
Payer: COMMERCIAL

## 2022-11-30 VITALS
TEMPERATURE: 98.8 F | HEART RATE: 92 BPM | SYSTOLIC BLOOD PRESSURE: 108 MMHG | BODY MASS INDEX: 24.75 KG/M2 | DIASTOLIC BLOOD PRESSURE: 73 MMHG | WEIGHT: 151 LBS | OXYGEN SATURATION: 96 % | RESPIRATION RATE: 16 BRPM

## 2022-11-30 DIAGNOSIS — Z71.84 TRAVEL ADVICE ENCOUNTER: Primary | ICD-10-CM

## 2022-11-30 DIAGNOSIS — Z01.84 IMMUNITY STATUS TESTING: ICD-10-CM

## 2022-11-30 PROCEDURE — 90632 HEPA VACCINE ADULT IM: CPT | Performed by: NURSE PRACTITIONER

## 2022-11-30 PROCEDURE — 91312 COVID-19 VACCINE BIVALENT BOOSTER 12+ (PFIZER): CPT | Performed by: NURSE PRACTITIONER

## 2022-11-30 PROCEDURE — 90471 IMMUNIZATION ADMIN: CPT | Performed by: NURSE PRACTITIONER

## 2022-11-30 PROCEDURE — 0124A COVID-19 VACCINE BIVALENT BOOSTER 12+ (PFIZER): CPT | Performed by: NURSE PRACTITIONER

## 2022-11-30 PROCEDURE — 99401 PREV MED CNSL INDIV APPRX 15: CPT | Mod: 25 | Performed by: NURSE PRACTITIONER

## 2022-11-30 PROCEDURE — 90686 IIV4 VACC NO PRSV 0.5 ML IM: CPT | Performed by: NURSE PRACTITIONER

## 2022-11-30 PROCEDURE — 90472 IMMUNIZATION ADMIN EACH ADD: CPT | Performed by: NURSE PRACTITIONER

## 2022-11-30 RX ORDER — SCOLOPAMINE TRANSDERMAL SYSTEM 1 MG/1
1 PATCH, EXTENDED RELEASE TRANSDERMAL
Qty: 3 PATCH | Refills: 0 | Status: SHIPPED | OUTPATIENT
Start: 2022-11-30 | End: 2023-09-29

## 2022-11-30 NOTE — PATIENT INSTRUCTIONS
Thank you for visiting the [facility] : [phone]  Today November 30, 2022 you received the    Flu Vaccine    Hepatitis A Vaccine - Please return on 5/29/23 or later for your 2nd and final dose.    Covid 19 Booster Pfizer     Follow up vaccine appointments can be made as a NURSE ONLY visit at the Travel Clinic, (BE PREPARED TO WAIT, ) or at designated [facility].    If you are receiving the Rabies vaccines series, it is important that you follow the exact schedule ordered.     Pre-travel     We recommend that you purchase Medical Evacuation Insurance prior to your departure.  Https://wwwnc.cdc.gov/travel/page/insurance    [place] your travel plans with the  Department of State through STEP ( Smart Traveler Enrollment Program ) https://step.state.gov.  STEP is a free service to allow U[place] citizens and nationals traveling and living abroad to enroll their trip with the nearest U.S. Embassy or Consulate.    Animal Exposure: Avoid all mammals even if they look healthy.  If there is a bite, scratch or even a lick, wash area immediately with soap and water for 15 minutes and seek medical care within 24 hours for evaluation of Rabies post exposure treatment.  Contact your Medical Evacuation Insurance.    COVID 19 (Sars Cov2) prevention strategies  Physical distancing: Maintain 6 foot (2m) from others.              Avoid large gatherings and public transportation.   Avoid indoor shopping malls, theaters and restaurants   Practice consistent mask wearing covering the nose, mouth and underneath the chin when unable to maintain 6 foot distance from others.  Hand washing: frequent, thorough handwashing with soap and water for 20 seconds (or using a hand  containing 60% alcohol)   Avoid touching face, nose, eyes, mouth unless you have done appropriate hand washing as above.   Clean high touch surfaces with approved disinfectant against Covid 19  (70% Ethanol ) or a  bleach solution (add 20 mL (4 teaspoons) of bleach to 1 L (1 quart) of water;)  Be careful not to breath or touch bleach.      Travel Covid 19 Testing:  updated 12/06/2021  International travelers: Pre-travel: diagnostic testing (antigen or PCR) may be required for entry:  See country specific Embassy websites or airline websites.    Post travel: CDC recommends getting tested 3-5 days after your trip     COVID-19 testing scheduling number for pre-travel through [facility]  [phone] (Must have an order). Available 24 hours a day.  You can also schedule through My Chart.     Post-travel illness:  Contact your provider or [facility] if you develop a fever, rash, cough, diarrhea or other symptoms for up to 1 year after travel.  Inform your healthcare provider when and where you traveled to.    Please call the [facility] with any questions [phone]  Or send your provider a 'My Chart' note.

## 2022-11-30 NOTE — PROGRESS NOTES
Nurse Note ( Pre-Travel Consult)      Itinerary:  [place] for [place] day     [facility]       Departure Date: 12/09/2022      Return Date: 12/16/2022      Length of Trip 8 days      Reason for Travel: Tourism           Urban or rural: urban      Accommodations: Cruise Ship        IMMUNIZATION HISTORY  Have you received any immunizations within the past 4 weeks?  No  Have you ever fainted from having your blood drawn or from an injection?  No  Have you ever had a fever reaction to vaccination?  No  Have you ever had any bad reaction or side effect from any vaccination?  No  Have you ever had hepatitis A or B vaccine?  Yes  Do you live (or work closely) with anyone who has AIDS, an AIDS-like condition, any other immune disorder or who is on chemotherapy for cancer?  No  Do you have a family history of immunodeficiency?  Yes  Have you received any injection of immune globulin or any blood products during the past 12 months?  No    Patient roomed by Nery Harkinscorey Patel is a 47 year old female seen today with son for counsultation for international travel.   Patient will be departing in  9 day(s) and  traveling son and boyfriend .      Patient itinerary :  will be in the [facility] region of the  and then to [facility]     Patient's activities will include cruise.    Patient's country of birth is [place]    Special medical concerns: IMMUNOCOMPROMISED (Lupus treatment)    concerned about motion sickness    Pre-travel questionnaire was completed by patient and reviewed by provider.     Vitals: /73 (BP Location: Left arm, Patient Position: Sitting, Cuff Size: Adult Regular)   Pulse 92   Temp 98.8  F (37.1  C) (Temporal)   Resp 16   Wt 68.5 kg (151 lb)   LMP 02/05/2022   SpO2 96%   BMI 24.75 kg/m    BMI= Body mass index is 24.75 kg/m .    EXAM:  General:  Well-nourished, well-developed in no acute distress.  Appears to be stated age, interacts appropriately and expresses understanding of  information given to patient.    Current Outpatient Medications   Medication Sig Dispense Refill     Aspirin-Acetaminophen-Caffeine (EXCEDRIN PO)        Belimumab 200 MG/ML SOAJ Inject 200 mg Subcutaneous every 7 days . Hold for signs of infection and seek medical attention. 4 mL 4     cetirizine (ZYRTEC) 10 MG tablet TAKE 2 TABLETS (20 MG) BY MOUTH DAILY 60 tablet 0     clobetasol (TEMOVATE) 0.05 % external cream Apply topically 2 times daily 60 g 3     cycloSPORINE (RESTASIS) 0.05 % ophthalmic emulsion Place 1 drop into both eyes 2 times daily 11 mL 2     diclofenac (VOLTAREN) 1 % topical gel Apply up to 2 grams of 1% gel to hands up to 4 times daily as needed for joint pain (maximum: 8 g per upper extremity per day) 200 g 2     ferrous sulfate (FEROSUL) 325 (65 Fe) MG tablet Take 325 mg by mouth every other day       FLUoxetine (PROZAC) 40 MG capsule TAKE ONE CAPSULE BY MOUTH ONCE DAILY 90 capsule 1     fluticasone (FLONASE) 50 MCG/ACT nasal spray Spray 1 spray into both nostrils daily 16 g 1     hydroxychloroquine (PLAQUENIL) 200 MG tablet Hydroxychloroquine 200 mg daily with an additional 200 mg every other day.  Ophthalmology toxicity monitoring eye exam, that includes 10-2 VF and SD-OCT, is required yearly. 135 tablet 2     leflunomide (ARAVA) 20 MG tablet Take 1 tablet (20 mg) by mouth daily ; labs required every 12 weeks 30 tablet 3     scopolamine (TRANSDERM) 1 MG/3DAYS 72 hr patch Place 1 patch onto the skin every 72 hours 3 patch 0     varenicline (CHANTIX MARY) 0.5 MG X 11 & 1 MG X 42 tablet Take 0.5 mg tab daily for 3 days, THEN 0.5 mg tab twice daily for 4 days, THEN 1 mg twice daily. 53 tablet 0     varenicline (CHANTIX) 1 MG tablet Take 1 tablet (1 mg) by mouth 2 times daily 60 tablet 3     predniSONE (DELTASONE) 5 MG tablet Prednisone 20mg daily x5days, then 15mg daily x5days, then 10mg daily x5days, then 5mg daily x5days, then stop. (Patient not taking: Reported on 8/31/2022) 50 tablet 0      triamcinolone (KENALOG) 0.1 % paste TAKE BY MOUTH 2 TIMES DAILY (Patient not taking: Reported on 8/31/2022) 30 g 1     Patient Active Problem List   Diagnosis     Herpes simplex type 2 infection     Genital warts     CARDIOVASCULAR SCREENING; LDL GOAL LESS THAN 160     H/O intravenous drug use in remission     JESSE positive     Multiple joint pain     Cocaine use     Systemic lupus erythematosus (H)     High risk medications (not anticoagulants) long-term use     Sicca syndrome (H)     Lichen sclerosus     Recurrent major depressive disorder, in partial remission (H)     No Known Allergies      Immunizations discussed include:   Covid 19: Ordered/given today, risks, benefits and side effects reviewed  Hepatitis A:  Ordered/given today, risks, benefits and side effects reviewed  Hepatitis B: Up to date per report  Influenza: Ordered/given today, risks, benefits and side effects reviewed  Typhoid: Not indicated  Rabies: Not indicated  Yellow Fever: Not indicated  Japanese Encephalitis: Not indicated  Meningococcus: Not indicated  Tetanus/Diphtheria: Up to date  Measles/Mumps/Rubella: future titers> NO LIVE VACCINES  Cholera: Not available  Polio: Not indicated  Pneumococcal: Up to date  Varicella: Immune by disease history per patient report  Shingrix: Up to date  HPV:  Not indicated     TB: low risk     Altitude Exposure on this trip: no  Past tolerance to Altitude: nq    ASSESSMENT/PLAN:  Sosa was seen today for travel clinic.    Diagnoses and all orders for this visit:    Travel advice encounter  -     scopolamine (TRANSDERM) 1 MG/3DAYS 72 hr patch; Place 1 patch onto the skin every 72 hours    Immunity status testing  -     Hepatitis B Surface Antibody; Future  -     Rubeola Antibody IgG; Future  -     Rubella Antibody IgG; Future  -     Mumps Immune Status, IgG; Future    Other orders  -     COVID-19 VACCINE BIVALENT BOOSTER 12+ (PFIZER)  -     INFLUENZA VACCINE >6 MONTHS (AFLURIA/FLUZONE)  -     HEPATITIS A  VACCINE (ADULT)      I have reviewed general recommendations for safe travel   including: food/water precautions, insect precautions, safer sex   practices given high prevalence of Zika, HIV and other STDs,   roadway safety. Educational materials and [employer] report provided.    Malaraia prophylaxis recommended: none  Symptomatic treatment for traveler's diarrhea: loperamide/diphenoxylate    Personal protective measures reviewed including hand sanitizing and contact precautions for the prevention of viral illnesses. Cover coughs and masking  during travel and upon return.  Current COVID 19 pandemic.   Monitor / follow current CDC guidelines.        Evacuation insurance advised and resources were provided to patient.    Total visit time 20 minutes  with over 50% of time spent counseling patient and shared decision making as detailed above.    Celia Garcia CNP  Certificate in Travel Health

## 2022-12-01 RX ORDER — CYCLOSPORINE 0.5 MG/ML
1 EMULSION OPHTHALMIC 2 TIMES DAILY
Qty: 11 ML | Refills: 2 | Status: SHIPPED | OUTPATIENT
Start: 2022-12-01 | End: 2023-08-08

## 2022-12-05 ENCOUNTER — MYC REFILL (OUTPATIENT)
Dept: RHEUMATOLOGY | Facility: CLINIC | Age: 47
End: 2022-12-05

## 2022-12-05 DIAGNOSIS — M32.9 SYSTEMIC LUPUS ERYTHEMATOSUS, UNSPECIFIED SLE TYPE, UNSPECIFIED ORGAN INVOLVEMENT STATUS (H): ICD-10-CM

## 2022-12-06 RX ORDER — PREDNISONE 5 MG/1
TABLET ORAL
Qty: 50 TABLET | Refills: 0 | Status: SHIPPED | OUTPATIENT
Start: 2022-12-06 | End: 2023-08-08

## 2022-12-07 NOTE — TELEPHONE ENCOUNTER
Rheumatology team: Please call to notify Ms. Patel that prednisone has been refilled.  Only use prednisone if having a flare.  Jack Arvizu MD  12/6/2022 11:22 PM

## 2022-12-25 ENCOUNTER — E-VISIT (OUTPATIENT)
Dept: FAMILY MEDICINE | Facility: CLINIC | Age: 47
End: 2022-12-25
Payer: COMMERCIAL

## 2022-12-25 DIAGNOSIS — L81.1 MELASMA: Primary | ICD-10-CM

## 2022-12-25 PROCEDURE — 99421 OL DIG E/M SVC 5-10 MIN: CPT | Performed by: PHYSICIAN ASSISTANT

## 2022-12-26 ENCOUNTER — TELEPHONE (OUTPATIENT)
Dept: FAMILY MEDICINE | Facility: CLINIC | Age: 47
End: 2022-12-26

## 2022-12-26 RX ORDER — HYDROQUINONE 40 MG/G
CREAM TOPICAL 2 TIMES DAILY
Qty: 30 G | Refills: 3 | Status: SHIPPED | OUTPATIENT
Start: 2022-12-26 | End: 2024-05-20

## 2023-01-10 DIAGNOSIS — M32.9 SYSTEMIC LUPUS ERYTHEMATOSUS, UNSPECIFIED SLE TYPE, UNSPECIFIED ORGAN INVOLVEMENT STATUS (H): ICD-10-CM

## 2023-01-12 NOTE — TELEPHONE ENCOUNTER
Requested Prescriptions   Pending Prescriptions Disp Refills     Belimumab 200 MG/ML SOAJ 4 mL 4     Sig: Inject 200 mg Subcutaneous every 7 days . Hold for signs of infection and seek medical attention.       There is no refill protocol information for this order        LOV 7/15/22  Next 2/15/23  Labs 7/12/22    Shanna CORDOVA, Specialty RN 1/12/2023 10:21 AM

## 2023-01-16 ENCOUNTER — TELEPHONE (OUTPATIENT)
Dept: RHEUMATOLOGY | Facility: CLINIC | Age: 48
End: 2023-01-16

## 2023-01-16 NOTE — TELEPHONE ENCOUNTER
Medication Question or Refill        What medication are you calling about (include dose and sig)?:     Controlled Substance Agreement on file:   CSA -- Patient Level:    CSA: None found at the patient level.       Who prescribed the medication?:Arvizu    Do you need a refill? Yes:     When did you use the medication last? unknown    Patient offered an appointment? No    Do you have any questions or concerns?  Yes: Refill request was sent but pharmacy did not receive- send to [facility]    Preferred Pharmacy:   [facility] - [place], [place] - [address]  [address]  [address]  [facility] [address]  Phone: [phone] Fax: [phone]    [facility] - [place], [place] - [address]  [address] [address]  Phone: [phone] Fax: [phone]    [facility] [address] [place] [facility] - [place] - [address] [facility]  [address] [facility] [address]  Phone: [phone] Fax: [phone]      Could we send this information to you in [facility] or would you prefer to receive a phone call?:   Patient would prefer a phone call   Okay to leave a detailed message?: Yes at Cell number on file:    Telephone Information:   Mobile [phone]

## 2023-01-16 NOTE — TELEPHONE ENCOUNTER
Called pt message was in regards to her generic zyrtec. From [facility]. She will have mail order send her both meds.    Cassia MCGOVERN RN Specialty Triage 1/16/2023 2:16 PM

## 2023-01-16 NOTE — TELEPHONE ENCOUNTER
So, what has happened with this? Did we just abbandond it, did the insurance deny a PA, is it not covered at all? Did the patient decide that she didn't want this?

## 2023-01-16 NOTE — TELEPHONE ENCOUNTER
Unsure what this message is in regards to. Specialty pharmacy is able to see and fill the pts benlysta...    Cassia MCGOVERN RN Specialty Triage 1/16/2023 2:11 PM

## 2023-01-16 NOTE — TELEPHONE ENCOUNTER
Central Prior Authorization Team - Phone: [phone]     PA Initiation    Medication: Hydroquinone 4%- PA INITIATED  Insurance Company:    Pharmacy Filling the Rx: [facility] - [address]  Filling Pharmacy Phone: [phone]  Filling Pharmacy Fax:    Start Date: 1/16/2023    Calling [facility] to clarify CMM message. Will update once additional information received.

## 2023-01-16 NOTE — TELEPHONE ENCOUNTER
Central Prior Authorization Team - Phone: [phone]     PRIOR AUTHORIZATION DENIED    Medication: Hydroquinone 4%- PA denied    Denial Date:  01/16/2023    Denial Rational: plan exclusion. Drug considered cosmetic drug and not covered under plan.    Appeal Information: n/a Plan exclusion. Not eligible for PA    Called [facility] at [phone] and spoke with representative. They verified this drug is a plan exclusion and considered a cosmetic drug. Plan does not cover drugs used for cosmetic purposes.

## 2023-01-22 NOTE — PATIENT INSTRUCTIONS
Patient Education     Plantar Fasciitis  Plantar fasciitis is a painful swelling of the plantar fascia. The plantar fascia is a thick, fibrous layer of tissue that covers the bones on the bottom of your foot. It supports the foot bones in an arched position.  Plantar fasciitis can happen gradually or suddenly. It usually affects one foot at a time. Heel pain can be sharp, like a knife sticking into the bottom of your foot. You may feel pain after exercising, long-distance jogging, stair climbing, long periods of standing, or after standing up.  Risk factors include: non-active lifestyle, arthritis, diabetes, obesity or recent weight gain, flat foot, high arch. Wearing high heels, loose shoes, or shoes with poor arch support for long periods of time adds to the risk. This problem is commonly found in runners and dancers. It also found in people who stand on hard surfaces for long periods of time.  Foot pain from this condition is usually worse in the morning. But it often improves with walking. By the end of the day there may be a dull aching. Treatment requires short-term rest and controlling swelling. It may take up to 9 months before all symptoms go away. Rarely, a steroid injection into the foot, or surgery, may be needed.  Home care    If you are overweight, lose weight to help healing.    Choose supportive shoes with good arch support and shock absorbency. Replace athletic shoes when they become worn out. Don t walk or run barefoot.    Premade or custom-fitted shoe inserts may be helpful. Inserts made of silicone seem to be the most effective. Custom-made inserts can be provided by foot specialist, physical therapist, or orthopedist.    Premade or custom-made night splints keep the heel stretched out while you sleep. They may prevent morning pain.    Limit activities that stress the feet: jogging, prolonged standing or walking, contact sports, etc.    First thing in the morning and before sports, stretch the  bottom of your feet. Gently flex your ankle so the toes move toward your knee.    Icing may help control heel pain. Apply an ice pack to the heel for 10 to 20 minutes as a preventive. Or ice your heel after a severe flare-up of symptoms. You may repeat this every 1 to 2 hours as needed.    You may use over-the-counter pain medicine to control pain, unless another medicine was prescribed. Anti-inflammatory pain medicines, such as ibuprofen or naproxen, may work better than acetaminophen. If you have chronic liver or kidney disease or ever had a stomach ulcer or gastrointestinal bleeding, talk with your healthcare provider before using these medicines.  Follow-up care  Follow up with your healthcare provider, or as advised.  Call for an appointment if pain worsens or there is no relief after a few weeks of home treatment. Shoe inserts, a night splint, or a special boot may be required.  If X-rays were taken, you will be told of any new findings that may affect your care.  When to seek medical advice  Call your healthcare provider right away if any of these occur:    Foot swelling    Redness or warmth with increasing pain  Dereje last reviewed this educational content on 5/1/2018 2000-2021 [employer]. All rights reserved. This information is not intended as a substitute for professional medical care. Always follow your healthcare professional's instructions.           Patient Education     Treating Plantar Fasciitis  First, your healthcare provider tries to find out the cause of your problem. He or she can then suggest ways to ease pain. If your pain is due to poor foot mechanics, occasionally custom-made shoe inserts (orthoses) may help.    Ease symptoms    To relieve mild symptoms, try aspirin, ibuprofen, or other medicines as directed. Rubbing ice on the area may also help.    To lessen severe pain and swelling, your healthcare provider may give you pills or injections. In some cases, you may  need a walking cast. Physical therapy, such as ultrasound or a daily stretching program, may also be recommended. Surgery is rarely needed.    To ease symptoms caused by poor foot mechanics, your foot may be taped. This supports the arch and temporarily controls movement. Night splints may also help by stretching the fascia.    Control movement  If taping helps, your healthcare provider may prescribe orthoses. These are inserts built from plaster casts of your feet. They control the way your foot moves. As a result, your symptoms may go away.  Reduce overuse  Every time your foot strikes the ground, the plantar fascia is stretched. You can lessen the strain on the plantar fascia and the chance of overuse by:    Losing any excess weight    Not running on hard or uneven ground    Using orthoses, if recommended, in your shoes and house slippers  If surgery is needed  Your healthcare provider may consider surgery if other types of treatment don't control your pain. During surgery, the plantar fascia is partially cut to release tension. As you heal, fibrous tissue fills the space between the heel bone and the plantar fascia.  [employer] last reviewed this educational content on 1/1/2018 2000-2021 [employer]. All rights reserved. This information is not intended as a substitute for professional medical care. Always follow your healthcare professional's instructions.            stated

## 2023-01-31 DIAGNOSIS — J31.0 CHRONIC RHINITIS: ICD-10-CM

## 2023-02-01 DIAGNOSIS — M32.9 SYSTEMIC LUPUS ERYTHEMATOSUS, UNSPECIFIED SLE TYPE, UNSPECIFIED ORGAN INVOLVEMENT STATUS (H): ICD-10-CM

## 2023-02-01 RX ORDER — CETIRIZINE HYDROCHLORIDE 10 MG/1
TABLET ORAL
Qty: 60 TABLET | Refills: 0 | Status: SHIPPED | OUTPATIENT
Start: 2023-02-01 | End: 2023-03-03

## 2023-02-09 ENCOUNTER — DOCUMENTATION ONLY (OUTPATIENT)
Dept: LAB | Facility: CLINIC | Age: 48
End: 2023-02-09
Payer: COMMERCIAL

## 2023-02-09 DIAGNOSIS — M32.9 SYSTEMIC LUPUS ERYTHEMATOSUS, UNSPECIFIED SLE TYPE, UNSPECIFIED ORGAN INVOLVEMENT STATUS (H): Primary | ICD-10-CM

## 2023-02-09 NOTE — PROGRESS NOTES
This patient has a future lab only appointment and most of the orders on board now have . There is only a urine order that is still useable. I wanted to make sure that the urine is the only orders that she needs at this time. If not please send new orders. Thanks harinder

## 2023-02-10 NOTE — PROGRESS NOTES
Previous lab orders  (patient no-showed to 2022 lab appointment and patient cancelled 2022 rheumatology follow-up appointment).  New lab orders placed.     Thank you,  Jack Arvizu MD  2/10/2023

## 2023-02-12 NOTE — PATIENT INSTRUCTIONS
RHEUMATOLOGY    Dr. Jack Arvizu    [facility]  [address] [place] [address]    Our new phone number for Rheumatology is [phone], this number will be able to help you schedule appointments for Dr. Arvizu or if you have any message you would like sent to us.    Thank you for choosing [facility]!  Bailey Resendez [facility] Rheumatology                     Up ad jose enrique . Ambulated on hallway . Voiding without difficulty . Doing skin to skin with baby . Lactation saw mom and baby . See lactation note.

## 2023-02-14 ENCOUNTER — LAB (OUTPATIENT)
Dept: LAB | Facility: CLINIC | Age: 48
End: 2023-02-14
Payer: COMMERCIAL

## 2023-02-14 DIAGNOSIS — M32.9 SYSTEMIC LUPUS ERYTHEMATOSUS, UNSPECIFIED SLE TYPE, UNSPECIFIED ORGAN INVOLVEMENT STATUS (H): ICD-10-CM

## 2023-02-14 LAB
ALBUMIN MFR UR ELPH: 13.6 MG/DL (ref 1–14)
ALBUMIN SERPL BCG-MCNC: 4.4 G/DL (ref 3.5–5.2)
ALBUMIN UR-MCNC: NEGATIVE MG/DL
ALP SERPL-CCNC: 36 U/L (ref 35–104)
ALT SERPL W P-5'-P-CCNC: 17 U/L (ref 10–35)
ANION GAP SERPL CALCULATED.3IONS-SCNC: 14 MMOL/L (ref 7–15)
APPEARANCE UR: CLEAR
AST SERPL W P-5'-P-CCNC: 24 U/L (ref 10–35)
BASOPHILS # BLD AUTO: 0 10E3/UL (ref 0–0.2)
BASOPHILS NFR BLD AUTO: 1 %
BILIRUB SERPL-MCNC: 0.4 MG/DL
BILIRUB UR QL STRIP: NEGATIVE
BUN SERPL-MCNC: 20 MG/DL (ref 6–20)
CALCIUM SERPL-MCNC: 9.6 MG/DL (ref 8.6–10)
CHLORIDE SERPL-SCNC: 100 MMOL/L (ref 98–107)
COLOR UR AUTO: YELLOW
CREAT SERPL-MCNC: 0.77 MG/DL (ref 0.51–0.95)
CREAT UR-MCNC: 89.9 MG/DL
CRP SERPL-MCNC: 4.44 MG/L
DEPRECATED HCO3 PLAS-SCNC: 24 MMOL/L (ref 22–29)
EOSINOPHIL # BLD AUTO: 0.3 10E3/UL (ref 0–0.7)
EOSINOPHIL NFR BLD AUTO: 6 %
ERYTHROCYTE [DISTWIDTH] IN BLOOD BY AUTOMATED COUNT: 13.8 % (ref 10–15)
ERYTHROCYTE [SEDIMENTATION RATE] IN BLOOD BY WESTERGREN METHOD: 10 MM/HR (ref 0–20)
GFR SERPL CREATININE-BSD FRML MDRD: >90 ML/MIN/1.73M2
GLUCOSE SERPL-MCNC: 86 MG/DL (ref 70–99)
GLUCOSE UR STRIP-MCNC: NEGATIVE MG/DL
HCT VFR BLD AUTO: 41.4 % (ref 35–47)
HGB BLD-MCNC: 13.6 G/DL (ref 11.7–15.7)
HGB UR QL STRIP: NEGATIVE
KETONES UR STRIP-MCNC: NEGATIVE MG/DL
LEUKOCYTE ESTERASE UR QL STRIP: NEGATIVE
LYMPHOCYTES # BLD AUTO: 1.2 10E3/UL (ref 0.8–5.3)
LYMPHOCYTES NFR BLD AUTO: 26 %
MCH RBC QN AUTO: 27.5 PG (ref 26.5–33)
MCHC RBC AUTO-ENTMCNC: 32.9 G/DL (ref 31.5–36.5)
MCV RBC AUTO: 84 FL (ref 78–100)
MONOCYTES # BLD AUTO: 0.6 10E3/UL (ref 0–1.3)
MONOCYTES NFR BLD AUTO: 12 %
NEUTROPHILS # BLD AUTO: 2.7 10E3/UL (ref 1.6–8.3)
NEUTROPHILS NFR BLD AUTO: 56 %
NITRATE UR QL: NEGATIVE
PH UR STRIP: 6 [PH] (ref 5–7)
PLATELET # BLD AUTO: 249 10E3/UL (ref 150–450)
POTASSIUM SERPL-SCNC: 4.4 MMOL/L (ref 3.4–5.3)
PROT SERPL-MCNC: 7 G/DL (ref 6.4–8.3)
PROT/CREAT 24H UR: 0.15 MG/MG CR (ref 0–0.2)
RBC # BLD AUTO: 4.95 10E6/UL (ref 3.8–5.2)
SODIUM SERPL-SCNC: 138 MMOL/L (ref 136–145)
SP GR UR STRIP: >=1.03 (ref 1–1.03)
UROBILINOGEN UR STRIP-ACNC: 0.2 E.U./DL
WBC # BLD AUTO: 4.8 10E3/UL (ref 4–11)

## 2023-02-14 PROCEDURE — 81003 URINALYSIS AUTO W/O SCOPE: CPT

## 2023-02-14 PROCEDURE — 86160 COMPLEMENT ANTIGEN: CPT

## 2023-02-14 PROCEDURE — 85025 COMPLETE CBC W/AUTO DIFF WBC: CPT

## 2023-02-14 PROCEDURE — 36416 COLLJ CAPILLARY BLOOD SPEC: CPT

## 2023-02-14 PROCEDURE — 36415 COLL VENOUS BLD VENIPUNCTURE: CPT

## 2023-02-14 PROCEDURE — 80053 COMPREHEN METABOLIC PANEL: CPT

## 2023-02-14 PROCEDURE — 86140 C-REACTIVE PROTEIN: CPT

## 2023-02-14 PROCEDURE — 86160 COMPLEMENT ANTIGEN: CPT | Mod: 59

## 2023-02-14 PROCEDURE — 84156 ASSAY OF PROTEIN URINE: CPT

## 2023-02-14 PROCEDURE — 86225 DNA ANTIBODY NATIVE: CPT

## 2023-02-14 PROCEDURE — 85652 RBC SED RATE AUTOMATED: CPT

## 2023-02-15 ENCOUNTER — OFFICE VISIT (OUTPATIENT)
Dept: RHEUMATOLOGY | Facility: CLINIC | Age: 48
End: 2023-02-15
Payer: COMMERCIAL

## 2023-02-15 VITALS
OXYGEN SATURATION: 95 % | HEART RATE: 103 BPM | WEIGHT: 153.8 LBS | BODY MASS INDEX: 25.2 KG/M2 | SYSTOLIC BLOOD PRESSURE: 107 MMHG | DIASTOLIC BLOOD PRESSURE: 73 MMHG

## 2023-02-15 DIAGNOSIS — Z79.899 HIGH RISK MEDICATION USE: ICD-10-CM

## 2023-02-15 DIAGNOSIS — M32.9 SYSTEMIC LUPUS ERYTHEMATOSUS, UNSPECIFIED SLE TYPE, UNSPECIFIED ORGAN INVOLVEMENT STATUS (H): Primary | ICD-10-CM

## 2023-02-15 LAB
C3 SERPL-MCNC: 147 MG/DL (ref 81–157)
C4 SERPL-MCNC: 34 MG/DL (ref 13–39)
DSDNA AB SER-ACNC: 7.1 IU/ML

## 2023-02-15 PROCEDURE — 99214 OFFICE O/P EST MOD 30 MIN: CPT | Performed by: INTERNAL MEDICINE

## 2023-02-15 RX ORDER — LEFLUNOMIDE 20 MG/1
20 TABLET ORAL DAILY
Qty: 30 TABLET | Refills: 3 | Status: CANCELLED | OUTPATIENT
Start: 2023-02-15

## 2023-02-15 RX ORDER — HYDROXYCHLOROQUINE SULFATE 200 MG/1
TABLET, FILM COATED ORAL
Qty: 135 TABLET | Refills: 1 | Status: SHIPPED | OUTPATIENT
Start: 2023-02-15 | End: 2023-08-08

## 2023-02-15 NOTE — PATIENT INSTRUCTIONS
RHEUMATOLOGY    Dr. Jack Arvizu    [facility]  [address] [place] [address]  [place] [address]  Phone number: [phone]  Fax number: [phone]    You may schedule your FLU shot by calling [phone] or if you would like to get your shot at a [facility] you may schedule online at [email or web address].    Thank you for choosing [facility]!    Bailey Resendez CMA Rheumatology

## 2023-02-15 NOTE — PROGRESS NOTES
Rheumatology Clinic Visit      Sosa Patel  MRN# [id number]   YOB: 1975 Age: 47 year old      Date of visit: 2/15/23   PCP: Cynthia Scott    Chief Complaint   Patient presents with:  Systemic lupus erythematosus    Assessment and Plan     1. Systemic Lupus Erythematosus (JESSE+, dsDNA+, arthritis, oral sores [SLE versus HSV related? - One-time had a culture that was negative], proteinuria, photosensitivity [fatigue]): Dx'd 10/2016; initially with arthritis that improved with HCQ.  Previously on MTX (fatigue, effective), MMF (resolved arthritis, rash, and brain fog; arthritis persisted though and eventually changed back to leflunomide).  Currently on HCQ 200mg daily, leflunomide 20mg daily, and Benlysta (SQ, starting mid March 2021; effective for arthritis and fatigue).  Worsening arthritis and fatigue when Benlysta was missed for 3-4 weeks when she forgot to fill it.  Currently with synovial hypertrophy but no active synovitis, and changes of osteoarthritis of the hands.  We reviewed the difference between osteoarthritis and inflammatory arthritis.  Continue current regimen.   Chronic illness, stable.     - Continue Benlysta SQ every 7 days   - Continue hydroxychloroquine from 200mg daily with an additional 200 mg every other day (last eye exam completed on 12/30/2021)  - Continue leflunomide 20 mg daily  - Ophthalmology referral for hydroxychloroquine toxicity monitoring   - Labs in 3 months: CBC, CMP, ESR, CRP, C3, C4, dsDNA, UA, Uprotein:creatinine    High risk medication requiring intensive toxicity monitoring at least quarterly: labs ordered include CBC, Creatinine, Hepatic panel to monitor for cytopenia and hepatotoxicity; checking creatinine as it affects clearance of medication.      # Pregnancy Plans: on birth control (depo-provera)    2.  Secondary Sjogren's syndrome: Mild symptoms that may be due to other factors such as caffeine intake, smoking, etc., but secondary Sjogren's Syndrome  is possible.  We discussed the importance of good oral hygiene, frequent sips of water, avoiding high sugar foods and liquids, avoiding oral irritants such as coffee, alcohol, and nicotine, and avoiding acidic drinks such as carbonated and high sugar beverages. She is already seeing a dentist regularly. Dry eyes improved with preservative free artificial tears but she often forgets to take them; was given Restasis by her father who is a pharmacist and is helped significantly.  She did not get punctal plugs from ophthalmology.  More dry eyes since not being on Restasis again as the sample given ran out.   Chronic illness  - Continue artificial tears and Restasis    3. 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      4.  Cigarette smoking:  Encouraged complete cessation and discussed the health benefits.        5.  Excessive daytime sleepiness, fatigue: Wakes up tired, reports witnessed apneic episodes at night.  Snmercedes.  Referred for sleep evaluation previously but she has not scheduled this appointment.  She says that she has benefited from melatonin but has not yet scheduled a sleep evaluation.  Advised that she call to schedule the sleep evaluation.    6.  Vaccinations: Vaccinations reviewed with Ms. Patel.    - Influenza: Encouraged yearly vaccination  - [id number]: up to date  - [id number]: up to date  - Shingrix: Up to date  - COVID-19: Up to date    Total minutes spent in evaluation with patient, documentation, , and review of pertinent studies and chart notes: 20      Sosa Patel verbalized agreement with and understanding of the rational for the diagnosis and treatment plan.  All questions were answered to best of my ability and the patient's satisfaction. Ms. Wolf was advised to contact the clinic with any questions that may arise after the clinic visit.      Thank you for involving me in the care of the patient    Return to clinic: 3-4 months      HPI   Sosa Patel  is a 47  year old female with a medical history significant for tobacco abuse, genital warts, and HSV type II who presents for follow-up of systemic lupus erythematosus.    7/15/2022: Felt more depressed and therefore forgot to refill Benlysta; missed Benlysta for 3-4 weeks and started to have more pain.  Pain was at the MCPs and MTPs.  Prednisone was started and she is feeling much better.  Currently mid-course of the prednisone, taking 10 mg daily at this time.  Also with upper lumbar spine/lower thoracic spine pain that radiates to the left lower back, worse with activity, and improves with rest; reportedly this pain is chronic, and does not radiate to the legs.  Did not see GYN because menstrual bleeding has ceased.  Melatonin helps her sleep; she has not scheduled a sleep evaluation.    10/26/2022: patient cancelled appointment    Mid-Nov 2022: would have run out of leflunomide around this time.  Not refilled because labs are overdue.     12/6/2022: prednisone course given for PRN arthritis flare (patient requested to have on hand while traveling)    Today, 2/15/2023: currently doing well.  Taking leflunomide, Benlysta, and hydroxychloroquine as prescribed.  States that she needs an eye exam completed and will call to schedule this. Has puffiness of her hands but no pain or swelling.  Bony hypertrophy at the DIPs.  Morning stiffness for less than 20 minutes.  Some pain at the end of the day on her hands.  She would like to remain on her current medication regimen because it is effective.    Denies fevers, chills, nausea, vomiting, constipation, diarrhea. No abdominal pain. No LE swelling. No neck pain.  No photosensitivity or photophobia.  No eye pain or redness.    Tobacco: smoking  EtOH: rare, sober since 5/16/2010  Drugs: cocaine history; now sober  Occupation: counselor for mentally disabled people; adult foster care    ROS   12 point review of system was completed and negative except as noted in the HPI     Active  Problem List     Patient Active Problem List   Diagnosis     Herpes simplex type 2 infection     Genital warts     CARDIOVASCULAR SCREENING; LDL GOAL LESS THAN 160     H/O intravenous drug use in remission     JESSE positive     Multiple joint pain     Cocaine use     Systemic lupus erythematosus (H)     High risk medications (not anticoagulants) long-term use     Sicca syndrome (H)     Lichen sclerosus     Recurrent major depressive disorder, in partial remission (H)     Past Medical History     Past Medical History:   Diagnosis Date     Bipolar disorder (H) 12/04    Dx'd     Combinations of drug dependence excluding opioid type drug (H) 5/4/2022    In remission      H/O: depression     SUICIDE ATTEMPT AGE 15     HPV (human papillomavirus)      Mood disorder (H) 4/9/2019     Past Surgical History     Past Surgical History:   Procedure Laterality Date     BUNIONECTOMY RT/LT       HC ENLARGE BREAST WITH IMPLANT  2000     Allergy   No Known Allergies  Current Medication List     Current Outpatient Medications   Medication Sig     Aspirin-Acetaminophen-Caffeine (EXCEDRIN PO)      Belimumab 200 MG/ML SOAJ Inject 200 mg Subcutaneous every 7 days . Hold for signs of infection and seek medical attention.     cetirizine (ZYRTEC) 10 MG tablet TAKE TWO TABLETS BY MOUTH ONCE DAILY     clobetasol (TEMOVATE) 0.05 % external cream Apply topically 2 times daily     cycloSPORINE (RESTASIS) 0.05 % ophthalmic emulsion Place 1 drop into both eyes 2 times daily     diclofenac (VOLTAREN) 1 % topical gel Apply up to 2 grams of 1% gel to hands up to 4 times daily as needed for joint pain (maximum: 8 g per upper extremity per day)     ferrous sulfate (FEROSUL) 325 (65 Fe) MG tablet Take 325 mg by mouth every other day     FLUoxetine (PROZAC) 40 MG capsule TAKE ONE CAPSULE BY MOUTH ONCE DAILY     fluticasone (FLONASE) 50 MCG/ACT nasal spray SPRAY 1 SPRAY INTO BOTH NOSTRILS DAILY     hydroquinone (RUFUS) 4 % external cream Apply topically 2  times daily For up to 3 months.     hydroxychloroquine (PLAQUENIL) 200 MG tablet Hydroxychloroquine 200 mg daily with an additional 200 mg every other day.  Ophthalmology toxicity monitoring eye exam, that includes 10-2 VF and SD-OCT, is required yearly.     leflunomide (ARAVA) 20 MG tablet Take 1 tablet (20 mg) by mouth daily ; labs required every 12 weeks     predniSONE (DELTASONE) 5 MG tablet For inflammatory arthritis flare only: Prednisone 20mg daily x5days, then 15mg daily x5days, then 10mg daily x5days, then 5mg daily x5days, then stop.     varenicline (CHANTIX MARY) 0.5 MG X 11 & 1 MG X 42 tablet Take 0.5 mg tab daily for 3 days, THEN 0.5 mg tab twice daily for 4 days, THEN 1 mg twice daily.     varenicline (CHANTIX) 1 MG tablet Take 1 tablet (1 mg) by mouth 2 times daily     scopolamine (TRANSDERM) 1 MG/3DAYS 72 hr patch Place 1 patch onto the skin every 72 hours     triamcinolone (KENALOG) 0.1 % paste TAKE BY MOUTH 2 TIMES DAILY (Patient not taking: Reported on 8/31/2022)     No current facility-administered medications for this visit.     Social History   See HPI    Family History     Family History   Problem Relation Age of Onset     Cancer Father         lung cancer     Diabetes Maternal Grandmother      Depression Maternal Grandmother      Lipids Maternal Grandmother      Cerebrovascular Disease Maternal Grandfather      Lipids Maternal Grandfather      Circulatory Maternal Grandfather         MI in 50's     Depression Brother         depression and OCD     Gastrointestinal Disease Brother         IBS     Depression Mother      Thyroid Disease No family hx of      Asthma No family hx of      Denies family history of autoimmune disease     Physical Exam     Temp Readings from Last 3 Encounters:   11/30/22 98.8  F (37.1  C) (Temporal)   05/04/22 98.7  F (37.1  C) (Oral)   03/11/21 97.6  F (36.4  C) (Oral)     BP Readings from Last 5 Encounters:   02/15/23 107/73   11/30/22 108/73   07/15/22 133/88  "  05/04/22 137/70   12/01/21 117/74     Pulse Readings from Last 1 Encounters:   02/15/23 103     Resp Readings from Last 1 Encounters:   11/30/22 16     Estimated body mass index is 25.2 kg/m  as calculated from the following:    Height as of 5/4/22: 1.664 m (5' 5.5\").    Weight as of this encounter: 69.8 kg (153 lb 12.8 oz).      GEN: NAD. Healthy appearing adult.   HEENT:  Anicteric, noninjected sclera. No obvious external lesions of the ear and nose. Hearing intact.  CV: S1, S2. RRR. No m/r/g  PULM: No increased work of breathing. CTA bilaterally   MSK: Synovial hypertrophy of the bilateral second-third MCPs but no tenderness to palpation.  Other MCPs without swelling or tenderness to palpation.  PIPs and DIPs nontender to palpation and without synovial swelling.  Heberden's nodes present.   Wrists without swelling or tenderness to palpation.  Elbows and shoulders without swelling or tenderness to palpation.  Knees, ankles, and MTPs without swelling or tenderness to palpation.    SKIN: No rash or jaundice seen  PSYCH: Alert. Appropriate.      Labs / Imaging (select studies)     RF/CCP  Recent Labs   Lab Test 10/11/16  1043 08/10/16  1059   CCPIGG 1  --    RHF  --  <20     JESSE  Recent Labs   Lab Test 08/10/16  1059   ROBYN 1.7*     RNP/Sm/SSA/SSB  Recent Labs   Lab Test 10/11/16  1043   RNPIGG 0.3   SMIGG <0.2  Negative   Antibody index (AI) values reflect qualitative changes in antibody   concentration that cannot be directly associated with clinical condition or   disease state.     SSAIGG <0.2  Negative   Antibody index (AI) values reflect qualitative changes in antibody   concentration that cannot be directly associated with clinical condition or   disease state.     SSBIGG <0.2  Negative   Antibody index (AI) values reflect qualitative changes in antibody   concentration that cannot be directly associated with clinical condition or   disease state.     SCLIGG <0.2  Negative   Antibody index (AI) values " reflect qualitative changes in antibody   concentration that cannot be directly associated with clinical condition or   disease state.       dsDNA  Recent Labs   Lab Test 07/12/22  1302 03/23/22  1204 11/30/21  1245 08/19/21  1056 04/20/21  1241 01/11/21  1315 10/16/19  1707 04/04/19  1627 12/13/18  1354   DNA 7.6 7.2 13.0* 11.0* 11* 17* 14* 14* 19*     C3/C4  Recent Labs   Lab Test 02/14/23  0856 07/12/22  1302 03/23/22  1204 11/30/21  1245 08/19/21  1056 04/20/21  1241 01/11/21  1315 10/16/19  1707 04/04/19  1627   P5GTFLQ 147 129 103 120 113 103 109 90 120   T7QPPZN 34 24 25 30 28 27 25 19 26     CBC  Recent Labs   Lab Test 02/14/23  0856 07/12/22  1302 04/07/22  1415 08/19/21  1057 04/20/21  1241 01/11/21  1315 09/10/20  1308   WBC 4.8 10.6 4.7   < > 6.0 6.2 6.1   RBC 4.95 5.40* 3.88   < > 4.28 4.46 4.44   HGB 13.6 13.7 9.4*   < > 12.1 12.7 12.4   HCT 41.4 42.0 29.8*   < > 35.7 39.1 37.8   MCV 84 78 77*   < > 83 88 85   RDW 13.8 17.8* 14.2   < > 12.9 13.0 13.6    288 265   < > 213 202 235   MCH 27.5 25.4* 24.2*   < > 28.3 28.5 27.9   MCHC 32.9 32.6 31.5   < > 33.9 32.5 32.8   NEUTROPHIL 56 56 88   < > 54.6 59.5 54.5   LYMPH 26 31 10   < > 31.6 26.4 30.0   MONOCYTE 12 9 2   < > 9.9 10.3 10.5   EOSINOPHIL 6 4 1   < > 3.6 3.5 4.3   BASOPHIL 1 0 0   < > 0.3 0.3 0.7   ANEU  --   --   --   --  3.3 3.7 3.3   ALYM  --   --   --   --  1.9 1.6 1.8   DAQUAN  --   --   --   --  0.6 0.6 0.6   AEOS  --   --   --   --  0.2 0.2 0.3   ABAS  --   --   --   --  0.0 0.0 0.0   ANEUTAUTO 2.7 5.9 4.1   < >  --   --   --    ALYMPAUTO 1.2 3.3 0.5*   < >  --   --   --    AMONOAUTO 0.6 0.9 0.1   < >  --   --   --    AEOSAUTO 0.3 0.5 0.0   < >  --   --   --    ABSBASO 0.0 0.0 0.0   < >  --   --   --     < > = values in this interval not displayed.     CMP  Recent Labs   Lab Test 02/14/23  0856 07/12/22  1302 03/23/22  1204 11/30/21  1245 08/19/21  1056 08/19/21  1056 04/20/21  1241 01/11/21  1315 09/10/20  1308 10/16/19  1708     141 140 141   < > 136 138 139 138 139   POTASSIUM 4.4 4.1 3.5 4.0   < > 4.1 3.6 3.9 4.1 3.9   CHLORIDE 100 107 109 109   < > 106 108 110* 106 109   CO2 24 29 22 27   < > 22 23 26 28 24   ANIONGAP 14 5 9 5   < > 8 7 3 4 6   GLC 86 83 99 93  --  92 117* 78 85 83   BUN 20.0 21 21 24   < > 17 25 15 17 17   CR 0.77 0.61 0.69 0.83   < > 0.81 0.95 0.67 0.77 0.75   GFRESTIMATED >90 >90 >90 84   < > 87 72 >90 >90 >90   GFRESTBLACK  --   --   --   --   --   --  83 >90 >90 >90   RICHARD 9.6 8.8 8.5 9.7   < > 8.8 8.3* 8.7 8.9 9.1   BILITOTAL 0.4 0.2 0.2 0.3   < > 0.4 0.4 0.3 0.3 0.3   ALBUMIN 4.4 3.6 3.2* 3.8   < > 3.9 3.8 4.0 3.8 4.2   PROTTOTAL 7.0 6.4* 6.5* 7.2   < > 6.9 6.6* 6.9 6.9 7.3   ALKPHOS 36 28* 25* 31*   < > 29* 24* 22* 28* 23*   AST 24 16 24 16   < > 25 13 18 18 18   ALT 17 33 35 38   < > 28 23 35 32 23    < > = values in this interval not displayed.     Calcium/VitaminD  Recent Labs   Lab Test 02/14/23  0856 07/12/22  1302 03/23/22  1204   RICHARD 9.6 8.8 8.5     ESR/CRP  Recent Labs   Lab Test 02/14/23  0856 07/12/22  1302 03/23/22  1204 11/30/21  1245 08/19/21  1057 08/19/21  1056   SED 10 4 18 8   < >  --    CRP  --   --  <2.9 <2.9  --  <2.9   CRPI 4.44 <3.00  --   --   --   --     < > = values in this interval not displayed.     CK/Aldolase  Recent Labs   Lab Test 07/12/22  1302 08/19/21  1056 04/20/21  1241 01/13/17  1015 10/11/16  1043   CKT 32 106 71   < > 65   ALDOLASE  --   --   --   --  4.5    < > = values in this interval not displayed.     TSH/T4  Recent Labs   Lab Test 10/11/16  1043   TSH 1.48     Lipid Panel  Recent Labs   Lab Test 07/12/22  1302   CHOL 222*   TRIG 170*   HDL 70   *   NHDL 152*     Hepatitis B  Recent Labs   Lab Test 02/26/21  0927 10/11/16  1043   HBCAB Nonreactive Nonreactive   HEPBANG Nonreactive Nonreactive     Hepatitis C  Recent Labs   Lab Test 02/26/21  0927 10/11/16  1043   HCVAB Nonreactive Nonreactive   Assay performance characteristics have not been established for  newborns,   infants, and children       Tuberculosis Screening  Recent Labs   Lab Test 07/12/22  1302 06/28/21  1700 02/26/21  0927   TBRES Negative  --   --    TBRST  --  Negative Negative     HIV Screening  Recent Labs   Lab Test 10/11/16  1043   HIAGAB Nonreactive   HIV-1 p24 Ag & HIV-1/HIV-2 Ab Not Detected       UA  Recent Labs   Lab Test 02/14/23  0919 07/12/22  1302 08/19/21  1058 09/10/20  1315 11/04/19  1747 04/04/19  1625 12/13/18  1355 09/21/17  0815 05/01/17  0954   COLOR Yellow Yellow Yellow   < > Yellow   < > Yellow Yellow Yellow   APPEARANCE Clear Clear Clear   < > Clear   < > Clear Clear Clear   URINEGLC Negative Negative Negative   < > Negative   < > Negative Negative Negative   URINEBILI Negative Negative Negative   < > Negative   < > Negative Negative Small  This is an unconfirmed screening test result. A positive result may be false.  *   SG >=1.030 >=1.030 1.025   < > 1.015   < > >1.030 >1.030 >1.030   URINEPH 6.0 5.5 6.0   < > 8.0*   < > 5.5 6.0 6.0   PROTEIN Negative Negative Negative   < > Trace*   < > Negative Negative Trace*   UROBILINOGEN 0.2 0.2 0.2   < > 0.2   < > 0.2 0.2 0.2   NITRITE Negative Negative Negative   < > Negative   < > Negative Negative Negative   UBLD Negative Negative Negative   < > Negative   < > Small* Trace* Small*   LEUKEST Negative Negative Negative   < > Negative   < > Negative Negative Negative   WBCU  --   --   --   --  0 - 5  --  0 - 5 O - 2 O - 2   RBCU  --   --   --   --  O - 2  --  O - 2 O - 2 O - 2   SQUAMOUSEPI  --   --   --   --  Few  --  Moderate* Few Few   BACTERIA  --   --   --   --  Few*  --   --   --   --    MUCOUS  --   --   --   --   --   --   --   --  Present*    < > = values in this interval not displayed.     Urine Microscopic  Recent Labs   Lab Test 11/04/19  1747 12/13/18  1355 09/21/17  0815 05/01/17  0954   WBCU 0 - 5 0 - 5 O - 2 O - 2   RBCU O - 2 O - 2 O - 2 O - 2   SQUAMOUSEPI Few Moderate* Few Few   BACTERIA Few*  --   --   --    MUCOUS   --   --   --  Present*     Urine Protein  GHUTP and UTP= Urine protein (random), GHUTPG and UTPG = urine protein:creatinine ratio (random), UCRR = urine creatinine (random)  Recent Labs   Lab Test 02/14/23  0919 07/12/22  1302 08/19/21  1058 09/10/20  1315 11/04/19  1747   GHUTP 13.6 9.0  --   --   --    UTP  --   --  0.16 0.15 0.21   GHUTPG 0.15 0.08  --   --   --    UTPG  --   --  0.12 0.17 0.16   UCRR 89.9 118.0 137 88  --      Immunization History     Immunization History   Administered Date(s) Administered     COVID-19 Vaccine 12+ (Pfizer 2022) 05/04/2022     COVID-19 Vaccine 12+ (Pfizer) 02/12/2021, 05/20/2021, 12/31/2021     COVID-19 Vaccine Bivalent Booster 12+ (Pfizer) 11/30/2022     Flu, Unspecified 11/01/2020, 12/01/2021     HepA-Adult 11/30/2022     HepB, Unspecified 03/21/1997     Influenza Vaccine >6 months (Alfuria,Fluzone) 12/19/2014, 10/16/2015, 11/04/2016, 12/13/2018, 11/07/2019, 11/30/2022     Mantoux Tuberculin Skin Test 07/30/2018     Pneumo Conj 13-V (2010&after) 12/13/2018     Pneumococcal 23 valent 04/04/2019     TD (ADULT, 7+) 02/23/2005, 04/09/2019     TDAP Vaccine (Adacel) 04/14/2009     Tdap (Adacel,Boostrix) 07/21/2016     Zoster vaccine recombinant adjuvanted (SHINGRIX) 12/21/2018, 04/04/2019          Chart documentation done in part with Dragon Voice recognition Software. Although reviewed after completion, some word and grammatical error may remain.    Jack Arvizu MD

## 2023-03-03 DIAGNOSIS — M32.9 SYSTEMIC LUPUS ERYTHEMATOSUS, UNSPECIFIED SLE TYPE, UNSPECIFIED ORGAN INVOLVEMENT STATUS (H): ICD-10-CM

## 2023-03-03 DIAGNOSIS — J31.0 CHRONIC RHINITIS: ICD-10-CM

## 2023-03-03 RX ORDER — CETIRIZINE HYDROCHLORIDE 10 MG/1
TABLET ORAL
Qty: 60 TABLET | Refills: 0 | Status: SHIPPED | OUTPATIENT
Start: 2023-03-03 | End: 2023-04-18

## 2023-03-06 ENCOUNTER — TELEPHONE (OUTPATIENT)
Dept: RHEUMATOLOGY | Facility: CLINIC | Age: 48
End: 2023-03-06
Payer: COMMERCIAL

## 2023-03-06 NOTE — TELEPHONE ENCOUNTER
Prior Authorization Approval    Authorization Effective Date: 2/4/2023  Authorization Expiration Date: 3/5/2024  Medication: Benlysta PA Renewal Approved  Approved Dose/Quantity: 4ml per 28 days  Reference #: [id number]   Insurance Company: [employer] - Phone [phone] Fax [phone]  Expected CoPay:       CoPay Card Available:      Foundation Assistance Needed:    Which Pharmacy is filling the prescription (Not needed for infusion/clinic administered):    Pharmacy Notified:    Patient Notified:    Yes        Delia Hickman [facility] Specialty Pharmacy Liaison  [email or web address]  Phone: [phone]  Fax: [phone]

## 2023-03-07 RX ORDER — LEFLUNOMIDE 20 MG/1
20 TABLET ORAL DAILY
Qty: 30 TABLET | Refills: 3 | Status: SHIPPED | OUTPATIENT
Start: 2023-03-07 | End: 2023-08-08

## 2023-03-07 NOTE — TELEPHONE ENCOUNTER
Refilled. Up to date on labs and appt status.    Cassia MCGOVERN RN Specialty Triage 3/7/2023 2:25 PM

## 2023-04-18 DIAGNOSIS — J31.0 CHRONIC RHINITIS: ICD-10-CM

## 2023-04-18 RX ORDER — CETIRIZINE HYDROCHLORIDE 10 MG/1
20 TABLET ORAL DAILY
Qty: 180 TABLET | Refills: 0 | Status: SHIPPED | OUTPATIENT
Start: 2023-04-18 | End: 2023-08-07

## 2023-04-20 ENCOUNTER — PATIENT OUTREACH (OUTPATIENT)
Dept: CARE COORDINATION | Facility: CLINIC | Age: 48
End: 2023-04-20

## 2023-04-21 ENCOUNTER — TELEPHONE (OUTPATIENT)
Dept: FAMILY MEDICINE | Facility: CLINIC | Age: 48
End: 2023-04-21
Payer: COMMERCIAL

## 2023-04-21 NOTE — LETTER
May 3, 2023      Sosa Patel  [address] [place] [address]    Your team at [facility] cares about your health. We have reviewed your chart and based on our findings; we are making the following recommendations to better manage your health.     You are in particular need of attention regarding the following:     Schedule Annual MAMMOGRAPHY. [facility] scheduling number is [phone] or schedule in Behancehart (self referral).  Call or MyChart message your clinic to schedule a colonoscopy, schedule/ a FIT Test, or order a Cologuard test. If you are unsure what type of test you need, please call your clinic and speak to clinic staff.   Colon cancer is now the second leading cause of cancer-related deaths in the [place] for both men and women and there are over 130,000 new cases and 50,000 deaths per year from colon cancer. Colonoscopies can prevent 90-95% of these deaths. Problem lesions can be removed before they ever become cancer. This test is not only looking for cancer, but also getting rid of precancerous lesions.   If you are under/uninsured, we recommend you contact the [employer] Program.[employer] is a free colorectal cancer screening program that provides colonoscopies for eligible under/uninsured [place] men and women. If you are interested in receiving a free colonoscopy, please call [employer] at t [phone] (mention code [employer]) to see if you're eligible. Please have them send us the results.   PREVENTATIVE VISIT: Physical    If you have already completed these items, please contact the clinic via phone or   Behancehart so your care team can review and update your records. Thank you for   choosing [facility] for your healthcare needs. For any questions,   concerns, or to schedule an appointment please contact our clinic.    Healthy Regards,      Your [facility] Care Team

## 2023-04-21 NOTE — TELEPHONE ENCOUNTER
Patient Quality Outreach    Patient is due for the following:   Colon Cancer Screening  Breast Cancer Screening - Mammogram  Depression  -  PHQ-9 needed  Physical Preventive Adult Physical    Next Steps:   Schedule a Adult Preventative    Type of outreach:    Sent exsulin message.    Next Steps:  Reach out within 90 days via exsulin.    Max number of attempts reached: No. Will try again in 90 days if patient still on fail list.    Questions for provider review:    None     Yaz Vázquez CMA  Chart routed to me .

## 2023-05-03 NOTE — TELEPHONE ENCOUNTER
Patient Quality Outreach    Patient is due for the following:   Colon Cancer Screening  Breast Cancer Screening - Mammogram  Depression  -  Depression follow-up visit  Physical Preventive Adult Physical    Next Steps:   Schedule a Adult Preventative    Type of outreach:    Sent letter.    Next Steps:  Reach out within 90 days via Letter.    Max number of attempts reached: Yes. Will try again in 90 days if patient still on fail list.    Questions for provider review:    None     Yaz Vázquez CMA  Chart routed to .

## 2023-05-23 ENCOUNTER — PATIENT OUTREACH (OUTPATIENT)
Dept: CARE COORDINATION | Facility: CLINIC | Age: 48
End: 2023-05-23
Payer: COMMERCIAL

## 2023-06-14 DIAGNOSIS — M32.9 SYSTEMIC LUPUS ERYTHEMATOSUS, UNSPECIFIED SLE TYPE, UNSPECIFIED ORGAN INVOLVEMENT STATUS (H): ICD-10-CM

## 2023-06-14 NOTE — TELEPHONE ENCOUNTER
Medication:  Benlysta   Last written on:   02/15/2023  Quantity:   4ml    Refills:   3    Last office visit:   02/115/2023  Next office visit:     Last labs:   02/14/2023

## 2023-06-14 NOTE — TELEPHONE ENCOUNTER
Rheumatology team: Please call to notify Ms. Patel that Benlysta has been refilled.  Rheumatology follow-up required before the next refill prescription.     Jack Arvizu MD  6/14/2023 5:12 PM

## 2023-06-15 NOTE — TELEPHONE ENCOUNTER
Message sent to patient thru alverto.  Bailey Resendez [facility] Rheumatology  6/15/2023

## 2023-07-09 ENCOUNTER — HEALTH MAINTENANCE LETTER (OUTPATIENT)
Age: 48
End: 2023-07-09

## 2023-07-10 DIAGNOSIS — M32.9 SYSTEMIC LUPUS ERYTHEMATOSUS, UNSPECIFIED SLE TYPE, UNSPECIFIED ORGAN INVOLVEMENT STATUS (H): ICD-10-CM

## 2023-07-10 NOTE — TELEPHONE ENCOUNTER
Medication:   Leflunomide  Last written on:   03/07/2023  Quantity:   30    Refills:   3    Last office visit:   02/15/2023  Next office visit:     Last labs:   02/14/2023

## 2023-07-13 RX ORDER — LEFLUNOMIDE 20 MG/1
20 TABLET ORAL DAILY
Qty: 30 TABLET | Refills: 3 | OUTPATIENT
Start: 2023-07-13

## 2023-07-13 NOTE — TELEPHONE ENCOUNTER
RN: leflunomide refill request refused.  Labs and clinic follow-up overdue.   Jack Arvizu MD  7/13/2023

## 2023-08-04 DIAGNOSIS — J31.0 CHRONIC RHINITIS: ICD-10-CM

## 2023-08-07 RX ORDER — CETIRIZINE HYDROCHLORIDE 10 MG/1
TABLET ORAL
Qty: 180 TABLET | Refills: 0 | Status: SHIPPED | OUTPATIENT
Start: 2023-08-07 | End: 2024-01-02

## 2023-08-08 ENCOUNTER — OFFICE VISIT (OUTPATIENT)
Dept: RHEUMATOLOGY | Facility: CLINIC | Age: 48
End: 2023-08-08
Payer: COMMERCIAL

## 2023-08-08 VITALS
DIASTOLIC BLOOD PRESSURE: 80 MMHG | HEART RATE: 89 BPM | WEIGHT: 170.4 LBS | RESPIRATION RATE: 16 BRPM | OXYGEN SATURATION: 100 % | SYSTOLIC BLOOD PRESSURE: 119 MMHG | BODY MASS INDEX: 27.92 KG/M2

## 2023-08-08 DIAGNOSIS — R21 RASH: ICD-10-CM

## 2023-08-08 DIAGNOSIS — M35.00 SECONDARY SJOGREN'S SYNDROME (H): ICD-10-CM

## 2023-08-08 DIAGNOSIS — M32.9 SYSTEMIC LUPUS ERYTHEMATOSUS, UNSPECIFIED SLE TYPE, UNSPECIFIED ORGAN INVOLVEMENT STATUS (H): Primary | ICD-10-CM

## 2023-08-08 DIAGNOSIS — M32.9 SYSTEMIC LUPUS ERYTHEMATOSUS, UNSPECIFIED SLE TYPE, UNSPECIFIED ORGAN INVOLVEMENT STATUS (H): ICD-10-CM

## 2023-08-08 DIAGNOSIS — Z79.899 HIGH RISK MEDICATION USE: ICD-10-CM

## 2023-08-08 DIAGNOSIS — L29.9 SKIN PRURITUS: ICD-10-CM

## 2023-08-08 LAB
BASOPHILS # BLD AUTO: 0 10E3/UL (ref 0–0.2)
BASOPHILS NFR BLD AUTO: 1 %
EOSINOPHIL # BLD AUTO: 0.2 10E3/UL (ref 0–0.7)
EOSINOPHIL NFR BLD AUTO: 4 %
ERYTHROCYTE [DISTWIDTH] IN BLOOD BY AUTOMATED COUNT: 13.1 % (ref 10–15)
ERYTHROCYTE [SEDIMENTATION RATE] IN BLOOD BY WESTERGREN METHOD: 6 MM/HR (ref 0–20)
HCT VFR BLD AUTO: 39.8 % (ref 35–47)
HGB BLD-MCNC: 12.9 G/DL (ref 11.7–15.7)
IMM GRANULOCYTES # BLD: 0 10E3/UL
IMM GRANULOCYTES NFR BLD: 0 %
LYMPHOCYTES # BLD AUTO: 1.4 10E3/UL (ref 0.8–5.3)
LYMPHOCYTES NFR BLD AUTO: 25 %
MCH RBC QN AUTO: 27.1 PG (ref 26.5–33)
MCHC RBC AUTO-ENTMCNC: 32.4 G/DL (ref 31.5–36.5)
MCV RBC AUTO: 84 FL (ref 78–100)
MONOCYTES # BLD AUTO: 0.7 10E3/UL (ref 0–1.3)
MONOCYTES NFR BLD AUTO: 11 %
NEUTROPHILS # BLD AUTO: 3.5 10E3/UL (ref 1.6–8.3)
NEUTROPHILS NFR BLD AUTO: 60 %
PLATELET # BLD AUTO: 251 10E3/UL (ref 150–450)
RBC # BLD AUTO: 4.76 10E6/UL (ref 3.8–5.2)
WBC # BLD AUTO: 5.8 10E3/UL (ref 4–11)

## 2023-08-08 PROCEDURE — 85652 RBC SED RATE AUTOMATED: CPT | Performed by: INTERNAL MEDICINE

## 2023-08-08 PROCEDURE — 85025 COMPLETE CBC W/AUTO DIFF WBC: CPT | Performed by: INTERNAL MEDICINE

## 2023-08-08 PROCEDURE — 99214 OFFICE O/P EST MOD 30 MIN: CPT | Performed by: INTERNAL MEDICINE

## 2023-08-08 PROCEDURE — 80053 COMPREHEN METABOLIC PANEL: CPT | Performed by: INTERNAL MEDICINE

## 2023-08-08 PROCEDURE — 36415 COLL VENOUS BLD VENIPUNCTURE: CPT | Performed by: INTERNAL MEDICINE

## 2023-08-08 PROCEDURE — 86160 COMPLEMENT ANTIGEN: CPT | Performed by: INTERNAL MEDICINE

## 2023-08-08 PROCEDURE — 86225 DNA ANTIBODY NATIVE: CPT | Performed by: INTERNAL MEDICINE

## 2023-08-08 PROCEDURE — 86140 C-REACTIVE PROTEIN: CPT | Performed by: INTERNAL MEDICINE

## 2023-08-08 RX ORDER — HYDROXYCHLOROQUINE SULFATE 200 MG/1
TABLET, FILM COATED ORAL
Qty: 135 TABLET | Refills: 1 | Status: CANCELLED | OUTPATIENT
Start: 2023-08-08

## 2023-08-08 RX ORDER — LEFLUNOMIDE 20 MG/1
20 TABLET ORAL DAILY
Qty: 90 TABLET | Refills: 0 | Status: SHIPPED | OUTPATIENT
Start: 2023-08-08 | End: 2023-10-31

## 2023-08-08 RX ORDER — LEFLUNOMIDE 20 MG/1
20 TABLET ORAL DAILY
Qty: 30 TABLET | Refills: 3 | Status: CANCELLED | OUTPATIENT
Start: 2023-08-08

## 2023-08-08 RX ORDER — PREDNISONE 5 MG/1
TABLET ORAL
Qty: 50 TABLET | Refills: 0 | Status: SHIPPED | OUTPATIENT
Start: 2023-08-08 | End: 2024-05-28

## 2023-08-08 RX ORDER — HYDROXYCHLOROQUINE SULFATE 200 MG/1
TABLET, FILM COATED ORAL
Qty: 135 TABLET | Refills: 1 | Status: SHIPPED | OUTPATIENT
Start: 2023-08-08 | End: 2023-12-01

## 2023-08-08 NOTE — PATIENT INSTRUCTIONS
RHEUMATOLOGY    [facility]  [address]  [place] [address]    Phone number: [phone]  Fax number: [phone]    If you need a medication refill, please contact us as you may need lab work and/or a follow up visit prior to your refill.      Thank you for choosing [facility]!    Bailey Resendez CMA Rheumatology    --------------    Please schedule with ophthalmology for hydroxychloroquine toxicity monitoring.     Please call to schedule with dermatology    Labs are required every 12 weeks to safely continue leflunomide

## 2023-08-08 NOTE — PROGRESS NOTES
Rheumatology Clinic Visit      Sosa Patel  MRN# [id number]   YOB: 1975 Age: 47 year old      Date of visit: 8/08/23   PCP: Cynthia Scott    Chief Complaint   Patient presents with:  Systemic lupus erythematosus    Assessment and Plan     1. Systemic Lupus Erythematosus (JESSE+, dsDNA+, arthritis, oral sores [SLE versus HSV related? - One-time had a culture that was negative], proteinuria, photosensitivity [fatigue]): Dx'd 10/2016; initially with arthritis that improved with HCQ.  Previously on MTX (fatigue, effective), MMF (resolved arthritis, rash, and brain fog; arthritis persisted though and eventually changed back to leflunomide).  Currently on HCQ 200mg daily, leflunomide 20mg daily, and Benlysta (SQ, starting mid March 2021; effective for arthritis and fatigue).  Worsening arthritis and fatigue when Benlysta was missed for 3-4 weeks when she forgot to fill it.  And now with active synovitis since being off of leflunomide for about 3 or 4 months; labs have been overdue and she did not refill the medication but she would like to restart leflunomide.  Labs today and in 12 weeks.  Routine labs are required for leflunomide refill and this was stressed again today.  Prednisone for current synovitis affecting both hands.   Chronic illness, progressive.     - Continue Benlysta SQ every 7 days   - Continue hydroxychloroquine from 200mg daily with an additional 200 mg every other day (last eye exam completed on 12/30/2021)  - Continue leflunomide 20 mg daily; labs required every 12 weeks for refills  - Start Prednisone 20mg daily x5days, then 15mg daily x5days, then 10mg daily x5days, then 5mg daily x5days, then stop.   - Ophthalmology referral for hydroxychloroquine toxicity monitoring given previously and I again advised that she call to schedule.  - Labs today: CBC, CMP, ESR, CRP, C3, C4, dsDNA, UA, Uprotein:creatinine  - Labs in 3 months: CBC, CMP, ESR, CRP, CK, C3, C4, dsDNA, UA,  Uprotein:creatinine    High risk medication requiring intensive toxicity monitoring at least quarterly.      # Pregnancy Plans: on birth control (depo-provera)    2.  Secondary Sjogren's syndrome: Mild symptoms that may be due to other factors such as caffeine intake, smoking, etc., but secondary Sjogren's Syndrome is possible.  We discussed the importance of good oral hygiene, frequent sips of water, avoiding high sugar foods and liquids, avoiding oral irritants such as coffee, alcohol, and nicotine, and avoiding acidic drinks such as carbonated and high sugar beverages. She is already seeing a dentist regularly. Dry eyes improved with preservative free artificial tears but she often forgets to take them; was given Restasis by her father who is a pharmacist and is helped significantly.  She did not get punctal plugs from ophthalmology.  More dry eyes since not being on Restasis again as the sample given ran out.   Chronic illness  - Continue artificial tears and Restasis    3. 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      4.  Cigarette smoking:  Encouraged complete cessation and discussed the health benefits.        5.  Excessive daytime sleepiness, fatigue: Wakes up tired, reports witnessed apneic episodes at night.  Snores.  Referred for sleep evaluation previously but she has not scheduled this appointment.  Advised that she call to schedule the sleep evaluation.    6.  Vaccinations: Vaccinations reviewed with MsWiliam Jorge.    - Influenza: Encouraged yearly vaccination  - [id number]: up to date  - [id number]: up to date  - Shingrix: Up to date  - COVID-19: Up to date    7.  History of lichen sclerosis; diffuse body pruritus x2 months: Patient would like to see dermatology.  She also has 1 lesion on each hand, dorsal aspect just proximal to the second MCPs    Total minutes spent in evaluation with patient, documentation, , and review of pertinent studies and chart notes: 22     Sosa  Jorge verbalized agreement with and understanding of the rational for the diagnosis and treatment plan.  All questions were answered to best of my ability and the patient's satisfaction. Ms. Wolf was advised to contact the clinic with any questions that may arise after the clinic visit.      Thank you for involving me in the care of the patient    Return to clinic: 3-4 months      HPI   Sosa Patel  is a 47 year old female with a medical history significant for tobacco abuse, genital warts, and HSV type II who presents for follow-up of systemic lupus erythematosus.    7/15/2022: Felt more depressed and therefore forgot to refill Benlysta; missed Benlysta for 3-4 weeks and started to have more pain.  Pain was at the MCPs and MTPs.  Prednisone was started and she is feeling much better.  Currently mid-course of the prednisone, taking 10 mg daily at this time.  Also with upper lumbar spine/lower thoracic spine pain that radiates to the left lower back, worse with activity, and improves with rest; reportedly this pain is chronic, and does not radiate to the legs.  Did not see GYN because menstrual bleeding has ceased.  Melatonin helps her sleep; she has not scheduled a sleep evaluation.    10/26/2022: patient cancelled appointment    Mid-Nov 2022: would have run out of leflunomide around this time.  Not refilled because labs are overdue.     12/6/2022: prednisone course given for PRN arthritis flare (patient requested to have on hand while traveling)    2/15/2023: currently doing well.  Taking leflunomide, Benlysta, and hydroxychloroquine as prescribed.  States that she needs an eye exam completed and will call to schedule this. Has puffiness of her hands but no pain or swelling.  Bony hypertrophy at the DIPs.  Morning stiffness for less than 20 minutes.  Some pain at the end of the day on her hands.  She would like to remain on her current medication regimen because it is effective.    5/24/2023: Patient canceled  appointment    Today, 8/8/2023: Patient reports doing poorly at this time because she has been out of leflunomide for 3-4 months.  She states that she canceled her last appointment and did not get labs.  Labs are overdue.  Worsening joint pain at the MCPs, PIPs, and wrists that is worse in the morning and improves with time and activity.  Morning stiffness for at least 2 hours.  Positive gelling phenomenon.  Would like to restart leflunomide because she felt better while on it.  Has not scheduled an appointment with sleep medicine.  Has a history of lichen sclerosis and diffuse body pruritus; has 2 erythematous lesions just proximal to the bilateral second MCPs, on the dorsal aspect of the hands.    Denies fevers, chills, nausea, vomiting, constipation, diarrhea. No abdominal pain. No LE swelling. No neck pain.  No photosensitivity or photophobia.  No eye pain or redness.    Tobacco: smoking  EtOH: rare, sober since 5/16/2010  Drugs: cocaine history; now sober  Occupation: counselor for mentally disabled people; adult foster care    ROS   12 point review of system was completed and negative except as noted in the HPI     Active Problem List     Patient Active Problem List   Diagnosis    Herpes simplex type 2 infection    Genital warts    CARDIOVASCULAR SCREENING; LDL GOAL LESS THAN 160    H/O intravenous drug use in remission    JESSE positive    Multiple joint pain    Cocaine use    Systemic lupus erythematosus (H)    High risk medications (not anticoagulants) long-term use    Sicca syndrome (H)    Lichen sclerosus    Recurrent major depressive disorder, in partial remission (H)     Past Medical History     Past Medical History:   Diagnosis Date    Bipolar disorder (H) 12/04    Dx'd    Combinations of drug dependence excluding opioid type drug (H) 5/4/2022    In remission     H/O: depression     SUICIDE ATTEMPT AGE 15    HPV (human papillomavirus)     Mood disorder (H) 4/9/2019     Past Surgical History     Past  Surgical History:   Procedure Laterality Date    BUNIONECTOMY RT/LT      HC ENLARGE BREAST WITH IMPLANT  2000     Allergy   No Known Allergies  Current Medication List     Current Outpatient Medications   Medication Sig    Aspirin-Acetaminophen-Caffeine (EXCEDRIN PO)     Belimumab 200 MG/ML SOAJ Inject 200 mg Subcutaneous every 7 days . Hold for signs of infection and seek medical attention.   Rheumatology follow-up required prior to the next refill prescription    cetirizine (ZYRTEC) 10 MG tablet TAKE TWO TABLETS BY MOUTH ONCE DAILY **NEED APPOINTMENT FOR MORE REFILLS**    clobetasol (TEMOVATE) 0.05 % external cream Apply topically 2 times daily    cycloSPORINE (RESTASIS) 0.05 % ophthalmic emulsion Place 1 drop into both eyes 2 times daily    diclofenac (VOLTAREN) 1 % topical gel Apply up to 2 grams of 1% gel to hands up to 4 times daily as needed for joint pain (maximum: 8 g per upper extremity per day)    ferrous sulfate (FEROSUL) 325 (65 Fe) MG tablet Take 325 mg by mouth every other day    FLUoxetine (PROZAC) 40 MG capsule TAKE ONE CAPSULE BY MOUTH ONCE DAILY    fluticasone (FLONASE) 50 MCG/ACT nasal spray SPRAY 1 SPRAY INTO BOTH NOSTRILS DAILY    hydroquinone (RUFUS) 4 % external cream Apply topically 2 times daily For up to 3 months.    hydroxychloroquine (PLAQUENIL) 200 MG tablet Hydroxychloroquine 200 mg daily with an additional 200 mg every other day.  Ophthalmology toxicity monitoring eye exam, that includes 10-2 VF and SD-OCT, is required yearly.    leflunomide (ARAVA) 20 MG tablet Take 1 tablet (20 mg) by mouth daily ; labs required every 12 weeks    predniSONE (DELTASONE) 5 MG tablet For inflammatory arthritis flare only: Prednisone 20mg daily x5days, then 15mg daily x5days, then 10mg daily x5days, then 5mg daily x5days, then stop.    scopolamine (TRANSDERM) 1 MG/3DAYS 72 hr patch Place 1 patch onto the skin every 72 hours    triamcinolone (KENALOG) 0.1 % paste TAKE BY MOUTH 2 TIMES DAILY (Patient  "not taking: Reported on 8/31/2022)    varenicline (CHANTIX MARY) 0.5 MG X 11 & 1 MG X 42 tablet Take 0.5 mg tab daily for 3 days, THEN 0.5 mg tab twice daily for 4 days, THEN 1 mg twice daily.    varenicline (CHANTIX) 1 MG tablet Take 1 tablet (1 mg) by mouth 2 times daily     No current facility-administered medications for this visit.     Social History   See HPI    Family History     Family History   Problem Relation Age of Onset    Cancer Father         lung cancer    Diabetes Maternal Grandmother     Depression Maternal Grandmother     Lipids Maternal Grandmother     Cerebrovascular Disease Maternal Grandfather     Lipids Maternal Grandfather     Circulatory Maternal Grandfather         MI in 50's    Depression Brother         depression and OCD    Gastrointestinal Disease Brother         IBS    Depression Mother     Thyroid Disease No family hx of     Asthma No family hx of      Denies family history of autoimmune disease     Physical Exam     Temp Readings from Last 3 Encounters:   11/30/22 98.8  F (37.1  C) (Temporal)   05/04/22 98.7  F (37.1  C) (Oral)   03/11/21 97.6  F (36.4  C) (Oral)     BP Readings from Last 5 Encounters:   02/15/23 107/73   11/30/22 108/73   07/15/22 133/88   05/04/22 137/70   12/01/21 117/74     Pulse Readings from Last 1 Encounters:   02/15/23 103     Resp Readings from Last 1 Encounters:   11/30/22 16     Estimated body mass index is 25.2 kg/m  as calculated from the following:    Height as of 5/4/22: 1.664 m (5' 5.5\").    Weight as of 2/15/23: 69.8 kg (153 lb 12.8 oz).      GEN: NAD.   HEENT:  Anicteric, noninjected sclera. No obvious external lesions of the ear and nose. Hearing intact.  CV: S1, S2. RRR. No m/r/g  PULM: No increased work of breathing. CTA bilaterally   MSK: Synovial swelling and tenderness palpation of the bilateral second-fifth MCPs, second-fifth PIPs, and both wrists.  DIPs nontender to palpation; Heberden's nodes.   Elbows and shoulders without swelling or " tenderness to palpation.  Knees, ankles, and MTPs without swelling or tenderness to palpation.    SKIN: 2 round erythematous lesions on the dorsal aspect of the hands, just proximal to the second MCPs  PSYCH: Alert. Appropriate.      Labs / Imaging (select studies)     RF/CCP  Recent Labs   Lab Test 10/11/16  1043 08/10/16  1059   CCPIGG 1  --    RHF  --  <20     JESSE  Recent Labs   Lab Test 08/10/16  1059   ROBYN 1.7*     RNP/Sm/SSA/SSB  Recent Labs   Lab Test 10/11/16  1043   RNPIGG 0.3   SMIGG <0.2  Negative   Antibody index (AI) values reflect qualitative changes in antibody   concentration that cannot be directly associated with clinical condition or   disease state.     SSAIGG <0.2  Negative   Antibody index (AI) values reflect qualitative changes in antibody   concentration that cannot be directly associated with clinical condition or   disease state.     SSBIGG <0.2  Negative   Antibody index (AI) values reflect qualitative changes in antibody   concentration that cannot be directly associated with clinical condition or   disease state.     SCLIGG <0.2  Negative   Antibody index (AI) values reflect qualitative changes in antibody   concentration that cannot be directly associated with clinical condition or   disease state.       dsDNA  Recent Labs   Lab Test 02/14/23  0856 07/12/22  1302 03/23/22  1204 11/30/21  1245 08/19/21  1056 04/20/21  1241 01/11/21  1315 10/16/19  1707 04/04/19  1627   DNA 7.1 7.6 7.2 13.0* 11.0* 11* 17* 14* 14*     C3/C4  Recent Labs   Lab Test 02/14/23  0856 07/12/22  1302 03/23/22  1204 11/30/21  1245 08/19/21  1056 04/20/21  1241 01/11/21  1315 10/16/19  1707 04/04/19  1627   D3QJZTU 147 129 103 120 113 103 109 90 120   E0HDYFM 34 24 25 30 28 27 25 19 26     CBC  Recent Labs   Lab Test 02/14/23  0856 07/12/22  1302 04/07/22  1415 08/19/21  1057 04/20/21  1241 01/11/21  1315 09/10/20  1308   WBC 4.8 10.6 4.7   < > 6.0 6.2 6.1   RBC 4.95 5.40* 3.88   < > 4.28 4.46 4.44   HGB 13.6 13.7  9.4*   < > 12.1 12.7 12.4   HCT 41.4 42.0 29.8*   < > 35.7 39.1 37.8   MCV 84 78 77*   < > 83 88 85   RDW 13.8 17.8* 14.2   < > 12.9 13.0 13.6    288 265   < > 213 202 235   MCH 27.5 25.4* 24.2*   < > 28.3 28.5 27.9   MCHC 32.9 32.6 31.5   < > 33.9 32.5 32.8   NEUTROPHIL 56 56 88   < > 54.6 59.5 54.5   LYMPH 26 31 10   < > 31.6 26.4 30.0   MONOCYTE 12 9 2   < > 9.9 10.3 10.5   EOSINOPHIL 6 4 1   < > 3.6 3.5 4.3   BASOPHIL 1 0 0   < > 0.3 0.3 0.7   ANEU  --   --   --   --  3.3 3.7 3.3   ALYM  --   --   --   --  1.9 1.6 1.8   DAQUAN  --   --   --   --  0.6 0.6 0.6   AEOS  --   --   --   --  0.2 0.2 0.3   ABAS  --   --   --   --  0.0 0.0 0.0   ANEUTAUTO 2.7 5.9 4.1   < >  --   --   --    ALYMPAUTO 1.2 3.3 0.5*   < >  --   --   --    AMONOAUTO 0.6 0.9 0.1   < >  --   --   --    AEOSAUTO 0.3 0.5 0.0   < >  --   --   --    ABSBASO 0.0 0.0 0.0   < >  --   --   --     < > = values in this interval not displayed.     CMP  Recent Labs   Lab Test 02/14/23  0856 07/12/22  1302 03/23/22  1204 11/30/21  1245 08/19/21  1056 08/19/21  1056 04/20/21  1241 01/11/21  1315 09/10/20  1308 10/16/19  1708    141 140 141   < > 136 138 139 138 139   POTASSIUM 4.4 4.1 3.5 4.0   < > 4.1 3.6 3.9 4.1 3.9   CHLORIDE 100 107 109 109   < > 106 108 110* 106 109   CO2 24 29 22 27   < > 22 23 26 28 24   ANIONGAP 14 5 9 5   < > 8 7 3 4 6   GLC 86 83 99 93  --  92 117* 78 85 83   BUN 20.0 21 21 24   < > 17 25 15 17 17   CR 0.77 0.61 0.69 0.83   < > 0.81 0.95 0.67 0.77 0.75   GFRESTIMATED >90 >90 >90 84   < > 87 72 >90 >90 >90   GFRESTBLACK  --   --   --   --   --   --  83 >90 >90 >90   RICHARD 9.6 8.8 8.5 9.7   < > 8.8 8.3* 8.7 8.9 9.1   BILITOTAL 0.4 0.2 0.2 0.3   < > 0.4 0.4 0.3 0.3 0.3   ALBUMIN 4.4 3.6 3.2* 3.8   < > 3.9 3.8 4.0 3.8 4.2   PROTTOTAL 7.0 6.4* 6.5* 7.2   < > 6.9 6.6* 6.9 6.9 7.3   ALKPHOS 36 28* 25* 31*   < > 29* 24* 22* 28* 23*   AST 24 16 24 16   < > 25 13 18 18 18   ALT 17 33 35 38   < > 28 23 35 32 23    < > = values in  this interval not displayed.     Calcium/VitaminD  Recent Labs   Lab Test 02/14/23  0856 07/12/22  1302 03/23/22  1204   RICHARD 9.6 8.8 8.5     ESR/CRP  Recent Labs   Lab Test 02/14/23  0856 07/12/22  1302 03/23/22  1204 11/30/21  1245 08/19/21  1057 08/19/21  1056   SED 10 4 18 8   < >  --    CRP  --   --  <2.9 <2.9  --  <2.9   CRPI 4.44 <3.00  --   --   --   --     < > = values in this interval not displayed.     CK/Aldolase  Recent Labs   Lab Test 07/12/22  1302 08/19/21  1056 04/20/21  1241 01/13/17  1015 10/11/16  1043   CKT 32 106 71   < > 65   ALDOLASE  --   --   --   --  4.5    < > = values in this interval not displayed.     TSH/T4  Recent Labs   Lab Test 10/11/16  1043   TSH 1.48     Hepatitis B  Recent Labs   Lab Test 02/26/21  0927 10/11/16  1043   HBCAB Nonreactive Nonreactive   HEPBANG Nonreactive Nonreactive     Hepatitis C  Recent Labs   Lab Test 02/26/21  0927 10/11/16  1043   HCVAB Nonreactive Nonreactive   Assay performance characteristics have not been established for newborns,   infants, and children       Tuberculosis Screening  Recent Labs   Lab Test 07/12/22  1302 06/28/21  1700 02/26/21  0927   TBRES Negative  --   --    TBRST  --  Negative Negative     HIV Screening  Recent Labs   Lab Test 10/11/16  1043   HIAGAB Nonreactive   HIV-1 p24 Ag & HIV-1/HIV-2 Ab Not Detected       UA  Recent Labs   Lab Test 02/14/23  0919 07/12/22  1302 08/19/21  1058 09/10/20  1315 11/04/19  1747 04/04/19  1625 12/13/18  1355 09/21/17  0815 05/01/17  0954   COLOR Yellow Yellow Yellow   < > Yellow   < > Yellow Yellow Yellow   APPEARANCE Clear Clear Clear   < > Clear   < > Clear Clear Clear   URINEGLC Negative Negative Negative   < > Negative   < > Negative Negative Negative   URINEBILI Negative Negative Negative   < > Negative   < > Negative Negative Small  This is an unconfirmed screening test result. A positive result may be false.  *   SG >=1.030 >=1.030 1.025   < > 1.015   < > >1.030 >1.030 >1.030   URINEPH 6.0  5.5 6.0   < > 8.0*   < > 5.5 6.0 6.0   PROTEIN Negative Negative Negative   < > Trace*   < > Negative Negative Trace*   UROBILINOGEN 0.2 0.2 0.2   < > 0.2   < > 0.2 0.2 0.2   NITRITE Negative Negative Negative   < > Negative   < > Negative Negative Negative   UBLD Negative Negative Negative   < > Negative   < > Small* Trace* Small*   LEUKEST Negative Negative Negative   < > Negative   < > Negative Negative Negative   WBCU  --   --   --   --  0 - 5  --  0 - 5 O - 2 O - 2   RBCU  --   --   --   --  O - 2  --  O - 2 O - 2 O - 2   SQUAMOUSEPI  --   --   --   --  Few  --  Moderate* Few Few   BACTERIA  --   --   --   --  Few*  --   --   --   --    MUCOUS  --   --   --   --   --   --   --   --  Present*    < > = values in this interval not displayed.     Urine Microscopic  Recent Labs   Lab Test 11/04/19  1747 12/13/18  1355 09/21/17  0815 05/01/17  0954   WBCU 0 - 5 0 - 5 O - 2 O - 2   RBCU O - 2 O - 2 O - 2 O - 2   SQUAMOUSEPI Few Moderate* Few Few   BACTERIA Few*  --   --   --    MUCOUS  --   --   --  Present*     Urine Protein  GHUTP and UTP= Urine protein (random), GHUTPG and UTPG = urine protein:creatinine ratio (random), UCRR = urine creatinine (random)  Recent Labs   Lab Test 02/14/23  0919 07/12/22  1302 08/19/21  1058 09/10/20  1315 11/04/19  1747   GHUTP 13.6 9.0  --   --   --    UTP  --   --  0.16 0.15 0.21   GHUTPG 0.15 0.08  --   --   --    UTPG  --   --  0.12 0.17 0.16   UCRR 89.9 118.0 137 88  --      Immunization History     Immunization History   Administered Date(s) Administered    COVID-19 Bivalent 12+ (Pfizer) 11/30/2022    COVID-19 MONOVALENT 12+ (Pfizer) 02/12/2021, 05/20/2021, 12/31/2021    COVID-19 Monovalent 12+ (Pfizer 2022) 05/04/2022    Flu, Unspecified 11/01/2020, 12/01/2021    HepB, Unspecified 03/21/1997    Hepatitis A (ADULT 19+) 11/30/2022    Influenza Vaccine >6 months (Alfuria,Fluzone) 12/19/2014, 10/16/2015, 11/04/2016, 12/13/2018, 11/07/2019, 11/30/2022    Mantoux Tuberculin Skin Test  07/30/2018    Pneumo Conj 13-V (2010&after) 12/13/2018    Pneumococcal 23 valent 04/04/2019    TD,PF 7+ (Tenivac) 02/23/2005, 04/09/2019    TDAP (Adacel,Boostrix) 07/21/2016    TDAP Vaccine (Adacel) 04/14/2009    Zoster recombinant adjuvanted (SHINGRIX) 12/21/2018, 04/04/2019          Chart documentation done in part with Dragon Voice recognition Software. Although reviewed after completion, some word and grammatical error may remain.    Jack Arvizu MD

## 2023-08-08 NOTE — NURSING NOTE
RAPID3 (0-30) Cumulative Score  20.7          RAPID3 Weighted Score (divide #4 by 3 and that is the weighted score)  6.9

## 2023-08-09 DIAGNOSIS — L90.0 LICHEN SCLEROSUS: ICD-10-CM

## 2023-08-09 DIAGNOSIS — M19.042 PRIMARY OSTEOARTHRITIS OF BOTH HANDS: ICD-10-CM

## 2023-08-09 DIAGNOSIS — M19.041 PRIMARY OSTEOARTHRITIS OF BOTH HANDS: ICD-10-CM

## 2023-08-09 LAB
ALBUMIN SERPL BCG-MCNC: 4.2 G/DL (ref 3.5–5.2)
ALP SERPL-CCNC: 35 U/L (ref 35–104)
ALT SERPL W P-5'-P-CCNC: 26 U/L (ref 0–50)
ANION GAP SERPL CALCULATED.3IONS-SCNC: 9 MMOL/L (ref 7–15)
AST SERPL W P-5'-P-CCNC: 28 U/L (ref 0–45)
BILIRUB SERPL-MCNC: <0.2 MG/DL
BUN SERPL-MCNC: 25.4 MG/DL (ref 6–20)
CALCIUM SERPL-MCNC: 8.9 MG/DL (ref 8.6–10)
CHLORIDE SERPL-SCNC: 108 MMOL/L (ref 98–107)
CREAT SERPL-MCNC: 0.69 MG/DL (ref 0.51–0.95)
CRP SERPL-MCNC: <3 MG/L
DEPRECATED HCO3 PLAS-SCNC: 27 MMOL/L (ref 22–29)
GFR SERPL CREATININE-BSD FRML MDRD: >90 ML/MIN/1.73M2
GLUCOSE SERPL-MCNC: 94 MG/DL (ref 70–99)
POTASSIUM SERPL-SCNC: 4 MMOL/L (ref 3.4–5.3)
PROT SERPL-MCNC: 6.5 G/DL (ref 6.4–8.3)
SODIUM SERPL-SCNC: 144 MMOL/L (ref 136–145)

## 2023-08-10 LAB
C3 SERPL-MCNC: 124 MG/DL (ref 81–157)
C4 SERPL-MCNC: 29 MG/DL (ref 13–39)
DSDNA AB SER-ACNC: 8 IU/ML

## 2023-08-11 DIAGNOSIS — L90.0 LICHEN SCLEROSUS: ICD-10-CM

## 2023-08-11 RX ORDER — CLOBETASOL PROPIONATE 0.5 MG/G
CREAM TOPICAL 2 TIMES DAILY
Qty: 60 G | Refills: 0 | Status: SHIPPED | OUTPATIENT
Start: 2023-08-11 | End: 2024-10-02 | Stop reason: ALTCHOICE

## 2023-08-11 RX ORDER — CYCLOSPORINE 0.5 MG/ML
1 EMULSION OPHTHALMIC 2 TIMES DAILY
Qty: 11 ML | Refills: 6 | Status: SHIPPED | OUTPATIENT
Start: 2023-08-11 | End: 2024-02-28

## 2023-08-11 RX ORDER — CLOBETASOL PROPIONATE 0.5 MG/G
CREAM TOPICAL
Qty: 60 G | Refills: 2 | OUTPATIENT
Start: 2023-08-11

## 2023-08-11 NOTE — TELEPHONE ENCOUNTER
Rheumatology team: Please call to notify Ms. Patel that topical voltaren has been refilled.  She will need to request the clobetasol from her primary care provider  Jack Arvizu MD  8/11/2023 12:42 AM

## 2023-08-11 NOTE — TELEPHONE ENCOUNTER
Patient notified.  Bailey Resendez [facility]  8/11/2023

## 2023-08-21 ENCOUNTER — VIRTUAL VISIT (OUTPATIENT)
Dept: FAMILY MEDICINE | Facility: CLINIC | Age: 48
End: 2023-08-21
Payer: COMMERCIAL

## 2023-08-21 DIAGNOSIS — B35.3 TINEA PEDIS OF BOTH FEET: ICD-10-CM

## 2023-08-21 DIAGNOSIS — B35.1 ONYCHOMYCOSIS: Primary | ICD-10-CM

## 2023-08-21 PROCEDURE — 99213 OFFICE O/P EST LOW 20 MIN: CPT | Mod: 95 | Performed by: PHYSICIAN ASSISTANT

## 2023-08-21 RX ORDER — CETIRIZINE HYDROCHLORIDE 10 MG/1
TABLET ORAL
Qty: 180 TABLET | Refills: 0 | Status: CANCELLED | OUTPATIENT
Start: 2023-08-21

## 2023-08-21 RX ORDER — CLOTRIMAZOLE 1 %
CREAM (GRAM) TOPICAL 2 TIMES DAILY
Qty: 85 G | Refills: 1 | Status: SHIPPED | OUTPATIENT
Start: 2023-08-21 | End: 2024-01-03

## 2023-08-21 ASSESSMENT — PATIENT HEALTH QUESTIONNAIRE - PHQ9
SUM OF ALL RESPONSES TO PHQ QUESTIONS 1-9: 6
10. IF YOU CHECKED OFF ANY PROBLEMS, HOW DIFFICULT HAVE THESE PROBLEMS MADE IT FOR YOU TO DO YOUR WORK, TAKE CARE OF THINGS AT HOME, OR GET ALONG WITH OTHER PEOPLE: NOT DIFFICULT AT ALL
SUM OF ALL RESPONSES TO PHQ QUESTIONS 1-9: 6

## 2023-08-21 NOTE — PATIENT INSTRUCTIONS
Foot fungus - clotrimazole cream (or lotrimin is over the counter - same medication).     Nail fungus - efinaconazole solution (if expensive, can get kerasal over the counter). If not improving in 6-9 months, may need to have oral medication but would need an in person visit for this.

## 2023-08-21 NOTE — PROGRESS NOTES
Sosa is a 47 year old who is being evaluated via a billable video visit.      How would you like to obtain your AVS? MyChart  If the video visit is dropped, the invitation should be resent by: Text to cell phone: [phone]  Will anyone else be joining your video visit? No          Assessment & Plan     Onychomycosis  Issues with connection in video visit. Had to switch to phone. Having persistent nail fungus - has been trying Nystatin. Not helping. Can try efinaconazole soln - or Kerasal if not able to get efinaconazole. Should be seen in person if not improving and wanting to try oral medication to have liver enzymes tested. She is agreeable. Due for in person physical.   - Efinaconazole 10 % SOLN; Externally apply topically daily    Tinea pedis of both feet  Possibly got this while cleaning others homes - usually is barefoot in the shower. Suggest treating with lotrimin cream.   - clotrimazole (LOTRIMIN) 1 % external cream; Apply topically 2 times daily                 Mable Goodson PA-C  [facility] [place]   Sosa is a 47 year old, presenting for the following health issues:  Recheck Medication and Nail Problem (Toenail fungus)      8/21/2023     5:22 PM   Additional Questions   Roomed by brodie       History of Present Illness       Reason for visit:  Feet and toenail fungus and refill on Ceterizine  Symptom onset:  More than a month  Symptoms include:  Itchy, thick nails, crumbly, dry and cracked yellow  Symptom intensity:  Moderate  Symptom progression:  Worsening  Had these symptoms before:  No  What makes it better:  Nystatin    She eats 0-1 servings of fruits and vegetables daily.She consumes 3 sweetened beverage(s) daily.She exercises with enough effort to increase her heart rate 60 or more minutes per day.  She exercises with enough effort to increase her heart rate 5 days per week. She is missing 1 dose(s) of medications per week.  She is not taking prescribed  medications regularly due to remembering to take.               Review of Systems         Objective           Vitals:  No vitals were obtained today due to virtual visit.    Physical Exam   GENERAL: Healthy, alert and no distress  EYES: Eyes grossly normal to inspection.  No discharge or erythema, or obvious scleral/conjunctival abnormalities.  RESP: No audible wheeze, cough, or visible cyanosis.  No visible retractions or increased work of breathing.    SKIN: Visible skin clear. No significant rash, abnormal pigmentation or lesions.  NEURO: Cranial nerves grossly intact.  Mentation and speech appropriate for age.  PSYCH: Mentation appears normal, affect normal/bright, judgement and insight intact, normal speech and appearance well-groomed.                Video-Visit Details    Type of service:  Video Visit   Video Start Time: 5:32 PM  Video End Time:5:49 PM    Originating Location (pt. Location): Home    Distant Location (provider location):  On-site  Platform used for Video Visit: Yippy

## 2023-08-22 ENCOUNTER — TELEPHONE (OUTPATIENT)
Dept: FAMILY MEDICINE | Facility: CLINIC | Age: 48
End: 2023-08-22
Payer: COMMERCIAL

## 2023-08-22 NOTE — TELEPHONE ENCOUNTER
Thank you,  Rajesh Hinkle, [facility]  [facility]  @~~~~~~

## 2023-08-23 NOTE — TELEPHONE ENCOUNTER
Central Prior Authorization Team  Phone: [phone]    PA Initiation    Medication: JUBLIA 10 % EX SOLN  Insurance Company: [employer] Non-Specialty PA's - Phone [phone] Fax [phone]  Pharmacy Filling the Rx: [facility] - [address]  Filling Pharmacy Phone: [phone]  Filling Pharmacy Fax:    Start Date: 8/23/2023

## 2023-08-24 NOTE — TELEPHONE ENCOUNTER
Central Prior Authorization Team  Phone: [phone]    Prior Authorization Approval    Medication: JUBLIA 10 % EX SOLN  Authorization Effective Date: 7/24/2023  Authorization Expiration Date: 8/23/2024  Approved Dose/Quantity:   Reference #:     Insurance Company: [employer] Non-Specialty PA's - Phone [phone] Fax [phone]  Expected CoPay:       CoPay Card Available:      Financial Assistance Needed:   Which Pharmacy is filling the prescription: [facility] [place], [place] - [address]  Pharmacy Notified: Yes  Patient Notified:

## 2023-08-24 NOTE — TELEPHONE ENCOUNTER
Central Prior Authorization Team  Phone: [phone]    RECEIVED FORMS FROM INSURANCE. FILLED OUT AND FAXED BACK [phone]    CASE# [id number]

## 2023-09-17 ENCOUNTER — HEALTH MAINTENANCE LETTER (OUTPATIENT)
Age: 48
End: 2023-09-17

## 2023-09-18 ENCOUNTER — OFFICE VISIT (OUTPATIENT)
Dept: URGENT CARE | Facility: URGENT CARE | Age: 48
End: 2023-09-18
Payer: COMMERCIAL

## 2023-09-18 ENCOUNTER — ANCILLARY PROCEDURE (OUTPATIENT)
Dept: GENERAL RADIOLOGY | Facility: CLINIC | Age: 48
End: 2023-09-18
Attending: EMERGENCY MEDICINE
Payer: COMMERCIAL

## 2023-09-18 VITALS
TEMPERATURE: 97.1 F | BODY MASS INDEX: 28.22 KG/M2 | DIASTOLIC BLOOD PRESSURE: 74 MMHG | OXYGEN SATURATION: 97 % | WEIGHT: 172.2 LBS | RESPIRATION RATE: 20 BRPM | HEART RATE: 94 BPM | SYSTOLIC BLOOD PRESSURE: 110 MMHG

## 2023-09-18 DIAGNOSIS — R10.11 RUQ ABDOMINAL PAIN: ICD-10-CM

## 2023-09-18 DIAGNOSIS — J06.9 UPPER RESPIRATORY TRACT INFECTION, UNSPECIFIED TYPE: ICD-10-CM

## 2023-09-18 DIAGNOSIS — R10.11 RUQ ABDOMINAL PAIN: Primary | ICD-10-CM

## 2023-09-18 PROCEDURE — 71046 X-RAY EXAM CHEST 2 VIEWS: CPT | Mod: TC | Performed by: STUDENT IN AN ORGANIZED HEALTH CARE EDUCATION/TRAINING PROGRAM

## 2023-09-18 PROCEDURE — 99207 PR FIRST ORDER ACUTE REFERRAL: CPT | Performed by: EMERGENCY MEDICINE

## 2023-09-18 RX ORDER — BENZONATATE 100 MG/1
100 CAPSULE ORAL 2 TIMES DAILY PRN
Qty: 20 CAPSULE | Refills: 0 | Status: SHIPPED | OUTPATIENT
Start: 2023-09-18 | End: 2023-09-18

## 2023-09-18 NOTE — PROGRESS NOTES
Assessment & Plan     Diagnosis:    ICD-10-CM    1. RUQ abdominal pain  R10.11 XR Chest 2 Views      2. Upper respiratory tract infection, unspecified type  J06.9 XR Chest 2 Views          Medical Decision Making:  Patient with history of lupus presented to urgent care with RUQ abdominal pain, also has recent cough and URI symptoms. The clinical exam today is non-specific. The exact etiology of the abdominal pain is not clear at this time. The differential diagnosis of abdominal pain includes: Cholecystitis, Pneumonia, Diverticulitis, Pancreatitis, UTI, kidney stone, Enteritis/Colitis, amongst many other etiologies. Given patient's moderate distress, abdominal exam concerning for cholecystitis, and negative CXR I instructed the patient to go to the [facility] tomorrow morning for further evaluation.  Patient voices understanding and agreement with the plan including reasons to go to the ER.    Referral to Acute and Diagnostic Services    [phone] ([place]) [place] [address]    Transition to Acute & Diagnostic Services Clinic has been discussed with patient, and she agrees with next level of care.   Patient understands that evaluation/treatment at [facility] typically takes significantly longer than in clinic/urgent care (>2 hours).  The [facility] Acute and Diagnostics Services Clinic has been contacted by provider/staff to confirm patient acceptance.         Special issues:      None                     The following provider has assessed this patient for intervention at [facility], and directed the patient for referral: MANOHAR Almeida PA-C  [facility]    Subjective     Sosa Patel is a 47 year old female who presents to clinic today for the following health issues:  Chief Complaint   Patient presents with    Urgent Care    Flank Pain     Right side pain today, and feel nausea    Nausea       HPI  Patient reports that a  few days ago she started feeling some right upper quadrant abdominal pain, was not really noticeable until today when it became very sharp, caused her to feel nauseated.  States that it is settled down somewhat since a few hours ago, but is still present.  She states she notices it when breathing in deeply and touching the area under her rib cage.  She notes no change with eating or drinking.  She has had a slight cold, coughing has resolved and she feels that she is on the tail end of things.  No difficulties breathing, chest pain, fevers, vomiting, diarrhea, dark or bloody stools or other symptoms.    Review of Systems    See HPI    Objective      Vitals: /74 (BP Location: Left arm, Patient Position: Sitting, Cuff Size: Adult Regular)   Pulse 94   Temp 97.1  F (36.2  C) (Tympanic)   Resp 20   Wt 78.1 kg (172 lb 3.2 oz)   SpO2 97%   BMI 28.22 kg/m    Resp: 17    Patient Vitals for the past 24 hrs:   BP Temp Temp src Pulse Resp SpO2 Weight   09/18/23 1743 110/74 97.1  F (36.2  C) Tympanic 94 20 97 % 78.1 kg (172 lb 3.2 oz)       Vital signs reviewed by: Brody Bobby PA-C    Physical Exam   Constitutional: Patient is alert and cooperative. Moderate acute distress.  Mouth: Mucous membranes are moist. Normal tongue and tonsil. Posterior oropharynx is clear.  Cardiovascular: Regular rate and rhythm  Pulmonary/Chest: Lungs are clear to auscultation throughout. Effort normal. No respiratory distress. No wheezes, rales or rhonchi.  GI: Right upper quadrant abdominal tenderness to palpation.  Positive Foster sign.  No rebound or guarding.  Neurological: Alert and oriented x3.  Psychiatric:The patient has a normal mood and affect.     Labs/Imaging:  Results for orders placed or performed in visit on 09/18/23   XR Chest 2 Views     Status: None    Narrative    EXAM: XR CHEST 2 VIEWS  LOCATION: [facility]  DATE: 9/18/2023    INDICATION:  RUQ abdominal pain, Upper respiratory tract  infection, unspecified type  COMPARISON: Chest radiograph 02/22/2021      Impression    IMPRESSION: Lungs are clear. No pleural effusion or pneumothorax. Cardiac silhouette and mediastinal contours are normal and unchanged. No acute osseous abnormality.     Reading per radiology        Brody Bobby PA-C, September 18, 2023

## 2023-09-19 ENCOUNTER — OFFICE VISIT (OUTPATIENT)
Dept: PEDIATRICS | Facility: CLINIC | Age: 48
End: 2023-09-19
Payer: COMMERCIAL

## 2023-09-19 ENCOUNTER — ANCILLARY PROCEDURE (OUTPATIENT)
Dept: ULTRASOUND IMAGING | Facility: CLINIC | Age: 48
End: 2023-09-19
Attending: PHYSICIAN ASSISTANT
Payer: COMMERCIAL

## 2023-09-19 ENCOUNTER — ANCILLARY ORDERS (OUTPATIENT)
Dept: PEDIATRICS | Facility: CLINIC | Age: 48
End: 2023-09-19

## 2023-09-19 VITALS
HEART RATE: 95 BPM | SYSTOLIC BLOOD PRESSURE: 114 MMHG | OXYGEN SATURATION: 96 % | TEMPERATURE: 97 F | DIASTOLIC BLOOD PRESSURE: 81 MMHG | RESPIRATION RATE: 17 BRPM

## 2023-09-19 DIAGNOSIS — K80.20 CALCULUS OF GALLBLADDER WITHOUT CHOLECYSTITIS WITHOUT OBSTRUCTION: ICD-10-CM

## 2023-09-19 DIAGNOSIS — R10.11 ABDOMINAL PAIN, RIGHT UPPER QUADRANT: ICD-10-CM

## 2023-09-19 DIAGNOSIS — R10.11 ABDOMINAL PAIN, RIGHT UPPER QUADRANT: Primary | ICD-10-CM

## 2023-09-19 DIAGNOSIS — R10.13 ABDOMINAL PAIN, EPIGASTRIC: ICD-10-CM

## 2023-09-19 LAB
ALBUMIN SERPL BCG-MCNC: 4.3 G/DL (ref 3.5–5.2)
ALBUMIN UR-MCNC: NEGATIVE MG/DL
ALP SERPL-CCNC: 35 U/L (ref 35–104)
ALT SERPL W P-5'-P-CCNC: 22 U/L (ref 0–50)
AMYLASE SERPL-CCNC: 29 U/L (ref 28–100)
ANION GAP SERPL CALCULATED.3IONS-SCNC: 11 MMOL/L (ref 7–15)
APPEARANCE UR: CLEAR
AST SERPL W P-5'-P-CCNC: 23 U/L (ref 0–45)
BASOPHILS # BLD AUTO: 0 10E3/UL (ref 0–0.2)
BASOPHILS NFR BLD AUTO: 1 %
BILIRUB SERPL-MCNC: 0.2 MG/DL
BILIRUB UR QL STRIP: NEGATIVE
BUN SERPL-MCNC: 17.8 MG/DL (ref 6–20)
CALCIUM SERPL-MCNC: 9.6 MG/DL (ref 8.6–10)
CHLORIDE SERPL-SCNC: 103 MMOL/L (ref 98–107)
COLOR UR AUTO: ABNORMAL
CREAT SERPL-MCNC: 0.79 MG/DL (ref 0.51–0.95)
DEPRECATED HCO3 PLAS-SCNC: 26 MMOL/L (ref 22–29)
EGFRCR SERPLBLD CKD-EPI 2021: >90 ML/MIN/1.73M2
EOSINOPHIL # BLD AUTO: 0.2 10E3/UL (ref 0–0.7)
EOSINOPHIL NFR BLD AUTO: 4 %
ERYTHROCYTE [DISTWIDTH] IN BLOOD BY AUTOMATED COUNT: 13.2 % (ref 10–15)
GLUCOSE SERPL-MCNC: 65 MG/DL (ref 70–99)
GLUCOSE UR STRIP-MCNC: NEGATIVE MG/DL
HCT VFR BLD AUTO: 40.3 % (ref 35–47)
HGB BLD-MCNC: 13.3 G/DL (ref 11.7–15.7)
HGB UR QL STRIP: NEGATIVE
IMM GRANULOCYTES # BLD: 0 10E3/UL
IMM GRANULOCYTES NFR BLD: 0 %
KETONES UR STRIP-MCNC: NEGATIVE MG/DL
LEUKOCYTE ESTERASE UR QL STRIP: ABNORMAL
LIPASE SERPL-CCNC: 17 U/L (ref 13–60)
LYMPHOCYTES # BLD AUTO: 1.9 10E3/UL (ref 0.8–5.3)
LYMPHOCYTES NFR BLD AUTO: 32 %
MCH RBC QN AUTO: 27 PG (ref 26.5–33)
MCHC RBC AUTO-ENTMCNC: 33 G/DL (ref 31.5–36.5)
MCV RBC AUTO: 82 FL (ref 78–100)
MONOCYTES # BLD AUTO: 0.6 10E3/UL (ref 0–1.3)
MONOCYTES NFR BLD AUTO: 11 %
NEUTROPHILS # BLD AUTO: 3 10E3/UL (ref 1.6–8.3)
NEUTROPHILS NFR BLD AUTO: 52 %
NITRATE UR QL: NEGATIVE
NRBC # BLD AUTO: 0 10E3/UL
NRBC BLD AUTO-RTO: 0 /100
PH UR STRIP: 5.5 [PH] (ref 5–7)
PLATELET # BLD AUTO: 252 10E3/UL (ref 150–450)
POTASSIUM SERPL-SCNC: 4.1 MMOL/L (ref 3.4–5.3)
PROT SERPL-MCNC: 6.7 G/DL (ref 6.4–8.3)
RBC # BLD AUTO: 4.93 10E6/UL (ref 3.8–5.2)
RBC #/AREA URNS AUTO: ABNORMAL /HPF
SODIUM SERPL-SCNC: 140 MMOL/L (ref 136–145)
SP GR UR STRIP: 1.01 (ref 1–1.03)
SQUAMOUS #/AREA URNS AUTO: ABNORMAL /LPF
UROBILINOGEN UR STRIP-MCNC: NORMAL MG/DL
WBC # BLD AUTO: 5.8 10E3/UL (ref 4–11)
WBC #/AREA URNS AUTO: ABNORMAL /HPF

## 2023-09-19 PROCEDURE — 36415 COLL VENOUS BLD VENIPUNCTURE: CPT | Performed by: PHYSICIAN ASSISTANT

## 2023-09-19 PROCEDURE — 83690 ASSAY OF LIPASE: CPT | Performed by: PHYSICIAN ASSISTANT

## 2023-09-19 PROCEDURE — 82150 ASSAY OF AMYLASE: CPT | Performed by: PHYSICIAN ASSISTANT

## 2023-09-19 PROCEDURE — 85025 COMPLETE CBC W/AUTO DIFF WBC: CPT | Performed by: PHYSICIAN ASSISTANT

## 2023-09-19 PROCEDURE — 76705 ECHO EXAM OF ABDOMEN: CPT

## 2023-09-19 PROCEDURE — 99214 OFFICE O/P EST MOD 30 MIN: CPT | Performed by: PHYSICIAN ASSISTANT

## 2023-09-19 PROCEDURE — 80053 COMPREHEN METABOLIC PANEL: CPT | Performed by: PHYSICIAN ASSISTANT

## 2023-09-19 PROCEDURE — 87086 URINE CULTURE/COLONY COUNT: CPT | Performed by: PHYSICIAN ASSISTANT

## 2023-09-19 PROCEDURE — 81001 URINALYSIS AUTO W/SCOPE: CPT | Performed by: PHYSICIAN ASSISTANT

## 2023-09-19 RX ORDER — OMEPRAZOLE 40 MG/1
40 CAPSULE, DELAYED RELEASE ORAL DAILY
Qty: 30 CAPSULE | Refills: 0 | Status: SHIPPED | OUTPATIENT
Start: 2023-09-19 | End: 2023-11-27

## 2023-09-19 NOTE — PROGRESS NOTES
Assessment & Plan     Abdominal pain, right upper quadrant    Patient has had intermittent RUQ abdominal pain  Patient is overall feeling normal today and not having pain in the RUQ    UA appears normal  CBC is neg for infection  CMP is neg for renal, hepatic or electrolyte abnormalities  Urine culture pending    RUQ abdominal pain was discussed with patient and the reason why her pain is occurring  We have discussed the need to follow up with GI to discuss treatment options  At this time there is no cholecystitis or gallbladder wall thickening so there is no need for surgery     Patient referred to GI for consultation to review ultrasound and discuss treatments options    - UA with Microscopic reflex to Culture  - CBC with platelets and differential; Future  - Comprehensive metabolic panel (BMP + Alb, Alk Phos, ALT, AST, Total. Bili, TP); Future  - UA Microscopic with Reflex to Culture  - Urine Culture  - Adult GI  Referral - Consult Only; Future    Abdominal pain, epigastric    Lipase and amylase is neg for pancreatitis  Upper abdominal ultrasound is neg for pancreatic concerns    Will have patient start on omeprazole for any gastritis  GI   - Amylase  - CBC with platelets and differential  - Lipase  - omeprazole (PRILOSEC) 40 MG DR capsule; Take 1 capsule (40 mg) by mouth daily    Calculus of gallbladder without cholecystitis without obstruction    Referral to GI for consultation regarding gallbladder stone and sludge and treatment options  In the even of sudden abdominal pain, fever, vomiting or sudden worsening symptoms then pt has been advised to go to the ED    - Adult GI  Referral - Consult Only; Future    Review of external notes as documented elsewhere in note       CONSULTATION/REFERRAL to GI for gallbladder stones    At today's visit with Sosa aPtel , we discussed results, diagnosis, medications and formulated a plan.  We also discussed red flags for immediate return to  clinic/ER, as well as indications for follow up with PCP if not improved in 3 days. Patient understood and agreed to plan. Sosa Patel was discharged with stable vitals and has no further questions.       No follow-ups on file.    Chidi De La Cruz, [facility], PA-C  [facility] is a 47 year old, presenting for the following health issues:  Abdominal Pain      HPI     Abdominal/Flank Pain  Onset/Duration: 9/15 for about 2 hours and it went away.  9/18 started to hurt again but more severe  Description:   Character: Sharp and Stabbing  Location: right upper quadrant  Radiation: None  Intensity: severe  Progression of Symptoms:  improving  Accompanying Signs & Symptoms:  Fever/chills: YES- 9/17  Gas/Bloating: YES  Nausea: YES  Vomitting: no   Diarrhea: no   Constipation:no   Dysuria: no            Hematuria: no            Frequency: YES           Incontinence of urine: no   History:            Last bowel movement: today  Trauma: no   Previous similar pain: no    Previous tests done: none           Previous Abdominal surgery: no   Precipitating factors:   Does the pain change with:     Food: no      Bowel Movement: no     Urination: no              Other factors: no   Therapies tried and outcome:  None    When food last eaten: 9/19 AM        Review of Systems   Constitutional, HEENT, cardiovascular, pulmonary, GI, , musculoskeletal, neuro, skin, endocrine and psych systems are negative, except as otherwise noted.      Objective    There were no vitals taken for this visit.  There is no height or weight on file to calculate BMI.  Physical Exam   GENERAL: healthy, alert and no distress  EYES: Eyes grossly normal to inspection, PERRL and conjunctivae and sclerae normal  HENT: ear canals and TM's normal, nose and mouth without ulcers or lesions  NECK: no adenopathy, no asymmetry, masses, or scars and thyroid normal to palpation  RESP: lungs clear to auscultation - no rales,  rhonchi or wheezes  CV: regular rate and rhythm, normal S1 S2, no S3 or S4, no murmur, click or rub, no peripheral edema and peripheral pulses strong  ABDOMEN: tenderness epigastric and RUQ  MS: no gross musculoskeletal defects noted, no edema  SKIN: no suspicious lesions or rashes  NEURO: Normal strength and tone, mentation intact and speech normal  PSYCH: mentation appears normal, affect normal/bright      Results for orders placed or performed in visit on 09/19/23   US Abdomen Limited     Status: None    Narrative    Right upper quadrant ultrasound    Comparisons: None    HISTORY:    Right upper quadrant pain    FINDINGS:    The liver measures a craniocaudal dimension of 15.9 cm.  No focal liver lesion. Liver echogenicity is normal. There is a large  mobile stone in the gallbladder. There is gallbladder sludge. The  diameter of the common bile duct measures 5mm. The pancreas is  partially obscured but otherwise appears unremarkable. The aorta and  IVC are visualized. The right kidney measures 11.6cm. No solid renal  mass, stone or hydronephrosis.      Impression    IMPRESSION:    Cholelithiasis without evidence of cholecystitis.    HOSEA GONZALEZ MD         SYSTEM ID:  [id number]   Results for orders placed or performed in visit on 09/19/23   UA with Microscopic reflex to Culture     Status: Abnormal    Specimen: Urine, Clean Catch   Result Value Ref Range    Color Urine Light Yellow Colorless, Straw, Light Yellow, Yellow    Appearance Urine Clear Clear    Glucose Urine Negative Negative mg/dL    Bilirubin Urine Negative Negative    Ketones Urine Negative Negative mg/dL    Specific Gravity Urine 1.014 0.999 - 1.035    Blood Urine Negative Negative    pH Urine 5.5 5.0 - 7.0    Protein Albumin Urine Negative Negative mg/dL    Nitrite Urine Negative Negative    Leukocyte Esterase Urine Moderate (A) Negative    Urobilinogen Urine Normal Normal, 2.0 mg/dL   Amylase     Status: Normal   Result Value Ref Range     Amylase 29 28 - 100 U/L   Comprehensive metabolic panel (BMP + Alb, Alk Phos, ALT, AST, Total. Bili, TP)     Status: Abnormal   Result Value Ref Range    Sodium 140 136 - 145 mmol/L    Potassium 4.1 3.4 - 5.3 mmol/L    Chloride 103 98 - 107 mmol/L    Carbon Dioxide (CO2) 26 22 - 29 mmol/L    Anion Gap 11 7 - 15 mmol/L    Urea Nitrogen 17.8 6.0 - 20.0 mg/dL    Creatinine 0.79 0.51 - 0.95 mg/dL    Calcium 9.6 8.6 - 10.0 mg/dL    Glucose 65 (L) 70 - 99 mg/dL    Alkaline Phosphatase 35 35 - 104 U/L    AST 23 0 - 45 U/L    ALT 22 0 - 50 U/L    Protein Total 6.7 6.4 - 8.3 g/dL    Albumin 4.3 3.5 - 5.2 g/dL    Bilirubin Total 0.2 <=1.2 mg/dL    GFR Estimate >90 >60 mL/min/1.73m2   CBC with platelets and differential     Status: None   Result Value Ref Range    WBC Count 5.8 4.0 - 11.0 10e3/uL    RBC Count 4.93 3.80 - 5.20 10e6/uL    Hemoglobin 13.3 11.7 - 15.7 g/dL    Hematocrit 40.3 35.0 - 47.0 %    MCV 82 78 - 100 fL    MCH 27.0 26.5 - 33.0 pg    MCHC 33.0 31.5 - 36.5 g/dL    RDW 13.2 10.0 - 15.0 %    Platelet Count 252 150 - 450 10e3/uL    % Neutrophils 52 %    % Lymphocytes 32 %    % Monocytes 11 %    % Eosinophils 4 %    % Basophils 1 %    % Immature Granulocytes 0 %    NRBCs per 100 WBC 0 <1 /100    Absolute Neutrophils 3.0 1.6 - 8.3 10e3/uL    Absolute Lymphocytes 1.9 0.8 - 5.3 10e3/uL    Absolute Monocytes 0.6 0.0 - 1.3 10e3/uL    Absolute Eosinophils 0.2 0.0 - 0.7 10e3/uL    Absolute Basophils 0.0 0.0 - 0.2 10e3/uL    Absolute Immature Granulocytes 0.0 <=0.4 10e3/uL    Absolute NRBCs 0.0 10e3/uL   UA Microscopic with Reflex to Culture     Status: Abnormal   Result Value Ref Range    RBC Urine None Seen 0-2 /HPF /HPF    WBC Urine None Seen 0-5 /HPF /HPF    Squamous Epithelials Urine Few (A) None Seen /LPF   Lipase     Status: Normal   Result Value Ref Range    Lipase 17 13 - 60 U/L   CBC with platelets and differential     Status: None    Narrative    The following orders were created for panel order CBC with  platelets and differential.  Procedure                               Abnormality         Status                     ---------                               -----------         ------                     CBC with platelets and d...[[id number]]                      Final result                 Please view results for these tests on the individual orders.

## 2023-09-19 NOTE — TELEPHONE ENCOUNTER
REFERRAL INFORMATION:  Referring Provider: CELIA Lea  Referring Clinic: [facility] -  - Primary Care  Reason for Visit/Diagnosis: Abdominal Pain, right upper quadrant, calculus of gallbladder w/o cholecystitis or obstruction       FUTURE VISIT INFORMATION:  Appointment Date: 2023  Appointment Time: 2 PM     NOTES RECORD STATUS  DETAILS   OFFICE NOTE from Referring Provider Internal [facility] - 23 - [facility] OV with CELIA Lea   OFFICE NOTE from Other Specialists Internal [facility]:  23 - [facility] OV with CELIA Almeida   HOSPITAL DISCHARGE SUMMARY/ ED VISITS  N/A    OPERATIVE REPORT N/A    PERTINENT LABS Internal    IMAGING (CT, MRI, US, XR)  Internal [facility]:  23 - US Abdomen  23, 21 - XR Chest

## 2023-09-21 ENCOUNTER — OFFICE VISIT (OUTPATIENT)
Dept: SURGERY | Facility: CLINIC | Age: 48
End: 2023-09-21
Attending: PHYSICIAN ASSISTANT
Payer: COMMERCIAL

## 2023-09-21 ENCOUNTER — PRE VISIT (OUTPATIENT)
Dept: SURGERY | Facility: CLINIC | Age: 48
End: 2023-09-21

## 2023-09-21 VITALS
HEIGHT: 66 IN | DIASTOLIC BLOOD PRESSURE: 70 MMHG | WEIGHT: 173 LBS | OXYGEN SATURATION: 97 % | HEART RATE: 92 BPM | BODY MASS INDEX: 27.8 KG/M2 | SYSTOLIC BLOOD PRESSURE: 100 MMHG

## 2023-09-21 DIAGNOSIS — K80.20 GALLSTONES: Primary | ICD-10-CM

## 2023-09-21 LAB — BACTERIA UR CULT: NORMAL

## 2023-09-21 PROCEDURE — 99203 OFFICE O/P NEW LOW 30 MIN: CPT | Performed by: SURGERY

## 2023-09-21 RX ORDER — CEFAZOLIN SODIUM 2 G/50ML
2 SOLUTION INTRAVENOUS SEE ADMIN INSTRUCTIONS
Status: CANCELLED | OUTPATIENT
Start: 2023-09-21

## 2023-09-21 RX ORDER — INDOCYANINE GREEN AND WATER 25 MG
2.5 KIT INJECTION ONCE
Status: CANCELLED | OUTPATIENT
Start: 2023-09-21 | End: 2023-09-21

## 2023-09-21 RX ORDER — CEFAZOLIN SODIUM 2 G/50ML
2 SOLUTION INTRAVENOUS
Status: CANCELLED | OUTPATIENT
Start: 2023-09-21

## 2023-09-21 ASSESSMENT — PAIN SCALES - GENERAL: PAINLEVEL: MILD PAIN (2)

## 2023-09-21 NOTE — NURSING NOTE
Pre and Post op Patient Education/Teaching Flowsheet  Relevant Diagnosis:  Cholelithiasis (K80.20)  Teaching Topic:  Pre and post op teaching  Person(s) Involved in teaching:  Patient     Motivation Level:  Asks Questions:  Yes  Eager to Learn:  Yes  Cooperative:  Yes  Receptive (willing/able to accept information):  Yes  Any cultural factors/Anglican beliefs that may influence understanding or compliance?  No    Patient/caregiver/family demonstrates understanding of the following:  Reason for the appointment, diagnosis, and treatment plan:  Yes  Patient demonstrates understanding of the following:  Pre-op bowel prep:  N/A  Post-op pain management recommendations (medications, ice compress, binder/athletic supporter (if applicable), etc.:  Yes  Inguinal hernia patients:  Post-op urinary retention- discussed signs/symptoms and visit to ER for Nguyen catheter placement and to stay in place for at least 48 hours:  NA  Restrictions:  Yes  Medications to take the day of surgery:  Per PCP  Blood thinner medications discussed and when to stop (if applicable):  Yes  Wound care:  Yes  Diabetes medication management (if applicable):  Per PCP  Which situations necessitate calling provider and whom to contact:  Discussed how to contact the hospital, nurse, and clinic scheduling staff if necessary      Date and time of surgery:  TBD  Location of surgery: [facility]- 5th Floor  History and Physical and any other testing necessary prior to surgery:  Yes  Required time line for completion of History and Physical and any pre-op testing:  Yes  Discuss need for someone to drive patient home and stay with them for 24 hours:  Yes  Pre-op showering/scrub information with Surgical Scrub:  Yes  NPO Guidelines:  NPO per Anesthesia Guidelines    Infection Prevention: Patient demonstrates understanding of the following:  Patient instructed on hand hygiene:  Yes  Surgical procedure site care will be taught  and will be reviewed at the time of discharge  Signs and symptoms of infection taught:  Yes  Wound care reviewed and will be taught at the time of discharge  Central venous catheter care will be taught at the time of discharge (if applicable)    Post-op follow-up:  Instructional materials used/given/mailed:  Surgical logistics, post op teaching sheet, and surgical scrub

## 2023-09-21 NOTE — PATIENT INSTRUCTIONS
You met with Dr. Demetris Peoples.      Today's visit instructions:    Reminder: Surgery Requirements  Your surgery will be at [facility] [place]- 5th Floor.  You will need to arrive 1.5  hours early.  You will need someone to drive you home (over 18 years old) and stay with you for 24 hours after the procedure.  You will need a preop physical with your regular doctor within 30 days of surgery- closer is always better.  Stop any blood thinners, vitamins, minerals, or herbal supplements 5 days before surgery.  If you are taking a prescribed blood thinner please let us know for specific instructions.  Fasting- a nurse from [facility] will call you 1-2 days before surgery to confirm your procedure and tell you when to stop eating and drinking.   Wash with the soap (Antibacterial Dial Foaming Complete , Hibiclense, or soap given/mailed from the clinic) the night before surgery and morning of surgery. See instructions in the Surgery Packet.  If you would like a procedure estimate please call Cost of Care at [phone] during the hours of 8 AM - 3 PM (option #2 for on-line request and option #3 for a representative).        If you have questions please contact Peggy RN or Massiel RN during regular clinic hours, Monday through Friday 7:30 AM - 4:00 PM, or you can contact us via [email or web address] at anytime.       If you have urgent needs after-hours, weekends, or holidays please call the hospital at [phone] and ask to speak with our on-call General Surgery Team.    Appointment [phone]  Nurse Advice (Peggy or Massiel): [phone]   Surgery Scheduler (Nancie): [phone]  Fax: [phone]    After your Robotic Cholecystectomy          Incision care   You may take a shower the day after surgery. Carefully wash your incision with soap and water. Do not submerge yourself in water (bath, whirlpool, hot tub, pool, lake) for 14 days after surgery.   Remove the bandage the day after  surgery, but leave the medical tape (Steri-Strips) or glue in place. These will loosen and fall off on their own 1-2 weeks after surgery.     Always wash your hands before touching your incisions or removing bandages.   It is not unusual to form a collection of fluid or blood under your incision that may feel firm or squishy- it can take several weeks to months for your body to reabsorb it.  At times, it may even drain.  If that should happen keep the area clean with soap, water,  and cover with a clean gauze dressing. You can change this daily or as needed.       Other medicines   Wait to start aspirin or blood thinners until the day after surgery. You can continue your regular medicines at your normal time the day after surgery.    Your pain medicine may cause constipation (hard, dry stools). To help with this, take the stool softener your doctor gave you or an over-the-counter stool softener or laxative. You can stop taking this when you are no longer taking pain medicine and your bowel movements are back to normal.      For pain or discomfort   Take the narcotic pain medicine your doctor gave you as needed and as instructed on the bottle. If you prefer to use over-the-counter medication, use acetaminophen (Tylenol) or ibuprofen (Advil, Motrin) as instructed on the box. Do not take Tylenol if it is in your narcotic pain medication.   Use an ice pack on your abdomen (belly) for 20 minutes at a time as needed for the first 24 hours. Be sure to protect your skin by putting a cloth between the ice pack and your skin.   After 24 hours you can switch to heat for 20 minutes as needed. Be sure to protect your skin by putting a cloth between the heat pack and your skin.   You may experience right shoulder pain after surgery which will go away 1-4 days after your procedure.  This is related to the gas that was used to inflate your abdomen, it gets trapped between your liver and diaphragm.  Walk frequently and apply a  heating pad (protecting your skin from the heating pad with a barrier such as a towel).       Activities   No driving until you feel it s safe to do so. Don t drive while taking narcotic pain medicine.   Don t lift anything heavier than 20 pounds for 3 to 4 weeks after surgery.      Special equipment   None     Diet   You can eat your regular meals after surgery.      When to call the doctor   Call your doctor if you have:   A fever above 101 F (38.3 C) (taken under the tongue), or a fever or chills lasting more than a day.   Redness at the incision site.   Any fluid or blood draining from the incision, especially if it smells bad.    Severe pain that doesn t improve with pain medicine.        We will call you 2 to 4 days after surgery to review this handout, answer questions and help arrange after-surgery care. If you have questions or concerns, please call [phone] during regular office hours. If you need to call after business hours, call [phone] and ask to page the surgeon on-call.     IMPORTANT:  Prior to your surgical procedure, a nurse will be contacting you to obtain a health history.   If they do not reach you by noon the day prior to your surgery, your surgery will be cancelled. If you have your Pre-Op Surgical Physical (H&P) with the Anesthesia Team (PAC), you are exempt from this call. Phone:  [phone] ([place] [facility]) or [phone] ([place]).                Transversus Abdominis Plane (TAP) Pain Block      What is a TAP block?   A TAP block can help you manage your pain after surgery. TAP stands for transversus abdominis plane, which is a muscle layer in your abdomen (belly). The TAP block uses numbing medicine similar to Novocaine to block pain near the site of your surgery.       Why get a TAP block?   To better manage your pain after surgery. A tap block will help keep the pain from getting severe and out of control.   To block pain signals from the nerve, which helps decrease pain after  surgery.   To help you sleep, easily breathe deeply, walk and visit with others.      How is it done?   You will lie still on a table. We will use an ultrasound machine to help us see the correct muscle layer of your abdomen. Then, we ll use a needle to inject the medicine. We may also give you some sleep medicine to lessen the pain of the injection.       The procedure takes between 5 and 15 minutes. It is usually done right before surgery, but will sometimes be after. It depends on your surgery and care needs.      What can I expect?   You may feel numbness, tingling or a heaviness in your abdomen.    You may have pain control up to 72 hours after surgery.   The TAP block may not lessen all of your surgery pain. But most patients feel 50 to 75 percent less pain than without the block.       Tell your nurse if you have:   Numbness or tingling in areas other than where the injection was   Blurry vision   Ringing in your ears   A metallic taste in your mouth

## 2023-09-21 NOTE — PROGRESS NOTES
Sosa Patel is a 47 year old female with a 1 week history of abdominal pain localized to the right upper quadrant.  Symptoms are associated with fatty food intake.  Associated symptoms: none  Common duct symptoms:  None  Diet tolerance:  good  Patient was not seen in the Emergency Room for these symptoms.  LFT's:  normal    Image studies included:  Ultrasound  Findings:  Gallstones seen    Past medical and surgical history, medications, allergies, family history, and social history were reviewed with the patient. Works housekeeping    ROS: 10 point review of systems negative except noted in HPI  PHYSICAL EXAM  General appearance- healthy, alert, and in no distress.  Skin- Skin color and turgor normal.  No obvious rashes.  Neck- Neck is supple without obvious adenopathy.  Lungs- Respiratory effort unlabored.  Gait- Normal.  Abdomen - soft non distended,mild tender RUQ    Impression:  Symptomatic cholelithiasis  Recommendation:  Laparoscopic Cholecystectomy robot assisted.     Low to nonfat diet until the patient undergoes surgery.    A full discussion regarding the alternatives, risks, goals, and potential complications for this surgery was completed today.  The patient understood that the potential problems included but are not limited to:  Infection, bleeding, bile leak, injury to structures about the gallbladder, possible common duct stone requiring further procedures. Most current review of literature confirm the more common specific risks related to laparoscopic cholecystectomy include bile duct injury (3/1000), bile leak (10/1000), retained common bile duct stone (10/1000), postcholecystectomy diarrhea (1-2%) and these complications may require additional treatment.    (To be complete the exhaustive list of possible liz operative issues includes the following.  Bile leak, chronic pain, deep vein thrombosis, nerve entrapment, food intolerances that may be temporary or permanent, misdiagnosis (from pathology  of nearby organs: liver, stomach, duodenum, pancreas, colon, small bowel), pain/opioid medicine addiction (heroin addiction), change in tastes to certain foods, temporary or permanent types of various diarrhea, seroma formation, possible drain placement, post operative pain, hernia formation, scar formation, deep and superficial infections, intra abdominal infections or abscesses or biloma or fluid collections, superficial and deep bleeding, injury to the liver/pancreas/stomach/duodenum, injury to small and large intestines, need to convert to open cholecystectomy, need for additional procedures that may be invasive or not such as ERCP, possible common hepatic duct, common bile duct, or right hepatic duct or left hepatic duct injury or any accessory or aberrant ducts that may be present, possible injury to right and left hepatic artery or any accessesory or aberrant arteries in the near vicinity, post operative ductal scarring or stricturing that may need to be intervened on, choledocholithiasis, remnant stones or spilled stones that need to be extracted at a later time point with additional interventions, strokes, pneumonia, heart attack/myocardial infarction, brain anoxia, death from the operation or anesthesia or a complication that ensued from the operation, corrective operations that could cause life changing alterations, etc.)    The patient verbally expressed understanding, was given the opportunity for questions, and gives full informed consent for the procedure.      Today the patient was instructed on the need for a preoperative H&P, NPO status prior to surgery, and the need to stop anticoagulants prior to surgery.  Additional educational material, soap, and instructions will be mailed out to the patients home.    The total time spent with this patient was 30 minutes.  The total time was spent on the date of encounter doing chart review, history and physical, dressing changes, documentation, patient  education, and any further activity as noted above.

## 2023-09-21 NOTE — NURSING NOTE
"Chief Complaint   Patient presents with    New Patient     Calculus of gallbladder       Vitals:    09/21/23 1341   BP: 100/70   BP Location: Left arm   Patient Position: Sitting   Cuff Size: Adult Regular   Pulse: 92   SpO2: 97%   Weight: 78.5 kg (173 lb)   Height: 1.664 m (5' 5.5\")       Body mass index is 28.35 kg/m .                          Sundar Urena, EMT    "

## 2023-09-21 NOTE — LETTER
9/21/2023       RE: Sosa Patel  [address]   [place] [address]     Dear Colleague,    Thank you for referring your patient, Sosa Patel, to the [facility] [place] [facility]. Please see a copy of my visit note below.    Sosa Patel is a 47 year old female with a 1 week history of abdominal pain localized to the right upper quadrant.  Symptoms are associated with fatty food intake.  Associated symptoms: none  Common duct symptoms:  None  Diet tolerance:  good  Patient was not seen in the Emergency Room for these symptoms.  LFT's:  normal    Image studies included:  Ultrasound  Findings:  Gallstones seen    Past medical and surgical history, medications, allergies, family history, and social history were reviewed with the patient. Works housekeeping    ROS: 10 point review of systems negative except noted in HPI  PHYSICAL EXAM  General appearance- healthy, alert, and in no distress.  Skin- Skin color and turgor normal.  No obvious rashes.  Neck- Neck is supple without obvious adenopathy.  Lungs- Respiratory effort unlabored.  Gait- Normal.  Abdomen - soft non distended,mild tender RUQ    Impression:  Symptomatic cholelithiasis  Recommendation:  Laparoscopic Cholecystectomy robot assisted.     Low to nonfat diet until the patient undergoes surgery.    A full discussion regarding the alternatives, risks, goals, and potential complications for this surgery was completed today.  The patient understood that the potential problems included but are not limited to:  Infection, bleeding, bile leak, injury to structures about the gallbladder, possible common duct stone requiring further procedures. Most current review of literature confirm the more common specific risks related to laparoscopic cholecystectomy include bile duct injury (3/1000), bile leak (10/1000), retained common bile duct stone (10/1000), postcholecystectomy  diarrhea (1-2%) and these complications may require additional treatment.    (To be complete the exhaustive list of possible liz operative issues includes the following.  Bile leak, chronic pain, deep vein thrombosis, nerve entrapment, food intolerances that may be temporary or permanent, misdiagnosis (from pathology of nearby organs: liver, stomach, duodenum, pancreas, colon, small bowel), pain/opioid medicine addiction (heroin addiction), change in tastes to certain foods, temporary or permanent types of various diarrhea, seroma formation, possible drain placement, post operative pain, hernia formation, scar formation, deep and superficial infections, intra abdominal infections or abscesses or biloma or fluid collections, superficial and deep bleeding, injury to the liver/pancreas/stomach/duodenum, injury to small and large intestines, need to convert to open cholecystectomy, need for additional procedures that may be invasive or not such as ERCP, possible common hepatic duct, common bile duct, or right hepatic duct or left hepatic duct injury or any accessory or aberrant ducts that may be present, possible injury to right and left hepatic artery or any accessesory or aberrant arteries in the near vicinity, post operative ductal scarring or stricturing that may need to be intervened on, choledocholithiasis, remnant stones or spilled stones that need to be extracted at a later time point with additional interventions, strokes, pneumonia, heart attack/myocardial infarction, brain anoxia, death from the operation or anesthesia or a complication that ensued from the operation, corrective operations that could cause life changing alterations, etc.)    The patient verbally expressed understanding, was given the opportunity for questions, and gives full informed consent for the procedure.      Today the patient was instructed on the need for a preoperative H&P, NPO status prior to surgery, and the need to stop  anticoagulants prior to surgery.  Additional educational material, soap, and instructions will be mailed out to the patients home.    The total time spent with this patient was 30 minutes.  The total time was spent on the date of encounter doing chart review, history and physical, dressing changes, documentation, patient education, and any further activity as noted above.          Again, thank you for allowing me to participate in the care of your patient.      Sincerely,    Demetris Peoples MD

## 2023-09-22 ENCOUNTER — TELEPHONE (OUTPATIENT)
Dept: SURGERY | Facility: CLINIC | Age: 48
End: 2023-09-22

## 2023-09-22 PROBLEM — K80.20 GALLSTONES: Status: ACTIVE | Noted: 2023-09-21

## 2023-09-22 NOTE — TELEPHONE ENCOUNTER
Patient is scheduled for surgery with Dr. Peoples    Spoke with: Sosa    Date of Surgery: 10/9/2023    Location: [facility]    Informed patient they will need an adult : Yes    Pre op with Provider n/a    H&P: Scheduled with PCP - Cynthia Scott PA-C    Additional imaging/appointments: n/a    Surgery packet: To be sent via [employer] and the [place] mail     Additional comments: n/a        Nancie Resendez on 9/22/2023 at 10:27 AM

## 2023-09-28 ASSESSMENT — PATIENT HEALTH QUESTIONNAIRE - PHQ9
SUM OF ALL RESPONSES TO PHQ QUESTIONS 1-9: 4
10. IF YOU CHECKED OFF ANY PROBLEMS, HOW DIFFICULT HAVE THESE PROBLEMS MADE IT FOR YOU TO DO YOUR WORK, TAKE CARE OF THINGS AT HOME, OR GET ALONG WITH OTHER PEOPLE: NOT DIFFICULT AT ALL
SUM OF ALL RESPONSES TO PHQ QUESTIONS 1-9: 4

## 2023-09-29 ENCOUNTER — OFFICE VISIT (OUTPATIENT)
Dept: FAMILY MEDICINE | Facility: CLINIC | Age: 48
End: 2023-09-29
Payer: COMMERCIAL

## 2023-09-29 VITALS
SYSTOLIC BLOOD PRESSURE: 99 MMHG | HEART RATE: 90 BPM | RESPIRATION RATE: 18 BRPM | OXYGEN SATURATION: 99 % | DIASTOLIC BLOOD PRESSURE: 64 MMHG | WEIGHT: 176 LBS | HEIGHT: 66 IN | TEMPERATURE: 98 F | BODY MASS INDEX: 28.28 KG/M2

## 2023-09-29 DIAGNOSIS — Z12.11 SCREEN FOR COLON CANCER: ICD-10-CM

## 2023-09-29 DIAGNOSIS — Z01.818 PREOP GENERAL PHYSICAL EXAM: Primary | ICD-10-CM

## 2023-09-29 DIAGNOSIS — K80.20 GALLSTONES: ICD-10-CM

## 2023-09-29 DIAGNOSIS — Z12.31 VISIT FOR SCREENING MAMMOGRAM: ICD-10-CM

## 2023-09-29 DIAGNOSIS — M32.9 SYSTEMIC LUPUS ERYTHEMATOSUS, UNSPECIFIED SLE TYPE, UNSPECIFIED ORGAN INVOLVEMENT STATUS (H): ICD-10-CM

## 2023-09-29 PROCEDURE — 90686 IIV4 VACC NO PRSV 0.5 ML IM: CPT | Performed by: PHYSICIAN ASSISTANT

## 2023-09-29 PROCEDURE — 99214 OFFICE O/P EST MOD 30 MIN: CPT | Mod: 25 | Performed by: PHYSICIAN ASSISTANT

## 2023-09-29 PROCEDURE — 90471 IMMUNIZATION ADMIN: CPT | Performed by: PHYSICIAN ASSISTANT

## 2023-09-29 RX ORDER — ARIPIPRAZOLE 10 MG/1
10 TABLET ORAL DAILY
COMMUNITY
Start: 2023-08-29

## 2023-09-29 RX ORDER — GABAPENTIN 100 MG/1
100 CAPSULE ORAL
COMMUNITY
Start: 2023-08-04 | End: 2024-01-03

## 2023-09-29 NOTE — COMMUNITY RESOURCES LIST (ENGLISH)
09/29/2023   [facility]  N/A  For questions about this resource list or additional care needs, please contact your primary care clinic or care manager.  Phone: [phone]   Email: N/A   Address: [address] [place] [address]   Hours: N/A        Food and Nutrition       Food pantry  1  [facility] - Food Shelf Distance: 0.86 miles      Pickup   [address] [place] [address]  Language: Icelandic, English, Hungarian, South African, Mohawk  Hours: Sun 11:00 AM - 1:30 PM  Fees: Free   Phone: (829) 497-6719 Email: icm@[facility].org Website: http://www.[facility].org     2  [facility] Distance: 1.35 miles      In-Person   [address] [place] [address]  Language: English  Hours: Tue 10:00 AM - 11:30 AM , Wed 5:00 PM - 6:30 PM , Fri 10:00 AM - 11:30 AM  Fees: Free   Phone: (129) 553-7718 Email: info@[facility].org Website: http://www.[facility].org/     SNAP application assistance  3  [facility] -   Family Wellness (AIFW) Distance: 3.71 miles      In-Person, Phone/Virtual   [address] [place] [address]  Language: Icelandic, Kyrgyz, English, Gujarati, Martha, Thai, Hungarian, Turkmen, Ukrainian, Mohawk  Hours: Mon - Wed 9:00 AM - 5:00 PM , Thu 12:00 PM - 6:00 PM , Fri 9:00 AM - 5:00 PM , Sun 10:30 AM - 2:00 PM Appt. Only  Fees: Free   Phone: (655) 631-5344 Email: info@[facility]-aifw.org Website: https://www.sewa-aifw.org/     4  [employer]  [facility] Distance: 4.63 miles      In-Person, Phone/Virtual   [address] [place] [address]  Language: English, Hmong  Hours: Mon - Fri 8:30 AM - 4:30 PM  Fees: Free   Phone: (730) 170-1483 Ext.1 Email: info@[employer].org Website: https://www.[employer].org/     Soup kitchen or free meals  5  [facility] Dinner Distance: 0.96 miles      [address] [address]  Language: English  Hours: Tue 5:00 PM - 6:30 PM   Fees: Free   Phone: (595) 113-4768 Email: admin@[employer] Website: http://www.[employer]/     6  [facility] Supper Distance: 1.35 miles      In-Person   [address] [place] [address]  Language: English  Hours: Wed 5:15 PM - 6:00 PM  Fees: Free   Phone: (301) 634-3998 Email: info@[facility].org Website: http://www.[facility].org/          Important Numbers & Websites       Emergency Services   911  City Services   311  Poison Control   (471) 629-8717  Suicide Prevention Lifeline   (178) 328-3846 (TALK)  Child Abuse Hotline   (709) 615-5966 (4-A-Child)  Sexual Assault Hotline   (317) 901-3403 (HOPE)  National Runaway Safeline   (954) 479-7610 (RUNAWAY)  All-Options Talkline   (963) 580-6643  Substance Abuse Referral   (434) 795-5435 (HELP)

## 2023-09-29 NOTE — COMMUNITY RESOURCES LIST (ENGLISH)
09/29/2023   [facility] Outpatient Clinics  N/A  For additional resource needs, please contact your health insurance member services or your primary care team.  Phone: [phone]   Email: N/A   Address: [address] [place] [address]   Hours: N/A        Food and Nutrition       Food pantry  1  [facility] - Food Shelf Distance: 0.86 miles      Pickup   [address] [place] [address]  Language: Pashto, English, Croatian, Ghanaian, Irish  Hours: Sun 11:00 AM - 1:30 PM  Fees: Free   Phone: (783) 943-1717 Email: icm@[facility].org Website: http://www.[facility].org     2  [facility] Distance: 1.35 miles      In-Person   [address] [place] [address]  Language: English  Hours: Tue 10:00 AM - 11:30 AM , Wed 5:00 PM - 6:30 PM , Fri 10:00 AM - 11:30 AM  Fees: Free   Phone: (613) 794-7590 Email: info@[facility].org Website: http://www.[facility].org/     SNAP application assistance  3  [employer] [place] Distance: 9.53 miles      Phone/Virtual   [address] [address]  Language: English, Hmong, Bolivian, Ghanaian, Macanese  Hours: Mon - Fri 8:30 AM - 4:30 PM  Fees: Free   Phone: (710) 983-7223 Email: helpline@[employer].org Website: https://www.[employer]ers[employer].org/programs/[place]-food-helpline/     4  [place] -  [facility] (AIFW) Distance: 3.71 miles      In-Person, Phone/Virtual   [address] [place] [address]  Language: Pashto, Kazakh, English, Gujarati, Martha, Mongolian, Croatian, Azeri, Nepali, Irish  Hours: Mon - Wed 9:00 AM - 5:00 PM , Thu 12:00 PM - 6:00 PM , Fri 9:00 AM - 5:00 PM , Sun 10:30 AM - 2:00 PM Appt. Only  Fees: Free   Phone: (984) 494-7593 Email: info@[employer].org Website: https://www.[employer].org/     Soup kitchen or free meals  5  [facility] Dinner Distance: 0.96 miles      Pickup   [address] [place] [address]  Language: English   Hours: Tue 5:00 PM - 6:30 PM  Fees: Free   Phone: (592) 783-5699 Email: admin@[employer] Website: http://www.[employer]/     6  [employer] [employer] [facility] Community Supper Distance: 1.35 miles      In-Person   [address] [facility] [address] [place] [address]  Language: English  Hours: Wed 5:15 PM - 6:00 PM  Fees: Free   Phone: (360) 732-5322 Email: info@[facility].org Website: http://www.[facility].org/          Important Numbers & Websites       [facility]   211 211itedway.org  Poison Control   (899) 514-5054 [place]  Suicide and Crisis Lifeline   [address] [place], [place] [address]line.org  Childhelp [place] Child Abuse Hotline   500.745.7260 Childhelphotline.org  [place] Sexual Assault Hotline   (260) 552-4731 (HOPE) [facility] Runaway Safeline   (692) 179-8767 (RUNAWAY) [facility] Hospital[facility].org  Pregnancy & Postpartum Support [place]   Call/text 146-566-5145 Ppsupportmn.org  Substance Abuse National Helpline ([facility]   148-342-HELP (4453) Findtreatment.gov  Emergency Services   911

## 2023-09-29 NOTE — H&P (VIEW-ONLY)
[facility]  [address]  [place] [address]  Phone: [phone]  Primary Provider: Rodolfo Scott  Pre-op Performing Provider: RODOLFO SCOTT      PREOPERATIVE EVALUATION:  Today's date: 9/29/2023    Sosa is a 48 year old female who presents for a preoperative evaluation.      9/29/2023     6:48 AM   Additional Questions   Roomed by Shwetha   Accompanied by self       Surgical Information:  Surgery/Procedure: Cholecystectomy   Surgery Location: [place]   Surgeon: Shelton Baumann   Surgery Date: 10-9-23   Time of Surgery: 10:00   Where patient plans to recover: At home with family  Fax number for surgical facility:     Assessment & Plan     The proposed surgical procedure is considered INTERMEDIATE risk.    Screen for colon cancer  Pt states she did the colo guard-will try to get records.      Visit for screening mammogram    - MA SCREENING DIGITAL BILAT - Future  (s+30); Future    Preop general physical exam      Gallstones  Continues to be symptomatic     Systemic lupus erythematosus, unspecified SLE type, unspecified organ involvement status (H)  She will stop belimumab 2 weeks before unless she hears differently from her Rheumatologist.  Can take tylenol for additional pain.  Hesitant to have her use her prednisone in this time frame as well.              - No identified additional risk factors other than previously addressed    Antiplatelet or Anticoagulation Medication Instructions:   - Patient is on no antiplatelet or anticoagulation medications.    Additional Medication Instructions:  Hold belimumab for 2 weeks before surgery     RECOMMENDATION:  APPROVAL GIVEN to proceed with proposed procedure, without further diagnostic evaluation.            Subjective       HPI related to upcoming procedure: gallstones -symptomatic         9/28/2023     7:09 PM   Preop Questions   1. Have you ever had a heart attack or stroke? No   2. Have you ever had  surgery on your heart or blood vessels, such as a stent placement, a coronary artery bypass, or surgery on an artery in your head, neck, heart, or legs? No   3. Do you have chest pain with activity? No   4. Do you have a history of  heart failure? No   5. Do you currently have a cold, bronchitis or symptoms of other infection? No   6. Do you have a cough, shortness of breath, or wheezing? No   7. Do you or anyone in your family have previous history of blood clots? No   8. Do you or does anyone in your family have a serious bleeding problem such as prolonged bleeding following surgeries or cuts? No   9. Have you ever had problems with anemia or been told to take iron pills? YES - takes iron    10. Have you had any abnormal blood loss such as black, tarry or bloody stools, or abnormal vaginal bleeding? No   11. Have you ever had a blood transfusion? No   12. Are you willing to have a blood transfusion if it is medically needed before, during, or after your surgery? Yes   13. Have you or any of your relatives ever had problems with anesthesia? No   14. Do you have sleep apnea, excessive snoring or daytime drowsiness? No   15. Do you have any artifical heart valves or other implanted medical devices like a pacemaker, defibrillator, or continuous glucose monitor? No   16. Do you have artificial joints? No   17. Are you allergic to latex? No   18. Is there any chance that you may be pregnant? No       Health Care Directive:  Patient does not have a Health Care Directive or Living Will: information given     Preoperative Review of :   reviewed - controlled substances reflected in medication list.          Review of Systems  CONSTITUTIONAL: NEGATIVE for fever, chills, change in weight  INTEGUMENTARY/SKIN: no new rashes  ENT/MOUTH: NEGATIVE for ear, mouth and throat problems  RESP: NEGATIVE for significant cough or SOB  CV: NEGATIVE for chest pain, palpitations or peripheral edema  GI: stomach pain from  gallbladder  : NEGATIVE for frequency, dysuria, or hematuria  MUSCULOSKELETAL: NEGATIVE for significant arthralgias or myalgia  NEURO: no new or different headaches   PSYCHIATRIC: NEGATIVE for changes in mood or affect    Patient Active Problem List    Diagnosis Date Noted    Gallstones 09/21/2023     Priority: Medium    Recurrent major depressive disorder, in partial remission (H) 08/31/2022     Priority: Medium    Lichen sclerosus 09/10/2019     Priority: Medium    High risk medications (not anticoagulants) long-term use 09/21/2017     Priority: Medium    Sicca syndrome (H) 09/21/2017     Priority: Medium    Systemic lupus erythematosus (H) 01/18/2017     Priority: Medium    JESSE positive 10/11/2016     Priority: Medium    Multiple joint pain 10/11/2016     Priority: Medium    Cocaine use 10/11/2016     Priority: Medium     In remission       H/O intravenous drug use in remission 03/06/2013     Priority: Medium    CARDIOVASCULAR SCREENING; LDL GOAL LESS THAN 160 10/31/2010     Priority: Medium    Herpes simplex type 2 infection 06/18/2010     Priority: Medium    Genital warts      Priority: Medium     (Problem list name updated by automated process. Provider to review and confirm.)        Past Medical History:   Diagnosis Date    Bipolar disorder (H) 12/04    Dx'd    Combinations of drug dependence excluding opioid type drug (H) 5/4/2022    In remission     H/O: depression     SUICIDE ATTEMPT AGE 15    HPV (human papillomavirus)     Mood disorder (H) 4/9/2019     Past Surgical History:   Procedure Laterality Date    BUNIONECTOMY RT/LT      HC ENLARGE BREAST WITH IMPLANT  2000     Current Outpatient Medications   Medication Sig Dispense Refill    ARIPiprazole (ABILIFY) 10 MG tablet Take 10 mg by mouth daily      Aspirin-Acetaminophen-Caffeine (EXCEDRIN PO)       Belimumab 200 MG/ML SOAJ Inject 200 mg Subcutaneous every 7 days . Hold for signs of infection and seek medical attention.   Rheumatology follow-up  required prior to the next refill prescription 4 mL 3    cetirizine (ZYRTEC) 10 MG tablet TAKE TWO TABLETS BY MOUTH ONCE DAILY **NEED APPOINTMENT FOR MORE REFILLS** 180 tablet 0    clobetasol (TEMOVATE) 0.05 % external cream Apply topically 2 times daily 60 g 0    clotrimazole (LOTRIMIN) 1 % external cream Apply topically 2 times daily 85 g 1    cycloSPORINE (RESTASIS) 0.05 % ophthalmic emulsion Place 1 drop into both eyes 2 times daily 11 mL 6    diclofenac (VOLTAREN) 1 % topical gel Apply up to 2 grams of 1% gel to hands up to 4 times daily as needed for joint pain (maximum: 8 g per upper extremity per day) 200 g 2    Efinaconazole 10 % SOLN Externally apply topically daily 8 mL 3    ferrous sulfate (FEROSUL) 325 (65 Fe) MG tablet Take 325 mg by mouth every other day      FLUoxetine (PROZAC) 40 MG capsule TAKE ONE CAPSULE BY MOUTH ONCE DAILY 90 capsule 1    fluticasone (FLONASE) 50 MCG/ACT nasal spray SPRAY 1 SPRAY INTO BOTH NOSTRILS DAILY 16 g 1    gabapentin (NEURONTIN) 100 MG capsule Take 100 mg by mouth nightly as needed (restless leg)      hydroquinone (RUFUS) 4 % external cream Apply topically 2 times daily For up to 3 months. 30 g 3    hydroxychloroquine (PLAQUENIL) 200 MG tablet Hydroxychloroquine 200 mg daily with an additional 200 mg every other day.  Ophthalmology toxicity monitoring eye exam, that includes 10-2 VF and SD-OCT, is required yearly. 135 tablet 1    leflunomide (ARAVA) 20 MG tablet Take 1 tablet (20 mg) by mouth daily ; labs required every 12 weeks 90 tablet 0    omeprazole (PRILOSEC) 40 MG DR capsule Take 1 capsule (40 mg) by mouth daily 30 capsule 0    predniSONE (DELTASONE) 5 MG tablet For inflammatory arthritis flare only: Prednisone 20mg daily x5days, then 15mg daily x5days, then 10mg daily x5days, then 5mg daily x5days, then stop. (Patient not taking: Reported on 9/29/2023) 50 tablet 0    triamcinolone (KENALOG) 0.1 % paste TAKE BY MOUTH 2 TIMES DAILY (Patient not taking: Reported  "on 8/31/2022) 30 g 1       No Known Allergies     Social History     Tobacco Use    Smoking status: Every Day     Packs/day: 0.10     Years: 23.00     Pack years: 2.30     Types: Other, Cigarettes, Vaping Device     Last attempt to quit: 3/3/2013     Years since quitting: 10.5    Smokeless tobacco: Never    Tobacco comments:     Currently vapes   Substance Use Topics    Alcohol use: Yes     Comment: rare     Family History   Problem Relation Age of Onset    Cancer Father         lung cancer    Diabetes Maternal Grandmother     Depression Maternal Grandmother     Lipids Maternal Grandmother     Cerebrovascular Disease Maternal Grandfather     Lipids Maternal Grandfather     Circulatory Maternal Grandfather         MI in 50's    Depression Brother         depression and OCD    Gastrointestinal Disease Brother         IBS    Depression Mother     Thyroid Disease No family hx of     Asthma No family hx of      History   Drug Use     Comment: Cocaine         Objective     BP 99/64   Pulse 90   Temp 98  F (36.7  C) (Oral)   Resp 18   Ht 1.664 m (5' 5.5\")   Wt 79.8 kg (176 lb)   SpO2 99%   BMI 28.84 kg/m      Physical Exam    GENERAL APPEARANCE: healthy, alert and no distress     HENT: ear canals and TM's normal and nose and mouth without ulcers or lesions     NECK: no adenopathy, no asymmetry, masses, or scars and thyroid normal to palpation     RESP: lungs clear to auscultation - no rales, rhonchi or wheezes     CV: regular rates and rhythm, normal S1 S2, no S3 or S4 and no murmur, click or rub     ABDOMEN: bowel sounds normal and tender rlq and ruq on palpation      MS: extremities normal- no gross deformities noted, no evidence of inflammation in joints, FROM in all extremities.     SKIN: red round lesions on both hands     NEURO: Normal strength and tone, sensory exam grossly normal, mentation intact and speech normal     PSYCH: mentation appears normal. and affect normal/bright     LYMPHATICS: No cervical " adenopathy    Recent Labs   Lab Test 09/19/23  1232 08/08/23  1618   HGB 13.3 12.9    251    144   POTASSIUM 4.1 4.0   CR 0.79 0.69        Diagnostics:  No labs were ordered during this visit.   No EKG required, no history of coronary heart disease, significant arrhythmia, peripheral arterial disease or other structural heart disease.    Revised Cardiac Risk Index (RCRI):  The patient has the following serious cardiovascular risks for perioperative complications:   - No serious cardiac risks = 0 points     RCRI Interpretation: 0 points: Class I (very low risk - 0.4% complication rate)            Signed Electronically by: Cynthia Scott PA-C  Copy of this evaluation report is provided to requesting physician.

## 2023-09-29 NOTE — PROGRESS NOTES
[facility]  [address]  [place] [address]  Phone: [phone]  Primary Provider: Rodolfo Scott  Pre-op Performing Provider: RODOLFO SCOTT      PREOPERATIVE EVALUATION:  Today's date: 9/29/2023    Sosa is a 48 year old female who presents for a preoperative evaluation.      9/29/2023     6:48 AM   Additional Questions   Roomed by Shwetha   Accompanied by self       Surgical Information:  Surgery/Procedure: Cholecystectomy   Surgery Location: [place]   Surgeon: Shelton Baumann   Surgery Date: 10-9-23   Time of Surgery: 10:00   Where patient plans to recover: At home with family  Fax number for surgical facility:     Assessment & Plan     The proposed surgical procedure is considered INTERMEDIATE risk.    Screen for colon cancer  Pt states she did the colo guard-will try to get records.      Visit for screening mammogram    - MA SCREENING DIGITAL BILAT - Future  (s+30); Future    Preop general physical exam      Gallstones  Continues to be symptomatic     Systemic lupus erythematosus, unspecified SLE type, unspecified organ involvement status (H)  She will stop belimumab 2 weeks before unless she hears differently from her Rheumatologist.  Can take tylenol for additional pain.  Hesitant to have her use her prednisone in this time frame as well.              - No identified additional risk factors other than previously addressed    Antiplatelet or Anticoagulation Medication Instructions:   - Patient is on no antiplatelet or anticoagulation medications.    Additional Medication Instructions:  Hold belimumab for 2 weeks before surgery     RECOMMENDATION:  APPROVAL GIVEN to proceed with proposed procedure, without further diagnostic evaluation.            Subjective       HPI related to upcoming procedure: gallstones -symptomatic         9/28/2023     7:09 PM   Preop Questions   1. Have you ever had a heart attack or stroke? No   2. Have you ever had  surgery on your heart or blood vessels, such as a stent placement, a coronary artery bypass, or surgery on an artery in your head, neck, heart, or legs? No   3. Do you have chest pain with activity? No   4. Do you have a history of  heart failure? No   5. Do you currently have a cold, bronchitis or symptoms of other infection? No   6. Do you have a cough, shortness of breath, or wheezing? No   7. Do you or anyone in your family have previous history of blood clots? No   8. Do you or does anyone in your family have a serious bleeding problem such as prolonged bleeding following surgeries or cuts? No   9. Have you ever had problems with anemia or been told to take iron pills? YES - takes iron    10. Have you had any abnormal blood loss such as black, tarry or bloody stools, or abnormal vaginal bleeding? No   11. Have you ever had a blood transfusion? No   12. Are you willing to have a blood transfusion if it is medically needed before, during, or after your surgery? Yes   13. Have you or any of your relatives ever had problems with anesthesia? No   14. Do you have sleep apnea, excessive snoring or daytime drowsiness? No   15. Do you have any artifical heart valves or other implanted medical devices like a pacemaker, defibrillator, or continuous glucose monitor? No   16. Do you have artificial joints? No   17. Are you allergic to latex? No   18. Is there any chance that you may be pregnant? No       Health Care Directive:  Patient does not have a Health Care Directive or Living Will: information given     Preoperative Review of :   reviewed - controlled substances reflected in medication list.          Review of Systems  CONSTITUTIONAL: NEGATIVE for fever, chills, change in weight  INTEGUMENTARY/SKIN: no new rashes  ENT/MOUTH: NEGATIVE for ear, mouth and throat problems  RESP: NEGATIVE for significant cough or SOB  CV: NEGATIVE for chest pain, palpitations or peripheral edema  GI: stomach pain from  gallbladder  : NEGATIVE for frequency, dysuria, or hematuria  MUSCULOSKELETAL: NEGATIVE for significant arthralgias or myalgia  NEURO: no new or different headaches   PSYCHIATRIC: NEGATIVE for changes in mood or affect    Patient Active Problem List    Diagnosis Date Noted    Gallstones 09/21/2023     Priority: Medium    Recurrent major depressive disorder, in partial remission (H) 08/31/2022     Priority: Medium    Lichen sclerosus 09/10/2019     Priority: Medium    High risk medications (not anticoagulants) long-term use 09/21/2017     Priority: Medium    Sicca syndrome (H) 09/21/2017     Priority: Medium    Systemic lupus erythematosus (H) 01/18/2017     Priority: Medium    JESSE positive 10/11/2016     Priority: Medium    Multiple joint pain 10/11/2016     Priority: Medium    Cocaine use 10/11/2016     Priority: Medium     In remission       H/O intravenous drug use in remission 03/06/2013     Priority: Medium    CARDIOVASCULAR SCREENING; LDL GOAL LESS THAN 160 10/31/2010     Priority: Medium    Herpes simplex type 2 infection 06/18/2010     Priority: Medium    Genital warts      Priority: Medium     (Problem list name updated by automated process. Provider to review and confirm.)        Past Medical History:   Diagnosis Date    Bipolar disorder (H) 12/04    Dx'd    Combinations of drug dependence excluding opioid type drug (H) 5/4/2022    In remission     H/O: depression     SUICIDE ATTEMPT AGE 15    HPV (human papillomavirus)     Mood disorder (H) 4/9/2019     Past Surgical History:   Procedure Laterality Date    BUNIONECTOMY RT/LT      HC ENLARGE BREAST WITH IMPLANT  2000     Current Outpatient Medications   Medication Sig Dispense Refill    ARIPiprazole (ABILIFY) 10 MG tablet Take 10 mg by mouth daily      Aspirin-Acetaminophen-Caffeine (EXCEDRIN PO)       Belimumab 200 MG/ML SOAJ Inject 200 mg Subcutaneous every 7 days . Hold for signs of infection and seek medical attention.   Rheumatology follow-up  required prior to the next refill prescription 4 mL 3    cetirizine (ZYRTEC) 10 MG tablet TAKE TWO TABLETS BY MOUTH ONCE DAILY **NEED APPOINTMENT FOR MORE REFILLS** 180 tablet 0    clobetasol (TEMOVATE) 0.05 % external cream Apply topically 2 times daily 60 g 0    clotrimazole (LOTRIMIN) 1 % external cream Apply topically 2 times daily 85 g 1    cycloSPORINE (RESTASIS) 0.05 % ophthalmic emulsion Place 1 drop into both eyes 2 times daily 11 mL 6    diclofenac (VOLTAREN) 1 % topical gel Apply up to 2 grams of 1% gel to hands up to 4 times daily as needed for joint pain (maximum: 8 g per upper extremity per day) 200 g 2    Efinaconazole 10 % SOLN Externally apply topically daily 8 mL 3    ferrous sulfate (FEROSUL) 325 (65 Fe) MG tablet Take 325 mg by mouth every other day      FLUoxetine (PROZAC) 40 MG capsule TAKE ONE CAPSULE BY MOUTH ONCE DAILY 90 capsule 1    fluticasone (FLONASE) 50 MCG/ACT nasal spray SPRAY 1 SPRAY INTO BOTH NOSTRILS DAILY 16 g 1    gabapentin (NEURONTIN) 100 MG capsule Take 100 mg by mouth nightly as needed (restless leg)      hydroquinone (RUFUS) 4 % external cream Apply topically 2 times daily For up to 3 months. 30 g 3    hydroxychloroquine (PLAQUENIL) 200 MG tablet Hydroxychloroquine 200 mg daily with an additional 200 mg every other day.  Ophthalmology toxicity monitoring eye exam, that includes 10-2 VF and SD-OCT, is required yearly. 135 tablet 1    leflunomide (ARAVA) 20 MG tablet Take 1 tablet (20 mg) by mouth daily ; labs required every 12 weeks 90 tablet 0    omeprazole (PRILOSEC) 40 MG DR capsule Take 1 capsule (40 mg) by mouth daily 30 capsule 0    predniSONE (DELTASONE) 5 MG tablet For inflammatory arthritis flare only: Prednisone 20mg daily x5days, then 15mg daily x5days, then 10mg daily x5days, then 5mg daily x5days, then stop. (Patient not taking: Reported on 9/29/2023) 50 tablet 0    triamcinolone (KENALOG) 0.1 % paste TAKE BY MOUTH 2 TIMES DAILY (Patient not taking: Reported  "on 8/31/2022) 30 g 1       No Known Allergies     Social History     Tobacco Use    Smoking status: Every Day     Packs/day: 0.10     Years: 23.00     Pack years: 2.30     Types: Other, Cigarettes, Vaping Device     Last attempt to quit: 3/3/2013     Years since quitting: 10.5    Smokeless tobacco: Never    Tobacco comments:     Currently vapes   Substance Use Topics    Alcohol use: Yes     Comment: rare     Family History   Problem Relation Age of Onset    Cancer Father         lung cancer    Diabetes Maternal Grandmother     Depression Maternal Grandmother     Lipids Maternal Grandmother     Cerebrovascular Disease Maternal Grandfather     Lipids Maternal Grandfather     Circulatory Maternal Grandfather         MI in 50's    Depression Brother         depression and OCD    Gastrointestinal Disease Brother         IBS    Depression Mother     Thyroid Disease No family hx of     Asthma No family hx of      History   Drug Use     Comment: Cocaine         Objective     BP 99/64   Pulse 90   Temp 98  F (36.7  C) (Oral)   Resp 18   Ht 1.664 m (5' 5.5\")   Wt 79.8 kg (176 lb)   SpO2 99%   BMI 28.84 kg/m      Physical Exam    GENERAL APPEARANCE: healthy, alert and no distress     HENT: ear canals and TM's normal and nose and mouth without ulcers or lesions     NECK: no adenopathy, no asymmetry, masses, or scars and thyroid normal to palpation     RESP: lungs clear to auscultation - no rales, rhonchi or wheezes     CV: regular rates and rhythm, normal S1 S2, no S3 or S4 and no murmur, click or rub     ABDOMEN: bowel sounds normal and tender rlq and ruq on palpation      MS: extremities normal- no gross deformities noted, no evidence of inflammation in joints, FROM in all extremities.     SKIN: red round lesions on both hands     NEURO: Normal strength and tone, sensory exam grossly normal, mentation intact and speech normal     PSYCH: mentation appears normal. and affect normal/bright     LYMPHATICS: No cervical " adenopathy    Recent Labs   Lab Test 09/19/23  1232 08/08/23  1618   HGB 13.3 12.9    251    144   POTASSIUM 4.1 4.0   CR 0.79 0.69        Diagnostics:  No labs were ordered during this visit.   No EKG required, no history of coronary heart disease, significant arrhythmia, peripheral arterial disease or other structural heart disease.    Revised Cardiac Risk Index (RCRI):  The patient has the following serious cardiovascular risks for perioperative complications:   - No serious cardiac risks = 0 points     RCRI Interpretation: 0 points: Class I (very low risk - 0.4% complication rate)            Signed Electronically by: Cynthia Scott PA-C  Copy of this evaluation report is provided to requesting physician.

## 2023-09-29 NOTE — PATIENT INSTRUCTIONS
Preparing for Your Surgery  Getting started  A nurse will call you to review your health history and instructions. They will give you an arrival time based on your scheduled surgery time. Please be ready to share:  Your doctor's clinic name and phone number  Your medical, surgical, and anesthesia history  A list of allergies and sensitivities  A list of medicines, including herbal treatments and over-the-counter drugs  Whether the patient has a legal guardian (ask how to send us the papers in advance)  Please tell us if you're pregnant--or if there's any chance you might be pregnant. Some surgeries may injure a fetus (unborn baby), so they require a pregnancy test. Surgeries that are safe for a fetus don't always need a test, and you can choose whether to have one.   If you have a child who's having surgery, please ask for a copy of Preparing for Your Child's Surgery.    Preparing for surgery  Within 10 to 30 days of surgery: Have a pre-op exam (sometimes called an H&P, or History and Physical). This can be done at a clinic or pre-operative center.  If you're having a , you may not need this exam. Talk to your care team.  At your pre-op exam, talk to your care team about all medicines you take. If you need to stop any medicines before surgery, ask when to start taking them again.  We do this for your safety. Many medicines can make you bleed too much during surgery. Some change how well surgery (anesthesia) drugs work.  Call your insurance company to let them know you're having surgery. (If you don't have insurance, call [phone].)  Call your clinic if there's any change in your health. This includes signs of a cold or flu (sore throat, runny nose, cough, rash, fever). It also includes a scrape or scratch near the surgery site.  If you have questions on the day of surgery, call your hospital or surgery center.  Eating and drinking guidelines  For your safety: Unless your surgeon tells you otherwise,  follow the guidelines below.  Eat and drink as usual until 8 hours before you arrive for surgery. After that, no food or milk.  Drink clear liquids until 2 hours before you arrive. These are liquids you can see through, like water, Gatorade, and Propel Water. They also include plain black coffee and tea (no cream or milk), candy, and breath mints. You can spit out gum when you arrive.  If you drink alcohol: Stop drinking it the night before surgery.  If your care team tells you to take medicine on the morning of surgery, it's okay to take it with a sip of water.  Preventing infection  Shower or bathe the night before and morning of your surgery. Follow the instructions your clinic gave you. (If no instructions, use regular soap.)  Don't shave or clip hair near your surgery site. We'll remove the hair if needed.  Don't smoke or vape the morning of surgery. You may chew nicotine gum up to 2 hours before surgery. A nicotine patch is okay.  Note: Some surgeries require you to completely quit smoking and nicotine. Check with your surgeon.  Your care team will make every effort to keep you safe from infection. We will:  Clean our hands often with soap and water (or an alcohol-based hand rub).  Clean the skin at your surgery site with a special soap that kills germs.  Give you a special gown to keep you warm. (Cold raises the risk of infection.)  Wear special hair covers, masks, gowns and gloves during surgery.  Give antibiotic medicine, if prescribed. Not all surgeries need antibiotics.  What to bring on the day of surgery  Photo ID and insurance card  Copy of your health care directive, if you have one  Glasses and hearing aids (bring cases)  You can't wear contacts during surgery  Inhaler and eye drops, if you use them (tell us about these when you arrive)  CPAP machine or breathing device, if you use them  A few personal items, if spending the night  If you have . . .  A pacemaker, ICD (cardiac defibrillator) or other  implant: Bring the ID card.  An implanted stimulator: Bring the remote control.  A legal guardian: Bring a copy of the certified (court-stamped) guardianship papers.  Please remove any jewelry, including body piercings. Leave jewelry and other valuables at home.  If you're going home the day of surgery  You must have a responsible adult drive you home. They should stay with you overnight as well.  If you don't have someone to stay with you, and you aren't safe to go home alone, we may keep you overnight. Insurance often won't pay for this.  After surgery  If it's hard to control your pain or you need more pain medicine, please call your surgeon's office.  Questions?   If you have any questions for your care team, list them here: _________________________________________________________________________________________________________________________________________________________________________ ____________________________________ ____________________________________ ____________________________________  For informational purposes only. Not to replace the advice of your health care provider. Copyright   2003, 2019 [employer]. All rights reserved. Clinically reviewed by Aurora Cardoza MD. [employer] 220401 - REV 12/22.    How to Take Your Medication Before Surgery  - HOLD (do not take) injection for 2 weeks

## 2023-10-06 ENCOUNTER — ANESTHESIA EVENT (OUTPATIENT)
Dept: SURGERY | Facility: AMBULATORY SURGERY CENTER | Age: 48
End: 2023-10-06
Payer: COMMERCIAL

## 2023-10-09 ENCOUNTER — HOSPITAL ENCOUNTER (OUTPATIENT)
Facility: AMBULATORY SURGERY CENTER | Age: 48
Discharge: HOME | End: 2023-10-09
Attending: SURGERY
Payer: COMMERCIAL

## 2023-10-09 ENCOUNTER — ANESTHESIA (OUTPATIENT)
Dept: SURGERY | Facility: AMBULATORY SURGERY CENTER | Age: 48
End: 2023-10-09
Payer: COMMERCIAL

## 2023-10-09 VITALS
HEART RATE: 66 BPM | OXYGEN SATURATION: 98 % | TEMPERATURE: 97.3 F | BODY MASS INDEX: 27.32 KG/M2 | HEIGHT: 66 IN | DIASTOLIC BLOOD PRESSURE: 61 MMHG | RESPIRATION RATE: 12 BRPM | WEIGHT: 170 LBS | SYSTOLIC BLOOD PRESSURE: 117 MMHG

## 2023-10-09 DIAGNOSIS — K80.20 GALLSTONES: Primary | ICD-10-CM

## 2023-10-09 PROCEDURE — 88304 TISSUE EXAM BY PATHOLOGIST: CPT | Mod: TC | Performed by: SURGERY

## 2023-10-09 PROCEDURE — 88304 TISSUE EXAM BY PATHOLOGIST: CPT | Mod: 26 | Performed by: PATHOLOGY

## 2023-10-09 PROCEDURE — 47562 LAPAROSCOPIC CHOLECYSTECTOMY: CPT | Performed by: SURGERY

## 2023-10-09 RX ORDER — OXYCODONE HYDROCHLORIDE 5 MG/1
5 TABLET ORAL
Status: COMPLETED | OUTPATIENT
Start: 2023-10-09 | End: 2023-10-09

## 2023-10-09 RX ORDER — FENTANYL CITRATE 50 UG/ML
INJECTION, SOLUTION INTRAMUSCULAR; INTRAVENOUS PRN
Status: DISCONTINUED | OUTPATIENT
Start: 2023-10-09 | End: 2023-10-09

## 2023-10-09 RX ORDER — HYDROCODONE BITARTRATE AND ACETAMINOPHEN 5; 325 MG/1; MG/1
1-2 TABLET ORAL EVERY 4 HOURS PRN
Qty: 15 TABLET | Refills: 0 | Status: SHIPPED | OUTPATIENT
Start: 2023-10-09 | End: 2024-01-03

## 2023-10-09 RX ORDER — ONDANSETRON 2 MG/ML
4 INJECTION INTRAMUSCULAR; INTRAVENOUS EVERY 30 MIN PRN
Status: DISCONTINUED | OUTPATIENT
Start: 2023-10-09 | End: 2023-10-09 | Stop reason: HOSPADM

## 2023-10-09 RX ORDER — ONDANSETRON 4 MG/1
4 TABLET, ORALLY DISINTEGRATING ORAL EVERY 30 MIN PRN
Status: DISCONTINUED | OUTPATIENT
Start: 2023-10-09 | End: 2023-10-10 | Stop reason: HOSPADM

## 2023-10-09 RX ORDER — ONDANSETRON 2 MG/ML
4 INJECTION INTRAMUSCULAR; INTRAVENOUS EVERY 30 MIN PRN
Status: DISCONTINUED | OUTPATIENT
Start: 2023-10-09 | End: 2023-10-10 | Stop reason: HOSPADM

## 2023-10-09 RX ORDER — BUPIVACAINE HYDROCHLORIDE 2.5 MG/ML
INJECTION, SOLUTION INFILTRATION; PERINEURAL PRN
Status: DISCONTINUED | OUTPATIENT
Start: 2023-10-09 | End: 2023-10-09 | Stop reason: HOSPADM

## 2023-10-09 RX ORDER — HYDRALAZINE HYDROCHLORIDE 20 MG/ML
2.5-5 INJECTION INTRAMUSCULAR; INTRAVENOUS EVERY 10 MIN PRN
Status: DISCONTINUED | OUTPATIENT
Start: 2023-10-09 | End: 2023-10-09 | Stop reason: HOSPADM

## 2023-10-09 RX ORDER — ACETAMINOPHEN 325 MG/1
975 TABLET ORAL ONCE
Status: COMPLETED | OUTPATIENT
Start: 2023-10-09 | End: 2023-10-09

## 2023-10-09 RX ORDER — INDOCYANINE GREEN AND WATER 25 MG
2.5 KIT INJECTION ONCE
Status: COMPLETED | OUTPATIENT
Start: 2023-10-09 | End: 2023-10-09

## 2023-10-09 RX ORDER — HYDROMORPHONE HYDROCHLORIDE 1 MG/ML
0.2 INJECTION, SOLUTION INTRAMUSCULAR; INTRAVENOUS; SUBCUTANEOUS EVERY 5 MIN PRN
Status: DISCONTINUED | OUTPATIENT
Start: 2023-10-09 | End: 2023-10-09 | Stop reason: HOSPADM

## 2023-10-09 RX ORDER — PROPOFOL 10 MG/ML
INJECTION, EMULSION INTRAVENOUS PRN
Status: DISCONTINUED | OUTPATIENT
Start: 2023-10-09 | End: 2023-10-09

## 2023-10-09 RX ORDER — KETOROLAC TROMETHAMINE 30 MG/ML
INJECTION, SOLUTION INTRAMUSCULAR; INTRAVENOUS PRN
Status: DISCONTINUED | OUTPATIENT
Start: 2023-10-09 | End: 2023-10-09

## 2023-10-09 RX ORDER — CEFAZOLIN SODIUM 2 G/50ML
2 SOLUTION INTRAVENOUS
Status: DISCONTINUED | OUTPATIENT
Start: 2023-10-09 | End: 2023-10-09 | Stop reason: HOSPADM

## 2023-10-09 RX ORDER — LIDOCAINE HYDROCHLORIDE 40 MG/ML
SOLUTION TOPICAL PRN
Status: DISCONTINUED | OUTPATIENT
Start: 2023-10-09 | End: 2023-10-09

## 2023-10-09 RX ORDER — OXYCODONE HYDROCHLORIDE 5 MG/1
10 TABLET ORAL
Status: DISCONTINUED | OUTPATIENT
Start: 2023-10-09 | End: 2023-10-10 | Stop reason: HOSPADM

## 2023-10-09 RX ORDER — CEFAZOLIN SODIUM 2 G/50ML
2 SOLUTION INTRAVENOUS SEE ADMIN INSTRUCTIONS
Status: DISCONTINUED | OUTPATIENT
Start: 2023-10-09 | End: 2023-10-09 | Stop reason: HOSPADM

## 2023-10-09 RX ORDER — HYDROMORPHONE HYDROCHLORIDE 1 MG/ML
0.4 INJECTION, SOLUTION INTRAMUSCULAR; INTRAVENOUS; SUBCUTANEOUS EVERY 5 MIN PRN
Status: DISCONTINUED | OUTPATIENT
Start: 2023-10-09 | End: 2023-10-09 | Stop reason: HOSPADM

## 2023-10-09 RX ORDER — AMOXICILLIN 250 MG
1-2 CAPSULE ORAL 2 TIMES DAILY
Qty: 15 TABLET | Refills: 0 | Status: SHIPPED | OUTPATIENT
Start: 2023-10-09 | End: 2024-05-20

## 2023-10-09 RX ORDER — ONDANSETRON 2 MG/ML
INJECTION INTRAMUSCULAR; INTRAVENOUS PRN
Status: DISCONTINUED | OUTPATIENT
Start: 2023-10-09 | End: 2023-10-09

## 2023-10-09 RX ORDER — LIDOCAINE 40 MG/G
CREAM TOPICAL
Status: DISCONTINUED | OUTPATIENT
Start: 2023-10-09 | End: 2023-10-09 | Stop reason: HOSPADM

## 2023-10-09 RX ORDER — LABETALOL 20 MG/4 ML (5 MG/ML) INTRAVENOUS SYRINGE
PRN
Status: DISCONTINUED | OUTPATIENT
Start: 2023-10-09 | End: 2023-10-09

## 2023-10-09 RX ORDER — DEXMEDETOMIDINE HYDROCHLORIDE 4 UG/ML
INJECTION, SOLUTION INTRAVENOUS PRN
Status: DISCONTINUED | OUTPATIENT
Start: 2023-10-09 | End: 2023-10-09

## 2023-10-09 RX ORDER — SODIUM CHLORIDE, SODIUM LACTATE, POTASSIUM CHLORIDE, CALCIUM CHLORIDE 600; 310; 30; 20 MG/100ML; MG/100ML; MG/100ML; MG/100ML
INJECTION, SOLUTION INTRAVENOUS CONTINUOUS
Status: DISCONTINUED | OUTPATIENT
Start: 2023-10-09 | End: 2023-10-09 | Stop reason: HOSPADM

## 2023-10-09 RX ORDER — PROPOFOL 10 MG/ML
INJECTION, EMULSION INTRAVENOUS CONTINUOUS PRN
Status: DISCONTINUED | OUTPATIENT
Start: 2023-10-09 | End: 2023-10-09

## 2023-10-09 RX ORDER — ONDANSETRON 4 MG/1
4 TABLET, ORALLY DISINTEGRATING ORAL EVERY 30 MIN PRN
Status: DISCONTINUED | OUTPATIENT
Start: 2023-10-09 | End: 2023-10-09 | Stop reason: HOSPADM

## 2023-10-09 RX ORDER — LIDOCAINE HYDROCHLORIDE 20 MG/ML
INJECTION, SOLUTION INFILTRATION; PERINEURAL PRN
Status: DISCONTINUED | OUTPATIENT
Start: 2023-10-09 | End: 2023-10-09

## 2023-10-09 RX ORDER — LABETALOL HYDROCHLORIDE 5 MG/ML
10 INJECTION, SOLUTION INTRAVENOUS
Status: DISCONTINUED | OUTPATIENT
Start: 2023-10-09 | End: 2023-10-09 | Stop reason: HOSPADM

## 2023-10-09 RX ORDER — DEXAMETHASONE SODIUM PHOSPHATE 4 MG/ML
INJECTION, SOLUTION INTRA-ARTICULAR; INTRALESIONAL; INTRAMUSCULAR; INTRAVENOUS; SOFT TISSUE PRN
Status: DISCONTINUED | OUTPATIENT
Start: 2023-10-09 | End: 2023-10-09

## 2023-10-09 RX ORDER — FENTANYL CITRATE 50 UG/ML
25 INJECTION, SOLUTION INTRAMUSCULAR; INTRAVENOUS EVERY 5 MIN PRN
Status: DISCONTINUED | OUTPATIENT
Start: 2023-10-09 | End: 2023-10-09 | Stop reason: HOSPADM

## 2023-10-09 RX ORDER — FENTANYL CITRATE 50 UG/ML
50 INJECTION, SOLUTION INTRAMUSCULAR; INTRAVENOUS EVERY 5 MIN PRN
Status: DISCONTINUED | OUTPATIENT
Start: 2023-10-09 | End: 2023-10-09 | Stop reason: HOSPADM

## 2023-10-09 RX ADMIN — OXYCODONE HYDROCHLORIDE 5 MG: 5 TABLET ORAL at 11:39

## 2023-10-09 RX ADMIN — Medication 10 MG: at 10:08

## 2023-10-09 RX ADMIN — FENTANYL CITRATE 50 MCG: 50 INJECTION, SOLUTION INTRAMUSCULAR; INTRAVENOUS at 09:54

## 2023-10-09 RX ADMIN — PROPOFOL 150 MCG/KG/MIN: 10 INJECTION, EMULSION INTRAVENOUS at 09:56

## 2023-10-09 RX ADMIN — CEFAZOLIN SODIUM 2 G: 2 SOLUTION INTRAVENOUS at 09:47

## 2023-10-09 RX ADMIN — SODIUM CHLORIDE, SODIUM LACTATE, POTASSIUM CHLORIDE, CALCIUM CHLORIDE: 600; 310; 30; 20 INJECTION, SOLUTION INTRAVENOUS at 09:44

## 2023-10-09 RX ADMIN — LIDOCAINE HYDROCHLORIDE 80 MG: 20 INJECTION, SOLUTION INFILTRATION; PERINEURAL at 09:56

## 2023-10-09 RX ADMIN — Medication 0.5 MG: at 10:06

## 2023-10-09 RX ADMIN — Medication 40 MG: at 09:56

## 2023-10-09 RX ADMIN — FENTANYL CITRATE 25 MCG: 50 INJECTION, SOLUTION INTRAMUSCULAR; INTRAVENOUS at 11:38

## 2023-10-09 RX ADMIN — ONDANSETRON 4 MG: 2 INJECTION INTRAMUSCULAR; INTRAVENOUS at 10:02

## 2023-10-09 RX ADMIN — LABETALOL 20 MG/4 ML (5 MG/ML) INTRAVENOUS SYRINGE 10 MG: at 10:39

## 2023-10-09 RX ADMIN — LIDOCAINE HYDROCHLORIDE 5 ML: 40 SOLUTION TOPICAL at 09:58

## 2023-10-09 RX ADMIN — LABETALOL 20 MG/4 ML (5 MG/ML) INTRAVENOUS SYRINGE 10 MG: at 10:29

## 2023-10-09 RX ADMIN — DEXAMETHASONE SODIUM PHOSPHATE 4 MG: 4 INJECTION, SOLUTION INTRA-ARTICULAR; INTRALESIONAL; INTRAMUSCULAR; INTRAVENOUS; SOFT TISSUE at 10:02

## 2023-10-09 RX ADMIN — INDOCYANINE GREEN AND WATER 2.5 MG: KIT at 09:26

## 2023-10-09 RX ADMIN — PROPOFOL 160 MG: 10 INJECTION, EMULSION INTRAVENOUS at 09:56

## 2023-10-09 RX ADMIN — KETOROLAC TROMETHAMINE 30 MG: 30 INJECTION, SOLUTION INTRAMUSCULAR; INTRAVENOUS at 10:39

## 2023-10-09 RX ADMIN — ACETAMINOPHEN 975 MG: 325 TABLET ORAL at 09:16

## 2023-10-09 RX ADMIN — DEXMEDETOMIDINE HYDROCHLORIDE 8 MCG: 4 INJECTION, SOLUTION INTRAVENOUS at 10:11

## 2023-10-09 RX ADMIN — DEXMEDETOMIDINE HYDROCHLORIDE 8 MCG: 4 INJECTION, SOLUTION INTRAVENOUS at 10:08

## 2023-10-09 RX ADMIN — FENTANYL CITRATE 50 MCG: 50 INJECTION, SOLUTION INTRAMUSCULAR; INTRAVENOUS at 10:20

## 2023-10-09 ASSESSMENT — LIFESTYLE VARIABLES: TOBACCO_USE: 1

## 2023-10-09 NOTE — ANESTHESIA POSTPROCEDURE EVALUATION
Patient: Sosa Patel    Procedure: Procedure(s):  CHOLECYSTECTOMY, ROBOT-ASSISTED, LAPAROSCOPIC, WITHOUT CHOLANGIOGRAM       Anesthesia Type:  General    Note:  Disposition: Outpatient   Postop Pain Control: Uneventful            Sign Out: Well controlled pain   PONV: No   Neuro/Psych: Uneventful            Sign Out: Acceptable/Baseline neuro status   Airway/Respiratory: Uneventful            Sign Out: Acceptable/Baseline resp. status   CV/Hemodynamics: Uneventful            Sign Out: Acceptable CV status; No obvious hypovolemia; No obvious fluid overload   Other NRE: NONE   DID A NON-ROUTINE EVENT OCCUR? No           Last vitals:  Vitals Value Taken Time   /81 10/09/23 1145   Temp 36.2  C (97.1  F) 10/09/23 1145   Pulse 58 10/09/23 1145   Resp 3 10/09/23 1145   SpO2 98 % 10/09/23 1145   Vitals shown include unvalidated device data.    Electronically Signed By: Mateusz Rivera DO  October 9, 2023  3:32 PM

## 2023-10-09 NOTE — ANESTHESIA CARE TRANSFER NOTE
Patient: Sosa Patel    Procedure: Procedure(s):  CHOLECYSTECTOMY, ROBOT-ASSISTED, LAPAROSCOPIC, WITHOUT CHOLANGIOGRAM       Diagnosis: Gallstones [K80.20]  Diagnosis Additional Information: No value filed.    Anesthesia Type:   General     Note:    Oropharynx: spontaneously breathing and oral airway in place  Level of Consciousness: drowsy  Oxygen Supplementation: face mask  Level of Supplemental Oxygen (L/min / FiO2): 5  Independent Airway: airway patency satisfactory and stable  Dentition: dentition unchanged  Vital Signs Stable: post-procedure vital signs reviewed and stable  Report to RN Given: handoff report given  Patient transferred to: PACU  Comments: Resps easy and regular. Report to PACU RN  Handoff Report: Identifed the Patient, Identified the Reponsible Provider, Reviewed the pertinent medical history, Discussed the surgical course, Reviewed Intra-OP anesthesia mangement and issues during anesthesia, Set expectations for post-procedure period and Allowed opportunity for questions and acknowledgement of understanding      Vitals:  Vitals Value Taken Time   /66 10/09/23 1051   Temp 97.6    Pulse 61 10/09/23 1056   Resp 16 10/09/23 1056   SpO2 98 % 10/09/23 1056   Vitals shown include unvalidated device data.    Electronically Signed By: MILO GAGE CRNA  October 9, 2023  10:57 AM

## 2023-10-09 NOTE — INTERVAL H&P NOTE
"I have reviewed the surgical (or preoperative) H&P that is linked to this encounter, and examined the patient. There are no significant changes    Clinical Conditions Present on Arrival:  Clinically Significant Risk Factors Present on Admission                  # Overweight: Estimated body mass index is 27.85 kg/m  as calculated from the following:    Height as of this encounter: 1.664 m (5' 5.51\").    Weight as of this encounter: 77.1 kg (170 lb).       "

## 2023-10-09 NOTE — DISCHARGE INSTRUCTIONS
[facility]  Home Care Following Anesthesia  For 24 hours after surgery:  Get plenty of rest.  A responsible adult must stay with you for at least 24 hours after you leave the surgery center.  Do not drive or use heavy equipment.  If you have weakness or tingling, don't drive or use heavy equipment until this feeling goes away.   Do not drink alcohol.   Avoid strenuous or risky activities.  Ask for help when climbing stairs.  You may feel lightheaded.  IF so, sit for a few minutes before standing.  Have someone help you get up.   If you have nausea (feel sick to your stomach): Drink only clear liquids such as apple juice, ginger ale, broth or 7-Up.  Rest may also help.  Be sure to drink enough fluids.  Move to a regular diet as you feel able.   You may have a slight fever.  Call the doctor if your fever is over 100 F (37.7 C) (taken under the tongue) or lasts longer than 24 hours.  You may have a dry mouth, a sore throat, muscle aches or trouble sleeping. These should go away after 24 hours.  Do not make important or legal decisions.   It is recommended to avoid smoking.               Tips for taking pain medications  To get the best pain relief possible, remember these points:  Take pain medications as directed, before pain becomes severe.  Pain medication can upset your stomach: taking it with food may help.  Constipation is a common side effect of pain medication. Drink plenty of  fluids.  Eat foods high in fiber. Take a stool softener if recommended by your doctor or pharmacist.  Do not drink alcohol, drive or operate machinery while taking pain medications.  Ask about other ways to control pain, such as with heat, ice or relaxation.    Tylenol/Acetaminophen Consumption    If you feel your pain relief is insufficient, you may take Tylenol/Acetaminophen in addition to your narcotic pain medication.   Be careful not to exceed 4,000 mg of Tylenol/Acetaminophen in a 24 hour  period from all sources.  If you are taking extra strength Tylenol/acetaminophen (500 mg), the maximum dose is 8 tablets in 24 hours.  If you are taking regular strength acetaminophen (325 mg), the maximum dose is 12 tablets in 24 hours.  975 mg of tylenol received at 9:15 AM, next dose available at 3:15 PM- then just follow the package instructions    Call a doctor for any of the following:  Signs of infection (fever, growing tenderness at the surgery site, a large amount of drainage or bleeding, severe pain, foul-smelling drainage, redness, swelling).  It has been over 8 to 10 hours since surgery and you are still not able to urinate (pass water).  Headache for over 24 hours.  Signs of Covid-19 infection (temperature over 100 degrees, shortness of breath, cough, loss of taste/smell, generalized body aches, persistent headache, chills, sore throat, nausea/vomiting/diarrhea)  Your doctor is:  Dr. Demetris Peoples, General Surgery: [phone]                    Or dial [phone] and ask for the resident on call for:  General Surgery  For emergency care, call the:  [facility]:  [phone] (TTY for hearing impaired: [phone])

## 2023-10-09 NOTE — OP NOTE
Operative report    Preop diagnosis symptomatic cholelithiasis  Postop diagnosis same    Procedure: Laparoscopic cholecystectomy robot-assisted    Surgeon LISETTE Peoples    Anesthesia General endotracheal & local infiltration Marcaine.    Indications for procedure: Patient presents with symptomatic cholelithiasis, informed consent was obtained.    Operative findings: Normal cystic duct cystic artery anatomy confirmed by firefly, large stone in GB.    Operative procedure:   Patient brought to the operating room put under general anesthesia, abdomen widely prepped and draped in usual sterile fashion.  Infraumbilical skin incision was made , fascia secured with O vicryl, open technique used per routine and technique to place Griffin port.  Abdomen Insufflated.  5 Mm Port Placed Left Subcostal per Routine. Two 8 Mm Ports Were Placed Left and Right of Griffin Port per  routine and technique with Marcaine placed under direct vision into preperitoneal space laterally.    Robot was docked at this point and attention turned to the console where the gallbladder was retracted with the accessory port grasper; the gallbladder was pulled out laterally.  The cystic duct and cystic artery were identified with the help of firefly technique.  Dissection was carried out to isolate the cystic duct as well as the cystic artery.  Cystic artery hemostasis was obtained using the fenestrated bipolar.  The cystic duct was then clipped proximally distally using Hem-o-vincent clips.  The cystic duct then transected using cutting cautery and the gallbladder taken off the liver bed use electrocautery.  Hemostasis at liver bed was complete.  At this point the robot was undocked and the gallbladder delivered through the infraumbilical port site using my standard routine and technique.  The abdomen is deflated our ports removed, the fascial defect closed with the 0 Vicryl, skin was subcuticular.    Estimated blood loss minimal  Pathology gallbladder for  permanent    Patient was taken recovery room where she was without difficulty or apparent complication.

## 2023-10-09 NOTE — ANESTHESIA PROCEDURE NOTES
Airway       Patient location during procedure: OR       Procedure Start/Stop Times: 10/9/2023 9:58 AM  Staff -        CRNA: Janell Lamb APRN CRNA       Performed By: CRNAIndications and Patient Condition       Indications for airway management: claudia-procedural       Induction type:intravenous       Mask difficulty assessment: 1 - vent by mask    Final Airway Details       Final airway type: endotracheal airway       Successful airway: Oral  Endotracheal Airway Details        Cuffed: yes       Successful intubation technique: direct laryngoscopy       DL Blade Type: MAC 3       Grade View of Cords: 1       Adjucts: stylet       Position: Right       Measured from: gums/teeth       Secured at (cm): 22       Bite block used: None    Post intubation assessment        Placement verified by: capnometry and equal breath sounds        Number of attempts at approach: 1       Number of other approaches attempted: 0       Secured with: silk tape       Ease of procedure: easy       Dentition: Intact and Unchanged    Medication(s) Administered   Medication Administration Time: 10/9/2023 9:58 AM

## 2023-10-09 NOTE — ANESTHESIA PREPROCEDURE EVALUATION
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     H/O: depression     SUICIDE ATTEMPT AGE 15    HPV (human papillomavirus)     Mood disorder (H) 2019      Past Surgical History:   Procedure Laterality Date    BUNIONECTOMY RT/LT      HC ENLARGE BREAST WITH IMPLANT        No Known Allergies   Social History     Tobacco Use    Smoking status: Every Day     Packs/day: 0.10     Years: 23.00     Pack years: 2.30     Types: Other, Cigarettes, Vaping Device     Last attempt to quit: 3/3/2013     Years since quitting: 10.6    Smokeless tobacco: Never    Tobacco comments:     Currently vapes   Substance Use Topics    Alcohol use: Yes     Comment: rare      Wt Readings from Last 1 Encounters:   10/09/23 77.1 kg (170 lb)        Anesthesia Evaluation   Pt has had prior anesthetic.     No history of anesthetic complications       ROS/MED HX  ENT/Pulmonary:     (+)                tobacco use, Current use,                      Neurologic:  - neg neurologic ROS     Cardiovascular:  - neg cardiovascular ROS     METS/Exercise Tolerance: >4 METS    Hematologic:  - neg hematologic  ROS     Musculoskeletal:  - neg musculoskeletal ROS     GI/Hepatic:  - neg GI/hepatic ROS     Renal/Genitourinary:  - neg Renal ROS     Endo:  - neg endo ROS     Psychiatric/Substance Use:     (+) psychiatric history bipolar       Infectious Disease:  - neg infectious disease ROS     Malignancy:  - neg malignancy ROS     Other:  - neg other ROS          Physical Exam    Airway  airway exam normal      Mallampati: I   TM distance: > 3 FB   Neck ROM: full   Mouth opening: > 3 cm    Respiratory Devices and Support         Dental        (+) Completely normal teeth      Cardiovascular   cardiovascular exam normal       Rhythm and rate: regular and normal     Pulmonary   pulmonary exam normal        breath sounds clear to auscultation           OUTSIDE LABS:  CBC:   Lab Results   Component Value Date    WBC 5.8 09/19/2023    WBC 5.8 08/08/2023    HGB 13.3 09/19/2023    HGB 12.9 08/08/2023    HCT 40.3 09/19/2023    HCT 39.8 08/08/2023     09/19/2023     08/08/2023     BMP:   Lab Results   Component Value Date     09/19/2023     08/08/2023    POTASSIUM 4.1 09/19/2023    POTASSIUM 4.0 08/08/2023    CHLORIDE 103 09/19/2023    CHLORIDE 108 (H) 08/08/2023    CO2 26 09/19/2023    CO2 27 08/08/2023    BUN 17.8 09/19/2023    BUN 25.4 (H) 08/08/2023    CR 0.79 09/19/2023    CR 0.69 08/08/2023    GLC 65 (L) 09/19/2023    GLC 94 08/08/2023     COAGS: No results found for: PTT, INR, FIBR  POC:   Lab Results   Component Value Date    HCG Negative 10/30/2020     HEPATIC:   Lab Results   Component Value Date    ALBUMIN 4.3 09/19/2023    PROTTOTAL 6.7 09/19/2023    ALT 22 09/19/2023    AST 23 09/19/2023    ALKPHOS 35 09/19/2023    BILITOTAL 0.2 09/19/2023     OTHER:   Lab Results   Component Value Date    A1C 5.1 04/14/2009    RICHARD 9.6 09/19/2023    LIPASE 17 09/19/2023    AMYLASE 29 09/19/2023    TSH 1.48 10/11/2016    CRP <2.9 03/23/2022    SED 6 08/08/2023       Anesthesia Plan    ASA Status:  2    NPO Status:  NPO Appropriate    Anesthesia Type: General.     - Airway: ETT   Induction: Intravenous, Propofol.   Maintenance: Balanced.        Consents    Anesthesia Plan(s) and associated risks, benefits, and realistic alternatives discussed. Questions answered and patient/representative(s) expressed understanding.     - Discussed:     - Discussed with:  Patient       Use of blood products discussed: No .     Postoperative Care    Pain management: Multi-modal analgesia, Oral pain medications.   PONV prophylaxis: Ondansetron (or other  5HT-3), Dexamethasone or Solumedrol     Comments:                Mateusz Rivera, DO

## 2023-10-11 ENCOUNTER — PATIENT OUTREACH (OUTPATIENT)
Dept: SURGERY | Facility: CLINIC | Age: 48
End: 2023-10-11
Payer: COMMERCIAL

## 2023-10-11 NOTE — PROGRESS NOTES
10/11/23    9:22 AM     Sosa Patel is a patient of Dr. Demetris Peoples that underwent robotic cholecystectomy approximately 2 days ago (10/9).  Attempted to contact patient via telephone for a status update and review post-op  teaching.  LM on VM to call office.  Await return call.      Of note:  Pathology:    Final Diagnosis   A.  GALLBLADDER, CHOLECYSTECTOMY:  - Chronic cholecystitis  - Cholelithiasis     Wound:  Steri-strips  Follow-up:  Routine  Restrictions:  - No lifting, pushing, pulling more than 15-20 pounds for 3-4 weeks  New medications:  Norco, Senna  Equipment/Supplies:  None    ADDENDUM  10/12/23  9:08 AM    Attempted to contact patient x 2 for post-op telephone call/status update.  LM on  to call office-contact information provided.

## 2023-10-12 LAB
PATH REPORT.COMMENTS IMP SPEC: NORMAL
PATH REPORT.COMMENTS IMP SPEC: NORMAL
PATH REPORT.FINAL DX SPEC: NORMAL
PATH REPORT.GROSS SPEC: NORMAL
PATH REPORT.MICROSCOPIC SPEC OTHER STN: NORMAL
PATH REPORT.RELEVANT HX SPEC: NORMAL
PHOTO IMAGE: NORMAL

## 2023-10-20 ENCOUNTER — DOCUMENTATION ONLY (OUTPATIENT)
Dept: FAMILY MEDICINE | Facility: CLINIC | Age: 48
End: 2023-10-20
Payer: COMMERCIAL

## 2023-10-20 DIAGNOSIS — M32.9 SYSTEMIC LUPUS ERYTHEMATOSUS, UNSPECIFIED SLE TYPE, UNSPECIFIED ORGAN INVOLVEMENT STATUS (H): Primary | ICD-10-CM

## 2023-10-20 NOTE — PROGRESS NOTES
Patient has an upcoming lab appointment on 10/26/2023. Please review and place future orders that are needed.  Delia Waterman on 10/20/2023 at 1:49 PM

## 2023-10-31 DIAGNOSIS — M32.9 SYSTEMIC LUPUS ERYTHEMATOSUS, UNSPECIFIED SLE TYPE, UNSPECIFIED ORGAN INVOLVEMENT STATUS (H): ICD-10-CM

## 2023-10-31 RX ORDER — LEFLUNOMIDE 20 MG/1
20 TABLET ORAL DAILY
Qty: 90 TABLET | Refills: 0 | Status: SHIPPED | OUTPATIENT
Start: 2023-10-31 | End: 2023-11-16

## 2023-10-31 NOTE — TELEPHONE ENCOUNTER
Medication:   Leflunomide 20mg  Last written on:   8/8/2023  Quantity:   90    Refills:   0    Last office visit:   08/08/2023  Next office visit:   11/16/2023  Last labs:   08/08/20023  Next lab: 11/09/2023

## 2023-11-09 ENCOUNTER — LAB (OUTPATIENT)
Dept: LAB | Facility: CLINIC | Age: 48
End: 2023-11-09
Payer: COMMERCIAL

## 2023-11-09 DIAGNOSIS — M32.9 SYSTEMIC LUPUS ERYTHEMATOSUS, UNSPECIFIED SLE TYPE, UNSPECIFIED ORGAN INVOLVEMENT STATUS (H): ICD-10-CM

## 2023-11-09 LAB
ALBUMIN MFR UR ELPH: 8.9 MG/DL
ALBUMIN SERPL BCG-MCNC: 4.3 G/DL (ref 3.5–5.2)
ALBUMIN UR-MCNC: NEGATIVE MG/DL
ALP SERPL-CCNC: 35 U/L (ref 35–104)
ALT SERPL W P-5'-P-CCNC: 33 U/L (ref 0–50)
ANION GAP SERPL CALCULATED.3IONS-SCNC: 9 MMOL/L (ref 7–15)
APPEARANCE UR: CLEAR
AST SERPL W P-5'-P-CCNC: 25 U/L (ref 0–45)
BACTERIA #/AREA URNS HPF: ABNORMAL /HPF
BASOPHILS # BLD AUTO: 0 10E3/UL (ref 0–0.2)
BASOPHILS NFR BLD AUTO: 1 %
BILIRUB SERPL-MCNC: 0.2 MG/DL
BILIRUB UR QL STRIP: NEGATIVE
BUN SERPL-MCNC: 25.9 MG/DL (ref 6–20)
CALCIUM SERPL-MCNC: 9.2 MG/DL (ref 8.6–10)
CHLORIDE SERPL-SCNC: 104 MMOL/L (ref 98–107)
CK SERPL-CCNC: 106 U/L (ref 26–192)
COLOR UR AUTO: YELLOW
CREAT SERPL-MCNC: 0.75 MG/DL (ref 0.51–0.95)
CREAT UR-MCNC: 96.3 MG/DL
CRP SERPL-MCNC: <3 MG/L
DEPRECATED HCO3 PLAS-SCNC: 26 MMOL/L (ref 22–29)
EGFRCR SERPLBLD CKD-EPI 2021: >90 ML/MIN/1.73M2
EOSINOPHIL # BLD AUTO: 0.3 10E3/UL (ref 0–0.7)
EOSINOPHIL NFR BLD AUTO: 5 %
ERYTHROCYTE [DISTWIDTH] IN BLOOD BY AUTOMATED COUNT: 13.5 % (ref 10–15)
ERYTHROCYTE [SEDIMENTATION RATE] IN BLOOD BY WESTERGREN METHOD: 4 MM/HR (ref 0–20)
GLUCOSE SERPL-MCNC: 82 MG/DL (ref 70–99)
GLUCOSE UR STRIP-MCNC: NEGATIVE MG/DL
HCT VFR BLD AUTO: 37.4 % (ref 35–47)
HGB BLD-MCNC: 12.1 G/DL (ref 11.7–15.7)
HGB UR QL STRIP: ABNORMAL
IMM GRANULOCYTES # BLD: 0 10E3/UL
IMM GRANULOCYTES NFR BLD: 0 %
KETONES UR STRIP-MCNC: NEGATIVE MG/DL
LEUKOCYTE ESTERASE UR QL STRIP: ABNORMAL
LYMPHOCYTES # BLD AUTO: 1.6 10E3/UL (ref 0.8–5.3)
LYMPHOCYTES NFR BLD AUTO: 31 %
MCH RBC QN AUTO: 27.2 PG (ref 26.5–33)
MCHC RBC AUTO-ENTMCNC: 32.4 G/DL (ref 31.5–36.5)
MCV RBC AUTO: 84 FL (ref 78–100)
MONOCYTES # BLD AUTO: 0.7 10E3/UL (ref 0–1.3)
MONOCYTES NFR BLD AUTO: 13 %
NEUTROPHILS # BLD AUTO: 2.6 10E3/UL (ref 1.6–8.3)
NEUTROPHILS NFR BLD AUTO: 50 %
NITRATE UR QL: NEGATIVE
PH UR STRIP: 6 [PH] (ref 5–7)
PLATELET # BLD AUTO: 207 10E3/UL (ref 150–450)
POTASSIUM SERPL-SCNC: 4 MMOL/L (ref 3.4–5.3)
PROT SERPL-MCNC: 6.5 G/DL (ref 6.4–8.3)
PROT/CREAT 24H UR: 0.09 MG/MG CR (ref 0–0.2)
RBC # BLD AUTO: 4.45 10E6/UL (ref 3.8–5.2)
RBC #/AREA URNS AUTO: ABNORMAL /HPF
SODIUM SERPL-SCNC: 139 MMOL/L (ref 135–145)
SP GR UR STRIP: >=1.03 (ref 1–1.03)
UROBILINOGEN UR STRIP-ACNC: 0.2 E.U./DL
WBC # BLD AUTO: 5.1 10E3/UL (ref 4–11)
WBC #/AREA URNS AUTO: ABNORMAL /HPF

## 2023-11-09 PROCEDURE — 85025 COMPLETE CBC W/AUTO DIFF WBC: CPT

## 2023-11-09 PROCEDURE — 36415 COLL VENOUS BLD VENIPUNCTURE: CPT

## 2023-11-09 PROCEDURE — 85652 RBC SED RATE AUTOMATED: CPT

## 2023-11-09 PROCEDURE — 86160 COMPLEMENT ANTIGEN: CPT

## 2023-11-09 PROCEDURE — 86140 C-REACTIVE PROTEIN: CPT

## 2023-11-09 PROCEDURE — 86225 DNA ANTIBODY NATIVE: CPT

## 2023-11-09 PROCEDURE — 81001 URINALYSIS AUTO W/SCOPE: CPT

## 2023-11-09 PROCEDURE — 80053 COMPREHEN METABOLIC PANEL: CPT

## 2023-11-09 PROCEDURE — 84156 ASSAY OF PROTEIN URINE: CPT

## 2023-11-09 PROCEDURE — 82550 ASSAY OF CK (CPK): CPT

## 2023-11-10 LAB
C3 SERPL-MCNC: 129 MG/DL (ref 81–157)
C4 SERPL-MCNC: 30 MG/DL (ref 13–39)
DSDNA AB SER-ACNC: 4.2 IU/ML

## 2023-11-16 ENCOUNTER — OFFICE VISIT (OUTPATIENT)
Dept: RHEUMATOLOGY | Facility: CLINIC | Age: 48
End: 2023-11-16
Payer: COMMERCIAL

## 2023-11-16 VITALS
HEART RATE: 88 BPM | BODY MASS INDEX: 28.67 KG/M2 | OXYGEN SATURATION: 98 % | DIASTOLIC BLOOD PRESSURE: 75 MMHG | WEIGHT: 175 LBS | RESPIRATION RATE: 16 BRPM | SYSTOLIC BLOOD PRESSURE: 117 MMHG

## 2023-11-16 DIAGNOSIS — M32.9 SYSTEMIC LUPUS ERYTHEMATOSUS, UNSPECIFIED SLE TYPE, UNSPECIFIED ORGAN INVOLVEMENT STATUS (H): Primary | ICD-10-CM

## 2023-11-16 DIAGNOSIS — M54.32 BILATERAL SCIATICA: ICD-10-CM

## 2023-11-16 DIAGNOSIS — Z79.899 HIGH RISK MEDICATION USE: ICD-10-CM

## 2023-11-16 DIAGNOSIS — M54.31 BILATERAL SCIATICA: ICD-10-CM

## 2023-11-16 DIAGNOSIS — M35.00 SECONDARY SJOGREN'S SYNDROME (H): ICD-10-CM

## 2023-11-16 PROCEDURE — 99214 OFFICE O/P EST MOD 30 MIN: CPT | Performed by: INTERNAL MEDICINE

## 2023-11-16 RX ORDER — LEFLUNOMIDE 20 MG/1
20 TABLET ORAL DAILY
Qty: 90 TABLET | Refills: 0 | Status: SHIPPED | OUTPATIENT
Start: 2023-11-16 | End: 2024-02-28

## 2023-11-16 ASSESSMENT — PAIN SCALES - GENERAL: PAINLEVEL: MILD PAIN (3)

## 2023-11-16 NOTE — NURSING NOTE
RAPID3 (0-30) Cumulative Score  8.0          RAPID3 Weighted Score (divide #4 by 3 and that is the weighted score)  2.66

## 2023-11-16 NOTE — PROGRESS NOTES
Rheumatology Clinic Visit      Sosa Patel  MRN# [id number]   YOB: 1975 Age: 48 year old      Date of visit: 11/16/23   PCP: Cynthia Scott    Chief Complaint   Patient presents with:  RECHECK: Systemic lupus erythematosus    Assessment and Plan     1. Systemic Lupus Erythematosus (JESSE+, dsDNA+, arthritis, oral sores [SLE versus HSV related? - One-time had a culture that was negative], proteinuria, photosensitivity [fatigue]): Dx'd 10/2016; initially with arthritis that improved with HCQ.  Previously on MTX (fatigue, effective for arthritis), MMF (resolved arthritis, rash, and brain fog; arthritis persisted though and eventually changed back to leflunomide).  Currently on HCQ 200mg daily, leflunomide 20mg daily, and Benlysta (SQ, starting mid March 2021; effective for arthritis and fatigue).  Worsening arthritis and fatigue when Benlysta was missed for 3-4 weeks when she forgot to fill it.  Also historically worsened when leflunomide was missed.  Currently doing well with regard to lupus, but needs dermatology input with regard to the skin lesions as discussed in #3.  Chronic illness.     - Continue Benlysta SQ every 7 days   - Continue hydroxychloroquine from 200mg daily with an additional 200 mg every other day (last eye exam completed on 12/30/2021)  - Continue leflunomide 20 mg daily; labs required every 12 weeks for refills  - Ophthalmology referral for hydroxychloroquine toxicity monitoring given previously and I again advised that she call to schedule.  The phone number was put on the after visit summary and I advised that she call to schedule an eye exam ASAP  - Labs in 3 months: CBC, CMP, ESR, CRP, UA, Uprotein:creatinine    High risk medication requiring intensive toxicity monitoring at least quarterly.      # Pregnancy Plans: on birth control (depo-provera)    2.  Secondary Sjogren's syndrome: Mild symptoms that may be due to other factors such as caffeine intake, smoking, etc., but  secondary Sjogren's Syndrome is possible.  We discussed the importance of good oral hygiene, frequent sips of water, avoiding high sugar foods and liquids, avoiding oral irritants such as coffee, alcohol, and nicotine, and avoiding acidic drinks such as carbonated and high sugar beverages. She is already seeing a dentist regularly. Dry eyes improved with preservative free artificial tears but she often forgets to take them; was given Restasis by her father who is a pharmacist and is helped significantly.  She did not get punctal plugs from ophthalmology.  More dry eyes since not being on Restasis again as the sample given ran out.   Chronic illness  - Continue artificial tears and Restasis    3. 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  Lesions on the dorsal aspect of her hands and she is going to see dermatology for this.    4.  Bilateral sciatica: No low back pain.  Improves with stretching exercises that she does on her own at home.  Advised more frequent stretching exercises throughout the day.  Also advised physical therapy but she declined.    5.  Excessive daytime sleepiness, fatigue: Wakes up tired, reports witnessed apneic episodes at night.  Snores.  Referred for sleep evaluation previously but she has not scheduled this appointment.  Advised previously and again today that she call to schedule the sleep evaluation.    6.  Vaccinations: Vaccinations reviewed with Ms. Patel.    - Influenza: Encouraged yearly vaccination  - [id number]: up to date  - [id number]: up to date  - Shingrix: Up to date  - COVID-19: Advised keeping updated, and to hold Benlysta and leflunomide for 1 week afterward    7.  History of lichen sclerosis; diffuse body pruritus x2 months: Patient would like to see dermatology.  She also has 1 lesion on each hand, dorsal aspect just proximal to the second MCPs    Total minutes spent in evaluation with patient, documentation, , and review of pertinent studies  and chart notes: Henna Patel verbalized agreement with and understanding of the rational for the diagnosis and treatment plan.  All questions were answered to best of my ability and the patient's satisfaction. Ms. Wolf was advised to contact the clinic with any questions that may arise after the clinic visit.      Thank you for involving me in the care of the patient    Return to clinic: 3-4 months      HPI   Sosa Patel  is a 48 year old female with a medical history significant for tobacco use history, genital warts, and HSV type II who presents for follow-up of systemic lupus erythematosus.    7/15/2022: Felt more depressed and therefore forgot to refill Benlysta; missed Benlysta for 3-4 weeks and started to have more pain.  Pain was at the MCPs and MTPs.  Prednisone was started and she is feeling much better.  Currently mid-course of the prednisone, taking 10 mg daily at this time.  Also with upper lumbar spine/lower thoracic spine pain that radiates to the left lower back, worse with activity, and improves with rest; reportedly this pain is chronic, and does not radiate to the legs.  Did not see GYN because menstrual bleeding has ceased.  Melatonin helps her sleep; she has not scheduled a sleep evaluation.    10/26/2022: patient cancelled appointment    Mid-Nov 2022: would have run out of leflunomide around this time.  Not refilled because labs are overdue.     12/6/2022: prednisone course given for PRN arthritis flare (patient requested to have on hand while traveling)    2/15/2023: currently doing well.  Taking leflunomide, Benlysta, and hydroxychloroquine as prescribed.  States that she needs an eye exam completed and will call to schedule this. Has puffiness of her hands but no pain or swelling.  Bony hypertrophy at the DIPs.  Morning stiffness for less than 20 minutes.  Some pain at the end of the day on her hands.  She would like to remain on her current medication regimen because it is  effective.    5/24/2023: Patient canceled appointment    8/8/2023: Patient reports doing poorly at this time because she has been out of leflunomide for 3-4 months.  She states that she canceled her last appointment and did not get labs.  Labs are overdue.  Worsening joint pain at the MCPs, PIPs, and wrists that is worse in the morning and improves with time and activity.  Morning stiffness for at least 2 hours.  Positive gelling phenomenon.  Would like to restart leflunomide because she felt better while on it.  Has not scheduled an appointment with sleep medicine.  Has a history of lichen sclerosis and diffuse body pruritus; has 2 erythematous lesions just proximal to the bilateral second MCPs, on the dorsal aspect of the hands.    Today, 11/16/2023: Generally doing well.  Still has the raised erythematous lesions on the dorsal aspect of her hands just proximal to the second MCPs, unchanged since previous, and she has scheduled an appointment with dermatology for evaluation.  Bilateral sciatica that improves with stretching exercises that she does once daily.  Otherwise doing well.  No oral or nasal sores.    Denies fevers, chills, nausea, vomiting, constipation, diarrhea. No abdominal pain. No LE swelling. No neck pain.  No photosensitivity or photophobia.  No eye pain or redness.    Tobacco: No longer smoking cigarettes; now vaping.  Advised that she wean off of vaping as well  EtOH: rare, sober since 5/16/2010  Drugs: cocaine history; now sober  Occupation: counselor for mentally disabled people; adult foster care    ROS   12 point review of system was completed and negative except as noted in the HPI     Active Problem List     Patient Active Problem List   Diagnosis    Herpes simplex type 2 infection    Genital warts    CARDIOVASCULAR SCREENING; LDL GOAL LESS THAN 160    H/O intravenous drug use in remission    JESSE positive    Multiple joint pain    Cocaine use    Systemic lupus erythematosus (H)    High risk  medications (not anticoagulants) long-term use    Sicca syndrome (H24)    Lichen sclerosus    Recurrent major depressive disorder, in partial remission (H24)    Gallstones     Past Medical History     Past Medical History:   Diagnosis Date    Bipolar disorder (H) 12/04    Dx'd    Combinations of drug dependence excluding opioid type drug (H) 5/4/2022    In remission     H/O: depression     SUICIDE ATTEMPT AGE 15    HPV (human papillomavirus)     Mood disorder (H24) 4/9/2019     Past Surgical History     Past Surgical History:   Procedure Laterality Date    BUNIONECTOMY RT/LT      DAVINCI LAPAROSCOPIC CHOLECYSTECTOMY WITHOUT GRAMS N/A 10/9/2023    Procedure: CHOLECYSTECTOMY, ROBOT-ASSISTED, LAPAROSCOPIC, WITHOUT CHOLANGIOGRAM;  Surgeon: Demetris Peoples MD;  Location: [facility] OR     ENLARGE BREAST WITH IMPLANT  2000     Allergy   No Known Allergies  Current Medication List     Current Outpatient Medications   Medication Sig    ARIPiprazole (ABILIFY) 10 MG tablet Take 10 mg by mouth daily    Aspirin-Acetaminophen-Caffeine (EXCEDRIN PO)     Belimumab 200 MG/ML SOAJ Inject 200 mg Subcutaneous every 7 days . Hold for signs of infection and seek medical attention.   Rheumatology follow-up required prior to the next refill prescription    cetirizine (ZYRTEC) 10 MG tablet TAKE TWO TABLETS BY MOUTH ONCE DAILY **NEED APPOINTMENT FOR MORE REFILLS**    clobetasol (TEMOVATE) 0.05 % external cream Apply topically 2 times daily    clotrimazole (LOTRIMIN) 1 % external cream Apply topically 2 times daily    cycloSPORINE (RESTASIS) 0.05 % ophthalmic emulsion Place 1 drop into both eyes 2 times daily    diclofenac (VOLTAREN) 1 % topical gel Apply up to 2 grams of 1% gel to hands up to 4 times daily as needed for joint pain (maximum: 8 g per upper extremity per day)    Efinaconazole 10 % SOLN Externally apply topically daily    ferrous sulfate (FEROSUL) 325 (65 Fe) MG tablet Take 325 mg by mouth every other day    FLUoxetine  (PROZAC) 40 MG capsule TAKE ONE CAPSULE BY MOUTH ONCE DAILY    fluticasone (FLONASE) 50 MCG/ACT nasal spray SPRAY 1 SPRAY INTO BOTH NOSTRILS DAILY    gabapentin (NEURONTIN) 100 MG capsule Take 100 mg by mouth nightly as needed (restless leg)    HYDROcodone-acetaminophen (NORCO) 5-325 MG tablet Take 1-2 tablets by mouth every 4 hours as needed for moderate to severe pain    hydroquinone (RUFUS) 4 % external cream Apply topically 2 times daily For up to 3 months.    hydroxychloroquine (PLAQUENIL) 200 MG tablet Hydroxychloroquine 200 mg daily with an additional 200 mg every other day.  Ophthalmology toxicity monitoring eye exam, that includes 10-2 VF and SD-OCT, is required yearly.    leflunomide (ARAVA) 20 MG tablet Take 1 tablet (20 mg) by mouth daily ; labs required every 12 weeks    omeprazole (PRILOSEC) 40 MG DR capsule Take 1 capsule (40 mg) by mouth daily    predniSONE (DELTASONE) 5 MG tablet For inflammatory arthritis flare only: Prednisone 20mg daily x5days, then 15mg daily x5days, then 10mg daily x5days, then 5mg daily x5days, then stop.    senna-docusate (SENOKOT-S/PERICOLACE) 8.6-50 MG tablet Take 1-2 tablets by mouth 2 times daily     No current facility-administered medications for this visit.     Social History   See HPI    Family History     Family History   Problem Relation Age of Onset    Cancer Father         lung cancer    Diabetes Maternal Grandmother     Depression Maternal Grandmother     Lipids Maternal Grandmother     Cerebrovascular Disease Maternal Grandfather     Lipids Maternal Grandfather     Circulatory Maternal Grandfather         MI in 50's    Depression Brother         depression and OCD    Gastrointestinal Disease Brother         IBS    Depression Mother     Thyroid Disease No family hx of     Asthma No family hx of      Denies family history of autoimmune disease     Physical Exam     Temp Readings from Last 3 Encounters:   10/09/23 97.3  F (36.3  C) (Temporal)   09/29/23 98  F  "(36.7  C) (Oral)   09/19/23 97  F (36.1  C) (Oral)     BP Readings from Last 5 Encounters:   11/16/23 117/75   10/09/23 117/61   09/29/23 99/64   09/21/23 100/70   09/19/23 114/81     Pulse Readings from Last 1 Encounters:   11/16/23 88     Resp Readings from Last 1 Encounters:   11/16/23 16     Estimated body mass index is 28.67 kg/m  as calculated from the following:    Height as of 10/9/23: 1.664 m (5' 5.51\").    Weight as of this encounter: 79.4 kg (175 lb).    GEN: NAD.   HEENT:  Anicteric, noninjected sclera. No obvious external lesions of the ear and nose. Hearing intact.  CV: S1, S2. RRR. No m/r/g  PULM: No increased work of breathing. CTA bilaterally   MSK:  MCPs, PIPs, and DIPs without swelling or tenderness to palpation.  Wrists without swelling or tenderness to palpation.  Heberden's nodes present..   Elbows and shoulders without swelling or tenderness to palpation.  Knees, ankles, and MTPs without swelling or tenderness to palpation.  Positive straight leg test bilaterally.  Spine nontender to palpation.  SKIN: 2 round erythematous lesions on the dorsal aspect of the hands, nonscaly and with raised borders, just proximal to the second MCPs  PSYCH: Alert. Appropriate.      Labs / Imaging (select studies)     RF/CCP  Recent Labs   Lab Test 10/11/16  1043 08/10/16  1059   CCPIGG 1  --    RHF  --  <20     JESSE  Recent Labs   Lab Test 08/10/16  1059   ROBYN 1.7*     RNP/Sm/SSA/SSB  Recent Labs   Lab Test 10/11/16  1043   RNPIGG 0.3   SMIGG <0.2  Negative   Antibody index (AI) values reflect qualitative changes in antibody   concentration that cannot be directly associated with clinical condition or   disease state.     SSAIGG <0.2  Negative   Antibody index (AI) values reflect qualitative changes in antibody   concentration that cannot be directly associated with clinical condition or   disease state.     SSBIGG <0.2  Negative   Antibody index (AI) values reflect qualitative changes in antibody   concentration " that cannot be directly associated with clinical condition or   disease state.     SCLIGG <0.2  Negative   Antibody index (AI) values reflect qualitative changes in antibody   concentration that cannot be directly associated with clinical condition or   disease state.       dsDNA  Recent Labs   Lab Test 11/09/23  0717 08/08/23  1618 02/14/23  0856 07/12/22  1302 03/23/22  1204 11/30/21  1245 08/19/21  1056 04/20/21  1241 01/11/21  1315   DNA 4.2 8.0 7.1 7.6 7.2 13.0* 11.0* 11* 17*     C3/C4  Recent Labs   Lab Test 11/09/23  0717 08/08/23  1618 02/14/23  0856 07/12/22  1302 03/23/22  1204 11/30/21  1245 08/19/21  1056 04/20/21  1241 01/11/21  1315   T5JFUEG 129 124 147 129 103 120 113 103 109   G4QSCNE 30 29 34 24 25 30 28 27 25     CBC  Recent Labs   Lab Test 11/09/23  0717 09/19/23  1232 08/08/23  1618 08/19/21  1057 04/20/21  1241 01/11/21  1315 09/10/20  1308   WBC 5.1 5.8 5.8   < > 6.0 6.2 6.1   RBC 4.45 4.93 4.76   < > 4.28 4.46 4.44   HGB 12.1 13.3 12.9   < > 12.1 12.7 12.4   HCT 37.4 40.3 39.8   < > 35.7 39.1 37.8   MCV 84 82 84   < > 83 88 85   RDW 13.5 13.2 13.1   < > 12.9 13.0 13.6    252 251   < > 213 202 235   MCH 27.2 27.0 27.1   < > 28.3 28.5 27.9   MCHC 32.4 33.0 32.4   < > 33.9 32.5 32.8   NEUTROPHIL 50 52 60   < > 54.6 59.5 54.5   LYMPH 31 32 25   < > 31.6 26.4 30.0   MONOCYTE 13 11 11   < > 9.9 10.3 10.5   EOSINOPHIL 5 4 4   < > 3.6 3.5 4.3   BASOPHIL 1 1 1   < > 0.3 0.3 0.7   ANEU  --   --   --   --  3.3 3.7 3.3   ALYM  --   --   --   --  1.9 1.6 1.8   DAQUAN  --   --   --   --  0.6 0.6 0.6   AEOS  --   --   --   --  0.2 0.2 0.3   ABAS  --   --   --   --  0.0 0.0 0.0   ANEUTAUTO 2.6 3.0 3.5   < >  --   --   --    ALYMPAUTO 1.6 1.9 1.4   < >  --   --   --    AMONOAUTO 0.7 0.6 0.7   < >  --   --   --    AEOSAUTO 0.3 0.2 0.2   < >  --   --   --    ABSBASO 0.0 0.0 0.0   < >  --   --   --     < > = values in this interval not displayed.     CMP  Recent Labs   Lab Test 11/09/23  0717 09/19/23  7351  08/08/23  1618 02/14/23  0856 07/12/22  1302 03/23/22  1204 08/19/21  1056 04/20/21  1241 01/11/21  1315 09/10/20  1308 10/16/19  1708    140 144 138 141 140   < > 138 139 138 139   POTASSIUM 4.0 4.1 4.0 4.4 4.1 3.5   < > 3.6 3.9 4.1 3.9   CHLORIDE 104 103 108* 100 107 109   < > 108 110* 106 109   CO2 26 26 27 24 29 22   < > 23 26 28 24   ANIONGAP 9 11 9 14 5 9   < > 7 3 4 6   GLC 82 65* 94 86 83 99   < > 117* 78 85 83   BUN 25.9* 17.8 25.4* 20.0 21 21   < > 25 15 17 17   CR 0.75 0.79 0.69 0.77 0.61 0.69   < > 0.95 0.67 0.77 0.75   GFRESTIMATED >90 >90 >90 >90 >90 >90   < > 72 >90 >90 >90   GFRESTBLACK  --   --   --   --   --   --   --  83 >90 >90 >90   RICHARD 9.2 9.6 8.9 9.6 8.8 8.5   < > 8.3* 8.7 8.9 9.1   BILITOTAL 0.2 0.2 <0.2 0.4 0.2 0.2   < > 0.4 0.3 0.3 0.3   ALBUMIN 4.3 4.3 4.2 4.4 3.6 3.2*   < > 3.8 4.0 3.8 4.2   PROTTOTAL 6.5 6.7 6.5 7.0 6.4* 6.5*   < > 6.6* 6.9 6.9 7.3   ALKPHOS 35 35 35 36 28* 25*   < > 24* 22* 28* 23*   AST 25 23 28 24 16 24   < > 13 18 18 18   ALT 33 22 26 17 33 35   < > 23 35 32 23    < > = values in this interval not displayed.     Calcium/VitaminD  Recent Labs   Lab Test 11/09/23  0717 09/19/23  1232 08/08/23  1618   RICHARD 9.2 9.6 8.9     ESR/CRP  Recent Labs   Lab Test 11/09/23  0717 08/08/23  1618 02/14/23  0856 07/12/22  1302 03/23/22  1204 11/30/21  1245 08/19/21  1057 08/19/21  1056   SED 4 6 10   < > 18 8   < >  --    CRP  --   --   --   --  <2.9 <2.9  --  <2.9   CRPI <3.00 <3.00 4.44   < >  --   --   --   --     < > = values in this interval not displayed.     CK/Aldolase  Recent Labs   Lab Test 11/09/23  0717 07/12/22  1302 08/19/21  1056 01/13/17  1015 10/11/16  1043    32 106   < > 65   ALDOLASE  --   --   --   --  4.5    < > = values in this interval not displayed.     TSH/T4  Recent Labs   Lab Test 10/11/16  1043   TSH 1.48     Hepatitis B  Recent Labs   Lab Test 02/26/21  0927 10/11/16  1043   HBCAB Nonreactive Nonreactive   HEPBANG Nonreactive Nonreactive      Hepatitis C  Recent Labs   Lab Test 02/26/21  0927 10/11/16  1043   HCVAB Nonreactive Nonreactive   Assay performance characteristics have not been established for newborns,   infants, and children       Tuberculosis Screening  Recent Labs   Lab Test 07/12/22  1302 06/28/21  1700 02/26/21  0927   TBRES Negative  --   --    TBRST  --  Negative Negative     HIV Screening  Recent Labs   Lab Test 10/11/16  1043   HIAGAB Nonreactive   HIV-1 p24 Ag & HIV-1/HIV-2 Ab Not Detected       UA  Recent Labs   Lab Test 11/09/23  0721 09/19/23  1213 02/14/23  0919 07/12/22  1302 09/10/20  1315 11/04/19  1747 04/04/19  1625 12/13/18  1355 09/21/17  0815 05/01/17  0954   COLOR Yellow Light Yellow Yellow Yellow   < > Yellow   < > Yellow Yellow Yellow   APPEARANCE Clear Clear Clear Clear   < > Clear   < > Clear Clear Clear   URINEGLC Negative Negative Negative Negative   < > Negative   < > Negative Negative Negative   URINEBILI Negative Negative Negative Negative   < > Negative   < > Negative Negative Small  This is an unconfirmed screening test result. A positive result may be false.  *   SG >=1.030 1.014 >=1.030 >=1.030   < > 1.015   < > >1.030 >1.030 >1.030   URINEPH 6.0 5.5 6.0 5.5   < > 8.0*   < > 5.5 6.0 6.0   PROTEIN Negative Negative Negative Negative   < > Trace*   < > Negative Negative Trace*   UROBILINOGEN 0.2  --  0.2 0.2   < > 0.2   < > 0.2 0.2 0.2   NITRITE Negative Negative Negative Negative   < > Negative   < > Negative Negative Negative   UBLD Trace* Negative Negative Negative   < > Negative   < > Small* Trace* Small*   LEUKEST Trace* Moderate* Negative Negative   < > Negative   < > Negative Negative Negative   WBCU 0-5 None Seen  --   --   --  0 - 5  --  0 - 5 O - 2 O - 2   RBCU None Seen None Seen  --   --   --  O - 2  --  O - 2 O - 2 O - 2   SQUAMOUSEPI  --   --   --   --   --  Few  --  Moderate* Few Few   BACTERIA Few*  --   --   --   --  Few*  --   --   --   --    MUCOUS  --   --   --   --   --   --   --   --    --  Present*    < > = values in this interval not displayed.     Urine Microscopic  Recent Labs   Lab Test 11/09/23  0721 09/19/23  1213 11/04/19  1747 12/13/18  1355 09/21/17  0815 05/01/17  0954   WBCU 0-5 None Seen 0 - 5 0 - 5 O - 2 O - 2   RBCU None Seen None Seen O - 2 O - 2 O - 2 O - 2   SQUAMOUSEPI  --   --  Few Moderate* Few Few   BACTERIA Few*  --  Few*  --   --   --    MUCOUS  --   --   --   --   --  Present*     Urine Protein  GHUTP and UTP= Urine protein (random), GHUTPG and UTPG = urine protein:creatinine ratio (random), UCRR = urine creatinine (random)  Recent Labs   Lab Test 11/09/23  0721 02/14/23  0919 07/12/22  1302 08/19/21  1058 09/10/20  1315 11/04/19  1747   GHUTP 8.9 13.6 9.0  --   --   --    UTP  --   --   --  0.16 0.15 0.21   GHUTPG 0.09 0.15 0.08  --   --   --    UTPG  --   --   --  0.12 0.17 0.16   UCRR 96.3 89.9 118.0 137 88  --        Immunization History     Immunization History   Administered Date(s) Administered    COVID-19 Bivalent 12+ (Pfizer) 11/30/2022    COVID-19 MONOVALENT 12+ (Pfizer) 02/12/2021, 05/20/2021, 12/31/2021    COVID-19 Monovalent 12+ (Pfizer 2022) 05/04/2022    Flu, Unspecified 11/01/2020, 12/01/2021    HepB, Unspecified 03/21/1997    Hepatitis A (ADULT 19+) 11/30/2022    Influenza (IIV3) PF 10/23/2010    Influenza Vaccine >6 months (Alfuria,Fluzone) 12/19/2014, 10/16/2015, 11/04/2016, 12/13/2018, 11/07/2019, 11/30/2022, 09/29/2023    Influenza Vaccine, 6+MO IM (QUADRIVALENT W/PRESERVATIVES) 10/09/2020    Mantoux Tuberculin Skin Test 07/30/2018    Pneumo Conj 13-V (2010&after) 12/13/2018    Pneumococcal 23 valent 04/04/2019    TD,PF 7+ (Tenivac) 02/23/2005, 04/09/2019    TDAP (Adacel,Boostrix) 07/21/2016    TDAP Vaccine (Adacel) 04/14/2009    Zoster recombinant adjuvanted (SHINGRIX) 12/21/2018, 04/04/2019          Chart documentation done in part with Dragon Voice recognition Software. Although reviewed after completion, some word and grammatical error may  remain.    Jack Arvizu MD

## 2023-11-27 DIAGNOSIS — R10.13 ABDOMINAL PAIN, EPIGASTRIC: ICD-10-CM

## 2023-11-27 RX ORDER — OMEPRAZOLE 40 MG/1
40 CAPSULE, DELAYED RELEASE ORAL DAILY
Qty: 30 CAPSULE | Refills: 0 | Status: SHIPPED | OUTPATIENT
Start: 2023-11-27 | End: 2024-01-26

## 2023-11-29 DIAGNOSIS — M32.9 SYSTEMIC LUPUS ERYTHEMATOSUS, UNSPECIFIED SLE TYPE, UNSPECIFIED ORGAN INVOLVEMENT STATUS (H): ICD-10-CM

## 2023-11-30 RX ORDER — HYDROXYCHLOROQUINE SULFATE 200 MG/1
TABLET, FILM COATED ORAL
Qty: 135 TABLET | Refills: 1 | OUTPATIENT
Start: 2023-11-30

## 2023-12-01 DIAGNOSIS — M32.9 SYSTEMIC LUPUS ERYTHEMATOSUS, UNSPECIFIED SLE TYPE, UNSPECIFIED ORGAN INVOLVEMENT STATUS (H): ICD-10-CM

## 2023-12-04 RX ORDER — HYDROXYCHLOROQUINE SULFATE 200 MG/1
TABLET, FILM COATED ORAL
Qty: 135 TABLET | Refills: 1 | Status: SHIPPED | OUTPATIENT
Start: 2023-12-04 | End: 2024-02-28

## 2023-12-15 ENCOUNTER — TELEPHONE (OUTPATIENT)
Dept: RHEUMATOLOGY | Facility: CLINIC | Age: 48
End: 2023-12-15
Payer: COMMERCIAL

## 2023-12-15 DIAGNOSIS — M54.31 BILATERAL SCIATICA: Primary | ICD-10-CM

## 2023-12-15 DIAGNOSIS — M54.32 BILATERAL SCIATICA: Primary | ICD-10-CM

## 2023-12-15 NOTE — TELEPHONE ENCOUNTER
[facility] Call Center    Phone Message    May a detailed message be left on voicemail: yes     Reason for Call: Other: Pt would like to get a PT referral from Dr Arvizu. Pt stats Dr Arvizu mentioned PT at last office visit. Pt would like to go a PT that is in [place] or [place].     Action Taken: Message routed to:  Other: [facility]     Travel Screening: Not Applicable

## 2023-12-18 NOTE — TELEPHONE ENCOUNTER
RN: Please call to notify Sosa Patel that the physical therapy referral was placed.  Please provide her with the phone number in the physical therapy referral so that she may call to schedule.    1. Bilateral sciatica  - Physical Therapy Referral; Future    Jack Arvizu MD  12/17/2023

## 2023-12-30 DIAGNOSIS — J31.0 CHRONIC RHINITIS: ICD-10-CM

## 2024-01-02 RX ORDER — CETIRIZINE HYDROCHLORIDE 10 MG/1
20 TABLET ORAL DAILY
Qty: 180 TABLET | Refills: 0 | Status: SHIPPED | OUTPATIENT
Start: 2024-01-02 | End: 2024-05-20

## 2024-01-03 ENCOUNTER — ANCILLARY PROCEDURE (OUTPATIENT)
Dept: GENERAL RADIOLOGY | Facility: CLINIC | Age: 49
End: 2024-01-03
Attending: PHYSICIAN ASSISTANT
Payer: COMMERCIAL

## 2024-01-03 ENCOUNTER — OFFICE VISIT (OUTPATIENT)
Dept: FAMILY MEDICINE | Facility: CLINIC | Age: 49
End: 2024-01-03
Payer: COMMERCIAL

## 2024-01-03 VITALS
TEMPERATURE: 97.5 F | RESPIRATION RATE: 19 BRPM | OXYGEN SATURATION: 98 % | HEIGHT: 66 IN | HEART RATE: 78 BPM | DIASTOLIC BLOOD PRESSURE: 85 MMHG | SYSTOLIC BLOOD PRESSURE: 126 MMHG | BODY MASS INDEX: 28.61 KG/M2 | WEIGHT: 178 LBS

## 2024-01-03 DIAGNOSIS — M54.41 BILATERAL LOW BACK PAIN WITH BILATERAL SCIATICA, UNSPECIFIED CHRONICITY: ICD-10-CM

## 2024-01-03 DIAGNOSIS — M54.41 BILATERAL LOW BACK PAIN WITH BILATERAL SCIATICA, UNSPECIFIED CHRONICITY: Primary | ICD-10-CM

## 2024-01-03 DIAGNOSIS — M54.42 BILATERAL LOW BACK PAIN WITH BILATERAL SCIATICA, UNSPECIFIED CHRONICITY: Primary | ICD-10-CM

## 2024-01-03 DIAGNOSIS — R20.0 NUMBNESS IN FEET: ICD-10-CM

## 2024-01-03 DIAGNOSIS — M48.061 SPINAL STENOSIS OF LUMBAR REGION, UNSPECIFIED WHETHER NEUROGENIC CLAUDICATION PRESENT: ICD-10-CM

## 2024-01-03 DIAGNOSIS — M54.42 BILATERAL LOW BACK PAIN WITH BILATERAL SCIATICA, UNSPECIFIED CHRONICITY: ICD-10-CM

## 2024-01-03 DIAGNOSIS — R21 RASH: ICD-10-CM

## 2024-01-03 LAB
KOH PREPARATION: NORMAL
KOH PREPARATION: NORMAL

## 2024-01-03 PROCEDURE — 99214 OFFICE O/P EST MOD 30 MIN: CPT | Mod: 24 | Performed by: PHYSICIAN ASSISTANT

## 2024-01-03 PROCEDURE — 87220 TISSUE EXAM FOR FUNGI: CPT | Performed by: PHYSICIAN ASSISTANT

## 2024-01-03 PROCEDURE — 72100 X-RAY EXAM L-S SPINE 2/3 VWS: CPT | Mod: TC | Performed by: RADIOLOGY

## 2024-01-03 RX ORDER — GABAPENTIN 100 MG/1
300 CAPSULE ORAL 2 TIMES DAILY
COMMUNITY
Start: 2024-01-03 | End: 2024-01-23

## 2024-01-03 RX ORDER — HYDROCODONE BITARTRATE AND ACETAMINOPHEN 5; 325 MG/1; MG/1
1 TABLET ORAL EVERY 6 HOURS PRN
Qty: 15 TABLET | Refills: 0 | Status: SHIPPED | OUTPATIENT
Start: 2024-01-03 | End: 2024-01-06

## 2024-01-03 RX ORDER — METHOCARBAMOL 500 MG/1
500 TABLET, FILM COATED ORAL 4 TIMES DAILY PRN
Qty: 30 TABLET | Refills: 1 | Status: SHIPPED | OUTPATIENT
Start: 2024-01-03 | End: 2024-02-01

## 2024-01-03 RX ORDER — MOMETASONE FUROATE 1 MG/G
CREAM TOPICAL DAILY
Qty: 45 G | Refills: 1 | Status: SHIPPED | OUTPATIENT
Start: 2024-01-03

## 2024-01-03 ASSESSMENT — PAIN SCALES - GENERAL: PAINLEVEL: EXTREME PAIN (8)

## 2024-01-03 NOTE — COMMUNITY RESOURCES LIST (ENGLISH)
01/03/2024   [facility]  N/A  For questions about this resource list or additional care needs, please contact your primary care clinic or care manager.  Phone: [phone]   Email: N/A   Address: [address] [place] [id number]   Hours: N/A        Financial Stability       Rent and mortgage payment assistance  1  [facility] - Rent Assistance Distance: 3.26 miles      In-Person   [address] [place] [address]  Language: English, Latvian  Hours: Mon - Sat 8:00 AM - 12:00 PM , Mon - Tue 1:00 PM - 8:00 PM , Wed 1:00 PM - 4:00 PM , Thu - Fri 1:00 PM - 8:00 PM , Sat 12:30 PM - 4:00 PM  Fees: Free   Phone: (478) 487-1557 Email: [email or web address].org Website: https://[facility].org/[facility]/home/#whatwedo     2  Neighborhood Assistance [employer] - Home Save Program Distance: 3.72 miles      Phone/Virtual   [address] [place] [address]  Language: English, Latvian  Hours: Mon - Fri 9:00 AM - 5:00 PM  Fees: Free   Phone: (933) 420-6156 Email: services@[employer] Website: https://www.naca.com          Food and Nutrition       Food pantry  3  [facility] - Food Shelf Distance: 0.86 miles      Pickup   [address] [place] [address]  Language: Irish, English, Yoruba, Citizen of [place], Thai  Hours: Sun 11:00 AM - 1:30 PM  Fees: Free   Phone: (950) 443-2566 Email: [facility].org Website: http://www.isSk[facility] CampusMoverati.org     4  [facility] Distance: 1.35 miles      In-Person   [address] [place] [address]  Language: English  Hours: Tue 10:00 AM - 11:30 AM , Wed 5:00 PM - 6:30 PM , Fri 10:00 AM - 11:30 AM  Fees: Free   Phone: (798) 396-2124 Email: info@[facility].org Website: http://www.[facility].org/     SNAP application assistance  5  [place] -   Family Wellness (AIF) Distance: 3.71 miles       In-Person, Phone/Virtual   [address] [place] [facility] [place] [address]  Language: German, Belarusian, English, Gujarati, Martha, Czech, Macedonian, Spanish, Hebrew, Greenlandic  Hours: Mon - Wed 9:00 AM - 5:00 PM , Thu 12:00 PM - 6:00 PM , Fri 9:00 AM - 5:00 PM , Sun 10:30 AM - 2:00 PM Appt. Only  Fees: Free   Phone: (853) 139-2019 Email: info@[employer] Website: https://www.[employer].org/     6  [facility] Immigrant Opportunity [place] ([facility]) Distance: 4.63 miles      In-Person, Phone/Virtual   [address] [place] [address]  Language: English, Hmong  Hours: Mon - Fri 8:30 AM - 4:30 PM  Fees: Free   Phone: (889) 207-2087 Ext.1 Email: info@[employer] Website: https://www.[employer]/     Soup kitchen or free meals  7  [facility] Dinner Distance: 0.96 miles      Pickup   [address] [place] [address]  Language: English  Hours: Tue 5:00 PM - 6:30 PM  Fees: Free   Phone: (397) 867-2568 Email: admin@[employer] Website: http://www.[employer].org/     8  [facility] - [employer] and [facility] - Community Supper Distance: 1.35 miles      In-Person   [address] [place] [address]  Language: English  Hours: Wed 5:15 PM - 6:00 PM  Fees: Free   Phone: (705) 525-6336 Email: info@[employer].org Website: http://www.spln.org/          Important Numbers & Websites       Emergency Services   911  [facility]   311  Poison Control   (341) 704-8220  Suicide Prevention Lifeline   (349) 263-5834 (TALK)  Child Abuse Hotline   (994) 766-2839 (4-A-Child)  Sexual Assault Hotline   (241) 981-7336 (HOPE)  National Runaway Safeline   (414) 468-2545 (RUNAWAY)  All-Options Talkline   (216) 617-8079  Substance Abuse Referral   (191) 567-5152 (HELP)

## 2024-01-03 NOTE — PROGRESS NOTES
"  Assessment & Plan     Rash  Blu was negative so will treat with steroids.  Encouraged pt to take pictures and keep derm appointment in case it does not resolve or returns   - KOH prep (skin, hair or nails only)  - mometasone (ELOCON) 0.1 % external cream; Apply topically daily    Bilateral low back pain with bilateral sciatica, unspecified chronicity  Suspect due to her fall.  Given her pain level and inability to walk will get MRI.  Xray appears normal-wait for final read.  Continue with PT.  Did give pt small amount of hydrocodone to use at night and refilled muscle relaxant.    - XR Lumbar Spine 2/3 Views; Future  - methocarbamol (ROBAXIN) 500 MG tablet; Take 1 tablet (500 mg) by mouth 4 times daily as needed for muscle spasms  - MR Lumbar Spine w/o Contrast; Future  - HYDROcodone-acetaminophen (NORCO) 5-325 MG tablet; Take 1 tablet by mouth every 6 hours as needed for severe pain    Numbness in feet  As above  - methocarbamol (ROBAXIN) 500 MG tablet; Take 1 tablet (500 mg) by mouth 4 times daily as needed for muscle spasms  - MR Lumbar Spine w/o Contrast; Future  - HYDROcodone-acetaminophen (NORCO) 5-325 MG tablet; Take 1 tablet by mouth every 6 hours as needed for severe pain           MED REC REQUIRED  Post Medication Reconciliation Status:  Discharge medications reconciled, continue medications without change  Nicotine/Tobacco Cessation:  She reports that she has been smoking other, cigarettes, and vaping device. She has a 2.3 pack-year smoking history. She has never used smokeless tobacco.  Nicotine/Tobacco Cessation Plan:   Information offered: Patient not interested at this time      BMI:   Estimated body mass index is 29.17 kg/m  as calculated from the following:    Height as of this encounter: 1.664 m (5' 5.5\").    Weight as of this encounter: 80.7 kg (178 lb).   Weight management plan: did not address         Cynthia Scott PA-C  [facility] MAURICIOGRACE Swan is a 48 " "year old, presenting for the following health issues:  Hospital F/U        1/3/2024     9:34 AM   Additional Questions   Roomed by Shwetha   Accompanied by Son       takes Gabapentin 300 mg twice daily        HPI       ED/UC Followup:    Facility:  [facility]   Date of visit: 12-12-23   Reason for visit: unable to walk   Current Status: still in pain and feet are numb       Mid Oct fell down the stairs after surgery.  Was doing ok but after she went back to work a few weeks she noticed pain in her legs and numbness in legs and feet and right hand.  Right hand numbness is not new.  Pain is in low back and back of both legs.  Feet are also numb.  Mid Dec could not walk due to pain.    ER was thinking related to fall or maybe MS.  No imaging or +    Having hard time sleeping.    Increase in gabapentin from ER has helped.    Robaxin helped too.  Starting PT next week.    Has some incontinence due to inability to get to bathroom.      Was referred to neurology but not getting in until April.    Rash on hands started about 3 months ago.  Did itch but not anymore.  Is spreading.  Had a similar rash before but can't remember what was done for this.        Review of Systems   As above      Objective    /85   Pulse 78   Temp 97.5  F (36.4  C) (Oral)   Resp 19   Ht 1.664 m (5' 5.5\")   Wt 80.7 kg (178 lb)   SpO2 98%   BMI 29.17 kg/m    Body mass index is 29.17 kg/m .  Physical Exam  Constitutional:       General: She is not in acute distress (but did use a wheelchair in the clinic).  Cardiovascular:      Rate and Rhythm: Normal rate and regular rhythm.   Pulmonary:      Effort: Pulmonary effort is normal.      Breath sounds: Normal breath sounds.   Musculoskeletal:      Lumbar back: Tenderness (bilaterally low back/buttocks) present. Decreased range of motion. Negative right straight leg raise test and negative left straight leg raise test.      Comments: Difficulty with walking.     Skin:     Findings: Rash (a few " scattered red round rash with raised edges on hands) present.   Neurological:      Mental Status: She is alert.      Motor: No weakness.      Deep Tendon Reflexes:      Reflex Scores:       Patellar reflexes are 1+ on the right side and 1+ on the left side.

## 2024-01-08 ENCOUNTER — THERAPY VISIT (OUTPATIENT)
Dept: PHYSICAL THERAPY | Facility: CLINIC | Age: 49
End: 2024-01-08
Attending: INTERNAL MEDICINE
Payer: COMMERCIAL

## 2024-01-08 DIAGNOSIS — M54.31 BILATERAL SCIATICA: ICD-10-CM

## 2024-01-08 DIAGNOSIS — M54.42 ACUTE BILATERAL LOW BACK PAIN WITH BILATERAL SCIATICA: Primary | ICD-10-CM

## 2024-01-08 DIAGNOSIS — M54.41 ACUTE BILATERAL LOW BACK PAIN WITH BILATERAL SCIATICA: Primary | ICD-10-CM

## 2024-01-08 DIAGNOSIS — B35.1 ONYCHOMYCOSIS: ICD-10-CM

## 2024-01-08 DIAGNOSIS — M54.32 BILATERAL SCIATICA: ICD-10-CM

## 2024-01-08 PROCEDURE — 97161 PT EVAL LOW COMPLEX 20 MIN: CPT | Mod: GP | Performed by: PHYSICAL THERAPIST

## 2024-01-08 PROCEDURE — 97110 THERAPEUTIC EXERCISES: CPT | Mod: GP | Performed by: PHYSICAL THERAPIST

## 2024-01-08 RX ORDER — EFINACONAZOLE 100 MG/ML
SOLUTION TOPICAL
Qty: 8 ML | Refills: 1 | Status: SHIPPED | OUTPATIENT
Start: 2024-01-08 | End: 2024-03-19

## 2024-01-08 NOTE — PROGRESS NOTES
PHYSICAL THERAPY EVALUATION  Type of Visit: Evaluation    See electronic medical record for Abuse and Falls Screening details.    Subjective       Presenting condition or subjective complaint: I have low back and butt pain, because of this I cannot walk without my cane. It has been getting much better over time but I want to make sure I keep heading in the right direction  Date of onset: 12/01/23    Relevant medical history: Arthritis; Foot drop; Significant weakness   Dates & types of surgery:      Prior diagnostic imaging/testing results: X-ray     Prior therapy history for the same diagnosis, illness or injury: No      Prior Level of Function  Transfers:   Ambulation:   ADL:   IADL:     Living Environment  Social support: With family members   Type of home: Multi-level   Stairs to enter the home: No   Is there a railing: No   Ramp: No   Stairs inside the home: Yes 12     Help at home: None  Equipment owned: Straight Cane; Walker     Employment: Yes   Hobbies/Interests: reading    Patient goals for therapy: walk on my own without a cane, and I need to be able to lift things for work    Pain assessment:      Objective   LUMBAR SPINE EVALUATION  PAIN: Pain Level at Rest: 3/10  Pain Level with Use: 9/10  Pain Quality: Aching, Burning, Dull, Exhausting, Miserable, Numb, Sharp, Tender, and Tingling  Pain is Exacerbated By: weight bearing, walking  Pain is Relieved By: rest and stretch  Pain Progression: Improved  INTEGUMENTARY (edema, incisions):   POSTURE:   GAIT:   Weightbearing Status:   Assistive Device(s): Cane (single end)  Gait Deviations: Antalgic  Base of support decreased  Stance time decreased  Stride length decreased  Tracie decreased  BALANCE/PROPRIOCEPTION:  TUG 23s  WEIGHTBEARING ALIGNMENT:   NON-WEIGHTBEARING ALIGNMENT:    ROM:   (Degrees) Left AROM Left PROM  Right AROM Right PROM   Hip Flexion       Hip Extension       Hip Abduction       Hip Adduction       Hip Internal Rotation       Hip  External Rotation       Knee Flexion       Knee Extension       Lumbar Side bend Mod loss, Pinching on left low back, pulling on right low back, no change in leg pain Mod loss, Pinching on right low back, pulling on left low back, no change in leg pain   Lumbar Flexion Mod loss, comfortable stretching sensation, no increase in low back pain   Lumbar Extension Mod loss, comfortable stretch in low back, no increase in leg pain   Pain:   End feel:   PELVIC/SI SCREEN:   STRENGTH:     MYOTOMES:    Left Right   T12-L3 (Hip Flexion) 4- 4-   L2-4 (Quads)  4- 4-   L4 (Ankle DF) 3+ 4-   L5 (Great Toe Ext) 4 4   S1 (Toe Raise) 2+ 2+     DTR S:   CORD SIGNS:   DERMATOMES:   NEURAL TENSION:   FLEXIBILITY: Decreased hip ER L, Decreased adductors L, Decreased piriformis L, Decreased hip flexors L, Decreased hamstrings L, Decreased gastroc L, Decreased hip ER R, Decreased adductors R, Decreased piriformis R, Decreased hip flexors R, Decreased hamstrings R, Decreased gastroc R  LUMBAR/HIP Special Tests:    PELVIS/SI SPECIAL TESTS:   FUNCTIONAL TESTS:   PALPATION:   + Tenderness At Location Left Right   Quadratus Lumborum     Erector Spinae + +   Piriformis  + +   PSIS     ASIS     Iliac Crest     Glut Medius     Greater Trochanter     Ischial Tuberosity     Hamstrings + +   Hip Flexors     Vertebral        SPINAL SEGMENTAL CONCLUSIONS:       Assessment & Plan   CLINICAL IMPRESSIONS  Medical Diagnosis: Bilateral sciatica    Treatment Diagnosis: Acute bilateral low back pain with bilateral sciatica   Impression/Assessment:     Clinical Decision Making (Complexity):  Clinical Presentation: Stable/Uncomplicated  Clinical Presentation Rationale: based on medical and personal factors listed in PT evaluation  Clinical Decision Making (Complexity): Low complexity    PLAN OF CARE  Treatment Interventions:  Interventions: Gait Training, Manual Therapy, Neuromuscular Re-education, Therapeutic Activity, Therapeutic Exercise    Long Term Goals      PT Goal 1  Goal Identifier: Ambulation  Goal Description: the patient will be able to walk for 20 minutes independently on level surfaces noting a pain level of <2/10  Rationale: to maximize safety and independence with performance of ADLs and functional tasks;to maximize safety and independence within the home;to maximize safety and independence within the community;to maximize safety and independence with transportation  Goal Progress: Currently only able to walk~5 min utilizing single edge cane  due to leg pain and weakness  Target Date: 03/18/24  PT Goal 2  Goal Identifier: Lifting  Goal Description: The patient will be able to lift an object weighing 20# from the floor to counter height 5x noting a pain level of <2/10  Rationale: to maximize safety and independence with performance of ADLs and functional tasks;to maximize safety and independence within the community  Goal Progress: Currently not lifting anythign due to pain  Target Date: 03/18/24      Frequency of Treatment: 1x daily per week  Duration of Treatment: 10 weeks    Recommended Referrals to Other Professionals:   Education Assessment:        Risks and benefits of evaluation/treatment have been explained.   Patient/Family/caregiver agrees with Plan of Care.     Evaluation Time:     PT Eval, Low Complexity Minutes (91246): 24       Signing Clinician: ANGIE FARFAN [facility]                                                                                   OUTPATIENT PHYSICAL THERAPY      PLAN OF TREATMENT FOR OUTPATIENT REHABILITATION   Patient's Last Name, First Name, Sosa Schulte YOB: 1975   Provider's Name   [facility]   Medical Record No.  [id number]     Onset Date: 12/01/23  Start of Care Date: 01/08/24     Medical Diagnosis:  Bilateral sciatica      PT Treatment Diagnosis:  Acute bilateral low back pain with bilateral sciatica Plan of  Treatment  Frequency/Duration: 1x daily per week/ 10 weeks    Certification date from 01/08/24 to 03/18/24         See note for plan of treatment details and functional goals     ANGIE CISNEROS                         I CERTIFY THE NEED FOR THESE SERVICES FURNISHED UNDER        THIS PLAN OF TREATMENT AND WHILE UNDER MY CARE     (Physician attestation of this document indicates review and certification of the therapy plan).              Referring Provider:  Dr. Jack Arvizu MD    Initial Assessment  See Epic Evaluation- Start of Care Date: 01/08/24

## 2024-01-09 PROBLEM — M54.41 ACUTE BILATERAL LOW BACK PAIN WITH BILATERAL SCIATICA: Status: ACTIVE | Noted: 2024-01-09

## 2024-01-09 PROBLEM — M54.42 ACUTE BILATERAL LOW BACK PAIN WITH BILATERAL SCIATICA: Status: ACTIVE | Noted: 2024-01-09

## 2024-01-23 DIAGNOSIS — M54.41 BILATERAL LOW BACK PAIN WITH BILATERAL SCIATICA, UNSPECIFIED CHRONICITY: ICD-10-CM

## 2024-01-23 DIAGNOSIS — M54.42 BILATERAL LOW BACK PAIN WITH BILATERAL SCIATICA, UNSPECIFIED CHRONICITY: ICD-10-CM

## 2024-01-23 DIAGNOSIS — R20.0 NUMBNESS IN FEET: ICD-10-CM

## 2024-01-23 RX ORDER — HYDROCODONE BITARTRATE AND ACETAMINOPHEN 5; 325 MG/1; MG/1
1 TABLET ORAL EVERY 6 HOURS PRN
Qty: 15 TABLET | Refills: 0 | OUTPATIENT
Start: 2024-01-23 | End: 2024-01-26

## 2024-01-23 RX ORDER — GABAPENTIN 100 MG/1
300 CAPSULE ORAL 2 TIMES DAILY
Qty: 60 CAPSULE | Refills: 1 | Status: SHIPPED | OUTPATIENT
Start: 2024-01-23 | End: 2024-05-20

## 2024-01-23 NOTE — TELEPHONE ENCOUNTER
Refilled gabapentin.  Want to see MRI results and have follow up before refilling Vicodin.    Cynthia Scott PA-C

## 2024-01-24 ENCOUNTER — ANCILLARY PROCEDURE (OUTPATIENT)
Dept: MRI IMAGING | Facility: CLINIC | Age: 49
End: 2024-01-24
Attending: PHYSICIAN ASSISTANT
Payer: COMMERCIAL

## 2024-01-24 DIAGNOSIS — M54.41 BILATERAL LOW BACK PAIN WITH BILATERAL SCIATICA, UNSPECIFIED CHRONICITY: ICD-10-CM

## 2024-01-24 DIAGNOSIS — R20.0 NUMBNESS IN FEET: ICD-10-CM

## 2024-01-24 DIAGNOSIS — M54.42 BILATERAL LOW BACK PAIN WITH BILATERAL SCIATICA, UNSPECIFIED CHRONICITY: ICD-10-CM

## 2024-01-24 PROCEDURE — 72148 MRI LUMBAR SPINE W/O DYE: CPT | Mod: TC | Performed by: RADIOLOGY

## 2024-01-24 NOTE — TELEPHONE ENCOUNTER
Attempted to call pt.  Phone rang, then went to busy tone.  Will try again later.  Parish Ceja MA on 1/24/2024 at 5:02 PM

## 2024-01-26 RX ORDER — PREDNISONE 20 MG/1
20 TABLET ORAL DAILY
Qty: 5 TABLET | Refills: 0 | Status: SHIPPED | OUTPATIENT
Start: 2024-01-26 | End: 2024-02-01

## 2024-02-01 ENCOUNTER — OFFICE VISIT (OUTPATIENT)
Dept: PALLIATIVE MEDICINE | Facility: OTHER | Age: 49
End: 2024-02-01
Payer: COMMERCIAL

## 2024-02-01 VITALS
DIASTOLIC BLOOD PRESSURE: 81 MMHG | BODY MASS INDEX: 28.51 KG/M2 | OXYGEN SATURATION: 94 % | HEART RATE: 107 BPM | SYSTOLIC BLOOD PRESSURE: 121 MMHG | WEIGHT: 174 LBS

## 2024-02-01 DIAGNOSIS — M54.42 BILATERAL LOW BACK PAIN WITH BILATERAL SCIATICA, UNSPECIFIED CHRONICITY: ICD-10-CM

## 2024-02-01 DIAGNOSIS — R20.0 NUMBNESS IN FEET: ICD-10-CM

## 2024-02-01 DIAGNOSIS — M54.41 BILATERAL LOW BACK PAIN WITH BILATERAL SCIATICA, UNSPECIFIED CHRONICITY: ICD-10-CM

## 2024-02-01 DIAGNOSIS — M54.16 LUMBAR RADICULOPATHY: Primary | ICD-10-CM

## 2024-02-01 DIAGNOSIS — M47.817 LUMBOSACRAL SPONDYLOSIS WITHOUT MYELOPATHY: ICD-10-CM

## 2024-02-01 DIAGNOSIS — M48.061 SPINAL STENOSIS OF LUMBAR REGION, UNSPECIFIED WHETHER NEUROGENIC CLAUDICATION PRESENT: ICD-10-CM

## 2024-02-01 PROCEDURE — 99204 OFFICE O/P NEW MOD 45 MIN: CPT | Performed by: STUDENT IN AN ORGANIZED HEALTH CARE EDUCATION/TRAINING PROGRAM

## 2024-02-01 PROCEDURE — G0463 HOSPITAL OUTPT CLINIC VISIT: HCPCS | Performed by: STUDENT IN AN ORGANIZED HEALTH CARE EDUCATION/TRAINING PROGRAM

## 2024-02-01 RX ORDER — GABAPENTIN 300 MG/1
300 CAPSULE ORAL 3 TIMES DAILY
Qty: 180 CAPSULE | Refills: 1 | Status: SHIPPED | OUTPATIENT
Start: 2024-02-01 | End: 2024-04-03

## 2024-02-01 ASSESSMENT — PAIN SCALES - GENERAL: PAINLEVEL: EXTREME PAIN (8)

## 2024-02-01 NOTE — TELEPHONE ENCOUNTER
[facility] Call Center    Phone Message    May a detailed message be left on voicemail: yes     Reason for Call: Medication Refill Request    Has the patient contacted the pharmacy for the refill? Yes   Name of medication being requested: methocarbamol (ROBAXIN) 500 MG tablet   Provider who prescribed the medication:   Pharmacy:   [facility] [address]     Date medication is needed: ASAP    Pt stated that her primary was filling this but wants  to instead. Pt stated she spoke to  about it at her last appt       Action Taken: Message routed to:  Other: [facility]    Travel Screening: Not Applicable

## 2024-02-01 NOTE — PROGRESS NOTES
[facility] Med Spine Evaluation    Date of visit: 2/1/2024    Reason for consultation:    Sosa Patel is a 48 year old female who is seen in consultation today for medical spine evaluation.    PCP is Cynthia Scott.    Please see the [facility] health questionnaire which the patient completed and reviewed with me in detail.    Chief Complaint:    Chief Complaint   Patient presents with    Pain       Pain history:  Sosa Patel is a 48 year old female presenting with a chief pain complaint of bilateral leg and back pain    Onset: 2 months ago, gradual onset and worsening  Location and Radiation: bilateral posterior thighs and right low back, back is worse than legs  Provoking: walking, laying on stomach, turning in bed  Palliating: laying flat on back  Quality: dull  Severity: 4-9/10  Timing: constant  Numbness: bilateral plantar feet  Weakness: initially right foot drop    +bowel and bladder urgency and accidents secondary to not having enough time to get to the bathroom, started a couple years ago with 4 incidents 2 years ago. 2 recent incidents since pain issue began.   Mild numbness in suprapubic area  [place] is uncomfortable  Patient denies red flag symptoms of corresponding fever/chills, profound motor loss, weight loss, or sudden unremitting increase in pain.    Current pain medications include:  Gabapentin 300mg BID - helpful  Methocarbamol - helpful  Voltaren    Previous medication treatments included:  Prednisone 20mg 5 days - Helpful while on it, but stopped    Other treatments have included:  Sosa Patel has not been seen at a pain clinic in the past.    PT: ongoing, has been to 1 visit so far, doing HEP - may be helpful  Injections: none  Surgery: none    Past Medical History:  Past Medical History:   Diagnosis Date    Bipolar disorder (H) 12/04    Dx'd    Combinations of drug dependence excluding opioid type  drug (H) 5/4/2022    In remission     H/O: depression     SUICIDE ATTEMPT AGE 15    HPV (human papillomavirus)     Mood disorder (H24) 4/9/2019     Patient Active Problem List    Diagnosis Date Noted    Acute bilateral low back pain with bilateral sciatica 01/09/2024     Priority: Medium    Gallstones 09/21/2023     Priority: Medium    Recurrent major depressive disorder, in partial remission (H24) 08/31/2022     Priority: Medium    Lichen sclerosus 09/10/2019     Priority: Medium    High risk medications (not anticoagulants) long-term use 09/21/2017     Priority: Medium    Sicca syndrome (H24) 09/21/2017     Priority: Medium    Systemic lupus erythematosus (H) 01/18/2017     Priority: Medium    JESSE positive 10/11/2016     Priority: Medium    Multiple joint pain 10/11/2016     Priority: Medium    Cocaine use 10/11/2016     Priority: Medium     In remission       H/O intravenous drug use in remission 03/06/2013     Priority: Medium    CARDIOVASCULAR SCREENING; LDL GOAL LESS THAN 160 10/31/2010     Priority: Medium    Herpes simplex type 2 infection 06/18/2010     Priority: Medium    Genital warts      Priority: Medium     (Problem list name updated by automated process. Provider to review and confirm.)         Past Surgical History:  Past Surgical History:   Procedure Laterality Date    BUNIONECTOMY RT/LT      DAVINCI LAPAROSCOPIC CHOLECYSTECTOMY WITHOUT GRAMS N/A 10/9/2023    Procedure: CHOLECYSTECTOMY, ROBOT-ASSISTED, LAPAROSCOPIC, WITHOUT CHOLANGIOGRAM;  Surgeon: Demetris Peoples MD;  [place] [facility] [place]     ENLARGE BREAST WITH IMPLANT  2000     Medications:  Current Outpatient Medications   Medication Sig Dispense Refill    ARIPiprazole (ABILIFY) 10 MG tablet Take 10 mg by mouth daily      Aspirin-Acetaminophen-Caffeine (EXCEDRIN PO)       Belimumab 200 MG/ML SOAJ Inject 200 mg Subcutaneous every 7 days . Hold for signs of infection and seek medical attention.   Rheumatology follow-up required prior to the  next refill prescription 4 mL 3    cetirizine (ZYRTEC) 10 MG tablet Take 2 tablets (20 mg) by mouth daily 180 tablet 0    clobetasol (TEMOVATE) 0.05 % external cream Apply topically 2 times daily 60 g 0    cycloSPORINE (RESTASIS) 0.05 % ophthalmic emulsion Place 1 drop into both eyes 2 times daily 11 mL 6    diclofenac (VOLTAREN) 1 % topical gel Apply up to 2 grams of 1% gel to hands up to 4 times daily as needed for joint pain (maximum: 8 g per upper extremity per day) 200 g 2    Efinaconazole (JUBLIA) 10 % SOLN APPLY TOPICALLY TO AFFECTED AREAS DAILY 8 mL 1    ferrous sulfate (FEROSUL) 325 (65 Fe) MG tablet Take 325 mg by mouth every other day (Patient not taking: Reported on 1/3/2024)      FLUoxetine (PROZAC) 40 MG capsule TAKE ONE CAPSULE BY MOUTH ONCE DAILY 90 capsule 1    gabapentin (NEURONTIN) 100 MG capsule Take 3 capsules (300 mg) by mouth 2 times daily 60 capsule 1    hydroquinone (RUFUS) 4 % external cream Apply topically 2 times daily For up to 3 months. 30 g 3    hydroxychloroquine (PLAQUENIL) 200 MG tablet Hydroxychloroquine 200 mg daily with an additional 200 mg every other day.  Ophthalmology toxicity monitoring eye exam, that includes 10-2 VF and SD-OCT, is required yearly. 135 tablet 1    leflunomide (ARAVA) 20 MG tablet Take 1 tablet (20 mg) by mouth daily ; labs required every 12 weeks 90 tablet 0    methocarbamol (ROBAXIN) 500 MG tablet Take 1 tablet (500 mg) by mouth 4 times daily as needed for muscle spasms 30 tablet 1    mometasone (ELOCON) 0.1 % external cream Apply topically daily 45 g 1    predniSONE (DELTASONE) 20 MG tablet Take 1 tablet (20 mg) by mouth daily 5 tablet 0    predniSONE (DELTASONE) 5 MG tablet For inflammatory arthritis flare only: Prednisone 20mg daily x5days, then 15mg daily x5days, then 10mg daily x5days, then 5mg daily x5days, then stop. (Patient not taking: Reported on 1/3/2024) 50 tablet 0    senna-docusate (SENOKOT-S/PERICOLACE) 8.6-50 MG tablet Take 1-2 tablets by  mouth 2 times daily (Patient not taking: Reported on 1/3/2024) 15 tablet 0     Allergies:   No Known Allergies      Family history:  Family History   Problem Relation Age of Onset    Cancer Father         lung cancer    Diabetes Maternal Grandmother     Depression Maternal Grandmother     Lipids Maternal Grandmother     Cerebrovascular Disease Maternal Grandfather     Lipids Maternal Grandfather     Circulatory Maternal Grandfather         MI in 50's    Depression Brother         depression and OCD    Gastrointestinal Disease Brother         IBS    Depression Mother     Thyroid Disease No family hx of     Asthma No family hx of        Physical Exam:  Vitals:    02/01/24 1359   Weight: 78.9 kg (174 lb)     Exam:  Constitutional: Well developed, NAD  Head: normocephalic. Atraumatic.   Eyes: no redness or jaundice noted   CV: warm, well perfused extremities   Skin: no suspicious lesions or rashes   Psychiatric: mentation appears normal and affect full    Neuromuscular Examination:  Normal ROM in lumbar flexion, extension, pain with extension  +TTP in bilateral lumbar paraspinals, some in lower lumbar midline spine  Negative SI joint tenderness bilaterally  Normal 5/5 strength in BLE with questionable 4/5 in L EHL  Reduced LT sensation in bilateral L4, L5, S1, intact at L3  No ankle clonus bilaterally    KEY: negative bilaterally   FADIR: negative bilaterally   Scour: negative bilaterally   Straight leg raise: negative bilaterally     Reflexes   Patellar: L 0/4, R 0/4      Diagnostic tests:  EXAM: MR LUMBAR SPINE W/O CONTRAST  LOCATION: [facility]  DATE: 1/24/2024     INDICATION: Bilateral low back pain and bilateral sciatica. Numbness in feet.  COMPARISON: X-ray 01/03/2024  TECHNIQUE: Routine Lumbar Spine MRI without IV contrast.     FINDINGS:   Nomenclature is based on 5 lumbar type vertebral bodies. Unchanged alignment, including 2 to 3 mm degenerative type anterolisthesis at L4-L5 and L5-S1.  Preserved vertebral body heights. Bilateral facet complex and pedicle edema at L4-L5. Normal distal   spinal cord and cauda equina with conus medullaris at L1-L2. No extraspinal abnormality. Unremarkable visualized bony pelvis.     T12-L1: Normal disc height and signal. No herniation. Normal facets. No spinal canal or neural foraminal stenosis.      L1-L2: Normal disc height and signal. No herniation. Minor facet arthropathy. No spinal canal or neural foraminal stenosis.     L2-L3: Disc desiccation with preserved disc height. Circumferential annular bulge without focal disc herniation. Minor facet arthropathy. No spinal canal stenosis. Partial effacement of the inferior neural foramina without significant neural foraminal   stenosis.      L3-L4: Disc desiccation with preserved disc height. Circumferential annular bulge with left subarticular annular fissure and shallow left foraminal disc protrusion. Mild to moderate facet arthropathy and ligamentum flavum thickening, greater on the left.   Mild spinal canal stenosis. No right neural foraminal stenosis. Mild left neural foraminal stenosis.     L4-L5: Mild to moderate loss of disc height and signal. Circumferential annular bulge and minor endplate spurring. Moderate to advanced facet arthropathy with facet joint effusions. Small medially directed synovial cyst within the midline dorsal epidural   fat measuring up to 5 mm. Additional posteriorly directed synovial cyst on the right. Severe trefoil type spinal canal stenosis. Severe right neural foraminal stenosis. Mild to moderate left neural foraminal stenosis.     L5-S1: Mild loss of disc height and signal. Circumferential annular bulge. Moderate right and minimal left facet arthropathy. Mild spinal canal stenosis. Moderate to severe right neural foraminal stenosis. Mild left neural foraminal stenosis.                                                                      IMPRESSION:  1.  Multilevel lumbar spondylosis  including advanced facet arthropathy and associated facet complex edema at L4-L5.   2.  At L4-L5, severe trefoil type spinal canal stenosis with impingement upon traversing L5 nerve roots. Severe right and mild to moderate left neural foraminal stenosis with impingement upon the right L4 nerve root.  3.  At L5-S1, mild spinal canal stenosis with moderate to severe right and mild left neural foraminal stenosis.  4.  At L3-L4, mild spinal canal stenosis and mild left neural foraminal stenosis.    Personally reviewed imaging: yes    Assessment:  Lumbar radiculopathy  Spinal stenosis  Lumbar spondylosis from facet arthropathy    Sosa Patel is a 48 year old female who presents with 2 months of bilateral posterior leg pain with later onset right-sided low back pain, associated with suprapubic numbness, occasional bowel and bladder urgency with rare accidents, and a right-sided foot drop which resolved.  Patient has tried gabapentin 300 mg twice daily which is helpful, methocarbamol which is helpful, and a short course of prednisone which was very helpful.  She has had 1 visit to physical therapy so far.  She would like to return to work sooner rather than later.  Examination shows reduced sensation in the bilateral L4 to S1 dermatomes.  Straight leg raise happens to be negative today on examination.  She does exhibit some decreased reflexes bilaterally.  I suspect lumbar radiculopathy and spinal canal stenosis to be the primary generator of her pain.  We will proceed with conservative management to start, gabapentin increased to 300 mg 3 times daily.  I have encouraged her to continue the physical therapy.  It would be appropriate for her to undergo bilateral L4 TFESI after completing the PT if not receiving sufficient benefit.  Will see her back in 3 months if not earlier.    Plan:  Diagnosis reviewed, treatment option addressed, and risk/benefits discussed.     Procedures: Bilateral L4 TFESI is appropriate after  completing PT  Physical Therapy: Continue PT and HEP  Diagnostic Studies: MRI Lumbar spine reviewed  Medication Management:   Gabapentin increased to 300mg TID  Continue methocarbamol  Follow up: 3 months    Philipp Echavarria MD  Interventional Pain Medicine  [facility]

## 2024-02-01 NOTE — PATIENT INSTRUCTIONS
Procedures: Bilateral L4 TFESI is appropriate after completing PT  Physical Therapy: Continue PT and HEP  Diagnostic Studies: MRI Lumbar spine reviewed  Medication Management: Gabapentin increased to 300mg TID  Follow up: 3 months    Philipp Echavarria MD  Interventional Pain Medicine  [facility] Number:  [phone]  Call with any questions about your care and for scheduling assistance.   Calls are returned Monday through Friday between 8 AM and 4:30 PM. We usually get back to you within 2 business days depending on the issue/request.    If we are prescribing your medications:  For opioid medication refills, call the clinic or send a [employer] message 7 days in advance.  Please include:  Name of requested medication  Name of the pharmacy.  For non-opioid medications, call your pharmacy directly to request a refill. Please allow 3-4 days to be processed.   Per [place] State Law:  All controlled substance prescriptions must be filled within 30 days of being written.    For those controlled substances allowing refills, pickup must occur within 30 days of last fill.      We believe regular attendance is key to your success in our program!    Any time you are unable to keep your appointment we ask that you call us at least 24 hours in advance to cancel.This will allow us to offer the appointment time to another patient.   Multiple missed appointments may lead to dismissal from the clinic.

## 2024-02-01 NOTE — PROGRESS NOTES
Patient presents to the clinic today for a visit with Philipp Echavarria MD regarding Pain Management.          2/1/2024     2:01 PM   PEG Score   PEG Total Score 9       UDS/CSA- na    Medications- na    Notes was cleaning and started getting numbness through the back of her legs and one day she woke up and couldn't walk. She went to the ER. Not as much in her back but now that her sciatic is healing her back seems to hurt more. Bowel movements urgent. And some urgency with urination and numbness in her pelvis.     Hayley Bella  [facility] Clinical Assistant

## 2024-02-02 RX ORDER — METHOCARBAMOL 500 MG/1
500 TABLET, FILM COATED ORAL 4 TIMES DAILY PRN
Qty: 30 TABLET | Refills: 1 | Status: SHIPPED | OUTPATIENT
Start: 2024-02-02 | End: 2024-02-23

## 2024-02-05 ENCOUNTER — THERAPY VISIT (OUTPATIENT)
Dept: PHYSICAL THERAPY | Facility: CLINIC | Age: 49
End: 2024-02-05
Payer: COMMERCIAL

## 2024-02-05 DIAGNOSIS — M54.41 ACUTE BILATERAL LOW BACK PAIN WITH BILATERAL SCIATICA: Primary | ICD-10-CM

## 2024-02-05 DIAGNOSIS — M54.42 ACUTE BILATERAL LOW BACK PAIN WITH BILATERAL SCIATICA: Primary | ICD-10-CM

## 2024-02-05 PROCEDURE — 97012 MECHANICAL TRACTION THERAPY: CPT | Mod: GP | Performed by: PHYSICAL THERAPIST

## 2024-02-08 ENCOUNTER — LAB (OUTPATIENT)
Dept: LAB | Facility: CLINIC | Age: 49
End: 2024-02-08
Payer: COMMERCIAL

## 2024-02-08 DIAGNOSIS — M32.9 SYSTEMIC LUPUS ERYTHEMATOSUS, UNSPECIFIED SLE TYPE, UNSPECIFIED ORGAN INVOLVEMENT STATUS (H): ICD-10-CM

## 2024-02-08 DIAGNOSIS — Z79.899 HIGH RISK MEDICATION USE: ICD-10-CM

## 2024-02-08 LAB
ALBUMIN SERPL BCG-MCNC: 3.9 G/DL (ref 3.5–5.2)
ALP SERPL-CCNC: 36 U/L (ref 40–150)
ALT SERPL W P-5'-P-CCNC: 15 U/L (ref 0–50)
ANION GAP SERPL CALCULATED.3IONS-SCNC: 9 MMOL/L (ref 7–15)
AST SERPL W P-5'-P-CCNC: 24 U/L (ref 0–45)
BASOPHILS # BLD AUTO: 0 10E3/UL (ref 0–0.2)
BASOPHILS NFR BLD AUTO: 1 %
BILIRUB SERPL-MCNC: 0.2 MG/DL
BUN SERPL-MCNC: 17.9 MG/DL (ref 6–20)
CALCIUM SERPL-MCNC: 9 MG/DL (ref 8.6–10)
CHLORIDE SERPL-SCNC: 105 MMOL/L (ref 98–107)
CREAT SERPL-MCNC: 0.71 MG/DL (ref 0.51–0.95)
CRP SERPL-MCNC: <3 MG/L
DEPRECATED HCO3 PLAS-SCNC: 27 MMOL/L (ref 22–29)
EGFRCR SERPLBLD CKD-EPI 2021: >90 ML/MIN/1.73M2
EOSINOPHIL # BLD AUTO: 0.3 10E3/UL (ref 0–0.7)
EOSINOPHIL NFR BLD AUTO: 5 %
ERYTHROCYTE [DISTWIDTH] IN BLOOD BY AUTOMATED COUNT: 12.8 % (ref 10–15)
ERYTHROCYTE [SEDIMENTATION RATE] IN BLOOD BY WESTERGREN METHOD: 6 MM/HR (ref 0–20)
GLUCOSE SERPL-MCNC: 74 MG/DL (ref 70–99)
HCT VFR BLD AUTO: 38.7 % (ref 35–47)
HGB BLD-MCNC: 12.1 G/DL (ref 11.7–15.7)
IMM GRANULOCYTES # BLD: 0 10E3/UL
IMM GRANULOCYTES NFR BLD: 0 %
LYMPHOCYTES # BLD AUTO: 1.8 10E3/UL (ref 0.8–5.3)
LYMPHOCYTES NFR BLD AUTO: 34 %
MCH RBC QN AUTO: 26.5 PG (ref 26.5–33)
MCHC RBC AUTO-ENTMCNC: 31.3 G/DL (ref 31.5–36.5)
MCV RBC AUTO: 85 FL (ref 78–100)
MONOCYTES # BLD AUTO: 0.6 10E3/UL (ref 0–1.3)
MONOCYTES NFR BLD AUTO: 11 %
NEUTROPHILS # BLD AUTO: 2.6 10E3/UL (ref 1.6–8.3)
NEUTROPHILS NFR BLD AUTO: 49 %
PLATELET # BLD AUTO: 206 10E3/UL (ref 150–450)
POTASSIUM SERPL-SCNC: 4 MMOL/L (ref 3.4–5.3)
PROT SERPL-MCNC: 6.2 G/DL (ref 6.4–8.3)
RBC # BLD AUTO: 4.56 10E6/UL (ref 3.8–5.2)
SODIUM SERPL-SCNC: 141 MMOL/L (ref 135–145)
WBC # BLD AUTO: 5.3 10E3/UL (ref 4–11)

## 2024-02-08 PROCEDURE — 36415 COLL VENOUS BLD VENIPUNCTURE: CPT

## 2024-02-08 PROCEDURE — 80053 COMPREHEN METABOLIC PANEL: CPT

## 2024-02-08 PROCEDURE — 86140 C-REACTIVE PROTEIN: CPT

## 2024-02-08 PROCEDURE — 85652 RBC SED RATE AUTOMATED: CPT

## 2024-02-08 PROCEDURE — 85025 COMPLETE CBC W/AUTO DIFF WBC: CPT

## 2024-02-09 ENCOUNTER — THERAPY VISIT (OUTPATIENT)
Dept: PHYSICAL THERAPY | Facility: CLINIC | Age: 49
End: 2024-02-09
Payer: COMMERCIAL

## 2024-02-09 ENCOUNTER — OFFICE VISIT (OUTPATIENT)
Dept: FAMILY MEDICINE | Facility: CLINIC | Age: 49
End: 2024-02-09
Payer: COMMERCIAL

## 2024-02-09 VITALS
HEIGHT: 66 IN | WEIGHT: 181 LBS | OXYGEN SATURATION: 97 % | TEMPERATURE: 98.1 F | SYSTOLIC BLOOD PRESSURE: 124 MMHG | RESPIRATION RATE: 18 BRPM | BODY MASS INDEX: 29.09 KG/M2 | DIASTOLIC BLOOD PRESSURE: 83 MMHG | HEART RATE: 88 BPM

## 2024-02-09 DIAGNOSIS — M54.41 ACUTE BILATERAL LOW BACK PAIN WITH BILATERAL SCIATICA: Primary | ICD-10-CM

## 2024-02-09 DIAGNOSIS — M54.41 BILATERAL LOW BACK PAIN WITH BILATERAL SCIATICA, UNSPECIFIED CHRONICITY: Primary | ICD-10-CM

## 2024-02-09 DIAGNOSIS — M54.42 ACUTE BILATERAL LOW BACK PAIN WITH BILATERAL SCIATICA: Primary | ICD-10-CM

## 2024-02-09 DIAGNOSIS — E66.3 OVERWEIGHT (BMI 25.0-29.9): ICD-10-CM

## 2024-02-09 DIAGNOSIS — M54.42 BILATERAL LOW BACK PAIN WITH BILATERAL SCIATICA, UNSPECIFIED CHRONICITY: Primary | ICD-10-CM

## 2024-02-09 PROCEDURE — 99214 OFFICE O/P EST MOD 30 MIN: CPT | Performed by: PHYSICIAN ASSISTANT

## 2024-02-09 PROCEDURE — 91320 SARSCV2 VAC 30MCG TRS-SUC IM: CPT | Performed by: PHYSICIAN ASSISTANT

## 2024-02-09 PROCEDURE — 97110 THERAPEUTIC EXERCISES: CPT | Mod: GP | Performed by: PHYSICAL THERAPIST

## 2024-02-09 PROCEDURE — 97012 MECHANICAL TRACTION THERAPY: CPT | Mod: GP | Performed by: PHYSICAL THERAPIST

## 2024-02-09 PROCEDURE — 90480 ADMN SARSCOV2 VAC 1/ONLY CMP: CPT | Performed by: PHYSICIAN ASSISTANT

## 2024-02-09 RX ORDER — HYDROCODONE BITARTRATE AND ACETAMINOPHEN 5; 325 MG/1; MG/1
1 TABLET ORAL EVERY 6 HOURS PRN
Qty: 18 TABLET | Refills: 0 | Status: SHIPPED | OUTPATIENT
Start: 2024-02-09 | End: 2024-02-23

## 2024-02-09 NOTE — PROGRESS NOTES
{PROVIDER CHARTING PREFERENCE:975579}    Wendy Swan is a 48 year old, presenting for the following health issues:  No chief complaint on file.  {(!) Visit Details have not yet been documented.  Please enter Visit Details and then use this list to pull in documentation. (Optional):798498}  History of Present Illness       Back Pain:  She presents for follow up of back pain. Patient's back pain is a chronic problem.  Location of back pain:  Right lower back  Description of back pain: dull ache  Back pain spreads: right thigh    Since patient first noticed back pain, pain is: always present, but gets better and worse  Does back pain interfere with her job:  Yes       She eats 4 or more servings of fruits and vegetables daily.She consumes 1 sweetened beverage(s) daily.She exercises with enough effort to increase her heart rate 60 or more minutes per day.  She exercises with enough effort to increase her heart rate 5 days per week.   She is taking medications regularly.       {MA/LPN/RN Pre-Provider Visit Orders- hCG/UA/Strep (Optional):243726}  {SUPERLIST (Optional):991087}  {additonal problems for provider to add (Optional):654910}    {ROS Picklists (Optional):400604}      Objective    There were no vitals taken for this visit.  There is no height or weight on file to calculate BMI.  Physical Exam   {Exam List (Optional):043872}    {Diagnostic Test Results (Optional):687443}        Signed Electronically by: Cynthia Scott PA-C  {Email feedback regarding this note to [email or web address]   :685388}

## 2024-02-09 NOTE — PROGRESS NOTES
Assessment & Plan       Bilateral low back pain with bilateral sciatica, unspecified chronicity  Patient may increase dose of gabapentin to 400mg given it is helping her pain. Recommended taking 100mg in the morning and 300mg at night. Discussed that given risks of prednisone and recent taper we recommend waiting before doing anymore. Discussed that she may restart a small dose of NORCO given she has just restarted her job and discussed risks including dependency. Recommended taking NORCO after work and discussed risks of driving while on narcotic. Patient made aware of red flag symptoms to report to Ed for including complete loss of mobility, complete bladder/bowel incontinence, and saddle anesthesia.  - HYDROcodone-acetaminophen (NORCO) 5-325 MG tablet; Take 1 tablet by mouth every 6 hours as needed for pain  - Continue x2 weekly PT  - Continue follow up with pain clinic for TFESI injection following PT  If has not seen pain follow up in one month     Overweight (BMI 25.0-29.9)  Discussed recent weight gain is most likely due to lack of exercise due to back pain and recent prednisone use. Discussed that given patients BMI is under 30 she does not qualify for Wegovy at this time. Will continue to monitor at this time.                  Wendy Swan is a 48 year old, presenting for the following health issues:  Follow Up      2/9/2024     8:46 AM   Additional Questions   Roomed by Shwetha   Accompanied by self     History of Present Illness       Back Pain:  She presents for follow up of back pain. Patient's back pain is a chronic problem.  Location of back pain:  Right lower back  Description of back pain: dull ache  Back pain spreads: right thigh    Since patient first noticed back pain, pain is: always present, but gets better and worse  Does back pain interfere with her job:  Yes       She eats 4 or more servings of fruits and vegetables daily.She consumes 1 sweetened beverage(s) daily.She exercises with  "enough effort to increase her heart rate 60 or more minutes per day.  She exercises with enough effort to increase her heart rate 5 days per week.   She is taking medications regularly.  TT is 47yo female with a pmhx of Lupus, MDD, and spinal stenosis who presents for further evaluation of back pain. She was seen by pain clinic on 2/1 who reviewed her spinal MRI and increased gabapentin to 300mg TID and recommended TFESI after PT is complete. She has been to PT twice in total this far and notes some minimal improvement in her pain. She notes the gabapentin and muscle relaxant are helping the pain. She says the pain severity varies at its worst it is an 8/10 and at a 5/10 at its best. She states that prednisone taper has helped in the past and she is wondering if she can do another one. She states the Norco dose she was given helped significantly with her pain as well. She states that she started working again as a  two days ago which has worsened her pain significantly. She states her leg numbness is improved but is worse after a long work day. She states her bladder and bowl urgency was very bad last night. She has not had any new episodes of incontinence since her last visit. She states she is still able to feel her saddle area and legs and able to make it to the bathroom before having a Bm at this time.    She would like to discuss a weight loss drugs. She notes has tried diet and exercise but has not been able to lose weight. She note she is limited by her back pain which is making it more difficult for her to exercise. She feels as though her weight is making her back pain worse. Her most recent lipid labs were elevated.                      Objective    /83   Pulse 88   Temp 98.1  F (36.7  C) (Oral)   Resp 18   Ht 1.664 m (5' 5.5\")   Wt 82.1 kg (181 lb)   SpO2 97%   BMI 29.66 kg/m    Body mass index is 29.66 kg/m .  Physical Exam   GENERAL: alert and no distress  NECK: no adenopathy, no " asymmetry, masses, or scars  RESP: lungs clear to auscultation - no rales, rhonchi or wheezes  CV: regular rate and rhythm, normal S1 S2, no S3 or S4, no murmur, click or rub, no peripheral edema  ABDOMEN: soft, nontender, no hepatosplenomegaly, no masses and bowel sounds normal  MS: no gross musculoskeletal defects noted, no edema  BACK: Pain bilaterally with SLR. Weakness with SLR. Intact sensation to light touch bilaterally          Sign: Kristal YANG-S2  -- Services Performed by a physician assistant student in Presence of a certified physician assistant-------    Signed Electronically by: Cynthia Scott PA-C

## 2024-02-09 NOTE — PATIENT INSTRUCTIONS
Add 300mg of gabapentin at night and save the 100mg to add during the day  Limit vicodin      Patient Education

## 2024-02-15 ENCOUNTER — THERAPY VISIT (OUTPATIENT)
Dept: PHYSICAL THERAPY | Facility: CLINIC | Age: 49
End: 2024-02-15
Payer: COMMERCIAL

## 2024-02-15 DIAGNOSIS — M54.41 ACUTE BILATERAL LOW BACK PAIN WITH BILATERAL SCIATICA: Primary | ICD-10-CM

## 2024-02-15 DIAGNOSIS — M54.42 ACUTE BILATERAL LOW BACK PAIN WITH BILATERAL SCIATICA: Primary | ICD-10-CM

## 2024-02-15 PROCEDURE — 97012 MECHANICAL TRACTION THERAPY: CPT | Mod: GP | Performed by: PHYSICAL THERAPIST

## 2024-02-19 ENCOUNTER — THERAPY VISIT (OUTPATIENT)
Dept: PHYSICAL THERAPY | Facility: CLINIC | Age: 49
End: 2024-02-19
Payer: COMMERCIAL

## 2024-02-19 DIAGNOSIS — M54.42 ACUTE BILATERAL LOW BACK PAIN WITH BILATERAL SCIATICA: Primary | ICD-10-CM

## 2024-02-19 DIAGNOSIS — M54.41 ACUTE BILATERAL LOW BACK PAIN WITH BILATERAL SCIATICA: Primary | ICD-10-CM

## 2024-02-19 PROCEDURE — 97012 MECHANICAL TRACTION THERAPY: CPT | Mod: GP | Performed by: PHYSICAL THERAPIST

## 2024-02-20 ENCOUNTER — TELEPHONE (OUTPATIENT)
Dept: RHEUMATOLOGY | Facility: CLINIC | Age: 49
End: 2024-02-20
Payer: COMMERCIAL

## 2024-02-21 NOTE — TELEPHONE ENCOUNTER
Prior Authorization Approval    Medication: BELIMUMAB 200 MG/ML SC SOAJ  Authorization Effective Date: 2/21/2024  Authorization Expiration Date: 2/21/2025  Approved Dose/Quantity: 4 / 28  Reference #: TANGELKE (Key: [id number])   Insurance Company: [email or web address] - Phone [phone] Fax [phone]:  PMAP  Expected CoPay: $ 0  CoPay Card Available: No    Financial Assistance Needed: no - pmap  Which Pharmacy is filling the prescription: [facility] - [address]  Pharmacy Notified: yes - epa renewal  Patient Notified: renewal        Thank You,     Cindy Quiles CPhT  [facility]  [email or web address]  [email or web address]  Phone: [phone]  Fax: [phone]

## 2024-02-23 ENCOUNTER — THERAPY VISIT (OUTPATIENT)
Dept: PHYSICAL THERAPY | Facility: CLINIC | Age: 49
End: 2024-02-23
Payer: COMMERCIAL

## 2024-02-23 DIAGNOSIS — R20.0 NUMBNESS IN FEET: ICD-10-CM

## 2024-02-23 DIAGNOSIS — M54.42 ACUTE BILATERAL LOW BACK PAIN WITH BILATERAL SCIATICA: Primary | ICD-10-CM

## 2024-02-23 DIAGNOSIS — M54.41 ACUTE BILATERAL LOW BACK PAIN WITH BILATERAL SCIATICA: Primary | ICD-10-CM

## 2024-02-23 DIAGNOSIS — M54.41 BILATERAL LOW BACK PAIN WITH BILATERAL SCIATICA, UNSPECIFIED CHRONICITY: ICD-10-CM

## 2024-02-23 DIAGNOSIS — M54.42 BILATERAL LOW BACK PAIN WITH BILATERAL SCIATICA, UNSPECIFIED CHRONICITY: ICD-10-CM

## 2024-02-23 PROCEDURE — 97012 MECHANICAL TRACTION THERAPY: CPT | Mod: GP | Performed by: PHYSICAL THERAPIST

## 2024-02-23 RX ORDER — HYDROCODONE BITARTRATE AND ACETAMINOPHEN 5; 325 MG/1; MG/1
1 TABLET ORAL EVERY 6 HOURS PRN
Qty: 18 TABLET | Refills: 0 | Status: SHIPPED | OUTPATIENT
Start: 2024-02-23 | End: 2024-02-26

## 2024-02-23 RX ORDER — METHOCARBAMOL 500 MG/1
500 TABLET, FILM COATED ORAL 4 TIMES DAILY PRN
Qty: 30 TABLET | Refills: 1 | Status: SHIPPED | OUTPATIENT
Start: 2024-02-23 | End: 2024-03-18

## 2024-02-27 ENCOUNTER — THERAPY VISIT (OUTPATIENT)
Dept: PHYSICAL THERAPY | Facility: CLINIC | Age: 49
End: 2024-02-27
Payer: COMMERCIAL

## 2024-02-27 DIAGNOSIS — M54.41 ACUTE BILATERAL LOW BACK PAIN WITH BILATERAL SCIATICA: Primary | ICD-10-CM

## 2024-02-27 DIAGNOSIS — M54.42 ACUTE BILATERAL LOW BACK PAIN WITH BILATERAL SCIATICA: Primary | ICD-10-CM

## 2024-02-27 PROCEDURE — 97012 MECHANICAL TRACTION THERAPY: CPT | Mod: GP | Performed by: PHYSICAL THERAPIST

## 2024-02-28 ENCOUNTER — OFFICE VISIT (OUTPATIENT)
Dept: RHEUMATOLOGY | Facility: CLINIC | Age: 49
End: 2024-02-28
Payer: COMMERCIAL

## 2024-02-28 VITALS
BODY MASS INDEX: 29.66 KG/M2 | RESPIRATION RATE: 16 BRPM | WEIGHT: 181 LBS | DIASTOLIC BLOOD PRESSURE: 68 MMHG | HEART RATE: 86 BPM | OXYGEN SATURATION: 95 % | SYSTOLIC BLOOD PRESSURE: 103 MMHG

## 2024-02-28 DIAGNOSIS — M32.9 SYSTEMIC LUPUS ERYTHEMATOSUS, UNSPECIFIED SLE TYPE, UNSPECIFIED ORGAN INVOLVEMENT STATUS (H): Primary | ICD-10-CM

## 2024-02-28 DIAGNOSIS — Z79.899 HIGH RISK MEDICATION USE: ICD-10-CM

## 2024-02-28 DIAGNOSIS — M35.00 SECONDARY SJOGREN'S SYNDROME (H): ICD-10-CM

## 2024-02-28 PROCEDURE — 99214 OFFICE O/P EST MOD 30 MIN: CPT | Performed by: INTERNAL MEDICINE

## 2024-02-28 RX ORDER — CYCLOSPORINE 0.5 MG/ML
1 EMULSION OPHTHALMIC 2 TIMES DAILY
Qty: 11 ML | Refills: 6 | Status: SHIPPED | OUTPATIENT
Start: 2024-02-28 | End: 2024-09-06

## 2024-02-28 RX ORDER — HYDROXYCHLOROQUINE SULFATE 200 MG/1
TABLET, FILM COATED ORAL
Qty: 135 TABLET | Refills: 1 | Status: SHIPPED | OUTPATIENT
Start: 2024-02-28 | End: 2024-09-03

## 2024-02-28 RX ORDER — LEFLUNOMIDE 20 MG/1
20 TABLET ORAL DAILY
Qty: 90 TABLET | Refills: 1 | Status: SHIPPED | OUTPATIENT
Start: 2024-02-28 | End: 2024-09-30

## 2024-02-28 ASSESSMENT — PAIN SCALES - GENERAL: PAINLEVEL: NO PAIN (0)

## 2024-02-28 NOTE — PROGRESS NOTES
Rheumatology Clinic Visit      Sosa Patel  MRN# [id number]   YOB: 1975 Age: 48 year old      Date of visit: 2/28/24   PCP: Cynthia Scott    Chief Complaint   Patient presents with:  RECHECK: follow up on Lupus    Assessment and Plan     1. Systemic Lupus Erythematosus (JESSE+, dsDNA+, arthritis, oral sores [SLE versus HSV related? - One-time had a culture that was negative], proteinuria, photosensitivity [fatigue]): Dx'd 10/2016; initially with arthritis that improved with HCQ.  Previously on MTX (fatigue, effective for arthritis), MMF (resolved arthritis, rash, and brain fog; arthritis persisted though and eventually changed back to leflunomide).  Currently on HCQ 200mg daily, leflunomide 20mg daily, and Benlysta (SQ, starting mid March 2021; effective for arthritis and fatigue).  Worsening arthritis and fatigue when Benlysta was missed for 3-4 weeks when she forgot to fill it.  Also historically worsened when leflunomide was missed.  Currently doing well with regard to lupus, but needs dermatology input with regard to the skin lesions as discussed in #3.  Chronic illness.     - Continue Benlysta SQ every 7 days   - Continue hydroxychloroquine from 200mg daily with an additional 200 mg every other day (last eye exam completed on 12/30/2021)  - Continue leflunomide 20 mg daily; labs required every 12 weeks for refills  - Ophthalmology referral for hydroxychloroquine toxicity monitoring given previously and she has an upcoming appointment with Dr. Contreras on 5/10/2024  - Labs in 3 months: CBC, CMP, ESR, CRP, UA, Uprotein:creatinine    High risk medication requiring intensive toxicity monitoring at least quarterly.      # Pregnancy Plans: on birth control (depo-provera)    2.  Secondary Sjogren's syndrome: Mild symptoms that may be due to other factors such as caffeine intake, smoking, etc., but secondary Sjogren's Syndrome is possible.  We discussed the importance of good oral hygiene, frequent  sips of water, avoiding high sugar foods and liquids, avoiding oral irritants such as coffee, alcohol, and nicotine, and avoiding acidic drinks such as carbonated and high sugar beverages. She is already seeing a dentist regularly. Dry eyes improved with preservative free artificial tears and Restasis (effective).    Chronic illness  - Continue artificial tears and Restasis    3. 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  Lesions on the dorsal aspect of her hands and she is going to see dermatology for this.    4.  Bilateral sciatica: No low back pain.  Improves with stretching exercises that she does on her own at home.  Advised more frequent stretching exercises throughout the day.  Also advised physical therapy but she declined.    5.  Excessive daytime sleepiness, fatigue: Wakes up tired, reports witnessed apneic episodes at night.  Snores.  Referred for sleep evaluation previously but she has not scheduled this appointment.  Advised previously and again today that she call to schedule the sleep evaluation.    6.  Vaccinations: Vaccinations reviewed with Ms. Patel.    - Influenza: Encouraged yearly vaccination  - [id number]: up to date  - [id number]: up to date  - Shingrix: Up to date  - COVID-19: Advised keeping updated, and to hold Benlysta and leflunomide for 1 week afterward    7.  History of lichen sclerosis; diffuse body pruritus x2 months: Patient would like to see dermatology.  She also has 1 lesion on each hand, dorsal aspect just proximal to the second MCPs    8.  Chronic low back pain, radiating to each leg: Found to have spinal canal stenosis and is going to have a steroid injection.  Physical therapy was not effective.  Was initially evaluated in the emergency for low back pain and is now following in the pain clinic for this issue.  She will continue following in the pain clinic for management.  Documented here for historical significance only.    Total minutes spent in  evaluation with patient, documentation, , and review of pertinent studies and chart notes: 20  The longitudinal plan of care for the rheumatology problem(s) were addressed during this visit.  Due to added complexity of care, we will continue to support the patient and the subsequent management of this condition with ongoing continuity of care.        Sosa Patel verbalized agreement with and understanding of the rational for the diagnosis and treatment plan.  All questions were answered to best of my ability and the patient's satisfaction. Ms. Wolf was advised to contact the clinic with any questions that may arise after the clinic visit.      Thank you for involving me in the care of the patient    Return to clinic: 3-4 months      HPI   Sosa Patel  is a 48 year old female with a medical history significant for tobacco use history, genital warts, and HSV type II who presents for follow-up of systemic lupus erythematosus.    7/15/2022: Felt more depressed and therefore forgot to refill Benlysta; missed Benlysta for 3-4 weeks and started to have more pain.  Pain was at the MCPs and MTPs.  Prednisone was started and she is feeling much better.  Currently mid-course of the prednisone, taking 10 mg daily at this time.  Also with upper lumbar spine/lower thoracic spine pain that radiates to the left lower back, worse with activity, and improves with rest; reportedly this pain is chronic, and does not radiate to the legs.  Did not see GYN because menstrual bleeding has ceased.  Melatonin helps her sleep; she has not scheduled a sleep evaluation.    10/26/2022: patient cancelled appointment    Mid-Nov 2022: would have run out of leflunomide around this time.  Not refilled because labs are overdue.     12/6/2022: prednisone course given for PRN arthritis flare (patient requested to have on hand while traveling)    2/15/2023: currently doing well.  Taking leflunomide, Benlysta, and hydroxychloroquine as  prescribed.  States that she needs an eye exam completed and will call to schedule this. Has puffiness of her hands but no pain or swelling.  Bony hypertrophy at the DIPs.  Morning stiffness for less than 20 minutes.  Some pain at the end of the day on her hands.  She would like to remain on her current medication regimen because it is effective.    5/24/2023: Patient canceled appointment    8/8/2023: Patient reports doing poorly at this time because she has been out of leflunomide for 3-4 months.  She states that she canceled her last appointment and did not get labs.  Labs are overdue.  Worsening joint pain at the MCPs, PIPs, and wrists that is worse in the morning and improves with time and activity.  Morning stiffness for at least 2 hours.  Positive gelling phenomenon.  Would like to restart leflunomide because she felt better while on it.  Has not scheduled an appointment with sleep medicine.  Has a history of lichen sclerosis and diffuse body pruritus; has 2 erythematous lesions just proximal to the bilateral second MCPs, on the dorsal aspect of the hands.    11/16/2023: Generally doing well.  Still has the raised erythematous lesions on the dorsal aspect of her hands just proximal to the second MCPs, unchanged since previous, and she has scheduled an appointment with dermatology for evaluation.  Bilateral sciatica that improves with stretching exercises that she does once daily.  Otherwise doing well.  No oral or nasal sores.    Today, 2/28/2024: Reports that lupus is doing well.  Was unable to make the dermatology appointment but has another appointment scheduled soon with Dr. Beaulieu to evaluate the lesions on her hands.  Since last seen she had acute back pain with numbness and tingling going down her legs; now followed in pain clinic for spinal stenosis and planning to have a steroid injection in her back.  Back pain is worse when she is standing upright for prolonged period of time and feels better when  she flexes at the waist.    Denies fevers, chills, nausea, vomiting, constipation, diarrhea. No abdominal pain. No LE swelling. No neck pain.  No photosensitivity or photophobia.  No eye pain or redness.    Tobacco: No longer smoking cigarettes; now vaping.  Advised that she wean off of vaping as well  EtOH: rare, sober since 5/16/2010  Drugs: cocaine history; now sober  Occupation: counselor for mentally disabled people; adult foster care    ROS   12 point review of system was completed and negative except as noted in the HPI     Active Problem List     Patient Active Problem List   Diagnosis    Herpes simplex type 2 infection    Genital warts    CARDIOVASCULAR SCREENING; LDL GOAL LESS THAN 160    H/O intravenous drug use in remission    JESSE positive    Multiple joint pain    Cocaine use    Systemic lupus erythematosus (H)    High risk medications (not anticoagulants) long-term use    Sicca syndrome (H24)    Lichen sclerosus    Recurrent major depressive disorder, in partial remission (H24)    Gallstones    Acute bilateral low back pain with bilateral sciatica     Past Medical History     Past Medical History:   Diagnosis Date    Bipolar disorder (H) 12/04    Dx'd    Combinations of drug dependence excluding opioid type drug (H) 5/4/2022    In remission     H/O: depression     SUICIDE ATTEMPT AGE 15    HPV (human papillomavirus)     Mood disorder (H24) 4/9/2019     Past Surgical History     Past Surgical History:   Procedure Laterality Date    BUNIONECTOMY RT/LT      DAVINCI LAPAROSCOPIC CHOLECYSTECTOMY WITHOUT GRAMS N/A 10/9/2023    Procedure: CHOLECYSTECTOMY, ROBOT-ASSISTED, LAPAROSCOPIC, WITHOUT CHOLANGIOGRAM;  Surgeon: Demetris Peoples MD;  Location: [facility] [place] OR     ENLARGE BREAST WITH IMPLANT  2000     Allergy   No Known Allergies  Current Medication List     Current Outpatient Medications   Medication Sig    ARIPiprazole (ABILIFY) 10 MG tablet Take 10 mg by mouth daily    Aspirin-Acetaminophen-Caffeine  (EXCEDRIN PO)     belimumab (BENLYSTA) subcutaneous injection (prefilled autoinjector) Inject 1 mL (200 mg) Subcutaneous every 7 days . Hold for signs of infection and seek medical attention.   Rheumatology follow-up required prior to the next refill prescription    cetirizine (ZYRTEC) 10 MG tablet Take 2 tablets (20 mg) by mouth daily    clobetasol (TEMOVATE) 0.05 % external cream Apply topically 2 times daily    cycloSPORINE (RESTASIS) 0.05 % ophthalmic emulsion Place 1 drop into both eyes 2 times daily    diclofenac (VOLTAREN) 1 % topical gel Apply up to 2 grams of 1% gel to hands up to 4 times daily as needed for joint pain (maximum: 8 g per upper extremity per day)    Efinaconazole (JUBLIA) 10 % SOLN APPLY TOPICALLY TO AFFECTED AREAS DAILY    FLUoxetine (PROZAC) 40 MG capsule TAKE ONE CAPSULE BY MOUTH ONCE DAILY    gabapentin (NEURONTIN) 100 MG capsule Take 3 capsules (300 mg) by mouth 2 times daily    gabapentin (NEURONTIN) 300 MG capsule Take 1 capsule (300 mg) by mouth 3 times daily    hydroquinone (RUFUS) 4 % external cream Apply topically 2 times daily For up to 3 months.    hydroxychloroquine (PLAQUENIL) 200 MG tablet Hydroxychloroquine 200 mg daily with an additional 200 mg every other day.  Ophthalmology toxicity monitoring eye exam, that includes 10-2 VF and SD-OCT, is required yearly.    leflunomide (ARAVA) 20 MG tablet Take 1 tablet (20 mg) by mouth daily ; labs required every 12 weeks    methocarbamol (ROBAXIN) 500 MG tablet Take 1 tablet (500 mg) by mouth 4 times daily as needed for muscle spasms    mometasone (ELOCON) 0.1 % external cream Apply topically daily    predniSONE (DELTASONE) 5 MG tablet For inflammatory arthritis flare only: Prednisone 20mg daily x5days, then 15mg daily x5days, then 10mg daily x5days, then 5mg daily x5days, then stop.    senna-docusate (SENOKOT-S/PERICOLACE) 8.6-50 MG tablet Take 1-2 tablets by mouth 2 times daily     No current facility-administered medications for  "this visit.     Social History   See HPI    Family History     Family History   Problem Relation Age of Onset    Cancer Father         lung cancer    Diabetes Maternal Grandmother     Depression Maternal Grandmother     Lipids Maternal Grandmother     Cerebrovascular Disease Maternal Grandfather     Lipids Maternal Grandfather     Circulatory Maternal Grandfather         MI in 50's    Depression Brother         depression and OCD    Gastrointestinal Disease Brother         IBS    Depression Mother     Thyroid Disease No family hx of     Asthma No family hx of      Denies family history of autoimmune disease     Physical Exam     Temp Readings from Last 3 Encounters:   02/09/24 98.1  F (36.7  C) (Oral)   01/03/24 97.5  F (36.4  C) (Oral)   10/09/23 97.3  F (36.3  C) (Temporal)     BP Readings from Last 5 Encounters:   02/28/24 103/68   02/09/24 124/83   02/01/24 121/81   01/03/24 126/85   11/16/23 117/75     Pulse Readings from Last 1 Encounters:   02/28/24 86     Resp Readings from Last 1 Encounters:   02/28/24 16     Estimated body mass index is 29.66 kg/m  as calculated from the following:    Height as of 2/9/24: 1.664 m (5' 5.5\").    Weight as of this encounter: 82.1 kg (181 lb).    GEN: NAD.   HEENT:  Anicteric, noninjected sclera. No obvious external lesions of the ear and nose. Hearing intact.  CV: S1, S2. RRR. No m/r/g  PULM: No increased work of breathing. CTA bilaterally   MSK:  MCPs, PIPs, and DIPs without swelling or tenderness to palpation.  Wrists without swelling or tenderness to palpation.  Heberden's nodes present..   Elbows and shoulders without swelling or tenderness to palpation.  Knees, ankles, and MTPs without swelling or tenderness to palpation.  SKIN: 2 round erythematous lesions on the dorsal aspect of the hands, nonscaly and with raised borders, just proximal to the second MCPs  PSYCH: Alert. Appropriate.      Labs / Imaging (select studies)     RF/CCP  Recent Labs   Lab Test 10/11/16  1043 " 08/10/16  1059   CCPIGG 1  --    RHF  --  <20     JESSE  Recent Labs   Lab Test 08/10/16  1059   ROBYN 1.7*     RNP/Sm/SSA/SSB  Recent Labs   Lab Test 10/11/16  1043   RNPIGG 0.3   SMIGG <0.2  Negative   Antibody index (AI) values reflect qualitative changes in antibody   concentration that cannot be directly associated with clinical condition or   disease state.     SSAIGG <0.2  Negative   Antibody index (AI) values reflect qualitative changes in antibody   concentration that cannot be directly associated with clinical condition or   disease state.     SSBIGG <0.2  Negative   Antibody index (AI) values reflect qualitative changes in antibody   concentration that cannot be directly associated with clinical condition or   disease state.     SCLIGG <0.2  Negative   Antibody index (AI) values reflect qualitative changes in antibody   concentration that cannot be directly associated with clinical condition or   disease state.       dsDNA  Recent Labs   Lab Test 11/09/23  0717 08/08/23  1618 02/14/23  0856 07/12/22  1302 03/23/22  1204 11/30/21  1245 08/19/21  1056 04/20/21  1241 01/11/21  1315   DNA 4.2 8.0 7.1 7.6 7.2 13.0* 11.0* 11* 17*     C3/C4  Recent Labs   Lab Test 11/09/23  0717 08/08/23  1618 02/14/23  0856 07/12/22  1302 03/23/22  1204 11/30/21  1245 08/19/21  1056 04/20/21  1241 01/11/21  1315   J6QVZTH 129 124 147 129 103 120 113 103 109   J7DGUME 30 29 34 24 25 30 28 27 25     CBC  Recent Labs   Lab Test 02/08/24  0700 11/09/23  0717 09/19/23  1232 08/19/21  1057 04/20/21  1241 01/11/21  1315 09/10/20  1308   WBC 5.3 5.1 5.8   < > 6.0 6.2 6.1   RBC 4.56 4.45 4.93   < > 4.28 4.46 4.44   HGB 12.1 12.1 13.3   < > 12.1 12.7 12.4   HCT 38.7 37.4 40.3   < > 35.7 39.1 37.8   MCV 85 84 82   < > 83 88 85   RDW 12.8 13.5 13.2   < > 12.9 13.0 13.6    207 252   < > 213 202 235   MCH 26.5 27.2 27.0   < > 28.3 28.5 27.9   MCHC 31.3* 32.4 33.0   < > 33.9 32.5 32.8   NEUTROPHIL 49 50 52   < > 54.6 59.5 54.5   LYMPH 34  31 32   < > 31.6 26.4 30.0   MONOCYTE 11 13 11   < > 9.9 10.3 10.5   EOSINOPHIL 5 5 4   < > 3.6 3.5 4.3   BASOPHIL 1 1 1   < > 0.3 0.3 0.7   ANEU  --   --   --   --  3.3 3.7 3.3   ALYM  --   --   --   --  1.9 1.6 1.8   DAQUAN  --   --   --   --  0.6 0.6 0.6   AEOS  --   --   --   --  0.2 0.2 0.3   ABAS  --   --   --   --  0.0 0.0 0.0   ANEUTAUTO 2.6 2.6 3.0   < >  --   --   --    ALYMPAUTO 1.8 1.6 1.9   < >  --   --   --    AMONOAUTO 0.6 0.7 0.6   < >  --   --   --    AEOSAUTO 0.3 0.3 0.2   < >  --   --   --    ABSBASO 0.0 0.0 0.0   < >  --   --   --     < > = values in this interval not displayed.     CMP  Recent Labs   Lab Test 02/08/24  0700 11/09/23  0717 09/19/23  1232 08/08/23  1618 02/14/23  0856 07/12/22  1302 07/12/22  1302 08/19/21  1056 04/20/21  1241 01/11/21  1315 09/10/20  1308 10/16/19  1708    139 140 144 138  --  141   < > 138 139 138 139   POTASSIUM 4.0 4.0 4.1 4.0 4.4   < > 4.1   < > 3.6 3.9 4.1 3.9   CHLORIDE 105 104 103 108* 100   < > 107   < > 108 110* 106 109   CO2 27 26 26 27 24   < > 29   < > 23 26 28 24   ANIONGAP 9 9 11 9 14   < > 5   < > 7 3 4 6   GLC 74 82 65* 94 86  --  83   < > 117* 78 85 83   BUN 17.9 25.9* 17.8 25.4* 20.0   < > 21   < > 25 15 17 17   CR 0.71 0.75 0.79 0.69 0.77  --  0.61   < > 0.95 0.67 0.77 0.75   GFRESTIMATED >90 >90 >90 >90 >90  --  >90   < > 72 >90 >90 >90   GFRESTBLACK  --   --   --   --   --   --   --   --  83 >90 >90 >90   RICHARD 9.0 9.2 9.6 8.9 9.6  --  8.8   < > 8.3* 8.7 8.9 9.1   BILITOTAL 0.2 0.2 0.2 <0.2 0.4  --  0.2   < > 0.4 0.3 0.3 0.3   ALBUMIN 3.9 4.3 4.3 4.2 4.4   < > 3.6   < > 3.8 4.0 3.8 4.2   PROTTOTAL 6.2* 6.5 6.7 6.5 7.0  --  6.4*   < > 6.6* 6.9 6.9 7.3   ALKPHOS 36* 35 35 35 36  --  28*   < > 24* 22* 28* 23*   AST 24 25 23 28 24  --  16   < > 13 18 18 18   ALT 15 33 22 26 17  --  33   < > 23 35 32 23    < > = values in this interval not displayed.     Calcium/VitaminD  Recent Labs   Lab Test 02/08/24  0700 11/09/23  0717 09/19/23  1232   RICHARD  9.0 9.2 9.6     ESR/CRP  Recent Labs   Lab Test 02/08/24  0700 11/09/23  0717 08/08/23  1618 07/12/22  1302 03/23/22  1204 11/30/21  1245 08/19/21  1057 08/19/21  1056   SED 6 4 6   < > 18 8   < >  --    CRP  --   --   --   --  <2.9 <2.9  --  <2.9   CRPI <3.00 <3.00 <3.00   < >  --   --   --   --     < > = values in this interval not displayed.     CK/Aldolase  Recent Labs   Lab Test 11/09/23  0717 07/12/22  1302 08/19/21  1056 01/13/17  1015 10/11/16  1043    32 106   < > 65   ALDOLASE  --   --   --   --  4.5    < > = values in this interval not displayed.     TSH/T4  Recent Labs   Lab Test 10/11/16  1043   TSH 1.48         Hepatitis B  Recent Labs   Lab Test 02/26/21  0927 10/11/16  1043   HBCAB Nonreactive Nonreactive   HEPBANG Nonreactive Nonreactive     Hepatitis C  Recent Labs   Lab Test 02/26/21  0927 10/11/16  1043   HCVAB Nonreactive Nonreactive   Assay performance characteristics have not been established for newborns,   infants, and children       Tuberculosis Screening  Recent Labs   Lab Test 07/12/22  1302 06/28/21  1700 02/26/21  0927   TBRES Negative  --   --    TBRST  --  Negative Negative     HIV Screening  Recent Labs   Lab Test 10/11/16  1043   HIAGAB Nonreactive   HIV-1 p24 Ag & HIV-1/HIV-2 Ab Not Detected       UA  Recent Labs   Lab Test 11/09/23  0721 09/19/23  1213 02/14/23  0919 07/12/22  1302 09/10/20  1315 11/04/19  1747 04/04/19  1625 12/13/18  1355 09/21/17  0815 05/01/17  0954   COLOR Yellow Light Yellow Yellow Yellow   < > Yellow   < > Yellow Yellow Yellow   APPEARANCE Clear Clear Clear Clear   < > Clear   < > Clear Clear Clear   URINEGLC Negative Negative Negative Negative   < > Negative   < > Negative Negative Negative   URINEBILI Negative Negative Negative Negative   < > Negative   < > Negative Negative Small  This is an unconfirmed screening test result. A positive result may be false.  *   SG >=1.030 1.014 >=1.030 >=1.030   < > 1.015   < > >1.030 >1.030 >1.030   URINEPH 6.0  5.5 6.0 5.5   < > 8.0*   < > 5.5 6.0 6.0   PROTEIN Negative Negative Negative Negative   < > Trace*   < > Negative Negative Trace*   UROBILINOGEN 0.2  --  0.2 0.2   < > 0.2   < > 0.2 0.2 0.2   NITRITE Negative Negative Negative Negative   < > Negative   < > Negative Negative Negative   UBLD Trace* Negative Negative Negative   < > Negative   < > Small* Trace* Small*   LEUKEST Trace* Moderate* Negative Negative   < > Negative   < > Negative Negative Negative   WBCU 0-5 None Seen  --   --   --  0 - 5  --  0 - 5 O - 2 O - 2   RBCU None Seen None Seen  --   --   --  O - 2  --  O - 2 O - 2 O - 2   SQUAMOUSEPI  --   --   --   --   --  Few  --  Moderate* Few Few   BACTERIA Few*  --   --   --   --  Few*  --   --   --   --    MUCOUS  --   --   --   --   --   --   --   --   --  Present*    < > = values in this interval not displayed.     Urine Microscopic  Recent Labs   Lab Test 11/09/23  0721 09/19/23  1213 11/04/19  1747 12/13/18  1355 09/21/17  0815 05/01/17  0954   WBCU 0-5 None Seen 0 - 5 0 - 5 O - 2 O - 2   RBCU None Seen None Seen O - 2 O - 2 O - 2 O - 2   SQUAMOUSEPI  --   --  Few Moderate* Few Few   BACTERIA Few*  --  Few*  --   --   --    MUCOUS  --   --   --   --   --  Present*     Urine Protein  GHUTP and UTP= Urine protein (random), GHUTPG and UTPG = urine protein:creatinine ratio (random), UCRR = urine creatinine (random)  Recent Labs   Lab Test 11/09/23  0721 02/14/23  0919 07/12/22  1302 08/19/21  1058 09/10/20  1315 11/04/19  1747   GHUTP 8.9 13.6 9.0  --   --   --    UTP  --   --   --  0.16 0.15 0.21   GHUTPG 0.09 0.15 0.08  --   --   --    UTPG  --   --   --  0.12 0.17 0.16   UCRR 96.3 89.9 118.0 137 88  --      Immunization History     Immunization History   Administered Date(s) Administered    COVID-19 12+ (2023-24) (Pfizer) 02/09/2024    COVID-19 Bivalent 12+ (Pfizer) 11/30/2022    COVID-19 MONOVALENT 12+ (Pfizer) 02/12/2021, 05/20/2021, 12/31/2021    COVID-19 Monovalent 12+ (Pfizer 2022) 05/04/2022     Flu, Unspecified 11/01/2020, 12/01/2021    HepB, Unspecified 03/21/1997    Hepatitis A (ADULT 19+) 11/30/2022    Influenza (IIV3) PF 10/23/2010    Influenza Vaccine >6 months,quad, PF 12/19/2014, 10/16/2015, 11/04/2016, 12/13/2018, 11/07/2019, 11/30/2022, 09/29/2023    Influenza Vaccine, 6+MO IM (QUADRIVALENT W/PRESERVATIVES) 10/09/2020    Mantoux Tuberculin Skin Test 07/30/2018    Pneumo Conj 13-V (2010&after) 12/13/2018    Pneumococcal 23 valent 04/04/2019    TD,PF 7+ (Tenivac) 02/23/2005, 04/09/2019    TDAP (Adacel,Boostrix) 07/21/2016    TDAP Vaccine (Adacel) 04/14/2009    Zoster recombinant adjuvanted (SHINGRIX) 12/21/2018, 04/04/2019          Chart documentation done in part with Dragon Voice recognition Software. Although reviewed after completion, some word and grammatical error may remain.    Jack Arvizu MD

## 2024-02-28 NOTE — NURSING NOTE
RAPID3 (0-30) Cumulative Score  10.2          RAPID3 Weighted Score (divide #4 by 3 and that is the weighted score)  3.4

## 2024-03-13 ENCOUNTER — RADIOLOGY INJECTION OFFICE VISIT (OUTPATIENT)
Dept: PALLIATIVE MEDICINE | Facility: OTHER | Age: 49
End: 2024-03-13
Attending: STUDENT IN AN ORGANIZED HEALTH CARE EDUCATION/TRAINING PROGRAM
Payer: COMMERCIAL

## 2024-03-13 VITALS — OXYGEN SATURATION: 98 % | SYSTOLIC BLOOD PRESSURE: 131 MMHG | HEART RATE: 71 BPM | DIASTOLIC BLOOD PRESSURE: 84 MMHG

## 2024-03-13 DIAGNOSIS — M54.16 LUMBAR RADICULOPATHY: ICD-10-CM

## 2024-03-13 PROCEDURE — 250N000011 HC RX IP 250 OP 636: Performed by: STUDENT IN AN ORGANIZED HEALTH CARE EDUCATION/TRAINING PROGRAM

## 2024-03-13 PROCEDURE — 64483 NJX AA&/STRD TFRM EPI L/S 1: CPT | Mod: 50 | Performed by: STUDENT IN AN ORGANIZED HEALTH CARE EDUCATION/TRAINING PROGRAM

## 2024-03-13 PROCEDURE — 250N000009 HC RX 250: Performed by: STUDENT IN AN ORGANIZED HEALTH CARE EDUCATION/TRAINING PROGRAM

## 2024-03-13 PROCEDURE — 255N000002 HC RX 255 OP 636: Performed by: STUDENT IN AN ORGANIZED HEALTH CARE EDUCATION/TRAINING PROGRAM

## 2024-03-13 RX ORDER — LIDOCAINE HYDROCHLORIDE 10 MG/ML
3 INJECTION, SOLUTION EPIDURAL; INFILTRATION; INTRACAUDAL; PERINEURAL ONCE
Status: COMPLETED | OUTPATIENT
Start: 2024-03-13 | End: 2024-03-13

## 2024-03-13 RX ORDER — DEXAMETHASONE SODIUM PHOSPHATE 10 MG/ML
10 INJECTION INTRAMUSCULAR; INTRAVENOUS ONCE
Status: COMPLETED | OUTPATIENT
Start: 2024-03-13 | End: 2024-03-13

## 2024-03-13 RX ADMIN — IOHEXOL 2 ML: 180 INJECTION INTRAVENOUS at 11:24

## 2024-03-13 RX ADMIN — DEXAMETHASONE SODIUM PHOSPHATE 10 MG: 10 INJECTION INTRAMUSCULAR; INTRAVENOUS at 11:44

## 2024-03-13 RX ADMIN — LIDOCAINE HYDROCHLORIDE 3 ML: 10 INJECTION, SOLUTION EPIDURAL; INFILTRATION; INTRACAUDAL; PERINEURAL at 11:44

## 2024-03-13 ASSESSMENT — PAIN SCALES - GENERAL
PAINLEVEL: SEVERE PAIN (7)
PAINLEVEL: MILD PAIN (3)

## 2024-03-13 NOTE — PATIENT INSTRUCTIONS
[facility]   Procedure Discharge Instructions    Today you saw:      Dr. Echavarria    You had an:  Bilateral L4 epidural steroid injection       Medications used:  Lidocaine  Dexamethasone Omnipaque       If you were holding your blood thinning medication, please restart taking it: N/A  Be cautious when walking. Numbness and/or weakness in the lower extremities may occur for up to 6-8 hours after the procedure due to effect of the local anesthetic  Do not drive for 6 hours. The effect of the local anesthetic could slow your reflexes.   You may resume your regular activities after 24 hours  Avoid strenuous activity for the first 24 hours  You may shower, however avoid swimming, tub baths or hot tubs for 24 hours following your procedure  You may have a mild to moderate increase in pain for several days following the injection.  It may take up to 14 days for the steroid medication to start working although you may feel the effect as early as a few days after the procedure.     You may use ice packs for 10-15 minutes, 3 to 4 times a day at the injection site for comfort  Do not use heat to painful areas for 6 to 8 hours. This will give the local anesthetic time to wear off and prevent you from accidentally burning your skin.   Unless you have been directed to avoid the use of anti-inflammatory medications (NSAIDS), you may use medications such as ibuprofen, Aleve or Tylenol for pain control if needed.   If you have diabetes, check your blood sugar more frequently than usual as your blood sugar may be higher than normal for 10-14 days following a steroid injection. Contact your doctor who manages your diabetes if your blood sugar is higher than usual  Possible side effects of steroids that you may experience include flushing, elevated blood pressure, increased appetite, mild headaches and restlessness.  All of these symptoms will get better with time.  If you experience any of the  following, call the Pain Clinic at [phone]:  -Fever over 100 degree F  -Swelling, bleeding, redness, drainage, warmth at the injection site  -Progressive weakness or numbness in your legs or arms  -Loss of bowel or bladder function  -Unusual headache that is not relieved by Tylenol or other pain reliever  -Unusual new onset of pain that is not improving

## 2024-03-13 NOTE — NURSING NOTE
Discharge Information    IV Discontiued Time:  NA    Amount of Fluid Infused:  NA    Discharge Criteria = When patient returns to baseline or as per MD order    Consciousness:  Pt is fully awake    Circulation:  BP +/- 20% of pre-procedure level    Respiration:  Patient is able to breathe deeply    O2 Sat:  Patient is able to maintain O2 Sat >92% on room air    Activity:  Moves 4 extremities on command    Ambulation:  Patient is able to stand and walk or stand and pivot into wheelchair    Dressing:  Clean/dry or No Dressing    Notes:   Discharge instructions and AVS given to patient    Patient meets criteria for discharge?  YES    Admitted to PCU?  No    Responsible adult present to accompany patient home?  Yes    Signature/Title:    Paula Bowman RN  RN Care Coordinator  [facility] [place]

## 2024-03-13 NOTE — PROGRESS NOTES
Pre procedure Diagnosis: Bilateral lumbar radiculopathy   Post procedure Diagnosis: Same  Procedure performed: bilateral L4-5 transforaminal epidural steroid injection, CPT code 99932-56  Anesthesia: none  Complications: none immediately  Operators: Philipp Echavarria MD    Indications:   Sosa Patel is a 48 year old female was sent for a lumbar transforaminal epidural steroid injection      Options/alternatives, benefits and risks were discussed with the patient including bleeding, infection, tissue trauma, numbness, weakness, paralysis, spinal cord injury, radiation exposure, headache and reaction to medications. Questions were answered to her satisfaction and she agrees to proceed. Voluntary informed consent was obtained and signed.     Vitals were reviewed: Yes  Allergies were reviewed:  Yes   Medications were reviewed:  Yes   Pre-procedure pain score: 7/10    Procedure:  After getting informed consent, patient was brought into the procedure suite and was placed in a prone position on the procedure table.   A Pause for the Cause was performed.  Patient was prepped and draped in sterile fashion.     After identifying the L4-5 neuroforamen, the C-arm was rotated to a lateral oblique angle.  A total of 4ml of Lidocaine 1% was used to anesthetize the skin and the needle track at a skin entry site coaxial with the fluoroscopy beam, and overriding the superior aspect of the neuroforamen.  A 22 gauge 5 inch spinal needle was advanced under intermittent fluoroscopy until it entered the foramen superiorly.    The position was then inspected from anteroposterior and lateral views, and the needle adjusted appropriately.  A total of 2ml of Omnipaque-180 was injected, confirming appropriate position, with spread into the nerve root sheath and the epidural space, with no intravascular uptake. Visualization of active injection under digital subtraction angiography further confirmed the above appropriate contrast  spread.    1.5ml of preservative free 1% lidocaine with 5mg of dexamethasone was injected.  The needle was flushed with lidocaine and removed.    The procedure was then repeated on the other side. A total of 3ml preservative free 1% lidocaine and 10mg dexamethasone were injected.    During the procedure, the patient DID NOT experience paresthesias corresponding to the nerve root near final needle placement.    Hemostasis was achieved, the area was cleaned, and bandaids were placed when appropriate.    The patient tolerated the procedure well, and was taken to the recovery room. Images were saved to PACS.    Post-procedure pain score: 3/10  Follow-up includes:   -f/u with referring provider    Philipp Echavarria MD  Interventional Pain Medicine  [facility]

## 2024-03-13 NOTE — PROGRESS NOTES
Pre-procedure Intake  If YES to any questions or NO to having a   Please complete laminated checklist and leave on the computer keyboard for Provider, verbally inform provider if able.    For SCS Trial, RFA's or any sedation procedure:  Have you been fasting? NA  If yes, for how long?     Are you taking any any blood thinners such as Coumadin, Warfarin, Jantoven, Pradaxa Xarelto, Eliquis, Edoxaban, Enoxaparin, Lovenox, Heparin, Arixtra, Fondaparinux, or Fragmin? OR Antiplatelet medication such as Plavix, Brilinta, or Effient?   No   If yes, when did you take your last dose?     Do you take aspirin?  No  If cervical procedure, have you held aspirin for 6 days?   NA    Do you have any allergies to contrast dye, iodine, steroid and/or numbing medications?  NO    Are you currently taking antibiotics or have an active infection?  NO    Have you had a fever/elevated temperature within the past week? NO    Are you currently taking oral steroids? YES: predniSONE (DELTASONE) 5 MG tablet - Long term for inflammatory arthritis flare. Not currently taking    Do you have a ? Yes    Are you pregnant or breastfeeding?  NO    Have you received the COVID-19 vaccine? Yes  If yes, was it your 1st, 2nd or only dose needed? 2nd dose and booster  Date of most recent vaccine: 02/09/2024    Notify provider and RNs if systolic BP >170, diastolic BP >100, P >100 or O2 sats < 90%     Ramonita Arzola MA  [facility] [place]

## 2024-03-16 DIAGNOSIS — M54.42 BILATERAL LOW BACK PAIN WITH BILATERAL SCIATICA, UNSPECIFIED CHRONICITY: ICD-10-CM

## 2024-03-16 DIAGNOSIS — M54.41 BILATERAL LOW BACK PAIN WITH BILATERAL SCIATICA, UNSPECIFIED CHRONICITY: ICD-10-CM

## 2024-03-16 DIAGNOSIS — R20.0 NUMBNESS IN FEET: ICD-10-CM

## 2024-03-18 ENCOUNTER — OFFICE VISIT (OUTPATIENT)
Dept: DERMATOLOGY | Facility: CLINIC | Age: 49
End: 2024-03-18
Attending: INTERNAL MEDICINE
Payer: COMMERCIAL

## 2024-03-18 DIAGNOSIS — L81.0 POSTINFLAMMATORY HYPERPIGMENTATION: ICD-10-CM

## 2024-03-18 DIAGNOSIS — B35.1 ONYCHOMYCOSIS: ICD-10-CM

## 2024-03-18 DIAGNOSIS — D84.9 IMMUNOSUPPRESSION (H): ICD-10-CM

## 2024-03-18 DIAGNOSIS — L81.4 SOLAR LENTIGO: ICD-10-CM

## 2024-03-18 DIAGNOSIS — L92.0 GRANULOMA ANNULARE: Primary | ICD-10-CM

## 2024-03-18 DIAGNOSIS — M32.9 SYSTEMIC LUPUS ERYTHEMATOSUS, UNSPECIFIED SLE TYPE, UNSPECIFIED ORGAN INVOLVEMENT STATUS (H): ICD-10-CM

## 2024-03-18 PROCEDURE — 99203 OFFICE O/P NEW LOW 30 MIN: CPT | Performed by: DERMATOLOGY

## 2024-03-18 RX ORDER — HYDROQUINONE 40 MG/G
CREAM TOPICAL AT BEDTIME
Qty: 28.35 G | Refills: 11 | Status: SHIPPED | OUTPATIENT
Start: 2024-03-18

## 2024-03-18 RX ORDER — METHOCARBAMOL 500 MG/1
500 TABLET, FILM COATED ORAL 4 TIMES DAILY PRN
Qty: 30 TABLET | Refills: 1 | Status: SHIPPED | OUTPATIENT
Start: 2024-03-18 | End: 2024-04-03

## 2024-03-18 RX ORDER — BETAMETHASONE DIPROPIONATE 0.5 MG/G
OINTMENT, AUGMENTED TOPICAL 2 TIMES DAILY
Qty: 50 G | Refills: 3 | Status: SHIPPED | OUTPATIENT
Start: 2024-03-18 | End: 2024-09-16

## 2024-03-18 RX ORDER — TRETINOIN 0.25 MG/G
CREAM TOPICAL AT BEDTIME
Qty: 45 G | Refills: 3 | Status: SHIPPED | OUTPATIENT
Start: 2024-03-18

## 2024-03-18 NOTE — NURSING NOTE
Sosa Patel's goals for this visit include:   Chief Complaint   Patient presents with    Rash     Patient has a rash on both hands for past 7 months.  Rash itches.  Test was negative for ring worm.  Patient has lupus and believes it is possibly related.  Patient has tried Mometasone with not much improvement.       She requests these members of her care team be copied on today's visit information:     PCP: Cynthia Scott    Referring Provider:  Jack Arvizu MD  [address]  [place] [id number]    There were no vitals taken for this visit.    Do you need any medication refills at today's visit?     Francine Santa on 3/18/2024 at 7:07 AM

## 2024-03-18 NOTE — LETTER
3/18/2024         RE: Sosa Patel  [address]   [place] [address]        Dear Colleague,    Thank you for referring your patient, Sosa Patel, to the [facility]. Please see a copy of my visit note below.    [facility] Dermatology Note    Encounter Date: Mar 18, 2024    Dermatology Problem List:  1. Granuloma annulare  - current tx: augmented betamethasone  - in reserve: steroid injections, methotrexate, hydroxychloroquine  2. Post inflammatory hyperpigmentation/solar lentigines  - current tx: tretinoin, hydroquinone  3. 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 Postinflammatory hyperpigmentation/solar lentigines on the face  - discussed risks/benefits of topical tretinoin vs azelaic acid  - patient will begin tretinoin 0.025%  - continue hydroquinone 4%  3. Systemic lupus  - managed by rheumatology  - on hydroxychloroquine, leflunomide, and belimumab    Follow-up in 5-6 months.       Staff Involved:  Staff and Scribe    I, Becki Hatfield, am serving as a scribe; to document services personally performed by Louis Beaulieu MD -based on data collection and the provider's statements to me.    Provider Disclosure:   The documentation recorded by the scribe accurately reflects the services I personally performed and the decisions made by me.    Louis Beaulieu MD   of Dermatology  Department of Dermatology  [facility] [employer]      CC:   Chief Complaint   Patient presents with     Rash     Patient has a rash on both hands for past  7 months.  Rash itches.  Test was negative for ring worm.  Patient has lupus and believes it is possibly related.  Patient has tried Mometasone with not much improvement.        History of Present Illness:  Ms. Sosa Patel is a 48 year old female who presents as a new patient. She is here for a rash on both hands that has been present for 7 months. Patient reports itching. She has lupus, wonders if it could be related. Patient reports that test was negative for ringworm. She has been using mometasone with little improvement. She denies rashes on the feet or ankles. Patient would also like to discuss dark spots on the face, has used hydroquinone but it has not been successful.     Patient is otherwise feeling well, with no additional skin concerns.     Labs:  N/A    Physical exam:  Vitals: There were no vitals taken for this visit.  GEN: This is a well developed, well-nourished female in no acute distress, in a pleasant mood.    SKIN: Moses phototype III  - Focused examination of the face, hands, and feet was performed.  - Multiple erythematous annular patches and plaques on the dorsal hands.  - Diffuse hyperpigmentation on the central face with few brown macules.  - No other lesions of concern on areas examined.     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 She has a 2.3 pack-year smoking history. She has never used smokeless tobacco. She reports current alcohol use. She reports current drug use.    Family History:  Family History   Problem Relation Age of Onset     Cancer Father         lung cancer     Diabetes Maternal Grandmother      Depression Maternal Grandmother      Lipids Maternal Grandmother      Cerebrovascular Disease Maternal Grandfather      Lipids Maternal Grandfather      Circulatory Maternal Grandfather         MI in 50's     Depression Brother         depression and OCD     Gastrointestinal Disease Brother         IBS     Depression Mother      Thyroid Disease No family hx of      Asthma No family hx of        Medications:  Current Outpatient Medications   Medication Sig Dispense Refill     ARIPiprazole (ABILIFY) 10 MG tablet Take 10 mg by mouth daily       Aspirin-Acetaminophen-Caffeine (EXCEDRIN PO)        augmented betamethasone dipropionate (DIPROLENE-AF) 0.05 % external ointment Apply topically 2 times daily 50 g 3     belimumab (BENLYSTA) subcutaneous injection (prefilled autoinjector) Inject 1 mL (200 mg) Subcutaneous every 7 days . Hold for signs of infection and seek medical attention.   Rheumatology follow-up required prior to the next refill prescription 4 mL 3     cetirizine (ZYRTEC) 10 MG tablet Take 2 tablets (20 mg) by mouth daily 180 tablet 0     clobetasol (TEMOVATE) 0.05 % external cream Apply topically 2 times daily 60 g 0     cycloSPORINE (RESTASIS) 0.05 % ophthalmic emulsion Place 1 drop into both eyes 2 times daily 11 mL 6     diclofenac (VOLTAREN) 1 % topical gel Apply up to 2 grams of 1% gel to hands up to 4 times daily as needed for joint pain (maximum: 8 g per upper extremity per day) 200 g 2     Efinaconazole (JUBLIA) 10 % SOLN APPLY TOPICALLY TO AFFECTED AREAS DAILY 8 mL 1     FLUoxetine (PROZAC) 40 MG capsule TAKE ONE CAPSULE BY MOUTH ONCE DAILY 90 capsule 1     gabapentin (NEURONTIN) 100 MG  capsule Take 3 capsules (300 mg) by mouth 2 times daily 60 capsule 1     gabapentin (NEURONTIN) 300 MG capsule Take 1 capsule (300 mg) by mouth 3 times daily 180 capsule 1     hydroquinone (RUFUS) 4 % external cream Apply topically at bedtime 28.35 g 11     hydroquinone (RUFUS) 4 % external cream Apply topically 2 times daily For up to 3 months. 30 g 3     hydroxychloroquine (PLAQUENIL) 200 MG tablet Hydroxychloroquine 200 mg daily with an additional 200 mg every other day.  Ophthalmology toxicity monitoring eye exam, that includes 10-2 VF and SD-OCT, is required yearly. 135 tablet 1     leflunomide (ARAVA) 20 MG tablet Take 1 tablet (20 mg) by mouth daily ; labs required every 12 weeks 90 tablet 1     methocarbamol (ROBAXIN) 500 MG tablet Take 1 tablet (500 mg) by mouth 4 times daily as needed for muscle spasms 30 tablet 1     mometasone (ELOCON) 0.1 % external cream Apply topically daily 45 g 1     predniSONE (DELTASONE) 5 MG tablet For inflammatory arthritis flare only: Prednisone 20mg daily x5days, then 15mg daily x5days, then 10mg daily x5days, then 5mg daily x5days, then stop. 50 tablet 0     senna-docusate (SENOKOT-S/PERICOLACE) 8.6-50 MG tablet Take 1-2 tablets by mouth 2 times daily 15 tablet 0     tretinoin (RETIN-A) 0.025 % external cream Apply topically at bedtime Pea-sized amount to whole face 45 g 3     No Known Allergies                Again, thank you for allowing me to participate in the care of your patient.        Sincerely,        Louis Beaulieu MD

## 2024-03-18 NOTE — PROGRESS NOTES
[facility] Dermatology Note    Encounter Date: Mar 18, 2024    Dermatology Problem List:  1. Granuloma annulare  - current tx: augmented betamethasone  - in reserve: steroid injections, methotrexate, hydroxychloroquine  2. Post inflammatory hyperpigmentation/solar lentigines  - current tx: tretinoin, hydroquinone  3. 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 Postinflammatory hyperpigmentation/solar lentigines on the face  - discussed risks/benefits of topical tretinoin vs azelaic acid  - patient will begin tretinoin 0.025%  - continue hydroquinone 4%  3. Systemic lupus  - managed by rheumatology  - on hydroxychloroquine, leflunomide, and belimumab    Follow-up in 5-6 months.       Staff Involved:  Staff and Scribe    I, Becki Hatfield, am serving as a scribe; to document services personally performed by Louis Beaulieu MD -based on data collection and the provider's statements to me.    Provider Disclosure:   The documentation recorded by the scribe accurately reflects the services I personally performed and the decisions made by me.    Louis Beaulieu MD   of Dermatology  Department of Dermatology  [facility] [employer] [facility]      CC:   Chief Complaint   Patient presents with    Rash     Patient has a rash on both hands for past 7 months.  Rash itches.  Test was negative for ring worm.  Patient has lupus and believes it is possibly related.  Patient has tried Mometasone with not much improvement.        History of Present Illness:  Ms. Sosa Patel is a 48 year old female  who presents as a new patient. She is here for a rash on both hands that has been present for 7 months. Patient reports itching. She has lupus, wonders if it could be related. Patient reports that test was negative for ringworm. She has been using mometasone with little improvement. She denies rashes on the feet or ankles. Patient would also like to discuss dark spots on the face, has used hydroquinone but it has not been successful.     Patient is otherwise feeling well, with no additional skin concerns.     Labs:  N/A    Physical exam:  Vitals: There were no vitals taken for this visit.  GEN: This is a well developed, well-nourished female in no acute distress, in a pleasant mood.    SKIN: Moses phototype III  - Focused examination of the face, hands, and feet was performed.  - Multiple erythematous annular patches and plaques on the dorsal hands.  - Diffuse hyperpigmentation on the central face with few brown macules.  - No other lesions of concern on areas examined.     Past Medical History:   Past Medical History:   Diagnosis Date    Bipolar disorder (H) 12/04    Dx'd    Combinations of drug dependence excluding opioid type drug (H) 5/4/2022    In remission     H/O: depression     SUICIDE ATTEMPT AGE 15    HPV (human papillomavirus)     Mood disorder (H24) 4/9/2019     Past Surgical History:   Procedure Laterality Date    BUNIONECTOMY RT/LT      DAVINCI LAPAROSCOPIC CHOLECYSTECTOMY WITHOUT GRAMS N/A 10/9/2023    Procedure: CHOLECYSTECTOMY, ROBOT-ASSISTED, LAPAROSCOPIC, WITHOUT CHOLANGIOGRAM;  Surgeon: Demetris Peoples MD;  [place] [facility] [place]     ENLARGE BREAST WITH IMPLANT  2000       Social History:   reports that she has been smoking other, cigarettes, and vaping device. She has a 2.3 pack-year smoking history. She has never used smokeless tobacco. She reports current alcohol use. She reports current drug use.    Family History:  Family History   Problem Relation Age of Onset    Cancer Father          lung cancer    Diabetes Maternal Grandmother     Depression Maternal Grandmother     Lipids Maternal Grandmother     Cerebrovascular Disease Maternal Grandfather     Lipids Maternal Grandfather     Circulatory Maternal Grandfather         MI in 50's    Depression Brother         depression and OCD    Gastrointestinal Disease Brother         IBS    Depression Mother     Thyroid Disease No family hx of     Asthma No family hx of        Medications:  Current Outpatient Medications   Medication Sig Dispense Refill    ARIPiprazole (ABILIFY) 10 MG tablet Take 10 mg by mouth daily      Aspirin-Acetaminophen-Caffeine (EXCEDRIN PO)       augmented betamethasone dipropionate (DIPROLENE-AF) 0.05 % external ointment Apply topically 2 times daily 50 g 3    belimumab (BENLYSTA) subcutaneous injection (prefilled autoinjector) Inject 1 mL (200 mg) Subcutaneous every 7 days . Hold for signs of infection and seek medical attention.   Rheumatology follow-up required prior to the next refill prescription 4 mL 3    cetirizine (ZYRTEC) 10 MG tablet Take 2 tablets (20 mg) by mouth daily 180 tablet 0    clobetasol (TEMOVATE) 0.05 % external cream Apply topically 2 times daily 60 g 0    cycloSPORINE (RESTASIS) 0.05 % ophthalmic emulsion Place 1 drop into both eyes 2 times daily 11 mL 6    diclofenac (VOLTAREN) 1 % topical gel Apply up to 2 grams of 1% gel to hands up to 4 times daily as needed for joint pain (maximum: 8 g per upper extremity per day) 200 g 2    Efinaconazole (JUBLIA) 10 % SOLN APPLY TOPICALLY TO AFFECTED AREAS DAILY 8 mL 1    FLUoxetine (PROZAC) 40 MG capsule TAKE ONE CAPSULE BY MOUTH ONCE DAILY 90 capsule 1    gabapentin (NEURONTIN) 100 MG capsule Take 3 capsules (300 mg) by mouth 2 times daily 60 capsule 1    gabapentin (NEURONTIN) 300 MG capsule Take 1 capsule (300 mg) by mouth 3 times daily 180 capsule 1    hydroquinone (RUFUS) 4 % external cream Apply topically at bedtime 28.35 g 11    hydroquinone (RUFUS) 4 %  external cream Apply topically 2 times daily For up to 3 months. 30 g 3    hydroxychloroquine (PLAQUENIL) 200 MG tablet Hydroxychloroquine 200 mg daily with an additional 200 mg every other day.  Ophthalmology toxicity monitoring eye exam, that includes 10-2 VF and SD-OCT, is required yearly. 135 tablet 1    leflunomide (ARAVA) 20 MG tablet Take 1 tablet (20 mg) by mouth daily ; labs required every 12 weeks 90 tablet 1    methocarbamol (ROBAXIN) 500 MG tablet Take 1 tablet (500 mg) by mouth 4 times daily as needed for muscle spasms 30 tablet 1    mometasone (ELOCON) 0.1 % external cream Apply topically daily 45 g 1    predniSONE (DELTASONE) 5 MG tablet For inflammatory arthritis flare only: Prednisone 20mg daily x5days, then 15mg daily x5days, then 10mg daily x5days, then 5mg daily x5days, then stop. 50 tablet 0    senna-docusate (SENOKOT-S/PERICOLACE) 8.6-50 MG tablet Take 1-2 tablets by mouth 2 times daily 15 tablet 0    tretinoin (RETIN-A) 0.025 % external cream Apply topically at bedtime Pea-sized amount to whole face 45 g 3     No Known Allergies

## 2024-03-19 RX ORDER — EFINACONAZOLE 100 MG/ML
SOLUTION TOPICAL
Qty: 8 ML | Refills: 1 | Status: SHIPPED | OUTPATIENT
Start: 2024-03-19 | End: 2024-05-20

## 2024-04-02 DIAGNOSIS — M54.42 BILATERAL LOW BACK PAIN WITH BILATERAL SCIATICA, UNSPECIFIED CHRONICITY: ICD-10-CM

## 2024-04-02 DIAGNOSIS — M54.41 BILATERAL LOW BACK PAIN WITH BILATERAL SCIATICA, UNSPECIFIED CHRONICITY: ICD-10-CM

## 2024-04-02 DIAGNOSIS — R20.0 NUMBNESS IN FEET: ICD-10-CM

## 2024-04-03 ENCOUNTER — VIRTUAL VISIT (OUTPATIENT)
Dept: FAMILY MEDICINE | Facility: CLINIC | Age: 49
End: 2024-04-03
Payer: COMMERCIAL

## 2024-04-03 DIAGNOSIS — R20.0 NUMBNESS IN FEET: ICD-10-CM

## 2024-04-03 DIAGNOSIS — M54.41 BILATERAL LOW BACK PAIN WITH BILATERAL SCIATICA, UNSPECIFIED CHRONICITY: ICD-10-CM

## 2024-04-03 DIAGNOSIS — M54.42 BILATERAL LOW BACK PAIN WITH BILATERAL SCIATICA, UNSPECIFIED CHRONICITY: ICD-10-CM

## 2024-04-03 DIAGNOSIS — M54.16 LUMBAR RADICULOPATHY: ICD-10-CM

## 2024-04-03 PROCEDURE — 99214 OFFICE O/P EST MOD 30 MIN: CPT | Mod: 95 | Performed by: PHYSICIAN ASSISTANT

## 2024-04-03 RX ORDER — GABAPENTIN 300 MG/1
300 CAPSULE ORAL 3 TIMES DAILY
Qty: 180 CAPSULE | Refills: 1 | Status: SHIPPED | OUTPATIENT
Start: 2024-04-03 | End: 2024-05-20

## 2024-04-03 RX ORDER — METHOCARBAMOL 500 MG/1
500 TABLET, FILM COATED ORAL 4 TIMES DAILY PRN
Qty: 30 TABLET | Refills: 1 | Status: SHIPPED | OUTPATIENT
Start: 2024-04-03 | End: 2024-04-18

## 2024-04-03 RX ORDER — METHOCARBAMOL 500 MG/1
500 TABLET, FILM COATED ORAL 4 TIMES DAILY PRN
Qty: 30 TABLET | Refills: 1 | OUTPATIENT
Start: 2024-04-03

## 2024-04-03 RX ORDER — HYDROCODONE BITARTRATE AND ACETAMINOPHEN 5; 325 MG/1; MG/1
1 TABLET ORAL 2 TIMES DAILY PRN
Qty: 48 TABLET | Refills: 0 | Status: SHIPPED | OUTPATIENT
Start: 2024-04-03 | End: 2024-05-20

## 2024-04-03 ASSESSMENT — PATIENT HEALTH QUESTIONNAIRE - PHQ9
SUM OF ALL RESPONSES TO PHQ QUESTIONS 1-9: 5
SUM OF ALL RESPONSES TO PHQ QUESTIONS 1-9: 5
10. IF YOU CHECKED OFF ANY PROBLEMS, HOW DIFFICULT HAVE THESE PROBLEMS MADE IT FOR YOU TO DO YOUR WORK, TAKE CARE OF THINGS AT HOME, OR GET ALONG WITH OTHER PEOPLE: NOT DIFFICULT AT ALL

## 2024-04-03 NOTE — PROGRESS NOTES
Sosa is a 48 year old who is being evaluated via a billable video visit.    What phone number would you like to be contacted at? [phone]   How would you like to obtain your AVS? Joceline      Assessment & Plan     Bilateral low back pain with bilateral sciatica, unspecified chronicity  Refilled.  Will continue norco for now at max 2 per day.  Will increase gabapentin at night to hopefully reduce need for norco at night.    - methocarbamol (ROBAXIN) 500 MG tablet; Take 1 tablet (500 mg) by mouth 4 times daily as needed for muscle spasms    Numbness in feet  As above  - methocarbamol (ROBAXIN) 500 MG tablet; Take 1 tablet (500 mg) by mouth 4 times daily as needed for muscle spasms    Lumbar radiculopathy  As above.  Keep follow up with pain.  Follow up in one month with me   - gabapentin (NEURONTIN) 300 MG capsule; Take 1 capsule (300 mg) by mouth 3 times daily Plus one extra at night  - HYDROcodone-acetaminophen (NORCO) 5-325 MG tablet; Take 1 tablet by mouth 2 times daily as needed for severe pain                  Subjective   Sosa is a 48 year old, presenting for the following health issues:  Refill Request (Vicodin )        4/3/2024     2:46 PM   Additional Questions   Roomed by Shwetha   Accompanied by self     Video Start Time: 3:12 PM    History of Present Illness       Back Pain:  She presents for follow up of back pain. Patient's back pain is a chronic problem.  Location of back pain:  Right lower back, left lower back, right buttock and left buttock  Description of back pain: cramping, dull ache and gnawing  Back pain spreads: right buttocks, left buttocks, right thigh and left thigh    Since patient first noticed back pain, pain is: always present, but gets better and worse  Does back pain interfere with her job:  Yes       She eats 2-3 servings of fruits and vegetables daily.She consumes 1 sweetened beverage(s) daily.She exercises with enough effort to increase her heart rate 60 or more minutes per  day.  She exercises with enough effort to increase her heart rate 5 days per week. She is missing 2 dose(s) of medications per week.  She is not taking prescribed medications regularly due to remembering to take.     Got 2 steroid injections.  Worked a little for a week.  Hard to walk due to pain.  Radiates down right leg.  Hard to do daily activities due to pain.  Seeing pain clinic end of month.  Was using 2 a day during work days and for sleep.  Currently 3 EX tylenol and ibuprofen but not helping.  Taking 3 gabapentin a day and it helps but causes her to be tired.    Prednisone really helps too.  Rarely uses for lupus.    Stopped PT for now.                Objective           Vitals:  No vitals were obtained today due to virtual visit.    Physical Exam   GENERAL: alert and no distress  EYES: Eyes grossly normal to inspection.  No discharge or erythema, or obvious scleral/conjunctival abnormalities.  RESP: No audible wheeze, cough, or visible cyanosis.    SKIN: Visible skin clear. No significant rash, abnormal pigmentation or lesions.  NEURO: Cranial nerves grossly intact.  Mentation and speech appropriate for age.  PSYCH: Appropriate affect, tone, and pace of words          Video-Visit Details    Type of service:  Video Visit   Video End Time:3:22 PM  Originating Location (pt. Location): Home    Distant Location (provider location):  On-site  Platform used for Video Visit: Dilshad  Signed Electronically by: Cynthia Scott PA-C

## 2024-04-18 DIAGNOSIS — M54.41 BILATERAL LOW BACK PAIN WITH BILATERAL SCIATICA, UNSPECIFIED CHRONICITY: ICD-10-CM

## 2024-04-18 DIAGNOSIS — M54.42 BILATERAL LOW BACK PAIN WITH BILATERAL SCIATICA, UNSPECIFIED CHRONICITY: ICD-10-CM

## 2024-04-18 DIAGNOSIS — R20.0 NUMBNESS IN FEET: ICD-10-CM

## 2024-04-18 RX ORDER — METHOCARBAMOL 500 MG/1
500 TABLET, FILM COATED ORAL 4 TIMES DAILY PRN
Qty: 30 TABLET | Refills: 1 | Status: SHIPPED | OUTPATIENT
Start: 2024-04-18 | End: 2024-05-20

## 2024-04-23 ASSESSMENT — PAIN SCALES - PAIN ENJOYMENT GENERAL ACTIVITY SCALE (PEG)
INTERFERED_ENJOYMENT_LIFE: 10
INTERFERED_ENJOYMENT_LIFE: 10 - COMPLETELY INTERFERES
INTERFERED_GENERAL_ACTIVITY: 10
PEG_TOTALSCORE: 9.67
AVG_PAIN_PASTWEEK: 9
INTERFERED_GENERAL_ACTIVITY: 10 - COMPLETELY INTERFERES

## 2024-04-30 ENCOUNTER — OFFICE VISIT (OUTPATIENT)
Dept: PALLIATIVE MEDICINE | Facility: OTHER | Age: 49
End: 2024-04-30
Attending: PHYSICIAN ASSISTANT
Payer: COMMERCIAL

## 2024-04-30 VITALS — HEART RATE: 86 BPM | SYSTOLIC BLOOD PRESSURE: 123 MMHG | OXYGEN SATURATION: 98 % | DIASTOLIC BLOOD PRESSURE: 74 MMHG

## 2024-04-30 DIAGNOSIS — M54.41 BILATERAL LOW BACK PAIN WITH BILATERAL SCIATICA, UNSPECIFIED CHRONICITY: ICD-10-CM

## 2024-04-30 DIAGNOSIS — M48.061 SPINAL STENOSIS OF LUMBAR REGION, UNSPECIFIED WHETHER NEUROGENIC CLAUDICATION PRESENT: ICD-10-CM

## 2024-04-30 DIAGNOSIS — M54.16 LUMBAR RADICULOPATHY: Primary | ICD-10-CM

## 2024-04-30 DIAGNOSIS — M54.42 BILATERAL LOW BACK PAIN WITH BILATERAL SCIATICA, UNSPECIFIED CHRONICITY: ICD-10-CM

## 2024-04-30 PROCEDURE — 99214 OFFICE O/P EST MOD 30 MIN: CPT | Performed by: STUDENT IN AN ORGANIZED HEALTH CARE EDUCATION/TRAINING PROGRAM

## 2024-04-30 PROCEDURE — G0463 HOSPITAL OUTPT CLINIC VISIT: HCPCS | Performed by: STUDENT IN AN ORGANIZED HEALTH CARE EDUCATION/TRAINING PROGRAM

## 2024-04-30 RX ORDER — METHOCARBAMOL 500 MG/1
500 TABLET, FILM COATED ORAL 4 TIMES DAILY PRN
Qty: 120 TABLET | Refills: 3 | Status: SHIPPED | OUTPATIENT
Start: 2024-04-30 | End: 2024-09-06

## 2024-04-30 RX ORDER — GABAPENTIN 600 MG/1
600 TABLET ORAL 3 TIMES DAILY
Qty: 270 TABLET | Refills: 0 | Status: SHIPPED | OUTPATIENT
Start: 2024-04-30 | End: 2024-09-06

## 2024-04-30 ASSESSMENT — PAIN SCALES - GENERAL: PAINLEVEL: SEVERE PAIN (6)

## 2024-04-30 NOTE — PROGRESS NOTES
[facility] Spine Follow-up    Date of visit: 4/30/2024    Chief complaint:   Chief Complaint   Patient presents with    Pain     Procedure on 03/13/2024       Interval history:  Since her last visit, Sosa Patel reports:  Injection helped for 1 day, 15% on the right leg, 50% on the left leg, but then wore off.  Continuing to take medications  Noticing recent improvements in the legs this last week 75%, noticing more in the back.  Doing HEP at home - irregularly    Pain scores:  Pain intensity currently is 6 on a scale of 0-10.     Current pain medications include:  Gabapentin [id number] - patient takes 2 capsules 2-3 times a day. Causes drowsiness  Methocarbamol - helpful but causes drowsiness  Norco 5-325mg BID PRN - helpful  Tylenol - H  Ibuprofen - H  Diclofenac - H     Previous medication treatments included:  Prednisone 20mg 5 days - Helpful while on it, but stopped     Other treatments have included:  Sosa Patel has not been seen at a pain clinic in the past.    PT: ongoing, has been to 1 visit so far, doing HEP - may be helpful  Injections:    3/13/24 - bilateral L4-5 TFESI - [facility]  Surgery: none    Side Effects: drowsiness    Medications:  Current Outpatient Medications   Medication Sig Dispense Refill    ARIPiprazole (ABILIFY) 10 MG tablet Take 10 mg by mouth daily      Aspirin-Acetaminophen-Caffeine (EXCEDRIN PO)       augmented betamethasone dipropionate (DIPROLENE-AF) 0.05 % external ointment Apply topically 2 times daily 50 g 3    belimumab (BENLYSTA) subcutaneous injection (prefilled autoinjector) Inject 1 mL (200 mg) Subcutaneous every 7 days . Hold for signs of infection and seek medical attention.   Rheumatology follow-up required prior to the next refill prescription 4 mL 3    cetirizine (ZYRTEC) 10 MG tablet Take 2 tablets (20 mg) by mouth daily 180 tablet 0    clobetasol (TEMOVATE) 0.05 % external cream Apply topically 2 times daily 60 g 0     cycloSPORINE (RESTASIS) 0.05 % ophthalmic emulsion Place 1 drop into both eyes 2 times daily 11 mL 6    FLUoxetine (PROZAC) 40 MG capsule TAKE ONE CAPSULE BY MOUTH ONCE DAILY 90 capsule 1    gabapentin (NEURONTIN) 100 MG capsule Take 3 capsules (300 mg) by mouth 2 times daily 60 capsule 1    gabapentin (NEURONTIN) 300 MG capsule Take 1 capsule (300 mg) by mouth 3 times daily Plus one extra at night 180 capsule 1    HYDROcodone-acetaminophen (NORCO) 5-325 MG tablet Take 1 tablet by mouth 2 times daily as needed for severe pain 48 tablet 0    hydroquinone (RUFUS) 4 % external cream Apply topically at bedtime 28.35 g 11    hydroquinone (RUFUS) 4 % external cream Apply topically 2 times daily For up to 3 months. 30 g 3    hydroxychloroquine (PLAQUENIL) 200 MG tablet Hydroxychloroquine 200 mg daily with an additional 200 mg every other day.  Ophthalmology toxicity monitoring eye exam, that includes 10-2 VF and SD-OCT, is required yearly. 135 tablet 1    JUBLIA 10 % SOLN APPLY TOPICALLY TO AFFECTED AREAS DAILY 8 mL 1    leflunomide (ARAVA) 20 MG tablet Take 1 tablet (20 mg) by mouth daily ; labs required every 12 weeks 90 tablet 1    methocarbamol (ROBAXIN) 500 MG tablet Take 1 tablet (500 mg) by mouth 4 times daily as needed for muscle spasms 30 tablet 1    mometasone (ELOCON) 0.1 % external cream Apply topically daily 45 g 1    tretinoin (RETIN-A) 0.025 % external cream Apply topically at bedtime Pea-sized amount to whole face 45 g 3    diclofenac (VOLTAREN) 1 % topical gel Apply up to 2 grams of 1% gel to hands up to 4 times daily as needed for joint pain (maximum: 8 g per upper extremity per day) (Patient not taking: Reported on 4/3/2024) 200 g 2    predniSONE (DELTASONE) 5 MG tablet For inflammatory arthritis flare only: Prednisone 20mg daily x5days, then 15mg daily x5days, then 10mg daily x5days, then 5mg daily x5days, then stop. (Patient not taking: Reported on 4/3/2024) 50 tablet 0    senna-docusate  (SENOKOT-S/PERICOLACE) 8.6-50 MG tablet Take 1-2 tablets by mouth 2 times daily (Patient not taking: Reported on 4/30/2024) 15 tablet 0       Medical History: any changes in medical history since they were last seen? No    Physical Exam:  Blood pressure 123/74, pulse 86, SpO2 98%, not currently breastfeeding.  Constitutional: Well developed, NAD  Head: normocephalic. Atraumatic.   Eyes: no redness or jaundice noted   CV: warm, well perfused extremities   Skin: no suspicious lesions or rashes   Psychiatric: mentation appears normal and affect full/anxious    Diagnostics:  none    Personally reviewed: N/A    Assessment:   Lumbar radiculopathy  Spinal stenosis  Lumbar spondylosis from facet arthropathy     Sosa Patel is a 48 year old female who presents with chronic bilateral posterior leg pain with later onset right-sided low back pain, associated with suprapubic numbness, occasional bowel and bladder urgency with rare accidents, and a right-sided foot drop which resolved.  Patient has previously tried gabapentin and methocarbamol which were helpful.  She also tried ibuprofen and Tylenol which were helpful.  Initial examination showed reduced sensation in the bilateral L4-S1 dermatomes.  We proceeded with treatment of lumbar radiculopathy and spinal canal stenosis with increased gabapentin, which did help, and a bilateral L4 TFESI, which was helpful for 1 day, then wore off.  Patient notes that more recently the pain has reduced in severity, and she hopes this will be ongoing.  I discussed that it would be appropriate to ramp up her home exercise program intensity during this time of relief to give herself the best chance of ongoing benefit.  Will also increase her gabapentin to 600 mg 3 times daily, and I have refilled her methocarbamol.  I have given a surgical referral per the patient's request, and noted that this is not obligate her to undergo surgery.  Will see her back in 3 months.     Plan:  Diagnosis  reviewed, treatment option addressed, and risk/benefits discussed.      Procedures: None for now  Physical Therapy: Continue PT and HEP  Diagnostic Studies: MRI Lumbar spine reviewed  Medication Management:   Increase gabapentin to 600mg TID - reordered  Continue methocarbamol 500mg up to QID - reordered  Ok to take ibuprofen and tylenol - discussed dosing restrictions  Surgical referral given  Follow up: 3 months    Philipp Echavarria MD  Interventional Pain Medicine  [facility]

## 2024-04-30 NOTE — NURSING NOTE
Patient presents to the clinic today for a follow up with Philipp Echavarria MD  regarding Pain Management.           2/1/2024     2:01 PM 4/30/2024     8:05 AM   PEG Score   PEG Total Score 9 8.33      Procedure relief for one day- Feels just the numbing provided during the procedure for relief short term  - then no relieve- Better in the past week- Pain is still persistent.      Ramonita Arzola MA  [facility] [place]

## 2024-04-30 NOTE — PATIENT INSTRUCTIONS
Procedures: None for now  Physical Therapy: Continue PT and HEP  Diagnostic Studies: MRI Lumbar spine reviewed  Medication Management:   Increase gabapentin to 600mg TID - reordered  Continue methocarbamol 500mg up to QID - reordered  Ok to take ibuprofen and tylenol - discussed dosing restrictions  Follow up: 3 months    Philipp Echavarria MD  Interventional Pain Medicine  [facility] Number:  [phone]  Call with any questions about your care and for scheduling assistance.   Calls are returned Monday through Friday between 8 AM and 4:30 PM. We usually get back to you within 2 business days depending on the issue/request.    If we are prescribing your medications:  For opioid medication refills, call the clinic or send a [employer] message 7 days in advance.  Please include:  Name of requested medication  Name of the pharmacy.  For non-opioid medications, call your pharmacy directly to request a refill. Please allow 3-4 days to be processed.   Per [place] State Law:  All controlled substance prescriptions must be filled within 30 days of being written.    For those controlled substances allowing refills, pickup must occur within 30 days of last fill.      We believe regular attendance is key to your success in our program!    Any time you are unable to keep your appointment we ask that you call us at least 24 hours in advance to cancel.This will allow us to offer the appointment time to another patient.   Multiple missed appointments may lead to dismissal from the clinic.

## 2024-05-08 DIAGNOSIS — M54.9 BACK PAIN: Primary | ICD-10-CM

## 2024-05-09 NOTE — CONFIDENTIAL NOTE
Neurosurgery  SPINE PATIENTS - NEW PROTOCOL PREVISIT    RECORDS RECEIVED FROM: Referred by     Philipp Echavarria MD      REASON FOR VISIT: M54.16 (ICD-10-CM) - Lumbar radiculopathy    Date of Appt: 5/13/24   NOTES (FOR ALL VISITS) STATUS DETAILS   OFFICE NOTE from referring provider [facility] 4/30/24   OFFICE NOTE from other specialist Internal [facility]- 4/03/24 Cynthia Scott PA-C  PT -2/27/24 Apolinar Junior    DISCHARGE SUMMARY from hospital Na    DISCHARGE REPORT from [facility] Everywhere 12/12/23- 1. Paresthesia R20.2    OPERATIVE REPORT Na    EMG REPORT Na    MEDICATION LIST Internal    IMAGING  (FOR ALL VISITS)     MRI (HEAD, NECK, SPINE)  Lumbar 2/10/24 [facility]    XRAY (SPINE) *NEUROSURGERY* Pacs Lumbar 2/3 View 1/3/24 [facility]   CT (HEAD, NECK, SPINE) Na       Action F [id number] [facility] 5/9   Action Taken Requested Lumbar MRI from 2/10/24   Status: OK by RF 2:15 PM

## 2024-05-13 ENCOUNTER — OFFICE VISIT (OUTPATIENT)
Dept: NEUROSURGERY | Facility: CLINIC | Age: 49
End: 2024-05-13
Attending: STUDENT IN AN ORGANIZED HEALTH CARE EDUCATION/TRAINING PROGRAM
Payer: COMMERCIAL

## 2024-05-13 ENCOUNTER — HOSPITAL ENCOUNTER (OUTPATIENT)
Dept: GENERAL RADIOLOGY | Facility: HOSPITAL | Age: 49
Discharge: HOME | End: 2024-05-13
Attending: NEUROLOGICAL SURGERY | Admitting: NEUROLOGICAL SURGERY
Payer: COMMERCIAL

## 2024-05-13 ENCOUNTER — PRE VISIT (OUTPATIENT)
Dept: NEUROSURGERY | Facility: CLINIC | Age: 49
End: 2024-05-13

## 2024-05-13 VITALS
HEART RATE: 75 BPM | BODY MASS INDEX: 28.35 KG/M2 | WEIGHT: 173 LBS | SYSTOLIC BLOOD PRESSURE: 125 MMHG | OXYGEN SATURATION: 93 % | DIASTOLIC BLOOD PRESSURE: 68 MMHG

## 2024-05-13 DIAGNOSIS — M54.16 LUMBAR RADICULOPATHY: ICD-10-CM

## 2024-05-13 DIAGNOSIS — M54.9 BACK PAIN: ICD-10-CM

## 2024-05-13 PROCEDURE — 72120 X-RAY BEND ONLY L-S SPINE: CPT

## 2024-05-13 PROCEDURE — 99205 OFFICE O/P NEW HI 60 MIN: CPT | Performed by: NEUROLOGICAL SURGERY

## 2024-05-13 RX ORDER — IBUPROFEN 200 MG
200 TABLET ORAL EVERY 4 HOURS PRN
Status: ON HOLD | COMMUNITY
End: 2024-05-30

## 2024-05-13 RX ORDER — ACETAMINOPHEN 325 MG/1
325-650 TABLET ORAL EVERY 6 HOURS PRN
COMMUNITY

## 2024-05-13 ASSESSMENT — PAIN SCALES - GENERAL: PAINLEVEL: EXTREME PAIN (8)

## 2024-05-13 NOTE — PATIENT INSTRUCTIONS
Recommend bilateral lumbar L4-5 decompressive laminectomy      Please review COMPLETE information about your proposed surgery, pre-operative requirements, post-operative course and expectations - available in a folder provided to you in clinic!    Your surgery scheduler will call you within 3 business days to begin the process of scheduling your surgery and appointments.     Pre-Operative    Pre-operative physical / Lab work with primary care physician within 30 days of surgical date. We prefer the pre-op exam to be done 2 weeks prior to surgery. We also prefer pre-op lab work be done at [facility] outpatient lab, 2 weeks prior to surgery.     If all pre-op appointments/test are not completed prior to your surgery date, you will be asked to reschedule your surgery.           As part of preparation for your upcoming procedure your primary care doctor may order a test to rule out a COVID-19 infection. This is no longer a requirement prior to surgery.     Readiness Visits    Prior to surgery, you may have a telephone or in person readiness visit with our RN team to discuss your upcomming surgery, results of your pre-op physical, and lab work.   If you will require a collar/neck brace after surgery, you will be fitted for one at your readiness visit prior to surgery (scheduled by the surgery scheduler).     Shower procedure    Hibiclens shower: Use one packet the night before surgery and one packet the morning of surgery for a whole body shower. Avoid face, hair, and genitals.      Eating/Drinking    Stop all solid foods 8 hours before surgery.  Stop all clear liquids 2 hours prior to arrival time     Clear liquids include water, clear juice, black coffee, or clear tea without milk, Gatorade, clear soda.     Medications - please refer to the pre-operative medication instructions sheet in your folder    Hold Aspirin, NSAIDs (Advil/Ibuprofen, Indocin, Naproxen,Nuprin,Relafen/Nabumetone,  Diclofenac,Meloxicam, Aleve, Celebrex) and all vitamins and supplements x 7-10 days prior to surgical date  You can take Tylenol (Acetaminophen) for pain up until the date of your surgery   Do not exceed 3,000 mg per day   Any other medications prescribed, please discuss with your primary care provider at your pre-operative physical. Please discuss when/if it is safe for you to stop taking blood thinners with your primary care provider.   We will NOT provide pain medications prior to surgery. We will prescribe post-op pain medications for up to 6 weeks after surgery.       FMLA/Short-term disability    If you are currently employed, you will likely need to be off work for 4-6 weeks for post-op recovery and healing.  Please fax any FMLA/short term disability paperwork to [phone], mail it into the clinic, drop it off in person, or send via a [employer] message.   You may also call our clinic with the date in which you'd like to return to work, and we can provide a work letter to your employer  We will support Short-Term Disability up to 12 weeks, beginning the date of your surgery. We do NOT support Long-Term Disability/Social Security Benefits.     Pain Management after surgery    Dealing with pain    As your body heals, you might feel a stabbing, burning, or aching pain. You may also have some numbness.  Everyone feels pain differently, we may ask you to rate your pain using a pain scale. This will let us know how much pain you feel.   Keep in mind that medicine won't take away all of your pain. It helps to try other ways to relax and ease pain.     Things to help with pain    After surgery, we will give you medicine for your pain. These medications work well, but they can make you drowsy, itchy, or sick to your stomach, and constipated. Try to take narcotics with food if they cause nausea.   For mild to moderate pain, you can take medication such as Tylenol or Ibuprofen. These can be used with narcotics and  muscle relaxants. *If you have had a fusion: do NOT use NSAIDs for 6 months after surgery.   Do NOT drive while taking narcotic pain medication  Do NOT drink alcohol while using narcotic pain medication  You can utilize ice as needed (no longer than 20 minutes at one time) you may apply this over your covered incision  Utilize heat for muscle spasms, do not apply heat over your incision  If a muscle relaxer is prescribed, please do NOT take it at the same time as your narcotic pain medication. Take them at least 90 minutes apart.   You may also use pain cream/patches on sore muscles. Do NOT apply these on your incision. Patches may be cut in 1/2 if needed.     *After your surgery, if you will be staying in-patient, a nursing team will be monitoring you closely throughout your stay and communicate your health status to your surgeon and other providers.  You will be seen by Advanced Practice Providers (e.g., nurse practitioners, clinic nurse specialists, and physician assistants) who will check on you regularly to assess the status of your surgical recovery.     Incision Care    Look at your incision site every day. You  may need a mirror, or family member to help you.   Do not submerge your incision in water such as pools, hot tubs, baths for at least 6 weeks or until incision is healed  You may get your incision wet in the shower. Allow water and soap to run over incision, and gently pat dry.   Remove the dressing the day after you are discharged from the hospital. Keep the incision clean and dry at all times. This may require several bandage changes.   Contact us right away if you have:   Fever over 101 degrees farenheit  Green or yellow drainage (pus) from your incision or increased bloody drainage   Redness, swelling, or warmth at your surgery site   Notify the clinic [phone].    Activity Restrictions    For the first 6 weeks, no lifting,pushing, or pulling > 5-10 pounds, no bending, twisting.  Use the  stairs in moderation   Walking: Walking is the best way to start exercise after surgery. Take short frequent walks. You may gradually increase the distance as tolerated. If you feel pain, decrease your activity, but DO NOT stop walking. Walking will help you regain strength.  Avoid prolonged positioning for longer than 30 minutes (change positions frequently while awake)  No contact sports until after follow up visit  No high impact activities such as; running/jogging, snowmobile or 4 reeves riding or any other recreational vehicles until deemed safe by your surgeon/care team.   Please call the clinic if you develop any of the following symptoms:  Swelling and/or warmth in one or both legs  Pain or tenderness in your leg, ankle, foot, or arm   Red or discolored/pale skin     Post-Op Follow Up Appointments    We will call you to schedule these appointments after your discharge from the hospital.   Incision assessment within 2 weeks with a Registered Nurse   6 week post-op with a Nurse Practitioner/Physician Assistant. Your surgeon will be available on this day.

## 2024-05-13 NOTE — LETTER
5/13/2024         RE: Sosa Patel  [address]   [place] [address]        Dear Colleague,    Thank you for referring your patient, Sosa Patel, to the [facility]. Please see a copy of my visit note below.    NEUROSURGERY CONSULTATION NOTE  Neurosurgery was asked to see this patient by Philipp Echavarria MD for evaluation of low back pain and bilateral leg pain right more than the left likely neurogenic claudication.       CONSULTATION ASSESSMENT AND PLAN:    Ms Patel is a 48-year-old right-handed female with significant past medical history of bipolar disorder, drug dependence, suicide attempt presented to the clinic today for evaluation of low back pain and bilateral leg pain right more than the left since November 2023 without preceding trauma.  She has clinical symptoms of neurogenic claudication with intermittent episodes of bladder/bowel incontinence.  On clinical examination she has 4+ right foot dorsiflexion and impaired sensation along S1 dermatome involving both lower extremities.  I explained the MRI lumbar spine and x-ray lumbar spine findings to the patient and showed her the images.  I explained to her she has multilevel degenerative disc disease primarily at lumbar 4 and lumbar 5 causing severe central canal stenosis.  I also explained to her that she has degenerative disc disease at lumbar 5 S1 causing right lateral recess stenosis.  However her symptoms are more related to neurogenic claudication.     I discussed the natural history of DDD and HNP and also the management options including conservative treatment with physical therapy and injections, decompression alone and decompression with fusion.  Since patient has already trialed oral medications as listed in their medication history, and has trialed activity modifications.  Patient has also tried bilateral L4-5 transforaminal epidural steroid injection with no significant improvement in symptoms.  In  spite of this, and in spite of the conservative therapies documented above, the patient has significant functional disability in their activities of daily living such as prolonged sitting and standing, prolonged walking, and activities of daily living.    Therefore I would recommend, open lumbar 4 and lumbar 5 bilateral decompressive laminectomy with medial facetectomies.I discussed the risk and benefits of the surgery including bleeding, infection, incomplete relief of symptoms, injury to the thecal sac, injury to the nerve root, CSF leak, requiring repeat surgery including fusion and additional levels, need for prolonged PT, DVT/PE, MI and others.  After extensive discussion of the risk and benefits of the surgery patient and his mom agreed to proceed.  We will schedule the surgery and give her a call.     I explained to the patient that we need clearance from her primary care patient for the general anesthesia and we also need to stop ibuprofen 7 days prior to the surgery.    Patient agreed with the plan.  All the questions were answered and patient sounded understanding.  She can contact us if there are any further questions or concerns or worsening neurological deficits.I spent more than 60 minutes in this apt, examining the pt, reviewing the scans, reviewing notes from chart, discussing treatment options with risks and benefits and coordinating care. This note was created in part by the use of Dragon voice recognition system. Inadvertent grammatical errors and typographical errors may have occurred due to inherent limitation of voice recognition software.  Reasonable attempts made to avoid errors, but this document may contain an error not identified before finalizing.  Please contact me for any clarification needed.     Aureliano Goins MD      HPI:    Ms Patel is a 48-year-old right-handed female with significant past medical history of bipolar disorder, drug dependence, suicide attempt presented to the clinic  today for evaluation of low back pain and bilateral leg pain right more than the left since November 2023 without preceding trauma.    Patient started noticing low back pain since November 2023 with no preceding trauma which gradually transition to involve bilateral lower extremitie right more than the left.She reports that her pain is radiating along her bilateral buttocks to the posterior aspect of the thigh up to the knees.  She describes her pain as shooting in nature 8 x 10 on VAS aggravated with work and activities and relieves with rest, opioid medications and gabapentin.  Patient reports that her pain is severe when she does not take her medications and cannot perform her activities of daily living.    Patient also reports that her walking distance has significantly decreased since November 2023.  Patient reports that she was able to walk long distances, however it is difficult to walk even half a mile without pain.  She also reports a history of using a shopping cart and bending forward while trying to walk long distances.  She also reports feeling of weakness in bilateral lower extremities in the form of difficulty in getting up from squatting position and feeling of her legs giving way while walking.  However she denies any difficulty in clearing the ground.    Patient went to the ER on 2/10/2024 with bladder incontinence and workup was negative for UTI.  She was recommended to follow-up with her primary care for her symptoms.  Patient also reports intermittent episodes of bowel incontinence.  Patient denied any recent such episodes.  She also denied any history of urinary dribbling.  She also reports numbness involving her bilateral feet and occasional imbalance on walking    Patient has done physical therapy with traction with no significant relief in her symptoms.  She also had bilateral L4-5 transforaminal epidural steroid injection on 3/13/2024 with improvement in pain from 7 x 10 to 3 x 10 on VAS  immediately after the procedure.  However she reports less than 10% improvement in her symptoms difficulty in walking and pain.    Patient vapes and occasionally consumes alcohol.  She denies use of recreational drugs.  She denies any other significant cardiac or pulmonary history.  She is not on any antiplatelets or anticoagulants.    Past Medical History:   Diagnosis Date     Bipolar disorder (H) 12/04    Dx'd     Combinations of drug dependence excluding opioid type drug (H) 5/4/2022    In remission      H/O: depression     SUICIDE ATTEMPT AGE 15     HPV (human papillomavirus)      Mood disorder (H24) 4/9/2019       Past Surgical History:   Procedure Laterality Date     BUNIONECTOMY RT/LT       DAVINCI LAPAROSCOPIC CHOLECYSTECTOMY WITHOUT GRAMS N/A 10/9/2023    Procedure: CHOLECYSTECTOMY, ROBOT-ASSISTED, LAPAROSCOPIC, WITHOUT CHOLANGIOGRAM;  Surgeon: Demetris Peoples MD;  [place] [facility] [place]      ENLARGE BREAST WITH IMPLANT  2000       REVIEW OF SYSTEMS:  Review Of Systems  Skin: negative  Eyes: negative  Ears/Nose/Throat: negative  Respiratory: No shortness of breath, dyspnea on exertion, cough, or hemoptysis  Cardiovascular: negative  Gastrointestinal: negative  Genitourinary: negative  Musculoskeletal: Low back pain and bilateral leg pain right more than left  Neurologic: negative  Psychiatric: negative  Hematologic/Lymphatic/Immunologic: negative  Endocrine: negative    MEDICATIONS:    Current Outpatient Medications   Medication Sig Dispense Refill     ARIPiprazole (ABILIFY) 10 MG tablet Take 10 mg by mouth daily       gabapentin (NEURONTIN) 300 MG capsule Take 1 capsule (300 mg) by mouth 3 times daily Plus one extra at night 180 capsule 1     gabapentin (NEURONTIN) 600 MG tablet Take 1 tablet (600 mg) by mouth 3 times daily 270 tablet 0     methocarbamol (ROBAXIN) 500 MG tablet Take 1 tablet (500 mg) by mouth 4 times daily as needed for muscle spasms 120 tablet 3     methocarbamol (ROBAXIN) 500 MG  tablet Take 1 tablet (500 mg) by mouth 4 times daily as needed for muscle spasms 30 tablet 1     Aspirin-Acetaminophen-Caffeine (EXCEDRIN PO)        augmented betamethasone dipropionate (DIPROLENE-AF) 0.05 % external ointment Apply topically 2 times daily 50 g 3     belimumab (BENLYSTA) subcutaneous injection (prefilled autoinjector) Inject 1 mL (200 mg) Subcutaneous every 7 days . Hold for signs of infection and seek medical attention.   Rheumatology follow-up required prior to the next refill prescription 4 mL 3     cetirizine (ZYRTEC) 10 MG tablet Take 2 tablets (20 mg) by mouth daily 180 tablet 0     clobetasol (TEMOVATE) 0.05 % external cream Apply topically 2 times daily 60 g 0     cycloSPORINE (RESTASIS) 0.05 % ophthalmic emulsion Place 1 drop into both eyes 2 times daily 11 mL 6     diclofenac (VOLTAREN) 1 % topical gel Apply up to 2 grams of 1% gel to hands up to 4 times daily as needed for joint pain (maximum: 8 g per upper extremity per day) (Patient not taking: Reported on 4/3/2024) 200 g 2     FLUoxetine (PROZAC) 40 MG capsule TAKE ONE CAPSULE BY MOUTH ONCE DAILY 90 capsule 1     gabapentin (NEURONTIN) 100 MG capsule Take 3 capsules (300 mg) by mouth 2 times daily 60 capsule 1     HYDROcodone-acetaminophen (NORCO) 5-325 MG tablet Take 1 tablet by mouth 2 times daily as needed for severe pain (Patient not taking: Reported on 5/13/2024) 48 tablet 0     hydroquinone (RUFUS) 4 % external cream Apply topically at bedtime 28.35 g 11     hydroquinone (RUFUS) 4 % external cream Apply topically 2 times daily For up to 3 months. 30 g 3     hydroxychloroquine (PLAQUENIL) 200 MG tablet Hydroxychloroquine 200 mg daily with an additional 200 mg every other day.  Ophthalmology toxicity monitoring eye exam, that includes 10-2 VF and SD-OCT, is required yearly. 135 tablet 1     JUBLIA 10 % SOLN APPLY TOPICALLY TO AFFECTED AREAS DAILY 8 mL 1     leflunomide (ARAVA) 20 MG tablet Take 1 tablet (20 mg) by mouth daily ;  labs required every 12 weeks 90 tablet 1     mometasone (ELOCON) 0.1 % external cream Apply topically daily 45 g 1     predniSONE (DELTASONE) 5 MG tablet For inflammatory arthritis flare only: Prednisone 20mg daily x5days, then 15mg daily x5days, then 10mg daily x5days, then 5mg daily x5days, then stop. (Patient not taking: Reported on 4/3/2024) 50 tablet 0     senna-docusate (SENOKOT-S/PERICOLACE) 8.6-50 MG tablet Take 1-2 tablets by mouth 2 times daily (Patient not taking: Reported on 2024) 15 tablet 0     tretinoin (RETIN-A) 0.025 % external cream Apply topically at bedtime Pea-sized amount to whole face 45 g 3         ALLERGIES/SENSITIVITIES:     No Known Allergies    PERTINENT SOCIAL HISTORY: She uses vaping device    Social History     Socioeconomic History     Marital status:      Spouse name: None     Number of children: None     Years of education: None     Highest education level: None   Tobacco Use     Smoking status: Every Day     Current packs/day: 0.00     Average packs/day: 0.1 packs/day for 23.0 years (2.3 ttl pk-yrs)     Types: Other, Cigarettes, Vaping Device     Start date: 3/3/1990     Last attempt to quit: 3/3/2013     Years since quittin.2     Smokeless tobacco: Never     Tobacco comments:     Currently vapes   Vaping Use     Vaping status: Every Day     Substances: Nicotine, Flavoring     Devices: Disposable   Substance and Sexual Activity     Alcohol use: Yes     Comment: rare     Drug use: Yes     Comment: Cocaine     Sexual activity: Yes     Partners: Male     Birth control/protection: Surgical, None     Comment: -vasectomy   Other Topics Concern      Service No     Blood Transfusions No     Caffeine Concern No     Occupational Exposure No     Hobby Hazards No     Sleep Concern Yes     Comment: Has problems sleeping     Stress Concern Yes     Comment: Addict and in treatment at [facility] 3/6/13     Weight Concern No     Special Diet No     Back Care No      Exercise Yes     Comment: 5x per week for 20min     Bike Helmet No     Seat Belt Yes     Self-Exams Yes     Social Determinants of Health     Financial Resource Strain: Low Risk  (1/3/2024)    Financial Resource Strain      Within the past 12 months, have you or your family members you live with been unable to get utilities (heat, electricity) when it was really needed?: No   Food Insecurity: High Risk (1/3/2024)    Food Insecurity      Within the past 12 months, did you worry that your food would run out before you got money to buy more?: Yes      Within the past 12 months, did the food you bought just not last and you didn t have money to get more?: Yes   Transportation Needs: Low Risk  (1/3/2024)    Transportation Needs      Within the past 12 months, has lack of transportation kept you from medical appointments, getting your medicines, non-medical meetings or appointments, work, or from getting things that you need?: No   Interpersonal Safety: Not At Risk (12/12/2023)    [facility]     Humiliation, Afraid, Rape, and Kick questionnaire      Fear of Current or Ex-Partner: No      Emotionally Abused: No      Physically Abused: No      Sexually Abused: No   Housing Stability: High Risk (1/3/2024)    Housing Stability      Do you have housing? : Yes      Are you worried about losing your housing?: Yes         FAMILY HISTORY:  Family History   Problem Relation Age of Onset     Cancer Father         lung cancer     Diabetes Maternal Grandmother      Depression Maternal Grandmother      Lipids Maternal Grandmother      Cerebrovascular Disease Maternal Grandfather      Lipids Maternal Grandfather      Circulatory Maternal Grandfather         MI in 50's     Depression Brother         depression and OCD     Gastrointestinal Disease Brother         IBS     Depression Mother      Thyroid Disease No family hx of      Asthma No family hx of         PHYSICAL EXAM:    Constitutional: /68   Pulse 75   Wt 173 lb (78.5 kg)   SpO2 93%   BMI 28.35 kg/m       Mental Status: A & O in no acute distress.  Affect is appropriate.  Speech is fluent.  Recent and remote memory are intact.  Attention span and concentration are normal.        Cranial Nerves:   CN1: grossly intact per patient recall.   CN2: No funduscopic exam performed.   CN3,4 & 6: Pupillary light response, lateral and vertical gaze normal.  No nystagmus.  Visual fields are full to confrontation.   CN5: Intact to touch   CN7: No facial weakness, smile, facial symmetry intact.   CN8: Intact to spoken voice.   CN9&10: Gag reflex, uvula midline, palate rises with phonation.   CN11: Shoulder shrug 5/5 intact bilaterally.   CN12: Tongue midline and moves freely from side to side.     Motor: No pronator drift of upper extremity.   Normal bulk and tone all muscle groups of upper and lower extremities.       Delt Bi Tri Hand Flex/  Ext Iliopsoas Quadriceps Tibialis Anterior EHL Gastroc     C5 C6 C7 C8/T1 L2 L3 L4 L5 S1   R 5 5 5 5 5 5 5 4+ 5   L 5 5 5 5 5 5 5 5 5          Sensory: Decreased sensation to touch in bilateral lower extremities to approximately 70 to 80% along the posterior aspect     Coordination; finger to nose, rapid alternating movements smooth and rhythmic.   Romberg intact.   Heel/tandem gait intact.    Toe gait impaired  Normal gait and station.     Reflexes;                       Right              Left  Brachioradialis (C5,6)      2+                 2+  Biceps   (C5,6)                 2+                 2+  Triceps  (C7,8)                 2+                2+  Knee (L3,4)                      1+               1+  Ankle jerk (S1,2)              1+                1+      No hoffmans/babinski/ clonus.  FABERS/Jethro test positive on the left  SLR bilaterally free  No lower lumbar paraspinal tenderness.       IMAGING:  I personally reviewed all radiographic images and agree with any radiology report of  bilateral lateral recess stenosis primarily at L4-5.   2/10/2024 MRI lumbar spine:  1.  Normal distal cord.   2.  Moderate facet arthropathy with fluid in both facet joints at L4-L5. Degenerative changes result in marked canal narrowing with near complete effacement of CSF spaces between nerve roots of cauda equina and buckling of nerve roots of cauda equina distal to the narrowing. Marked right and moderate left foraminal narrowing.   3.  Mild left lateral recess narrowing at L3-L4. Slight left extraforaminal disc protrusion with abutment of exited left L3 nerve root. No significant canal narrowing. Mild left foraminal narrowing.   4.  Marked right foraminal narrowing at L5-S1.       Cc:   Cynthia Scott                Again, thank you for allowing me to participate in the care of your patient.        Sincerely,        Aureliano Goins MD

## 2024-05-13 NOTE — NURSING NOTE
"Sosa Patel is a 48 year old female who presents for:  Chief Complaint   Patient presents with    Consult     Bilateral low-back pain   B LE numbness         Initial Vitals:  /68 (BP Location: Left arm, Patient Position: Sitting, Cuff Size: Adult Regular)   Pulse 75   Wt 173 lb (78.5 kg)   SpO2 93%   BMI 28.35 kg/m   Estimated body mass index is 28.35 kg/m  as calculated from the following:    Height as of 2/9/24: 5' 5.5\" (1.664 m).    Weight as of this encounter: 173 lb (78.5 kg).. Body surface area is 1.9 meters squared. BP completed using cuff size: regular  Extreme Pain (8)    Robbin Blanton   "

## 2024-05-13 NOTE — PROGRESS NOTES
NEUROSURGERY CONSULTATION NOTE  Neurosurgery was asked to see this patient by Philipp Echavarria MD for evaluation of low back pain and bilateral leg pain right more than the left likely neurogenic claudication.       CONSULTATION ASSESSMENT AND PLAN:    Ms Patel is a 48-year-old right-handed female with significant past medical history of bipolar disorder, drug dependence, suicide attempt presented to the clinic today for evaluation of low back pain and bilateral leg pain right more than the left since November 2023 without preceding trauma.  She has clinical symptoms of neurogenic claudication with intermittent episodes of bladder/bowel incontinence.  On clinical examination she has 4+ right foot dorsiflexion and impaired sensation along S1 dermatome involving both lower extremities.  I explained the MRI lumbar spine and x-ray lumbar spine findings to the patient and showed her the images.  I explained to her she has multilevel degenerative disc disease primarily at lumbar 4 and lumbar 5 causing severe central canal stenosis.  I also explained to her that she has degenerative disc disease at lumbar 5 S1 causing right lateral recess stenosis.  However her symptoms are more related to neurogenic claudication.     I discussed the natural history of DDD and HNP and also the management options including conservative treatment with physical therapy and injections, decompression alone and decompression with fusion.  Since patient has already trialed oral medications as listed in their medication history, and has trialed activity modifications.  Patient has also tried bilateral L4-5 transforaminal epidural steroid injection with no significant improvement in symptoms.  In spite of this, and in spite of the conservative therapies documented above, the patient has significant functional disability in their activities of daily living such as prolonged sitting and standing, prolonged walking, and activities of daily  living.    Therefore I would recommend, open lumbar 4 and lumbar 5 bilateral decompressive laminectomy with medial facetectomies.I discussed the risk and benefits of the surgery including bleeding, infection, incomplete relief of symptoms, injury to the thecal sac, injury to the nerve root, CSF leak, requiring repeat surgery including fusion and additional levels, need for prolonged PT, DVT/PE, MI and others.  After extensive discussion of the risk and benefits of the surgery patient and his mom agreed to proceed.  We will schedule the surgery and give her a call.     I explained to the patient that we need clearance from her primary care patient for the general anesthesia and we also need to stop ibuprofen 7 days prior to the surgery.    Patient agreed with the plan.  All the questions were answered and patient sounded understanding.  She can contact us if there are any further questions or concerns or worsening neurological deficits.I spent more than 60 minutes in this apt, examining the pt, reviewing the scans, reviewing notes from chart, discussing treatment options with risks and benefits and coordinating care. This note was created in part by the use of Dragon voice recognition system. Inadvertent grammatical errors and typographical errors may have occurred due to inherent limitation of voice recognition software.  Reasonable attempts made to avoid errors, but this document may contain an error not identified before finalizing.  Please contact me for any clarification needed.     Aureliano Goins MD      HPI:    Ms Patel is a 48-year-old right-handed female with significant past medical history of bipolar disorder, drug dependence, suicide attempt presented to the clinic today for evaluation of low back pain and bilateral leg pain right more than the left since November 2023 without preceding trauma.    Patient started noticing low back pain since November 2023 with no preceding trauma which gradually  transition to involve bilateral lower extremitie right more than the left.She reports that her pain is radiating along her bilateral buttocks to the posterior aspect of the thigh up to the knees.  She describes her pain as shooting in nature 8 x 10 on VAS aggravated with work and activities and relieves with rest, opioid medications and gabapentin.  Patient reports that her pain is severe when she does not take her medications and cannot perform her activities of daily living.    Patient also reports that her walking distance has significantly decreased since November 2023.  Patient reports that she was able to walk long distances, however it is difficult to walk even half a mile without pain.  She also reports a history of using a shopping cart and bending forward while trying to walk long distances.  She also reports feeling of weakness in bilateral lower extremities in the form of difficulty in getting up from squatting position and feeling of her legs giving way while walking.  However she denies any difficulty in clearing the ground.    Patient went to the ER on 2/10/2024 with bladder incontinence and workup was negative for UTI.  She was recommended to follow-up with her primary care for her symptoms.  Patient also reports intermittent episodes of bowel incontinence.  Patient denied any recent such episodes.  She also denied any history of urinary dribbling.  She also reports numbness involving her bilateral feet and occasional imbalance on walking    Patient has done physical therapy with traction with no significant relief in her symptoms.  She also had bilateral L4-5 transforaminal epidural steroid injection on 3/13/2024 with improvement in pain from 7 x 10 to 3 x 10 on VAS immediately after the procedure.  However she reports less than 10% improvement in her symptoms difficulty in walking and pain.    Patient vapes and occasionally consumes alcohol.  She denies use of recreational drugs.  She denies any  other significant cardiac or pulmonary history.  She is not on any antiplatelets or anticoagulants.    Past Medical History:   Diagnosis Date    Bipolar disorder (H) 12/04    Dx'd    Combinations of drug dependence excluding opioid type drug (H) 5/4/2022    In remission     H/O: depression     SUICIDE ATTEMPT AGE 15    HPV (human papillomavirus)     Mood disorder (H24) 4/9/2019       Past Surgical History:   Procedure Laterality Date    BUNIONECTOMY RT/LT      DAVINCI LAPAROSCOPIC CHOLECYSTECTOMY WITHOUT GRAMS N/A 10/9/2023    Procedure: CHOLECYSTECTOMY, ROBOT-ASSISTED, LAPAROSCOPIC, WITHOUT CHOLANGIOGRAM;  Surgeon: Demetris Peoples MD;  Location: [facility]     ENLARGE BREAST WITH IMPLANT  2000       REVIEW OF SYSTEMS:  Review Of Systems  Skin: negative  Eyes: negative  Ears/Nose/Throat: negative  Respiratory: No shortness of breath, dyspnea on exertion, cough, or hemoptysis  Cardiovascular: negative  Gastrointestinal: negative  Genitourinary: negative  Musculoskeletal: Low back pain and bilateral leg pain right more than left  Neurologic: negative  Psychiatric: negative  Hematologic/Lymphatic/Immunologic: negative  Endocrine: negative    MEDICATIONS:    Current Outpatient Medications   Medication Sig Dispense Refill    ARIPiprazole (ABILIFY) 10 MG tablet Take 10 mg by mouth daily      gabapentin (NEURONTIN) 300 MG capsule Take 1 capsule (300 mg) by mouth 3 times daily Plus one extra at night 180 capsule 1    gabapentin (NEURONTIN) 600 MG tablet Take 1 tablet (600 mg) by mouth 3 times daily 270 tablet 0    methocarbamol (ROBAXIN) 500 MG tablet Take 1 tablet (500 mg) by mouth 4 times daily as needed for muscle spasms 120 tablet 3    methocarbamol (ROBAXIN) 500 MG tablet Take 1 tablet (500 mg) by mouth 4 times daily as needed for muscle spasms 30 tablet 1    Aspirin-Acetaminophen-Caffeine (EXCEDRIN PO)       augmented betamethasone dipropionate (DIPROLENE-AF) 0.05 % external ointment Apply topically 2 times  daily 50 g 3    belimumab (BENLYSTA) subcutaneous injection (prefilled autoinjector) Inject 1 mL (200 mg) Subcutaneous every 7 days . Hold for signs of infection and seek medical attention.   Rheumatology follow-up required prior to the next refill prescription 4 mL 3    cetirizine (ZYRTEC) 10 MG tablet Take 2 tablets (20 mg) by mouth daily 180 tablet 0    clobetasol (TEMOVATE) 0.05 % external cream Apply topically 2 times daily 60 g 0    cycloSPORINE (RESTASIS) 0.05 % ophthalmic emulsion Place 1 drop into both eyes 2 times daily 11 mL 6    diclofenac (VOLTAREN) 1 % topical gel Apply up to 2 grams of 1% gel to hands up to 4 times daily as needed for joint pain (maximum: 8 g per upper extremity per day) (Patient not taking: Reported on 4/3/2024) 200 g 2    FLUoxetine (PROZAC) 40 MG capsule TAKE ONE CAPSULE BY MOUTH ONCE DAILY 90 capsule 1    gabapentin (NEURONTIN) 100 MG capsule Take 3 capsules (300 mg) by mouth 2 times daily 60 capsule 1    HYDROcodone-acetaminophen (NORCO) 5-325 MG tablet Take 1 tablet by mouth 2 times daily as needed for severe pain (Patient not taking: Reported on 5/13/2024) 48 tablet 0    hydroquinone (RUFUS) 4 % external cream Apply topically at bedtime 28.35 g 11    hydroquinone (RUFUS) 4 % external cream Apply topically 2 times daily For up to 3 months. 30 g 3    hydroxychloroquine (PLAQUENIL) 200 MG tablet Hydroxychloroquine 200 mg daily with an additional 200 mg every other day.  Ophthalmology toxicity monitoring eye exam, that includes 10-2 VF and SD-OCT, is required yearly. 135 tablet 1    JUBLIA 10 % SOLN APPLY TOPICALLY TO AFFECTED AREAS DAILY 8 mL 1    leflunomide (ARAVA) 20 MG tablet Take 1 tablet (20 mg) by mouth daily ; labs required every 12 weeks 90 tablet 1    mometasone (ELOCON) 0.1 % external cream Apply topically daily 45 g 1    predniSONE (DELTASONE) 5 MG tablet For inflammatory arthritis flare only: Prednisone 20mg daily x5days, then 15mg daily x5days, then 10mg daily  x5days, then 5mg daily x5days, then stop. (Patient not taking: Reported on 4/3/2024) 50 tablet 0    senna-docusate (SENOKOT-S/PERICOLACE) 8.6-50 MG tablet Take 1-2 tablets by mouth 2 times daily (Patient not taking: Reported on 2024) 15 tablet 0    tretinoin (RETIN-A) 0.025 % external cream Apply topically at bedtime Pea-sized amount to whole face 45 g 3         ALLERGIES/SENSITIVITIES:     No Known Allergies    PERTINENT SOCIAL HISTORY: She uses vaping device    Social History     Socioeconomic History    Marital status:      Spouse name: None    Number of children: None    Years of education: None    Highest education level: None   Tobacco Use    Smoking status: Every Day     Current packs/day: 0.00     Average packs/day: 0.1 packs/day for 23.0 years (2.3 ttl pk-yrs)     Types: Other, Cigarettes, Vaping Device     Start date: 3/3/1990     Last attempt to quit: 3/3/2013     Years since quittin.2    Smokeless tobacco: Never    Tobacco comments:     Currently vapes   Vaping Use    Vaping status: Every Day    Substances: Nicotine, Flavoring    Devices: Disposable   Substance and Sexual Activity    Alcohol use: Yes     Comment: rare    Drug use: Yes     Comment: Cocaine    Sexual activity: Yes     Partners: Male     Birth control/protection: Surgical, None     Comment: -vasectomy   Other Topics Concern     Service No    Blood Transfusions No    Caffeine Concern No    Occupational Exposure No    Hobby Hazards No    Sleep Concern Yes     Comment: Has problems sleeping    Stress Concern Yes     Comment: Addict and in treatment at [facility] 3/6/13    Weight Concern No    Special Diet No    Back Care No    Exercise Yes     Comment: 5x per week for 20min    Bike Helmet No    Seat Belt Yes    Self-Exams Yes     Social Determinants of Health     Financial Resource Strain: Low Risk  (1/3/2024)    Financial Resource Strain     Within the past 12 months, have you or your family members you live  with been unable to get utilities (heat, electricity) when it was really needed?: No   Food Insecurity: High Risk (1/3/2024)    Food Insecurity     Within the past 12 months, did you worry that your food would run out before you got money to buy more?: Yes     Within the past 12 months, did the food you bought just not last and you didn t have money to get more?: Yes   Transportation Needs: Low Risk  (1/3/2024)    Transportation Needs     Within the past 12 months, has lack of transportation kept you from medical appointments, getting your medicines, non-medical meetings or appointments, work, or from getting things that you need?: No   Interpersonal Safety: Not At Risk (12/12/2023)    [facility]     Humiliation, Afraid, Rape, and Kick questionnaire     Fear of Current or Ex-Partner: No     Emotionally Abused: No     Physically Abused: No     Sexually Abused: No   Housing Stability: High Risk (1/3/2024)    Housing Stability     Do you have housing? : Yes     Are you worried about losing your housing?: Yes         FAMILY HISTORY:  Family History   Problem Relation Age of Onset    Cancer Father         lung cancer    Diabetes Maternal Grandmother     Depression Maternal Grandmother     Lipids Maternal Grandmother     Cerebrovascular Disease Maternal Grandfather     Lipids Maternal Grandfather     Circulatory Maternal Grandfather         MI in 50's    Depression Brother         depression and OCD    Gastrointestinal Disease Brother         IBS    Depression Mother     Thyroid Disease No family hx of     Asthma No family hx of         PHYSICAL EXAM:   Constitutional: /68   Pulse 75   Wt 173 lb (78.5 kg)   SpO2 93%   BMI 28.35 kg/m       Mental Status: A & O in no acute distress.  Affect is appropriate.  Speech is fluent.  Recent and remote memory are intact.  Attention span and concentration are normal.        Cranial Nerves:   CN1: grossly intact per patient  recall.   CN2: No funduscopic exam performed.   CN3,4 & 6: Pupillary light response, lateral and vertical gaze normal.  No nystagmus.  Visual fields are full to confrontation.   CN5: Intact to touch   CN7: No facial weakness, smile, facial symmetry intact.   CN8: Intact to spoken voice.   CN9&10: Gag reflex, uvula midline, palate rises with phonation.   CN11: Shoulder shrug 5/5 intact bilaterally.   CN12: Tongue midline and moves freely from side to side.     Motor: No pronator drift of upper extremity.   Normal bulk and tone all muscle groups of upper and lower extremities.       Delt Bi Tri Hand Flex/  Ext Iliopsoas Quadriceps Tibialis Anterior EHL Gastroc     C5 C6 C7 C8/T1 L2 L3 L4 L5 S1   R 5 5 5 5 5 5 5 4+ 5   L 5 5 5 5 5 5 5 5 5          Sensory: Decreased sensation to touch in bilateral lower extremities to approximately 70 to 80% along the posterior aspect     Coordination; finger to nose, rapid alternating movements smooth and rhythmic.   Romberg intact.   Heel/tandem gait intact.    Toe gait impaired  Normal gait and station.     Reflexes;                       Right              Left  Brachioradialis (C5,6)      2+                 2+  Biceps   (C5,6)                 2+                 2+  Triceps  (C7,8)                 2+                2+  Knee (L3,4)                      1+               1+  Ankle jerk (S1,2)              1+                1+      No hoffmans/babinski/ clonus.  FABERS/Jethro test positive on the left  SLR bilaterally free  No lower lumbar paraspinal tenderness.       IMAGING:  I personally reviewed all radiographic images and agree with any radiology report of bilateral lateral recess stenosis primarily at L4-5.   2/10/2024 MRI lumbar spine:  1.  Normal distal cord.   2.  Moderate facet arthropathy with fluid in both facet joints at L4-L5. Degenerative changes result in marked canal narrowing with near complete effacement of CSF spaces between nerve roots of cauda equina and buckling  of nerve roots of cauda equina distal to the narrowing. Marked right and moderate left foraminal narrowing.   3.  Mild left lateral recess narrowing at L3-L4. Slight left extraforaminal disc protrusion with abutment of exited left L3 nerve root. No significant canal narrowing. Mild left foraminal narrowing.   4.  Marked right foraminal narrowing at L5-S1.       Cc:   Cynthia Scott

## 2024-05-14 ENCOUNTER — TELEPHONE (OUTPATIENT)
Dept: NEUROSURGERY | Facility: CLINIC | Age: 49
End: 2024-05-14
Payer: COMMERCIAL

## 2024-05-14 NOTE — TELEPHONE ENCOUNTER
Patient is scheduled for surgery 5/29/24. Gave patient surgery details and she verbalized understanding.

## 2024-05-14 NOTE — TELEPHONE ENCOUNTER
Patient called back and confirmed 5/29 for her surgery date. I advised her I would get it scheduled and call her back with surgery details.

## 2024-05-14 NOTE — TELEPHONE ENCOUNTER
Called patient regarding scheduling surgery. Left vm message asking her to call me back to schedule.

## 2024-05-20 ENCOUNTER — OFFICE VISIT (OUTPATIENT)
Dept: FAMILY MEDICINE | Facility: CLINIC | Age: 49
End: 2024-05-20
Payer: COMMERCIAL

## 2024-05-20 VITALS
HEART RATE: 81 BPM | TEMPERATURE: 97.5 F | OXYGEN SATURATION: 96 % | HEIGHT: 65 IN | RESPIRATION RATE: 20 BRPM | BODY MASS INDEX: 28.09 KG/M2 | DIASTOLIC BLOOD PRESSURE: 87 MMHG | WEIGHT: 168.6 LBS | SYSTOLIC BLOOD PRESSURE: 127 MMHG

## 2024-05-20 DIAGNOSIS — M54.16 LUMBAR RADICULOPATHY: ICD-10-CM

## 2024-05-20 DIAGNOSIS — J31.0 CHRONIC RHINITIS: ICD-10-CM

## 2024-05-20 DIAGNOSIS — Z01.818 PREOP GENERAL PHYSICAL EXAM: Primary | ICD-10-CM

## 2024-05-20 DIAGNOSIS — B35.1 ONYCHOMYCOSIS: ICD-10-CM

## 2024-05-20 PROBLEM — M54.42 ACUTE BILATERAL LOW BACK PAIN WITH BILATERAL SCIATICA: Status: RESOLVED | Noted: 2024-01-09 | Resolved: 2024-05-20

## 2024-05-20 PROBLEM — M54.41 ACUTE BILATERAL LOW BACK PAIN WITH BILATERAL SCIATICA: Status: RESOLVED | Noted: 2024-01-09 | Resolved: 2024-05-20

## 2024-05-20 LAB
ANION GAP SERPL CALCULATED.3IONS-SCNC: 12 MMOL/L (ref 7–15)
BUN SERPL-MCNC: 25.9 MG/DL (ref 6–20)
CALCIUM SERPL-MCNC: 9.5 MG/DL (ref 8.6–10)
CHLORIDE SERPL-SCNC: 104 MMOL/L (ref 98–107)
CREAT SERPL-MCNC: 0.67 MG/DL (ref 0.51–0.95)
DEPRECATED HCO3 PLAS-SCNC: 21 MMOL/L (ref 22–29)
EGFRCR SERPLBLD CKD-EPI 2021: >90 ML/MIN/1.73M2
ERYTHROCYTE [DISTWIDTH] IN BLOOD BY AUTOMATED COUNT: 14.3 % (ref 10–15)
GLUCOSE SERPL-MCNC: 89 MG/DL (ref 70–99)
HCT VFR BLD AUTO: 39.2 % (ref 35–47)
HGB BLD-MCNC: 12.7 G/DL (ref 11.7–15.7)
MCH RBC QN AUTO: 27.7 PG (ref 26.5–33)
MCHC RBC AUTO-ENTMCNC: 32.4 G/DL (ref 31.5–36.5)
MCV RBC AUTO: 85 FL (ref 78–100)
PLATELET # BLD AUTO: 234 10E3/UL (ref 150–450)
POTASSIUM SERPL-SCNC: 3.8 MMOL/L (ref 3.4–5.3)
RBC # BLD AUTO: 4.59 10E6/UL (ref 3.8–5.2)
SODIUM SERPL-SCNC: 137 MMOL/L (ref 135–145)
WBC # BLD AUTO: 4.7 10E3/UL (ref 4–11)

## 2024-05-20 PROCEDURE — 99214 OFFICE O/P EST MOD 30 MIN: CPT | Performed by: PHYSICIAN ASSISTANT

## 2024-05-20 PROCEDURE — 85027 COMPLETE CBC AUTOMATED: CPT | Performed by: PHYSICIAN ASSISTANT

## 2024-05-20 PROCEDURE — 80048 BASIC METABOLIC PNL TOTAL CA: CPT | Performed by: PHYSICIAN ASSISTANT

## 2024-05-20 PROCEDURE — 36415 COLL VENOUS BLD VENIPUNCTURE: CPT | Performed by: PHYSICIAN ASSISTANT

## 2024-05-20 RX ORDER — EFINACONAZOLE 100 MG/ML
SOLUTION TOPICAL
Qty: 8 ML | Refills: 0 | Status: SHIPPED | OUTPATIENT
Start: 2024-05-20 | End: 2024-09-06

## 2024-05-20 RX ORDER — CETIRIZINE HYDROCHLORIDE 10 MG/1
20 TABLET ORAL DAILY
Qty: 180 TABLET | Refills: 0 | Status: SHIPPED | OUTPATIENT
Start: 2024-05-20 | End: 2024-08-27

## 2024-05-20 NOTE — PROGRESS NOTES
DISCHARGE  Reason for Discharge: Patient chooses to discontinue therapy.    Equipment Issued: home exercise routine    Discharge Plan: Patient to follow up with MD    Referring Provider:  Dr. Jack Arvizu MD       02/27/24 0500   Appointment Info   Signing clinician's name / credentials Apolinar Junior DPT   Total/Authorized Visits 10 per PT plan of care   Visits Used 7   Medical Diagnosis Bilateral sciatica   PT Tx Diagnosis Acute bilateral low back pain with bilateral sciatica   Quick Adds Certification   Progress Note/Certification   Start of Care Date 01/08/24   Onset of illness/injury or Date of Surgery 12/01/23   Therapy Frequency 1x daily per week   Predicted Duration 10 weeks   Certification date from 01/08/24   Certification date to 03/18/24   Progress Note Completed Date 01/08/24   GOALS   PT Goals 2   PT Goal 1   Goal Identifier Ambulation   Goal Description the patient will be able to walk for 20 minutes independently on level surfaces noting a pain level of <2/10   Rationale to maximize safety and independence with performance of ADLs and functional tasks;to maximize safety and independence within the home;to maximize safety and independence within the community;to maximize safety and independence with transportation   Goal Progress walking independently for 10 minutes, 4/10 pain   Target Date 03/18/24   PT Goal 2   Goal Identifier Lifting   Goal Description The patient will be able to lift an object weighing 20# from the floor to counter height 5x noting a pain level of <2/10   Rationale to maximize safety and independence with performance of ADLs and functional tasks;to maximize safety and independence within the community   Goal Progress lifting 10-15# at work   Target Date 03/18/24   Subjective Report   Subjective Report the patient notes that following her last visit she did not experience the same amount of relief she is used to from traction, and feels very sore today. she reports that  at this time she would like to take a break from physical therapy and reach out to her doctor to discuss a possible injection.   PT Modalities   PT Modalities Mechanical Traction   Mechanical Traction   Mechanical Traction, Minutes (07093) 25   Traction -Type Lumbar   Position supine   Type Static   Duration 20 min   Max poundage 50   Min poundage 0   Steps ramping up 2   Steps ramping down 2   Patient Response/Progress no relief in symptoms from traction today   Treatment Interventions (PT)   Interventions Therapeutic Procedure/Exercise   Therapeutic Procedure/Exercise   Therapeutic Procedures Ther Proc 2   Ther Proc 1 Patient education   Ther Proc 1 - Details plan of care, importance of consistency with home exercise program   Ther Proc 2 Self traction gapping in left sidelying   Ther Proc 2 - Details x2 min   PTRx Ther Proc 1 Clamshell Feet together   PTRx Ther Proc 1 - Details x10 bilaterally   PTRx Ther Proc 2 Supine Abdominal Exercise #3B (Two Leg Marching)   PTRx Ther Proc 2 - Details x15   PTRx Ther Proc 3 standing lumbar extension   PTRx Ther Proc 3 - Details 2x10   PTRx Ther Proc 4 Seated Piriformis Stretch   PTRx Ther Proc 4 - Details x60s bilaterally   Skilled Intervention NOT TODAY   Patient Response/Progress NOT TODAY   Plan   Home program Printed PTRX   Updates to plan of care Added mechanical traction to plan of care   Plan for next session patient to reach out to primary provider to discuss potential medical intervention   Comments   Comments TRACTION ONLY PER PATIENT PREFERENCE   Total Session Time   Total Treatment Time (sum of timed and untimed services) 25

## 2024-05-20 NOTE — PATIENT INSTRUCTIONS

## 2024-05-20 NOTE — PROGRESS NOTES
Preoperative Evaluation  [facility]  [address]  [place] [address]  Phone: [phone]  Primary Provider: Cynthia Scott PA-C  Pre-op Performing Provider: Mable Goodson PA-C  May 20, 2024             5/15/2024   Surgical Information   What procedure is being done? Lumbar spinal surgery   Facility or Hospital where procedure/surgery will be performed: [facility]   Who is doing the procedure / surgery? Aureliano Goins MD   Date of surgery / procedure: 5/29/2024   Time of surgery / procedure: 7:30 am   Where do you plan to recover after surgery? at home with family     Fax number for surgical facility: Note does not need to be faxed, will be available electronically in Epic.  Fax number: [phone]    Assessment & Plan     The proposed surgical procedure is considered INTERMEDIATE risk.    Preop general physical exam  Lumbar radiculopathy  - CBC with platelets  - Basic metabolic panel  (Ca, Cl, CO2, Creat, Gluc, K, Na, BUN)       - No identified additional risk factors other than previously addressed    Preoperative Medication Instructions  Antiplatelet or Anticoagulation Medication Instructions   - Patient is on no antiplatelet or anticoagulation medications.    Additional Medication Instructions  Take all scheduled medications on the day of surgery EXCEPT for modifications listed below:  HOLD ibuprofen for 7 days prior to surgery  HOLD Bynlysta until 7 days after surgery    Recommendation  Approval given to proceed with proposed procedure, without further diagnostic evaluation.        Wendy Swan is a 48 year old, presenting for the following:  Pre-Op Exam          5/20/2024     8:41 AM   Additional Questions   Roomed by An V.         5/20/2024     8:41 AM   Patient Reported Additional Medications   Patient reports taking the following new medications none     HPI related to upcoming procedure: Persistent low back pain.          5/15/2024   Pre-Op Questionnaire   Have you ever had a heart attack or stroke? No   Have you ever had surgery on your heart or blood vessels, such as a stent placement, a coronary artery bypass, or surgery on an artery in your head, neck, heart, or legs? No   Do you have chest pain with activity? No   Do you have a history of heart failure? No   Do you currently have a cold, bronchitis or symptoms of other infection? No   Do you have a cough, shortness of breath, or wheezing? No   Do you or anyone in your family have previous history of blood clots? No   Do you or does anyone in your family have a serious bleeding problem such as prolonged bleeding following surgeries or cuts? No   Have you ever had problems with anemia or been told to take iron pills? (!) YES - takes daily iron, last hemoglobin 12.1 02/2024   Have you had any abnormal blood loss such as black, tarry or bloody stools, or abnormal vaginal bleeding? No   Have you ever had a blood transfusion? No   Are you willing to have a blood transfusion if it is medically needed before, during, or after your surgery? Yes   Have you or any of your relatives ever had problems with anesthesia? No   Do you have sleep apnea, excessive snoring or daytime drowsiness? No   Do you have any artifical heart valves or other implanted medical devices like a pacemaker, defibrillator, or continuous glucose monitor? No   Do you have artificial joints? No   Are you allergic to latex? No     Health Care Directive  Patient does not have a Health Care Directive or Living Will: Discussed advance care planning with patient; information given to patient to review.    Preoperative Review of    reviewed - controlled substances reflected in medication list.          Patient Active Problem List    Diagnosis Date Noted    Acute bilateral low back pain with bilateral sciatica 01/09/2024     Priority: Medium    Gallstones 09/21/2023     Priority: Medium    Recurrent major depressive  disorder, in partial remission (H24) 08/31/2022     Priority: Medium    Lichen sclerosus 09/10/2019     Priority: Medium    High risk medications (not anticoagulants) long-term use 09/21/2017     Priority: Medium    Sicca syndrome (H24) 09/21/2017     Priority: Medium    Systemic lupus erythematosus (H) 01/18/2017     Priority: Medium    JESSE positive 10/11/2016     Priority: Medium    Multiple joint pain 10/11/2016     Priority: Medium    Cocaine use 10/11/2016     Priority: Medium     In remission       H/O intravenous drug use in remission 03/06/2013     Priority: Medium    Herpes simplex type 2 infection 06/18/2010     Priority: Medium    Genital warts      Priority: Medium     (Problem list name updated by automated process. Provider to review and confirm.)        Past Medical History:   Diagnosis Date    Arthritis 2013    Bipolar disorder (H) 12/2004    Dx'd    Combinations of drug dependence excluding opioid type drug (H) 05/04/2022    In remission     H/O: depression     SUICIDE ATTEMPT AGE 15    HPV (human papillomavirus)     Mood disorder (H24) 04/09/2019     Past Surgical History:   Procedure Laterality Date    BIOPSY      BUNIONECTOMY RT/LT      CHOLECYSTECTOMY      DAVINCI LAPAROSCOPIC CHOLECYSTECTOMY WITHOUT GRAMS N/A 10/09/2023    Procedure: CHOLECYSTECTOMY, ROBOT-ASSISTED, LAPAROSCOPIC, WITHOUT CHOLANGIOGRAM;  Surgeon: Demetris Peoples MD;  Location: [facility] OR     ENLARGE BREAST WITH IMPLANT  01/01/2000     Current Outpatient Medications   Medication Sig Dispense Refill    acetaminophen (TYLENOL) 325 MG tablet Take 325-650 mg by mouth every 6 hours as needed for mild pain      ARIPiprazole (ABILIFY) 10 MG tablet Take 10 mg by mouth daily      Aspirin-Acetaminophen-Caffeine (EXCEDRIN PO)       augmented betamethasone dipropionate (DIPROLENE-AF) 0.05 % external ointment Apply topically 2 times daily 50 g 3    belimumab (BENLYSTA) subcutaneous injection (prefilled autoinjector) Inject 1 mL (200 mg)  Subcutaneous every 7 days . Hold for signs of infection and seek medical attention.   Rheumatology follow-up required prior to the next refill prescription 4 mL 3    cetirizine (ZYRTEC) 10 MG tablet Take 2 tablets (20 mg) by mouth daily 180 tablet 0    clobetasol (TEMOVATE) 0.05 % external cream Apply topically 2 times daily 60 g 0    cycloSPORINE (RESTASIS) 0.05 % ophthalmic emulsion Place 1 drop into both eyes 2 times daily 11 mL 6    FLUoxetine (PROZAC) 40 MG capsule TAKE ONE CAPSULE BY MOUTH ONCE DAILY 90 capsule 1    gabapentin (NEURONTIN) 100 MG capsule Take 3 capsules (300 mg) by mouth 2 times daily 60 capsule 1    gabapentin (NEURONTIN) 300 MG capsule Take 1 capsule (300 mg) by mouth 3 times daily Plus one extra at night 180 capsule 1    gabapentin (NEURONTIN) 600 MG tablet Take 1 tablet (600 mg) by mouth 3 times daily 270 tablet 0    hydroquinone (RUFUS) 4 % external cream Apply topically at bedtime 28.35 g 11    hydroquinone (RUFUS) 4 % external cream Apply topically 2 times daily For up to 3 months. 30 g 3    hydroxychloroquine (PLAQUENIL) 200 MG tablet Hydroxychloroquine 200 mg daily with an additional 200 mg every other day.  Ophthalmology toxicity monitoring eye exam, that includes 10-2 VF and SD-OCT, is required yearly. 135 tablet 1    ibuprofen (ADVIL/MOTRIN) 200 MG tablet Take 200 mg by mouth every 4 hours as needed for pain      JUBLIA 10 % SOLN APPLY TOPICALLY TO AFFECTED AREAS DAILY 8 mL 1    leflunomide (ARAVA) 20 MG tablet Take 1 tablet (20 mg) by mouth daily ; labs required every 12 weeks 90 tablet 1    methocarbamol (ROBAXIN) 500 MG tablet Take 1 tablet (500 mg) by mouth 4 times daily as needed for muscle spasms 120 tablet 3    methocarbamol (ROBAXIN) 500 MG tablet Take 1 tablet (500 mg) by mouth 4 times daily as needed for muscle spasms 30 tablet 1    mometasone (ELOCON) 0.1 % external cream Apply topically daily 45 g 1    tretinoin (RETIN-A) 0.025 % external cream Apply topically at  "bedtime Pea-sized amount to whole face 45 g 3    diclofenac (VOLTAREN) 1 % topical gel Apply up to 2 grams of 1% gel to hands up to 4 times daily as needed for joint pain (maximum: 8 g per upper extremity per day) (Patient not taking: Reported on 4/3/2024) 200 g 2    HYDROcodone-acetaminophen (NORCO) 5-325 MG tablet Take 1 tablet by mouth 2 times daily as needed for severe pain (Patient not taking: Reported on 2024) 48 tablet 0    predniSONE (DELTASONE) 5 MG tablet For inflammatory arthritis flare only: Prednisone 20mg daily x5days, then 15mg daily x5days, then 10mg daily x5days, then 5mg daily x5days, then stop. (Patient not taking: Reported on 4/3/2024) 50 tablet 0    senna-docusate (SENOKOT-S/PERICOLACE) 8.6-50 MG tablet Take 1-2 tablets by mouth 2 times daily (Patient not taking: Reported on 2024) 15 tablet 0       No Known Allergies     Social History     Tobacco Use    Smoking status: Every Day     Current packs/day: 0.00     Average packs/day: 0.3 packs/day for 41.4 years (11.5 ttl pk-yrs)     Types: Cigarettes     Start date: 3/3/1990     Last attempt to quit: 3/3/2013     Years since quittin.2    Smokeless tobacco: Current    Tobacco comments:     Currently vapes   Substance Use Topics    Alcohol use: Yes     Comment: rare       History   Drug Use Unknown     Comment: Cocaine             Review of Systems  Constitutional, HEENT, cardiovascular, pulmonary, GI, , musculoskeletal, neuro, skin, endocrine and psych systems are negative, except as otherwise noted.    Objective    /87   Pulse 81   Temp 97.5  F (36.4  C) (Oral)   Resp 20   Ht 1.658 m (5' 5.28\")   Wt 76.5 kg (168 lb 9.6 oz)   SpO2 96%   BMI 27.82 kg/m     Estimated body mass index is 27.82 kg/m  as calculated from the following:    Height as of this encounter: 1.658 m (5' 5.28\").    Weight as of this encounter: 76.5 kg (168 lb 9.6 oz).  Physical Exam  GENERAL: alert and no distress  EYES: Eyes grossly normal to " inspection, PERRL and conjunctivae and sclerae normal  HENT: ear canals and TM's normal, nose and mouth without ulcers or lesions  NECK: no adenopathy, no asymmetry, masses, or scars  RESP: lungs clear to auscultation - no rales, rhonchi or wheezes  CV: regular rate and rhythm, normal S1 S2, no S3 or S4, no murmur, click or rub, no peripheral edema  ABDOMEN: soft, nontender, no hepatosplenomegaly, no masses and bowel sounds normal  MS: no gross musculoskeletal defects noted, no edema  SKIN: no suspicious lesions or rashes  NEURO: Normal strength and tone, mentation intact and speech normal  PSYCH: mentation appears normal, affect normal/bright    Recent Labs   Lab Test 02/08/24  0700 11/09/23  0717   HGB 12.1 12.1    207    139   POTASSIUM 4.0 4.0   CR 0.71 0.75        Diagnostics  No labs were ordered during this visit. Labs ordered by surgeon to be reviewed by surgeon.   No EKG required, no history of coronary heart disease, significant arrhythmia, peripheral arterial disease or other structural heart disease.    Revised Cardiac Risk Index (RCRI)  The patient has the following serious cardiovascular risks for perioperative complications:   - No serious cardiac risks = 0 points     RCRI Interpretation: 0 points: Class I (very low risk - 0.4% complication rate)         Signed Electronically by: Mable Goodson PA-C  Copy of this evaluation report is provided to requesting physician.

## 2024-05-28 NOTE — PROGRESS NOTES
PTA medications updated by Medication Scribe prior to surgery via phone call with patient (last doses completed by Nurse)     Medication history sources: Patient, Surescripts, and H&P  In the past week, patient estimated taking medication this percent of the time: Greater than 90%      Significant changes made to the medication list:  None      Additional medication history information:   None    Medication reconciliation completed by provider prior to medication history? No    Time spent in this activity: 35 MINUTES    The information provided in this note is only as accurate as the sources available at the time of update(s)      Prior to Admission medications    Medication Sig Last Dose Taking? Auth Provider Long Term End Date   acetaminophen (TYLENOL) 325 MG tablet Take 325-650 mg by mouth every 6 hours as needed for mild pain  at PRN Yes Reported, Patient     ARIPiprazole (ABILIFY) 10 MG tablet Take 10 mg by mouth daily  at AM Yes Reported, Patient No    aspirin-acetaminophen-caffeine (EXCEDRIN MIGRAINE) 250-250-65 MG tablet Take 1 tablet by mouth daily as needed for headaches  at PRN Yes Reported, Patient     augmented betamethasone dipropionate (DIPROLENE-AF) 0.05 % external ointment Apply topically 2 times daily  at PM Yes Louis Beaulieu MD     belimumab (BENLYSTA) subcutaneous injection (prefilled autoinjector) Inject 1 mL (200 mg) Subcutaneous every 7 days . Hold for signs of infection and seek medical attention.   Rheumatology follow-up required prior to the next refill prescription  Yes Jack Arvizu MD Yes    cetirizine (ZYRTEC) 10 MG tablet TAKE 2 TABLETS BY MOUTH DAILY  at AM Yes Cynthia Scott PA-C     clobetasol (TEMOVATE) 0.05 % external cream Apply topically 2 times daily  at PRN Yes Cynthia Scott PA-C     cycloSPORINE (RESTASIS) 0.05 % ophthalmic emulsion Place 1 drop into both eyes 2 times daily  at PRN Yes Jack Arvizu MD     diclofenac (VOLTAREN) 1 % topical gel  Apply up to 2 grams of 1% gel to hands up to 4 times daily as needed for joint pain (maximum: 8 g per upper extremity per day)  at PRN Yes Jack Arvizu MD     Efinaconazole (JUBLIA) 10 % SOLN APPLY TOPICALLY TO AFFECTED AREAS DAILY  Yes Nathalia Miles MD     FLUoxetine (PROZAC) 40 MG capsule TAKE ONE CAPSULE BY MOUTH ONCE DAILY  at AM Yes Cynthia Scott PA-C Yes    gabapentin (NEURONTIN) 600 MG tablet Take 1 tablet (600 mg) by mouth 3 times daily  at AM Yes Philipp Echavarria MD Yes    hydroquinone (RUFUS) 4 % external cream Apply topically at bedtime  at AM Yes Louis Beaulieu MD     hydroxychloroquine (PLAQUENIL) 200 MG tablet Hydroxychloroquine 200 mg daily with an additional 200 mg every other day.  Ophthalmology toxicity monitoring eye exam, that includes 10-2 VF and SD-OCT, is required yearly.  at AM Yes Jack Arvizu MD     ibuprofen (ADVIL/MOTRIN) 200 MG tablet Take 200 mg by mouth every 4 hours as needed for pain  Yes Reported, Patient     leflunomide (ARAVA) 20 MG tablet Take 1 tablet (20 mg) by mouth daily ; labs required every 12 weeks  at AM Yes Jack Arvizu MD Yes    methocarbamol (ROBAXIN) 500 MG tablet Take 1 tablet (500 mg) by mouth 4 times daily as needed for muscle spasms  at AM Yes Philipp Echavarria MD     mometasone (ELOCON) 0.1 % external cream Apply topically daily  Yes Cynthia Scott PA-C     tretinoin (RETIN-A) 0.025 % external cream Apply topically at bedtime Pea-sized amount to whole face  at PM Yes Louis Beaulieu MD         Medication history completed by: Jany Yun

## 2024-05-29 ENCOUNTER — HOSPITAL ENCOUNTER (OUTPATIENT)
Facility: CLINIC | Age: 49
Discharge: HOME | End: 2024-05-30
Attending: NEUROLOGICAL SURGERY | Admitting: NEUROLOGICAL SURGERY
Payer: COMMERCIAL

## 2024-05-29 ENCOUNTER — ANESTHESIA EVENT (OUTPATIENT)
Dept: SURGERY | Facility: CLINIC | Age: 49
End: 2024-05-29
Payer: COMMERCIAL

## 2024-05-29 ENCOUNTER — ANESTHESIA (OUTPATIENT)
Dept: SURGERY | Facility: CLINIC | Age: 49
End: 2024-05-29
Payer: COMMERCIAL

## 2024-05-29 ENCOUNTER — APPOINTMENT (OUTPATIENT)
Dept: GENERAL RADIOLOGY | Facility: CLINIC | Age: 49
End: 2024-05-29
Attending: NEUROLOGICAL SURGERY
Payer: COMMERCIAL

## 2024-05-29 DIAGNOSIS — M54.41 BILATERAL LOW BACK PAIN WITH BILATERAL SCIATICA, UNSPECIFIED CHRONICITY: ICD-10-CM

## 2024-05-29 DIAGNOSIS — M54.16 LUMBAR RADICULOPATHY: ICD-10-CM

## 2024-05-29 DIAGNOSIS — Z98.890 S/P LUMBAR LAMINECTOMY: Primary | ICD-10-CM

## 2024-05-29 DIAGNOSIS — M54.42 BILATERAL LOW BACK PAIN WITH BILATERAL SCIATICA, UNSPECIFIED CHRONICITY: ICD-10-CM

## 2024-05-29 PROBLEM — Z41.9 SURGERY, ELECTIVE: Status: ACTIVE | Noted: 2024-05-29

## 2024-05-29 PROCEDURE — 258N000003 HC RX IP 258 OP 636

## 2024-05-29 PROCEDURE — 710N000009 HC RECOVERY PHASE 1, LEVEL 1, PER MIN: Performed by: NEUROLOGICAL SURGERY

## 2024-05-29 PROCEDURE — 250N000011 HC RX IP 250 OP 636

## 2024-05-29 PROCEDURE — 258N000003 HC RX IP 258 OP 636: Performed by: SURGERY

## 2024-05-29 PROCEDURE — 250N000009 HC RX 250

## 2024-05-29 PROCEDURE — 250N000025 HC SEVOFLURANE, PER MIN: Performed by: NEUROLOGICAL SURGERY

## 2024-05-29 PROCEDURE — 250N000009 HC RX 250: Performed by: NEUROLOGICAL SURGERY

## 2024-05-29 PROCEDURE — 250N000011 HC RX IP 250 OP 636: Performed by: NEUROLOGICAL SURGERY

## 2024-05-29 PROCEDURE — 272N000001 HC OR GENERAL SUPPLY STERILE: Performed by: NEUROLOGICAL SURGERY

## 2024-05-29 PROCEDURE — 63005 REMOVE SPINE LAMINA 1/2 LMBR: CPT | Performed by: SURGERY

## 2024-05-29 PROCEDURE — 250N000013 HC RX MED GY IP 250 OP 250 PS 637: Performed by: NURSE PRACTITIONER

## 2024-05-29 PROCEDURE — 250N000013 HC RX MED GY IP 250 OP 250 PS 637

## 2024-05-29 PROCEDURE — 88304 TISSUE EXAM BY PATHOLOGIST: CPT | Mod: 26 | Performed by: PATHOLOGY

## 2024-05-29 PROCEDURE — 999N000179 XR SURGERY CARM FLUORO LESS THAN 5 MIN W STILLS

## 2024-05-29 PROCEDURE — 999N000141 HC STATISTIC PRE-PROCEDURE NURSING ASSESSMENT: Performed by: NEUROLOGICAL SURGERY

## 2024-05-29 PROCEDURE — 63005 REMOVE SPINE LAMINA 1/2 LMBR: CPT

## 2024-05-29 PROCEDURE — 250N000011 HC RX IP 250 OP 636: Mod: JZ | Performed by: SURGERY

## 2024-05-29 PROCEDURE — 88304 TISSUE EXAM BY PATHOLOGIST: CPT | Mod: TC | Performed by: NEUROLOGICAL SURGERY

## 2024-05-29 PROCEDURE — 360N000084 HC SURGERY LEVEL 4 W/ FLUORO, PER MIN: Performed by: NEUROLOGICAL SURGERY

## 2024-05-29 PROCEDURE — 370N000017 HC ANESTHESIA TECHNICAL FEE, PER MIN: Performed by: NEUROLOGICAL SURGERY

## 2024-05-29 PROCEDURE — 250N000005 HC OR RX SURGIFLO HEMOSTATIC MATRIX 10ML 199102S OPNP: Performed by: NEUROLOGICAL SURGERY

## 2024-05-29 RX ORDER — NALOXONE HYDROCHLORIDE 0.4 MG/ML
0.2 INJECTION, SOLUTION INTRAMUSCULAR; INTRAVENOUS; SUBCUTANEOUS
Status: DISCONTINUED | OUTPATIENT
Start: 2024-05-29 | End: 2024-05-30 | Stop reason: HOSPADM

## 2024-05-29 RX ORDER — CALCIUM CARBONATE 500 MG/1
500 TABLET, CHEWABLE ORAL 4 TIMES DAILY PRN
Status: DISCONTINUED | OUTPATIENT
Start: 2024-05-29 | End: 2024-05-30 | Stop reason: HOSPADM

## 2024-05-29 RX ORDER — GABAPENTIN 300 MG/1
300 CAPSULE ORAL
Status: COMPLETED | OUTPATIENT
Start: 2024-05-29 | End: 2024-05-29

## 2024-05-29 RX ORDER — HYDROXYZINE HYDROCHLORIDE 25 MG/1
25 TABLET, FILM COATED ORAL EVERY 6 HOURS PRN
Qty: 30 TABLET | Refills: 0 | Status: SHIPPED | OUTPATIENT
Start: 2024-05-29 | End: 2024-05-29

## 2024-05-29 RX ORDER — FENTANYL CITRATE 0.05 MG/ML
25 INJECTION, SOLUTION INTRAMUSCULAR; INTRAVENOUS EVERY 5 MIN PRN
Status: DISCONTINUED | OUTPATIENT
Start: 2024-05-29 | End: 2024-05-29 | Stop reason: HOSPADM

## 2024-05-29 RX ORDER — HYDROMORPHONE HCL IN WATER/PF 6 MG/30 ML
0.4 PATIENT CONTROLLED ANALGESIA SYRINGE INTRAVENOUS EVERY 5 MIN PRN
Status: DISCONTINUED | OUTPATIENT
Start: 2024-05-29 | End: 2024-05-29 | Stop reason: HOSPADM

## 2024-05-29 RX ORDER — AMOXICILLIN 250 MG
1-2 CAPSULE ORAL 2 TIMES DAILY
Qty: 30 TABLET | Refills: 0 | Status: SHIPPED | OUTPATIENT
Start: 2024-05-29 | End: 2024-10-07

## 2024-05-29 RX ORDER — HYDROXYZINE HYDROCHLORIDE 25 MG/1
25 TABLET, FILM COATED ORAL EVERY 6 HOURS PRN
Status: DISCONTINUED | OUTPATIENT
Start: 2024-05-29 | End: 2024-05-29 | Stop reason: HOSPADM

## 2024-05-29 RX ORDER — DEXMEDETOMIDINE HYDROCHLORIDE 4 UG/ML
INJECTION, SOLUTION INTRAVENOUS PRN
Status: DISCONTINUED | OUTPATIENT
Start: 2024-05-29 | End: 2024-05-29

## 2024-05-29 RX ORDER — LIDOCAINE HYDROCHLORIDE 20 MG/ML
INJECTION, SOLUTION INFILTRATION; PERINEURAL PRN
Status: DISCONTINUED | OUTPATIENT
Start: 2024-05-29 | End: 2024-05-29

## 2024-05-29 RX ORDER — ONDANSETRON 4 MG/1
4 TABLET, ORALLY DISINTEGRATING ORAL EVERY 30 MIN PRN
Status: DISCONTINUED | OUTPATIENT
Start: 2024-05-29 | End: 2024-05-29 | Stop reason: HOSPADM

## 2024-05-29 RX ORDER — ACETAMINOPHEN 325 MG/1
650 TABLET ORAL EVERY 4 HOURS PRN
Qty: 100 TABLET | Refills: 0 | Status: SHIPPED | OUTPATIENT
Start: 2024-05-29 | End: 2024-05-29

## 2024-05-29 RX ORDER — ONDANSETRON 2 MG/ML
INJECTION INTRAMUSCULAR; INTRAVENOUS PRN
Status: DISCONTINUED | OUTPATIENT
Start: 2024-05-29 | End: 2024-05-29

## 2024-05-29 RX ORDER — ONDANSETRON 2 MG/ML
4 INJECTION INTRAMUSCULAR; INTRAVENOUS EVERY 30 MIN PRN
Status: DISCONTINUED | OUTPATIENT
Start: 2024-05-29 | End: 2024-05-29 | Stop reason: HOSPADM

## 2024-05-29 RX ORDER — KETAMINE HYDROCHLORIDE 10 MG/ML
INJECTION INTRAMUSCULAR; INTRAVENOUS PRN
Status: DISCONTINUED | OUTPATIENT
Start: 2024-05-29 | End: 2024-05-29

## 2024-05-29 RX ORDER — HYDROMORPHONE HCL IN WATER/PF 6 MG/30 ML
0.4 PATIENT CONTROLLED ANALGESIA SYRINGE INTRAVENOUS
Status: DISCONTINUED | OUTPATIENT
Start: 2024-05-29 | End: 2024-05-30 | Stop reason: HOSPADM

## 2024-05-29 RX ORDER — PROPOFOL 10 MG/ML
INJECTION, EMULSION INTRAVENOUS CONTINUOUS PRN
Status: DISCONTINUED | OUTPATIENT
Start: 2024-05-29 | End: 2024-05-29

## 2024-05-29 RX ORDER — OXYCODONE HYDROCHLORIDE 5 MG/1
10 TABLET ORAL EVERY 4 HOURS PRN
Status: DISCONTINUED | OUTPATIENT
Start: 2024-05-29 | End: 2024-05-30 | Stop reason: HOSPADM

## 2024-05-29 RX ORDER — DEXAMETHASONE SODIUM PHOSPHATE 4 MG/ML
INJECTION, SOLUTION INTRA-ARTICULAR; INTRALESIONAL; INTRAMUSCULAR; INTRAVENOUS; SOFT TISSUE PRN
Status: DISCONTINUED | OUTPATIENT
Start: 2024-05-29 | End: 2024-05-29

## 2024-05-29 RX ORDER — BUPIVACAINE HYDROCHLORIDE AND EPINEPHRINE 2.5; 5 MG/ML; UG/ML
INJECTION, SOLUTION INFILTRATION; PERINEURAL PRN
Status: DISCONTINUED | OUTPATIENT
Start: 2024-05-29 | End: 2024-05-29 | Stop reason: HOSPADM

## 2024-05-29 RX ORDER — FENTANYL CITRATE 0.05 MG/ML
50 INJECTION, SOLUTION INTRAMUSCULAR; INTRAVENOUS EVERY 5 MIN PRN
Status: DISCONTINUED | OUTPATIENT
Start: 2024-05-29 | End: 2024-05-29 | Stop reason: HOSPADM

## 2024-05-29 RX ORDER — HYDRALAZINE HYDROCHLORIDE 20 MG/ML
2.5-5 INJECTION INTRAMUSCULAR; INTRAVENOUS EVERY 10 MIN PRN
Status: DISCONTINUED | OUTPATIENT
Start: 2024-05-29 | End: 2024-05-29 | Stop reason: HOSPADM

## 2024-05-29 RX ORDER — CEFAZOLIN SODIUM/WATER 2 G/20 ML
2 SYRINGE (ML) INTRAVENOUS
Status: COMPLETED | OUTPATIENT
Start: 2024-05-29 | End: 2024-05-29

## 2024-05-29 RX ORDER — ACETAMINOPHEN 325 MG/1
325-650 TABLET ORAL EVERY 6 HOURS PRN
Status: DISCONTINUED | OUTPATIENT
Start: 2024-05-29 | End: 2024-05-30 | Stop reason: HOSPADM

## 2024-05-29 RX ORDER — ONDANSETRON 4 MG/1
4 TABLET, ORALLY DISINTEGRATING ORAL EVERY 6 HOURS PRN
Status: DISCONTINUED | OUTPATIENT
Start: 2024-05-29 | End: 2024-05-30 | Stop reason: HOSPADM

## 2024-05-29 RX ORDER — LIDOCAINE 40 MG/G
CREAM TOPICAL
Status: DISCONTINUED | OUTPATIENT
Start: 2024-05-29 | End: 2024-05-30 | Stop reason: HOSPADM

## 2024-05-29 RX ORDER — NALOXONE HYDROCHLORIDE 0.4 MG/ML
0.1 INJECTION, SOLUTION INTRAMUSCULAR; INTRAVENOUS; SUBCUTANEOUS
Status: DISCONTINUED | OUTPATIENT
Start: 2024-05-29 | End: 2024-05-29 | Stop reason: HOSPADM

## 2024-05-29 RX ORDER — ONDANSETRON 4 MG/1
4-8 TABLET, ORALLY DISINTEGRATING ORAL EVERY 8 HOURS PRN
Qty: 10 TABLET | Refills: 0 | Status: SHIPPED | OUTPATIENT
Start: 2024-05-29 | End: 2024-05-29

## 2024-05-29 RX ORDER — DEXAMETHASONE SODIUM PHOSPHATE 4 MG/ML
4 INJECTION, SOLUTION INTRA-ARTICULAR; INTRALESIONAL; INTRAMUSCULAR; INTRAVENOUS; SOFT TISSUE
Status: DISCONTINUED | OUTPATIENT
Start: 2024-05-29 | End: 2024-05-29 | Stop reason: HOSPADM

## 2024-05-29 RX ORDER — HYDROMORPHONE HCL IN WATER/PF 6 MG/30 ML
0.2 PATIENT CONTROLLED ANALGESIA SYRINGE INTRAVENOUS
Status: DISCONTINUED | OUTPATIENT
Start: 2024-05-29 | End: 2024-05-30 | Stop reason: HOSPADM

## 2024-05-29 RX ORDER — SODIUM CHLORIDE, SODIUM LACTATE, POTASSIUM CHLORIDE, CALCIUM CHLORIDE 600; 310; 30; 20 MG/100ML; MG/100ML; MG/100ML; MG/100ML
INJECTION, SOLUTION INTRAVENOUS CONTINUOUS
Status: DISCONTINUED | OUTPATIENT
Start: 2024-05-29 | End: 2024-05-29 | Stop reason: HOSPADM

## 2024-05-29 RX ORDER — NALOXONE HYDROCHLORIDE 0.4 MG/ML
0.4 INJECTION, SOLUTION INTRAMUSCULAR; INTRAVENOUS; SUBCUTANEOUS
Status: DISCONTINUED | OUTPATIENT
Start: 2024-05-29 | End: 2024-05-30 | Stop reason: HOSPADM

## 2024-05-29 RX ORDER — FENTANYL CITRATE 50 UG/ML
INJECTION, SOLUTION INTRAMUSCULAR; INTRAVENOUS PRN
Status: DISCONTINUED | OUTPATIENT
Start: 2024-05-29 | End: 2024-05-29

## 2024-05-29 RX ORDER — HYDROXYZINE HYDROCHLORIDE 25 MG/1
25 TABLET, FILM COATED ORAL EVERY 6 HOURS PRN
Status: DISCONTINUED | OUTPATIENT
Start: 2024-05-29 | End: 2024-05-30 | Stop reason: HOSPADM

## 2024-05-29 RX ORDER — LABETALOL HYDROCHLORIDE 5 MG/ML
10 INJECTION, SOLUTION INTRAVENOUS
Status: DISCONTINUED | OUTPATIENT
Start: 2024-05-29 | End: 2024-05-29 | Stop reason: HOSPADM

## 2024-05-29 RX ORDER — CEFAZOLIN SODIUM/WATER 2 G/20 ML
2 SYRINGE (ML) INTRAVENOUS SEE ADMIN INSTRUCTIONS
Status: DISCONTINUED | OUTPATIENT
Start: 2024-05-29 | End: 2024-05-29 | Stop reason: HOSPADM

## 2024-05-29 RX ORDER — GLYCOPYRROLATE 0.2 MG/ML
INJECTION, SOLUTION INTRAMUSCULAR; INTRAVENOUS PRN
Status: DISCONTINUED | OUTPATIENT
Start: 2024-05-29 | End: 2024-05-29

## 2024-05-29 RX ORDER — FENTANYL CITRATE 50 UG/ML
25 INJECTION, SOLUTION INTRAMUSCULAR; INTRAVENOUS
Status: DISCONTINUED | OUTPATIENT
Start: 2024-05-29 | End: 2024-05-29 | Stop reason: HOSPADM

## 2024-05-29 RX ORDER — HYDROMORPHONE HCL IN WATER/PF 6 MG/30 ML
0.2 PATIENT CONTROLLED ANALGESIA SYRINGE INTRAVENOUS EVERY 5 MIN PRN
Status: DISCONTINUED | OUTPATIENT
Start: 2024-05-29 | End: 2024-05-29 | Stop reason: HOSPADM

## 2024-05-29 RX ORDER — ONDANSETRON 2 MG/ML
4 INJECTION INTRAMUSCULAR; INTRAVENOUS EVERY 6 HOURS PRN
Status: DISCONTINUED | OUTPATIENT
Start: 2024-05-29 | End: 2024-05-30 | Stop reason: HOSPADM

## 2024-05-29 RX ORDER — GABAPENTIN 600 MG/1
600 TABLET ORAL 3 TIMES DAILY
Status: DISCONTINUED | OUTPATIENT
Start: 2024-05-30 | End: 2024-05-30 | Stop reason: HOSPADM

## 2024-05-29 RX ORDER — ARIPIPRAZOLE 10 MG/1
10 TABLET ORAL DAILY
Status: DISCONTINUED | OUTPATIENT
Start: 2024-05-29 | End: 2024-05-30 | Stop reason: HOSPADM

## 2024-05-29 RX ORDER — PROPOFOL 10 MG/ML
INJECTION, EMULSION INTRAVENOUS PRN
Status: DISCONTINUED | OUTPATIENT
Start: 2024-05-29 | End: 2024-05-29

## 2024-05-29 RX ORDER — METHOCARBAMOL 500 MG/1
500 TABLET, FILM COATED ORAL 4 TIMES DAILY PRN
Status: DISCONTINUED | OUTPATIENT
Start: 2024-05-29 | End: 2024-05-30 | Stop reason: HOSPADM

## 2024-05-29 RX ORDER — OXYCODONE HYDROCHLORIDE 5 MG/1
5 TABLET ORAL EVERY 4 HOURS PRN
Status: DISCONTINUED | OUTPATIENT
Start: 2024-05-29 | End: 2024-05-30 | Stop reason: HOSPADM

## 2024-05-29 RX ADMIN — ARIPIPRAZOLE 10 MG: 10 TABLET ORAL at 14:09

## 2024-05-29 RX ADMIN — PROPOFOL 150 MG: 10 INJECTION, EMULSION INTRAVENOUS at 07:47

## 2024-05-29 RX ADMIN — FENTANYL CITRATE 50 MCG: 50 INJECTION, SOLUTION INTRAMUSCULAR; INTRAVENOUS at 10:37

## 2024-05-29 RX ADMIN — GABAPENTIN 300 MG: 300 CAPSULE ORAL at 06:26

## 2024-05-29 RX ADMIN — FENTANYL CITRATE 100 MCG: 50 INJECTION INTRAMUSCULAR; INTRAVENOUS at 07:47

## 2024-05-29 RX ADMIN — Medication 10 MG: at 08:11

## 2024-05-29 RX ADMIN — PHENYLEPHRINE HYDROCHLORIDE 100 MCG: 10 INJECTION INTRAVENOUS at 09:08

## 2024-05-29 RX ADMIN — GLYCOPYRROLATE 0.2 MG: 0.2 INJECTION, SOLUTION INTRAMUSCULAR; INTRAVENOUS at 07:32

## 2024-05-29 RX ADMIN — DEXMEDETOMIDINE HYDROCHLORIDE 4 MCG: 200 INJECTION INTRAVENOUS at 08:35

## 2024-05-29 RX ADMIN — OXYCODONE HYDROCHLORIDE 5 MG: 5 TABLET ORAL at 17:32

## 2024-05-29 RX ADMIN — Medication 2 G: at 07:30

## 2024-05-29 RX ADMIN — METHOCARBAMOL 500 MG: 500 TABLET ORAL at 14:09

## 2024-05-29 RX ADMIN — HYDROMORPHONE HYDROCHLORIDE 0.25 MG: 1 INJECTION, SOLUTION INTRAMUSCULAR; INTRAVENOUS; SUBCUTANEOUS at 09:58

## 2024-05-29 RX ADMIN — LIDOCAINE HYDROCHLORIDE 100 MG: 20 INJECTION, SOLUTION INFILTRATION; PERINEURAL at 07:47

## 2024-05-29 RX ADMIN — DEXAMETHASONE SODIUM PHOSPHATE 8 MG: 4 INJECTION, SOLUTION INTRA-ARTICULAR; INTRALESIONAL; INTRAMUSCULAR; INTRAVENOUS; SOFT TISSUE at 07:53

## 2024-05-29 RX ADMIN — ONDANSETRON 4 MG: 2 INJECTION INTRAMUSCULAR; INTRAVENOUS at 07:53

## 2024-05-29 RX ADMIN — PHENYLEPHRINE HYDROCHLORIDE 100 MCG: 10 INJECTION INTRAVENOUS at 08:48

## 2024-05-29 RX ADMIN — PHENYLEPHRINE HYDROCHLORIDE 100 MCG: 10 INJECTION INTRAVENOUS at 08:14

## 2024-05-29 RX ADMIN — FENTANYL CITRATE 25 MCG: 50 INJECTION INTRAMUSCULAR; INTRAVENOUS at 10:04

## 2024-05-29 RX ADMIN — ROCURONIUM BROMIDE 50 MG: 50 INJECTION, SOLUTION INTRAVENOUS at 07:47

## 2024-05-29 RX ADMIN — PROPOFOL 100 MCG/KG/MIN: 10 INJECTION, EMULSION INTRAVENOUS at 08:00

## 2024-05-29 RX ADMIN — OXYCODONE HYDROCHLORIDE 10 MG: 5 TABLET ORAL at 22:29

## 2024-05-29 RX ADMIN — DEXMEDETOMIDINE HYDROCHLORIDE 4 MCG: 200 INJECTION INTRAVENOUS at 07:52

## 2024-05-29 RX ADMIN — SODIUM CHLORIDE, POTASSIUM CHLORIDE, SODIUM LACTATE AND CALCIUM CHLORIDE: 600; 310; 30; 20 INJECTION, SOLUTION INTRAVENOUS at 07:28

## 2024-05-29 RX ADMIN — ROCURONIUM BROMIDE 10 MG: 50 INJECTION, SOLUTION INTRAVENOUS at 09:05

## 2024-05-29 RX ADMIN — ROCURONIUM BROMIDE 10 MG: 50 INJECTION, SOLUTION INTRAVENOUS at 08:31

## 2024-05-29 RX ADMIN — SUGAMMADEX 150 MG: 100 INJECTION, SOLUTION INTRAVENOUS at 09:53

## 2024-05-29 RX ADMIN — DEXMEDETOMIDINE HYDROCHLORIDE 4 MCG: 200 INJECTION INTRAVENOUS at 10:01

## 2024-05-29 RX ADMIN — OXYCODONE HYDROCHLORIDE 5 MG: 5 TABLET ORAL at 13:23

## 2024-05-29 RX ADMIN — PHENYLEPHRINE HYDROCHLORIDE 100 MCG: 10 INJECTION INTRAVENOUS at 08:57

## 2024-05-29 RX ADMIN — MIDAZOLAM 2 MG: 1 INJECTION INTRAMUSCULAR; INTRAVENOUS at 07:30

## 2024-05-29 RX ADMIN — FENTANYL CITRATE 25 MCG: 50 INJECTION INTRAMUSCULAR; INTRAVENOUS at 10:06

## 2024-05-29 RX ADMIN — DEXMEDETOMIDINE HYDROCHLORIDE 4 MCG: 200 INJECTION INTRAVENOUS at 07:43

## 2024-05-29 RX ADMIN — FENTANYL CITRATE 50 MCG: 50 INJECTION, SOLUTION INTRAMUSCULAR; INTRAVENOUS at 10:58

## 2024-05-29 RX ADMIN — HYDROMORPHONE HYDROCHLORIDE 0.25 MG: 1 INJECTION, SOLUTION INTRAMUSCULAR; INTRAVENOUS; SUBCUTANEOUS at 09:43

## 2024-05-29 RX ADMIN — SODIUM CHLORIDE, POTASSIUM CHLORIDE, SODIUM LACTATE AND CALCIUM CHLORIDE: 600; 310; 30; 20 INJECTION, SOLUTION INTRAVENOUS at 08:58

## 2024-05-29 RX ADMIN — DEXMEDETOMIDINE HYDROCHLORIDE 8 MCG: 200 INJECTION INTRAVENOUS at 08:20

## 2024-05-29 RX ADMIN — Medication 10 MG: at 08:05

## 2024-05-29 ASSESSMENT — ACTIVITIES OF DAILY LIVING (ADL)
ADLS_ACUITY_SCORE: 34
ADLS_ACUITY_SCORE: 35
ADLS_ACUITY_SCORE: 35
ADLS_ACUITY_SCORE: 31
ADLS_ACUITY_SCORE: 35
ADLS_ACUITY_SCORE: 35
ADLS_ACUITY_SCORE: 34
ADLS_ACUITY_SCORE: 35
ADLS_ACUITY_SCORE: 34
ADLS_ACUITY_SCORE: 35
ADLS_ACUITY_SCORE: 34

## 2024-05-29 ASSESSMENT — LIFESTYLE VARIABLES: TOBACCO_USE: 1

## 2024-05-29 NOTE — ANESTHESIA PROCEDURE NOTES
Airway       Patient location during procedure: OR       Procedure Start/Stop Times: 5/29/2024 7:50 AM  Staff -        Anesthesiologist:  Kiko Mendez MD       CRNA: Loulou Donaldson APRN CRNA       Performed By: CRNA  Consent for Airway        Urgency: elective  Indications and Patient Condition       Indications for airway management: claudia-procedural       Induction type:intravenous       Mask difficulty assessment: 1 - vent by mask    Final Airway Details       Final airway type: endotracheal airway       Successful airway: ETT - single  Endotracheal Airway Details        ETT size (mm): 7.0       Cuffed: yes       Cuff volume (mL): 8       Successful intubation technique: direct laryngoscopy       DL Blade Type: MAC 3       Grade View of Cords: 1       Adjucts: stylet       Position: Right       Measured from: gums/teeth       Secured at (cm): 20       Bite block used: Soft    Post intubation assessment        Placement verified by: capnometry, equal breath sounds and chest rise        Number of attempts at approach: 1       Secured with: tape       Ease of procedure: easy       Dentition: Intact and Unchanged    Medication(s) Administered   Medication Administration Time: 5/29/2024 7:50 AM    Additional Comments       SIVI. Easy mask and Grade I view. ETT placement smooth and atraumatic. Lips, gums and dentition unchanged from preop assessment.

## 2024-05-29 NOTE — BRIEF OP NOTE
[facility] Brief Operative Note    Pre-operative diagnosis: Lumbar radiculopathy [M54.16]   Post-operative diagnosis * No post-op diagnosis entered *lumbar spinal canal stenosis with synovial cyst   Procedure: Procedure(s):  LUMBAR 4, LUMBAR 5 DECOMPRESSIVE BILATERAL LAMINECTOMY, MEDIAL FACETECTOMY WITH EXCISION OF CYST   Surgeon(s): Surgeons and Role:     * Aureliano Goins MD - Primary     * Koby Jones CNP - Assisting   Estimated blood loss: 10 mL    Specimens: ID Type Source Tests Collected by Time Destination   1 : L4, L5 EPIDURAL CYST Tissue Spine, Lumbar SURGICAL PATHOLOGY EXAM Aureliano Goins MD 5/29/2024  9:07 AM       Findings: Satisfactory decompression with excision of epidural synovial cyst achieved.

## 2024-05-29 NOTE — ANESTHESIA POSTPROCEDURE EVALUATION
Patient: Sosa Patel    Procedure: Procedure(s):  LUMBAR 4, LUMBAR 5 DECOMPRESSIVE BILATERAL LAMINECTOMY, MEDIAL FACETECTOMY WITH EXCISION OF CYST       Anesthesia Type:  General    Note:  Disposition: Outpatient   Postop Pain Control: Uneventful            Sign Out: Well controlled pain   PONV: No   Neuro/Psych: Uneventful            Sign Out: Acceptable/Baseline neuro status   Airway/Respiratory: Uneventful            Sign Out: Acceptable/Baseline resp. status   CV/Hemodynamics: Uneventful            Sign Out: Acceptable CV status   Other NRE: NONE   DID A NON-ROUTINE EVENT OCCUR? No           Last vitals:  Vitals Value Taken Time   /75 05/29/24 1030   Temp     Pulse 75 05/29/24 1040   Resp 18 05/29/24 1040   SpO2 97 % 05/29/24 1040   Vitals shown include unfiled device data.    Electronically Signed By: Kiko Mendez MD  May 29, 2024  10:41 AM

## 2024-05-29 NOTE — DISCHARGE INSTRUCTIONS
Same Day Surgery Discharge Instructions for  Sedation and General Anesthesia     It's not unusual to feel dizzy, light-headed or faint for up to 24 hours after surgery or while taking pain medication.  If you have these symptoms: sit for a few minutes before standing and have someone assist you when you get up to walk or use the bathroom.    You should rest and relax for the next 24 hours. We recommend you make arrangements to have an adult stay with you for at least 24 hours after your discharge.  Avoid hazardous and strenuous activity.    DO NOT DRIVE any vehicle or operate mechanical equipment for 24 hours following the end of your surgery.  Even though you may feel normal, your reactions may be affected by the medication you have received.    Do not drink alcoholic beverages for 24 hours following surgery.     Slowly progress to your regular diet as you feel able. It's not unusual to feel nauseated and/or vomit after receiving anesthesia.  If you develop these symptoms, drink clear liquids (apple juice, ginger ale, broth, 7-up, etc. ) until you feel better.  If your nausea and vomiting persists for 24 hours, please notify your surgeon.      All narcotic pain medications, along with inactivity and anesthesia, can cause constipation. Drinking plenty of liquids and increasing fiber intake will help.    For any questions of a medical nature, call your surgeon.    Do not make important decisions for 24 hours.    If you had general anesthesia, you may have a sore throat for a couple of days related to the breathing tube used during surgery.  You may use Cepacol lozenges to help with this discomfort.  If it worsens or if you develop a fever, contact your surgeon.     If you feel your pain is not well managed with the pain medications prescribed by your surgeon, please contact your surgeon's office to let them know so they can address your concerns.      **If you have questions or concerns about your procedure,   call  Dr. Goins at  **

## 2024-05-29 NOTE — PROGRESS NOTES
Neurosurgery Progress Note:     POD#0 s/p LUMBAR 4, LUMBAR 5 DECOMPRESSIVE BILATERAL LAMINECTOMY, MEDIAL FACETECTOMY WITH EXCISION OF CYST with BRENDA Goins     Incision:Monocryl-Dermabond-island dressing     PLAN:  -ok for diet  -Up with assist  -PT consult  -no landa  -pain control  -Probable discharge tomorrow  -No abx  -No imaging     Koby Jones DNP  [facility] Neurosurgery  [address] [address]  [phone]            Dragon Disclosure   This was created at least in part with a voice recognition software. Mistakes/typos may be present.

## 2024-05-29 NOTE — ANESTHESIA PREPROCEDURE EVALUATION
Anesthesia Pre-Procedure Evaluation    Patient: Sosa Patel   MRN: [id number] : 1975        Procedure : Procedure(s):  LUMBAR 4, LUMBAR 5 DECOMPRESSIVE BILATERAL LAMINECTOMY, MEDIAL FACETECTOMY          Past Medical History:   Diagnosis Date    Arthritis 2013    Bipolar disorder (H) 2004    Dx'd    Combinations of drug dependence excluding opioid type drug (H) 2022    In remission     H/O: depression     SUICIDE ATTEMPT AGE 15    HPV (human papillomavirus)     Lupus erythematosus     Mood disorder (H24) 2019      Past Surgical History:   Procedure Laterality Date    BIOPSY      BUNIONECTOMY RT/LT      CHOLECYSTECTOMY      DAVINCI LAPAROSCOPIC CHOLECYSTECTOMY WITHOUT GRAMS N/A 10/09/2023    Procedure: CHOLECYSTECTOMY, ROBOT-ASSISTED, LAPAROSCOPIC, WITHOUT CHOLANGIOGRAM;  Surgeon: Demetris Peoples MD;  Location: [facility] [place]     ENLARGE BREAST WITH IMPLANT  2000      No Known Allergies   Social History     Tobacco Use    Smoking status: Every Day     Current packs/day: 0.00     Average packs/day: 0.3 packs/day for 41.4 years (11.5 ttl pk-yrs)     Types: Cigarettes     Start date: 3/3/1990     Last attempt to quit: 3/3/2013     Years since quittin.2    Smokeless tobacco: Current    Tobacco comments:     Currently vapes   Substance Use Topics    Alcohol use: Yes     Comment: rare      Wt Readings from Last 1 Encounters:   24 76.7 kg (169 lb)        Anesthesia Evaluation   Pt has had prior anesthetic.     No history of anesthetic complications       ROS/MED HX  ENT/Pulmonary:     (+)                tobacco use, Current use,                       Neurologic:  - neg neurologic ROS     Cardiovascular:  - neg cardiovascular ROS     METS/Exercise Tolerance: >4 METS    Hematologic:  - neg hematologic  ROS     Musculoskeletal:   (+)  arthritis,             GI/Hepatic:  - neg GI/hepatic ROS     Renal/Genitourinary:  - neg Renal ROS     Endo:  - neg endo ROS     Psychiatric/Substance  "Use:     (+) psychiatric history bipolar, depression and anxiety   Recreational drug usage: Other (Comment).    Infectious Disease:  - neg infectious disease ROS     Malignancy:  - neg malignancy ROS     Other:  - neg other ROS          Physical Exam    Airway  airway exam normal      Mallampati: I   TM distance: > 3 FB   Neck ROM: full   Mouth opening: > 3 cm    Respiratory Devices and Support         Dental       (+) Completely normal teeth      Cardiovascular   cardiovascular exam normal       Rhythm and rate: regular and normal     Pulmonary   pulmonary exam normal        breath sounds clear to auscultation           OUTSIDE LABS:  CBC:   Lab Results   Component Value Date    WBC 4.7 05/20/2024    WBC 5.3 02/08/2024    HGB 12.7 05/20/2024    HGB 12.1 02/08/2024    HCT 39.2 05/20/2024    HCT 38.7 02/08/2024     05/20/2024     02/08/2024     BMP:   Lab Results   Component Value Date     05/20/2024     02/08/2024    POTASSIUM 3.8 05/20/2024    POTASSIUM 4.0 02/08/2024    CHLORIDE 104 05/20/2024    CHLORIDE 105 02/08/2024    CO2 21 (L) 05/20/2024    CO2 27 02/08/2024    BUN 25.9 (H) 05/20/2024    BUN 17.9 02/08/2024    CR 0.67 05/20/2024    CR 0.71 02/08/2024    GLC 89 05/20/2024    GLC 74 02/08/2024     COAGS: No results found for: \"PTT\", \"INR\", \"FIBR\"  POC:   Lab Results   Component Value Date    HCG Negative 10/30/2020     HEPATIC:   Lab Results   Component Value Date    ALBUMIN 3.9 02/08/2024    PROTTOTAL 6.2 (L) 02/08/2024    ALT 15 02/08/2024    AST 24 02/08/2024    ALKPHOS 36 (L) 02/08/2024    BILITOTAL 0.2 02/08/2024     OTHER:   Lab Results   Component Value Date    A1C 5.1 04/14/2009    RICHARD 9.5 05/20/2024    LIPASE 17 09/19/2023    AMYLASE 29 09/19/2023    TSH 1.48 10/11/2016    CRP <2.9 03/23/2022    SED 6 02/08/2024       Anesthesia Plan    ASA Status:  2    NPO Status:  NPO Appropriate    Anesthesia Type: General.     - Airway: ETT   Induction: Intravenous, Propofol. " "  Maintenance: Balanced.        Consents    Anesthesia Plan(s) and associated risks, benefits, and realistic alternatives discussed. Questions answered and patient/representative(s) expressed understanding.     - Discussed:     - Discussed with:  Patient       Use of blood products discussed: No .     Postoperative Care    Pain management: Multi-modal analgesia, IV analgesics.   PONV prophylaxis: Ondansetron (or other 5HT-3), Dexamethasone or Solumedrol, Background Propofol Infusion     Comments:               Kiko Mendez MD    I have reviewed the pertinent notes and labs in the chart from the past 30 days and (re)examined the patient.  Any updates or changes from those notes are reflected in this note.              # Overweight: Estimated body mass index is 28.12 kg/m  as calculated from the following:    Height as of this encounter: 1.651 m (5' 5\").    Weight as of this encounter: 76.7 kg (169 lb).      "

## 2024-05-29 NOTE — OP NOTE
Date of surgery: 05/29/24    PREOPERATIVE DIAGNOSES:  1.  L4-5 lumbar canal stenosis with synovial cyst  2.  Neurogenic claudication      POSTOPERATIVE DIAGNOSES:  1.  L4-5 lumbar canal stenosis with synovial cyst  2.  Neurogenic claudication    PROCEDURES:  1.  L4-5 decompressive lumbar laminectomy, medial facetectomy, foraminotomy and resection of epidural synovial cyst.  2.  Use of intraoperative flouroscope      SURGEON:  Aureliano Goins MD    ASSISTANT:  Koby Jones CNP - Assisting  (Note: The assistant was present for and assisted with the entire surgery, and his/her role as an assistant was crucial for aid in positioning, exposure, suctioning, retraction, and closure).  No qualified resident was available for the procedure.     INDICATIONS:  Ms Patel is a 48-year-old right-handed female with significant past medical history of bipolar disorder, drug dependence, suicide attempt presented to the clinic today for evaluation of low back pain and bilateral leg pain right more than the left since November 2023 without preceding trauma.  She has clinical symptoms of neurogenic claudication with intermittent episodes of bladder/bowel incontinence.  On clinical examination she has 4+ right foot dorsiflexion and impaired sensation along S1 dermatome involving both lower extremities.  Her MRI lumbar spine showed L4-5 central and bilateral lateral recess stenosis with synovial cyst.  Based on patient's symptoms and imaging findings surgical decompression was offered.We discussed the risk and benefits of the procedure as well as risks and benefits.   I discussed with the risk and benefits of each of management options with the patient and her son.  I discussed the risk of bleeding, infection, CSF leak, need for redo surgery, injury to nerves, injury to thecal sac, need for postoperative rehabilitation.  After discussing the risk and benefits patient agreed to proceed with the surgery.    PROCEDURE:  After obtaining  informed consent, the patient was brought to the operating room with TEDs and pneumatic stockings in place.  IV lines and Nguyen catheter was placed.  IV antibiotics were administered. She was intubated under general endotracheal anesthesia with her neck in a completely neutral position, no hyperextension.  She was turned into the Marcus frame with all pressure points well padded.  She was prepped and draped in the usual sterile fashion.  A preincisional infiltration of 1% lidocaine with epinephrine was performed along the midline and the incision was opened sharply and extended down to the fascia.  The fascia was opened in one layer with monopolar electrocautery.  A subperiosteal dissection was undertaken to expose the lamina at L4-5.  We verified levels with a lateral x-ray.      Once levels were verified, I then proceeded to remove  the lamina of L4-5   with a Acosta Ramírez drill, and kerrisons rongeurs.  I drilled down to the ligamentum elevated the ligamentum from the underlying thecal sac and removed it with a combination of Kerrisons and curettes.  We identited a synovial cyst densely adhered to the dural sac at L4-5 which was dissected away from the thecal sac using medicals dissection techniques and exiting nerve root.  As needed, I next drilled a partial medial facetectomy  and foraminotomies opening up the lateral recess and expose the underlying impinged lateral recess and nerve roots.   Once the thecal sac and nerve roots were completely decompressed on both sides I used a probe to verify no further pressure either in the canal or in the foramina.  The lateral recess was nicely decompressed.   We then proceeded to copiously irrigate the incision with antibiotic-containing saline, achieved hemostasis with bipolar electrocautery and thrombin soaked Gelfoam.      The incision was closed in layers with interrupted 0 Vicryl to approximate the muscle and fascia, inverted 2-0 Vicryl to approximate the  subcutaneous tissues and 3-0 Monocryl to approximate the skin edges.  Sponge and needle counts were correct prior to closure x2.  Sterile dressings were placed.  Patient tolerated the procedure well, extubated and transferred to the recovery room in stable condition.  I updated patient's daughter regarding the procedure.      Complications: None    Drain: None    Findings: Satisfactory decompression of thecal sac and exiting root with excision of epidural synovial cyst.  No durotomy.      Aureliano Goins MD

## 2024-05-29 NOTE — ANESTHESIA CARE TRANSFER NOTE
Patient: Sosa Patel    Procedure: Procedure(s):  LUMBAR 4, LUMBAR 5 DECOMPRESSIVE BILATERAL LAMINECTOMY, MEDIAL FACETECTOMY WITH EXCISION OF CYST       Diagnosis: Lumbar radiculopathy [M54.16]  Diagnosis Additional Information: No value filed.    Anesthesia Type:   General     Note:    Oropharynx: oropharynx clear of all foreign objects and spontaneously breathing  Level of Consciousness: drowsy  Oxygen Supplementation: face mask  Level of Supplemental Oxygen (L/min / FiO2): 6  Independent Airway: airway patency satisfactory and stable  Dentition: dentition unchanged  Vital Signs Stable: post-procedure vital signs reviewed and stable  Report to RN Given: handoff report given  Patient transferred to: PACU    Handoff Report: Identifed the Patient, Identified the Reponsible Provider, Reviewed the pertinent medical history, Discussed the surgical course, Reviewed Intra-OP anesthesia mangement and issues during anesthesia, Set expectations for post-procedure period and Allowed opportunity for questions and acknowledgement of understanding      Vitals:  Vitals Value Taken Time   /89 5/29/24 1008   Temp 97.3F 5/29/24 1008   Pulse 68 05/29/24 1008   Resp 10 05/29/24 1008   SpO2 100 % 05/29/24 1008   Vitals shown include unfiled device data.    Electronically Signed By: MILO Dia CRNA  May 29, 2024  10:10 AM

## 2024-05-30 ENCOUNTER — APPOINTMENT (OUTPATIENT)
Dept: PHYSICAL THERAPY | Facility: CLINIC | Age: 49
End: 2024-05-30
Attending: NURSE PRACTITIONER
Payer: COMMERCIAL

## 2024-05-30 VITALS
WEIGHT: 169 LBS | RESPIRATION RATE: 13 BRPM | TEMPERATURE: 97.5 F | DIASTOLIC BLOOD PRESSURE: 65 MMHG | OXYGEN SATURATION: 96 % | HEIGHT: 65 IN | BODY MASS INDEX: 28.16 KG/M2 | HEART RATE: 82 BPM | SYSTOLIC BLOOD PRESSURE: 105 MMHG

## 2024-05-30 LAB — GLUCOSE BLDC GLUCOMTR-MCNC: 135 MG/DL (ref 70–99)

## 2024-05-30 PROCEDURE — 97161 PT EVAL LOW COMPLEX 20 MIN: CPT | Mod: GP | Performed by: PHYSICAL THERAPIST

## 2024-05-30 PROCEDURE — 250N000013 HC RX MED GY IP 250 OP 250 PS 637: Performed by: NURSE PRACTITIONER

## 2024-05-30 PROCEDURE — 82962 GLUCOSE BLOOD TEST: CPT

## 2024-05-30 RX ORDER — IBUPROFEN 200 MG
200 TABLET ORAL EVERY 4 HOURS PRN
COMMUNITY
Start: 2024-05-30

## 2024-05-30 RX ORDER — OXYCODONE HYDROCHLORIDE 5 MG/1
5 TABLET ORAL EVERY 4 HOURS PRN
Qty: 30 TABLET | Refills: 0 | Status: SHIPPED | OUTPATIENT
Start: 2024-05-30 | End: 2024-09-06

## 2024-05-30 RX ADMIN — OXYCODONE HYDROCHLORIDE 10 MG: 5 TABLET ORAL at 02:39

## 2024-05-30 RX ADMIN — ARIPIPRAZOLE 10 MG: 10 TABLET ORAL at 09:13

## 2024-05-30 RX ADMIN — HYDROXYZINE HYDROCHLORIDE 25 MG: 25 TABLET, FILM COATED ORAL at 07:42

## 2024-05-30 RX ADMIN — ACETAMINOPHEN 650 MG: 325 TABLET, FILM COATED ORAL at 02:39

## 2024-05-30 RX ADMIN — GABAPENTIN 600 MG: 600 TABLET, FILM COATED ORAL at 09:13

## 2024-05-30 RX ADMIN — METHOCARBAMOL 500 MG: 500 TABLET ORAL at 02:39

## 2024-05-30 RX ADMIN — METHOCARBAMOL 500 MG: 500 TABLET ORAL at 07:42

## 2024-05-30 RX ADMIN — FLUOXETINE 40 MG: 20 CAPSULE ORAL at 09:13

## 2024-05-30 RX ADMIN — OXYCODONE HYDROCHLORIDE 10 MG: 5 TABLET ORAL at 07:42

## 2024-05-30 ASSESSMENT — ACTIVITIES OF DAILY LIVING (ADL)
ADLS_ACUITY_SCORE: 34
ADLS_ACUITY_SCORE: 34
ADLS_ACUITY_SCORE: 36
ADLS_ACUITY_SCORE: 34
ADLS_ACUITY_SCORE: 36
ADLS_ACUITY_SCORE: 34

## 2024-05-30 NOTE — DISCHARGE SUMMARY
[facility]    Discharge Summary  Neurosurgery    Date of Admission:  5/29/2024  Date of Discharge:  5/30/2024  Discharging Provider: Beena Burgess PA-C   Discharge Diagnoses   Patient Active Problem List   Diagnosis    Herpes simplex type 2 infection    Genital warts    H/O intravenous drug use in remission    JESSE positive    Multiple joint pain    Cocaine use    Systemic lupus erythematosus (H)    High risk medications (not anticoagulants) long-term use    Sicca syndrome (H24)    Lichen sclerosus    Recurrent major depressive disorder, in partial remission (H24)    Gallstones    Surgery, elective       Procedure/Surgery Information   Procedure: Procedure(s):  LUMBAR 4, LUMBAR 5 DECOMPRESSIVE BILATERAL LAMINECTOMY, MEDIAL FACETECTOMY WITH EXCISION OF CYST   Surgeon(s): Surgeons and Role:     * Aureliano Goins MD - Primary     * Koby Jones CNP - Assisting   Specimens: ID Type Source Tests Collected by Time Destination   1 : L4, L5 EPIDURAL CYST Tissue Spine, Lumbar SURGICAL PATHOLOGY EXAM Aureliano Goins MD 5/29/2024  9:07 AM       Non-operative procedures None performed     History of Present Illness   Sosa Patel is a 48 year old female who presented with L4-5 canal stenosis with synovial cyst and neurogenic claudication.    Hospital Course   Sosa Patel was admitted on 5/29/2024.  The following problems were addressed during her hospitalization:    Patient underwent    L4-5 decompressive lumbar laminectomy, medial facetectomy, foraminotomy and resection of epidural synovial cyst.    On postoperative day 1 she states her leg pain has resolved.  She feels ready to discharge home.    Post-operative pain control: included Hydromorphone (dilaudid) IV and will be Oxycodone on discharge.     Medications discontinued or adjusted during this hospitalization: No change     Antibiotics prescribed at discharge: None prescribed     Imaging study follow up needs:   -None  performed    Significant Findings:     Beena Burgess PA-C   Neurosurgery    Discharge Disposition   Discharged to home   Condition at discharge: Stable    Pending Results   Final pathology results: No pathology submitted  These results will be followed up by   Unresulted Labs Ordered in the Past 30 Days of this Admission       Date and Time Order Name Status Description    5/29/2024  9:08 AM Surgical Pathology Exam In process             Primary Care Physician   Cynthia Scott    Physical Exam   Temp: 97.5  F (36.4  C) Temp src: Oral BP: 105/65 Pulse: 82   Resp: 13 SpO2: 96 % O2 Device: None (Room air) Oxygen Delivery: 1 LPM  Vitals:    05/29/24 0553   Weight: 76.7 kg (169 lb)     Vital Signs with Ranges  Temp:  [97.3  F (36.3  C)-98.1  F (36.7  C)] 97.5  F (36.4  C)  Pulse:  [66-96] 82  Resp:  [11-18] 13  BP: ()/(61-93) 105/65  SpO2:  [94 %-100 %] 96 %  I/O last 3 completed shifts:  In: 1700 [I.V.:1700]  Out: 360 [Urine:350; Blood:10]    Alert and oriented no acute distress  Bilateral lower extremities with 5 out of 5 strength  Incision clean dry and intact with glue dressing    Consultations This Hospital Stay   PHYSICAL THERAPY ADULT IP CONSULT    Time Spent on this Encounter   I have spent less than 30 minutes on this discharge.    Discharge Orders      When to call - Contact Surgeon Team    You may experience symptoms that require follow-up before your scheduled appointment. Contact your Surgeon Team if you are concerned about pain control, large amount of bleeding, blood clots, constipation, or if you experience signs of infection (fever, growing tenderness at the surgery site, a large amount of drainage, severe pain, foul-smelling drainage, redness or swelling.     When to call - Reach out to Urgent Care    If you are experiencing uncontrolled Nausea and Vomiting, uncontrolled pain, inability to urinate and uncomfortable, and in need of immediate care, and you are NOT able to reach your  Surgeon Team, go to an Urgent Care clinic. Do NOT go to the Emergency Room unless you have shortness of breath, chest pain, or other signs of a medical emergency.     When to call - Reasons to Call 911    Call 911 immediately if you experience sudden-onset chest pain, arm weakness/numbness, slurred speech, or shortness of breath     Symptoms - Fever Management    A low grade fever can be expected after surgery. Your Provider many have prescribed an Opioid pain medication that also contains acetaminophen (TYLENOL) that may help with Fever management.  Do NOT take additional acetaminophen (TYLENOL) in combination with an Opioid/acetaminophen (TYLENOL) product. Read the labels on your Over The Counter (OTC) medications with care.     Symptoms - Reduced Urine Output    If it has been greater than 8 hours since you have urinated despite drinking plenty of water, call your Surgeon Team.     No driving or operating machinery    Do NOT drive any vehicle or operate mechanical equipment for 24 hours following the end of your surgery.  Even though you may feel normal, your reactions may be affected by Anesthesia medication you received.     No Alcohol    Do NOT drink alcoholic beverages for 24 hours following your surgery and while taking pain medications.     Diet Instructions    Follow your surgeon's orders for any diet restrictions.  If you did not receive any diet restrictions, you may drink clear liquids (apple juice, ginger ale, 7-up, broth, etc.), and progress to your regular diet as you feel able. It is important to stay well-hydrated after surgery and drink plenty of water.     Discharge Instructions - Comfort and Pain Management    Pain after surgery is normal and expected. You will have some amount of pain after surgery. Your pain will improve with time. There are several things you can do to help reduce your pain including: rest, ice, and using pain medications as needed. Use pain interventions and don't wait until  pain level is out of control. Contact your Surgeon Team if you have pain that persists or worsens after surgery despite rest, ice, and taking your medication(s) as prescribed. You may have a dry mouth, a sore throat, muscles aches or trouble sleeping, and these symptoms should go away after 24 hours.     Discharge Instructions - Rest    Rest and relax for the next 24 hours. Make arrangements to have someone stay with you overnight, and avoid hazardous and strenuous activities.  Do NOT make any important decisions for the next 24 hours.     Dressing / Wound Care - Wound    You have a clean dressing on your surgical wound. Dressing change instructions as follows: Can remove gauze dressing when you get home.  Incision is closed with glue that will fall off on its own.  He will be evaluated at your 2-week follow-up appointment do not scrub incision.  Contact your Surgeon Team if you have increased redness, warmth around the surgical wound, and/or drainage from the surgical wound.     Reason for your hospital stay    Lumbar surgery     Follow-up and recommended labs and tests     Follow-up in 2 weeks with neurosurgery clinic for incision check     Activity    Avoid excessive bending lifting and twisting  Okay to shower  Do not submerge incision underwater  Go for short frequent walks throughout the day  Okay to leave incision open to air     Walker     Diet    Follow this diet upon discharge: Orders Placed This Encounter      Advance Diet as Tolerated: Regular Diet Adult     Discharge Medications   Current Discharge Medication List        START taking these medications    Details   oxyCODONE (ROXICODONE) 5 MG tablet Take 1 tablet (5 mg) by mouth every 4 hours as needed for moderate pain  Qty: 30 tablet, Refills: 0    Associated Diagnoses: S/P lumbar laminectomy      senna-docusate (SENOKOT-S/PERICOLACE) 8.6-50 MG tablet Take 1-2 tablets by mouth 2 times daily Take while on oral narcotics to prevent or treat  constipation.  Qty: 30 tablet, Refills: 0    Comments: While taking narcotics  Associated Diagnoses: S/P lumbar laminectomy           CONTINUE these medications which have CHANGED    Details   aspirin-acetaminophen-caffeine (EXCEDRIN MIGRAINE) 250-250-65 MG tablet Take 1 tablet by mouth daily as needed for headaches    Comments: May resume in 2 weeks on 6/13/2024      ibuprofen (ADVIL/MOTRIN) 200 MG tablet Take 1 tablet (200 mg) by mouth every 4 hours as needed for pain    Comments: May resume in 2 weeks on 6/13/2024           CONTINUE these medications which have NOT CHANGED    Details   acetaminophen (TYLENOL) 325 MG tablet Take 325-650 mg by mouth every 6 hours as needed for mild pain      ARIPiprazole (ABILIFY) 10 MG tablet Take 10 mg by mouth daily      augmented betamethasone dipropionate (DIPROLENE-AF) 0.05 % external ointment Apply topically 2 times daily  Qty: 50 g, Refills: 3    Associated Diagnoses: Granuloma annulare      belimumab (BENLYSTA) subcutaneous injection (prefilled autoinjector) Inject 1 mL (200 mg) Subcutaneous every 7 days . Hold for signs of infection and seek medical attention.   Rheumatology follow-up required prior to the next refill prescription  Qty: 4 mL, Refills: 3    Associated Diagnoses: Systemic lupus erythematosus, unspecified SLE type, unspecified organ involvement status (H)      cetirizine (ZYRTEC) 10 MG tablet TAKE 2 TABLETS BY MOUTH DAILY  Qty: 180 tablet, Refills: 0    Associated Diagnoses: Chronic rhinitis      clobetasol (TEMOVATE) 0.05 % external cream Apply topically 2 times daily  Qty: 60 g, Refills: 0    Associated Diagnoses: Lichen sclerosus      cycloSPORINE (RESTASIS) 0.05 % ophthalmic emulsion Place 1 drop into both eyes 2 times daily  Qty: 11 mL, Refills: 6    Associated Diagnoses: Systemic lupus erythematosus, unspecified SLE type, unspecified organ involvement status (H); Secondary Sjogren's syndrome (H24)      diclofenac (VOLTAREN) 1 % topical gel Apply up to  2 grams of 1% gel to hands up to 4 times daily as needed for joint pain (maximum: 8 g per upper extremity per day)  Qty: 200 g, Refills: 2    Associated Diagnoses: Primary osteoarthritis of both hands      Efinaconazole (JUBLIA) 10 % SOLN APPLY TOPICALLY TO AFFECTED AREAS DAILY  Qty: 8 mL, Refills: 0    Associated Diagnoses: Onychomycosis      FLUoxetine (PROZAC) 40 MG capsule TAKE ONE CAPSULE BY MOUTH ONCE DAILY  Qty: 90 capsule, Refills: 1    Associated Diagnoses: Recurrent major depressive disorder, in partial remission (H24)      gabapentin (NEURONTIN) 600 MG tablet Take 1 tablet (600 mg) by mouth 3 times daily  Qty: 270 tablet, Refills: 0    Associated Diagnoses: Lumbar radiculopathy; Bilateral low back pain with bilateral sciatica, unspecified chronicity      hydroquinone (RUFUS) 4 % external cream Apply topically at bedtime  Qty: 28.35 g, Refills: 11    Associated Diagnoses: Postinflammatory hyperpigmentation; Solar lentigo      hydroxychloroquine (PLAQUENIL) 200 MG tablet Hydroxychloroquine 200 mg daily with an additional 200 mg every other day.  Ophthalmology toxicity monitoring eye exam, that includes 10-2 VF and SD-OCT, is required yearly.  Qty: 135 tablet, Refills: 1    Associated Diagnoses: Systemic lupus erythematosus, unspecified SLE type, unspecified organ involvement status (H)      leflunomide (ARAVA) 20 MG tablet Take 1 tablet (20 mg) by mouth daily ; labs required every 12 weeks  Qty: 90 tablet, Refills: 1    Associated Diagnoses: Systemic lupus erythematosus, unspecified SLE type, unspecified organ involvement status (H)      methocarbamol (ROBAXIN) 500 MG tablet Take 1 tablet (500 mg) by mouth 4 times daily as needed for muscle spasms  Qty: 120 tablet, Refills: 3    Associated Diagnoses: Lumbar radiculopathy; Bilateral low back pain with bilateral sciatica, unspecified chronicity      mometasone (ELOCON) 0.1 % external cream Apply topically daily  Qty: 45 g, Refills: 1    Associated  Diagnoses: Rash      tretinoin (RETIN-A) 0.025 % external cream Apply topically at bedtime Pea-sized amount to whole face  Qty: 45 g, Refills: 3    Associated Diagnoses: Postinflammatory hyperpigmentation; Solar lentigo           Allergies   No Known Allergies  Data

## 2024-05-30 NOTE — PROGRESS NOTES
05/30/24 1030   Appointment Info   Signing Clinician's Name / Credentials (PT) Delia Hankins DPT   Rehab Comments (PT) spinal precautions   Quick Adds   Quick Adds Certification   Living Environment   People in Home child(hilton), adult   Current Living Arrangements house   Home Accessibility stairs within home   Number of Stairs, Within Home, Primary greater than 10 stairs   Stair Railings, Within Home, Primary railings safe and in good condition   Transportation Anticipated family or friend will provide   Living Environment Comments Pt lives in house with > 10 stairs, does not own AD. Has toilet raiser to use.   Self-Care   Usual Activity Tolerance good   Current Activity Tolerance fair   Equipment Currently Used at Home none   Fall history within last six months no   Activity/Exercise/Self-Care Comment Pt normally independent with all I/ADLs, works as a    General Information   Onset of Illness/Injury or Date of Surgery 05/29/24   Referring Physician Koby Jones CNP   Patient/Family Therapy Goals Statement (PT) return home   Pertinent History of Current Problem (include personal factors and/or comorbidities that impact the POC) s/p LUMBAR 4, LUMBAR 5 DECOMPRESSIVE BILATERAL LAMINECTOMY, MEDIAL FACETECTOMY WITH EXCISION OF CYST   Existing Precautions/Restrictions spinal   Cognition   Affect/Mental Status (Cognition) WFL   Orientation Status (Cognition) oriented x 4   Follows Commands (Cognition) WFL   Pain Assessment   Patient Currently in Pain Yes, see Vital Sign flowsheet   Integumentary/Edema   Integumentary/Edema Comments lumbar incision open to air   Posture    Posture Comments spinal stiffness d/t increased pain with movement   Range of Motion (ROM)   Range of Motion ROM deficits secondary to pain   Strength (Manual Muscle Testing)   Strength (Manual Muscle Testing) No deficits observed during functional mobility;Able to perform L SLR;Able to perform R SLR   Bed Mobility   Comment, (Bed  Mobility) Independent supine <> sit   Transfers   Comment, (Transfers) Independent sit <> stand with FWW   Gait/Stairs (Locomotion)   [place] Level (Gait) modified independence   Assistive Device (Gait) walker, front-wheeled   Distance in Feet (Gait) 250' + 8 stairs + 250'   Deviations/Abnormal Patterns (Gait) antalgic   Comment, (Gait/Stairs) slow gait speed related to spinal pain, denies leg pain. Hx sciatic radiating pain.   Balance   Balance Comments improved safety and gait speed with FWW use   Sensory Examination   Sensory Perception patient reports no sensory changes   Coordination   Coordination no deficits were identified   Muscle Tone   Muscle Tone no deficits were identified   Clinical Impression   Criteria for Skilled Therapeutic Intervention Evaluation only   PT Diagnosis (PT) impaired mobility   Influenced by the following impairments pain   Functional limitations due to impairments decreased activity tolerance   Clinical Presentation (PT Evaluation Complexity) stable   Clinical Presentation Rationale clinical judgement   Clinical Decision Making (Complexity) low complexity   Planned Therapy Interventions (PT) patient/family education;gait training   Risk & Benefits of therapy have been explained evaluation/treatment results reviewed;care plan/treatment goals reviewed;risks/benefits reviewed;current/potential barriers reviewed;participants voiced agreement with care plan;participants included;patient   PT Total Evaluation Time   PT Eval, Low Complexity Minutes (89810) 30   Therapy Certification   Start of care date 05/30/24   Certification date from 05/30/24   Certification date to 05/31/24   Medical Diagnosis lumbar laminectomy   Physical Therapy Goals   PT Frequency One time eval and treatment only   PT Predicted Duration/Target Date for Goal Attainment 05/30/24   PT Goals Bed Mobility;Transfers;Gait;Stairs   PT: Bed Mobility Independent;Within precautions;Goal Met   PT: Transfers Modified  independent;Sit to/from stand;Bed to/from chair;Assistive device;Goal Met   PT: Gait Modified independent;Rolling walker;150 feet;Goal Met   PT: Stairs Modified independent;8 stairs;Rail on right;Goal Met   PT Discharge Planning   PT Plan met all IP PT goals.   PT Discharge Recommendation (DC Rec) home with assist   PT Rationale for DC Rec Has assist from son, issued FWW for safe ambulation. Pt has met all IP PT goals and ambulating > 150 feet with FWW, navigating stairs needed to access home. 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 Pt aware of spinal precautions and walking program recommendations; able to provide teachback for education provided.   PT Brief overview of current status Mod I with FWW   PT Equipment Needed at Discharge   (FWW issued for home)   Total Session Time   Total Session Time (sum of timed and untimed services) 30   [facility]  OUTPATIENT PHYSICAL THERAPY EVALUATION  PLAN OF TREATMENT FOR OUTPATIENT REHABILITATION  (COMPLETE FOR INITIAL CLAIMS ONLY)  Patient's Last Name, First Name, M.I.  YOB: 1975  Sosa Patel  SHANIQUA                        Provider's Name  [facility] Medical Record No.  [id number]                             Onset Date:  05/29/24   Start of Care Date:  05/30/24   Type:     _X_PT   ___OT   ___SLP Medical Diagnosis:  lumbar laminectomy              PT Diagnosis:  impaired mobility Visits from SOC:  1     See note for plan of treatment, functional goals and certification details    I CERTIFY THE NEED FOR THESE SERVICES FURNISHED UNDER        THIS PLAN OF TREATMENT AND WHILE UNDER MY CARE     (Physician co-signature of this document indicates review and certification of the therapy plan).

## 2024-05-30 NOTE — PROGRESS NOTES
Discharge instructions reviewed with patients. Spinal precautions reviewed. Pain management at home reviewed. Patient is able to teach back all discharge instructions. Follow up appointments reviewed. All personal belongings accounted for.

## 2024-05-30 NOTE — PLAN OF CARE
Goal Outcome Evaluation:  Summary: POD 1 LUMBAR 4, LUMBAR 5 DECOMPRESSIVE BILATERAL LAMINECTOMY, MEDIAL FACETECTOMY WITH EXCISION OF CYST    Orientation: Aox4    Vitals/Tele: VSS on RA    Pain: tylenol, robaxin, and oxy x1    IV Access/drains: PIV SL    Diet: Reg    Mobility: Ax1 GB/W    GI/: Continent    Wound/Skin: Lumbar surgical incision CDI    Consults: PT    Discharge Plan: Possible discharge today 5/30      See Flow sheets for assessment      ~ Patient vital signs are at baseline: met  ~ Patient able to ambulate as they were prior to admission or with assist devices provided by therapies during their stay:met  ~ Patient MUST void prior to discharge: met  ~ Patient able to tolerate oral intake to maintain hydration status: met  ~ Patient has adequate pain control using Oral analgesics: met  ~ Hypercapnia, hypoventilation or hypoxia resolved for at least 2 hours without supplemental oxygen: met  ~ Deficits in sensation, mobility or coordination have resolved if spinal or regional anesthesia was used,: met  ~ Patient has returned to baseline mental status: met  ~ Notify Provider IF patient FAILS to meet Discharge Goal criteria,   ~ IF Provider has already entered all discharge goals and/or cleared patient for discharge, you do NOT need to notify Provider PRIOR to patient discharge.

## 2024-05-30 NOTE — PLAN OF CARE
Goal Outcome Evaluation:    Patient vital signs are at baseline: Yes  Patient able to ambulate as they were prior to admission or with assist devices provided by therapies during their stay:  Yes  Patient MUST void prior to discharge:  Yes  Patient able to tolerate oral intake:  Yes  Pain has adequate pain control using Oral analgesics:  Yes  Does patient have an identified :  Yes  Has goal D/C date and time been discussed with patient:  Yes    A&Ox4. VSS on RA. AX1, GB/W. Pain managed with PRN oxy. Discharge pending.

## 2024-06-04 ENCOUNTER — VIRTUAL VISIT (OUTPATIENT)
Dept: FAMILY MEDICINE | Facility: CLINIC | Age: 49
End: 2024-06-04
Payer: COMMERCIAL

## 2024-06-04 DIAGNOSIS — B00.9 HERPES SIMPLEX TYPE 2 INFECTION: Primary | ICD-10-CM

## 2024-06-04 DIAGNOSIS — L90.0 LICHEN SCLEROSUS: ICD-10-CM

## 2024-06-04 PROCEDURE — 99213 OFFICE O/P EST LOW 20 MIN: CPT | Mod: 95 | Performed by: PHYSICIAN ASSISTANT

## 2024-06-04 PROCEDURE — G2211 COMPLEX E/M VISIT ADD ON: HCPCS | Mod: 95 | Performed by: PHYSICIAN ASSISTANT

## 2024-06-04 RX ORDER — VALACYCLOVIR HYDROCHLORIDE 1 G/1
1000 TABLET, FILM COATED ORAL 2 TIMES DAILY
Qty: 40 TABLET | Refills: 1 | Status: SHIPPED | OUTPATIENT
Start: 2024-06-04 | End: 2024-06-14

## 2024-06-04 RX ORDER — LIDOCAINE 50 MG/G
OINTMENT TOPICAL PRN
Qty: 50 G | Refills: 1 | Status: SHIPPED | OUTPATIENT
Start: 2024-06-04

## 2024-06-04 NOTE — PROGRESS NOTES
Sosa is a 48 year old who is being evaluated via a billable video visit.    How would you like to obtain your AVS? MyChart  If the video visit is dropped, the invitation should be resent by: Text to cell phone: [phone]  Will anyone else be joining your video visit? No      Assessment & Plan     Herpes simplex type 2 infection  Start valtrex as needed but if continues to get a lot will consider daily use.  Likely flared due to recent health changes.    - valACYclovir (VALTREX) 1000 mg tablet; Take 1 tablet (1,000 mg) by mouth 2 times daily for 10 days  - lidocaine (XYLOCAINE) 5 % external ointment; Apply topically as needed for moderate pain    Lichen sclerosus  Continue steroid cream as able.  Lidocaine to help with pain  - lidocaine (XYLOCAINE) 5 % external ointment; Apply topically as needed for moderate pain    The longitudinal plan of care for the diagnosis(es)/condition(s) as documented were addressed during this visit. Due to the added complexity in care, I will continue to support Sosa in the subsequent management and with ongoing continuity of care.            Subjective   Sosa is a 48 year old, presenting for the following health issues:  STD      6/4/2024     4:31 PM   Additional Questions   Roomed by Shwetha   Accompanied by self     Video Start Time: 4:50 PM    STD    History of Present Illness       Reason for visit:  Herpies    She eats 2-3 servings of fruits and vegetables daily.She consumes 6 sweetened beverage(s) daily.She exercises with enough effort to increase her heart rate 9 or less minutes per day.  She exercises with enough effort to increase her heart rate 3 or less days per week.   She is taking medications regularly.       Had back surgery last week.  Pain has resolved.  Can't use the cream for lichen sclerosis due to herpes outbreak-burns.  Getting more outbreaks recently.  Hadn't had for years.  Now having one every few months x one year.  This outbreak started about week ago.   a                Objective           Vitals:  No vitals were obtained today due to virtual visit.    Physical Exam   GENERAL: alert and no distress  EYES: Eyes grossly normal to inspection.  No discharge or erythema, or obvious scleral/conjunctival abnormalities.  RESP: No audible wheeze, cough, or visible cyanosis.    SKIN: Visible skin clear. No significant rash, abnormal pigmentation or lesions.  NEURO: Cranial nerves grossly intact.  Mentation and speech appropriate for age.  PSYCH: Appropriate affect, tone, and pace of words          Video-Visit Details    Type of service:  Video Visit   Video End Time:4:58 PM  Originating Location (pt. Location): Home    Distant Location (provider location):  On-site  Platform used for Video Visit: Dilshad  Signed Electronically by: Cynthia Scott PA-C

## 2024-06-10 ENCOUNTER — ALLIED HEALTH/NURSE VISIT (OUTPATIENT)
Dept: NEUROSURGERY | Facility: CLINIC | Age: 49
End: 2024-06-10
Payer: COMMERCIAL

## 2024-06-10 VITALS — RESPIRATION RATE: 18 BRPM | HEART RATE: 76 BPM | DIASTOLIC BLOOD PRESSURE: 80 MMHG | SYSTOLIC BLOOD PRESSURE: 130 MMHG

## 2024-06-10 DIAGNOSIS — M54.16 LUMBAR RADICULOPATHY: Primary | ICD-10-CM

## 2024-06-10 PROCEDURE — 99024 POSTOP FOLLOW-UP VISIT: CPT

## 2024-06-10 NOTE — PROGRESS NOTES
Sosa Patel is status post L4-5 decompressive lumbar laminectomy, medial facetectomy, foraminotomy and resection of epidural synovial cyst on 05/29/2024 with Dr. Goins.  Preoperatively presented with low back pain and bilateral leg pain, right more than the left.  Today she returns in follow up for wound check. She is very pleased with her outcome - reports a resolved leg pain. Endorses seldom ache in bilateral low back associated with activity. Denies sensory or motor disturbance in legs and feet. Gait and balance are normal. Takes Tylenol prn, on gabapentin.      Surgical wound WNL - CDI, no signs of infection or skin breakdown.  Incision well-healed: good skin approximation, no redness or visible/palpable edema, no tenderness to palpation.  PT. AF, denies fever, chills or sweats.  Pt. reports that the symptoms are improved from pre-op.    Chaya Orosco RN, CNRN

## 2024-06-19 ENCOUNTER — LAB (OUTPATIENT)
Dept: LAB | Facility: CLINIC | Age: 49
End: 2024-06-19
Payer: COMMERCIAL

## 2024-06-19 DIAGNOSIS — M32.9 SYSTEMIC LUPUS ERYTHEMATOSUS, UNSPECIFIED SLE TYPE, UNSPECIFIED ORGAN INVOLVEMENT STATUS (H): ICD-10-CM

## 2024-06-19 DIAGNOSIS — Z79.899 HIGH RISK MEDICATION USE: ICD-10-CM

## 2024-06-19 LAB
ALBUMIN MFR UR ELPH: 18.8 MG/DL
ALBUMIN SERPL BCG-MCNC: 4.3 G/DL (ref 3.5–5.2)
ALBUMIN UR-MCNC: NEGATIVE MG/DL
ALP SERPL-CCNC: 35 U/L (ref 40–150)
ALT SERPL W P-5'-P-CCNC: 26 U/L (ref 0–50)
ANION GAP SERPL CALCULATED.3IONS-SCNC: 12 MMOL/L (ref 7–15)
APPEARANCE UR: ABNORMAL
AST SERPL W P-5'-P-CCNC: 23 U/L (ref 0–45)
BACTERIA #/AREA URNS HPF: ABNORMAL /HPF
BASOPHILS # BLD AUTO: 0 10E3/UL (ref 0–0.2)
BASOPHILS NFR BLD AUTO: 0 %
BILIRUB SERPL-MCNC: 0.3 MG/DL
BILIRUB UR QL STRIP: NEGATIVE
BUN SERPL-MCNC: 25.1 MG/DL (ref 6–20)
CALCIUM SERPL-MCNC: 9.5 MG/DL (ref 8.6–10)
CHLORIDE SERPL-SCNC: 107 MMOL/L (ref 98–107)
COLOR UR AUTO: YELLOW
CREAT SERPL-MCNC: 0.7 MG/DL (ref 0.51–0.95)
CREAT UR-MCNC: 166 MG/DL
CRP SERPL-MCNC: <3 MG/L
DEPRECATED HCO3 PLAS-SCNC: 23 MMOL/L (ref 22–29)
EGFRCR SERPLBLD CKD-EPI 2021: >90 ML/MIN/1.73M2
EOSINOPHIL # BLD AUTO: 0.2 10E3/UL (ref 0–0.7)
EOSINOPHIL NFR BLD AUTO: 3 %
ERYTHROCYTE [DISTWIDTH] IN BLOOD BY AUTOMATED COUNT: 13.3 % (ref 10–15)
ERYTHROCYTE [SEDIMENTATION RATE] IN BLOOD BY WESTERGREN METHOD: 7 MM/HR (ref 0–20)
GLUCOSE SERPL-MCNC: 105 MG/DL (ref 70–99)
GLUCOSE UR STRIP-MCNC: NEGATIVE MG/DL
HCT VFR BLD AUTO: 39 % (ref 35–47)
HGB BLD-MCNC: 12.6 G/DL (ref 11.7–15.7)
HGB UR QL STRIP: NEGATIVE
IMM GRANULOCYTES # BLD: 0 10E3/UL
IMM GRANULOCYTES NFR BLD: 0 %
KETONES UR STRIP-MCNC: NEGATIVE MG/DL
LEUKOCYTE ESTERASE UR QL STRIP: ABNORMAL
LYMPHOCYTES # BLD AUTO: 1.6 10E3/UL (ref 0.8–5.3)
LYMPHOCYTES NFR BLD AUTO: 22 %
MCH RBC QN AUTO: 27.9 PG (ref 26.5–33)
MCHC RBC AUTO-ENTMCNC: 32.3 G/DL (ref 31.5–36.5)
MCV RBC AUTO: 87 FL (ref 78–100)
MONOCYTES # BLD AUTO: 0.7 10E3/UL (ref 0–1.3)
MONOCYTES NFR BLD AUTO: 10 %
MUCOUS THREADS #/AREA URNS LPF: PRESENT /LPF
NEUTROPHILS # BLD AUTO: 4.5 10E3/UL (ref 1.6–8.3)
NEUTROPHILS NFR BLD AUTO: 64 %
NITRATE UR QL: NEGATIVE
PH UR STRIP: 5.5 [PH] (ref 5–7)
PLATELET # BLD AUTO: 260 10E3/UL (ref 150–450)
POTASSIUM SERPL-SCNC: 3.9 MMOL/L (ref 3.4–5.3)
PROT SERPL-MCNC: 6.6 G/DL (ref 6.4–8.3)
PROT/CREAT 24H UR: 0.11 MG/MG CR (ref 0–0.2)
RBC # BLD AUTO: 4.51 10E6/UL (ref 3.8–5.2)
RBC #/AREA URNS AUTO: ABNORMAL /HPF
SODIUM SERPL-SCNC: 142 MMOL/L (ref 135–145)
SP GR UR STRIP: >=1.03 (ref 1–1.03)
SQUAMOUS #/AREA URNS AUTO: ABNORMAL /LPF
UROBILINOGEN UR STRIP-ACNC: 0.2 E.U./DL
WBC # BLD AUTO: 7 10E3/UL (ref 4–11)
WBC #/AREA URNS AUTO: ABNORMAL /HPF

## 2024-06-19 PROCEDURE — 85652 RBC SED RATE AUTOMATED: CPT

## 2024-06-19 PROCEDURE — 81001 URINALYSIS AUTO W/SCOPE: CPT

## 2024-06-19 PROCEDURE — 85025 COMPLETE CBC W/AUTO DIFF WBC: CPT

## 2024-06-19 PROCEDURE — 36415 COLL VENOUS BLD VENIPUNCTURE: CPT

## 2024-06-19 PROCEDURE — 80053 COMPREHEN METABOLIC PANEL: CPT

## 2024-06-19 PROCEDURE — 84156 ASSAY OF PROTEIN URINE: CPT

## 2024-06-19 PROCEDURE — 86140 C-REACTIVE PROTEIN: CPT

## 2024-07-01 DIAGNOSIS — M32.9 SYSTEMIC LUPUS ERYTHEMATOSUS, UNSPECIFIED SLE TYPE, UNSPECIFIED ORGAN INVOLVEMENT STATUS (H): ICD-10-CM

## 2024-07-01 NOTE — TELEPHONE ENCOUNTER
Medication:   Benlysta 200 mg  Last written on:   02/28/2024  Quantity:   4ml    Refills:   3    Last office visit:   02/28/2024  Canceled:  06/26/2024  Next office visit:     Last labs:   06/19/2024

## 2024-07-02 NOTE — TELEPHONE ENCOUNTER
Rheumatology team: Please call to notify Ms. Patel that Benlysta has been refilled.  Future refills to be at rheumatology visits.   Jack Arvizu MD  7/2/2024 9:03 AM

## 2024-07-10 ENCOUNTER — OFFICE VISIT (OUTPATIENT)
Dept: NEUROSURGERY | Facility: CLINIC | Age: 49
End: 2024-07-10
Payer: COMMERCIAL

## 2024-07-10 VITALS — SYSTOLIC BLOOD PRESSURE: 113 MMHG | HEART RATE: 94 BPM | DIASTOLIC BLOOD PRESSURE: 65 MMHG | OXYGEN SATURATION: 96 %

## 2024-07-10 DIAGNOSIS — M54.16 LUMBAR RADICULOPATHY: Primary | ICD-10-CM

## 2024-07-10 PROCEDURE — 99024 POSTOP FOLLOW-UP VISIT: CPT | Performed by: PHYSICIAN ASSISTANT

## 2024-07-10 ASSESSMENT — PAIN SCALES - GENERAL: PAINLEVEL: MILD PAIN (2)

## 2024-07-10 NOTE — LETTER
7/10/2024      Sosa Patel  [address]   [place] [address]      Dear Colleague,    Thank you for referring your patient, Sosa Patel, to the [facility]. Please see a copy of my visit note below.    NEUROSURGERY FOLLOW UP  NOTE 7/10/2024     Ms. Patel is a 48 year old female who comes today in follow-up. postoperative L4-5 decompressive lumbar laminectomy, medial facetectomy, foraminotomy and resection of epidural synovial cyst on 5/29/2024 by Dr. Goins.      Presently patient states that she is overall doing well and is pleased with her progress following surgery. She notes that her previous radicular leg pain has completely resolved and denies any radicular numbness tingling or weakness.        PHYSICAL EXAM:   /65 (BP Location: Left arm, Patient Position: Sitting, Cuff Size: Adult Regular)   Pulse 94   LMP 02/05/2022   SpO2 96%      Mental Status: A & O in no acute distress.  Affect is appropriate.  Speech is fluent.  Recent and remote memory are intact.  Attention span and concentration are normal.     Motor:  Normal bulk and tone all muscle groups of lower extremities.     Sensory: Sensation intact bilaterally to light touch in LE     Reflexes; knee/ ankle jerk 1+     Incision: well healed without erythema, induration, or drainage        CONSULTATION ASSESSMENT AND PLAN:    Patient has been advised to begin physical therapy focusing on core strength and mobility. Follow up will be on an as needed basis and she understands to contact the office should any symptoms arise or worsen.      Lilia Castrejon PA-C  [facility]  O: [phone]            Again, thank you for allowing me to participate in the care of your patient.        Sincerely,        Lilia Castrejon PA-C

## 2024-07-10 NOTE — NURSING NOTE
"Sosa Patel is a 48 year old female who presents for:  Chief Complaint   Patient presents with    RECHECK        Initial Vitals:  /65 (BP Location: Left arm, Patient Position: Sitting, Cuff Size: Adult Regular)   Pulse 94   LMP 02/05/2022   SpO2 96%  Estimated body mass index is 28.12 kg/m  as calculated from the following:    Height as of 5/29/24: 5' 5\" (1.651 m).    Weight as of 5/29/24: 169 lb (76.7 kg).. There is no height or weight on file to calculate BSA. BP completed using cuff size: regular  Mild Pain (2)    Robbin Blanton    "

## 2024-07-10 NOTE — PROGRESS NOTES
NEUROSURGERY FOLLOW UP  NOTE 7/10/2024     Ms. Patel is a 48 year old female who comes today in follow-up. postoperative L4-5 decompressive lumbar laminectomy, medial facetectomy, foraminotomy and resection of epidural synovial cyst on 5/29/2024 by Dr. Goins.      Presently patient states that she is overall doing well and is pleased with her progress following surgery. She notes that her previous radicular leg pain has completely resolved and denies any radicular numbness tingling or weakness.        PHYSICAL EXAM:   /65 (BP Location: Left arm, Patient Position: Sitting, Cuff Size: Adult Regular)   Pulse 94   LMP 02/05/2022   SpO2 96%      Mental Status: A & O in no acute distress.  Affect is appropriate.  Speech is fluent.  Recent and remote memory are intact.  Attention span and concentration are normal.     Motor:  Normal bulk and tone all muscle groups of lower extremities.     Sensory: Sensation intact bilaterally to light touch in LE     Reflexes; knee/ ankle jerk 1+     Incision: well healed without erythema, induration, or drainage        CONSULTATION ASSESSMENT AND PLAN:    Patient has been advised to begin physical therapy focusing on core strength and mobility. Follow up will be on an as needed basis and she understands to contact the office should any symptoms arise or worsen.      Lilia Castrejon PA-C  [facility]  O: [phone]

## 2024-07-15 NOTE — TELEPHONE ENCOUNTER
Called and spoke to patient. Patient verbalized understanding and stated she would call later today to schedule follow up appointment.    TAMICA AdamsN, RN, PHN 7/15/2024 11:52 AM

## 2024-07-29 ENCOUNTER — TELEPHONE (OUTPATIENT)
Dept: DERMATOLOGY | Facility: CLINIC | Age: 49
End: 2024-07-29
Payer: COMMERCIAL

## 2024-07-29 NOTE — TELEPHONE ENCOUNTER
"Alternative requested for Hydroquinone 4% cream from [facility] pharmacy #[id number] on [address] in [place], [place] states \"alternative requested; PA is needed per insurance, please let us know results\"   Rx: [id number]  Tel: [phone]  Fax: [phone]    Marcell Ramirez on 7/29/2024 at 11:50 AM    "

## 2024-07-31 ENCOUNTER — TELEPHONE (OUTPATIENT)
Dept: NEUROSURGERY | Facility: CLINIC | Age: 49
End: 2024-07-31
Payer: COMMERCIAL

## 2024-07-31 NOTE — TELEPHONE ENCOUNTER
[facility] Call Center    Phone Message    May a detailed message be left on voicemail: yes     Reason for Call: Other: Patient called to reschedule her appointment on 8/19/2024 since Shanna YANG is no longer with us, But when writer went to reschedule all the PA's are scheduled out till September and she needs it sooner since this is her 12 week post-op appointment. Please review and call back.     Action Taken: Message routed to:  Other: [facility]     Travel Screening: Not Applicable     Date of Service:

## 2024-07-31 NOTE — TELEPHONE ENCOUNTER
Retail Pharmacy Prior Authorization Team   Phone: [phone]    PA Initiation    Medication: HYDROQUINONE 4 % EX CREA  Insurance Company: [employer] - Phone [phone] Fax [phone]  Pharmacy Filling the Rx: [id number] [place] [facility] [place] - [address]  Filling Pharmacy Phone: [phone]  Filling Pharmacy Fax:    Start Date: 7/31/2024

## 2024-07-31 NOTE — TELEPHONE ENCOUNTER
PRIOR AUTHORIZATION DENIED    Medication: HYDROQUINONE 4 % EX CREA  Insurance Company: [employer] - Phone [phone] Fax [phone]  Denial Date: 7/31/2024  Denial Reason(s): PRODUCT IS EXCLUDED FROM COVERAGE      Appeal Information: IF THE PROVIDER WOULD LIKE TO APPEAL THIS DECISION PLEASE PROVIDE THE PA TEAM WITH A LETTER OF MEDICAL NECESSITY      Patient Notified: NO

## 2024-08-18 NOTE — PROGRESS NOTES
Monitored by specialist- no acute findings meriting change in the plan          "Rheumatology Clinic Visit      Sosa Wolf MRN# [id number]   YOB: 1975 Age: 41 year old      Date of visit: 9/21/17   PCP: Cynthia Scott    Chief Complaint   Patient presents with:  Systemic Lupus Erythematous: patient has had a cold, been tired      Assessment and Plan     1. Systemic Lupus Erythematosus (JESSE+, dsDNA+, arthritis, oral sores [SLE versus HSV related], proteinuria): Dx'd 10/2016; initially with arthritis that improved with HCQ.  MTX was added in 1/2017 that was reportedly effective for her arthritis but was stopped because of associated fatigue. Currently on HCQ (started 9/2016).  No HCQ eye tox exam has been done so it will be on hold for now, with plans to restart at a lower dose of HCQ 200mg daily when the eye exam is performed.  For the arthritis, will start leflunomide.  As she was noncompliant with MTX and HCQ toxicity monitoring, the importance of it was stressed today and she verbalized understanding and agreement to follow the treatment plan as outlined below. She described \"brain fog\" referring to forgetting things often; will assess response to tx and if needed may refer to neuropsych for evaluation.   - Remove methotrexate from the medication list  - Remove folic acid from the medication list  - Discontinue hydroxychloroquine; restart at 200mg daily if eye exam is okay  - Start leflunomide 20mg daily if baseline labs are okay (labs being done today)  - Labs monthly x2 months: CBC, Cr, Hepatic Panel  - Labs today and 1 week prior to next rheumatology clinic visit: CBC, CMP, ESR, CRP, CK, C3, C4, dsDNA, UA, Uprotein:creatinine    # Leflunomide (Arava) Risks and Benefits: The risks and benefits of leflunomide were discussed in detail and the patient verbalized understanding.  The risks discussed include, but are not limited to, the risk for hypersensitivity, anaphylaxis, anaphylactoid reactions, hepatotoxicity, infections, interstitial lung disease, alopecia, rash, nausea, " and diarrhea.  The impact on malignancies is not fully defined.   Alcohol should be avoided while taking leflunomide.  Pregnancy prevention and planning was discussed; it is recommended that women of childbearing potential use reliable contraception before, during, and for a period of 2 years after treatment with leflunomide; if pregnancy is planned within 2 years of discontinuing medication then women should undergo drug elimination procedures (i.e. cholestyramine). Routine laboratory monitoring is required during leflunomide therapy. I encouraged reviewing the package insert and asking any questions about the medication.      # Hydroxychloroquine (Plaquenil) Risks and Benefits:  The risks and benefits of hydroxychloroquine were discussed in detail and the patient verbalized understanding; the patient also verbalized agreement to get the required ophthalmologic toxicity monitoring.  The risks discussed include, but are not limited to, the risk for hypersensitivity, anaphylaxis, anaphylactoid reactions, irreversible retinal damage, rare hematologic reactions, and rare cardiomyopathy.  I encouraged reviewing the package insert and asking any questions about the medication.      # Pregnancy Plans: on birth control (depo-provera)    2. Sicca Syndrome: Mild symptoms that may be due to other factors such as caffeine intake, smoking, etc., but secondary Sjogren's Syndrome is possible.  We discussed the importance of good oral hygiene, frequent sips of water, avoiding high sugar foods and liquids, avoiding oral irritants such as coffee, alcohol, and nicotine, and avoiding acidic drinks such as carbonated and high sugar beverages. She is already seeing a dentist regularly. Dry eyes are not being treated with anything right now; I advised her to use artificial tears as needed.     3. Cigarette Smoking:  Encouraged complete cessation and discussed the health benefits.  She has already cut down significantly; positive  reinforcement given.     4. Cocaine use history: No sober    5. Vaccinations: Vaccinations reviewed with Ms. Wolf.  Risks and benefits of vaccinations were discussed.  - Influenza: up to date per patient  - [id number]: will receive today  - [id number]: to receive at least 8 weeks after [id number] is administered     6. Noncompliance history: She says that she was noncompliant because of her job change and other stresses. She says that she also has minimal social support as her parents do not understand lupus and she lives with her parents. She says that she came to this appointment with the plan to put her health first and take better care of herself. She says that she will get the labs as instructed and follow up in 3 months as instructed.     Ms. Wolf verbalized agreement with and understanding of the rational for the diagnosis and treatment plan.  All questions were answered to best of my ability and the patient's satisfaction. Ms. Wolf was advised to contact the clinic with any questions that may arise after the clinic visit.      Thank you for involving me in the care of the patient    Return to clinic: 3 months      HPI   Sosa Wolf is a 41 year old female with a medical history significant for tobacco abuse, genital warts, and HSV type II who presents for follow-up of systemic lupus erythematosus.    Ms. Wolf was last seen in January 2017 at which time MTX was started.     Today, Ms. Wolf has continued  mg twice daily. She has also stopped methotrexate after taking it for 3-4 months. She says that while she was on methotrexate she felt well with no joint pain and no morning stiffness, but she had fatigue for 2 days after she took the methotrexate each time. She was on methotrexate 15 mg once weekly. She has oral and nasal sores that are painful, always occur in the same location, and typically resolve within 1-2 days. She does not use the valacyclovir that she has at home because they do not last  very long. She believes that she has had more brain fog because she is forgetting things more frequently. She also has some muscle aches in her legs bilaterally. Mildly dry eye and dry mouth. She drinks liquid to moisten her mouth, and comes in with a [employer] energy drink today. She reports following with a dentist regularly and has not been getting. She had blurry vision that occurred previously and has since resolved. She has not seen ophthalmology for hydroxychloroquine toxicity monitoring. Joints primarily affecting her right now include her fingers, wrists, ankles, and toes. Morning stiffness for approximately 45 minutes. Positive gelling phenomenon.    Has a cold that is improving; still with a nonproductive cough.      Denies fevers, chills, nausea, vomiting, constipation, diarrhea. No abdominal pain. No chest pain/pressure, palpitations, or shortness of breath. No LE swelling. No neck pain.   No rash.  No photosensitivity or photophobia. No eye pain or redness. No history of inflammatory eye disease.  No history of DVT, pulmonary embolism, or miscarriage.   No history of serositis.  No history of Raynaud's Phenomenon.  No seizure history.  No known renal disorder.      More fatigue recently.    She says that she has been noncompliant with follow-up and her toxicity monitoring labs because of all of her life changes including a job change. She says that she needs to put herself first more often and will be more compliant with the treatment plan.    Tobacco: trying to quit, rare cigarette now, using vapor  EtOH: none, sober since 5/16/2010  Drugs: cocaine history; now sober  Occupation: counselor for mentally disabled people    ROS   GEN: No fevers, chills, night sweats, or weight change  SKIN: No itching, rashes, sores  HEENT: No epistaxis. No oral or nasal ulcers.  CV: No chest pain, pressure, palpitations, or dyspnea on exertion.  PULM: No shortness of breath.  Cough currently and says that she has a cold  that is improving.   GI: No nausea, vomiting, constipation, diarrhea. No blood in stool. No abdominal pain.  : No blood in urine.  MSK: See HPI.  NEURO: No numbness, tingling, or weakness.  ENDO: No cold intolerance; see history of present illness   EXT: No LE swelling  PSYCH: Negative    Active Problem List     Patient Active Problem List   Diagnosis     Herpes simplex type 2 infection     Genital warts     CARDIOVASCULAR SCREENING; LDL GOAL LESS THAN 160     H/O intravenous drug use in remission     Tobacco abuse     Surveillance for Depo-Provera contraception     JESSE positive     Multiple joint pain     Cocaine use     Systemic lupus erythematosus (H)     Past Medical History     Past Medical History:   Diagnosis Date     Bipolar disorder (H) 12/04    Dx'd     H/O: depression     SUICIDE ATTEMPT AGE 15     HPV (human papillomavirus)      Past Surgical History     Past Surgical History:   Procedure Laterality Date     BUNIONECTOMY RT/LT       HC ENLARGE BREAST WITH IMPLANT  2000     Allergy   No Known Allergies  Current Medication List     Current Outpatient Prescriptions   Medication Sig     hydroxychloroquine (PLAQUENIL) 200 MG tablet Take 2 tablets (400 mg) by mouth daily . Rheumatology clinic follow up and labs are needed prior to future refills.     medroxyPROGESTERone (DEPO-PROVERA) 150 MG/ML injection Inject 1 mL (150 mg) into the muscle every 3 months     azithromycin (ZITHROMAX) 250 MG tablet Two tablets first day, then one tablet daily for four days.     albuterol (PROAIR HFA/PROVENTIL HFA/VENTOLIN HFA) 108 (90 BASE) MCG/ACT Inhaler 2 puffs in mouth every 6 hrs for next 2 days and then every 6 hrs as needed for shortness of breath and wheezing.     ARIPiprazole (ABILIFY) 10 MG tablet Take 10 mg by mouth daily     valACYclovir (VALTREX) 500 MG tablet Take 1 tablet (500 mg) by mouth 2 times daily     medroxyPROGESTERone (DEPO-PROVERA) 150 MG/ML injection Inject 1 mL (150 mg) into the muscle every 3  "months     buPROPion (WELLBUTRIN SR) 150 MG 12 hr tablet Take 150 mg by mouth daily     SERTRALINE  MG PO TABS 2 TABLET DAILY     methotrexate sodium 2.5 MG TABS Take 15 mg by mouth once a week . Take all 6 tablets on the same day of each week. (Patient not taking: Reported on 9/21/2017)     folic acid (FOLVITE) 1 MG tablet Take 1 tablet (1 mg) by mouth daily (Patient not taking: Reported on 9/21/2017)     No current facility-administered medications for this visit.      Social History   See HPI    Family History     Family History   Problem Relation Age of Onset     CANCER Father      lung cancer     DIABETES Maternal Grandmother      Depression Maternal Grandmother      Lipids Maternal Grandmother      CEREBROVASCULAR DISEASE Maternal Grandfather      Lipids Maternal Grandfather      Circulatory Maternal Grandfather      MI in 50's     Depression Brother      depression and OCD     GASTROINTESTINAL DISEASE Brother      IBS     Depression Mother      Thyroid Disease No family hx of      Asthma No family hx of      Denies family history of autoimmune disease     Physical Exam     Temp Readings from Last 3 Encounters:   09/21/17 97.1  F (36.2  C) (Oral)   01/30/17 98.2  F (36.8  C) (Oral)   01/18/17 97.3  F (36.3  C) (Oral)     BP Readings from Last 5 Encounters:   09/21/17 119/75   07/17/17 113/72   05/01/17 114/72   01/30/17 102/61   01/18/17 120/68     Pulse Readings from Last 1 Encounters:   09/21/17 94     Resp Readings from Last 1 Encounters:   11/04/16 18     Estimated body mass index is 25.24 kg/(m^2) as calculated from the following:    Height as of this encounter: 1.67 m (5' 5.75\").    Weight as of this encounter: 70.4 kg (155 lb 3.2 oz).    GEN: NAD  HEENT: MMM. No oral lesions. Anicteric, noninjected sclera  CV: S1, S2. RRR. No m/r/g.  PULM: Expiratory wheezing; no crackles.  Coughing during the clinic visit today.   MSK:  Synovial swelling and tenderness to palpation of the bilateral second and " third MCPs and right second and fourth PIPs. Wrists mildly tender to palpation but without swelling. Elbows and shoulders without swelling or tenderness to palpation. Hips nontender to direct palpation. Knees and ankles without swelling or tenderness to palpation. MTPs diffusely tender to palpation but no swelling.   NEURO: UE and LE strengths 5/5 and equal bilaterally.   SKIN: No rash  EXT: No LE edema  PSYCH: Alert. Appropriate.    Labs / Imaging (select studies)   RF/CCP  Recent Labs   Lab Test  10/11/16   1043  08/10/16   1059   CCPIGG  1   --    RHF   --   <20     JESSE  Recent Labs   Lab Test  08/10/16   1059   ROBYN  1.7*     RNP/Sm/SSA/SSB  Recent Labs   Lab Test  10/11/16   1043  12/10/13   1118   RNPIGG  0.3   --    SMIGG  <0.2  Negative   Antibody index (AI) values reflect qualitative changes in antibody   concentration that cannot be directly associated with clinical condition or   disease state.     --    SSAIGG  <0.2  Negative   Antibody index (AI) values reflect qualitative changes in antibody   concentration that cannot be directly associated with clinical condition or   disease state.     --    SSBIGG  <0.2  Negative   Antibody index (AI) values reflect qualitative changes in antibody   concentration that cannot be directly associated with clinical condition or   disease state.     --    SCLIGG  <0.2  Negative   Antibody index (AI) values reflect qualitative changes in antibody   concentration that cannot be directly associated with clinical condition or   disease state.     --    TREPAB   --   Negative     dsDNA  Recent Labs   Lab Test  05/01/17   0936  01/13/17   1015  10/11/16   1043   DNA  37*  45*  70*     C3/C4  Recent Labs   Lab Test  05/01/17   0936  01/13/17   1015  10/11/16   1043   V0UPIPL  80  87  83   I1ZHRHC  17  22  15     CBC  Recent Labs   Lab Test  05/01/17   0936  01/13/17   1015  10/11/16   1043   WBC  6.0  7.5  8.8   RBC  4.78  4.63  4.88   HGB  14.0  13.4  13.9   HCT  40.6  38.7   39.8   MCV  85  84  82   RDW  13.8  14.0  13.9   PLT  163  215  223   MCH  29.3  28.9  28.5   MCHC  34.5  34.6  34.9   NEUTROPHIL  57.6  58.0  62.1   LYMPH  33.2  30.3  30.3   MONOCYTE  6.2  7.9  4.3   EOSINOPHIL  2.5  3.4  3.0   BASOPHIL  0.5  0.4  0.3   ANEU  3.5  4.3  5.5   ALYM  2.0  2.3  2.7   DAQUAN  0.4  0.6  0.4   AEOS  0.2  0.3  0.3   ABAS  0.0  0.0  0.0     CMP  Recent Labs   Lab Test  05/01/17   0936  01/13/17   1015  10/11/16   1043  08/31/16   0950  08/10/16   1059   NA  142  137   --   141  142   POTASSIUM  3.6  4.0   --   3.5  3.3*   CHLORIDE  110*  104   --   110*  110*   CO2  24  25   --   25  24   ANIONGAP  8  8   --   6  8   GLC  91  87   --   60*  80   BUN  16  21   --   18  14   CR  0.83  0.68  0.74  0.91  0.85   GFRESTIMATED  75  >90  Non  GFR Calc    86  67  73   GFRESTBLACK  >90   GFR Calc    >90   GFR Calc    >90   GFR Calc    82  89   RICHARD  8.9  8.9   --   9.1  8.6   BILITOTAL  0.4  0.3  0.3   --   0.3   ALBUMIN  3.6  3.9  4.0   --   3.7   PROTTOTAL  6.5*  6.9  7.2   --   6.5*   ALKPHOS  24*  26*  29*   --   31*   AST  16  26  14   --   11   ALT  23  37  24   --   17     Calcium/VitaminD  Recent Labs   Lab Test  05/01/17   0936  01/13/17   1015  08/31/16   0950   RICHARD  8.9  8.9  9.1     ESR/CRP  Recent Labs   Lab Test  05/01/17   0936  01/13/17   1015  08/10/16   1059   SED  4  6  6   CRP  <2.9  <2.9  <2.9     CK/Aldolase  Recent Labs   Lab Test  05/01/17   0936  01/13/17   1015  10/11/16   1043   CKT  81  174  65   ALDOLASE   --    --   4.5     TSH/T4  Recent Labs   Lab Test  10/11/16   1043   TSH  1.48     Hepatitis B  Recent Labs   Lab Test  10/11/16   1043   HBCAB  Nonreactive   HEPBANG  Nonreactive     Hepatitis C  Recent Labs   Lab Test  10/11/16   1043  12/10/13   1118  03/06/13   1411   HCVAB  Nonreactive   Assay performance characteristics have not been established for newborns,   infants, and children    Negative   Negative     HIV Screening  Recent Labs   Lab Test  10/11/16   1043   HIAGAB  Nonreactive   HIV-1 p24 Ag & HIV-1/HIV-2 Ab Not Detected       UA  Recent Labs   Lab Test  05/01/17   0954  01/13/17   1033   COLOR  Yellow  Yellow   APPEARANCE  Clear  Clear   URINEGLC  Negative  Negative   URINEBILI  Small  This is an unconfirmed screening test result. A positive result may be false.  *  Negative   SG  >1.030  1.010   URINEPH  6.0  6.5   PROTEIN  Trace*  Negative   UROBILINOGEN  0.2  0.2   NITRITE  Negative  Negative   UBLD  Small*  Negative   LEUKEST  Negative  Negative   WBCU  O - 2  O - 2   RBCU  O - 2  O - 2   SQUAMOUSEPI  Few  Few   MUCOUS  Present*   --      Urine Microscopic  Recent Labs   Lab Test  05/01/17   0954  01/13/17   1033   WBCU  O - 2  O - 2   RBCU  O - 2  O - 2   SQUAMOUSEPI  Few  Few   MUCOUS  Present*   --      Urine Protein  Recent Labs   Lab Test  05/01/17   0954  01/13/17   1033   UTP  1.14  0.07   UTPG  0.35*  0.27*   UCRR  324   --         Recent Results (from the past 744 hour(s))   XR Hand Bilateral G/E 3 Views    Narrative    XR HAND BILATERAL G/E 3 VW 10/11/2016 12:41 PM    HISTORY: Pain in unspecified joint      Impression    IMPRESSION: Negative.    JULIAN LORD MD   XR Foot Bilateral G/E 3 Views    Narrative    XR FOOT BILATERAL G/E 3 VW 10/11/2016 12:39 PM    HISTORY: Pain in unspecified joint      Impression    IMPRESSION: Deformity of the head of the right first metatarsal most  likely related to previous surgery or trauma. Some erosive change in  this area cannot be excluded. Bilateral exam otherwise unremarkable.    JULIAN LORD MD   XR Hip Left 2-3 Views    Narrative    XR HIP LEFT 2-3 VIEWS 10/11/2016 12:39 PM    HISTORY: Pain in unspecified joint      Impression    IMPRESSION: Negative.    JULIAN LORD MD       Immunization History     Immunization History   Administered Date(s) Administered     Influenza Vaccine IM 3yrs+ 4 Valent IIV4 12/19/2014, 10/16/2015, 11/04/2016      TD (ADULT, 7+) 02/23/2005     TDAP Vaccine (Adacel) 04/14/2009          Chart documentation done in part with Dragon Voice recognition Software. Although reviewed after completion, some word and grammatical error may remain.    Jack Arvizu MD

## 2024-08-19 ENCOUNTER — OFFICE VISIT (OUTPATIENT)
Dept: NEUROSURGERY | Facility: CLINIC | Age: 49
End: 2024-08-19
Payer: COMMERCIAL

## 2024-08-19 VITALS — SYSTOLIC BLOOD PRESSURE: 106 MMHG | OXYGEN SATURATION: 94 % | HEART RATE: 92 BPM | DIASTOLIC BLOOD PRESSURE: 52 MMHG

## 2024-08-19 DIAGNOSIS — M54.16 LUMBAR RADICULOPATHY: Primary | ICD-10-CM

## 2024-08-19 PROCEDURE — 99024 POSTOP FOLLOW-UP VISIT: CPT | Performed by: NEUROLOGICAL SURGERY

## 2024-08-19 ASSESSMENT — PAIN SCALES - GENERAL: PAINLEVEL: MILD PAIN (2)

## 2024-08-19 NOTE — LETTER
8/19/2024      Sosa aPtel  [address]   [place] [address]      Dear Colleague,    Thank you for referring your patient, Sosa Patel, to the [facility]. Please see a copy of my visit note below.    NEUROSURGERY FOLLOWUP  NOTE    Sosa Patel comes today in f/u approximately 3 months s/p 5/29/2024 L4-5 decompressive lumbar laminectomy, medial facetectomy foraminotomy and resection of epidural synovial cyst.  Final pathology:L4/L5 epidural cyst, excision, Consistent with benign synovial cyst.    Patient is doing very well following the surgery with approximately 80% improvement in her preoperative symptoms.  She denies any new onset neurological symptoms during her clinic visit today.      PHYSICAL EXAM:   Constitutional: /52 (BP Location: Left arm, Patient Position: Sitting, Cuff Size: Adult Regular)   Pulse 92   LMP 02/05/2022   SpO2 94%      Mental Status: A & O in no acute distress.  Affect is appropriate.  Speech is fluent.  Recent and remote memory are intact.  Attention span and concentration are normal.     Motor: No pronator drift of upper extremity.   Normal bulk and tone all muscle groups of upper and lower extremities.       Delt Bi Tri Hand Flex/  Ext Iliopsoas Quadriceps Tibialis Anterior EHL Gastroc     C5 C6 C7 C8/T1 L2 L3 L4 L5 S1   R 5 5 5 5 5 5 5 5 5   L 5 5 5 5 5 5 5 5 5        Sensory: Sensation intact bilaterally to light touch.      Coordination:   Heel/toe/ gait intact.  Intact tandem gait      Reflexes; supinator, biceps, triceps, knee/ ankle jerk intact.  No hoffmans/   no babinski/ clonus.    Incision healed well    IMAGING: No new images today     CONSULTATION ASSESSMENT AND PLAN:    Ms Patel is a 48-year-old right-handed female with significant past medical history of bipolar disorder, drug dependence, suicide attempt presented to the clinic today for evaluation of low back pain and bilateral leg pain right more than the left since  November 2023 without preceding trauma. She has clinical symptoms of neurogenic claudication with intermittent episodes of bladder/bowel incontinence. On clinical examination she has 4+ right foot dorsiflexion and impaired sensation along S1 dermatome involving both lower extremities. Her MRI lumbar spine showed L4-5 central and bilateral lateral recess stenosis with synovial cyst. Based on patient's symptoms and imaging findings surgical decompression was offered.     Patient is in the clinic today for 3 months follow-up s/p 5/29/2024 L4-5 decompressive lumbar laminectomy, medial facetectomy foraminotomy and resection of epidural synovial cyst.    Patient is doing very well following the surgery with more than 80% improvement in her preoperative symptoms.  She is able to carry out her activities of daily living without pain and overall pleased with outcome.  Her incision is healed well without issues.    I discussed the role of strengthening of paraspinal muscles and will refer her for the physical therapy.  She can follow-up with me on as-needed basis.    Patient agreed with the plan.  All the questions were answered and patient sounded understanding.  She can contact us if there are any further questions or concerns or worsening neurological deficits.I spent more than 20 minutes in this apt, examining the pt, reviewing the scans, reviewing notes from chart, discussing treatment options with risks and benefits and coordinating care. This note was created in part by the use of Dragon voice recognition system. Inadvertent grammatical errors and typographical errors may have occurred due to inherent limitation of voice recognition software.  Reasonable attempts made to avoid errors, but this document may contain an error not identified before finalizing.  Please contact me for any clarification needed.        Aureliano Goins MD      CC:     Cynthia Scott  [address] [facility] [address]      Again,  thank you for allowing me to participate in the care of your patient.        Sincerely,        Aureliano Goins MD

## 2024-08-19 NOTE — PROGRESS NOTES
NEUROSURGERY FOLLOWUP  NOTE    Sosa Patel comes today in f/u approximately 3 months s/p 5/29/2024 L4-5 decompressive lumbar laminectomy, medial facetectomy foraminotomy and resection of epidural synovial cyst.  Final pathology:L4/L5 epidural cyst, excision, Consistent with benign synovial cyst.    Patient is doing very well following the surgery with approximately 80% improvement in her preoperative symptoms.  She denies any new onset neurological symptoms during her clinic visit today.      PHYSICAL EXAM:   Constitutional: /52 (BP Location: Left arm, Patient Position: Sitting, Cuff Size: Adult Regular)   Pulse 92   LMP 02/05/2022   SpO2 94%      Mental Status: A & O in no acute distress.  Affect is appropriate.  Speech is fluent.  Recent and remote memory are intact.  Attention span and concentration are normal.     Motor: No pronator drift of upper extremity.   Normal bulk and tone all muscle groups of upper and lower extremities.       Delt Bi Tri Hand Flex/  Ext Iliopsoas Quadriceps Tibialis Anterior EHL Gastroc     C5 C6 C7 C8/T1 L2 L3 L4 L5 S1   R 5 5 5 5 5 5 5 5 5   L 5 5 5 5 5 5 5 5 5        Sensory: Sensation intact bilaterally to light touch.      Coordination:   Heel/toe/ gait intact.  Intact tandem gait      Reflexes; supinator, biceps, triceps, knee/ ankle jerk intact.  No hoffmans/   no babinski/ clonus.    Incision healed well    IMAGING: No new images today     CONSULTATION ASSESSMENT AND PLAN:    Ms Patel is a 48-year-old right-handed female with significant past medical history of bipolar disorder, drug dependence, suicide attempt presented to the clinic today for evaluation of low back pain and bilateral leg pain right more than the left since November 2023 without preceding trauma. She has clinical symptoms of neurogenic claudication with intermittent episodes of bladder/bowel incontinence. On clinical examination she has 4+ right foot dorsiflexion and impaired sensation along S1  dermatome involving both lower extremities. Her MRI lumbar spine showed L4-5 central and bilateral lateral recess stenosis with synovial cyst. Based on patient's symptoms and imaging findings surgical decompression was offered.     Patient is in the clinic today for 3 months follow-up s/p 5/29/2024 L4-5 decompressive lumbar laminectomy, medial facetectomy foraminotomy and resection of epidural synovial cyst.    Patient is doing very well following the surgery with more than 80% improvement in her preoperative symptoms.  She is able to carry out her activities of daily living without pain and overall pleased with outcome.  Her incision is healed well without issues.    I discussed the role of strengthening of paraspinal muscles and will refer her for the physical therapy.  She can follow-up with me on as-needed basis.    Patient agreed with the plan.  All the questions were answered and patient sounded understanding.  She can contact us if there are any further questions or concerns or worsening neurological deficits.I spent more than 20 minutes in this apt, examining the pt, reviewing the scans, reviewing notes from chart, discussing treatment options with risks and benefits and coordinating care. This note was created in part by the use of Dragon voice recognition system. Inadvertent grammatical errors and typographical errors may have occurred due to inherent limitation of voice recognition software.  Reasonable attempts made to avoid errors, but this document may contain an error not identified before finalizing.  Please contact me for any clarification needed.        Aureliano Goins MD      CC:     Cynthia Scott  [address] [facility]  [place] [address]

## 2024-08-19 NOTE — NURSING NOTE
"Sosa Patel is a 48 year old female who presents for:  Chief Complaint   Patient presents with    RECHECK        Initial Vitals:  /52 (BP Location: Left arm, Patient Position: Sitting, Cuff Size: Adult Regular)   Pulse 92   LMP 02/05/2022   SpO2 94%  Estimated body mass index is 28.12 kg/m  as calculated from the following:    Height as of 5/29/24: 5' 5\" (1.651 m).    Weight as of 5/29/24: 169 lb (76.7 kg).. There is no height or weight on file to calculate BSA. BP completed using cuff size: regular  Mild Pain (2)    Robbin Blanton    Oswestry Disability Index (DIONNE   Yao Reece 1980, All rights reserved. Used with permission)    Section 1 - Pain intensity: The pain is moderate at the moment.  Section 2 - Personal care (washing, dressing, etc.) : I can look after myself normally without causing additional pain.  Section 3 - Lifting: Pain prevents me from lifting heavy weights but I can manage light to medium weights if they are conveniently positioned.  Section 4 - Walking: Pain does not prevent me from walking any distance.  Section 5 - Sitting: I can sit in any chair as long as I like.  Section 6 - Standing: I can stand as long as I want but it gives me additional pain.  Section 7 - Sleeping: My sleep is never interrupted by pain.  Section 8 - Sex life (if applicable): My sex life is normal but causes some additional pain.  Section 9 - Social life: My social life is normal and causes me no additional pain.  Section 10 - Traveling: I can travel anywhere but it gives me additional pain.  Sum: 8  Count: 10  Oswestry Score (%): 16 %       "

## 2024-08-26 DIAGNOSIS — M32.9 SYSTEMIC LUPUS ERYTHEMATOSUS, UNSPECIFIED SLE TYPE, UNSPECIFIED ORGAN INVOLVEMENT STATUS (H): ICD-10-CM

## 2024-08-26 DIAGNOSIS — J31.0 CHRONIC RHINITIS: ICD-10-CM

## 2024-08-26 NOTE — TELEPHONE ENCOUNTER
Medication:   Benlysta 200mg/ml  Last written on:   07/02/2024  Quantity:   4ml    Refills:   1    Last office visit:   02/28/2024  Next office visit:   09/30/2024  Last labs:   06/19/2024

## 2024-08-27 RX ORDER — CETIRIZINE HYDROCHLORIDE 10 MG/1
20 TABLET ORAL DAILY
Qty: 180 TABLET | Refills: 0 | Status: SHIPPED | OUTPATIENT
Start: 2024-08-27

## 2024-08-30 ENCOUNTER — THERAPY VISIT (OUTPATIENT)
Dept: PHYSICAL THERAPY | Facility: CLINIC | Age: 49
End: 2024-08-30
Attending: NEUROLOGICAL SURGERY
Payer: COMMERCIAL

## 2024-08-30 ENCOUNTER — OFFICE VISIT (OUTPATIENT)
Dept: OPHTHALMOLOGY | Facility: CLINIC | Age: 49
End: 2024-08-30
Payer: COMMERCIAL

## 2024-08-30 DIAGNOSIS — H52.4 MYOPIA OF BOTH EYES WITH ASTIGMATISM AND PRESBYOPIA: ICD-10-CM

## 2024-08-30 DIAGNOSIS — H52.13 MYOPIA OF BOTH EYES WITH ASTIGMATISM AND PRESBYOPIA: ICD-10-CM

## 2024-08-30 DIAGNOSIS — Z98.890 S/P LUMBAR LAMINECTOMY: ICD-10-CM

## 2024-08-30 DIAGNOSIS — H04.123 DRY EYE SYNDROME, BILATERAL: ICD-10-CM

## 2024-08-30 DIAGNOSIS — M32.9 SYSTEMIC LUPUS ERYTHEMATOSUS, UNSPECIFIED SLE TYPE, UNSPECIFIED ORGAN INVOLVEMENT STATUS (H): ICD-10-CM

## 2024-08-30 DIAGNOSIS — H52.203 MYOPIA OF BOTH EYES WITH ASTIGMATISM AND PRESBYOPIA: ICD-10-CM

## 2024-08-30 DIAGNOSIS — Z79.899 HIGH RISK MEDICATIONS (NOT ANTICOAGULANTS) LONG-TERM USE: Primary | ICD-10-CM

## 2024-08-30 PROCEDURE — 97161 PT EVAL LOW COMPLEX 20 MIN: CPT | Mod: GP | Performed by: PHYSICAL THERAPIST

## 2024-08-30 PROCEDURE — 92014 COMPRE OPH EXAM EST PT 1/>: CPT | Performed by: STUDENT IN AN ORGANIZED HEALTH CARE EDUCATION/TRAINING PROGRAM

## 2024-08-30 PROCEDURE — 92134 CPTRZ OPH DX IMG PST SGM RTA: CPT | Performed by: STUDENT IN AN ORGANIZED HEALTH CARE EDUCATION/TRAINING PROGRAM

## 2024-08-30 PROCEDURE — 92083 EXTENDED VISUAL FIELD XM: CPT | Performed by: STUDENT IN AN ORGANIZED HEALTH CARE EDUCATION/TRAINING PROGRAM

## 2024-08-30 PROCEDURE — 97110 THERAPEUTIC EXERCISES: CPT | Mod: GP | Performed by: PHYSICAL THERAPIST

## 2024-08-30 PROCEDURE — 92015 DETERMINE REFRACTIVE STATE: CPT | Performed by: STUDENT IN AN ORGANIZED HEALTH CARE EDUCATION/TRAINING PROGRAM

## 2024-08-30 ASSESSMENT — VISUAL ACUITY
OD_SC: 20/25
OS_SC: 20/25-2
METHOD: SNELLEN - LINEAR

## 2024-08-30 ASSESSMENT — EXTERNAL EXAM - LEFT EYE: OS_EXAM: NORMAL

## 2024-08-30 ASSESSMENT — CONF VISUAL FIELD
OD_NORMAL: 1
OS_NORMAL: 1
OD_INFERIOR_TEMPORAL_RESTRICTION: 0
OD_INFERIOR_NASAL_RESTRICTION: 0
OS_INFERIOR_TEMPORAL_RESTRICTION: 0
OD_SUPERIOR_TEMPORAL_RESTRICTION: 0
OS_SUPERIOR_TEMPORAL_RESTRICTION: 0
OS_INFERIOR_NASAL_RESTRICTION: 0
OS_SUPERIOR_NASAL_RESTRICTION: 0
OD_SUPERIOR_NASAL_RESTRICTION: 0

## 2024-08-30 ASSESSMENT — CUP TO DISC RATIO
OD_RATIO: 0.4
OS_RATIO: 0.45

## 2024-08-30 ASSESSMENT — REFRACTION_MANIFEST
OS_ADD: +1.50
OS_SPHERE: -1.00
OD_CYLINDER: +0.75
OS_AXIS: 166
OD_AXIS: 014
OD_ADD: +1.50
OD_SPHERE: -1.00
OS_CYLINDER: +1.00

## 2024-08-30 ASSESSMENT — TONOMETRY
OS_IOP_MMHG: 15
OD_IOP_MMHG: 18
IOP_METHOD: APPLANATION

## 2024-08-30 ASSESSMENT — EXTERNAL EXAM - RIGHT EYE: OD_EXAM: NORMAL

## 2024-08-30 ASSESSMENT — SLIT LAMP EXAM - LIDS
COMMENTS: NORMAL
COMMENTS: NORMAL

## 2024-08-30 NOTE — PROGRESS NOTES
Current Eye Medications:  Restasis both eyes once per day      Subjective:  High risk medication, plaquenil since 09/2016. Referred by Rheumatologist Dr. Arvizu. Patient  takes 200mg day 1 and 100mg day 2 and so on every other day. She thinks maybe she's supposed to be taking 200mg a day with an additional 200mg every other day. She has an appointment coming up with Dr. Arvizu soon and will clarify directions.     She has noticed that her near vision is not as good as it was and tends to wear a +1.25 OTC reader. No changes in her distance vision. No floaters/flashing lights/pain or discomfort. She has no other ocular concerns today.      Objective:  See Ophthalmology Exam.      Assessment:  Sosa Patel is a 48 year old female who presents with:   Encounter Diagnoses   Name Primary?    High risk medications (not anticoagulants) long-term use Plaquenil since 9/2016 (8 years).      Macular SD-OCT within normal limits both eyes.     Dueñas visual field (HVF) 10-2 within normal limits both eyes     No signs of Plaquenil retinal toxicity at present.     Systemic lupus erythematosus, unspecified SLE type, unspecified organ involvement status (H)     Dry eye syndrome, bilateral     Myopia of both eyes with astigmatism and presbyopia        Plan:  Normal Plaquenil eye tests today.    Ok to continue Restasis 1-2 times per day in both eyes     Continue over the counter readers or can fill glasses prescription given     Josefa Contreras MD  [phone]

## 2024-08-30 NOTE — LETTER
8/30/2024      Sosa Patel  [address]   [place] [address]      Dear Colleague,    Thank you for referring your patient, Sosa Patel, to the [facility]. Please see a copy of my visit note below.     Current Eye Medications:  Restasis both eyes once per day      Subjective:  High risk medication, plaquenil since 09/2016. Referred by Rheumatologist Dr. Arvizu. Patient  takes 200mg day 1 and 100mg day 2 and so on every other day. She thinks maybe she's supposed to be taking 200mg a day with an additional 200mg every other day. She has an appointment coming up with Dr. Arvizu soon and will clarify directions.     She has noticed that her near vision is not as good as it was and tends to wear a +1.25 OTC reader. No changes in her distance vision. No floaters/flashing lights/pain or discomfort. She has no other ocular concerns today.      Objective:  See Ophthalmology Exam.      Assessment:  Sosa Patel is a 48 year old female who presents with:   Encounter Diagnoses   Name Primary?     High risk medications (not anticoagulants) long-term use Plaquenil since 9/2016 (8 years).      Macular SD-OCT within normal limits both eyes.     Dueñas visual field (HVF) 10-2 within normal limits both eyes     No signs of Plaquenil retinal toxicity at present.      Systemic lupus erythematosus, unspecified SLE type, unspecified organ involvement status (H)      Dry eye syndrome, bilateral      Myopia of both eyes with astigmatism and presbyopia        Plan:  Normal Plaquenil eye tests today.    Ok to continue Restasis 1-2 times per day in both eyes     Continue over the counter readers or can fill glasses prescription given     Josefa Contreras MD  [phone]          Again, thank you for allowing me to participate in the care of your patient.        Sincerely,        Josefa Contreras MD

## 2024-08-30 NOTE — PROGRESS NOTES
PHYSICAL THERAPY EVALUATION  Type of Visit: Evaluation     Abuse screening: the patient does not require abuse support services at this time    Fall Risk Screen:  Fall screen completed by: PT  Have you fallen 2 or more times in the past year?: Yes (pre surgery, fell 4 times)  Have you fallen and had an injury in the past year?: Yes (just before her initial back pain started)  Is patient a fall risk?: Department fall risk interventions implemented  Fall screen comments: usually off balance while carring something  TUs  Subjective       Presenting condition or subjective complaint: spinal surge, recently started to get back to cleaning, experiences some pain but is much improved post surgery (roughly 80% better pain post surgery) does experience occasional sciatica pain bilaterally  on posterior thighs to knees after working for several hours, no numbness or tingling. Work days involve 6-8 hours of bending, lifting, vacuuming.  Date of onset: 24 (date of surgery)    Relevant medical history: Arthritis; Foot drop; Menopause; Overweight; Pain at night or rest; Significant weakness; Smoking   Dates & types of surgery: lumbar spinal surgery laminectomy and decompression of spine  L4-5 decompressive lumbar laminectomy, medial facetectomy foraminotomy and resection of epidural synovial cyst.     Prior diagnostic imaging/testing results: MRI; X-ray; Other     Provided intraoperative images demonstrate posterior  surgical device directed at the L4-L5 interspace  Prior therapy history for the same diagnosis, illness or injury:    Therapy prior to surgery which did not provide lasting improvement      Living Environment  Social support: With family members   Type of home: House   Stairs to enter the home:         Ramp: No   Stairs inside the home: Yes 12     Help at home:    Equipment owned:       Employment: Yes    Hobbies/Interests:      Patient goals for therapy: have n lower back pain while cleaning and  lifting    Pain assessment:      Objective   LUMBAR SPINE EVALUATION  PAIN: Pain Level at Rest: 2/10  Pain Level with Use: 5/10  Pain Quality: Aching and Tiring  Pain is Exacerbated By: standing, bending, lifting  Pain is Relieved By: rest  Pain Progression: Improved  POSTURE:  In standing slight increased kyphosis, good lumbar lordosis, in sitting slight decreased lumbar lordosis with flexed forward posture  GAIT: WNL on observation in the clinic  BALANCE/PROPRIOCEPTION:  TUG= 7s, SLS x15s on R, x17s on L    ROM:   (Degrees) Left AROM Left PROM  Right AROM Right PROM   Hip Flexion  137  136   Hip Extension  17  18   Hip Abduction  49  48   Hip Adduction       Hip Internal Rotation  29  32   Hip External Rotation  58  61   Knee Flexion       Knee Extension       Lumbar rotation Full range but painful Full range, good stretch   Lumbar Flexion Full fingertips to toes, hamstrings feel very tight, no pain in low back   Lumbar Extension 9 degrees, stiff and sore in low back     MYOTOMES:    Left Right   T12-L3 (Hip Flexion) 5 5   L2-4 (Quads)  5 5   L4 (Ankle DF) 5 5 (was 4+ on 8/19/24 examination in neurosurgery)   L5 (Great Toe Ext) 5 5   S1 (Toe Raise) 4 4     DERMATOMES: WNL  NEURAL TENSION: Lumbar WNL  FUNCTIONAL TESTS:  single leg bridge: contralateral hip drop bilateral, unable to perform full hip extension. Double leg squat: performs a mini squat to a knee flexion angle of 41 degrees, quadriceps dominant pattern, knees forward  PALPATION:   + Tenderness At Location Left Right   Quadratus Lumborum - -   Erector Spinae + +   Piriformis  + +   PSIS + +   ASIS - -   Iliac Crest - -   Glut Medius - +   Greater Trochanter     Ischial Tuberosity     Hamstrings     Hip Flexors     Vertebral  + +       Assessment & Plan   CLINICAL IMPRESSIONS  Medical Diagnosis: S/P lumbar laminectomy    Treatment Diagnosis: Chronic low back pain status post lumbar laminectomy   Impression/Assessment: Patient is a 48 year old female with  Chronic low back pain following L4-5 decompressive lumbar laminectomy, medial facetectomy foraminotomy and resection of epidural synovial cyst complaints.  The following significant findings have been identified: Pain, Decreased ROM/flexibility, Decreased strength, Impaired balance, Decreased activity tolerance, and Impaired posture. These impairments interfere with their ability to perform work tasks, recreational activities, and community mobility as compared to previous level of function.     Clinical Decision Making (Complexity):  Clinical Presentation: Stable/Uncomplicated  Clinical Presentation Rationale: based on medical and personal factors listed in PT evaluation  Clinical Decision Making (Complexity): Low complexity    PLAN OF CARE  Treatment Interventions:  Modalities: Cryotherapy  Interventions: Manual Therapy, Neuromuscular Re-education, Therapeutic Activity, Therapeutic Exercise    Long Term Goals     PT Goal 1  Goal Identifier: Standing  Goal Description: the patient will be able to stand for 60 minutes without requiring a sitting break noting a pain level of <3/10  Rationale: to maximize safety and independence with performance of ADLs and functional tasks;to maximize safety and independence within the community (The patient reports she is required to stand for roughly an hour at a time at work)  Goal Progress: currently notes a pain level of 6/10 with standing for a full hour noting fatigue  Target Date: 10/25/24      Frequency of Treatment: 1x daily per week  Duration of Treatment: 8 weeks    Recommended Referrals to Other Professionals:   Education Assessment:        Risks and benefits of evaluation/treatment have been explained.   Patient/Family/caregiver agrees with Plan of Care.     Evaluation Time:     PT Eval, Low Complexity Minutes (58982): 19       Signing Clinician: ANGIE CISNEROS        [facility]                                                                                    OUTPATIENT PHYSICAL THERAPY      PLAN OF TREATMENT FOR OUTPATIENT REHABILITATION   Patient's Last Name, First Name, Sosa Schulte YOB: 1975   Provider's Name   [facility]   Medical Record No.  [id number]     Onset Date: 05/29/24 (date of surgery)  Start of Care Date: 08/30/24     Medical Diagnosis:  S/P lumbar laminectomy      PT Treatment Diagnosis:  Chronic low back pain status post lumbar laminectomy Plan of Treatment  Frequency/Duration: 1x daily per week/ 8 weeks    Certification date from 08/30/24 to 10/25/24         See note for plan of treatment details and functional goals     ANGIE CISNEROS                         I CERTIFY THE NEED FOR THESE SERVICES FURNISHED UNDER        THIS PLAN OF TREATMENT AND WHILE UNDER MY CARE     (Physician attestation of this document indicates review and certification of the therapy plan).              Referring Provider:  Dr. Aureliano Goins MD    Initial Assessment  See Epic Evaluation- Start of Care Date: 08/30/24

## 2024-08-30 NOTE — PATIENT INSTRUCTIONS
Normal Plaquenil eye tests today.    Ok to continue Restasis 1-2 times per day in both eyes     Continue over the counter readers or can fill glasses prescription given     Josefa Contreras MD  [phone]

## 2024-08-31 ENCOUNTER — HEALTH MAINTENANCE LETTER (OUTPATIENT)
Age: 49
End: 2024-08-31

## 2024-09-03 DIAGNOSIS — M32.9 SYSTEMIC LUPUS ERYTHEMATOSUS, UNSPECIFIED SLE TYPE, UNSPECIFIED ORGAN INVOLVEMENT STATUS (H): ICD-10-CM

## 2024-09-03 NOTE — TELEPHONE ENCOUNTER
Medication:   Hydroxychloroquine   Last written on:   02/28/2024  Quantity:   135    Refills:   1    Last office visit:   02/28/2024  Next office visit:   09/30/2024  Last labs:   06/19/2024  Eye Exam:  08/30/2024 with Dr. Contreras

## 2024-09-04 ENCOUNTER — THERAPY VISIT (OUTPATIENT)
Dept: PHYSICAL THERAPY | Facility: CLINIC | Age: 49
End: 2024-09-04
Attending: NEUROLOGICAL SURGERY
Payer: COMMERCIAL

## 2024-09-04 DIAGNOSIS — Z98.890 S/P LUMBAR LAMINECTOMY: Primary | ICD-10-CM

## 2024-09-04 PROBLEM — G89.29 CHRONIC MIDLINE LOW BACK PAIN WITHOUT SCIATICA: Chronic | Status: ACTIVE | Noted: 2022-07-27

## 2024-09-04 PROBLEM — G89.29 CHRONIC MIDLINE LOW BACK PAIN WITHOUT SCIATICA: Status: ACTIVE | Noted: 2022-07-27

## 2024-09-04 PROBLEM — M54.50 CHRONIC MIDLINE LOW BACK PAIN WITHOUT SCIATICA: Chronic | Status: ACTIVE | Noted: 2022-07-27

## 2024-09-04 PROCEDURE — 97112 NEUROMUSCULAR REEDUCATION: CPT | Mod: GP | Performed by: PHYSICAL THERAPIST

## 2024-09-04 PROCEDURE — 97110 THERAPEUTIC EXERCISES: CPT | Mod: GP | Performed by: PHYSICAL THERAPIST

## 2024-09-04 RX ORDER — HYDROXYCHLOROQUINE SULFATE 200 MG/1
TABLET, FILM COATED ORAL
Qty: 135 TABLET | Refills: 0 | Status: SHIPPED | OUTPATIENT
Start: 2024-09-04 | End: 2024-09-30

## 2024-09-06 ENCOUNTER — OFFICE VISIT (OUTPATIENT)
Dept: FAMILY MEDICINE | Facility: CLINIC | Age: 49
End: 2024-09-06
Payer: COMMERCIAL

## 2024-09-06 VITALS
HEART RATE: 87 BPM | TEMPERATURE: 97.5 F | OXYGEN SATURATION: 99 % | HEIGHT: 66 IN | WEIGHT: 179 LBS | BODY MASS INDEX: 28.77 KG/M2 | DIASTOLIC BLOOD PRESSURE: 74 MMHG | SYSTOLIC BLOOD PRESSURE: 107 MMHG | RESPIRATION RATE: 18 BRPM

## 2024-09-06 DIAGNOSIS — B35.3 TINEA PEDIS OF BOTH FEET: ICD-10-CM

## 2024-09-06 DIAGNOSIS — L90.0 LICHEN SCLEROSUS: ICD-10-CM

## 2024-09-06 DIAGNOSIS — E66.3 OVERWEIGHT: Primary | ICD-10-CM

## 2024-09-06 DIAGNOSIS — M54.16 LUMBAR RADICULOPATHY: ICD-10-CM

## 2024-09-06 DIAGNOSIS — Z12.11 SCREEN FOR COLON CANCER: ICD-10-CM

## 2024-09-06 DIAGNOSIS — M54.42 BILATERAL LOW BACK PAIN WITH BILATERAL SCIATICA, UNSPECIFIED CHRONICITY: ICD-10-CM

## 2024-09-06 DIAGNOSIS — F33.41 RECURRENT MAJOR DEPRESSIVE DISORDER, IN PARTIAL REMISSION (H): ICD-10-CM

## 2024-09-06 DIAGNOSIS — M54.41 BILATERAL LOW BACK PAIN WITH BILATERAL SCIATICA, UNSPECIFIED CHRONICITY: ICD-10-CM

## 2024-09-06 PROCEDURE — 96127 BRIEF EMOTIONAL/BEHAV ASSMT: CPT | Performed by: PHYSICIAN ASSISTANT

## 2024-09-06 PROCEDURE — G2211 COMPLEX E/M VISIT ADD ON: HCPCS | Performed by: PHYSICIAN ASSISTANT

## 2024-09-06 PROCEDURE — 99214 OFFICE O/P EST MOD 30 MIN: CPT | Performed by: PHYSICIAN ASSISTANT

## 2024-09-06 RX ORDER — TOPIRAMATE 25 MG/1
25 TABLET, FILM COATED ORAL DAILY
Qty: 30 TABLET | Refills: 1 | Status: SHIPPED | OUTPATIENT
Start: 2024-09-06 | End: 2024-10-07 | Stop reason: DRUGHIGH

## 2024-09-06 RX ORDER — TERBINAFINE HYDROCHLORIDE 250 MG/1
250 TABLET ORAL DAILY
Qty: 90 TABLET | Refills: 0 | Status: SHIPPED | OUTPATIENT
Start: 2024-09-06 | End: 2024-12-05

## 2024-09-06 RX ORDER — GABAPENTIN 600 MG/1
600 TABLET ORAL 2 TIMES DAILY
Qty: 180 TABLET | Refills: 1 | Status: SHIPPED | OUTPATIENT
Start: 2024-09-06

## 2024-09-06 ASSESSMENT — PATIENT HEALTH QUESTIONNAIRE - PHQ9: SUM OF ALL RESPONSES TO PHQ QUESTIONS 1-9: 6

## 2024-09-06 NOTE — PROGRESS NOTES
"  Assessment & Plan     Screen for colon cancer    - Colonoscopy Screening  Referral; Future    Lumbar radiculopathy  Doing well.  refilled  - gabapentin (NEURONTIN) 600 MG tablet; Take 1 tablet (600 mg) by mouth 2 times daily.    Bilateral low back pain with bilateral sciatica, unspecified chronicity  As above  - gabapentin (NEURONTIN) 600 MG tablet; Take 1 tablet (600 mg) by mouth 2 times daily.    Overweight  Discussed she doesn't qualify for weight loss meds. Willing to try topamax.  Discussed increase in exercise now that she has less pain and watching diet will likely help the weight come off.    - topiramate (TOPAMAX) 25 MG tablet; Take 1 tablet (25 mg) by mouth daily.    Lichen sclerosus  See gyn for possible bx and other treatment options   - Ob/Gyn  Referral; Future    Tinea pedis of both feet  Failed topical.    - terbinafine (LAMISIL) 250 MG tablet; Take 1 tablet (250 mg) by mouth daily.    Recurrent major depressive disorder, in partial remission (H24)  Sees psychiatry     The longitudinal plan of care for the diagnosis(es)/condition(s) as documented were addressed during this visit. Due to the added complexity in care, I will continue to support Sosa in the subsequent management and with ongoing continuity of care.      BMI  Estimated body mass index is 29.33 kg/m  as calculated from the following:    Height as of this encounter: 1.664 m (5' 5.5\").    Weight as of this encounter: 81.2 kg (179 lb).   Weight management plan: as above          Subjective   Sosa is a 48 year old, presenting for the following health issues:  Weight Loss      9/6/2024     8:46 AM   Additional Questions   Roomed by Shwetha   Accompanied by self     Via the Health Maintenance questionnaire, the patient has reported the following services have been completed -Mammogram: [facility] 2022-01-01, this information has not been sent to the abstraction team.  History of Present Illness       Reason for " "visit:  Toenail fungus lichen scelerosis weight loss    She eats 4 or more servings of fruits and vegetables daily.She consumes 3 sweetened beverage(s) daily.She exercises with enough effort to increase her heart rate 60 or more minutes per day.  She exercises with enough effort to increase her heart rate 5 days per week. She is missing 2 dose(s) of medications per week.       Tried topical antifungal on both feet but didn't help.    Gabapentin helps back.  After surgery her pain is much improved.        Vaginal LS is painful.  Ossun is painful.  The topical steroid doesn't help much    Wants to lose weight.  Was very inactive for some time before her surgery.                Objective    /74   Pulse 87   Temp 97.5  F (36.4  C) (Temporal)   Resp 18   Ht 1.664 m (5' 5.5\")   Wt 81.2 kg (179 lb)   LMP 02/05/2022   SpO2 99%   BMI 29.33 kg/m    Body mass index is 29.33 kg/m .  Physical Exam  Constitutional:       General: She is not in acute distress.  Cardiovascular:      Rate and Rhythm: Normal rate and regular rhythm.   Pulmonary:      Breath sounds: Rhonchi (throughout) present.   Feet:      Right foot:      Toenail Condition: Right toenails are abnormally thick. Fungal disease present.     Left foot:      Toenail Condition: Left toenails are abnormally thick. Fungal disease present.  Neurological:      Mental Status: She is alert.   Psychiatric:         Mood and Affect: Mood is anxious.                    Signed Electronically by: Cynthia Scott PA-C    "

## 2024-09-06 NOTE — PROGRESS NOTES
{PROVIDER CHARTING PREFERENCE:173606}    Wendy Swan is a 48 year old, presenting for the following health issues:  No chief complaint on file.  {(!) Visit Details have not yet been documented.  Please enter Visit Details and then use this list to pull in documentation. (Optional):823158}    Via the Health Maintenance questionnaire, the patient has reported the following services have been completed -Mammogram: [facility] 2022-01-01, this information has not been sent to the abstraction team.  History of Present Illness       Reason for visit:  Toenail fungus lichen scelerosis weight loss    She eats 4 or more servings of fruits and vegetables daily.She consumes 3 sweetened beverage(s) daily.She exercises with enough effort to increase her heart rate 60 or more minutes per day.  She exercises with enough effort to increase her heart rate 5 days per week. She is missing 2 dose(s) of medications per week.       {MA/LPN/RN Pre-Provider Visit Orders- hCG/UA/Strep (Optional):043287}  {SUPERLIST (Optional):861436}  {additonal problems for provider to add (Optional):385160}    {ROS Picklists (Optional):019450}      Objective    LMP 02/05/2022   There is no height or weight on file to calculate BMI.  Physical Exam   {Exam List (Optional):659852}    {Diagnostic Test Results (Optional):342842}        Signed Electronically by: Cynthia Scott PA-C  {Email feedback regarding this note to [email or web address]   :029116}

## 2024-09-11 ENCOUNTER — THERAPY VISIT (OUTPATIENT)
Dept: PHYSICAL THERAPY | Facility: CLINIC | Age: 49
End: 2024-09-11
Attending: NEUROLOGICAL SURGERY
Payer: COMMERCIAL

## 2024-09-11 DIAGNOSIS — Z98.890 S/P LUMBAR LAMINECTOMY: ICD-10-CM

## 2024-09-11 DIAGNOSIS — M54.50 CHRONIC MIDLINE LOW BACK PAIN WITHOUT SCIATICA: Primary | Chronic | ICD-10-CM

## 2024-09-11 DIAGNOSIS — G89.29 CHRONIC MIDLINE LOW BACK PAIN WITHOUT SCIATICA: Primary | Chronic | ICD-10-CM

## 2024-09-11 PROCEDURE — 97140 MANUAL THERAPY 1/> REGIONS: CPT | Mod: GP | Performed by: PHYSICAL THERAPIST

## 2024-09-11 PROCEDURE — 97110 THERAPEUTIC EXERCISES: CPT | Mod: GP | Performed by: PHYSICAL THERAPIST

## 2024-09-11 PROCEDURE — 97112 NEUROMUSCULAR REEDUCATION: CPT | Mod: GP | Performed by: PHYSICAL THERAPIST

## 2024-09-12 ENCOUNTER — PATIENT OUTREACH (OUTPATIENT)
Dept: CARE COORDINATION | Facility: CLINIC | Age: 49
End: 2024-09-12
Payer: COMMERCIAL

## 2024-09-16 ENCOUNTER — OFFICE VISIT (OUTPATIENT)
Dept: DERMATOLOGY | Facility: CLINIC | Age: 49
End: 2024-09-16
Payer: COMMERCIAL

## 2024-09-16 DIAGNOSIS — L92.0 GRANULOMA ANNULARE: Primary | ICD-10-CM

## 2024-09-16 DIAGNOSIS — D84.9 IMMUNOSUPPRESSION (H): ICD-10-CM

## 2024-09-16 DIAGNOSIS — M32.9 SYSTEMIC LUPUS ERYTHEMATOSUS, UNSPECIFIED SLE TYPE, UNSPECIFIED ORGAN INVOLVEMENT STATUS (H): ICD-10-CM

## 2024-09-16 PROCEDURE — 11901 INJECT SKIN LESIONS >7: CPT | Performed by: DERMATOLOGY

## 2024-09-16 PROCEDURE — 99213 OFFICE O/P EST LOW 20 MIN: CPT | Mod: 25 | Performed by: DERMATOLOGY

## 2024-09-16 RX ORDER — BETAMETHASONE DIPROPIONATE 0.5 MG/G
OINTMENT, AUGMENTED TOPICAL 2 TIMES DAILY
Qty: 50 G | Refills: 3 | Status: SHIPPED | OUTPATIENT
Start: 2024-09-16

## 2024-09-16 NOTE — PROGRESS NOTES
[facility] Dermatology Note    Encounter Date: Sep 16, 2024    Dermatology Problem List:  1. Granuloma annulare  - current tx: augmented betamethasone  - in reserve: steroid injections, methotrexate, hydroxychloroquine  2. Post inflammatory hyperpigmentation/solar lentigines  - current tx: tretinoin, hydroquinone  3. 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 Would need to switch from leflunomide  - continue augmented betamethasone ointment  - Kenalog intralesional injection procedure note: After verbal consent and discussion of risks including but not limited to atrophy, pain, and bruising, time out was performed, the patient underwent positioning and the area was prepped with isopropyl alcohol, 1.8 total cc of Kenalog 10 mg/cc was injected into 9 site(s) on the dorsal hands. The patient tolerated the procedure well and left the Dermatology clinic in good condition.    2. Lichen sclerosus  - not examined today, but patient report,has been more active recently  - trial augmented betamethasone ointment  - if refractor, would expect benefit from  switching to methotrexate    3. Systemic lupus  - managed by rheumatology  - consider switch from leflunomide to methotrexate, pending rhematology  - on hydroxychloroquine, leflunomide, and belimumab    Follow-up in 6-8 weeks.       Staff Involved:  Staff Only    Scribe Disclosure:   Latanya PARHAM, am serving as a scribe; to document services personally performed by Louis MEDLEY  MD Christofer -based on data collection and the provider's statements to me.     Provider Disclosure:   The documentation recorded by the scribe accurately reflects the services I personally performed and the decisions made by me.    Louis Beaulieu MD   of Dermatology  Department of Dermatology  [facility]        CC:   Chief Complaint   Patient presents with    Derm Problem     Pt here for GA on hands. Itchy on hands. Using past medication prescribed for hands, it is helping, but GA is still there.        History of Present Illness:  Ms. Sosa Patel is a 48 year old female who presents as a return patient.  Last seen in clinic on 3/18/24.    Today, she states that her GA on the hands has improved. She is using    Labs:  None    Physical exam:  Vitals: LMP 02/05/2022   GEN: This is a well developed, well-nourished female in no acute distress, in a pleasant mood.    SKIN: Moses phototype III  - Focused examination of the hands was performed.  - Multiple annulare plaques on the dorsal hand  - No other lesions of concern on areas examined.     Past Medical History:   Past Medical History:   Diagnosis Date    Arthritis 2013    Bipolar disorder (H) 12/2004    Dx'd    Combinations of drug dependence excluding opioid type drug (H) 05/04/2022    In remission     H/O: depression     SUICIDE ATTEMPT AGE 15    HPV (human papillomavirus)     Lupus erythematosus     Mood disorder (H24) 04/09/2019     Past Surgical History:   Procedure Laterality Date    BIOPSY      BUNIONECTOMY RT/LT      CHOLECYSTECTOMY      DAVINCI LAPAROSCOPIC CHOLECYSTECTOMY WITHOUT GRAMS N/A 10/09/2023    Procedure: CHOLECYSTECTOMY, ROBOT-ASSISTED, LAPAROSCOPIC, WITHOUT CHOLANGIOGRAM;  Surgeon: Demetris Peoples MD;  Location: [facility] OR     ENLARGE BREAST WITH IMPLANT  01/01/2000    LAMINECTOMY LUMBAR TWO LEVELS Bilateral 5/29/2024    Procedure: LUMBAR 4, LUMBAR 5 DECOMPRESSIVE BILATERAL  LAMINECTOMY, MEDIAL FACETECTOMY WITH EXCISION OF CYST;  Surgeon: Aureliano Goins MD;  Location: [facility] OR       Social History:   reports that she quit smoking about 11 years ago. Her smoking use included cigarettes. She started smoking about 34 years ago. She has a 11.5 pack-year smoking history. She has been exposed to tobacco smoke. She uses smokeless tobacco. She reports current alcohol use. She reports that she does not currently use drugs.    Family History:  Family History   Problem Relation Age of Onset    Cancer Father         lung cancer    Other Cancer Father     Diabetes Maternal Grandmother     Depression Maternal Grandmother     Lipids Maternal Grandmother     Cerebrovascular Disease Maternal Grandfather     Lipids Maternal Grandfather     Circulatory Maternal Grandfather         MI in 50's    Depression Brother         OCD    Gastrointestinal Disease Brother         IBS    Depression Mother     Anxiety Disorder Daughter     Thyroid Disease No family hx of     Asthma No family hx of        Medications:  Current Outpatient Medications   Medication Sig Dispense Refill    acetaminophen (TYLENOL) 325 MG tablet Take 325-650 mg by mouth every 6 hours as needed for mild pain      ARIPiprazole (ABILIFY) 10 MG tablet Take 10 mg by mouth daily.      aspirin-acetaminophen-caffeine (EXCEDRIN MIGRAINE) 250-250-65 MG tablet Take 1 tablet by mouth daily as needed for headaches      augmented betamethasone dipropionate (DIPROLENE-AF) 0.05 % external ointment Apply topically 2 times daily 50 g 3    belimumab (BENLYSTA) subcutaneous injection (prefilled autoinjector) Inject 1 mL (200 mg) subcutaneously every 7 days. . Hold for signs of infection and seek medical attention.   Refills at rheumatology appointments. 4 mL 0    cetirizine (ZYRTEC) 10 MG tablet TAKE 2 TABLETS BY MOUTH DAILY 180 tablet 0    clobetasol (TEMOVATE) 0.05 % external cream Apply topically 2 times daily 60 g 0    diclofenac (VOLTAREN) 1 % topical gel Apply  up to 2 grams of 1% gel to hands up to 4 times daily as needed for joint pain (maximum: 8 g per upper extremity per day) 200 g 2    FLUoxetine (PROZAC) 40 MG capsule TAKE ONE CAPSULE BY MOUTH ONCE DAILY 90 capsule 1    gabapentin (NEURONTIN) 600 MG tablet Take 1 tablet (600 mg) by mouth 2 times daily. 180 tablet 1    hydroquinone (RUFUS) 4 % external cream Apply topically at bedtime 28.35 g 11    hydroxychloroquine (PLAQUENIL) 200 MG tablet Hydroxychloroquine 200 mg daily with an additional 200 mg every other day.  Ophthalmology toxicity monitoring eye exam, that includes 10-2 VF and SD-OCT, is required yearly. 135 tablet 0    ibuprofen (ADVIL/MOTRIN) 200 MG tablet Take 1 tablet (200 mg) by mouth every 4 hours as needed for pain      leflunomide (ARAVA) 20 MG tablet Take 1 tablet (20 mg) by mouth daily ; labs required every 12 weeks 90 tablet 1    lidocaine (XYLOCAINE) 5 % external ointment Apply topically as needed for moderate pain 50 g 1    mometasone (ELOCON) 0.1 % external cream Apply topically daily 45 g 1    senna-docusate (SENOKOT-S/PERICOLACE) 8.6-50 MG tablet Take 1-2 tablets by mouth 2 times daily Take while on oral narcotics to prevent or treat constipation. 30 tablet 0    terbinafine (LAMISIL) 250 MG tablet Take 1 tablet (250 mg) by mouth daily. 90 tablet 0    topiramate (TOPAMAX) 25 MG tablet Take 1 tablet (25 mg) by mouth daily. 30 tablet 1    tretinoin (RETIN-A) 0.025 % external cream Apply topically at bedtime Pea-sized amount to whole face 45 g 3    valACYclovir (VALTREX) 1000 mg tablet Take 1 tablet (1,000 mg) by mouth 2 times daily for 10 days 40 tablet 1     No Known Allergies

## 2024-09-16 NOTE — NURSING NOTE
Sosa Patel's goals for this visit include:   Chief Complaint   Patient presents with    Derm Problem     Pt here for GA on hands. Itchy on hands. Using past medication prescribed for hands, it is helping, but GA is still there.        She requests these members of her care team be copied on today's visit information:     PCP: Cynthia Scott    Referring Provider:  Cynthia Scott, MANOHAR  [address]  [place] [address]    [facility] 02/05/2022     Do you need any medication refills at today's visit?     Joyce Deluna LPN on 9/16/2024 at 8:25 AM

## 2024-09-16 NOTE — NURSING NOTE
Drug Administration Record    Prior to injection, verified patient identity using patient's name and date of birth.  Due to injection administration, patient instructed to remain in clinic for 15 minutes  afterwards, and to report any adverse reaction to me immediately.    Drug Name: triamcinolone acetonide(kenalog)  Dose: 2mL of triamcinolone 10mg/mL, 20mg dose  Route administered: intralesional  NDC #: 9999-9318-36  Amount of waste(mL):0  Reason for waste: Multi dose vial    LOT #: [id number]  SITE: hands  : ac lee  EXPIRATION DATE: 01/31/2026    Joyce Deluna LPN on 9/16/2024 at 8:56 AM

## 2024-09-16 NOTE — NURSING NOTE
Sosa Patel's goals for this visit include:   Chief Complaint   Patient presents with    Derm Problem     Pt here for GA on hands. Itchy on hands. Using past medication prescribed for hands, it is helping, but GA is still there.        She requests these members of her care team be copied on today's visit information:     PCP: Cynthia Scott    Referring Provider:  Cynthia Scott PA-C  [address]  [place] [address]    [facility] 02/05/2022     Do you need any medication refills at today's visit?     Ysabel Carias on 9/16/2024 at 8:29 AM

## 2024-09-16 NOTE — LETTER
9/16/2024      Sosa Patel  [address]   [place] [address]      Dear Colleague,    Thank you for referring your patient, Sosa Patel, to the [facility]. Please see a copy of my visit note below.    [facility] Dermatology Note    Encounter Date: Sep 16, 2024    Dermatology Problem List:  1. Granuloma annulare  - current tx: augmented betamethasone  - in reserve: steroid injections, methotrexate, hydroxychloroquine  2. Post inflammatory hyperpigmentation/solar lentigines  - current tx: tretinoin, hydroquinone  3. 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 Would need to switch from leflunomide  - continue augmented betamethasone ointment  - Kenalog intralesional injection procedure note: After verbal consent and discussion of risks including but not limited to atrophy, pain, and bruising, time out was performed, the patient underwent positioning and the area was prepped with isopropyl alcohol, 1.8 total cc of Kenalog 10 mg/cc was injected into 9 site(s) on the dorsal hands. The patient tolerated the procedure well and left the Dermatology clinic in good condition.    2. Lichen sclerosus  - not examined today, but patient report,has been more active recently  - trial augmented betamethasone ointment  - if refractor, would expect benefit from  switching to methotrexate    3. Systemic lupus  - managed by rheumatology  - consider switch from leflunomide to methotrexate, pending  CC: followup of hypertension  HPI:  The patient is a 73 y.o. year old female who presents to the office for followup of hypertension.  The patient complains of chest pain, shortness of breath and fatigue, but denies headache, blurred vision, nausea or vomiting.  She complains intermittent pressure sensation, which is similar to the pain she has experienced in the past.  She also reports doing a lot of heavy lifting recently.  Review of labs reveals a mildly elevated total cholesterol.  She reports falling last month, but states she does not remember the circumstances around the fall.  She reports feeling depressed recently, especially around the holidays, cries herself to sleep.    PAST MEDICAL HISTORY:  Past Medical History:   Diagnosis Date    Abnormal mammogram of both breasts     Arthritis     Hypertension     Major depressive disorder 5/14/2017    Memory disorder     Seizures     Stroke        SURGICAL HISTORY:  Past Surgical History:   Procedure Laterality Date    CATARACT EXTRACTION      cysts breast      TONSILLECTOMY         MEDS:  Medcard reviewed and updated    ALLERGIES: Allergy Card reviewed and updated    SOCIAL HISTORY:   The patient is a nonsmoker.    PE:   APPEARANCE: Well nourished, well developed, in no acute distress.    CHEST: Lungs clear to auscultation with unlabored respirations.  CARDIOVASCULAR: Normal S1, S2. No murmurs. No carotid bruits. No pedal edema.  ABDOMEN: Bowel sounds normal. Not distended. Soft. No tenderness or masses.   PSYCHIATRIC: The patient is oriented to person, place, and time and has a pleasant affect.        ASSESSMENT/PLAN:  Sofia was seen today for follow-up.    Diagnoses and all orders for this visit:    Essential hypertension  -     Blood pressure is controlled    Hyperlipidemia, unspecified hyperlipidemia type  -     Restart lipitor    Recurrent major depressive disorder, remission status unspecified  -     Ambulatory Referral to Psychiatry    Other  orders  -     atorvastatin (LIPITOR) 80 MG tablet; Take 1 tablet (80 mg total) by mouth once daily.         rhematology  - on hydroxychloroquine, leflunomide, and belimumab    Follow-up in 6-8 weeks.       Staff Involved:  Staff Only    Scribe Disclosure:   I, Latanya Yolande, am serving as a scribe; to document services personally performed by Louis Beaulieu MD -based on data collection and the provider's statements to me.     Provider Disclosure:   The documentation recorded by the scribe accurately reflects the services I personally performed and the decisions made by me.    Louis Beaulieu MD   of Dermatology  Department of Dermatology  [facility]        CC:   Chief Complaint   Patient presents with     Derm Problem     Pt here for GA on hands. Itchy on hands. Using past medication prescribed for hands, it is helping, but GA is still there.        History of Present Illness:  Ms. Sosa Patel is a 48 year old female who presents as a return patient.  Last seen in clinic on 3/18/24.    Today, she states that her GA on the hands has improved. She is using    Labs:  None    Physical exam:  Vitals: LMP 02/05/2022   GEN: This is a well developed, well-nourished female in no acute distress, in a pleasant mood.    SKIN: Moses phototype III  - Focused examination of the hands was performed.  - Multiple annulare plaques on the dorsal hand  - No other lesions of concern on areas examined.     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/2022    In remission      H/O: depression     SUICIDE ATTEMPT AGE 15     HPV (human papillomavirus)      Lupus erythematosus      Mood disorder (H24) 04/09/2019     Past Surgical History:   Procedure Laterality Date     BIOPSY       BUNIONECTOMY RT/LT       CHOLECYSTECTOMY       DAVINCI LAPAROSCOPIC CHOLECYSTECTOMY WITHOUT GRAMS N/A 10/09/2023    Procedure: CHOLECYSTECTOMY,  ROBOT-ASSISTED, LAPAROSCOPIC, WITHOUT CHOLANGIOGRAM;  Surgeon: Demetris Peoples MD;  [place] [facility] [place]      ENLARGE BREAST WITH IMPLANT  01/01/2000     LAMINECTOMY LUMBAR TWO LEVELS Bilateral 5/29/2024    Procedure: LUMBAR 4, LUMBAR 5 DECOMPRESSIVE BILATERAL LAMINECTOMY, MEDIAL FACETECTOMY WITH EXCISION OF CYST;  Surgeon: Aureliano Goins MD;  Location:  OR       Social History:   reports that she quit smoking about 11 years ago. Her smoking use included cigarettes. She started smoking about 34 years ago. She has a 11.5 pack-year smoking history. She has been exposed to tobacco smoke. She uses smokeless tobacco. She reports current alcohol use. She reports that she does not currently use drugs.    Family History:  Family History   Problem Relation Age of Onset     Cancer Father         lung cancer     Other Cancer Father      Diabetes Maternal Grandmother      Depression Maternal Grandmother      Lipids Maternal Grandmother      Cerebrovascular Disease Maternal Grandfather      Lipids Maternal Grandfather      Circulatory Maternal Grandfather         MI in 50's     Depression Brother         OCD     Gastrointestinal Disease Brother         IBS     Depression Mother      Anxiety Disorder Daughter      Thyroid Disease No family hx of      Asthma No family hx of        Medications:  Current Outpatient Medications   Medication Sig Dispense Refill     acetaminophen (TYLENOL) 325 MG tablet Take 325-650 mg by mouth every 6 hours as needed for mild pain       ARIPiprazole (ABILIFY) 10 MG tablet Take 10 mg by mouth daily.       aspirin-acetaminophen-caffeine (EXCEDRIN MIGRAINE) 250-250-65 MG tablet Take 1 tablet by mouth daily as needed for headaches       augmented betamethasone dipropionate (DIPROLENE-AF) 0.05 % external ointment Apply topically 2 times daily 50 g 3     belimumab (BENLYSTA) subcutaneous injection (prefilled autoinjector) Inject 1 mL (200 mg) subcutaneously every 7 days. . Hold for signs of  infection and seek medical attention.   Refills at rheumatology appointments. 4 mL 0     cetirizine (ZYRTEC) 10 MG tablet TAKE 2 TABLETS BY MOUTH DAILY 180 tablet 0     clobetasol (TEMOVATE) 0.05 % external cream Apply topically 2 times daily 60 g 0     diclofenac (VOLTAREN) 1 % topical gel Apply up to 2 grams of 1% gel to hands up to 4 times daily as needed for joint pain (maximum: 8 g per upper extremity per day) 200 g 2     FLUoxetine (PROZAC) 40 MG capsule TAKE ONE CAPSULE BY MOUTH ONCE DAILY 90 capsule 1     gabapentin (NEURONTIN) 600 MG tablet Take 1 tablet (600 mg) by mouth 2 times daily. 180 tablet 1     hydroquinone (RUFUS) 4 % external cream Apply topically at bedtime 28.35 g 11     hydroxychloroquine (PLAQUENIL) 200 MG tablet Hydroxychloroquine 200 mg daily with an additional 200 mg every other day.  Ophthalmology toxicity monitoring eye exam, that includes 10-2 VF and SD-OCT, is required yearly. 135 tablet 0     ibuprofen (ADVIL/MOTRIN) 200 MG tablet Take 1 tablet (200 mg) by mouth every 4 hours as needed for pain       leflunomide (ARAVA) 20 MG tablet Take 1 tablet (20 mg) by mouth daily ; labs required every 12 weeks 90 tablet 1     lidocaine (XYLOCAINE) 5 % external ointment Apply topically as needed for moderate pain 50 g 1     mometasone (ELOCON) 0.1 % external cream Apply topically daily 45 g 1     senna-docusate (SENOKOT-S/PERICOLACE) 8.6-50 MG tablet Take 1-2 tablets by mouth 2 times daily Take while on oral narcotics to prevent or treat constipation. 30 tablet 0     terbinafine (LAMISIL) 250 MG tablet Take 1 tablet (250 mg) by mouth daily. 90 tablet 0     topiramate (TOPAMAX) 25 MG tablet Take 1 tablet (25 mg) by mouth daily. 30 tablet 1     tretinoin (RETIN-A) 0.025 % external cream Apply topically at bedtime Pea-sized amount to whole face 45 g 3     valACYclovir (VALTREX) 1000 mg tablet Take 1 tablet (1,000 mg) by mouth 2 times daily for 10 days 40 tablet 1     No Known Allergies                  Again, thank you for allowing me to participate in the care of your patient.        Sincerely,        Louis Beaulieu MD

## 2024-09-23 ENCOUNTER — THERAPY VISIT (OUTPATIENT)
Dept: PHYSICAL THERAPY | Facility: CLINIC | Age: 49
End: 2024-09-23
Payer: COMMERCIAL

## 2024-09-23 ENCOUNTER — DOCUMENTATION ONLY (OUTPATIENT)
Dept: RHEUMATOLOGY | Facility: CLINIC | Age: 49
End: 2024-09-23

## 2024-09-23 DIAGNOSIS — G89.29 CHRONIC MIDLINE LOW BACK PAIN WITHOUT SCIATICA: Primary | Chronic | ICD-10-CM

## 2024-09-23 DIAGNOSIS — Z79.899 HIGH RISK MEDICATION USE: ICD-10-CM

## 2024-09-23 DIAGNOSIS — Z98.890 S/P LUMBAR LAMINECTOMY: ICD-10-CM

## 2024-09-23 DIAGNOSIS — M32.9 SYSTEMIC LUPUS ERYTHEMATOSUS, UNSPECIFIED SLE TYPE, UNSPECIFIED ORGAN INVOLVEMENT STATUS (H): Primary | ICD-10-CM

## 2024-09-23 DIAGNOSIS — M54.50 CHRONIC MIDLINE LOW BACK PAIN WITHOUT SCIATICA: Primary | Chronic | ICD-10-CM

## 2024-09-23 PROCEDURE — 97140 MANUAL THERAPY 1/> REGIONS: CPT | Mod: GP | Performed by: PHYSICAL THERAPIST

## 2024-09-23 PROCEDURE — 97110 THERAPEUTIC EXERCISES: CPT | Mod: GP | Performed by: PHYSICAL THERAPIST

## 2024-09-23 PROCEDURE — 97112 NEUROMUSCULAR REEDUCATION: CPT | Mod: GP | Performed by: PHYSICAL THERAPIST

## 2024-09-23 NOTE — PROGRESS NOTES
This patient has a future lab only appointment and needs orders. Please send orders. Thanks [facility]

## 2024-09-25 ENCOUNTER — TELEPHONE (OUTPATIENT)
Dept: PALLIATIVE MEDICINE | Facility: OTHER | Age: 49
End: 2024-09-25

## 2024-09-25 ENCOUNTER — LAB (OUTPATIENT)
Dept: LAB | Facility: CLINIC | Age: 49
End: 2024-09-25
Payer: COMMERCIAL

## 2024-09-25 DIAGNOSIS — M32.9 SYSTEMIC LUPUS ERYTHEMATOSUS, UNSPECIFIED SLE TYPE, UNSPECIFIED ORGAN INVOLVEMENT STATUS (H): ICD-10-CM

## 2024-09-25 DIAGNOSIS — Z79.899 HIGH RISK MEDICATION USE: ICD-10-CM

## 2024-09-25 DIAGNOSIS — G89.29 CHRONIC MIDLINE LOW BACK PAIN WITHOUT SCIATICA: Primary | Chronic | ICD-10-CM

## 2024-09-25 DIAGNOSIS — J31.0 CHRONIC RHINITIS: ICD-10-CM

## 2024-09-25 DIAGNOSIS — M54.50 CHRONIC MIDLINE LOW BACK PAIN WITHOUT SCIATICA: Primary | Chronic | ICD-10-CM

## 2024-09-25 LAB
ALBUMIN MFR UR ELPH: 18.3 MG/DL
ALBUMIN SERPL BCG-MCNC: 4.1 G/DL (ref 3.5–5.2)
ALBUMIN UR-MCNC: ABNORMAL MG/DL
ALP SERPL-CCNC: 36 U/L (ref 40–150)
ALT SERPL W P-5'-P-CCNC: 24 U/L (ref 0–50)
ANION GAP SERPL CALCULATED.3IONS-SCNC: 12 MMOL/L (ref 7–15)
APPEARANCE UR: CLEAR
AST SERPL W P-5'-P-CCNC: 30 U/L (ref 0–45)
BACTERIA #/AREA URNS HPF: ABNORMAL /HPF
BASOPHILS # BLD AUTO: 0 10E3/UL (ref 0–0.2)
BASOPHILS NFR BLD AUTO: 1 %
BILIRUB SERPL-MCNC: 0.3 MG/DL
BILIRUB UR QL STRIP: NEGATIVE
BUN SERPL-MCNC: 31.7 MG/DL (ref 6–20)
CALCIUM SERPL-MCNC: 8.9 MG/DL (ref 8.8–10.4)
CHLORIDE SERPL-SCNC: 106 MMOL/L (ref 98–107)
COLOR UR AUTO: YELLOW
CREAT SERPL-MCNC: 0.87 MG/DL (ref 0.51–0.95)
CREAT UR-MCNC: 189 MG/DL
EGFRCR SERPLBLD CKD-EPI 2021: 81 ML/MIN/1.73M2
EOSINOPHIL # BLD AUTO: 0.2 10E3/UL (ref 0–0.7)
EOSINOPHIL NFR BLD AUTO: 4 %
ERYTHROCYTE [DISTWIDTH] IN BLOOD BY AUTOMATED COUNT: 12.4 % (ref 10–15)
GLUCOSE SERPL-MCNC: 85 MG/DL (ref 70–99)
GLUCOSE UR STRIP-MCNC: NEGATIVE MG/DL
HCO3 SERPL-SCNC: 22 MMOL/L (ref 22–29)
HCT VFR BLD AUTO: 38.3 % (ref 35–47)
HGB BLD-MCNC: 12.4 G/DL (ref 11.7–15.7)
HGB UR QL STRIP: NEGATIVE
IMM GRANULOCYTES # BLD: 0 10E3/UL
IMM GRANULOCYTES NFR BLD: 0 %
KETONES UR STRIP-MCNC: NEGATIVE MG/DL
LEUKOCYTE ESTERASE UR QL STRIP: NEGATIVE
LYMPHOCYTES # BLD AUTO: 1.7 10E3/UL (ref 0.8–5.3)
LYMPHOCYTES NFR BLD AUTO: 26 %
MCH RBC QN AUTO: 27.5 PG (ref 26.5–33)
MCHC RBC AUTO-ENTMCNC: 32.4 G/DL (ref 31.5–36.5)
MCV RBC AUTO: 85 FL (ref 78–100)
MONOCYTES # BLD AUTO: 0.7 10E3/UL (ref 0–1.3)
MONOCYTES NFR BLD AUTO: 11 %
MUCOUS THREADS #/AREA URNS LPF: PRESENT /LPF
NEUTROPHILS # BLD AUTO: 3.8 10E3/UL (ref 1.6–8.3)
NEUTROPHILS NFR BLD AUTO: 59 %
NITRATE UR QL: NEGATIVE
PH UR STRIP: 6 [PH] (ref 5–7)
PLATELET # BLD AUTO: 254 10E3/UL (ref 150–450)
POTASSIUM SERPL-SCNC: 3.7 MMOL/L (ref 3.4–5.3)
PROT SERPL-MCNC: 6.4 G/DL (ref 6.4–8.3)
PROT/CREAT 24H UR: 0.1 MG/MG CR (ref 0–0.2)
RBC # BLD AUTO: 4.51 10E6/UL (ref 3.8–5.2)
RBC #/AREA URNS AUTO: ABNORMAL /HPF
SODIUM SERPL-SCNC: 140 MMOL/L (ref 135–145)
SP GR UR STRIP: >=1.03 (ref 1–1.03)
SQUAMOUS #/AREA URNS AUTO: ABNORMAL /LPF
UROBILINOGEN UR STRIP-ACNC: 0.2 E.U./DL
WBC # BLD AUTO: 6.5 10E3/UL (ref 4–11)
WBC #/AREA URNS AUTO: ABNORMAL /HPF

## 2024-09-25 PROCEDURE — 81001 URINALYSIS AUTO W/SCOPE: CPT

## 2024-09-25 PROCEDURE — 85025 COMPLETE CBC W/AUTO DIFF WBC: CPT

## 2024-09-25 PROCEDURE — 36415 COLL VENOUS BLD VENIPUNCTURE: CPT

## 2024-09-25 PROCEDURE — 84156 ASSAY OF PROTEIN URINE: CPT

## 2024-09-25 PROCEDURE — 80053 COMPREHEN METABOLIC PANEL: CPT

## 2024-09-25 RX ORDER — METHOCARBAMOL 500 MG/1
500 TABLET, FILM COATED ORAL 4 TIMES DAILY PRN
Qty: 30 TABLET | Refills: 0 | Status: SHIPPED | OUTPATIENT
Start: 2024-09-25 | End: 2024-10-07

## 2024-09-25 RX ORDER — CETIRIZINE HYDROCHLORIDE 10 MG/1
20 TABLET ORAL DAILY
Qty: 180 TABLET | Refills: 0 | OUTPATIENT
Start: 2024-09-25

## 2024-09-25 NOTE — TELEPHONE ENCOUNTER
Pt is requesting new rx for    Gabapentin 300mg caps    Did not see on active med list please verify and send new rx. Thank you!     [facility]  [phone]

## 2024-09-27 DIAGNOSIS — M54.16 LUMBAR RADICULOPATHY: ICD-10-CM

## 2024-09-27 NOTE — TELEPHONE ENCOUNTER
Requesting proactive refill for a new rx for    Gabapentin 300mg caps    Did not see on active med list please verify and send new rx. Thank you!     [facility]  [phone]

## 2024-09-30 ENCOUNTER — HOSPITAL ENCOUNTER (OUTPATIENT)
Facility: AMBULATORY SURGERY CENTER | Age: 49
End: 2024-09-30
Attending: COLON & RECTAL SURGERY
Payer: COMMERCIAL

## 2024-09-30 ENCOUNTER — OFFICE VISIT (OUTPATIENT)
Dept: RHEUMATOLOGY | Facility: CLINIC | Age: 49
End: 2024-09-30
Attending: INTERNAL MEDICINE
Payer: COMMERCIAL

## 2024-09-30 ENCOUNTER — TELEPHONE (OUTPATIENT)
Dept: GASTROENTEROLOGY | Facility: CLINIC | Age: 49
End: 2024-09-30

## 2024-09-30 VITALS
HEART RATE: 99 BPM | DIASTOLIC BLOOD PRESSURE: 79 MMHG | BODY MASS INDEX: 28.06 KG/M2 | SYSTOLIC BLOOD PRESSURE: 115 MMHG | WEIGHT: 171.2 LBS | OXYGEN SATURATION: 98 %

## 2024-09-30 DIAGNOSIS — M32.9 SYSTEMIC LUPUS ERYTHEMATOSUS, UNSPECIFIED SLE TYPE, UNSPECIFIED ORGAN INVOLVEMENT STATUS (H): Primary | ICD-10-CM

## 2024-09-30 DIAGNOSIS — M35.00 SECONDARY SJOGREN'S SYNDROME (H): ICD-10-CM

## 2024-09-30 DIAGNOSIS — Z79.899 HIGH RISK MEDICATION USE: ICD-10-CM

## 2024-09-30 PROCEDURE — G2211 COMPLEX E/M VISIT ADD ON: HCPCS | Performed by: INTERNAL MEDICINE

## 2024-09-30 PROCEDURE — 99214 OFFICE O/P EST MOD 30 MIN: CPT | Performed by: INTERNAL MEDICINE

## 2024-09-30 RX ORDER — HYDROXYCHLOROQUINE SULFATE 200 MG/1
TABLET, FILM COATED ORAL
Qty: 135 TABLET | Refills: 2 | Status: SHIPPED | OUTPATIENT
Start: 2024-09-30

## 2024-09-30 RX ORDER — FOLIC ACID 1 MG/1
1 TABLET ORAL DAILY
Qty: 100 TABLET | Refills: 3 | Status: SHIPPED | OUTPATIENT
Start: 2024-09-30

## 2024-09-30 RX ORDER — GABAPENTIN 300 MG/1
300 CAPSULE ORAL 3 TIMES DAILY
Qty: 180 CAPSULE | Refills: 1 | OUTPATIENT
Start: 2024-09-30

## 2024-09-30 RX ORDER — LEFLUNOMIDE 20 MG/1
20 TABLET ORAL DAILY
Qty: 90 TABLET | Refills: 1 | Status: CANCELLED | OUTPATIENT
Start: 2024-09-30

## 2024-09-30 RX ORDER — METHOTREXATE 2.5 MG/1
15 TABLET ORAL WEEKLY
Qty: 24 TABLET | Refills: 3 | Status: SHIPPED | OUTPATIENT
Start: 2024-09-30

## 2024-09-30 NOTE — TELEPHONE ENCOUNTER
"Endoscopy Scheduling Screen    Have you had any respiratory illness or flu-like symptoms in the last 10 days?  No    What is your communication preference for Instructions and/or Bowel Prep?   MyChart    What insurance is in the chart?  Other:  [facility]    Ordering/Referring Provider:   RODOLFO MCDERMOTT        (If ordering provider performs procedure, schedule with ordering provider unless otherwise instructed. )    BMI: Estimated body mass index is 28.06 kg/m  as calculated from the following:    Height as of 9/6/24: 1.664 m (5' 5.5\").    Weight as of an earlier encounter on 9/30/24: 77.7 kg (171 lb 3.2 oz).     Sedation Ordered  MAC/deep sedation.   BMI<= 45 45 < BMI <= 48 48 < BMI < = 50  BMI > 50   No Restrictions No [facility]  No [facility]  [facility] with exceptions Hospital Only OR Only       Do you have a history of malignant hyperthermia?  No    (Females) Are you currently pregnant?   No     Have you been diagnosed or told you have pulmonary hypertension?   No    Do you have an LVAD?  No    Have you been told you have moderate to severe sleep apnea?  No.    Have you been told you have COPD, asthma, or any other lung disease?  No    Do you have any heart conditions?  No     Have you ever had or are you waiting for an organ transplant?  No. Continue scheduling, no site restrictions.    Have you had a stroke or transient ischemic attack (TIA aka \"mini stroke\" in the last 6 months?   No    Have you been diagnosed with or been told you have cirrhosis of the liver?   No.    Are you currently on dialysis?   No    Do you need assistance transferring?   No    BMI: Estimated body mass index is 28.06 kg/m  as calculated from the following:    Height as of 9/6/24: 1.664 m (5' 5.5\").    Weight as of an earlier encounter on 9/30/24: 77.7 kg (171 lb 3.2 oz).     Is patients BMI > 40 and scheduling location [facility]?  No    Do you take an injectable or oral medication for weight loss or diabetes (excluding insulin)?  No    Do " you take the medication Naltrexone?  No    Do you take blood thinners?  No       Prep   Are you currently on dialysis or do you have chronic kidney disease?  No    Do you have a diagnosis of diabetes?  No    Do you have a diagnosis of cystic fibrosis (CF)?  No    On a regular basis do you go 3 -5 days between bowel movements?  No    BMI > 40?  No    Preferred Pharmacy:    [facility] [place], [place] - [address]  [address]  [address]  [place] [address]  Phone: [phone] Fax: [phone]          Final Scheduling Details     Procedure scheduled  Colonoscopy    Surgeon:  Gris     Date of procedure:  1/21/25     Pre-OP / PAC:   No - Not required for this site.    Location  [facility] - [facility] - Patient preference.    Sedation   MAC/Deep Sedation - Per order.      Patient Reminders:   You will receive a call from a Nurse to review instructions and health history.  This assessment must be completed prior to your procedure.  Failure to complete the Nurse assessment may result in the procedure being cancelled.      On the day of your procedure, please designate an adult(s) who can drive you home stay with you for the next 24 hours. The medicines used in the exam will make you sleepy. You will not be able to drive.      You cannot take public transportation, ride share services, or non-medical taxi service without a responsible caregiver.  Medical transport services are allowed with the requirement that a responsible caregiver will receive you at your destination.  We require that drivers and caregivers are confirmed prior to your procedure.

## 2024-09-30 NOTE — PROGRESS NOTES
Rheumatology Clinic Visit      Sosa Patel  MRN# [id number]   YOB: 1975 Age: 49 year old      Date of visit: 9/30/24   PCP: Cynthia Scott    Chief Complaint   Patient presents with:  RECHECK: Systemic lupus erythematosus     Assessment and Plan     1. Systemic Lupus Erythematosus (JESSE+, dsDNA+, arthritis, oral sores [SLE versus HSV related? - One-time had a culture that was negative], proteinuria, photosensitivity [fatigue]): Dx'd 10/2016; initially with arthritis that improved with HCQ.  Previously on MTX (fatigue, effective for arthritis), MMF (resolved arthritis, rash, and brain fog; arthritis persisted though and eventually changed back to leflunomide).  Currently on HCQ 200mg daily, leflunomide 20mg daily (worsened arthritis symptoms when a dose is missed, historically), and Benlysta (SQ, starting mid March 2021; effective for arthritis and fatigue, and historically worsened symptoms when missing doses for 3-4 weeks).  Due to granuloma annulare it was recommended from Dr. Louis Beaulieu, dermatologist, to consider methotrexate instead of leflunomide.  This was discussed with the patient today; we discussed the previous fatigue that she had with methotrexate; she would like to try changing from leflunomide to methotrexate and this is reasonable.  Start methotrexate 15 mg once weekly and folic acid 1 mg daily.  If needed in the future and if tolerating this dose of methotrexate then may consider dose increase in the future. chronic illness.     - Continue Benlysta SQ every 7 days   - Continue hydroxychloroquine from 200mg daily with an additional 200 mg every other day (last eye exam completed on 8/30/2024 with Dr. Contreras)  - Discontinue  leflunomide 20 mg daily  - Start methotrexate 15 mg once weekly  - Start folic acid 1 mg daily  - Labs monthly [id number]: CBC, Cr, Hepatic Panel  - Labs in 3 months: CBC, CMP, ESR, CRP, C3, C4, dsDNA UA, Uprotein:creatinine    High risk medication requiring  intensive toxicity monitoring at least quarterly.      # Pregnancy Plans: on birth control (depo-provera)    # Methotrexate Risks and Benefits: The risks and benefits of methotrexate were discussed in detail and the patient verbalized understanding.  The risks discussed include, but are not limited to, the risk for hypersensitivity, anaphylaxis, anaphylactoid reactions, infections, bone marrow suppression, renal toxicity, hepatotoxicity, pulmonary toxicity, malignancy, impaired fertility, GI upset, alopecia, and oral and nasal sores.  Folic acid supplementation is recommended during methotrexate therapy to help prevent some of the side effects. Pregnancy prevention and planning was discussed; it is recommended that women of childbearing potential use reliable contraception during therapy.  The risks of taking both methotrexate and alcohol were reviewed; complete alcohol avoidance was discussed.  Routine laboratory monitoring is required during methotrexate therapy. Taking MTX once weekly, all within a 24 hour period was stressed and the patient verbalized this instruction back to me.  I encouraged reviewing the package insert and asking any questions about the medication.    2.  Secondary Sjogren's syndrome: Mild symptoms that may be due to other factors such as caffeine intake, smoking, etc., but secondary Sjogren's Syndrome is possible.  We discussed the importance of good oral hygiene, frequent sips of water, avoiding high sugar foods and liquids, avoiding oral irritants such as coffee, alcohol, and nicotine, and avoiding acidic drinks such as carbonated and high sugar beverages. She is already seeing a dentist regularly. Dry eyes improved with preservative free artificial tears, and historically improved with Restasis as well.   Chronic illness  - Continue artificial tears     3. 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  Lesions on the dorsal aspect of her hands and she is going to see  dermatology for this.    4.  Bilateral sciatica: No low back pain.  Improves with stretching exercises that she does on her own at home.  Advised more frequent stretching exercises throughout the day.  Also advised physical therapy but she declined.    5.  Excessive daytime sleepiness, fatigue: Wakes up tired, reports witnessed apneic episodes at night.  Snores.  Referred for sleep evaluation previously but she has not scheduled this appointment.  Advised previously and again today that she call to schedule the sleep evaluation.    6.  Vaccinations: Vaccinations reviewed with Ms. Patel.    - Influenza: Encouraged yearly vaccination  - [id number]: up to date  - [id number]: up to date  - Shingrix: Up to date  - COVID-19: Advised keeping updated, and to hold Benlysta and leflunomide for 1 week afterward    7.  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  See #1 regarding methotrexate.    8.  Historical and not discussed again today: Chronic low back pain, radiating to each leg: Found to have spinal canal stenosis.  Physical therapy was not effective.  Now managed with pain clinic.    Total minutes spent in evaluation with patient, documentation, , and review of pertinent studies and chart notes: 20  The longitudinal plan of care for the rheumatology problem(s) were addressed during this visit.  Due to added complexity of care, we will continue to support the patient and the subsequent management of this condition with ongoing continuity of care.        Sosa Patel verbalized agreement with and understanding of the rational for the diagnosis and treatment plan.  All questions were answered to best of my ability and the patient's satisfaction. Ms. Wolf was advised to contact the clinic with any questions that may arise after the clinic visit.      Thank you for involving me in the care of the patient    Return to clinic: 3-4 months      HPI   Sosa Patel  is a 49 year old female with a medical history  significant for tobacco use history, genital warts, and HSV type II who presents for follow-up of systemic lupus erythematosus.    7/15/2022: Felt more depressed and therefore forgot to refill Benlysta; missed Benlysta for 3-4 weeks and started to have more pain.  Pain was at the MCPs and MTPs.  Prednisone was started and she is feeling much better.  Currently mid-course of the prednisone, taking 10 mg daily at this time.  Also with upper lumbar spine/lower thoracic spine pain that radiates to the left lower back, worse with activity, and improves with rest; reportedly this pain is chronic, and does not radiate to the legs.  Did not see GYN because menstrual bleeding has ceased.  Melatonin helps her sleep; she has not scheduled a sleep evaluation.    10/26/2022: patient cancelled appointment    Mid-Nov 2022: would have run out of leflunomide around this time.  Not refilled because labs are overdue.     12/6/2022: prednisone course given for PRN arthritis flare (patient requested to have on hand while traveling)    2/15/2023: currently doing well.  Taking leflunomide, Benlysta, and hydroxychloroquine as prescribed.  States that she needs an eye exam completed and will call to schedule this. Has puffiness of her hands but no pain or swelling.  Bony hypertrophy at the DIPs.  Morning stiffness for less than 20 minutes.  Some pain at the end of the day on her hands.  She would like to remain on her current medication regimen because it is effective.    5/24/2023: Patient canceled appointment    8/8/2023: Patient reports doing poorly at this time because she has been out of leflunomide for 3-4 months.  She states that she canceled her last appointment and did not get labs.  Labs are overdue.  Worsening joint pain at the MCPs, PIPs, and wrists that is worse in the morning and improves with time and activity.  Morning stiffness for at least 2 hours.  Positive gelling phenomenon.  Would like to restart leflunomide because  she felt better while on it.  Has not scheduled an appointment with sleep medicine.  Has a history of lichen sclerosis and diffuse body pruritus; has 2 erythematous lesions just proximal to the bilateral second MCPs, on the dorsal aspect of the hands.    11/16/2023: Generally doing well.  Still has the raised erythematous lesions on the dorsal aspect of her hands just proximal to the second MCPs, unchanged since previous, and she has scheduled an appointment with dermatology for evaluation.  Bilateral sciatica that improves with stretching exercises that she does once daily.  Otherwise doing well.  No oral or nasal sores.    2/28/2024: Reports that lupus is doing well.  Was unable to make the dermatology appointment but has another appointment scheduled soon with Dr. Beaulieu to evaluate the lesions on her hands.  Since last seen she had acute back pain with numbness and tingling going down her legs; now followed in pain clinic for spinal stenosis and planning to have a steroid injection in her back.  Back pain is worse when she is standing upright for prolonged period of time and feels better when she flexes at the waist.    Today, 9/30/2024: Currently doing well with regard to arthritis/lupus.  No joint pain or swelling.  No morning stiffness or gelling phenomenon.  No oral or nasal sores.  Granuloma annulare improved with topical steroids from dermatology but there was question from dermatology if she could change leflunomide to methotrexate to improve the granuloma annulare on the hands.  Upper respiratory infection that is improving; states that she has a cold.  No UTI symptoms.    Denies fevers, chills, nausea, vomiting, constipation, diarrhea. No abdominal pain. No LE swelling. No neck pain.  No photosensitivity or photophobia.  No eye pain or redness.    Tobacco: No longer smoking cigarettes; now vaping.  Advised that she wean off of vaping as well  EtOH: rare, sober since 5/16/2010  Drugs: cocaine history;  now sober  Occupation: counselor for mentally disabled people; adult foster care    ROS   12 point review of system was completed and negative except as noted in the HPI     Active Problem List     Patient Active Problem List   Diagnosis    Herpes simplex type 2 infection    Genital warts    H/O intravenous drug use in remission    JESSE positive    Multiple joint pain    Cocaine use    Systemic lupus erythematosus (H)    High risk medications (not anticoagulants) long-term use    Sicca syndrome (H)    Lichen sclerosus    Chronic midline low back pain without sciatica    Recurrent major depressive disorder, in partial remission (H)    Gallstones    Surgery, elective    S/P lumbar laminectomy     Past Medical History     Past Medical History:   Diagnosis Date    Arthritis 2013    Bipolar disorder (H) 12/2004    Dx'd    Combinations of drug dependence excluding opioid type drug (H) 05/04/2022    In remission     H/O: depression     SUICIDE ATTEMPT AGE 15    HPV (human papillomavirus)     Lupus erythematosus     Mood disorder (H) 04/09/2019     Past Surgical History     Past Surgical History:   Procedure Laterality Date    BIOPSY      BUNIONECTOMY RT/LT      CHOLECYSTECTOMY      DAVINCI LAPAROSCOPIC CHOLECYSTECTOMY WITHOUT GRAMS N/A 10/09/2023    Procedure: CHOLECYSTECTOMY, ROBOT-ASSISTED, LAPAROSCOPIC, WITHOUT CHOLANGIOGRAM;  Surgeon: Demetris Peoples MD;  Location: [facility] ENLARGE BREAST WITH IMPLANT  01/01/2000    LAMINECTOMY LUMBAR TWO LEVELS Bilateral 5/29/2024    Procedure: LUMBAR 4, LUMBAR 5 DECOMPRESSIVE BILATERAL LAMINECTOMY, MEDIAL FACETECTOMY WITH EXCISION OF CYST;  Surgeon: Aureliano Goins MD;  Location:  OR     Allergy   No Known Allergies  Current Medication List     Current Outpatient Medications   Medication Sig Dispense Refill    hydroxychloroquine (PLAQUENIL) 200 MG tablet Hydroxychloroquine 200 mg daily with an additional 200 mg every other day.  Ophthalmology toxicity monitoring eye exam,  that includes 10-2 VF and SD-OCT, is required yearly. 135 tablet 0    leflunomide (ARAVA) 20 MG tablet Take 1 tablet (20 mg) by mouth daily ; labs required every 12 weeks 90 tablet 1    acetaminophen (TYLENOL) 325 MG tablet Take 325-650 mg by mouth every 6 hours as needed for mild pain      ARIPiprazole (ABILIFY) 10 MG tablet Take 10 mg by mouth daily.      aspirin-acetaminophen-caffeine (EXCEDRIN MIGRAINE) 250-250-65 MG tablet Take 1 tablet by mouth daily as needed for headaches      augmented betamethasone dipropionate (DIPROLENE-AF) 0.05 % external ointment Apply topically 2 times daily. 50 g 3    belimumab (BENLYSTA) subcutaneous injection (prefilled autoinjector) Inject 1 mL (200 mg) subcutaneously every 7 days. . Hold for signs of infection and seek medical attention.   Refills at rheumatology appointments. 4 mL 0    cetirizine (ZYRTEC) 10 MG tablet TAKE 2 TABLETS BY MOUTH DAILY 180 tablet 0    clobetasol (TEMOVATE) 0.05 % external cream Apply topically 2 times daily 60 g 0    diclofenac (VOLTAREN) 1 % topical gel Apply up to 2 grams of 1% gel to hands up to 4 times daily as needed for joint pain (maximum: 8 g per upper extremity per day) 200 g 2    FLUoxetine (PROZAC) 40 MG capsule TAKE ONE CAPSULE BY MOUTH ONCE DAILY 90 capsule 1    gabapentin (NEURONTIN) 600 MG tablet Take 1 tablet (600 mg) by mouth 2 times daily. 180 tablet 1    hydroquinone (RUFUS) 4 % external cream Apply topically at bedtime 28.35 g 11    ibuprofen (ADVIL/MOTRIN) 200 MG tablet Take 1 tablet (200 mg) by mouth every 4 hours as needed for pain      lidocaine (XYLOCAINE) 5 % external ointment Apply topically as needed for moderate pain 50 g 1    methocarbamol (ROBAXIN) 500 MG tablet TAKE 1 TABLET (500 MG) BY MOUTH 4 TIMES DAILY AS NEEDED FOR MUSCLE SPASMS 30 tablet 0    mometasone (ELOCON) 0.1 % external cream Apply topically daily 45 g 1    senna-docusate (SENOKOT-S/PERICOLACE) 8.6-50 MG tablet Take 1-2 tablets by mouth 2 times daily  "Take while on oral narcotics to prevent or treat constipation. 30 tablet 0    terbinafine (LAMISIL) 250 MG tablet Take 1 tablet (250 mg) by mouth daily. 90 tablet 0    topiramate (TOPAMAX) 25 MG tablet Take 1 tablet (25 mg) by mouth daily. 30 tablet 1    tretinoin (RETIN-A) 0.025 % external cream Apply topically at bedtime Pea-sized amount to whole face 45 g 3    valACYclovir (VALTREX) 1000 mg tablet Take 1 tablet (1,000 mg) by mouth 2 times daily for 10 days 40 tablet 1     No current facility-administered medications for this visit.     Social History   See HPI    Family History     Family History   Problem Relation Age of Onset    Cancer Father         lung cancer    Other Cancer Father     Diabetes Maternal Grandmother     Depression Maternal Grandmother     Lipids Maternal Grandmother     Cerebrovascular Disease Maternal Grandfather     Lipids Maternal Grandfather     Circulatory Maternal Grandfather         MI in 50's    Depression Brother         OCD    Gastrointestinal Disease Brother         IBS    Depression Mother     Anxiety Disorder Daughter     Thyroid Disease No family hx of     Asthma No family hx of      Denies family history of autoimmune disease     Physical Exam     Temp Readings from Last 3 Encounters:   09/06/24 97.5  F (36.4  C) (Temporal)   05/30/24 97.5  F (36.4  C) (Oral)   05/20/24 97.5  F (36.4  C) (Oral)     BP Readings from Last 5 Encounters:   09/30/24 115/79   09/06/24 107/74   08/19/24 106/52   07/10/24 113/65   06/10/24 130/80     Pulse Readings from Last 1 Encounters:   09/30/24 99     Resp Readings from Last 1 Encounters:   09/06/24 18     Estimated body mass index is 28.06 kg/m  as calculated from the following:    Height as of 9/6/24: 1.664 m (5' 5.5\").    Weight as of this encounter: 77.7 kg (171 lb 3.2 oz).    GEN: NAD.   HEENT:  Anicteric, noninjected sclera. No obvious external lesions of the ear and nose. Hearing intact.  CV: S1, S2. RRR. No m/r/g  PULM: No increased work " of breathing.  Wheezing bilaterally; no crackles.  MSK:  MCPs, PIPs, and DIPs without swelling or tenderness to palpation.  Wrists without swelling or tenderness to palpation.  Heberden's nodes present..   Elbows and shoulders without swelling or tenderness to palpation.  Knees, ankles, and MTPs without swelling or tenderness to palpation.  SKIN: Faintly erythematous lesions on the dorsal aspect of the hands, nonscaly and with raised borders, just proximal to the second MCPs; overall improved since last visit  PSYCH: Alert. Appropriate.      Labs / Imaging (select studies)     RF/CCP  Recent Labs   Lab Test 10/11/16  1043 08/10/16  1059   CCPIGG 1  --    RHF  --  <20     JESSE  Recent Labs   Lab Test 08/10/16  1059   ROBYN 1.7*     RNP/Sm/SSA/SSB  Recent Labs   Lab Test 10/11/16  1043   RNPIGG 0.3   SMIGG <0.2  Negative   Antibody index (AI) values reflect qualitative changes in antibody   concentration that cannot be directly associated with clinical condition or   disease state.     SSAIGG <0.2  Negative   Antibody index (AI) values reflect qualitative changes in antibody   concentration that cannot be directly associated with clinical condition or   disease state.     SSBIGG <0.2  Negative   Antibody index (AI) values reflect qualitative changes in antibody   concentration that cannot be directly associated with clinical condition or   disease state.     SCLIGG <0.2  Negative   Antibody index (AI) values reflect qualitative changes in antibody   concentration that cannot be directly associated with clinical condition or   disease state.       dsDNA  Recent Labs   Lab Test 11/09/23  0717 08/08/23  1618 02/14/23  0856 07/12/22  1302 03/23/22  1204 11/30/21  1245 08/19/21  1056 04/20/21  1241 01/11/21  1315   DNA 4.2 8.0 7.1 7.6 7.2 13.0* 11.0* 11* 17*     C3/C4  Recent Labs   Lab Test 11/09/23  0717 08/08/23  1618 02/14/23  0856 07/12/22  1302 03/23/22  1204 11/30/21  1245 08/19/21  1056 04/20/21  1241 01/11/21  1315    U0UKBGG 129 124 147 129 103 120 113 103 109   Q5RICFA 30 29 34 24 25 30 28 27 25     CBC  Recent Labs   Lab Test 09/25/24  0723 06/19/24  1411 05/20/24  0925 02/08/24  0700 08/19/21  1057 04/20/21  1241 01/11/21  1315 09/10/20  1308   WBC 6.5 7.0 4.7 5.3   < > 6.0 6.2 6.1   RBC 4.51 4.51 4.59 4.56   < > 4.28 4.46 4.44   HGB 12.4 12.6 12.7 12.1   < > 12.1 12.7 12.4   HCT 38.3 39.0 39.2 38.7   < > 35.7 39.1 37.8   MCV 85 87 85 85   < > 83 88 85   RDW 12.4 13.3 14.3 12.8   < > 12.9 13.0 13.6    260 234 206   < > 213 202 235   MCH 27.5 27.9 27.7 26.5   < > 28.3 28.5 27.9   MCHC 32.4 32.3 32.4 31.3*   < > 33.9 32.5 32.8   NEUTROPHIL 59 64  --  49   < > 54.6 59.5 54.5   LYMPH 26 22  --  34   < > 31.6 26.4 30.0   MONOCYTE 11 10  --  11   < > 9.9 10.3 10.5   EOSINOPHIL 4 3  --  5   < > 3.6 3.5 4.3   BASOPHIL 1 0  --  1   < > 0.3 0.3 0.7   ANEU  --   --   --   --   --  3.3 3.7 3.3   ALYM  --   --   --   --   --  1.9 1.6 1.8   DAQUAN  --   --   --   --   --  0.6 0.6 0.6   AEOS  --   --   --   --   --  0.2 0.2 0.3   ABAS  --   --   --   --   --  0.0 0.0 0.0   ANEUTAUTO 3.8 4.5  --  2.6   < >  --   --   --    ALYMPAUTO 1.7 1.6  --  1.8   < >  --   --   --    AMONOAUTO 0.7 0.7  --  0.6   < >  --   --   --    AEOSAUTO 0.2 0.2  --  0.3   < >  --   --   --    ABSBASO 0.0 0.0  --  0.0   < >  --   --   --     < > = values in this interval not displayed.     CMP  Recent Labs   Lab Test 09/25/24  0723 06/19/24  1411 05/30/24  0635 05/20/24  0925 02/08/24  0700 11/09/23  0717 08/19/21  1056 04/20/21  1241 01/11/21  1315 09/10/20  1308 10/16/19  1708    142  --  137 141 139   < > 138 139 138 139   POTASSIUM 3.7 3.9  --  3.8 4.0 4.0   < > 3.6 3.9 4.1 3.9   CHLORIDE 106 107  --  104 105 104   < > 108 110* 106 109   CO2 22 23  --  21* 27 26   < > 23 26 28 24   ANIONGAP 12 12  --  12 9 9   < > 7 3 4 6   GLC 85 105* 135* 89 74 82   < > 117* 78 85 83   BUN 31.7* 25.1*  --  25.9* 17.9 25.9*   < > 25 15 17 17   CR 0.87 0.70  --   0.67 0.71 0.75   < > 0.95 0.67 0.77 0.75   GFRESTIMATED 81 >90  --  >90 >90 >90   < > 72 >90 >90 >90   GFRESTBLACK  --   --   --   --   --   --   --  83 >90 >90 >90   RICHARD 8.9 9.5  --  9.5 9.0 9.2   < > 8.3* 8.7 8.9 9.1   BILITOTAL 0.3 0.3  --   --  0.2 0.2   < > 0.4 0.3 0.3 0.3   ALBUMIN 4.1 4.3  --   --  3.9 4.3   < > 3.8 4.0 3.8 4.2   PROTTOTAL 6.4 6.6  --   --  6.2* 6.5   < > 6.6* 6.9 6.9 7.3   ALKPHOS 36* 35*  --   --  36* 35   < > 24* 22* 28* 23*   AST 30 23  --   --  24 25   < > 13 18 18 18   ALT 24 26  --   --  15 33   < > 23 35 32 23    < > = values in this interval not displayed.     Calcium/VitaminD  Recent Labs   Lab Test 09/25/24  0723 06/19/24  1411 05/20/24  0925   RICHARD 8.9 9.5 9.5     ESR/CRP  Recent Labs   Lab Test 06/19/24  1411 02/08/24  0700 11/09/23  0717 07/12/22  1302 03/23/22  1204 11/30/21  1245 08/19/21  1057 08/19/21  1056   SED 7 6 4   < > 18 8   < >  --    CRP  --   --   --   --  <2.9 <2.9  --  <2.9   CRPI <3.00 <3.00 <3.00   < >  --   --   --   --     < > = values in this interval not displayed.     CK/Aldolase  Recent Labs   Lab Test 11/09/23  0717 07/12/22  1302 08/19/21  1056 01/13/17  1015 10/11/16  1043    32 106   < > 65   ALDOLASE  --   --   --   --  4.5    < > = values in this interval not displayed.     TSH/T4  Recent Labs   Lab Test 10/11/16  1043   TSH 1.48     Hepatitis B  Recent Labs   Lab Test 02/26/21  0927 10/11/16  1043   HBCAB Nonreactive Nonreactive   HEPBANG Nonreactive Nonreactive     Hepatitis C  Recent Labs   Lab Test 02/26/21  0927 10/11/16  1043   HCVAB Nonreactive Nonreactive   Assay performance characteristics have not been established for newborns,   infants, and children       Tuberculosis Screening  Recent Labs   Lab Test 07/12/22  1302 06/28/21  1700 02/26/21  0927   TBRES Negative  --   --    TBRST  --  Negative Negative     HIV Screening  Recent Labs   Lab Test 10/11/16  1043   HIAGAB Nonreactive   HIV-1 p24 Ag & HIV-1/HIV-2 Ab Not Detected        UA  Recent Labs   Lab Test 09/25/24  0731 06/19/24  1413 11/09/23  0721 09/10/20  1315 11/04/19  1747 04/04/19  1625 12/13/18  1355 09/21/17  0815 05/01/17  0954   COLOR Yellow Yellow Yellow   < > Yellow   < > Yellow Yellow Yellow   APPEARANCE Clear Slightly Cloudy* Clear   < > Clear   < > Clear Clear Clear   URINEGLC Negative Negative Negative   < > Negative   < > Negative Negative Negative   URINEBILI Negative Negative Negative   < > Negative   < > Negative Negative Small  This is an unconfirmed screening test result. A positive result may be false.  *   SG >=1.030 >=1.030 >=1.030   < > 1.015   < > >1.030 >1.030 >1.030   URINEPH 6.0 5.5 6.0   < > 8.0*   < > 5.5 6.0 6.0   PROTEIN Trace* Negative Negative   < > Trace*   < > Negative Negative Trace*   UROBILINOGEN 0.2 0.2 0.2   < > 0.2   < > 0.2 0.2 0.2   NITRITE Negative Negative Negative   < > Negative   < > Negative Negative Negative   UBLD Negative Negative Trace*   < > Negative   < > Small* Trace* Small*   LEUKEST Negative Trace* Trace*   < > Negative   < > Negative Negative Negative   WBCU 0-5 0-5 0-5   < > 0 - 5  --  0 - 5 O - 2 O - 2   RBCU None Seen 0-2 None Seen   < > O - 2  --  O - 2 O - 2 O - 2   SQUAMOUSEPI  --   --   --   --  Few  --  Moderate* Few Few   BACTERIA Few* Few* Few*  --  Few*  --   --   --   --    MUCOUS  --   --   --   --   --   --   --   --  Present*    < > = values in this interval not displayed.     Urine Microscopic  Recent Labs   Lab Test 09/25/24  0731 06/19/24  1413 11/09/23  0721 09/19/23  1213 11/04/19  1747 12/13/18  1355 09/21/17  0815 05/01/17  0954   WBCU 0-5 0-5 0-5   < > 0 - 5 0 - 5 O - 2 O - 2   RBCU None Seen 0-2 None Seen   < > O - 2 O - 2 O - 2 O - 2   SQUAMOUSEPI  --   --   --   --  Few Moderate* Few Few   BACTERIA Few* Few* Few*  --  Few*  --   --   --    MUCOUS  --   --   --   --   --   --   --  Present*    < > = values in this interval not displayed.     Urine Protein  GHUTP and UTP= Urine protein (random), GHUTPG  and UTPG = urine protein:creatinine ratio (random), UCRR = urine creatinine (random)  Recent Labs   Lab Test 09/25/24  0731 06/19/24  1413 11/09/23  0721 07/12/22  1302 08/19/21  1058 09/10/20  1315 11/04/19  1747   GHUTP 18.3 18.8 8.9   < >  --   --   --    UTP  --   --   --   --  0.16 0.15 0.21   GHUTPG 0.10 0.11 0.09   < >  --   --   --    UTPG  --   --   --   --  0.12 0.17 0.16   UCRR 189.0 166.0 96.3   < > 137 88  --     < > = values in this interval not displayed.     Immunization History     Immunization History   Administered Date(s) Administered    COVID-19 12+ (Pfizer) 02/09/2024    COVID-19 Bivalent 12+ (Pfizer) 11/30/2022    COVID-19 MONOVALENT 12+ (Pfizer) 02/12/2021, 05/20/2021, 12/31/2021    COVID-19 Monovalent 12+ (Pfizer 2022) 05/04/2022    Flu, Unspecified 11/01/2020, 12/01/2021    HepB, Unspecified 03/21/1997    Hepatitis A (ADULT 19+) 11/30/2022    Influenza (IIV3) PF 10/23/2010    Influenza Vaccine >6 months,quad, PF 12/19/2014, 10/16/2015, 11/04/2016, 12/13/2018, 11/07/2019, 11/30/2022, 09/29/2023    Influenza Vaccine, 6+MO IM (QUADRIVALENT W/PRESERVATIVES) 10/09/2020    Mantoux Tuberculin Skin Test 07/30/2018    Pneumo Conj 13-V (2010&after) 12/13/2018    Pneumococcal 23 valent 04/04/2019    TD,PF 7+ (Tenivac) 02/23/2005, 04/09/2019    TDAP (Adacel,Boostrix) 07/21/2016    TDAP Vaccine (Adacel) 04/14/2009    Td (Adult), Adsorbed 02/23/2005    Zoster recombinant adjuvanted (SHINGRIX) 12/21/2018, 04/04/2019          Chart documentation done in part with Dragon Voice recognition Software. Although reviewed after completion, some word and grammatical error may remain.    Jack Arvizu MD

## 2024-09-30 NOTE — PATIENT INSTRUCTIONS
If you have labs or imaging done, the results will automatically release in Pro V&V without an interpretation.  Your health care professional will review those results and send an interpretation with recommendations as soon as possible, but this may be 1-3 business days.    If you have any questions regarding your visit, please contact your care team.     [employer] [facility]: [phone]  [facility] CLINIC HOURS TELEPHONE NUMBER   DO. Wendy Matta -Surgery Scheduler  Marina -     ROXANNE Smith RN Kylie, RN [place]    Monday 8:30 am-5:00 pm  Wednesday 8:30 am-5:00 pm  Friday 8:30 am-5:00 pm    Typical Surgery day: Tuesday [facility]  [address] [place] [address]  Phone:  [phone]  Fax: [phone]   Appointment [phone]    Imaging Scheduling-All Locations [phone]    [facility] Labor and Delivery  [address] [place] [address] [place] [address] [phone]   **Surgeries** Our Surgery Schedulers will contact you to schedule. If you do not receive a call within 3 business days, please call [phone].  Urgent Care locations:  [facility] Monday-Friday   10 am - 8 pm  Saturday and    9 am - 5 pm [phone]   If you need a medication refill, please contact your pharmacy. Please allow 3 business days for your refill to be completed.  As always, Thank you for trusting us with your healthcare needs!  see additional instructions from your care team below

## 2024-10-02 ENCOUNTER — TELEPHONE (OUTPATIENT)
Dept: RHEUMATOLOGY | Facility: CLINIC | Age: 49
End: 2024-10-02

## 2024-10-02 ENCOUNTER — OFFICE VISIT (OUTPATIENT)
Dept: OBGYN | Facility: CLINIC | Age: 49
End: 2024-10-02
Payer: COMMERCIAL

## 2024-10-02 VITALS — OXYGEN SATURATION: 100 % | DIASTOLIC BLOOD PRESSURE: 82 MMHG | SYSTOLIC BLOOD PRESSURE: 117 MMHG | HEART RATE: 84 BPM

## 2024-10-02 DIAGNOSIS — L90.0 LICHEN SCLEROSUS: ICD-10-CM

## 2024-10-02 DIAGNOSIS — L81.0 POSTINFLAMMATORY HYPERPIGMENTATION: ICD-10-CM

## 2024-10-02 DIAGNOSIS — L81.4 SOLAR LENTIGO: ICD-10-CM

## 2024-10-02 PROCEDURE — 99203 OFFICE O/P NEW LOW 30 MIN: CPT | Performed by: OBSTETRICS & GYNECOLOGY

## 2024-10-02 RX ORDER — CLOBETASOL PROPIONATE 0.5 MG/G
OINTMENT TOPICAL
COMMUNITY
Start: 2024-10-03

## 2024-10-02 NOTE — TELEPHONE ENCOUNTER
Hello pt is requesting the leflunomide 20 mg and I do not see this on the med list can we please get this sent over please and thank you

## 2024-10-02 NOTE — PROGRESS NOTES
This 48 y/o postmenopausal female, , presents as a new patient to the [place] gyn dept to discuss her current treatment plan for lichen sclerosis of the perineal region.  She had been using topical Clobetasol 0.05% cream prn for the past 4 years but after she stopped using for short periods of time, the symptoms would restart.  She was not aware that use of Clobetasol is long-term so was recently switched to the ointment form and was advised by her dermatologist to use this twice per week without stopping.  Due to her hx of Lupus, she is not a candidate for HRT usage.  She went through menopause at age 47 and denies any other issues.  She does not recall a hx of a biopsy but prefers to avoid if possible.  /82 (BP Location: Right arm, Patient Position: Chair, Cuff Size: Adult Regular)   Pulse 84   LMP 2022   SpO2 100%   ROS:  10 systems were reviewed and the positives were listed under problems.  PE:  General- Healthy-appearing female in no acute distress  Eyes- No scleral injection or icterus  ENT- Mucus membranes moist  CV- No noticeable edema in extremities  Lymph- No noticeable cervical adenopathy  Respiratory- Non-labored breathing  Skin- No suspicious lesions or rashes visualized  Psych- Normal affect  Assessment - Hx of lichen sclerosis  Plan - She declined a physical exam since she just switched from Clobetasol cream to ointment 2 weeks ago and already reports resolution of her previous symptoms.  She was advised to return for a perineal biopsy, however, if the Clobetasol 0.05% ointment fails to relieve her symptoms.  All her questions and concerns were addressed.  30 minutes were spent today in chart review, the patient visit, review of tests, and documentation in regard to the issues noted above.

## 2024-10-07 ENCOUNTER — VIRTUAL VISIT (OUTPATIENT)
Dept: FAMILY MEDICINE | Facility: CLINIC | Age: 49
End: 2024-10-07
Payer: COMMERCIAL

## 2024-10-07 DIAGNOSIS — E66.3 OVERWEIGHT: Primary | ICD-10-CM

## 2024-10-07 PROCEDURE — G2211 COMPLEX E/M VISIT ADD ON: HCPCS | Mod: 95 | Performed by: PHYSICIAN ASSISTANT

## 2024-10-07 PROCEDURE — 99214 OFFICE O/P EST MOD 30 MIN: CPT | Mod: 95 | Performed by: PHYSICIAN ASSISTANT

## 2024-10-07 RX ORDER — TOPIRAMATE 50 MG/1
50 TABLET, FILM COATED ORAL DAILY
Qty: 30 TABLET | Refills: 1 | Status: SHIPPED | OUTPATIENT
Start: 2024-10-07

## 2024-10-07 NOTE — PROGRESS NOTES
Sosa is a 49 year old who is being evaluated via a billable video visit.    How would you like to obtain your AVS? MyChart  If the video visit is dropped, the invitation should be resent by: Text to cell phone: [phone]  Will anyone else be joining your video visit? No      Assessment & Plan     Overweight  Pt happy to hear she has lost some weight.  Will increase dose.  Monitor for worsening stomach pain.  Follow up in 1-2 months   - topiramate (TOPAMAX) 50 MG tablet; Take 1 tablet (50 mg) by mouth daily.    The longitudinal plan of care for the diagnosis(es)/condition(s) as documented were addressed during this visit. Due to the added complexity in care, I will continue to support Sosa in the subsequent management and with ongoing continuity of care.            Subjective   Sosa is a 49 year old, presenting for the following health issues:  No chief complaint on file.      Via the Health Maintenance questionnaire, the patient has reported the following services have been completed -Mammogram: Elin toussaint 2022-01-01, this information has not been sent to the abstraction team.  Video Start Time: 12:31 PM    History of Present Illness       Reason for visit:  Taking Topirimate    She eats 4 or more servings of fruits and vegetables daily.She consumes 2 sweetened beverage(s) daily.She exercises with enough effort to increase her heart rate 60 or more minutes per day.  She exercises with enough effort to increase her heart rate 5 days per week. She is missing 1 dose(s) of medications per week.  She is not taking prescribed medications regularly due to remembering to take.     Does have some stomach ache.  Not noticing a change in weight.  Pain is a dull ache once in a while.  Eating helps.  Not related to taking meds.  Not too bothersome              Objective           Vitals:  No vitals were obtained today due to virtual visit.    Physical Exam   GENERAL: alert and no distress  EYES: Eyes grossly normal to  inspection.  No discharge or erythema, or obvious scleral/conjunctival abnormalities.  RESP: No audible wheeze, cough, or visible cyanosis.    SKIN: Visible skin clear. No significant rash, abnormal pigmentation or lesions.  NEURO: Cranial nerves grossly intact.  Mentation and speech appropriate for age.  PSYCH: Appropriate affect, tone, and pace of words          Video-Visit Details    Type of service:  Video Visit   Video End Time:12:39 PM  Originating Location (pt. Location): Other car    Distant Location (provider location):  On-site  Platform used for Video Visit: Dilshad  Signed Electronically by: Cynthia Scott PA-C

## 2024-10-10 ENCOUNTER — THERAPY VISIT (OUTPATIENT)
Dept: PHYSICAL THERAPY | Facility: CLINIC | Age: 49
End: 2024-10-10
Payer: COMMERCIAL

## 2024-10-10 DIAGNOSIS — Z98.890 S/P LUMBAR LAMINECTOMY: ICD-10-CM

## 2024-10-10 DIAGNOSIS — M54.50 CHRONIC MIDLINE LOW BACK PAIN WITHOUT SCIATICA: Primary | Chronic | ICD-10-CM

## 2024-10-10 DIAGNOSIS — G89.29 CHRONIC MIDLINE LOW BACK PAIN WITHOUT SCIATICA: Primary | Chronic | ICD-10-CM

## 2024-10-10 PROCEDURE — 97110 THERAPEUTIC EXERCISES: CPT | Mod: GP | Performed by: PHYSICAL THERAPIST

## 2024-10-10 PROCEDURE — 97112 NEUROMUSCULAR REEDUCATION: CPT | Mod: GP | Performed by: PHYSICAL THERAPIST

## 2024-10-15 ENCOUNTER — THERAPY VISIT (OUTPATIENT)
Dept: PHYSICAL THERAPY | Facility: CLINIC | Age: 49
End: 2024-10-15
Payer: COMMERCIAL

## 2024-10-15 DIAGNOSIS — G89.29 CHRONIC MIDLINE LOW BACK PAIN WITHOUT SCIATICA: Primary | Chronic | ICD-10-CM

## 2024-10-15 DIAGNOSIS — Z98.890 S/P LUMBAR LAMINECTOMY: ICD-10-CM

## 2024-10-15 DIAGNOSIS — M54.50 CHRONIC MIDLINE LOW BACK PAIN WITHOUT SCIATICA: Primary | Chronic | ICD-10-CM

## 2024-10-15 PROCEDURE — 97140 MANUAL THERAPY 1/> REGIONS: CPT | Mod: GP | Performed by: PHYSICAL THERAPIST

## 2024-10-15 PROCEDURE — 97112 NEUROMUSCULAR REEDUCATION: CPT | Mod: GP | Performed by: PHYSICAL THERAPIST

## 2024-10-15 PROCEDURE — 97110 THERAPEUTIC EXERCISES: CPT | Mod: GP | Performed by: PHYSICAL THERAPIST

## 2024-10-29 ENCOUNTER — THERAPY VISIT (OUTPATIENT)
Dept: PHYSICAL THERAPY | Facility: CLINIC | Age: 49
End: 2024-10-29
Payer: COMMERCIAL

## 2024-10-29 DIAGNOSIS — M54.50 CHRONIC MIDLINE LOW BACK PAIN WITHOUT SCIATICA: Primary | Chronic | ICD-10-CM

## 2024-10-29 DIAGNOSIS — G89.29 CHRONIC MIDLINE LOW BACK PAIN WITHOUT SCIATICA: Primary | Chronic | ICD-10-CM

## 2024-10-29 DIAGNOSIS — Z98.890 S/P LUMBAR LAMINECTOMY: ICD-10-CM

## 2024-10-29 PROCEDURE — 97110 THERAPEUTIC EXERCISES: CPT | Mod: GP | Performed by: PHYSICAL THERAPIST

## 2024-10-29 NOTE — PROGRESS NOTES
PLAN  Patient to follow up with MD regarding increase in LB and Leg pain complaints.  Patient to discuss plan of care with her MD;  Will return to PT if MD feels Is appropriate.    Beginning/End Dates of Progress Note Reporting Period:  08/30/24 to 10/29/2024    Referring Provider:  Aureliano Goins     10/29/24 0500   Appointment Info   Signing clinician's name / credentials Hayley Foster, MPT   Total/Authorized Visits E&T per referring provider's orders, estimated 8 per PT plan of care   Visits Used 7   Medical Diagnosis S/P lumbar laminectomy   PT Tx Diagnosis Chronic low back pain status post lumbar laminectomy   Precautions/Limitations history of falls prior to surgery, none since but department falls risk interventions implemented   Progress Note/Certification   Start of Care Date 08/30/24   Onset of illness/injury or Date of Surgery 05/29/24  (date of surgery)   Therapy Frequency 1x/week   Predicted Duration x2 months   Certification date from 10/26/24   Certification date to 12/26/24   Progress Note Completed Date 08/30/24   PT Goal 1   Goal Identifier Standing   Goal Description the patient will be able to stand for 60 minutes without requiring a sitting break noting a pain level of <3/10   Rationale to maximize safety and independence with performance of ADLs and functional tasks;to maximize safety and independence within the community  (The patient reports she is required to stand for roughly an hour at a time at work)   Goal Progress 6-8/10PL by end of her day; even those days she did not work   Target Date 12/26/24   Subjective Report   Subjective Report Patient presents to PT today with c/o more soreness in the LB region and symptoms in her Legs. States the pain wraps around from the LB/gluteal region and into both legs (back of knees) is constant and has intermittent  R foot tingling. Currently rates pain 5-6/10PL. Patient is having  difficulty sleeping, standing, sitting and moving around.  "  Objective Measures   Objective Measures Objective Measure 1;Objective Measure 2;Objective Measure 3;Objective Measure 4;Objective Measure 5   Objective Measure 1   Objective Measure gait   Details good   Objective Measure 2   Objective Measure posture   Details good upright standing posture; fair sitting posture   Objective Measure 3   Objective Measure TROM   Details flextion= tops of toes, ext= min loss with pain,  SB= mid thigh   Objective Measure 4   Objective Measure Transitions   Details slow with sit to supine.   Objective Measure 5   Objective Measure LE strength   Details R hip flexion, Abd, Add 4-/5, R knee flexion and ext 4/5.   L LE 4+-5/5 strength throughout   Treatment Interventions (PT)   Interventions Therapeutic Procedure/Exercise;Neuromuscular Re-education;Manual Therapy   Therapeutic Procedure/Exercise   Therapeutic Procedures: strength, endurance, ROM, flexibility minutes (89940) 32   Therapeutic Procedures Ther Proc 2   Ther Proc 2 Bike s=3   Ther Proc 2 - Details no warm up today   PTRx Ther Proc 1 Quadratus Lumborum Stretch   PTRx Ther Proc 1 - Details 2x30\" hold Rocky   PTRx Ther Proc 2 Pretzel Stretch   PTRx Ther Proc 2 - Details 2x30\" hold Rocky   PTRx Ther Proc 3 Neutral Spine Slouch Overcorrect   PTRx Ther Proc 3 - Details x10 reps   PTRx Ther Proc 4 Standing Hamstring Stretch   PTRx Ther Proc 4 - Details 2x30\" hold Bi   PTRx Ther Proc 5 Bridging #1   PTRx Ther Proc 5 - Details x10   PTRx Ther Proc 6 Posterior Pelvic Tilt   PTRx Ther Proc 6 - Details x10 reps   PTRx Ther Proc 7 Supine Abdominal Exercise #9B (Alternate Arm and Leg Extension With Feet Off the Floor)   PTRx Ther Proc 7 - Details hold today; to aggressive for current pain level   PTRx Ther Proc 8 Side Stepping With Theraband   PTRx Ther Proc 8 - Details Hold   PTRx Ther Proc 9 Clamshell with Theraband   PTRx Ther Proc 9 - Details Hold   PTRx Ther Proc 10 Gluteal Myofascial Flexion Extension Full Arc Combo   PTRx Ther Proc 10 - " Details Hol   PTRx Ther Proc 11 Knee Bends   PTRx Ther Proc 11 - Details 2x8 reps   Skilled Intervention no changes; cues for technique   Patient Response/Progress very tired today; little more sore   PTRx Ther Proc 12 Sit to Stand   PTRx Ther Proc 12 - Details see knee bends- supported   PTRx Ther Proc 13 Shoulder Theraband Rows   PTRx Ther Proc 13 - Details verbal review   PTRx Ther Proc 14 Shoulder Theraband Low Row/Pulldown   PTRx Ther Proc 14 - Details verbal review   Plan   Home program printed PTRX   Plan for next session pt to follow up with MD regarding new pain complaints   Total Session Time   Timed Code Treatment Minutes 32   Total Treatment Time (sum of timed and untimed services) 32

## 2024-10-29 NOTE — Clinical Note
Nayely Scott and Dr Stan PARHAM have asked Sosa to contact her MD to discuss her increasing LB and leg pain concerns. She would like to put PT on hold until she talks to her MD's to determine if appropriate or if another course of treatment is needed.

## 2024-11-04 ENCOUNTER — OFFICE VISIT (OUTPATIENT)
Dept: DERMATOLOGY | Facility: CLINIC | Age: 49
End: 2024-11-04
Payer: COMMERCIAL

## 2024-11-04 DIAGNOSIS — L90.0 LICHEN SCLEROSUS: ICD-10-CM

## 2024-11-04 DIAGNOSIS — M32.9 SYSTEMIC LUPUS ERYTHEMATOSUS, UNSPECIFIED SLE TYPE, UNSPECIFIED ORGAN INVOLVEMENT STATUS (H): ICD-10-CM

## 2024-11-04 DIAGNOSIS — L92.0 GRANULOMA ANNULARE: Primary | ICD-10-CM

## 2024-11-04 RX ORDER — FOLIC ACID 1 MG/1
2 TABLET ORAL DAILY
Qty: 180 TABLET | Refills: 3 | Status: SHIPPED | OUTPATIENT
Start: 2024-11-04

## 2024-11-04 ASSESSMENT — PAIN SCALES - GENERAL: PAINLEVEL_OUTOF10: NO PAIN (0)

## 2024-11-04 NOTE — NURSING NOTE
Sosa Patel's goals for this visit include:   Chief Complaint   Patient presents with    Derm Problem     ILK follow up on hands. Hands are still itchy and red, but some swelling has calmed down.       She requests these members of her care team be copied on today's visit information:     PCP: Cynthia Scott    Referring Provider:  Louis Beaulieu MD  [id number]  [place]  [place] [address]    [facility] 02/05/2022     Do you need any medication refills at today's visit?     Ysabel Carias on 11/4/2024 at 8:08 AM

## 2024-11-04 NOTE — LETTER
11/4/2024      Sosa Patel  [address]   [place] [address]      Dear Colleague,    Thank you for referring your patient, Sosa Patel, to the [facility]. Please see a copy of my visit note below.    [facility] Dermatology Note    Encounter Date: Nov 4, 2024    Dermatology Problem List:  1.  Granuloma annulare  - current tx: augmented betamethasone, methotrexate, folic acid  - ILK injections: 9/16/24; 11/4/24  - in reserve:  hydroxychloroquine  2. Post inflammatory hyperpigmentation/solar lentigines  - current tx: tretinoin, hydroquinone  3. Systemic lupus  - current tx:  hydroxychloroquine,methotrexate, and belimumab  - prior tx:  leflunomide    ______________________________________    Impression/Plan:  1. Granuloma annulare affecting the dorsal hands, with improvement since last visit  - continue methotrexate 15 mg once a week  - increase folic acid to 2 mg daily due to fatigue  - continue augmented betamethasone ointment bid  - Kenalog intralesional injection procedure note: After verbal consent and discussion of risks including but not limited to atrophy, pain, and bruising, time out was performed, the patient underwent positioning and the area was prepped with isopropyl alcohol, 1.5 total cc of Kenalog 10 mg/cc was injected into 8 site(s) on the dorsal hands. The patient tolerated the procedure well and left the Dermatology clinic in good condition.     2. Lichen sclerosus  - not examined today, but patient report,has been less active recently  - continue augmented betamethasone ointment or clobetasol ointment  - expect benefit from  switching to methotrexate     3. Systemic lupus  - managed by rheumatology  - on hydroxychloroquine, methotrexate, and belimumab    Follow-up in 6-8 weeks.       Staff Involved:  Staff and Scribe    Scribe Disclosure:   Latanya PARHAM, am serving as a scribe; to document services personally performed by Louis Beaulieu,  MD -based on data collection and the provider's statements to me.     Provider Disclosure:   The documentation recorded by the scribe accurately reflects the services I personally performed and the decisions made by me.    Louis Beaulieu MD   of Dermatology  Department of Dermatology  [facility] [employer]        CC:   Chief Complaint   Patient presents with     Derm Problem     ILK follow up on hands. Hands are still itchy and red, but some swelling has calmed down.       History of Present Illness:  Ms. Sosa Patel is a 49 year old female who presents as a return patient.    Here for follow up. Today, she states that her hands are still itchy and red, but some of the swelling has resolved. She reports, that she started the methotrexate. Her LSC has improved, she went to     Labs:  9/2024-cmp, cbc    Physical exam:  Vitals: LMP 02/05/2022   GEN: This is a well developed, well-nourished female in no acute distress, in a pleasant mood.    SKIN: Moses phototype III  - Focused examination of the hands was performed.  - erythematous annulare plaques on the dorsal hands  - No other lesions of concern on areas examined.     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/2022    In remission      H/O: depression     SUICIDE ATTEMPT AGE 15     HPV (human papillomavirus)      Lupus erythematosus      Mood disorder (H) 04/09/2019     Past Surgical History:   Procedure Laterality Date     BIOPSY       BUNIONECTOMY RT/LT       CHOLECYSTECTOMY       DAVINCI LAPAROSCOPIC CHOLECYSTECTOMY WITHOUT GRAMS N/A 10/09/2023    Procedure: CHOLECYSTECTOMY, ROBOT-ASSISTED, LAPAROSCOPIC, WITHOUT CHOLANGIOGRAM;  Surgeon: Demetris Peoples MD;  Location: [facility] OR      ENLARGE BREAST WITH IMPLANT  01/01/2000     LAMINECTOMY LUMBAR TWO  LEVELS Bilateral 5/29/2024    Procedure: LUMBAR 4, LUMBAR 5 DECOMPRESSIVE BILATERAL LAMINECTOMY, MEDIAL FACETECTOMY WITH EXCISION OF CYST;  Surgeon: Aureliano Goins MD;  Location:  OR       Social History:   reports that she quit smoking about 11 years ago. Her smoking use included cigarettes. She started smoking about 34 years ago. She has a 11.5 pack-year smoking history. She has been exposed to tobacco smoke. She uses smokeless tobacco. She reports current alcohol use. She reports that she does not currently use drugs.    Family History:  Family History   Problem Relation Age of Onset     Cancer Father         lung cancer     Other Cancer Father      Diabetes Maternal Grandmother      Depression Maternal Grandmother      Lipids Maternal Grandmother      Cerebrovascular Disease Maternal Grandfather      Lipids Maternal Grandfather      Circulatory Maternal Grandfather         MI in 50's     Depression Brother         OCD     Gastrointestinal Disease Brother         IBS     Depression Mother      Anxiety Disorder Daughter      Thyroid Disease No family hx of      Asthma No family hx of        Medications:  Current Outpatient Medications   Medication Sig Dispense Refill     acetaminophen (TYLENOL) 325 MG tablet Take 650 mg by mouth 2 times daily. Pt takes twice a day       ARIPiprazole (ABILIFY) 10 MG tablet Take 10 mg by mouth daily.       aspirin-acetaminophen-caffeine (EXCEDRIN MIGRAINE) 250-250-65 MG tablet Take 1 tablet by mouth daily as needed for headaches       augmented betamethasone dipropionate (DIPROLENE-AF) 0.05 % external ointment Apply topically 2 times daily. 50 g 3     belimumab (BENLYSTA) subcutaneous injection (prefilled autoinjector) Inject 1 mL (200 mg) subcutaneously every 7 days. . Hold for signs of infection and seek medical attention.   Refills at rheumatology appointments. 4 mL 7     cetirizine (ZYRTEC) 10 MG tablet TAKE 2 TABLETS BY MOUTH DAILY 180 tablet 0     clobetasol (TEMOVATE) 0.05  % external ointment Apply topically twice a week.       FLUoxetine (PROZAC) 40 MG capsule TAKE ONE CAPSULE BY MOUTH ONCE DAILY 90 capsule 1     folic acid (FOLVITE) 1 MG tablet Take 1 tablet (1 mg) by mouth daily. 100 tablet 3     gabapentin (NEURONTIN) 600 MG tablet Take 1 tablet (600 mg) by mouth 2 times daily. 180 tablet 1     hydroquinone (RUFUS) 4 % external cream Apply topically at bedtime 28.35 g 11     hydroxychloroquine (PLAQUENIL) 200 MG tablet Hydroxychloroquine 200 mg daily with an additional 200 mg every other day.  Ophthalmology toxicity monitoring eye exam, that includes 10-2 VF and SD-OCT, is required yearly. 135 tablet 2     lidocaine (XYLOCAINE) 5 % external ointment Apply topically as needed for moderate pain 50 g 1     methotrexate 2.5 MG tablet Take 6 tablets (15 mg) by mouth once a week. Methotrexate is a once weekly medication, taken on the same day of each week. 24 tablet 3     terbinafine (LAMISIL) 250 MG tablet Take 1 tablet (250 mg) by mouth daily. 90 tablet 0     diclofenac (VOLTAREN) 1 % topical gel Apply up to 2 grams of 1% gel to hands up to 4 times daily as needed for joint pain (maximum: 8 g per upper extremity per day) 200 g 2     ibuprofen (ADVIL/MOTRIN) 200 MG tablet Take 1 tablet (200 mg) by mouth every 4 hours as needed for pain (Patient not taking: Reported on 11/4/2024)       mometasone (ELOCON) 0.1 % external cream Apply topically daily 45 g 1     topiramate (TOPAMAX) 50 MG tablet Take 1 tablet (50 mg) by mouth daily. (Patient not taking: Reported on 11/4/2024) 30 tablet 1     tretinoin (RETIN-A) 0.025 % external cream Apply topically at bedtime Pea-sized amount to whole face 45 g 3     valACYclovir (VALTREX) 1000 mg tablet Take 1 tablet (1,000 mg) by mouth 2 times daily for 10 days 40 tablet 1     No Known Allergies                 Again, thank you for allowing me to participate in the care of your patient.        Sincerely,        Louis Beaulieu MD

## 2024-11-04 NOTE — PROGRESS NOTES
[facility] Dermatology Note    Encounter Date: Nov 4, 2024    Dermatology Problem List:  1.  Granuloma annulare  - current tx: augmented betamethasone, methotrexate, folic acid  - ILK injections: 9/16/24; 11/4/24  - in reserve:  hydroxychloroquine  2. Post inflammatory hyperpigmentation/solar lentigines  - current tx: tretinoin, hydroquinone  3. Systemic lupus  - current tx:  hydroxychloroquine,methotrexate, and belimumab  - prior tx:  leflunomide    ______________________________________    Impression/Plan:  1. Granuloma annulare affecting the dorsal hands, with improvement since last visit  - continue methotrexate 15 mg once a week  - increase folic acid to 2 mg daily due to fatigue  - continue augmented betamethasone ointment bid  - Kenalog intralesional injection procedure note: After verbal consent and discussion of risks including but not limited to atrophy, pain, and bruising, time out was performed, the patient underwent positioning and the area was prepped with isopropyl alcohol, 1.5 total cc of Kenalog 10 mg/cc was injected into 8 site(s) on the dorsal hands. The patient tolerated the procedure well and left the Dermatology clinic in good condition.     2. Lichen sclerosus  - not examined today, but patient report,has been less active recently  - continue augmented betamethasone ointment or clobetasol ointment  - expect benefit from  switching to methotrexate     3. Systemic lupus  - managed by rheumatology  - on hydroxychloroquine, methotrexate, and belimumab    Follow-up in 6-8 weeks.       Staff Involved:  Staff and Scribe    Scribe Disclosure:   I, Latanya Lanier, am serving as a scribe; to document services personally performed by Louis Beaulieu MD -based on data collection and the provider's statements to me.     Provider Disclosure:   The documentation recorded by the scribe accurately reflects the services I personally performed and the decisions made by me.    Louis MEDLEY  MD Christofer   of Dermatology  Department of Dermatology  [facility]        CC:   Chief Complaint   Patient presents with    Derm Problem     ILK follow up on hands. Hands are still itchy and red, but some swelling has calmed down.       History of Present Illness:  Ms. Sosa Patel is a 49 year old female who presents as a return patient.    Here for follow up. Today, she states that her hands are still itchy and red, but some of the swelling has resolved. She reports, that she started the methotrexate. Her LSC has improved, she went to     Labs:  9/2024-cmp, [facility]    Physical exam:  Vitals: LMP 02/05/2022   GEN: This is a well developed, well-nourished female in no acute distress, in a pleasant mood.    SKIN: Moses phototype III  - Focused examination of the hands was performed.  - erythematous annulare plaques on the dorsal hands  - No other lesions of concern on areas examined.     Past Medical History:   Past Medical History:   Diagnosis Date    Arthritis 2013    Bipolar disorder (H) 12/2004    Dx'd    Combinations of drug dependence excluding opioid type drug (H) 05/04/2022    In remission     H/O: depression     SUICIDE ATTEMPT AGE 15    HPV (human papillomavirus)     Lupus erythematosus     Mood disorder (H) 04/09/2019     Past Surgical History:   Procedure Laterality Date    BIOPSY      BUNIONECTOMY RT/LT      CHOLECYSTECTOMY      DAVINCI LAPAROSCOPIC CHOLECYSTECTOMY WITHOUT GRAMS N/A 10/09/2023    Procedure: CHOLECYSTECTOMY, ROBOT-ASSISTED, LAPAROSCOPIC, WITHOUT CHOLANGIOGRAM;  Surgeon: Demetris Peoples MD;  Location: [facility] OR     ENLARGE BREAST WITH IMPLANT  01/01/2000    LAMINECTOMY LUMBAR TWO LEVELS Bilateral 5/29/2024    Procedure: LUMBAR 4, LUMBAR 5 DECOMPRESSIVE BILATERAL LAMINECTOMY, MEDIAL FACETECTOMY WITH EXCISION OF CYST;  Surgeon: Aureliano Goins MD;  Location:  OR       Social History:   reports that she quit smoking about 11  years ago. Her smoking use included cigarettes. She started smoking about 34 years ago. She has a 11.5 pack-year smoking history. She has been exposed to tobacco smoke. She uses smokeless tobacco. She reports current alcohol use. She reports that she does not currently use drugs.    Family History:  Family History   Problem Relation Age of Onset    Cancer Father         lung cancer    Other Cancer Father     Diabetes Maternal Grandmother     Depression Maternal Grandmother     Lipids Maternal Grandmother     Cerebrovascular Disease Maternal Grandfather     Lipids Maternal Grandfather     Circulatory Maternal Grandfather         MI in 50's    Depression Brother         OCD    Gastrointestinal Disease Brother         IBS    Depression Mother     Anxiety Disorder Daughter     Thyroid Disease No family hx of     Asthma No family hx of        Medications:  Current Outpatient Medications   Medication Sig Dispense Refill    acetaminophen (TYLENOL) 325 MG tablet Take 650 mg by mouth 2 times daily. Pt takes twice a day      ARIPiprazole (ABILIFY) 10 MG tablet Take 10 mg by mouth daily.      aspirin-acetaminophen-caffeine (EXCEDRIN MIGRAINE) 250-250-65 MG tablet Take 1 tablet by mouth daily as needed for headaches      augmented betamethasone dipropionate (DIPROLENE-AF) 0.05 % external ointment Apply topically 2 times daily. 50 g 3    belimumab (BENLYSTA) subcutaneous injection (prefilled autoinjector) Inject 1 mL (200 mg) subcutaneously every 7 days. . Hold for signs of infection and seek medical attention.   Refills at rheumatology appointments. 4 mL 7    cetirizine (ZYRTEC) 10 MG tablet TAKE 2 TABLETS BY MOUTH DAILY 180 tablet 0    clobetasol (TEMOVATE) 0.05 % external ointment Apply topically twice a week.      FLUoxetine (PROZAC) 40 MG capsule TAKE ONE CAPSULE BY MOUTH ONCE DAILY 90 capsule 1    folic acid (FOLVITE) 1 MG tablet Take 1 tablet (1 mg) by mouth daily. 100 tablet 3    gabapentin (NEURONTIN) 600 MG tablet Take  1 tablet (600 mg) by mouth 2 times daily. 180 tablet 1    hydroquinone (RUFUS) 4 % external cream Apply topically at bedtime 28.35 g 11    hydroxychloroquine (PLAQUENIL) 200 MG tablet Hydroxychloroquine 200 mg daily with an additional 200 mg every other day.  Ophthalmology toxicity monitoring eye exam, that includes 10-2 VF and SD-OCT, is required yearly. 135 tablet 2    lidocaine (XYLOCAINE) 5 % external ointment Apply topically as needed for moderate pain 50 g 1    methotrexate 2.5 MG tablet Take 6 tablets (15 mg) by mouth once a week. Methotrexate is a once weekly medication, taken on the same day of each week. 24 tablet 3    terbinafine (LAMISIL) 250 MG tablet Take 1 tablet (250 mg) by mouth daily. 90 tablet 0    diclofenac (VOLTAREN) 1 % topical gel Apply up to 2 grams of 1% gel to hands up to 4 times daily as needed for joint pain (maximum: 8 g per upper extremity per day) 200 g 2    ibuprofen (ADVIL/MOTRIN) 200 MG tablet Take 1 tablet (200 mg) by mouth every 4 hours as needed for pain (Patient not taking: Reported on 11/4/2024)      mometasone (ELOCON) 0.1 % external cream Apply topically daily 45 g 1    topiramate (TOPAMAX) 50 MG tablet Take 1 tablet (50 mg) by mouth daily. (Patient not taking: Reported on 11/4/2024) 30 tablet 1    tretinoin (RETIN-A) 0.025 % external cream Apply topically at bedtime Pea-sized amount to whole face 45 g 3    valACYclovir (VALTREX) 1000 mg tablet Take 1 tablet (1,000 mg) by mouth 2 times daily for 10 days 40 tablet 1     No Known Allergies

## 2024-11-11 NOTE — NURSING NOTE
Drug Administration Record    Prior to injection, verified patient identity using patient's name and date of birth.  Due to injection administration, patient instructed to remain in clinic for 15 minutes  afterwards, and to report any adverse reaction to me immediately.    Drug Name: triamcinolone acetonide(kenalog)  Dose: 1.5mL of triamcinolone 10mg/mL, 10mg dose  Route administered: intradermal  NDC #: 1052-2767-07  Amount of waste(mL):0.5mL  Reason for waste: As per MD    LOT #: [id number]  SITE: hands  : [id number]  EXPIRATION DATE: 04-    Joyce Deluna LPN on 11/11/2024 at 1:49 PM

## 2024-11-13 ENCOUNTER — VIRTUAL VISIT (OUTPATIENT)
Dept: FAMILY MEDICINE | Facility: CLINIC | Age: 49
End: 2024-11-13
Attending: PHYSICIAN ASSISTANT
Payer: COMMERCIAL

## 2024-11-13 DIAGNOSIS — Z53.9 ERRONEOUS ENCOUNTER--DISREGARD: Primary | ICD-10-CM

## 2024-11-13 ASSESSMENT — PATIENT HEALTH QUESTIONNAIRE - PHQ9
10. IF YOU CHECKED OFF ANY PROBLEMS, HOW DIFFICULT HAVE THESE PROBLEMS MADE IT FOR YOU TO DO YOUR WORK, TAKE CARE OF THINGS AT HOME, OR GET ALONG WITH OTHER PEOPLE: SOMEWHAT DIFFICULT
SUM OF ALL RESPONSES TO PHQ QUESTIONS 1-9: 4
SUM OF ALL RESPONSES TO PHQ QUESTIONS 1-9: 4

## 2024-11-13 NOTE — PROGRESS NOTES
"Sosa is a 49 year old who is being evaluated via a billable video visit.    How would you like to obtain your AVS? MyChart  If the video visit is dropped, the invitation should be resent by: Text to cell phone: [phone]  Will anyone else be joining your video visit? No  {If patient encounters technical issues they should call [phone] :216733}    {PROVIDER CHARTING PREFERENCE:518604}    Subjective   Sosa is a 49 year old, presenting for the following health issues:  Recheck Medication      11/13/2024     8:16 AM   Additional Questions   Roomed by Shwetha   Accompanied by self     Video Start Time: {video visit start/end time for provider to select:939539}    History of Present Illness       Reason for visit:  Med check   She is taking medications regularly.       Medication Followup of Topiramate   Taking Medication as prescribed: yes  Side Effects:  None  Medication Helping Symptoms:  yes  {additonal problems for provider to add (Optional):516775}    {ROS Picklists (Optional):201957}      Objective           Vitals:  No vitals were obtained today due to virtual visit.    Physical Exam   {video visit exam brief selected:285292}    {Diagnostic Test Results (Optional):350918}      Video-Visit Details    Type of service:  Video Visit   Video End Time:{video visit start/end time for provider to select:608845}  Originating Location (pt. Location): {video visit patient location:240413::\"Home\"}  {PROVIDER LOCATION On-site should be selected for visits conducted from your clinic location or adjoining [facility] hospital, academic office, or other nearby [facility] building. Off-site should be selected for all other provider locations, including home:755623}  Distant Location (provider location):  {virtual location provider:803574}  Platform used for Video Visit: {Virtual Visit Platforms:744677::\"[email or web address]\"}  Signed Electronically by: Cynthia Scott PA-C  {Email feedback regarding this note to " [email or web address]   :748344}

## 2024-11-14 ENCOUNTER — PATIENT OUTREACH (OUTPATIENT)
Dept: CARE COORDINATION | Facility: CLINIC | Age: 49
End: 2024-11-14
Payer: COMMERCIAL

## 2024-11-24 ENCOUNTER — OFFICE VISIT (OUTPATIENT)
Dept: URGENT CARE | Facility: URGENT CARE | Age: 49
End: 2024-11-24
Payer: COMMERCIAL

## 2024-11-24 VITALS
TEMPERATURE: 97.8 F | OXYGEN SATURATION: 98 % | BODY MASS INDEX: 25.4 KG/M2 | RESPIRATION RATE: 18 BRPM | DIASTOLIC BLOOD PRESSURE: 73 MMHG | SYSTOLIC BLOOD PRESSURE: 108 MMHG | HEART RATE: 84 BPM | WEIGHT: 155 LBS

## 2024-11-24 DIAGNOSIS — N76.0 BV (BACTERIAL VAGINOSIS): Primary | ICD-10-CM

## 2024-11-24 DIAGNOSIS — N89.8 VAGINAL DISCHARGE: ICD-10-CM

## 2024-11-24 DIAGNOSIS — B96.89 BV (BACTERIAL VAGINOSIS): Primary | ICD-10-CM

## 2024-11-24 LAB
CLUE CELLS: PRESENT
TRICHOMONAS, WET PREP: ABNORMAL
WBC'S/HIGH POWER FIELD, WET PREP: ABNORMAL
YEAST, WET PREP: ABNORMAL

## 2024-11-24 PROCEDURE — 99214 OFFICE O/P EST MOD 30 MIN: CPT | Performed by: STUDENT IN AN ORGANIZED HEALTH CARE EDUCATION/TRAINING PROGRAM

## 2024-11-24 PROCEDURE — 87210 SMEAR WET MOUNT SALINE/INK: CPT | Performed by: STUDENT IN AN ORGANIZED HEALTH CARE EDUCATION/TRAINING PROGRAM

## 2024-11-24 PROCEDURE — 87491 CHLMYD TRACH DNA AMP PROBE: CPT | Performed by: STUDENT IN AN ORGANIZED HEALTH CARE EDUCATION/TRAINING PROGRAM

## 2024-11-24 PROCEDURE — 87591 N.GONORRHOEAE DNA AMP PROB: CPT | Performed by: STUDENT IN AN ORGANIZED HEALTH CARE EDUCATION/TRAINING PROGRAM

## 2024-11-24 RX ORDER — METRONIDAZOLE 500 MG/1
500 TABLET ORAL 2 TIMES DAILY
Qty: 14 TABLET | Refills: 0 | Status: SHIPPED | OUTPATIENT
Start: 2024-11-24 | End: 2024-12-01

## 2024-11-24 ASSESSMENT — PAIN SCALES - GENERAL: PAINLEVEL_OUTOF10: NO PAIN (0)

## 2024-11-24 NOTE — PATIENT INSTRUCTIONS
You currently have a vaginal infection called bacterial vaginosis, or BV for short.   This is not a sexually transmitted infection and it cannot be transmitted to your partner.  BV typically occurs due to a change in pH balance of the vagina. The following things may cause BV to occur: douching, new soaps or lubricants, or oral sex. Sometimes we can't prevent BV because it can happen for no reason at all.   Sometimes BV is asymptomatic, but classically it causes thin or creamy odorous vaginal discharge. It can also cause some low abdominal discomfort.     Today we will treat this infection with an antibiotic called Metronidazole (Flagyl). Take this medication twice daily for 7 days. You must avoid drinking alcohol while you are taking this medication. If you drink while taking Flagyl you may experience severe headache, nausea/vomiting, or liver dysfunction.  Avoid sexual intercourse until treatment is completed.  Follow up if you continue to have symptoms after you have completed your treatment.

## 2024-11-24 NOTE — PROGRESS NOTES
ASSESSMENT & PLAN:   Diagnoses and all orders for this visit:  BV (bacterial vaginosis)  -     metroNIDAZOLE (FLAGYL) 500 MG tablet; Take 1 tablet (500 mg) by mouth 2 times daily for 7 days.  Vaginal discharge  -     Wet prep - lab collect; Future  -     NEISSERIA GONORRHOEA PCR; Future  -     CHLAMYDIA TRACHOMATIS PCR; Future    Vaginal discharge x 2 weeks. Wet prep positive for BV - metronidazole x 7 days. Gonorrhea and chlamydia test pending.    At the end of the encounter, I discussed results, diagnosis, medications. Discussed red flags for immediate return to clinic/ER, as well as indications for follow up if no improvement. Patient and/or caregiver understood and agreed to plan. Patient was stable for discharge.    Patient Instructions   You currently have a vaginal infection called bacterial vaginosis, or BV for short.   This is not a sexually transmitted infection and it cannot be transmitted to your partner.  BV typically occurs due to a change in pH balance of the vagina. The following things may cause BV to occur: douching, new soaps or lubricants, or oral sex. Sometimes we can't prevent BV because it can happen for no reason at all.   Sometimes BV is asymptomatic, but classically it causes thin or creamy odorous vaginal discharge. It can also cause some low abdominal discomfort.     Today we will treat this infection with an antibiotic called Metronidazole (Flagyl). Take this medication twice daily for 7 days. You must avoid drinking alcohol while you are taking this medication. If you drink while taking Flagyl you may experience severe headache, nausea/vomiting, or liver dysfunction.  Avoid sexual intercourse until treatment is completed.  Follow up if you continue to have symptoms after you have completed your treatment.      No follow-ups on file.    ------------------------------------------------------------------------  SUBJECTIVE  History was obtained from patient.    Patient presents  with:  Vaginal Problem: Thought it was yeast infection (used OTC) - yellow, brown, discharge, smell, itching     HPI  Sosa Patel is a(n) 49 year old female presenting to urgent care for vaginal discharge x 2 weeks. Did OTC yeast infection treatment - 3 day course then 7 day course with no improvement. Reports odor and itching. Would like STD testing. No known exposure.    Review of Systems    Current Outpatient Medications   Medication Sig Dispense Refill    acetaminophen (TYLENOL) 325 MG tablet Take 650 mg by mouth 2 times daily. Pt takes twice a day      ARIPiprazole (ABILIFY) 10 MG tablet Take 10 mg by mouth daily.      aspirin-acetaminophen-caffeine (EXCEDRIN MIGRAINE) 250-250-65 MG tablet Take 1 tablet by mouth daily as needed for headaches      augmented betamethasone dipropionate (DIPROLENE-AF) 0.05 % external ointment Apply topically 2 times daily. 50 g 3    belimumab (BENLYSTA) subcutaneous injection (prefilled autoinjector) Inject 1 mL (200 mg) subcutaneously every 7 days. . Hold for signs of infection and seek medical attention.   Refills at rheumatology appointments. 4 mL 7    cetirizine (ZYRTEC) 10 MG tablet TAKE 2 TABLETS BY MOUTH DAILY 180 tablet 0    clobetasol (TEMOVATE) 0.05 % external ointment Apply topically twice a week.      FLUoxetine (PROZAC) 40 MG capsule TAKE ONE CAPSULE BY MOUTH ONCE DAILY 90 capsule 1    folic acid (FOLVITE) 1 MG tablet Take 2 tablets (2 mg) by mouth daily. 180 tablet 3    gabapentin (NEURONTIN) 600 MG tablet Take 1 tablet (600 mg) by mouth 2 times daily. 180 tablet 1    hydroquinone (RUFUS) 4 % external cream Apply topically at bedtime 28.35 g 11    hydroxychloroquine (PLAQUENIL) 200 MG tablet Hydroxychloroquine 200 mg daily with an additional 200 mg every other day.  Ophthalmology toxicity monitoring eye exam, that includes 10-2 VF and SD-OCT, is required yearly. 135 tablet 2    ibuprofen (ADVIL/MOTRIN) 200 MG tablet Take 200 mg by mouth every 4 hours as needed  for pain.      lidocaine (XYLOCAINE) 5 % external ointment Apply topically as needed for moderate pain 50 g 1    methotrexate 2.5 MG tablet Take 6 tablets (15 mg) by mouth once a week. Methotrexate is a once weekly medication, taken on the same day of each week. 24 tablet 3    metroNIDAZOLE (FLAGYL) 500 MG tablet Take 1 tablet (500 mg) by mouth 2 times daily for 7 days. 14 tablet 0    terbinafine (LAMISIL) 250 MG tablet Take 1 tablet (250 mg) by mouth daily. 90 tablet 0    topiramate (TOPAMAX) 50 MG tablet Take 1 tablet (50 mg) by mouth daily. 30 tablet 1    diclofenac (VOLTAREN) 1 % topical gel Apply up to 2 grams of 1% gel to hands up to 4 times daily as needed for joint pain (maximum: 8 g per upper extremity per day) 200 g 2    mometasone (ELOCON) 0.1 % external cream Apply topically daily 45 g 1    tretinoin (RETIN-A) 0.025 % external cream Apply topically at bedtime Pea-sized amount to whole face 45 g 3    valACYclovir (VALTREX) 1000 mg tablet Take 1 tablet (1,000 mg) by mouth 2 times daily for 10 days 40 tablet 1     Problem List:  2024-09: S/P lumbar laminectomy  2024-05: Surgery, elective  2024-01: Acute bilateral low back pain with bilateral sciatica  2023-09: Gallstones  2022-08: Recurrent major depressive disorder, in partial remission (H)  2022-07: Chronic midline low back pain without sciatica  2022-07: Pain in thoracic spine  2022-05: Combinations of drug dependence excluding opioid type drug   (H)  2019-09: Lichen sclerosus  2019-04: Mood disorder (H)  2017-09: High risk medications (not anticoagulants) long-term use  2017-09: Sicca syndrome (H)  2017-01: Systemic lupus erythematosus (H)  2016-10: JESSE positive  2016-10: Multiple joint pain  2016-10: Cocaine use  2016-02: Surveillance for Depo-Provera contraception  2015-12: Tobacco abuse  2013-03: H/O intravenous drug use in remission  2010-10: CARDIOVASCULAR SCREENING; LDL GOAL LESS THAN 160  2010-06: Herpes simplex type 2 infection  Genital  warts    No Known Allergies      OBJECTIVE  Vitals:    11/24/24 1048   BP: 108/73   BP Location: Left arm   Patient Position: Sitting   Cuff Size: Adult Regular   Pulse: 84   Resp: 18   Temp: 97.8  F (36.6  C)   TempSrc: Tympanic   SpO2: 98%   Weight: 70.3 kg (155 lb)     Physical Exam   GENERAL: healthy, alert, no acute distress.   PSYCH: mentation appears normal. Normal affect  HEAD: normocephalic, atraumatic.  LUNGS: no increased work of breathing.    Results for orders placed or performed in visit on 11/24/24   Wet prep - lab collect     Status: Abnormal    Specimen: Vagina; Swab   Result Value Ref Range    Trichomonas Absent Absent    Yeast Absent Absent    Clue Cells Present (A) Absent    WBCs/high power field 3+ (A) None

## 2024-11-25 LAB
C TRACH DNA SPEC QL NAA+PROBE: NEGATIVE
N GONORRHOEA DNA SPEC QL NAA+PROBE: NEGATIVE

## 2024-12-09 ENCOUNTER — E-VISIT (OUTPATIENT)
Dept: FAMILY MEDICINE | Facility: CLINIC | Age: 49
End: 2024-12-09
Payer: COMMERCIAL

## 2024-12-09 DIAGNOSIS — N76.0 BACTERIAL VAGINOSIS: Primary | ICD-10-CM

## 2024-12-09 DIAGNOSIS — B96.89 BACTERIAL VAGINOSIS: Primary | ICD-10-CM

## 2024-12-09 RX ORDER — METRONIDAZOLE 500 MG/1
500 TABLET ORAL 2 TIMES DAILY
Qty: 14 TABLET | Refills: 0 | Status: SHIPPED | OUTPATIENT
Start: 2024-12-09 | End: 2024-12-16

## 2024-12-17 ENCOUNTER — OFFICE VISIT (OUTPATIENT)
Dept: PALLIATIVE MEDICINE | Facility: OTHER | Age: 49
End: 2024-12-17
Attending: STUDENT IN AN ORGANIZED HEALTH CARE EDUCATION/TRAINING PROGRAM
Payer: COMMERCIAL

## 2024-12-17 VITALS — SYSTOLIC BLOOD PRESSURE: 114 MMHG | HEART RATE: 82 BPM | DIASTOLIC BLOOD PRESSURE: 73 MMHG | OXYGEN SATURATION: 100 %

## 2024-12-17 DIAGNOSIS — M47.817 LUMBOSACRAL SPONDYLOSIS WITHOUT MYELOPATHY: ICD-10-CM

## 2024-12-17 DIAGNOSIS — M48.061 SPINAL STENOSIS OF LUMBAR REGION, UNSPECIFIED WHETHER NEUROGENIC CLAUDICATION PRESENT: ICD-10-CM

## 2024-12-17 DIAGNOSIS — M54.16 LUMBAR RADICULOPATHY: Primary | ICD-10-CM

## 2024-12-17 PROCEDURE — G0463 HOSPITAL OUTPT CLINIC VISIT: HCPCS | Performed by: STUDENT IN AN ORGANIZED HEALTH CARE EDUCATION/TRAINING PROGRAM

## 2024-12-17 ASSESSMENT — PAIN SCALES - GENERAL: PAINLEVEL_OUTOF10: MILD PAIN (2)

## 2024-12-17 NOTE — PROGRESS NOTES
[facility] Spine Follow-up    Date of visit: 12/17/2024    Chief complaint:   Chief Complaint   Patient presents with    Pain     Follow up post surgery- May 2024. Experiencing problems after surgery- Sciatica pain- Bilateral pain- Left side is worse then the right. Lower back- All across. When she moves her back pops all the time. Feels the gabapentin helps. She was on a taper off but she put herself back on it.        Interval history:  Since her last visit, Sosa Patel reports:  Has had surgery and feels this was significantly helpful.  Was weaned off gabapentin but after some return of symptoms, back on gabapentin    Pain is controlled to a 2 with gabapentin, 6 without    Pain scores:  Pain intensity currently is 2 on a scale of 0-10.     Current pain medications include:  Gabapentin 600mg 2-3 times daily  - causes drowsiness  Tylenol - H     Previous medication treatments included:  Methocarbamol - helpful but causes drowsiness  Prednisone 20mg 5 days - Helpful while on it, but stopped  Norco 5-325mg BID PRN - helpful  Ibuprofen - H  Diclofenac - H    Other treatments have included:  Sosa Patel has not been seen at a pain clinic in the past.    PT: ongoing, has been to 1 visit so far, doing HEP - may be helpful  Injections:               3/13/24 - bilateral L4-5 TFESI - [facility]  Surgeries:   5/29/24 - L4-5 bilateral laminectomy - Dr. Goins - very helpful    Side Effects: no side effect    Medications:  Current Outpatient Medications   Medication Sig Dispense Refill    acetaminophen (TYLENOL) 325 MG tablet Take 650 mg by mouth 2 times daily. Pt takes twice a day      ARIPiprazole (ABILIFY) 10 MG tablet Take 10 mg by mouth daily.      aspirin-acetaminophen-caffeine (EXCEDRIN MIGRAINE) 250-250-65 MG tablet Take 1 tablet by mouth daily as needed for headaches      augmented betamethasone dipropionate (DIPROLENE-AF) 0.05 % external ointment Apply topically 2 times daily. 50  g 3    belimumab (BENLYSTA) subcutaneous injection (prefilled autoinjector) Inject 1 mL (200 mg) subcutaneously every 7 days. . Hold for signs of infection and seek medical attention.   Refills at rheumatology appointments. 4 mL 7    cetirizine (ZYRTEC) 10 MG tablet TAKE 2 TABLETS BY MOUTH DAILY 180 tablet 0    clobetasol (TEMOVATE) 0.05 % external ointment Apply topically twice a week.      FLUoxetine (PROZAC) 40 MG capsule TAKE ONE CAPSULE BY MOUTH ONCE DAILY 90 capsule 1    folic acid (FOLVITE) 1 MG tablet Take 2 tablets (2 mg) by mouth daily. 180 tablet 3    gabapentin (NEURONTIN) 600 MG tablet Take 1 tablet (600 mg) by mouth 2 times daily. 180 tablet 1    hydroquinone (RUFUS) 4 % external cream Apply topically at bedtime 28.35 g 11    hydroxychloroquine (PLAQUENIL) 200 MG tablet Hydroxychloroquine 200 mg daily with an additional 200 mg every other day.  Ophthalmology toxicity monitoring eye exam, that includes 10-2 VF and SD-OCT, is required yearly. 135 tablet 2    lidocaine (XYLOCAINE) 5 % external ointment Apply topically as needed for moderate pain 50 g 1    methotrexate 2.5 MG tablet Take 6 tablets (15 mg) by mouth once a week. Methotrexate is a once weekly medication, taken on the same day of each week. 24 tablet 3    topiramate (TOPAMAX) 50 MG tablet Take 1 tablet (50 mg) by mouth daily. 30 tablet 1    ibuprofen (ADVIL/MOTRIN) 200 MG tablet Take 200 mg by mouth every 4 hours as needed for pain. (Patient not taking: Reported on 12/17/2024)      valACYclovir (VALTREX) 1000 mg tablet Take 1 tablet (1,000 mg) by mouth 2 times daily for 10 days 40 tablet 1       Medical History: any changes in medical history since they were last seen? No    Physical Exam:  Blood pressure 114/73, pulse 82, last menstrual period 02/05/2022, SpO2 100%, not currently breastfeeding.  Constitutional: Well developed, NAD  Head: normocephalic. Atraumatic.   Eyes: no redness or jaundice noted   CV: warm, well perfused extremities    Skin: no suspicious lesions or rashes   Psychiatric: mentation appears normal and affect full    Diagnostics:  EXAM: XR LUMBAR FLEX/EXT 2/3 VIEWS  LOCATION: [facility]  DATE: 5/13/2024     INDICATION:  Back pain  COMPARISON: Lumbar spine MR from 1/24/2024.                                                                      IMPRESSION: No fracture. Normal vertebral heights. Broad right convex curvature centered in the mid lumbar spine. Minimal anterolisthesis at L4-5. No evidence of dynamic instability. Multilevel mild disc height loss and osteophyte formation. Normal   extraspinal structures.    Personally reviewed: Yes    Assessment:   Lumbar radiculopathy  Spinal stenosis  Lumbar spondylosis from facet arthropathy     Sosa Patel is a 49 year old female who presents for follow-up of low back and leg pain, now status post L4-5 laminectomy.  We have previously tried therapy, medications, TFESI, with the gabapentin helping most.  She notes that she was fully weaned off of gabapentin after the surgery due to symptomatic improvement, but some symptoms have returned, and patient is back on gabapentin.  Overall she feels much improved after the surgery.  I discussed that gabapentin would be appropriate to continue at her current dosing with her PCP.  We would not proceed with any further interventions, but we can discuss in the future if there are additional needs.  Will see her back as needed.     Plan:  Diagnosis reviewed, treatment option addressed, and risk/benefits discussed.      Procedures: None  Physical Therapy:   Continue HEP  Diagnostic Studies:   XR lumbar spine reviewed  MRI Lumbar spine previously reviewed  Medication Management:   Ok to continue gabapentin 600mg 2-3 times per day  Ok to take tylenol - previously discussed dosing restrictions  Follow up: as needed    Philipp Echavarria MD  Interventional Pain Medicine  [facility]

## 2024-12-17 NOTE — PATIENT INSTRUCTIONS
Procedures: None  Physical Therapy:   Continue HEP  Diagnostic Studies:   XR lumbar spine reviewed  MRI Lumbar spine previously reviewed  Medication Management:   Ok to continue gabapentin 600mg 2-3 times per day  Ok to take tylenol - previously discussed dosing restrictions  Follow up: as needed    Philipp Echavarria MD  Interventional Pain Medicine  [facility] Number:  [phone]  Call with any questions about your care and for scheduling assistance.   Calls are returned Monday through Friday between 8 AM and 4:30 PM. We usually get back to you within 2 business days depending on the issue/request.    If we are prescribing your medications:  For opioid medication refills, call the clinic or send a INXPO message 7 days in advance.  Please include:  Name of requested medication  Name of the pharmacy.  For non-opioid medications, call your pharmacy directly to request a refill. Please allow 3-4 days to be processed.   Per [place] State Law:  All controlled substance prescriptions must be filled within 30 days of being written.    For those controlled substances allowing refills, pickup must occur within 30 days of last fill.      We believe regular attendance is key to your success in our program!    Any time you are unable to keep your appointment we ask that you call us at least 24 hours in advance to cancel.This will allow us to offer the appointment time to another patient.   Multiple missed appointments may lead to dismissal from the clinic.

## 2024-12-30 DIAGNOSIS — J31.0 CHRONIC RHINITIS: ICD-10-CM

## 2024-12-30 RX ORDER — CETIRIZINE HYDROCHLORIDE 10 MG/1
20 TABLET ORAL DAILY
Qty: 180 TABLET | Refills: 0 | Status: SHIPPED | OUTPATIENT
Start: 2024-12-30

## 2025-01-19 PROBLEM — Z98.890 S/P LUMBAR LAMINECTOMY: Status: RESOLVED | Noted: 2024-09-04 | Resolved: 2025-01-19

## 2025-01-19 PROBLEM — M54.50 CHRONIC MIDLINE LOW BACK PAIN WITHOUT SCIATICA: Chronic | Status: RESOLVED | Noted: 2022-07-27 | Resolved: 2025-01-19

## 2025-01-19 PROBLEM — G89.29 CHRONIC MIDLINE LOW BACK PAIN WITHOUT SCIATICA: Chronic | Status: RESOLVED | Noted: 2022-07-27 | Resolved: 2025-01-19

## 2025-01-20 RX ORDER — SODIUM CHLORIDE, SODIUM LACTATE, POTASSIUM CHLORIDE, CALCIUM CHLORIDE 600; 310; 30; 20 MG/100ML; MG/100ML; MG/100ML; MG/100ML
INJECTION, SOLUTION INTRAVENOUS CONTINUOUS
Status: CANCELLED | OUTPATIENT
Start: 2025-01-20

## 2025-01-20 RX ORDER — LIDOCAINE 40 MG/G
CREAM TOPICAL
Status: CANCELLED | OUTPATIENT
Start: 2025-01-20

## 2025-01-20 RX ORDER — ONDANSETRON 2 MG/ML
4 INJECTION INTRAMUSCULAR; INTRAVENOUS
Status: CANCELLED | OUTPATIENT
Start: 2025-01-20

## 2025-01-21 ENCOUNTER — ANESTHESIA (OUTPATIENT)
Dept: SURGERY | Facility: AMBULATORY SURGERY CENTER | Age: 50
End: 2025-01-21
Payer: COMMERCIAL

## 2025-01-21 ENCOUNTER — ANESTHESIA EVENT (OUTPATIENT)
Dept: SURGERY | Facility: AMBULATORY SURGERY CENTER | Age: 50
End: 2025-01-21
Payer: COMMERCIAL

## 2025-01-21 ASSESSMENT — LIFESTYLE VARIABLES: TOBACCO_USE: 1

## 2025-01-21 NOTE — ANESTHESIA PREPROCEDURE EVALUATION
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 remission     H/O: depression     SUICIDE ATTEMPT AGE 15    HPV (human papillomavirus)     Lupus erythematosus     Mood disorder 2019      Past Surgical History:   Procedure Laterality Date    BIOPSY      BUNIONECTOMY RT/LT      CHOLECYSTECTOMY      DAVINCI LAPAROSCOPIC CHOLECYSTECTOMY WITHOUT GRAMS N/A 10/09/2023    Procedure: CHOLECYSTECTOMY, ROBOT-ASSISTED, LAPAROSCOPIC, WITHOUT CHOLANGIOGRAM;  Surgeon: Demetris Peoples MD;  Location: [facility] OR     ENLARGE BREAST WITH IMPLANT  2000    LAMINECTOMY LUMBAR TWO LEVELS Bilateral 2024    Procedure: LUMBAR 4, LUMBAR 5 DECOMPRESSIVE BILATERAL LAMINECTOMY, MEDIAL FACETECTOMY WITH EXCISION OF CYST;  Surgeon: Aureliano Goins MD;  Location: [facility] OR      No Known Allergies   Social History     Tobacco Use    Smoking status: Former     Average packs/day: 0.3 packs/day for 41.4 years (11.5 ttl pk-yrs)     Types: Cigarettes     Start date: 3/3/1990     Quit date: 3/3/2013     Years since quittin.8     Passive exposure: Past    Smokeless tobacco: Current    Tobacco comments:     Currently vapes   Substance Use Topics    Alcohol use: Yes     Comment: rare      Wt Readings from Last 1 Encounters:   24 70.3 kg (155 lb)        Anesthesia Evaluation   Pt has not had prior anesthetic         ROS/MED HX  ENT/Pulmonary:     (+)                tobacco use, Past use,                       Neurologic:  - neg neurologic ROS     Cardiovascular:  - neg cardiovascular ROS     METS/Exercise Tolerance: >4 METS    Hematologic:  - neg hematologic  ROS     Musculoskeletal:  - neg musculoskeletal ROS     GI/Hepatic:  - neg  "GI/hepatic ROS     Renal/Genitourinary:  - neg Renal ROS     Endo:  - neg endo ROS     Psychiatric/Substance Use:     (+) psychiatric history bipolar   Recreational drug usage: Cocaine.    Infectious Disease:  - neg infectious disease ROS     Malignancy:  - neg malignancy ROS     Other:  - neg other ROS          Physical Exam    Airway  airway exam normal      Mallampati: I   TM distance: > 3 FB   Neck ROM: full   Mouth opening: > 3 cm    Respiratory Devices and Support         Dental       (+) Completely normal teeth      Cardiovascular   cardiovascular exam normal       Rhythm and rate: regular and normal     Pulmonary   pulmonary exam normal        breath sounds clear to auscultation           OUTSIDE LABS:  CBC:   Lab Results   Component Value Date    WBC 6.5 09/25/2024    WBC 7.0 06/19/2024    HGB 12.4 09/25/2024    HGB 12.6 06/19/2024    HCT 38.3 09/25/2024    HCT 39.0 06/19/2024     09/25/2024     06/19/2024     BMP:   Lab Results   Component Value Date     09/25/2024     06/19/2024    POTASSIUM 3.7 09/25/2024    POTASSIUM 3.9 06/19/2024    CHLORIDE 106 09/25/2024    CHLORIDE 107 06/19/2024    CO2 22 09/25/2024    CO2 23 06/19/2024    BUN 31.7 (H) 09/25/2024    BUN 25.1 (H) 06/19/2024    CR 0.87 09/25/2024    CR 0.70 06/19/2024    GLC 85 09/25/2024     (H) 06/19/2024     COAGS: No results found for: \"PTT\", \"INR\", \"FIBR\"  POC:   Lab Results   Component Value Date    HCG Negative 10/30/2020     HEPATIC:   Lab Results   Component Value Date    ALBUMIN 4.1 09/25/2024    PROTTOTAL 6.4 09/25/2024    ALT 24 09/25/2024    AST 30 09/25/2024    ALKPHOS 36 (L) 09/25/2024    BILITOTAL 0.3 09/25/2024     OTHER:   Lab Results   Component Value Date    A1C 5.1 04/14/2009    RICHARD 8.9 09/25/2024    LIPASE 17 09/19/2023    AMYLASE 29 09/19/2023    TSH 1.48 10/11/2016    CRP <2.9 03/23/2022    SED 7 06/19/2024       Anesthesia Plan    ASA Status:  3    NPO Status:  NPO Appropriate    Anesthesia " Type: MAC.     - Reason for MAC: Deep or markedly invasive procedure (G8)   Induction: Propofol.   Maintenance: N/A.        Consents    Anesthesia Plan(s) and associated risks, benefits, and realistic alternatives discussed. Questions answered and patient/representative(s) expressed understanding.     - Discussed:     - Discussed with:  Patient       Use of blood products discussed: No .     Postoperative Care            Comments:           H&P reviewed: Unable to attach H&P to encounter due to EHR limitations. H&P Update: appropriate H&P reviewed, patient examined. No interval changes since H&P (within 30 days).        Mateusz Rivera DO    I have reviewed the pertinent notes and labs in the chart from the past 30 days and (re)examined the patient.  Any updates or changes from those notes are reflected in this note.    Clinically Significant Risk Factors Present on Admission

## 2025-02-10 ENCOUNTER — TELEPHONE (OUTPATIENT)
Dept: RHEUMATOLOGY | Facility: CLINIC | Age: 50
End: 2025-02-10
Payer: COMMERCIAL

## 2025-02-10 NOTE — TELEPHONE ENCOUNTER
PA Initiation    Medication: BENLYSTA 200 MG/ML SC SOAJ  Insurance Company: [employer] - Phone [phone] Fax [phone]:  [facility]  Pharmacy Filling the Rx: [facility] - [place] [address]   Filling Pharmacy Phone: [phone]  Filling Pharmacy Fax: [phone]  Start Date: 2/10/2025  ALFONSO (Key: [id number])  PA Case ID #: [id number]        Thank You,     Amelia Quiles [facility]  Specialty Pharmacy Clinic [facility] Specialty  [email or web address]  [email or web address]  Phone: [phone]  Fax: [phone]

## 2025-02-12 DIAGNOSIS — M32.9 SYSTEMIC LUPUS ERYTHEMATOSUS, UNSPECIFIED SLE TYPE, UNSPECIFIED ORGAN INVOLVEMENT STATUS (H): ICD-10-CM

## 2025-02-12 NOTE — TELEPHONE ENCOUNTER
Prior Authorization Approval    Medication: BENLYSTA 200 MG/ML SC SOAJ  Authorization Effective Date: 2/10/2025  Authorization Expiration Date: 2/10/2026  Approved Dose/Quantity: 4 ML per 28 day supply  Reference #: ALFONSO (Key: [id number])   Insurance Company: [employer] - Phone [phone] Fax [phone]:  PMAP  Expected CoPay: $ 0  CoPay Card Available: No    Financial Assistance Needed: N/A - PMAP  Which Pharmacy is filling the prescription: [facility] - [address]  Pharmacy Notified: renewal  Patient Notified: renewal          Thank You,     Amelia Quiles [facility]  Specialty Pharmacy Clinic [facility] Specialty  [email or web address]  [email or web address]  Phone: [phone]  Fax: [phone]

## 2025-02-14 RX ORDER — METHOTREXATE 2.5 MG/1
15 TABLET ORAL WEEKLY
Qty: 24 TABLET | Refills: 3 | OUTPATIENT
Start: 2025-02-14

## 2025-02-19 ENCOUNTER — DOCUMENTATION ONLY (OUTPATIENT)
Dept: RHEUMATOLOGY | Facility: CLINIC | Age: 50
End: 2025-02-19
Payer: COMMERCIAL

## 2025-02-19 NOTE — PROGRESS NOTES
Please place or extend  orders for a lab appt scheduled 25. Per pt, orders to be collected from Dr Arvizu.

## 2025-02-20 NOTE — TELEPHONE ENCOUNTER
Labs that are overdue and subsequently  have had the expiration date extended.    Jack Arvizu MD  2025

## 2025-02-24 ENCOUNTER — TELEPHONE (OUTPATIENT)
Dept: NEUROSURGERY | Facility: CLINIC | Age: 50
End: 2025-02-24
Payer: COMMERCIAL

## 2025-02-24 DIAGNOSIS — M54.16 LUMBAR RADICULOPATHY: Primary | ICD-10-CM

## 2025-02-24 NOTE — TELEPHONE ENCOUNTER
Sosa Patel is status post L4-5 decompressive lumbar laminectomy, medial facetectomy foraminotomy and resection of epidural synovial cyst on 05/24/2024.  Final pathology: L4/L5 epidural cyst, excision, consistent with benign synovial cyst.  She was doing great post surgery until recent when she developed a new onset of sharp nerve at times pain on the left in a S1 dermatome. Endorses slight tingling in her toes on the left, denies weakness in LE. No incontinence or saddle anesthesia.   Chaya Orosco RN, CNRN

## 2025-02-24 NOTE — TELEPHONE ENCOUNTER
[facility] Call Center    Phone Message    May a detailed message be left on voicemail: yes     Reason for Call: Symptoms or Concerns     If patient has red-flag symptoms, warm transfer to triage line    Current symptom or concern: Sciatica, pain, Tingling from buttocks down rights side to her foot, foot drop, back pain    Symptoms have been present for:  1-2 month(s)    Has patient previously been seen for this? Yes    By : Buster    Date: 8/19/2024    Are there any new or worsening symptoms? Yes: symptoms are getting worse. Patient is also requesting a prescription for Vicodin until she can be seen     Action Taken: Message routed to:  Other: [facility]    Travel Screening: Not Applicable

## 2025-02-25 ENCOUNTER — OFFICE VISIT (OUTPATIENT)
Dept: RHEUMATOLOGY | Facility: CLINIC | Age: 50
End: 2025-02-25
Payer: COMMERCIAL

## 2025-02-25 DIAGNOSIS — G89.29 CHRONIC LOW BACK PAIN, UNSPECIFIED BACK PAIN LATERALITY, UNSPECIFIED WHETHER SCIATICA PRESENT: ICD-10-CM

## 2025-02-25 DIAGNOSIS — M32.9 SYSTEMIC LUPUS ERYTHEMATOSUS, UNSPECIFIED SLE TYPE, UNSPECIFIED ORGAN INVOLVEMENT STATUS (H): Primary | ICD-10-CM

## 2025-02-25 DIAGNOSIS — M54.50 CHRONIC LOW BACK PAIN, UNSPECIFIED BACK PAIN LATERALITY, UNSPECIFIED WHETHER SCIATICA PRESENT: ICD-10-CM

## 2025-02-25 DIAGNOSIS — M35.00 SECONDARY SJOGREN'S SYNDROME: ICD-10-CM

## 2025-02-25 DIAGNOSIS — Z79.899 HIGH RISK MEDICATION USE: ICD-10-CM

## 2025-02-25 LAB
ALBUMIN MFR UR ELPH: 10.3 MG/DL
ALBUMIN SERPL BCG-MCNC: 4.2 G/DL (ref 3.5–5.2)
ALBUMIN UR-MCNC: NEGATIVE MG/DL
ALP SERPL-CCNC: 27 U/L (ref 40–150)
ALT SERPL W P-5'-P-CCNC: 15 U/L (ref 0–50)
ANION GAP SERPL CALCULATED.3IONS-SCNC: 10 MMOL/L (ref 7–15)
APPEARANCE UR: CLEAR
AST SERPL W P-5'-P-CCNC: 20 U/L (ref 0–45)
BASOPHILS # BLD AUTO: 0 10E3/UL (ref 0–0.2)
BASOPHILS NFR BLD AUTO: 1 %
BILIRUB SERPL-MCNC: 0.2 MG/DL
BILIRUB UR QL STRIP: NEGATIVE
BUN SERPL-MCNC: 22.9 MG/DL (ref 6–20)
CALCIUM SERPL-MCNC: 9.1 MG/DL (ref 8.8–10.4)
CHLORIDE SERPL-SCNC: 104 MMOL/L (ref 98–107)
COLOR UR AUTO: YELLOW
CREAT SERPL-MCNC: 0.6 MG/DL (ref 0.51–0.95)
CREAT UR-MCNC: 94.7 MG/DL
CRP SERPL-MCNC: <3 MG/L
EGFRCR SERPLBLD CKD-EPI 2021: >90 ML/MIN/1.73M2
EOSINOPHIL # BLD AUTO: 0.3 10E3/UL (ref 0–0.7)
EOSINOPHIL NFR BLD AUTO: 6 %
ERYTHROCYTE [DISTWIDTH] IN BLOOD BY AUTOMATED COUNT: 12.9 % (ref 10–15)
ERYTHROCYTE [SEDIMENTATION RATE] IN BLOOD BY WESTERGREN METHOD: 4 MM/HR (ref 0–30)
GLUCOSE SERPL-MCNC: 80 MG/DL (ref 70–99)
GLUCOSE UR STRIP-MCNC: NEGATIVE MG/DL
HCO3 SERPL-SCNC: 26 MMOL/L (ref 22–29)
HCT VFR BLD AUTO: 38 % (ref 35–47)
HGB BLD-MCNC: 12.5 G/DL (ref 11.7–15.7)
HGB UR QL STRIP: NEGATIVE
IMM GRANULOCYTES # BLD: 0 10E3/UL
IMM GRANULOCYTES NFR BLD: 0 %
KETONES UR STRIP-MCNC: NEGATIVE MG/DL
LEUKOCYTE ESTERASE UR QL STRIP: NEGATIVE
LYMPHOCYTES # BLD AUTO: 1.6 10E3/UL (ref 0.8–5.3)
LYMPHOCYTES NFR BLD AUTO: 27 %
MCH RBC QN AUTO: 29.3 PG (ref 26.5–33)
MCHC RBC AUTO-ENTMCNC: 32.9 G/DL (ref 31.5–36.5)
MCV RBC AUTO: 89 FL (ref 78–100)
MONOCYTES # BLD AUTO: 0.5 10E3/UL (ref 0–1.3)
MONOCYTES NFR BLD AUTO: 9 %
NEUTROPHILS # BLD AUTO: 3.3 10E3/UL (ref 1.6–8.3)
NEUTROPHILS NFR BLD AUTO: 57 %
NITRATE UR QL: NEGATIVE
PH UR STRIP: 5.5 [PH] (ref 5–7)
PLATELET # BLD AUTO: 232 10E3/UL (ref 150–450)
POTASSIUM SERPL-SCNC: 3.9 MMOL/L (ref 3.4–5.3)
PROT SERPL-MCNC: 6.2 G/DL (ref 6.4–8.3)
PROT/CREAT 24H UR: 0.11 MG/MG CR (ref 0–0.2)
RBC # BLD AUTO: 4.26 10E6/UL (ref 3.8–5.2)
SODIUM SERPL-SCNC: 140 MMOL/L (ref 135–145)
SP GR UR STRIP: >=1.03 (ref 1–1.03)
UROBILINOGEN UR STRIP-ACNC: 0.2 E.U./DL
WBC # BLD AUTO: 5.8 10E3/UL (ref 4–11)

## 2025-02-25 PROCEDURE — 84156 ASSAY OF PROTEIN URINE: CPT | Performed by: INTERNAL MEDICINE

## 2025-02-25 PROCEDURE — 99214 OFFICE O/P EST MOD 30 MIN: CPT | Performed by: INTERNAL MEDICINE

## 2025-02-25 PROCEDURE — 81003 URINALYSIS AUTO W/O SCOPE: CPT | Performed by: INTERNAL MEDICINE

## 2025-02-25 PROCEDURE — 80053 COMPREHEN METABOLIC PANEL: CPT | Performed by: INTERNAL MEDICINE

## 2025-02-25 PROCEDURE — 86140 C-REACTIVE PROTEIN: CPT | Performed by: INTERNAL MEDICINE

## 2025-02-25 PROCEDURE — 86225 DNA ANTIBODY NATIVE: CPT | Performed by: INTERNAL MEDICINE

## 2025-02-25 PROCEDURE — G2211 COMPLEX E/M VISIT ADD ON: HCPCS | Performed by: INTERNAL MEDICINE

## 2025-02-25 PROCEDURE — 85025 COMPLETE CBC W/AUTO DIFF WBC: CPT | Performed by: INTERNAL MEDICINE

## 2025-02-25 PROCEDURE — 36415 COLL VENOUS BLD VENIPUNCTURE: CPT | Performed by: INTERNAL MEDICINE

## 2025-02-25 PROCEDURE — 85652 RBC SED RATE AUTOMATED: CPT | Performed by: INTERNAL MEDICINE

## 2025-02-25 PROCEDURE — 86481 TB AG RESPONSE T-CELL SUSP: CPT | Performed by: INTERNAL MEDICINE

## 2025-02-25 PROCEDURE — 86160 COMPLEMENT ANTIGEN: CPT | Performed by: INTERNAL MEDICINE

## 2025-02-25 RX ORDER — FOLIC ACID 1 MG/1
2 TABLET ORAL DAILY
Qty: 180 TABLET | Refills: 2 | Status: SHIPPED | OUTPATIENT
Start: 2025-02-25

## 2025-02-25 RX ORDER — HYDROXYCHLOROQUINE SULFATE 200 MG/1
TABLET, FILM COATED ORAL
Qty: 135 TABLET | Refills: 2 | Status: SHIPPED | OUTPATIENT
Start: 2025-02-25

## 2025-02-25 RX ORDER — METHYLPREDNISOLONE 4 MG/1
TABLET ORAL
Qty: 21 TABLET | Refills: 0 | Status: SHIPPED | OUTPATIENT
Start: 2025-02-25

## 2025-02-25 RX ORDER — METHOTREXATE 2.5 MG/1
15 TABLET ORAL WEEKLY
Qty: 78 TABLET | Refills: 0 | Status: SHIPPED | OUTPATIENT
Start: 2025-02-25

## 2025-02-25 NOTE — PROGRESS NOTES
Rheumatology Clinic Visit      Sosa Patel  MRN# [id number]   YOB: 1975 Age: 49 year old      Date of visit: 2/25/25   PCP: Cynthia Scott    Chief Complaint   Patient presents with:  Systemic Lupus Erythematosus: Hands are starting to hurt but has been out of methotrexate    Assessment and Plan     1. Systemic Lupus Erythematosus (JESSE+, dsDNA+, arthritis, oral sores [SLE versus HSV related? - One-time had a culture that was negative], proteinuria, photosensitivity [fatigue]): Dx'd 10/2016; initially with arthritis that improved with HCQ.  Previously on MTX (fatigue, effective for arthritis), MMF (resolved arthritis, rash, and brain fog; arthritis persisted though and eventually changed back to leflunomide).  Currently on HCQ 200mg daily, leflunomide 20mg daily (worsened arthritis symptoms when a dose is missed, historically), and Benlysta (SQ, starting mid March 2021; effective for arthritis and fatigue, and historically worsened symptoms when missing doses for 3-4 weeks).  Due to granuloma annulare it was recommended from Dr. Louis Beaulieu, dermatologist, to consider methotrexate instead of leflunomide and the change was made previously.  Currently doing well with regard to inflammatory arthritis, and patient reports no rash.  However, mildly worsening joint pain at the MCPs since running out of methotrexate; methotrexate was not renewed due to toxicity monitoring noncompliance. chronic illness.     - Continue Benlysta SQ every 7 days   - Continue hydroxychloroquine from 200mg daily with an additional 200 mg every other day (last eye exam completed on 8/30/2024 with Dr. Contreras)  - Restart methotrexate 15 mg once weekly (refills at appointments)  - Continue  folic acid 1 mg daily  - Labs today: CBC, CMP, ESR, CRP, C3, C4, dsDNA, QuantiFERON-TB gold plus UA, Uprotein:creatinine  - Labs in 3 months: CBC, CMP, ESR, CRP, C3, C4, dsDNA UA, Uprotein:creatinine    High risk medication requiring  intensive toxicity monitoring at least quarterly.      # Pregnancy Plans: on birth control    2.  Secondary Sjogren's syndrome: Mild symptoms that may be due to other factors such as caffeine intake, smoking, etc., but secondary Sjogren's Syndrome is possible.  We discussed the importance of good oral hygiene, frequent sips of water, avoiding high sugar foods and liquids, avoiding oral irritants such as coffee, alcohol, and nicotine, and avoiding acidic drinks such as carbonated and high sugar beverages. She is already seeing a dentist regularly. Dry eyes improved with preservative free artificial tears, and historically improved with Restasis as well.   Chronic illness  - Continue artificial tears     3. 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  Lesions on the dorsal aspect of her hands and she is going to see dermatology for this.    4.  Bilateral sciatica: No low back pain.  Improves with stretching exercises that she does on her own at home.  Advised more frequent stretching exercises throughout the day.  Also advised physical therapy but she declined.    5.  Excessive daytime sleepiness, fatigue: Wakes up tired, reports witnessed apneic episodes at night.  Snores.  Referred for sleep evaluation previously but she has not scheduled this appointment.  Advised previously and again today that she call to schedule the sleep evaluation.    6.  Vaccinations: Vaccinations reviewed with Ms. Patel.    - Influenza: Encouraged yearly vaccination  - [id number]: up to date  - [id number]: up to date  - Shingrix: Up to date  - COVID-19: Advised keeping updated, and to hold Benlysta and leflunomide for 1 week afterward    7.  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  Improved with methotrexate    8.  Chronic low back pain, radiating to each leg that improved with L4-5 bilateral laminectomy but is worsened recently and she is following with her pain clinic and spine surgeon.    Total minutes spent in  evaluation with patient, documentation, , and review of pertinent studies and chart notes: 14  The longitudinal plan of care for the rheumatology problem(s) were addressed during this visit.  Due to added complexity of care, we will continue to support the patient and the subsequent management of this condition with ongoing continuity of care.      Sosa Patel verbalized agreement with and understanding of the rational for the diagnosis and treatment plan.  All questions were answered to best of my ability and the patient's satisfaction. Ms. Wolf was advised to contact the clinic with any questions that may arise after the clinic visit.      Thank you for involving me in the care of the patient    Return to clinic: 3-4 months      HPI   Sosa Patel  is a 49 year old female with a medical history significant for tobacco use history, genital warts, and HSV type II who presents for follow-up of systemic lupus erythematosus.    7/15/2022: Felt more depressed and therefore forgot to refill Benlysta; missed Benlysta for 3-4 weeks and started to have more pain.  Pain was at the MCPs and MTPs.  Prednisone was started and she is feeling much better.  Currently mid-course of the prednisone, taking 10 mg daily at this time.  Also with upper lumbar spine/lower thoracic spine pain that radiates to the left lower back, worse with activity, and improves with rest; reportedly this pain is chronic, and does not radiate to the legs.  Did not see GYN because menstrual bleeding has ceased.  Melatonin helps her sleep; she has not scheduled a sleep evaluation.    10/26/2022: patient cancelled appointment    Mid-Nov 2022: would have run out of leflunomide around this time.  Not refilled because labs are overdue.     12/6/2022: prednisone course given for PRN arthritis flare (patient requested to have on hand while traveling)    2/15/2023: currently doing well.  Taking leflunomide, Benlysta, and hydroxychloroquine as  prescribed.  States that she needs an eye exam completed and will call to schedule this. Has puffiness of her hands but no pain or swelling.  Bony hypertrophy at the DIPs.  Morning stiffness for less than 20 minutes.  Some pain at the end of the day on her hands.  She would like to remain on her current medication regimen because it is effective.    5/24/2023: Patient canceled appointment    8/8/2023: Patient reports doing poorly at this time because she has been out of leflunomide for 3-4 months.  She states that she canceled her last appointment and did not get labs.  Labs are overdue.  Worsening joint pain at the MCPs, PIPs, and wrists that is worse in the morning and improves with time and activity.  Morning stiffness for at least 2 hours.  Positive gelling phenomenon.  Would like to restart leflunomide because she felt better while on it.  Has not scheduled an appointment with sleep medicine.  Has a history of lichen sclerosis and diffuse body pruritus; has 2 erythematous lesions just proximal to the bilateral second MCPs, on the dorsal aspect of the hands.    11/16/2023: Generally doing well.  Still has the raised erythematous lesions on the dorsal aspect of her hands just proximal to the second MCPs, unchanged since previous, and she has scheduled an appointment with dermatology for evaluation.  Bilateral sciatica that improves with stretching exercises that she does once daily.  Otherwise doing well.  No oral or nasal sores.    2/28/2024: Reports that lupus is doing well.  Was unable to make the dermatology appointment but has another appointment scheduled soon with Dr. Beaulieu to evaluate the lesions on her hands.  Since last seen she had acute back pain with numbness and tingling going down her legs; now followed in pain clinic for spinal stenosis and planning to have a steroid injection in her back.  Back pain is worse when she is standing upright for prolonged period of time and feels better when she  flexes at the waist.    9/30/2024: Currently doing well with regard to arthritis/lupus.  No joint pain or swelling.  No morning stiffness or gelling phenomenon.  No oral or nasal sores.  Granuloma annulare improved with topical steroids from dermatology but there was question from dermatology if she could change leflunomide to methotrexate to improve the granuloma annulare on the hands.  Upper respiratory infection that is improving; states that she has a cold.  No UTI symptoms.    1/8/2025: Patient canceled appointment    Today, 2/25/2025: Last rheumatology labs were in September 2024.  Methotrexate toxicity monitoring labs have not been completed since September 2024.  Patient reports that her joint symptoms are well-controlled with methotrexate.  Granuloma annulare also responded well to methotrexate.  Overall happy with how well she is doing.  No oral or nasal sores.  No stomach upset.  Still dealing with chronic degenerative back pain despite having had surgery in the past and is following with her spine surgeon and pain clinic.    Denies fevers, chills, nausea, vomiting, constipation, diarrhea. No abdominal pain. No LE swelling. No neck pain.  No photosensitivity or photophobia.  No eye pain or redness.    Tobacco: No longer smoking cigarettes; now vaping.  Advised that she wean off of vaping as well  EtOH: rare, sober since 5/16/2010  Drugs: cocaine history; now sober  Occupation: counselor for mentally disabled people; adult foster care    ROS   12 point review of system was completed and negative except as noted in the HPI     Active Problem List     Patient Active Problem List   Diagnosis    Herpes simplex type 2 infection    Genital warts    H/O intravenous drug use in remission    JESSE positive    Multiple joint pain    Cocaine use    Systemic lupus erythematosus (H)    High risk medications (not anticoagulants) long-term use    Sicca syndrome    Lichen sclerosus    Recurrent major depressive disorder, in  partial remission    Gallstones    Surgery, elective     Past Medical History     Past Medical History:   Diagnosis Date    Arthritis 2013    Bipolar disorder (H) 12/2004    Dx'd    Combinations of drug dependence excluding opioid type drug (H) 05/04/2022    In remission     H/O: depression     SUICIDE ATTEMPT AGE 15    HPV (human papillomavirus)     Lupus erythematosus     Mood disorder 04/09/2019     Past Surgical History     Past Surgical History:   Procedure Laterality Date    BIOPSY      BUNIONECTOMY RT/LT      CHOLECYSTECTOMY      DAVINCI LAPAROSCOPIC CHOLECYSTECTOMY WITHOUT GRAMS N/A 10/09/2023    Procedure: CHOLECYSTECTOMY, ROBOT-ASSISTED, LAPAROSCOPIC, WITHOUT CHOLANGIOGRAM;  Surgeon: Demetris Peoples MD;  [place] [facility] [place]     ENLARGE BREAST WITH IMPLANT  01/01/2000    LAMINECTOMY LUMBAR TWO LEVELS Bilateral 5/29/2024    Procedure: LUMBAR 4, LUMBAR 5 DECOMPRESSIVE BILATERAL LAMINECTOMY, MEDIAL FACETECTOMY WITH EXCISION OF CYST;  Surgeon: Aureliano Goins MD;  Location: [facility] OR     Allergy   No Known Allergies  Current Medication List     Current Outpatient Medications   Medication Sig Dispense Refill    acetaminophen (TYLENOL) 325 MG tablet Take 650 mg by mouth 2 times daily. Pt takes twice a day      ARIPiprazole (ABILIFY) 10 MG tablet Take 10 mg by mouth daily.      aspirin-acetaminophen-caffeine (EXCEDRIN MIGRAINE) 250-250-65 MG tablet Take 1 tablet by mouth daily as needed for headaches      augmented betamethasone dipropionate (DIPROLENE-AF) 0.05 % external ointment Apply topically 2 times daily. 50 g 3    belimumab (BENLYSTA) subcutaneous injection (prefilled autoinjector) Inject 1 mL (200 mg) subcutaneously every 7 days. . Hold for signs of infection and seek medical attention.   Refills at rheumatology appointments. 4 mL 7    cetirizine (ZYRTEC) 10 MG tablet TAKE 2 TABLETS BY MOUTH DAILY 180 tablet 0    clobetasol (TEMOVATE) 0.05 % external ointment Apply topically twice a week.      FLUoxetine  (PROZAC) 40 MG capsule TAKE ONE CAPSULE BY MOUTH ONCE DAILY 90 capsule 1    folic acid (FOLVITE) 1 MG tablet Take 2 tablets (2 mg) by mouth daily. 180 tablet 3    gabapentin (NEURONTIN) 600 MG tablet TAKE 1 TABLET (600 MG) BY MOUTH 2 TIMES DAILY. 180 tablet 0    hydroquinone (RUFUS) 4 % external cream Apply topically at bedtime 28.35 g 11    hydroxychloroquine (PLAQUENIL) 200 MG tablet Hydroxychloroquine 200 mg daily with an additional 200 mg every other day.  Ophthalmology toxicity monitoring eye exam, that includes 10-2 VF and SD-OCT, is required yearly. 135 tablet 2    ibuprofen (ADVIL/MOTRIN) 200 MG tablet Take 200 mg by mouth every 4 hours as needed for pain.      lidocaine (XYLOCAINE) 5 % external ointment Apply topically as needed for moderate pain 50 g 1    methotrexate 2.5 MG tablet Take 6 tablets (15 mg) by mouth once a week. Methotrexate is a once weekly medication, taken on the same day of each week. (Patient not taking: Reported on 2/25/2025) 24 tablet 3    topiramate (TOPAMAX) 50 MG tablet Take 1 tablet (50 mg) by mouth daily. (Patient not taking: Reported on 2/25/2025) 30 tablet 1    valACYclovir (VALTREX) 1000 mg tablet Take 1 tablet (1,000 mg) by mouth 2 times daily for 10 days 40 tablet 1     No current facility-administered medications for this visit.     Social History   See HPI    Family History     Family History   Problem Relation Age of Onset    Cancer Father         lung cancer    Other Cancer Father     Diabetes Maternal Grandmother     Depression Maternal Grandmother     Lipids Maternal Grandmother     Cerebrovascular Disease Maternal Grandfather     Lipids Maternal Grandfather     Circulatory Maternal Grandfather         MI in 50's    Depression Brother         OCD    Gastrointestinal Disease Brother         IBS    Depression Mother     Anxiety Disorder Daughter     Thyroid Disease No family hx of     Asthma No family hx of      Denies family history of autoimmune disease     Physical  "Exam     Temp Readings from Last 3 Encounters:   11/24/24 97.8  F (36.6  C) (Tympanic)   09/06/24 97.5  F (36.4  C) (Temporal)   05/30/24 97.5  F (36.4  C) (Oral)     BP Readings from Last 5 Encounters:   12/17/24 114/73   11/24/24 108/73   10/02/24 117/82   09/30/24 115/79   09/06/24 107/74     Pulse Readings from Last 1 Encounters:   12/17/24 82     Resp Readings from Last 1 Encounters:   11/24/24 18     Estimated body mass index is 25.4 kg/m  as calculated from the following:    Height as of 9/6/24: 1.664 m (5' 5.5\").    Weight as of 11/24/24: 70.3 kg (155 lb).    GEN: NAD.   HEENT:  Anicteric, noninjected sclera. No obvious external lesions of the ear and nose. Hearing intact.  CV: S1, S2. RRR. No m/r/g  PULM: No increased work of breathing.  Wheezing bilaterally; no crackles.  MSK:  MCPs, PIPs, and DIPs without swelling or tenderness to palpation.  Wrists without swelling or tenderness to palpation.  Heberden's nodes present..   Elbows and shoulders without swelling or tenderness to palpation.  Knees, ankles, and MTPs without swelling or tenderness to palpation.  PSYCH: Alert. Appropriate.      Labs / Imaging (select studies)     dsDNA  Recent Labs   Lab Test 11/09/23  0717 08/08/23  1618 02/14/23  0856 07/12/22  1302 03/23/22  1204 11/30/21  1245 08/19/21  1056 04/20/21  1241 01/11/21  1315   DNA 4.2 8.0 7.1 7.6 7.2 13.0* 11.0* 11* 17*     C3/C4  Recent Labs   Lab Test 11/09/23  0717 08/08/23  1618 02/14/23  0856 07/12/22  1302 03/23/22  1204 11/30/21  1245 08/19/21  1056 04/20/21  1241 01/11/21  1315   U8MVYCU 129 124 147 129 103 120 113 103 109   N0MCQXH 30 29 34 24 25 30 28 27 25     CBC  Recent Labs   Lab Test 09/25/24  0723 06/19/24  1411 05/20/24  0925 02/08/24  0700 08/19/21  1057 04/20/21  1241 01/11/21  1315 09/10/20  1308   WBC 6.5 7.0 4.7 5.3   < > 6.0 6.2 6.1   RBC 4.51 4.51 4.59 4.56   < > 4.28 4.46 4.44   HGB 12.4 12.6 12.7 12.1   < > 12.1 12.7 12.4   HCT 38.3 39.0 39.2 38.7   < > 35.7 39.1 37.8 "   MCV 85 87 85 85   < > 83 88 85   RDW 12.4 13.3 14.3 12.8   < > 12.9 13.0 13.6    260 234 206   < > 213 202 235   MCH 27.5 27.9 27.7 26.5   < > 28.3 28.5 27.9   MCHC 32.4 32.3 32.4 31.3*   < > 33.9 32.5 32.8   NEUTROPHIL 59 64  --  49   < > 54.6 59.5 54.5   LYMPH 26 22  --  34   < > 31.6 26.4 30.0   MONOCYTE 11 10  --  11   < > 9.9 10.3 10.5   EOSINOPHIL 4 3  --  5   < > 3.6 3.5 4.3   BASOPHIL 1 0  --  1   < > 0.3 0.3 0.7   ANEU  --   --   --   --   --  3.3 3.7 3.3   ALYM  --   --   --   --   --  1.9 1.6 1.8   DAQUAN  --   --   --   --   --  0.6 0.6 0.6   AEOS  --   --   --   --   --  0.2 0.2 0.3   ABAS  --   --   --   --   --  0.0 0.0 0.0   ANEUTAUTO 3.8 4.5  --  2.6   < >  --   --   --    ALYMPAUTO 1.7 1.6  --  1.8   < >  --   --   --    AMONOAUTO 0.7 0.7  --  0.6   < >  --   --   --    AEOSAUTO 0.2 0.2  --  0.3   < >  --   --   --    ABSBASO 0.0 0.0  --  0.0   < >  --   --   --     < > = values in this interval not displayed.     CMP  Recent Labs   Lab Test 09/25/24  0723 06/19/24  1411 05/30/24  0635 05/20/24  0925 02/08/24  0700 11/09/23  0717 08/19/21  1056 04/20/21  1241 01/11/21  1315 09/10/20  1308 10/16/19  1708    142  --  137 141 139   < > 138 139 138 139   POTASSIUM 3.7 3.9  --  3.8 4.0 4.0   < > 3.6 3.9 4.1 3.9   CHLORIDE 106 107  --  104 105 104   < > 108 110* 106 109   CO2 22 23  --  21* 27 26   < > 23 26 28 24   ANIONGAP 12 12  --  12 9 9   < > 7 3 4 6   GLC 85 105* 135* 89 74 82   < > 117* 78 85 83   BUN 31.7* 25.1*  --  25.9* 17.9 25.9*   < > 25 15 17 17   CR 0.87 0.70  --  0.67 0.71 0.75   < > 0.95 0.67 0.77 0.75   GFRESTIMATED 81 >90  --  >90 >90 >90   < > 72 >90 >90 >90   GFRESTBLACK  --   --   --   --   --   --   --  83 >90 >90 >90   RICHARD 8.9 9.5  --  9.5 9.0 9.2   < > 8.3* 8.7 8.9 9.1   BILITOTAL 0.3 0.3  --   --  0.2 0.2   < > 0.4 0.3 0.3 0.3   ALBUMIN 4.1 4.3  --   --  3.9 4.3   < > 3.8 4.0 3.8 4.2   PROTTOTAL 6.4 6.6  --   --  6.2* 6.5   < > 6.6* 6.9 6.9 7.3   ALKPHOS 36* 35*   --   --  36* 35   < > 24* 22* 28* 23*   AST 30 23  --   --  24 25   < > 13 18 18 18   ALT 24 26  --   --  15 33   < > 23 35 32 23    < > = values in this interval not displayed.     Calcium/VitaminD  Recent Labs   Lab Test 09/25/24  0723 06/19/24  1411 05/20/24  0925   RICHARD 8.9 9.5 9.5     ESR/CRP  Recent Labs   Lab Test 06/19/24  1411 02/08/24  0700 11/09/23  0717 07/12/22  1302 03/23/22  1204 11/30/21  1245 08/19/21  1057 08/19/21  1056   SED 7 6 4   < > 18 8   < >  --    CRP  --   --   --   --  <2.9 <2.9  --  <2.9   CRPI <3.00 <3.00 <3.00   < >  --   --   --   --     < > = values in this interval not displayed.     CK/Aldolase  Recent Labs   Lab Test 11/09/23  0717 07/12/22  1302 08/19/21  1056    32 106     Lipid Panel  Recent Labs   Lab Test 07/12/22  1302   CHOL 222*   TRIG 170*   HDL 70   *   NHDL 152*     Hepatitis B  Recent Labs   Lab Test 02/26/21  0927   HBCAB Nonreactive   HEPBANG Nonreactive     Hepatitis C  Recent Labs   Lab Test 02/26/21  0927   HCVAB Nonreactive     Tuberculosis Screening  Recent Labs   Lab Test 07/12/22  1302 06/28/21  1700 02/26/21  0927   TBRES Negative  --   --    TBRST  --  Negative Negative     UA  Recent Labs   Lab Test 09/25/24  0731 06/19/24  1413 11/09/23  0721 09/10/20  1315 11/04/19  1747 04/04/19  1625 12/13/18  1355 09/21/17  0815 05/01/17  0954   COLOR Yellow Yellow Yellow   < > Yellow   < > Yellow Yellow Yellow   APPEARANCE Clear Slightly Cloudy* Clear   < > Clear   < > Clear Clear Clear   URINEGLC Negative Negative Negative   < > Negative   < > Negative Negative Negative   URINEBILI Negative Negative Negative   < > Negative   < > Negative Negative Small  This is an unconfirmed screening test result. A positive result may be false.  *   SG >=1.030 >=1.030 >=1.030   < > 1.015   < > >1.030 >1.030 >1.030   URINEPH 6.0 5.5 6.0   < > 8.0*   < > 5.5 6.0 6.0   PROTEIN Trace* Negative Negative   < > Trace*   < > Negative Negative Trace*   UROBILINOGEN 0.2 0.2 0.2    < > 0.2   < > 0.2 0.2 0.2   NITRITE Negative Negative Negative   < > Negative   < > Negative Negative Negative   UBLD Negative Negative Trace*   < > Negative   < > Small* Trace* Small*   LEUKEST Negative Trace* Trace*   < > Negative   < > Negative Negative Negative   WBCU 0-5 0-5 0-5   < > 0 - 5  --  0 - 5 O - 2 O - 2   RBCU None Seen 0-2 None Seen   < > O - 2  --  O - 2 O - 2 O - 2   SQUAMOUSEPI  --   --   --   --  Few  --  Moderate* Few Few   BACTERIA Few* Few* Few*  --  Few*  --   --   --   --    MUCOUS  --   --   --   --   --   --   --   --  Present*    < > = values in this interval not displayed.     Urine Microscopic  Recent Labs   Lab Test 09/25/24  0731 06/19/24  1413 11/09/23  0721 09/19/23  1213 11/04/19  1747 12/13/18  1355 09/21/17  0815 05/01/17  0954   WBCU 0-5 0-5 0-5   < > 0 - 5 0 - 5 O - 2 O - 2   RBCU None Seen 0-2 None Seen   < > O - 2 O - 2 O - 2 O - 2   SQUAMOUSEPI  --   --   --   --  Few Moderate* Few Few   BACTERIA Few* Few* Few*  --  Few*  --   --   --    MUCOUS  --   --   --   --   --   --   --  Present*    < > = values in this interval not displayed.     Urine Protein  GHUTP and UTP= Urine protein (random), GHUTPG and UTPG = urine protein:creatinine ratio (random), UCRR = urine creatinine (random)  Recent Labs   Lab Test 09/25/24  0731 06/19/24  1413 11/09/23  0721 07/12/22  1302 08/19/21  1058 09/10/20  1315 11/04/19  1747   GHUTP 18.3 18.8 8.9   < >  --   --   --    UTP  --   --   --   --  0.16 0.15 0.21   GHUTPG 0.10 0.11 0.09   < >  --   --   --    UTPG  --   --   --   --  0.12 0.17 0.16   UCRR 189.0 166.0 96.3   < > 137 88  --     < > = values in this interval not displayed.       Immunization History     Immunization History   Administered Date(s) Administered    COVID-19 12+ (Pfizer) 02/09/2024    COVID-19 Bivalent 12+ (Pfizer) 11/30/2022    COVID-19 MONOVALENT 12+ (Pfizer) 02/12/2021, 05/20/2021, 12/31/2021    COVID-19 Monovalent 12+ (Pfizer 2022) 05/04/2022    Flu, Unspecified  11/01/2020, 12/01/2021    HepB, Unspecified 03/21/1997    Hepatitis A (ADULT 19+) 11/30/2022    Influenza (IIV3) PF 10/23/2010    Influenza Vaccine >6 months,quad, PF 12/19/2014, 10/16/2015, 11/04/2016, 12/13/2018, 11/07/2019, 11/30/2022, 09/29/2023    Influenza Vaccine, 6+MO IM (QUADRIVALENT W/PRESERVATIVES) 10/09/2020    Mantoux Tuberculin Skin Test 07/30/2018    Pneumo Conj 13-V (2010&after) 12/13/2018    Pneumococcal 23 valent 04/04/2019    TD,PF 7+ (Tenivac) 02/23/2005, 04/09/2019    TDAP (Adacel,Boostrix) 07/21/2016    TDAP Vaccine (Adacel) 04/14/2009    Td (Adult), Adsorbed 02/23/2005    Zoster recombinant adjuvanted (SHINGRIX) 12/21/2018, 04/04/2019          Chart documentation done in part with Dragon Voice recognition Software. Although reviewed after completion, some word and grammatical error may remain.    Jack Arvizu MD

## 2025-02-25 NOTE — NURSING NOTE
RAPID3 (0-30) Cumulative Score  9.5          RAPID3 Weighted Score (divide #4 by 3 and that is the weighted score)  3.1

## 2025-02-25 NOTE — TELEPHONE ENCOUNTER
Per discussion with Dr. Goins  - ordered a medrol Dosepak. Also would consider a referral to spine medicine for conservative treatment options.  Sosa voiced understanding and agreement.  Chaya Orosco RN, CNRN

## 2025-02-25 NOTE — PATIENT INSTRUCTIONS
RHEUMATOLOGY    [facility] [place]  [address] [place] [address]  [place] [address]    Phone number: [phone]  Fax number: [phone]    If you need a medication refill, please contact us as you may need lab work and/or a follow up visit prior to your refill.      Thank you for choosing [facility]!    Bailey Resendez CMA Rheumatology

## 2025-02-26 LAB
C3 SERPL-MCNC: 104 MG/DL (ref 81–157)
C4 SERPL-MCNC: 24 MG/DL (ref 13–39)
DSDNA AB SER-ACNC: 3.7 IU/ML
GAMMA INTERFERON BACKGROUND BLD IA-ACNC: 0.01 IU/ML
M TB IFN-G BLD-IMP: NEGATIVE
M TB IFN-G CD4+ BCKGRND COR BLD-ACNC: 9.99 IU/ML
MITOGEN IGNF BCKGRD COR BLD-ACNC: 0.01 IU/ML
MITOGEN IGNF BCKGRD COR BLD-ACNC: 0.01 IU/ML
QUANTIFERON MITOGEN: 10 IU/ML
QUANTIFERON NIL TUBE: 0.01 IU/ML
QUANTIFERON TB1 TUBE: 0.02 IU/ML
QUANTIFERON TB2 TUBE: 0.02

## 2025-03-04 ENCOUNTER — PATIENT OUTREACH (OUTPATIENT)
Dept: CARE COORDINATION | Facility: CLINIC | Age: 50
End: 2025-03-04
Payer: COMMERCIAL

## 2025-03-11 ENCOUNTER — HOSPITAL ENCOUNTER (OUTPATIENT)
Dept: MRI IMAGING | Facility: HOSPITAL | Age: 50
Discharge: HOME | End: 2025-03-11
Attending: NEUROLOGICAL SURGERY | Admitting: NEUROLOGICAL SURGERY
Payer: COMMERCIAL

## 2025-03-11 DIAGNOSIS — Z98.890 HX OF LUMBOSACRAL SPINE SURGERY: ICD-10-CM

## 2025-03-11 DIAGNOSIS — M54.16 LUMBAR RADICULOPATHY: ICD-10-CM

## 2025-03-11 PROCEDURE — 255N000002 HC RX 255 OP 636: Performed by: NEUROLOGICAL SURGERY

## 2025-03-11 PROCEDURE — 72158 MRI LUMBAR SPINE W/O & W/DYE: CPT

## 2025-03-11 PROCEDURE — A9585 GADOBUTROL INJECTION: HCPCS | Performed by: NEUROLOGICAL SURGERY

## 2025-03-11 RX ORDER — GADOBUTROL 604.72 MG/ML
7 INJECTION INTRAVENOUS ONCE
Status: COMPLETED | OUTPATIENT
Start: 2025-03-11 | End: 2025-03-11

## 2025-03-11 RX ADMIN — GADOBUTROL 7 ML: 604.72 INJECTION INTRAVENOUS at 06:53

## 2025-03-13 ENCOUNTER — OFFICE VISIT (OUTPATIENT)
Dept: PHYSICAL MEDICINE AND REHAB | Facility: CLINIC | Age: 50
End: 2025-03-13
Attending: NEUROLOGICAL SURGERY
Payer: COMMERCIAL

## 2025-03-13 VITALS
HEART RATE: 77 BPM | BODY MASS INDEX: 23.3 KG/M2 | WEIGHT: 145 LBS | SYSTOLIC BLOOD PRESSURE: 112 MMHG | DIASTOLIC BLOOD PRESSURE: 63 MMHG | HEIGHT: 66 IN

## 2025-03-13 DIAGNOSIS — Z98.890 HX OF LUMBOSACRAL SPINE SURGERY: ICD-10-CM

## 2025-03-13 DIAGNOSIS — M54.16 LUMBAR RADICULOPATHY: ICD-10-CM

## 2025-03-13 PROCEDURE — 99215 OFFICE O/P EST HI 40 MIN: CPT | Performed by: NURSE PRACTITIONER

## 2025-03-13 PROCEDURE — 3078F DIAST BP <80 MM HG: CPT | Performed by: NURSE PRACTITIONER

## 2025-03-13 PROCEDURE — 3074F SYST BP LT 130 MM HG: CPT | Performed by: NURSE PRACTITIONER

## 2025-03-13 PROCEDURE — 1125F AMNT PAIN NOTED PAIN PRSNT: CPT | Performed by: NURSE PRACTITIONER

## 2025-03-13 RX ORDER — GABAPENTIN 600 MG/1
1200 TABLET ORAL 2 TIMES DAILY
Qty: 120 TABLET | Refills: 1 | Status: SHIPPED | OUTPATIENT
Start: 2025-03-13

## 2025-03-13 RX ORDER — NAPROXEN 250 MG/1
250 TABLET ORAL 2 TIMES DAILY WITH MEALS
Qty: 60 TABLET | Refills: 0 | Status: SHIPPED | OUTPATIENT
Start: 2025-03-13

## 2025-03-13 RX ORDER — CYCLOBENZAPRINE HCL 5 MG
5-10 TABLET ORAL 3 TIMES DAILY PRN
Qty: 45 TABLET | Refills: 0 | Status: SHIPPED | OUTPATIENT
Start: 2025-03-13

## 2025-03-13 ASSESSMENT — PAIN SCALES - GENERAL: PAINLEVEL_OUTOF10: SEVERE PAIN (7)

## 2025-03-13 NOTE — PATIENT INSTRUCTIONS
Follow up with Dr Goins     Prescribed naproxen today. Please take 1 twice daily as prescribed, as needed for pain control as well as to aid in decreasing inflammation.   Take medication with a full glass of water and with food.   *Do not take Advil, Ibuprofen, Aleve, or Naproxen while taking this medication as it can cause organ failure if taken together*  This medication does have risks if taken long-term, these risks include: gastrointestinal irritation, kidney dysfunction, and cardiovascular effects.  We will check your kidney function as needed while you're on this medication.  Stop taking this medication if you have intolerable side effects or blood in the stool.     Flexeril 5mg (muscle relaxant medication) has been prescribed today. Please take 1-2 tabs three times daily as needed. This medication may cause drowsiness. Please do not work or drive while taking this medication until you know how it affects you. If it does make you drowsy, you should only take it before bedtime or at times that you do not have to work/drive.   ~You have been referred for Physical Therapy to [facility]. They will call you to schedule an appointment.      Scheduling phone number is [phone] for [facility], or [place] location.  If you have not heard from the scheduling office within 2 business days, please call [phone] for ALL other locations.    Discussed the importance of core strengthening, ROM, stretching exercises and how each of these entities is important in decreasing pain and improving long term spine health.  The purpose of physical therapy is to teach you an individualized home exercise program.  These exercises need to be performed every day in order to decrease pain and prevent future occurrences of pain.           An injection has been ordered today to potentially help with your pain symptoms. These injections do not fix what is going on in your back,  therefore they typically do not take away the pain completely, however they can many times help improve symptoms. Injections should always be completed along with other modalities such as physical therapy for the best long term outcomes. If injections alone are done, then pain will likely return.     [facility] Injection Requirements:    A  is required for all fluoroscopically-guided injections.  Injection appointments may be cancelled if there are signs/symptoms of an active infection or if the patient is being actively treated with antibiotics for a diagnosed infection.  Patients may have their steroid injection cancelled if they have had another steroid injection within 2 weeks.  Diabetic patients will have their blood glucose levels checked the day of their injection and the appointment will be rescheduled if the blood glucose level is 300 or higher.  Patients with allergies to cortisone, local anesthetics, iodine, or contrast dye should contact the Spine Center to further discuss these considerations.  Patients scheduled for medial branch block diagnostic injections should refrain from taking pain medication the day of the procedure.  The medial branch block injection appointment will be rescheduled if the patient's pain rating is not 5/10 or greater at the time of the procedure.  Patients taking warfarin/Coumadin will have their INR checked the day of the procedure and the procedure may be rescheduled if the INR is greater than 3.0.  Please contact the Spine Center (#[phone]) if you are taking any prescription blood-thinning medications (warfarin, Plavix, Lovenox, Eliquis, Brilinta, Effient, etc.) as special dosing adjustments may need to be made depending on the type of injection you are scheduled to receive.  It is recommended that you delay having your steroid injection if you have received a flu shot or shingles vaccine within 2 weeks.       ~Please call our   [facility] Nurse Navigation line [phone] with any questions or concerns about your treatment plan, if symptoms worsen and you would like to be seen urgently, or if you have any new or worsening numbness, weakness, or problems controlling bladder and bowel function.  ~You are also welcome to contact Laxmi Dubose via [employer], but please be aware that responses to [employer] message may take 2-3 days due to the high volume of patients seen in clinic.

## 2025-03-13 NOTE — LETTER
3/13/2025      Sosa Patel  [address]   [place] [address]      Dear Colleague,    Thank you for referring your patient, Sosa Patel, to the [facility]. Please see a copy of my visit note below.    ASSESSMENT: Sosa Patel is a 49 year old female who presents for consultation at the request of PCP Sonia, Cynthia Shipman, who presents today for new patient evaluation of:    -lumbar radiculopathy, history of lumbar surgery    Patient has decreased sensation to light touch right anterior thigh, otherwise neurologically intact on exam. She reports shaji posterolateral leg pain with developing numbness in the leg with one episode of bowel incontinence. We reviewed her new lumbar MRI. She has severe foraminal narrowing shaji at L4-5, moderate to severe canal stenosis. She has moderate to severe right and moderate left foraminal narrowing at L5-S1 with mild canal stenosis. Recommended follow up with Dr Goins to review MRI given her surgery with him this year.    Bulk of her pain is posterior legs, recommend shaji L5-S1 TF MAIN.  She will contact her pain mgmt provider to consider narcotic.  She has been taking Gabapentin 1200mg BID for the last 4 days and I refilled it at this dose but would not go higher. She will start naproxen 250mg BID (discussed risks w/ methotrexate, prozac), flexeril, and PT. Follow up 2-4 wks post injection        3/13/2025    12:23 PM   OSWESTRY DISABILITY INDEX   Count 9    Sum 24    Oswestry Score (%) 53.33 %        Patient-reported            Diagnoses and all orders for this visit:  Lumbar radiculopathy  -     Spine  Referral  -     PAIN Transforaminal MAIN Inj Lumbosacral Bilateral; Future  -     Physical Therapy  Referral; Future  -     gabapentin (NEURONTIN) 600 MG tablet; Take 2 tablets (1,200 mg) by mouth 2 times daily.  -     cyclobenzaprine (FLEXERIL) 5 MG tablet; Take 1-2 tablets (5-10 mg) by mouth 3 times daily as needed.  -      naproxen (NAPROSYN) 250 MG tablet; Take 1 tablet (250 mg) by mouth 2 times daily (with meals).  Hx of lumbosacral spine surgery  -     Spine  Referral      PLAN:  Reviewed spine anatomy and disease process. Discussed diagnosis and treatment options with the patient today. A shared decision making model was used.  The patient's values and choices were respected. The following represents what was discussed and decided upon by the provider and the patient.      -DIAGNOSTIC TESTS:  Images were personally reviewed and interpreted and explained to patient today using a spine model.   --no new imaging    -PHYSICAL THERAPY:    --Referral to PT placed for once pain controlled and able to attend   Discussed the importance of core strengthening, ROM, stretching exercises with the patient and how each of these entities is important in decreasing pain.  Explained to the patient that the purpose of physical therapy is to teach the patient a home exercise program.  These exercises need to be performed every day in order to decrease pain and prevent future occurrences of pain.        -MEDICATIONS:    --start naproxen 250mg  -refilled current gabapentin 1200mg BID  - start flexeril 5-10mg tid prn  Discussed multiple medication options today with patient. Discussed risks, side effects, and proper use of medications. Patient verbalized understanding.    -INTERVENTIONS:    -Discussed the role of injections with patient today. Patient would be a good candidate in the future for either epidural steroid injections or medial branch blocks if indicated based on symptoms and supported by imaging results.  -Risks, benefits, and role of injections were discussed with patient today. Orders placed for shaji L5-S1 TF MAIN.    -PATIENT EDUCATION:  Total time of 40 minutes, on the day of service, spent with the patient, reviewing the chart, placing orders, and documenting.   -Today we also discussed the pros and cons of the current treatment  plan.    -FOLLOW-UP:  2-4 wks post injection    Advised patient to call the Spine Center if symptoms worsen, new numbness or weakness develops in the legs, or if they develop new or worsening problems controlling bladder or bowel function.   ______________________________________________________________________    SUBJECTIVE:    HPI:  Sosa Patel  Is a 49 year old female hx depression, lupus, sicca syndrome, gallstones, cocaine use, hx IVDU in remission who presents today for new patient evaluation of lumbar radiculopathy, history of lumbar surgery -    1mo ago gradual onset R>L shaji leg pain posterior thighs and R groin into posterolateral ankle. The R foot is starting to get tingly which comes and goes. She had 1 episode of bowel incontinence 1 mo ago which was concerning as she had this preop. She had surgery shaji L4-5 lami with Dr Goins in may of 2024. Did great following surgery until now. Feels like same symptoms she had before surgery. She did PT all of 2024, jan to oct. She had shaji L4-5 TF MAIN 3/13/24 preop which was not helpful. Pain today 7/10, 10 at worst, 1 at best.     Weaned off of gabapentin then after some return of symptoms went back on it. She increased It on her own and has been taking 1200mg BID x last 4 days which is helping and no side effects.  Medrol dose pack sent for pt by neurosurgery at the end of feb. this did not help. She has been taking oxycodone 10mg twice daily, ibuprofen, tylenol    -Treatment to Date:     -Medications:  gabapentin    Current Outpatient Medications   Medication Sig Dispense Refill     cyclobenzaprine (FLEXERIL) 5 MG tablet Take 1-2 tablets (5-10 mg) by mouth 3 times daily as needed. 45 tablet 0     gabapentin (NEURONTIN) 600 MG tablet Take 2 tablets (1,200 mg) by mouth 2 times daily. 120 tablet 1     naproxen (NAPROSYN) 250 MG tablet Take 1 tablet (250 mg) by mouth 2 times daily (with meals). 60 tablet 0     acetaminophen (TYLENOL) 325 MG tablet Take 650 mg by  mouth 2 times daily. Pt takes twice a day       ARIPiprazole (ABILIFY) 10 MG tablet Take 10 mg by mouth daily.       aspirin-acetaminophen-caffeine (EXCEDRIN MIGRAINE) 250-250-65 MG tablet Take 1 tablet by mouth daily as needed for headaches       augmented betamethasone dipropionate (DIPROLENE-AF) 0.05 % external ointment Apply topically 2 times daily. 50 g 3     belimumab (BENLYSTA) subcutaneous injection (prefilled autoinjector) Inject 1 mL (200 mg) subcutaneously every 7 days. . Hold for signs of infection and seek medical attention.   Refills at rheumatology appointments. 4 mL 7     cetirizine (ZYRTEC) 10 MG tablet TAKE 2 TABLETS BY MOUTH DAILY 180 tablet 0     clobetasol (TEMOVATE) 0.05 % external ointment Apply topically twice a week.       FLUoxetine (PROZAC) 40 MG capsule TAKE ONE CAPSULE BY MOUTH ONCE DAILY 90 capsule 1     folic acid (FOLVITE) 1 MG tablet Take 2 tablets (2 mg) by mouth daily. 180 tablet 2     gabapentin (NEURONTIN) 600 MG tablet TAKE 1 TABLET (600 MG) BY MOUTH 2 TIMES DAILY. 180 tablet 0     hydroquinone (RUFUS) 4 % external cream Apply topically at bedtime 28.35 g 11     hydroxychloroquine (PLAQUENIL) 200 MG tablet Hydroxychloroquine 200 mg daily with an additional 200 mg every other day.  Ophthalmology toxicity monitoring eye exam, that includes 10-2 VF and SD-OCT, is required yearly. 135 tablet 2     ibuprofen (ADVIL/MOTRIN) 200 MG tablet Take 200 mg by mouth every 4 hours as needed for pain.       lidocaine (XYLOCAINE) 5 % external ointment Apply topically as needed for moderate pain 50 g 1     methotrexate 2.5 MG tablet Take 6 tablets (15 mg) by mouth once a week. Methotrexate is a once weekly medication, taken on the same day of each week. 78 tablet 0     methylPREDNISolone (MEDROL DOSEPAK) 4 MG tablet therapy pack Follow Package Directions 21 tablet 0     oxyCODONE (ROXICODONE) 5 MG tablet Take 1 tablet (5 mg) by mouth 3 times daily as needed for pain. 9 tablet 0     topiramate  (TOPAMAX) 50 MG tablet Take 1 tablet (50 mg) by mouth daily. (Patient not taking: Reported on 2/25/2025) 30 tablet 1     valACYclovir (VALTREX) 1000 mg tablet Take 1 tablet (1,000 mg) by mouth 2 times daily for 10 days 40 tablet 1     No current facility-administered medications for this visit.       No Known Allergies    Past Medical History:   Diagnosis Date     Arthritis 2013     Bipolar disorder (H) 12/2004    Dx'd     Combinations of drug dependence excluding opioid type drug (H) 05/04/2022    In remission      H/O: depression     SUICIDE ATTEMPT AGE 15     HPV (human papillomavirus)      Lupus erythematosus      Mood disorder 04/09/2019        Patient Active Problem List   Diagnosis     Herpes simplex type 2 infection     Genital warts     H/O intravenous drug use in remission     JESSE positive     Multiple joint pain     Cocaine use     Systemic lupus erythematosus (H)     High risk medications (not anticoagulants) long-term use     Sicca syndrome     Lichen sclerosus     Recurrent major depressive disorder, in partial remission     Gallstones     Surgery, elective       Past Surgical History:   Procedure Laterality Date     BIOPSY       BUNIONECTOMY RT/LT       CHOLECYSTECTOMY       DAVINCI LAPAROSCOPIC CHOLECYSTECTOMY WITHOUT GRAMS N/A 10/09/2023    Procedure: CHOLECYSTECTOMY, ROBOT-ASSISTED, LAPAROSCOPIC, WITHOUT CHOLANGIOGRAM;  Surgeon: Demetris Peoples MD;  Location: [facility] [place]      ENLARGE BREAST WITH IMPLANT  01/01/2000     LAMINECTOMY LUMBAR TWO LEVELS Bilateral 5/29/2024    Procedure: LUMBAR 4, LUMBAR 5 DECOMPRESSIVE BILATERAL LAMINECTOMY, MEDIAL FACETECTOMY WITH EXCISION OF CYST;  Surgeon: Aureliano Goins MD;  Location:  OR       Family History   Problem Relation Age of Onset     Cancer Father         lung cancer     Other Cancer Father      Diabetes Maternal Grandmother      Depression Maternal Grandmother      Lipids Maternal Grandmother      Cerebrovascular Disease Maternal Grandfather       "Lipids Maternal Grandfather      Circulatory Maternal Grandfather         MI in 50's     Depression Brother         OCD     Gastrointestinal Disease Brother         IBS     Depression Mother      Anxiety Disorder Daughter      Thyroid Disease No family hx of      Asthma No family hx of        Reviewed past medical, surgical, and family history with patient found on new patient intake packet located in [employer] tab.     SOCIAL HX: vape tobacco use, alcohol use, nonsmoker, no heavy drinking    ROS: positive for weight loss, loss of bowel control, joint pain, muscle pain, muscle fatigue, sciatica, imbalance falls. Specifically negative for bowel/bladder dysfunction, headache, dizziness, foot drop, fevers, chills, appetite changes, nausea/vomiting, unexplained weight loss. Otherwise 13 systems reviewed are negative. Please see the patient's intake questionnaire from today for details.    OBJECTIVE:  /63   Pulse 77   Ht 5' 5.5\" (1.664 m)   Wt 145 lb (65.8 kg)   LMP 02/05/2022   BMI 23.76 kg/m      PHYSICAL EXAMINATION:    --CONSTITUTIONAL:  Vital signs as above.  No acute distress.  The patient is well nourished and well groomed. Appears uncomfortable dt pain  --PSYCHIATRIC:  Appropriate mood and affect. The patient is awake, alert, oriented to person, place, time and answering questions appropriately with clear speech.    --SKIN:  Skin over the face, bilateral lower extremities, and posterior torso is clean, dry, intact without rashes.    --RESPIRATORY: Normal rhythm and effort. No abnormal accessory muscle breathing patterns noted.   --ABDOMINAL:  Non-distended.    --GROSS MOTOR: Gait is non-antalgic. arises with pain from a seated position. Toe walking and heel walking are normal without significant difficulty.      --LOWER EXTREMITY MOTOR TESTING:  Hip flexion: right 5/5, left 5/5  Hip abduction: right 5/5, left 5/5  Hip adduction: right 5/5, left 5/5   Quads: right 5/5, left 5/5  Hamstrings: right 5/5, " left 5/5  Dorsiflexion: right 5/5, left 5/5  Plantar flexion: right 5/5, left 5/5    Great toe MTP extension/EHL: right 5/5, left 5/5    --NEUROLOGICAL:  2/4 patellar and achilles reflexes bilaterally. Sensory loss R anterior thigh otherwise sensation to light touch is intact throughout both lower extremities. Babinski is negative. No clonus.      --MUSCULOSKELETAL: Lumbar spine inspection reveals no evidence of deformity. Range of motion is very limited in lumbar flexion, extension, rotation dt pain. No point tenderness to palpation lumbar spine. No lumbar paraspinal musculature tenderness.     Straight leg raising is positive shaji.    --HIPS: Full range of motion bilaterally. Negative KEY and Negative FADIR bilaterally. Negative hip joint tenderness to palp.    --SACROILIAC JOINT: Distraction maneuver was negative. Gaenslen's Test was negative. Thigh thrust was negative. Sacroiliac Joint Compression Test was negative. One finger point test was negative. Negative SI joint tenderness to palpation bilaterally.    --VASCULAR:  Bilateral lower extremities are warm without any discoloration.  There is no pitting edema of the bilateral lower extremities.    RESULTS:   Prior medical records from [facility] Everywhere were reviewed today.    Imaging: Spine imaging was personally reviewed and interpreted today. The images were shown to the patient and the findings were explained using a spine model.      MR Lumbar Spine w/o & w Contrast    Result Date: 3/11/2025  EXAM: MR LUMBAR SPINE W/O and W CONTRAST LOCATION: [facility] DATE: 3/11/2025 INDICATION:  Lumbar radiculopathy, Hx of lumbosacral spine surgery COMPARISON: 02/10/2024. CONTRAST: 7ml gadavist TECHNIQUE: Routine Lumbar Spine MRI without and with IV contrast. FINDINGS: Vertebral body heights are maintained. Grade 1 anterolisthesis of L4 on L5.. Multilevel loss of disc height. Conus terminates normally. Marrow signal  unremarkable. New postsurgical changes at L5. L1-L2: No canal stenosis. Mild facet disease. No foraminal narrowing. L2-L3: No canal stenosis. Facet disease. Broad-based disc bulge with mild bilateral foraminal narrowing. L3-L4: Broad-based disc bulge. Mild to moderate canal stenosis. Facet disease. Mild right and moderate left foraminal narrowing. L4-L5: Broad-based disc bulge with postsurgical changes at this level. Ligamentum flavum hypertrophy and facet disease contributes to moderate-severe canal stenosis. Severe right and left foraminal narrowing. L5-S1: Broad-based disc bulge with mild canal stenosis. Facet disease. Moderate-severe right and moderate left foraminal narrowing. Postsurgical changes of laminectomy. Peridural enhancement related to the postoperative changes.     IMPRESSION: 1.  Multilevel degenerative changes as detailed above. 2.  Postsurgical changes at L5 with associated peridural enhancement.        This note was dictated using voice recognition software. Any grammatical or context distortions are unintentional and inherent to the software.       Laxmi Dubose FNP-C  [facility]  O. [phone]             Again, thank you for allowing me to participate in the care of your patient.        Sincerely,        MILO Gonzalez CNP    Electronically signed

## 2025-03-13 NOTE — PROGRESS NOTES
ASSESSMENT: Sosa Patel is a 49 year old female who presents for consultation at the request of PCP Cynthia Scott, who presents today for new patient evaluation of:    -lumbar radiculopathy, history of lumbar surgery    Patient has decreased sensation to light touch right anterior thigh, otherwise neurologically intact on exam. She reports shaji posterolateral leg pain with developing numbness in the leg with one episode of bowel incontinence. We reviewed her new lumbar MRI. She has severe foraminal narrowing shaji at L4-5, moderate to severe canal stenosis. She has moderate to severe right and moderate left foraminal narrowing at L5-S1 with mild canal stenosis. Recommended follow up with Dr Goins to review MRI given her surgery with him this year.    Bulk of her pain is posterior legs, recommend shaji L5-S1 TF MAIN.  She will contact her pain mgmt provider to consider narcotic.  She has been taking Gabapentin 1200mg BID for the last 4 days and I refilled it at this dose but would not go higher. She will start naproxen 250mg BID (discussed risks w/ methotrexate, prozac), flexeril, and PT. Follow up 2-4 wks post injection        3/13/2025    12:23 PM   OSWESTRY DISABILITY INDEX   Count 9    Sum 24    Oswestry Score (%) 53.33 %        Patient-reported            Diagnoses and all orders for this visit:  Lumbar radiculopathy  -     Spine  Referral  -     PAIN Transforaminal MAIN Inj Lumbosacral Bilateral; Future  -     Physical Therapy  Referral; Future  -     gabapentin (NEURONTIN) 600 MG tablet; Take 2 tablets (1,200 mg) by mouth 2 times daily.  -     cyclobenzaprine (FLEXERIL) 5 MG tablet; Take 1-2 tablets (5-10 mg) by mouth 3 times daily as needed.  -     naproxen (NAPROSYN) 250 MG tablet; Take 1 tablet (250 mg) by mouth 2 times daily (with meals).  Hx of lumbosacral spine surgery  -     Spine  Referral      PLAN:  Reviewed spine anatomy and disease process. Discussed diagnosis and  treatment options with the patient today. A shared decision making model was used.  The patient's values and choices were respected. The following represents what was discussed and decided upon by the provider and the patient.      -DIAGNOSTIC TESTS:  Images were personally reviewed and interpreted and explained to patient today using a spine model.   --no new imaging    -PHYSICAL THERAPY:    --Referral to PT placed for once pain controlled and able to attend   Discussed the importance of core strengthening, ROM, stretching exercises with the patient and how each of these entities is important in decreasing pain.  Explained to the patient that the purpose of physical therapy is to teach the patient a home exercise program.  These exercises need to be performed every day in order to decrease pain and prevent future occurrences of pain.        -MEDICATIONS:    --start naproxen 250mg  -refilled current gabapentin 1200mg BID  - start flexeril 5-10mg tid prn  Discussed multiple medication options today with patient. Discussed risks, side effects, and proper use of medications. Patient verbalized understanding.    -INTERVENTIONS:    -Discussed the role of injections with patient today. Patient would be a good candidate in the future for either epidural steroid injections or medial branch blocks if indicated based on symptoms and supported by imaging results.  -Risks, benefits, and role of injections were discussed with patient today. Orders placed for shaji L5-S1 TF MAIN.    -PATIENT EDUCATION:  Total time of 40 minutes, on the day of service, spent with the patient, reviewing the chart, placing orders, and documenting.   -Today we also discussed the pros and cons of the current treatment plan.    -FOLLOW-UP:  2-4 wks post injection    Advised patient to call the [facility] if symptoms worsen, new numbness or weakness develops in the legs, or if they develop new or worsening problems controlling bladder or bowel function.    ______________________________________________________________________    SUBJECTIVE:    HPI:  Sosa Patel  Is a 49 year old female hx depression, lupus, sicca syndrome, gallstones, cocaine use, hx IVDU in remission who presents today for new patient evaluation of lumbar radiculopathy, history of lumbar surgery -    1mo ago gradual onset R>L shaji leg pain posterior thighs and R groin into posterolateral ankle. The R foot is starting to get tingly which comes and goes. She had 1 episode of bowel incontinence 1 mo ago which was concerning as she had this preop. She had surgery shaji L4-5 lami with Dr Goins in may of 2024. Did great following surgery until now. Feels like same symptoms she had before surgery. She did PT all of 2024, jan to oct. She had shaji L4-5 TF MAIN 3/13/24 preop which was not helpful. Pain today 7/10, 10 at worst, 1 at best.     Weaned off of gabapentin then after some return of symptoms went back on it. She increased It on her own and has been taking 1200mg BID x last 4 days which is helping and no side effects.  Medrol dose pack sent for pt by neurosurgery at the end of feb. this did not help. She has been taking oxycodone 10mg twice daily, ibuprofen, tylenol    -Treatment to Date:     -Medications:  gabapentin    Current Outpatient Medications   Medication Sig Dispense Refill    cyclobenzaprine (FLEXERIL) 5 MG tablet Take 1-2 tablets (5-10 mg) by mouth 3 times daily as needed. 45 tablet 0    gabapentin (NEURONTIN) 600 MG tablet Take 2 tablets (1,200 mg) by mouth 2 times daily. 120 tablet 1    naproxen (NAPROSYN) 250 MG tablet Take 1 tablet (250 mg) by mouth 2 times daily (with meals). 60 tablet 0    acetaminophen (TYLENOL) 325 MG tablet Take 650 mg by mouth 2 times daily. Pt takes twice a day      ARIPiprazole (ABILIFY) 10 MG tablet Take 10 mg by mouth daily.      aspirin-acetaminophen-caffeine (EXCEDRIN MIGRAINE) 250-250-65 MG tablet Take 1 tablet by mouth daily as needed for headaches       augmented betamethasone dipropionate (DIPROLENE-AF) 0.05 % external ointment Apply topically 2 times daily. 50 g 3    belimumab (BENLYSTA) subcutaneous injection (prefilled autoinjector) Inject 1 mL (200 mg) subcutaneously every 7 days. . Hold for signs of infection and seek medical attention.   Refills at rheumatology appointments. 4 mL 7    cetirizine (ZYRTEC) 10 MG tablet TAKE 2 TABLETS BY MOUTH DAILY 180 tablet 0    clobetasol (TEMOVATE) 0.05 % external ointment Apply topically twice a week.      FLUoxetine (PROZAC) 40 MG capsule TAKE ONE CAPSULE BY MOUTH ONCE DAILY 90 capsule 1    folic acid (FOLVITE) 1 MG tablet Take 2 tablets (2 mg) by mouth daily. 180 tablet 2    gabapentin (NEURONTIN) 600 MG tablet TAKE 1 TABLET (600 MG) BY MOUTH 2 TIMES DAILY. 180 tablet 0    hydroquinone (RUFUS) 4 % external cream Apply topically at bedtime 28.35 g 11    hydroxychloroquine (PLAQUENIL) 200 MG tablet Hydroxychloroquine 200 mg daily with an additional 200 mg every other day.  Ophthalmology toxicity monitoring eye exam, that includes 10-2 VF and SD-OCT, is required yearly. 135 tablet 2    ibuprofen (ADVIL/MOTRIN) 200 MG tablet Take 200 mg by mouth every 4 hours as needed for pain.      lidocaine (XYLOCAINE) 5 % external ointment Apply topically as needed for moderate pain 50 g 1    methotrexate 2.5 MG tablet Take 6 tablets (15 mg) by mouth once a week. Methotrexate is a once weekly medication, taken on the same day of each week. 78 tablet 0    methylPREDNISolone (MEDROL DOSEPAK) 4 MG tablet therapy pack Follow Package Directions 21 tablet 0    oxyCODONE (ROXICODONE) 5 MG tablet Take 1 tablet (5 mg) by mouth 3 times daily as needed for pain. 9 tablet 0    topiramate (TOPAMAX) 50 MG tablet Take 1 tablet (50 mg) by mouth daily. (Patient not taking: Reported on 2/25/2025) 30 tablet 1    valACYclovir (VALTREX) 1000 mg tablet Take 1 tablet (1,000 mg) by mouth 2 times daily for 10 days 40 tablet 1     No current  facility-administered medications for this visit.       No Known Allergies    Past Medical History:   Diagnosis Date    Arthritis 2013    Bipolar disorder (H) 12/2004    Dx'd    Combinations of drug dependence excluding opioid type drug (H) 05/04/2022    In remission     H/O: depression     SUICIDE ATTEMPT AGE 15    HPV (human papillomavirus)     Lupus erythematosus     Mood disorder 04/09/2019        Patient Active Problem List   Diagnosis    Herpes simplex type 2 infection    Genital warts    H/O intravenous drug use in remission    JESSE positive    Multiple joint pain    Cocaine use    Systemic lupus erythematosus (H)    High risk medications (not anticoagulants) long-term use    Sicca syndrome    Lichen sclerosus    Recurrent major depressive disorder, in partial remission    Gallstones    Surgery, elective       Past Surgical History:   Procedure Laterality Date    BIOPSY      BUNIONECTOMY RT/LT      CHOLECYSTECTOMY      DAVINCI LAPAROSCOPIC CHOLECYSTECTOMY WITHOUT GRAMS N/A 10/09/2023    Procedure: CHOLECYSTECTOMY, ROBOT-ASSISTED, LAPAROSCOPIC, WITHOUT CHOLANGIOGRAM;  Surgeon: Demetris Peoples MD;  Location: [facility] ENLARGE BREAST WITH IMPLANT  01/01/2000    LAMINECTOMY LUMBAR TWO LEVELS Bilateral 5/29/2024    Procedure: LUMBAR 4, LUMBAR 5 DECOMPRESSIVE BILATERAL LAMINECTOMY, MEDIAL FACETECTOMY WITH EXCISION OF CYST;  Surgeon: Aureliano Goins MD;  Location:  OR       Family History   Problem Relation Age of Onset    Cancer Father         lung cancer    Other Cancer Father     Diabetes Maternal Grandmother     Depression Maternal Grandmother     Lipids Maternal Grandmother     Cerebrovascular Disease Maternal Grandfather     Lipids Maternal Grandfather     Circulatory Maternal Grandfather         MI in 50's    Depression Brother         OCD    Gastrointestinal Disease Brother         IBS    Depression Mother     Anxiety Disorder Daughter     Thyroid Disease No family hx of     Asthma No family hx of  "       Reviewed past medical, surgical, and family history with patient found on new patient intake packet located in EMR Media tab.     SOCIAL HX: vape tobacco use, alcohol use, nonsmoker, no heavy drinking    ROS: positive for weight loss, loss of bowel control, joint pain, muscle pain, muscle fatigue, sciatica, imbalance falls. Specifically negative for bowel/bladder dysfunction, headache, dizziness, foot drop, fevers, chills, appetite changes, nausea/vomiting, unexplained weight loss. Otherwise 13 systems reviewed are negative. Please see the patient's intake questionnaire from today for details.    OBJECTIVE:  /63   Pulse 77   Ht 5' 5.5\" (1.664 m)   Wt 145 lb (65.8 kg)   LMP 02/05/2022   BMI 23.76 kg/m      PHYSICAL EXAMINATION:    --CONSTITUTIONAL:  Vital signs as above.  No acute distress.  The patient is well nourished and well groomed. Appears uncomfortable dt pain  --PSYCHIATRIC:  Appropriate mood and affect. The patient is awake, alert, oriented to person, place, time and answering questions appropriately with clear speech.    --SKIN:  Skin over the face, bilateral lower extremities, and posterior torso is clean, dry, intact without rashes.    --RESPIRATORY: Normal rhythm and effort. No abnormal accessory muscle breathing patterns noted.   --ABDOMINAL:  Non-distended.    --GROSS MOTOR: Gait is non-antalgic. arises with pain from a seated position. Toe walking and heel walking are normal without significant difficulty.      --LOWER EXTREMITY MOTOR TESTING:  Hip flexion: right 5/5, left 5/5  Hip abduction: right 5/5, left 5/5  Hip adduction: right 5/5, left 5/5   Quads: right 5/5, left 5/5  Hamstrings: right 5/5, left 5/5  Dorsiflexion: right 5/5, left 5/5  Plantar flexion: right 5/5, left 5/5    Great toe MTP extension/EHL: right 5/5, left 5/5    --NEUROLOGICAL:  2/4 patellar and achilles reflexes bilaterally. Sensory loss R anterior thigh otherwise sensation to light touch is intact throughout " both lower extremities. Babinski is negative. No clonus.      --MUSCULOSKELETAL: Lumbar spine inspection reveals no evidence of deformity. Range of motion is very limited in lumbar flexion, extension, rotation dt pain. No point tenderness to palpation lumbar spine. No lumbar paraspinal musculature tenderness.     Straight leg raising is positive shaji.    --HIPS: Full range of motion bilaterally. Negative KEY and Negative FADIR bilaterally. Negative hip joint tenderness to palp.    --SACROILIAC JOINT: Distraction maneuver was negative. Gaenslen's Test was negative. Thigh thrust was negative. Sacroiliac Joint Compression Test was negative. One finger point test was negative. Negative SI joint tenderness to palpation bilaterally.    --VASCULAR:  Bilateral lower extremities are warm without any discoloration.  There is no pitting edema of the bilateral lower extremities.    RESULTS:   Prior medical records from [facility] and [facility] Everywhere were reviewed today.    Imaging: Spine imaging was personally reviewed and interpreted today. The images were shown to the patient and the findings were explained using a spine model.      MR Lumbar Spine w/o & w Contrast    Result Date: 3/11/2025  EXAM: MR LUMBAR SPINE W/O and W CONTRAST LOCATION: [facility] DATE: 3/11/2025 INDICATION:  Lumbar radiculopathy, Hx of lumbosacral spine surgery COMPARISON: 02/10/2024. CONTRAST: 7ml gadavist TECHNIQUE: Routine Lumbar Spine MRI without and with IV contrast. FINDINGS: Vertebral body heights are maintained. Grade 1 anterolisthesis of L4 on L5.. Multilevel loss of disc height. Conus terminates normally. Marrow signal unremarkable. New postsurgical changes at L5. L1-L2: No canal stenosis. Mild facet disease. No foraminal narrowing. L2-L3: No canal stenosis. Facet disease. Broad-based disc bulge with mild bilateral foraminal narrowing. L3-L4: Broad-based disc bulge. Mild to moderate canal stenosis. Facet  disease. Mild right and moderate left foraminal narrowing. L4-L5: Broad-based disc bulge with postsurgical changes at this level. Ligamentum flavum hypertrophy and facet disease contributes to moderate-severe canal stenosis. Severe right and left foraminal narrowing. L5-S1: Broad-based disc bulge with mild canal stenosis. Facet disease. Moderate-severe right and moderate left foraminal narrowing. Postsurgical changes of laminectomy. Peridural enhancement related to the postoperative changes.     IMPRESSION: 1.  Multilevel degenerative changes as detailed above. 2.  Postsurgical changes at L5 with associated peridural enhancement.        This note was dictated using voice recognition software. Any grammatical or context distortions are unintentional and inherent to the software.       Laxmi Dubose P-C  [facility]  O. [phone]

## 2025-03-17 ENCOUNTER — MYC REFILL (OUTPATIENT)
Dept: PALLIATIVE MEDICINE | Facility: OTHER | Age: 50
End: 2025-03-17

## 2025-03-17 ENCOUNTER — OFFICE VISIT (OUTPATIENT)
Dept: NEUROSURGERY | Facility: CLINIC | Age: 50
End: 2025-03-17
Payer: COMMERCIAL

## 2025-03-17 VITALS
SYSTOLIC BLOOD PRESSURE: 109 MMHG | HEIGHT: 65 IN | BODY MASS INDEX: 23.82 KG/M2 | DIASTOLIC BLOOD PRESSURE: 62 MMHG | WEIGHT: 143 LBS | HEART RATE: 73 BPM

## 2025-03-17 DIAGNOSIS — M54.41 BILATERAL LOW BACK PAIN WITH BILATERAL SCIATICA, UNSPECIFIED CHRONICITY: ICD-10-CM

## 2025-03-17 DIAGNOSIS — M54.42 BILATERAL LOW BACK PAIN WITH BILATERAL SCIATICA, UNSPECIFIED CHRONICITY: ICD-10-CM

## 2025-03-17 DIAGNOSIS — M54.16 LUMBAR RADICULOPATHY: Primary | ICD-10-CM

## 2025-03-17 PROCEDURE — 3078F DIAST BP <80 MM HG: CPT | Performed by: NEUROLOGICAL SURGERY

## 2025-03-17 PROCEDURE — 1125F AMNT PAIN NOTED PAIN PRSNT: CPT | Performed by: NEUROLOGICAL SURGERY

## 2025-03-17 PROCEDURE — 99213 OFFICE O/P EST LOW 20 MIN: CPT | Performed by: NEUROLOGICAL SURGERY

## 2025-03-17 PROCEDURE — 3074F SYST BP LT 130 MM HG: CPT | Performed by: NEUROLOGICAL SURGERY

## 2025-03-17 RX ORDER — OXYCODONE HYDROCHLORIDE 5 MG/1
5 TABLET ORAL 3 TIMES DAILY PRN
Qty: 15 TABLET | Refills: 0 | Status: SHIPPED | OUTPATIENT
Start: 2025-03-17

## 2025-03-17 ASSESSMENT — PAIN SCALES - GENERAL: PAINLEVEL_OUTOF10: SEVERE PAIN (8)

## 2025-03-17 NOTE — NURSING NOTE
"Sosa Patel is a 49 year old female who presents for:  Chief Complaint   Patient presents with    RECHECK     Patient has pain in the lower back on the right side runs down her leg to the knee. Left leg has some pain in the upper leg. When she was driving her legs felt like cement. Lvl 8.        Initial Vitals:  /62   Pulse 73   Ht 5' 5\" (1.651 m)   Wt 143 lb (64.9 kg)   LMP 02/05/2022   BMI 23.80 kg/m   Estimated body mass index is 23.8 kg/m  as calculated from the following:    Height as of this encounter: 5' 5\" (1.651 m).    Weight as of this encounter: 143 lb (64.9 kg).. Body surface area is 1.73 meters squared. BP completed using cuff size: regular  Severe Pain (8)    Nursing Comments:       Precious Schneider    "

## 2025-03-17 NOTE — LETTER
"3/17/2025      Sosa Patel  [address]   [place] [address]      Dear Colleague,    Thank you for referring your patient, Sosa Patel, to the [facility]. Please see a copy of my visit note below.    NEUROSURGERY FOLLOWUP  NOTE    Sosa Patel comes today in f/u approximately 10 months s/p 5/29/2024 L4-5 decompressive lumbar laminectomy, medial facetectomy foraminotomy and resection of epidural synovial cyst.  Final pathology:L4/L5 epidural cyst, excision, Consistent with benign synovial cyst.    Patient did very well following the decompression and resection of the synovial cyst for last 10 months or so with almost 90% relief of her symptoms.  She has started noticing current pain in her low back radiating along the posterior aspect of her thigh leg and up to the sole involving her right lower extremity for last month or so.  She denies any preceding trauma.  She works as a  and reports that work exacerbated her pain.  She also reports pain radiating to her left lower extremity up to the left knee.  She denies pain radiating below her left knee.  She also reports weakness in the form of difficulty in getting up from squatting position.  She also reports single episode of bowel incontinence with no bladder incontinence.    Patient is in the clinic today with repeat MRI lumbar spine.    PHYSICAL EXAM:   Constitutional: /62   Pulse 73   Ht 5' 5\" (1.651 m)   Wt 143 lb (64.9 kg)   LMP 02/05/2022   BMI 23.80 kg/m       Mental Status: A & O in no acute distress.  Affect is appropriate.  Speech is fluent.  Recent and remote memory are intact.  Attention span and concentration are normal.     Motor: No pronator drift of upper extremity.   Normal bulk and tone all muscle groups of upper and lower extremities.       Delt Bi Tri Hand Flex/  Ext Iliopsoas Quadriceps Tibialis Anterior EHL Gastroc     C5 C6 C7 C8/T1 L2 L3 L4 L5 S1   R 5 5 5 5 5 5 5 -4 5   L 5 5 5 " 5 5 5 5 5 5        Sensory: Sensation intact bilaterally to light touch.      Coordination:   Heel/toe/ gait intact.  Intact tandem gait      Reflexes; supinator, biceps, triceps, knee/ ankle jerk intact.  No hoffmans/clonus.    Incision healed well.    IMAGING:   I personally reviewed all radiographic images and agree with the radiology report of multilevel degenerative disc disease primarily at L4-5 and L5-S1 causing severe right lateral recess stenosis.      IMPRESSION:  1.  Multilevel degenerative changes as detailed above.  2.  Postsurgical changes at L5 with associated peridural enhancement.     CONSULTATION ASSESSMENT AND PLAN:    Ms Patel is a 48-year-old right-handed female with significant past medical history of bipolar disorder, drug dependence, suicide attempt initially presented to the clinic in May 2024 for evaluation of low back pain and bilateral leg pain right more than the left since November 2023 without preceding trauma. She has clinical symptoms of neurogenic claudication with intermittent episodes of bladder/bowel incontinence. On clinical examination she has 4+ right foot dorsiflexion and impaired sensation along S1 dermatome involving both lower extremities. Her MRI lumbar spine showed L4-5 central and bilateral lateral recess stenosis with synovial cyst. Based on patient's symptoms and imaging findings surgical decompression was offered.     She underwent open L4-5 decompressive laminectomy and resection of the synovial cyst on 5/29/2024.  Patient had significant improvement in her symptoms following the decompression.  She reports almost 90% improvement in her preoperative symptoms.  However for last 1 month or so she started noticing worsening of her low back pain radiating to her right lower extremity along the posterior aspect likely along right L5 /S1 dermatomal distribution.  She also reports pain radiating up to her left knee along the posterior aspect.  She has mild right foot  dorsiflexion weakness.    I explained the MRI lumbar spine to the patient and showed her the images.  I explained to her the presence of multilevel degenerative disc disease primarily at L4-5 and L5-S1 causing severe right foraminal narrowing and moderate to severe left foraminal narrowing.  I also explained to her the postsurgical changes of laminectomy.  I discussed the management options for degenerative disc disease with lateral recess stenosis including conservative management with nonsurgical interventions including physical therapy, transforaminal epidural steroid injections, decompression alone and decompression with fusion.  I discussed the risk and benefits of all the management options.  I briefly explained her the procedure of transforaminal interbody fusion.    Patient is scheduled to receive transforaminal epidural steroid injection on 4/2/2025.  I will also refer her to start physical therapy.  I will also get bilateral lower extremities EMG/NCV studies, x-ray lumbar spine 4 views including flexion and extension and CT lumbar spine Mazor protocol.  I will follow her with above-mentioned tests and completion of conservative treatment at 6 weeks or so to finalize the management plan for persistent symptoms if any.    Patient agreed with the plan.  All the questions were answered and patient sounded understanding.  She can contact us if there are any further questions or concerns or worsening neurological deficits.I spent more than 20 minutes in this apt, examining the pt, reviewing the scans, reviewing notes from chart, discussing treatment options with risks and benefits and coordinating care. This note was created in part by the use of Dragon voice recognition system. Inadvertent grammatical errors and typographical errors may have occurred due to inherent limitation of voice recognition software.  Reasonable attempts made to avoid errors, but this document may contain an error not identified before  finalizing.  Please contact me for any clarification needed.        Aureliano Goins MD      CC:     Cynthia Scott  [address] [facility] [address]      Again, thank you for allowing me to participate in the care of your patient.        Sincerely,        Aureliano Goins MD    Electronically signed

## 2025-03-17 NOTE — PROGRESS NOTES
"NEUROSURGERY FOLLOWUP  NOTE    Sosa Patel comes today in f/u approximately 10 months s/p 5/29/2024 L4-5 decompressive lumbar laminectomy, medial facetectomy foraminotomy and resection of epidural synovial cyst.  Final pathology:L4/L5 epidural cyst, excision, Consistent with benign synovial cyst.    Patient did very well following the decompression and resection of the synovial cyst for last 10 months or so with almost 90% relief of her symptoms.  She has started noticing current pain in her low back radiating along the posterior aspect of her thigh leg and up to the sole involving her right lower extremity for last month or so.  She denies any preceding trauma.  She works as a  and reports that work exacerbated her pain.  She also reports pain radiating to her left lower extremity up to the left knee.  She denies pain radiating below her left knee.  She also reports weakness in the form of difficulty in getting up from squatting position.  She also reports single episode of bowel incontinence with no bladder incontinence.    Patient is in the clinic today with repeat MRI lumbar spine.    PHYSICAL EXAM:   Constitutional: /62   Pulse 73   Ht 5' 5\" (1.651 m)   Wt 143 lb (64.9 kg)   LMP 02/05/2022   BMI 23.80 kg/m       Mental Status: A & O in no acute distress.  Affect is appropriate.  Speech is fluent.  Recent and remote memory are intact.  Attention span and concentration are normal.     Motor: No pronator drift of upper extremity.   Normal bulk and tone all muscle groups of upper and lower extremities.       Delt Bi Tri Hand Flex/  Ext Iliopsoas Quadriceps Tibialis Anterior EHL Gastroc     C5 C6 C7 C8/T1 L2 L3 L4 L5 S1   R 5 5 5 5 5 5 5 -4 5   L 5 5 5 5 5 5 5 5 5        Sensory: Sensation intact bilaterally to light touch.      Coordination:   Heel/toe/ gait intact.  Intact tandem gait      Reflexes; supinator, biceps, triceps, knee/ ankle jerk intact.  No hoffmans/clonus.    Incision " healed well.    IMAGING:   I personally reviewed all radiographic images and agree with the radiology report of multilevel degenerative disc disease primarily at L4-5 and L5-S1 causing severe right lateral recess stenosis.      IMPRESSION:  1.  Multilevel degenerative changes as detailed above.  2.  Postsurgical changes at L5 with associated peridural enhancement.     CONSULTATION ASSESSMENT AND PLAN:    Ms Patel is a 48-year-old right-handed female with significant past medical history of bipolar disorder, drug dependence, suicide attempt initially presented to the clinic in May 2024 for evaluation of low back pain and bilateral leg pain right more than the left since November 2023 without preceding trauma. She has clinical symptoms of neurogenic claudication with intermittent episodes of bladder/bowel incontinence. On clinical examination she has 4+ right foot dorsiflexion and impaired sensation along S1 dermatome involving both lower extremities. Her MRI lumbar spine showed L4-5 central and bilateral lateral recess stenosis with synovial cyst. Based on patient's symptoms and imaging findings surgical decompression was offered.     She underwent open L4-5 decompressive laminectomy and resection of the synovial cyst on 5/29/2024.  Patient had significant improvement in her symptoms following the decompression.  She reports almost 90% improvement in her preoperative symptoms.  However for last 1 month or so she started noticing worsening of her low back pain radiating to her right lower extremity along the posterior aspect likely along right L5 /S1 dermatomal distribution.  She also reports pain radiating up to her left knee along the posterior aspect.  She has mild right foot dorsiflexion weakness.    I explained the MRI lumbar spine to the patient and showed her the images.  I explained to her the presence of multilevel degenerative disc disease primarily at L4-5 and L5-S1 causing severe right foraminal narrowing  and moderate to severe left foraminal narrowing.  I also explained to her the postsurgical changes of laminectomy.  I discussed the management options for degenerative disc disease with lateral recess stenosis including conservative management with nonsurgical interventions including physical therapy, transforaminal epidural steroid injections, decompression alone and decompression with fusion.  I discussed the risk and benefits of all the management options.  I briefly explained her the procedure of transforaminal interbody fusion.    Patient is scheduled to receive transforaminal epidural steroid injection on 4/2/2025.  I will also refer her to start physical therapy.  I will also get bilateral lower extremities EMG/NCV studies, x-ray lumbar spine 4 views including flexion and extension and CT lumbar spine Mazor protocol.  I will follow her with above-mentioned tests and completion of conservative treatment at 6 weeks or so to finalize the management plan for persistent symptoms if any.    Patient agreed with the plan.  All the questions were answered and patient sounded understanding.  She can contact us if there are any further questions or concerns or worsening neurological deficits.I spent more than 20 minutes in this apt, examining the pt, reviewing the scans, reviewing notes from chart, discussing treatment options with risks and benefits and coordinating care. This note was created in part by the use of Dragon voice recognition system. Inadvertent grammatical errors and typographical errors may have occurred due to inherent limitation of voice recognition software.  Reasonable attempts made to avoid errors, but this document may contain an error not identified before finalizing.  Please contact me for any clarification needed.        Aureliano Goins MD      CC:     Cynthia Scott  [address] [facility] [address]

## 2025-03-17 NOTE — PATIENT INSTRUCTIONS
Orders placed:  PT referral  EMG for both legs  Lumbar x-ray  Lumbar CT    Follow-up in 6-8 weeks once images are complete

## 2025-03-21 ENCOUNTER — TELEPHONE (OUTPATIENT)
Dept: PALLIATIVE MEDICINE | Facility: OTHER | Age: 50
End: 2025-03-21
Payer: COMMERCIAL

## 2025-03-21 DIAGNOSIS — M54.42 BILATERAL LOW BACK PAIN WITH BILATERAL SCIATICA, UNSPECIFIED CHRONICITY: ICD-10-CM

## 2025-03-21 DIAGNOSIS — M54.41 BILATERAL LOW BACK PAIN WITH BILATERAL SCIATICA, UNSPECIFIED CHRONICITY: ICD-10-CM

## 2025-03-21 RX ORDER — OXYCODONE HYDROCHLORIDE 5 MG/1
10 TABLET ORAL 3 TIMES DAILY PRN
Qty: 30 TABLET | Refills: 0 | Status: SHIPPED | OUTPATIENT
Start: 2025-03-21

## 2025-03-21 NOTE — TELEPHONE ENCOUNTER
[facility] Call Center    Phone Message    May a detailed message be left on voicemail: yes     Reason for Call: Medication Refill Request    Has the patient contacted the pharmacy for the refill? Yes   Name of medication being requested:  oxyCODONE (ROXICODONE) 5 MG tablet   Provider who prescribed the medication: LUIS  Pharmacy:   [facility] - [id number] [facility]     Date medication is needed: RIGHT AWAY     Patient states she needs to take two medications of these at a time in order for them to work. She states her primary gave her a quick dose but she needs a full refill     Action Taken: Message routed to:  Other: [facility]    Travel Screening: Not Applicable     Date of Service:

## 2025-03-26 ENCOUNTER — THERAPY VISIT (OUTPATIENT)
Dept: PHYSICAL THERAPY | Facility: CLINIC | Age: 50
End: 2025-03-26
Payer: COMMERCIAL

## 2025-03-26 DIAGNOSIS — M54.16 LUMBAR RADICULOPATHY: ICD-10-CM

## 2025-03-26 PROBLEM — G89.29 CHRONIC BILATERAL LOW BACK PAIN WITH BILATERAL SCIATICA: Status: ACTIVE | Noted: 2022-07-27

## 2025-03-26 PROBLEM — M54.41 CHRONIC BILATERAL LOW BACK PAIN WITH BILATERAL SCIATICA: Status: ACTIVE | Noted: 2022-07-27

## 2025-03-26 PROBLEM — M54.42 CHRONIC BILATERAL LOW BACK PAIN WITH BILATERAL SCIATICA: Status: ACTIVE | Noted: 2022-07-27

## 2025-03-26 PROCEDURE — 97110 THERAPEUTIC EXERCISES: CPT | Mod: GP | Performed by: PHYSICAL THERAPIST

## 2025-03-26 PROCEDURE — 97161 PT EVAL LOW COMPLEX 20 MIN: CPT | Mod: GP | Performed by: PHYSICAL THERAPIST

## 2025-03-26 NOTE — PROGRESS NOTES
PHYSICAL THERAPY EVALUATION  Type of Visit: Evaluation     Fall Risk Screen:  Fall screen completed by: PT  Have you fallen 2 or more times in the past year?: Yes  Have you fallen and had an injury in the past year?: No  Is patient a fall risk?: No    Subjective         Presenting condition or subjective complaint: Lower back pain and sciatica down both legs although the right side is more painful  Date of onset: 03/17/25 (MD order date)    Relevant medical history:     Dates & types of surgery: May 2024 laminectimy w/ spinal decompression  Nov 2024 gallbladder removed    Prior diagnostic imaging/testing results: MRI     Prior therapy history for the same diagnosis, illness or injury: Yes July 2024 lower back therapy from a laminectomy and spinal decompression    Prior Level of Function  Transfers: Independent  Ambulation: Independent  ADL: Independent  IADL: Driving, Finances, Housekeeping, Laundry, Meal preparation, Work    Living Environment  Social support: With family members   Type of home: House; Multi-level   Stairs to enter the home: Yes 2 Is there a railing: No     Ramp: No   Stairs inside the home: Yes 12 Is there a railing: Yes     Help at home: None  Equipment owned: Straight Cane; Walker; Raised toilet seat     Employment: Yes   Hobbies/Interests: Walks, reading, beingboutside , water, bonfires    Patient goals for therapy: Go for walks, exercise, take a trip, work without pain and sleep without pain    Pain assessment: See objective evaluation for additional pain details     Objective   LUMBAR SPINE EVALUATION  PAIN: Pain Level at Rest: 5/10  Pain Level with Use: 9/10  Pain Location: lumbar spine and pain into both legs (R>L)  Pain Quality: Aching, Burning, Numb, and Tingling  Pain Frequency: constant or daily  Pain is Exacerbated By: bending, twisting, sit to stand, squatting, prolong standing or sitting    POSTURE: Standing Posture: Rounded shoulders, Forward head  GAIT:   Weightbearing  Status: WBAT  Assistive Device(s): None  Gait Deviations: Antalgic  Trunk flexion, Decreased pelvic rotation    ROM:   (Degrees) Left AROM Left PROM  Right AROM Right PROM   Hip Flexion WFL  WFL    Hip Extension       Hip Abduction WFL  WFL    Hip Adduction       Hip Internal Rotation       Hip External Rotation       Knee Flexion       Knee Extension       Lumbar Side Bend Mid thigh Mid thigh   Lumbar Flexion Touches toes   Lumbar Extension Mod loss; p ain       MYOTOMES:    Left Right   T12-L3 (Hip Flexion) 4 4-   L2-4 (Quads)  4+ 4+   L4 (Ankle DF) 5 5   L5 (Great Toe Ext)     S1 (Toe Raise)         NEURAL TENSION: Lumbar WNL    PALPATION:   + Tenderness At Location Left Right   Quadratus Lumborum + +   Erector Spinae + +   Piriformis      PSIS     ASIS     Iliac Crest     Glut Medius     Greater Trochanter     Ischial Tuberosity     Hamstrings     Hip Flexors     Vertebral          Assessment & Plan   CLINICAL IMPRESSIONS  Medical Diagnosis: Lumbar radiculopathy    Treatment Diagnosis: chronic Back pain with leg pain   Impression/Assessment: Patient is a 49 year old female with LB and leg pain complaints.  The following significant findings have been identified: Pain, Decreased ROM/flexibility, Decreased strength, Impaired muscle performance, and Decreased activity tolerance. These impairments interfere with their ability to perform self care tasks, work tasks, and household chores as compared to previous level of function.     Clinical Decision Making (Complexity):  Clinical Presentation: Stable/Uncomplicated  Clinical Presentation Rationale: based on medical and personal factors listed in PT evaluation  Clinical Decision Making (Complexity): Low complexity    PLAN OF CARE  Treatment Interventions:  Modalities: E-stim  Interventions: Manual Therapy, Neuromuscular Re-education, Therapeutic Activity, Therapeutic Exercise    Long Term Goals     PT Goal 1  Goal Description: pt able to stand/work for 2 hours with  minimal increase in pain  Rationale: to maximize safety and independence with performance of ADLs and functional tasks;to maximize safety and independence within the home;to maximize safety and independence within the community  Goal Progress: standing/working 30 min before increase in LB and leg pain  Target Date: 06/18/25      Frequency of Treatment: 1x/week  Duration of Treatment: x12 weeks    Recommended Referrals to Other Professionals: Physical Therapy  Education Assessment:   Learner/Method: Patient;Demonstration;Pictures/Video    Risks and benefits of evaluation/treatment have been explained.   Patient/Family/caregiver agrees with Plan of Care.     Evaluation Time:     PT Eval, Low Complexity Minutes (27150): 18       Signing Clinician: Hayley Foster, IBIS        [facility]                                                                                   OUTPATIENT PHYSICAL THERAPY      PLAN OF TREATMENT FOR OUTPATIENT REHABILITATION   Patient's Last Name, First Name, Sosa Schulte  SHANIQUA YOB: 1975   Provider's Name   [facility]   Medical Record No.  [id number]     Onset Date: 03/17/25 (MD order date)  Start of Care Date: 03/26/25     Medical Diagnosis:  Lumbar radiculopathy      PT Treatment Diagnosis:  chronic Back pain with leg pain Plan of Treatment  Frequency/Duration: 1x/week/ x12 weeks    Certification date from 03/26/25 to 06/18/25         See note for plan of treatment details and functional goals     Hayley Foster, PT                         I CERTIFY THE NEED FOR THESE SERVICES FURNISHED UNDER        THIS PLAN OF TREATMENT AND WHILE UNDER MY CARE     (Physician attestation of this document indicates review and certification of the therapy plan).              Referring Provider:  Aureliano Goins    Initial Assessment  See Epic Evaluation- Start of Care Date: 03/26/25

## 2025-03-31 DIAGNOSIS — M54.16 LUMBAR RADICULOPATHY: ICD-10-CM

## 2025-03-31 DIAGNOSIS — J31.0 CHRONIC RHINITIS: ICD-10-CM

## 2025-03-31 RX ORDER — CETIRIZINE HYDROCHLORIDE 10 MG/1
20 TABLET ORAL DAILY
Qty: 180 TABLET | Refills: 0 | Status: SHIPPED | OUTPATIENT
Start: 2025-03-31

## 2025-04-02 ENCOUNTER — RADIOLOGY INJECTION OFFICE VISIT (OUTPATIENT)
Dept: PHYSICAL MEDICINE AND REHAB | Facility: CLINIC | Age: 50
End: 2025-04-02
Attending: NURSE PRACTITIONER
Payer: COMMERCIAL

## 2025-04-02 VITALS
RESPIRATION RATE: 16 BRPM | HEIGHT: 65 IN | DIASTOLIC BLOOD PRESSURE: 60 MMHG | BODY MASS INDEX: 23.32 KG/M2 | SYSTOLIC BLOOD PRESSURE: 94 MMHG | HEART RATE: 84 BPM | OXYGEN SATURATION: 98 % | TEMPERATURE: 98.1 F | WEIGHT: 140 LBS

## 2025-04-02 DIAGNOSIS — M54.16 LUMBAR RADICULOPATHY: ICD-10-CM

## 2025-04-02 DIAGNOSIS — Z79.2 NEED FOR ANTIBIOTIC PROPHYLAXIS FOR SURGICAL PROCEDURE: Primary | ICD-10-CM

## 2025-04-02 PROCEDURE — 64483 NJX AA&/STRD TFRM EPI L/S 1: CPT | Mod: 50 | Performed by: STUDENT IN AN ORGANIZED HEALTH CARE EDUCATION/TRAINING PROGRAM

## 2025-04-02 RX ORDER — LIDOCAINE HYDROCHLORIDE 10 MG/ML
INJECTION, SOLUTION EPIDURAL; INFILTRATION; INTRACAUDAL; PERINEURAL
Status: COMPLETED | OUTPATIENT
Start: 2025-04-02 | End: 2025-04-02

## 2025-04-02 RX ORDER — DEXAMETHASONE SODIUM PHOSPHATE 10 MG/ML
INJECTION, SOLUTION INTRAMUSCULAR; INTRAVENOUS
Status: COMPLETED | OUTPATIENT
Start: 2025-04-02 | End: 2025-04-02

## 2025-04-02 RX ORDER — BUPIVACAINE HYDROCHLORIDE 2.5 MG/ML
INJECTION, SOLUTION EPIDURAL; INFILTRATION; INTRACAUDAL; PERINEURAL
Status: COMPLETED | OUTPATIENT
Start: 2025-04-02 | End: 2025-04-02

## 2025-04-02 RX ORDER — CEPHALEXIN 500 MG/1
500 CAPSULE ORAL 4 TIMES DAILY
Qty: 28 CAPSULE | Refills: 0 | Status: SHIPPED | OUTPATIENT
Start: 2025-04-02 | End: 2025-04-09

## 2025-04-02 RX ADMIN — LIDOCAINE HYDROCHLORIDE 4 ML: 10 INJECTION, SOLUTION EPIDURAL; INFILTRATION; INTRACAUDAL; PERINEURAL at 13:03

## 2025-04-02 RX ADMIN — DEXAMETHASONE SODIUM PHOSPHATE 10 MG: 10 INJECTION, SOLUTION INTRAMUSCULAR; INTRAVENOUS at 13:03

## 2025-04-02 RX ADMIN — BUPIVACAINE HYDROCHLORIDE 2 ML: 2.5 INJECTION, SOLUTION EPIDURAL; INFILTRATION; INTRACAUDAL; PERINEURAL at 13:03

## 2025-04-02 ASSESSMENT — PAIN SCALES - GENERAL
PAINLEVEL_OUTOF10: MODERATE PAIN (4)
PAINLEVEL_OUTOF10: MODERATE PAIN (6)

## 2025-04-02 NOTE — PATIENT INSTRUCTIONS
DISCHARGE INSTRUCTIONS    During office hours (8:00 a.m.- 4:00 p.m.) questions or concerns may be answered  by calling [facility] Center Navigation Nurses at  [phone].  Messages received after hours will be returned the following business day.      In the case of an emergency, please dial 911 or seek assistance at the nearest Emergency Room/Urgent Care facility.     All Patients:    You may experience an increase in your symptoms for the first 2 days (It may take anywhere between 2 days- 2 weeks for the steroid to have maximum effect).    You may use ice on the injection site, as frequently as 20 minutes each hour if needed.    You may take your pain medicine.    You may continue taking your regular medication after your injection. If you have had a Medial Branch Block you may resume pain medication once your pain diary is completed.    You may shower. No swimming, tub bath or hot tub for 48 hours.  You may remove your bandaid/bandage as soon as you are home.    You may resume light activities, as tolerated.    Resume your usual diet as tolerated.    If you were told to hold any blood thinning medications you may resume taking them 24 hours after your procedure as prescribed.    It is strongly advised that you do not drive for 1-3 hours post injection.    If you have had oral sedation:  Do not drive for 8 hours post injection.      If you have had IV sedation:  Do not drive for 24 hours post injection.  Do not operate hazardous machinery or make important personal/business decisions for 24 hours.      POSSIBLE STEROID SIDE EFFECTS (If steroid/cortisone was used for your procedure)    -If you experience these symptoms, it should only last for a short period    Swelling of the legs              Skin redness (flushing)     Mouth (oral) irritation   Blood sugar (glucose) levels            Sweats                    Mood changes  Headache  Sleeplessness  Weakened immune system for up to 14 days, which could increase  the risk of rhoda the COVID-19 virus and/or experiencing more severe symptoms of the disease, if exposed.  Decreased effectiveness of the flu vaccine if given within 2 weeks of the steroid.         POSSIBLE PROCEDURE SIDE EFFECTS  -Call the Spine Center if you are concerned  Increased Pain           Increased numbness/tingling      Nausea/Vomiting          Bruising/bleeding at site      Redness or swelling                                              Difficulty walking      Weakness           Fever greater than 100.5    *In the event of a severe headache after an epidural steroid injection that is relieved by lying down, please call the [facility] to speak with a clinical staff member*

## 2025-04-07 RX ORDER — CYCLOBENZAPRINE HCL 5 MG
5-10 TABLET ORAL 3 TIMES DAILY PRN
Qty: 45 TABLET | Refills: 0 | Status: SHIPPED | OUTPATIENT
Start: 2025-04-07

## 2025-04-08 ENCOUNTER — OFFICE VISIT (OUTPATIENT)
Dept: PHYSICAL MEDICINE AND REHAB | Facility: CLINIC | Age: 50
End: 2025-04-08
Attending: NEUROLOGICAL SURGERY
Payer: COMMERCIAL

## 2025-04-08 DIAGNOSIS — M54.16 LUMBAR RADICULOPATHY: ICD-10-CM

## 2025-04-08 NOTE — PROGRESS NOTES
[facility]  [address] [place] [address] [address]  [place] [address]  Office: [phone] Fax: [phone]    Electromyography and Nerve Conduction Study Report        Indication: Patient presents at the request Dr. Goins for bilateral lower extremity EMG.  She has bilateral lower extremity numbness tingling right greater than left along with back pain and leg pain.  History of lumbar decompression.  On exam, normal sensation to light touch to lower extremities, 2+ patellar and absent Achilles reflexes bilaterally and normal muscle strength of the major muscular's of the bilateral lower extremities.      Pt Exam Discussion (Communication Barriers):  Electromyography and nerve conduction testing, including associated discomfort, risks, benefits, and alternatives was discussed with the patient prior to the procedure.  No learning/ communication barriers; patient verbalized understanding of procedure.  Informed consent was obtained.           Pt Assessment:  Testing was successfully completed; patient tolerated testing well.       Blood Thinners: None Skin Temperature: Warmed 29.9                   EMG/NCS  results:     Nerve Conduction Studies  Motor Sites      Segment Distal Latency Neg. Amp CV F-Latency F-Estimate Comment   Site  (ms) (mV) (m/s) (ms) (ms)    Left Fibular (EDB) Motor   Ankle Ankle-EDB 4.5 4.0       Knee Knee-Ankle 12.8 3.6 44      Right Fibular (EDB) Motor   Ankle Ankle-EDB 5.2 1.35       Fib Head Fib Head-Ankle 13.1 1.41 37      Knee Knee-Ankle 15.5 *1.39 *38      Right Fibular (TA) Motor   Fib Head Fib Head-Tib anterior 3.2 4.5       Knee Knee-Fib Head 5.4 *3.6 45      Left Tibial (AH) Motor   Ankle Ankle-AH 3.7 7.8       Knee Knee-Ankle 12.4 7.4 44      Right Tibial (AH) Motor   Ankle Ankle-AH 3.9 3.1       Knee Knee-Ankle 13.1 *1.69 43        Sensory Sites      Onset Lat Peak Lat Amp CV Comment   Site (ms) (ms) ( V) (m/s)    Left Sural Sensory   B-Ankle 3.6 4.2 9 -     Right Sural Sensory   B-Ankle 3.4 4.0 10 -      H-Reflex Sites      M-Lat H Lat H Neg Amp   Site (ms) (ms) (mV)   Left Tibial (Soleus) H-Reflex   Pop Fossa 6.3 36.0 0.47   Right Tibial (Soleus) H-Reflex   Pop Fossa 6.2 - -     H-Reflex Sites      Lt. H Lat Rt. H Lat L-R H Lat L-R H Neg Amp   Site (ms) (ms) (ms) Norm (mV)   Tibial (Soleus) H-Reflex   Pop Fossa 36.0 - -  < 3.0 -       NCS Waveforms:    Motor                  Sensory         H-Reflex           Electromyography     Side Muscle Nerve Root Ins Act Fibs Psw Fasc Recrt Dur Amp Poly Comment   Right AntTibialis Dp Br Fibular L4-5 Nml Nml Nml Nml Nml Nml Nml 0    Right Gastroc Tibial S1-2 Nml Nml Nml Nml Nml Nml Nml 0    Right Fibularis Long Sup Br Fibular L5-S1 Nml Nml Nml Nml Nml Nml Nml 0    Right VastusLat Femoral L2-4 Nml Nml Nml Nml Nml Nml Nml 0    Right TensorFascLat SupGluteal L4-5, S1 Nml Nml Nml Nml Nml Nml Nml 0    Right Ext Dig Brev Dp Br Fibular L5, S1 Nml Nml Nml Nml Nml Nml Nml 0    Right GluteusMax InfGluteal L5-S2 Nml Nml Nml Nml Nml *>12ms *Incr 0    Left AntTibialis Dp Br Fibular L4-5 Nml Nml Nml Nml Nml Nml Nml 0    Left Gastroc Tibial S1-2 Nml Nml Nml Nml Nml Nml Nml 0    Left Fibularis Long Sup Br Fibular L5-S1 Nml Nml Nml Nml Nml Nml Nml 0    Left VastusLat Femoral L2-4 Nml Nml Nml Nml Nml Nml Nml 0    Left RectFemoris Femoral L2-4 Nml Nml Nml Nml Nml Nml Nml 0            Comment NCS: Borderline study  1.  Normal bilateral sural SNAPs.  2.  Diffusely low amplitude right peroneal CMAP to the EDB with borderline conduction velocity.  3.  Normal right peroneal CMAP to the tibialis anterior.  4.  Normal left peroneal CMAP to the EDB and bilateral tibial CMAP's.  5.  Absent right present left tibial H reflex.    Comment EMG: Abnormal study  1.  Prolonged bony and potential duration right medial gastrocnemius muscle with the remainder of the right lower extremity needle EMG normal.    2.  Normal left lower extremity needle  EMG.    Interpretation: Abnormal: There is electrodiagnostic evidence of: Study    1.  Absent right tibial H reflex with prolonged motor unit potential duration on needle EMG of the right medial gastrocnemius muscle.  These findings in isolation are nonspecific and nondiagnostic.  Under the correct clinical condition, they can be observed in a chronic right S1 radiculopathy.     2.  There is no electrodiagnostic evidence of lumbosacral radiculopathy in the left lower extremity or focal neuropathy in the bilateral lower extremities.    The testing was completed in its entirety by the physician.      It was our pleasure caring for your patient today, if there any questions or concerns please do not hesitate to contact us.

## 2025-04-08 NOTE — LETTER
4/8/2025      Sosa Patel  [address]   [place] [address]      Dear Colleague,    Thank you for referring your patient, Sosa Patel, to the [facility]. Please see a copy of my visit note below.    [facility]  [address] [place] [address] [address]  [place] [address]  Office: [phone] Fax: [phone]    Electromyography and Nerve Conduction Study Report        Indication: Patient presents at the request Dr. Goins for bilateral lower extremity EMG.  She has bilateral lower extremity numbness tingling right greater than left along with back pain and leg pain.  History of lumbar decompression.  On exam, normal sensation to light touch to lower extremities, 2+ patellar and absent Achilles reflexes bilaterally and normal muscle strength of the major muscular's of the bilateral lower extremities.      Pt Exam Discussion (Communication Barriers):  Electromyography and nerve conduction testing, including associated discomfort, risks, benefits, and alternatives was discussed with the patient prior to the procedure.  No learning/ communication barriers; patient verbalized understanding of procedure.  Informed consent was obtained.           Pt Assessment:  Testing was successfully completed; patient tolerated testing well.       Blood Thinners: None Skin Temperature: Warmed 29.9                   EMG/NCS  results:     Nerve Conduction Studies  Motor Sites      Segment Distal Latency Neg. Amp CV F-Latency F-Estimate Comment   Site  (ms) (mV) (m/s) (ms) (ms)    Left Fibular (EDB) Motor   Ankle Ankle-EDB 4.5 4.0       Knee Knee-Ankle 12.8 3.6 44      Right Fibular (EDB) Motor   Ankle Ankle-EDB 5.2 1.35       Fib Head Fib Head-Ankle 13.1 1.41 37      Knee Knee-Ankle 15.5 *1.39 *38      Right Fibular (TA) Motor   Fib Head Fib Head-Tib anterior 3.2 4.5       Knee Knee-Fib Head 5.4 *3.6 45      Left Tibial (AH) Motor   Ankle Ankle-AH 3.7 7.8       Knee  Knee-Ankle 12.4 7.4 44      Right Tibial (AH) Motor   Ankle Ankle-AH 3.9 3.1       Knee Knee-Ankle 13.1 *1.69 43        Sensory Sites      Onset Lat Peak Lat Amp CV Comment   Site (ms) (ms) ( V) (m/s)    Left Sural Sensory   B-Ankle 3.6 4.2 9 -    Right Sural Sensory   B-Ankle 3.4 4.0 10 -      H-Reflex Sites      M-Lat H Lat H Neg Amp   Site (ms) (ms) (mV)   Left Tibial (Soleus) H-Reflex   Pop Fossa 6.3 36.0 0.47   Right Tibial (Soleus) H-Reflex   Pop Fossa 6.2 - -     H-Reflex Sites      Lt. H Lat Rt. H Lat L-R H Lat L-R H Neg Amp   Site (ms) (ms) (ms) Norm (mV)   Tibial (Soleus) H-Reflex   Pop Fossa 36.0 - -  < 3.0 -       NCS Waveforms:    Motor                  Sensory         H-Reflex           Electromyography     Side Muscle Nerve Root Ins Act Fibs Psw Fasc Recrt Dur Amp Poly Comment   Right AntTibialis Dp Br Fibular L4-5 Nml Nml Nml Nml Nml Nml Nml 0    Right Gastroc Tibial S1-2 Nml Nml Nml Nml Nml Nml Nml 0    Right Fibularis Long Sup Br Fibular L5-S1 Nml Nml Nml Nml Nml Nml Nml 0    Right VastusLat Femoral L2-4 Nml Nml Nml Nml Nml Nml Nml 0    Right TensorFascLat SupGluteal L4-5, S1 Nml Nml Nml Nml Nml Nml Nml 0    Right Ext Dig Brev Dp Br Fibular L5, S1 Nml Nml Nml Nml Nml Nml Nml 0    Right GluteusMax InfGluteal L5-S2 Nml Nml Nml Nml Nml *>12ms *Incr 0    Left AntTibialis Dp Br Fibular L4-5 Nml Nml Nml Nml Nml Nml Nml 0    Left Gastroc Tibial S1-2 Nml Nml Nml Nml Nml Nml Nml 0    Left Fibularis Long Sup Br Fibular L5-S1 Nml Nml Nml Nml Nml Nml Nml 0    Left VastusLat Femoral L2-4 Nml Nml Nml Nml Nml Nml Nml 0    Left RectFemoris Femoral L2-4 Nml Nml Nml Nml Nml Nml Nml 0            Comment NCS: Borderline study  1.  Normal bilateral sural SNAPs.  2.  Diffusely low amplitude right peroneal CMAP to the EDB with borderline conduction velocity.  3.  Normal right peroneal CMAP to the tibialis anterior.  4.  Normal left peroneal CMAP to the EDB and bilateral tibial CMAP's.  5.  Absent right present left  tibial H reflex.    Comment EMG: Abnormal study  1.  Prolonged bony and potential duration right medial gastrocnemius muscle with the remainder of the right lower extremity needle EMG normal.    2.  Normal left lower extremity needle EMG.    Interpretation: Abnormal: There is electrodiagnostic evidence of: Study    1.  Absent right tibial H reflex with prolonged motor unit potential duration on needle EMG of the right medial gastrocnemius muscle.  These findings in isolation are nonspecific and nondiagnostic.  Under the correct clinical condition, they can be observed in a chronic right S1 radiculopathy.     2.  There is no electrodiagnostic evidence of lumbosacral radiculopathy in the left lower extremity or focal neuropathy in the bilateral lower extremities.    The testing was completed in its entirety by the physician.      It was our pleasure caring for your patient today, if there any questions or concerns please do not hesitate to contact us.      Again, thank you for allowing me to participate in the care of your patient.        Sincerely,        Chidi Ludwig, DO    Electronically signed

## 2025-04-08 NOTE — PATIENT INSTRUCTIONS
Thank you for choosing the [facility] for your EMG testing.    The ordering provider will receive your final EMG results within the next few days.  Please follow up with your provider for the results and further treatment recommendations.

## 2025-04-09 ENCOUNTER — THERAPY VISIT (OUTPATIENT)
Dept: PHYSICAL THERAPY | Facility: CLINIC | Age: 50
End: 2025-04-09
Payer: COMMERCIAL

## 2025-04-09 DIAGNOSIS — G89.29 CHRONIC BILATERAL LOW BACK PAIN WITH BILATERAL SCIATICA: Primary | ICD-10-CM

## 2025-04-09 DIAGNOSIS — M54.41 CHRONIC BILATERAL LOW BACK PAIN WITH BILATERAL SCIATICA: Primary | ICD-10-CM

## 2025-04-09 DIAGNOSIS — M54.42 CHRONIC BILATERAL LOW BACK PAIN WITH BILATERAL SCIATICA: Primary | ICD-10-CM

## 2025-04-09 PROCEDURE — 97110 THERAPEUTIC EXERCISES: CPT | Mod: GP | Performed by: PHYSICAL THERAPIST

## 2025-04-09 PROCEDURE — 97140 MANUAL THERAPY 1/> REGIONS: CPT | Mod: GP | Performed by: PHYSICAL THERAPIST

## 2025-04-16 ENCOUNTER — MYC MEDICAL ADVICE (OUTPATIENT)
Dept: FAMILY MEDICINE | Facility: CLINIC | Age: 50
End: 2025-04-16
Payer: COMMERCIAL

## 2025-04-16 NOTE — TELEPHONE ENCOUNTER
See other My[facility] encounter on this date (4/16/25) addressing this issue. Closing this encounter.     Sharon GRIFFITH RN  [facility]

## 2025-04-18 PROBLEM — Z41.9 SURGERY, ELECTIVE: Status: RESOLVED | Noted: 2024-05-29 | Resolved: 2025-04-18

## 2025-04-18 PROBLEM — F19.20 OTHER PSYCHOACTIVE SUBSTANCE DEPENDENCE, UNCOMPLICATED (H): Status: ACTIVE | Noted: 2025-04-18

## 2025-04-18 PROBLEM — F19.20 OTHER PSYCHOACTIVE SUBSTANCE DEPENDENCE, UNCOMPLICATED (H): Status: RESOLVED | Noted: 2025-04-18 | Resolved: 2025-04-18

## 2025-04-18 PROBLEM — F11.90 CHRONIC, CONTINUOUS USE OF OPIOIDS: Status: ACTIVE | Noted: 2025-04-18

## 2025-04-18 PROBLEM — F60.3 BORDERLINE PERSONALITY DISORDER (H): Status: ACTIVE | Noted: 2025-04-18

## 2025-04-21 ENCOUNTER — PATIENT OUTREACH (OUTPATIENT)
Dept: CARE COORDINATION | Facility: CLINIC | Age: 50
End: 2025-04-21
Payer: COMMERCIAL

## 2025-04-22 ENCOUNTER — THERAPY VISIT (OUTPATIENT)
Dept: PHYSICAL THERAPY | Facility: CLINIC | Age: 50
End: 2025-04-22
Payer: COMMERCIAL

## 2025-04-22 DIAGNOSIS — M54.41 CHRONIC BILATERAL LOW BACK PAIN WITH BILATERAL SCIATICA: Primary | ICD-10-CM

## 2025-04-22 DIAGNOSIS — M54.42 CHRONIC BILATERAL LOW BACK PAIN WITH BILATERAL SCIATICA: Primary | ICD-10-CM

## 2025-04-22 DIAGNOSIS — G89.29 CHRONIC BILATERAL LOW BACK PAIN WITH BILATERAL SCIATICA: Primary | ICD-10-CM

## 2025-04-22 PROCEDURE — 97110 THERAPEUTIC EXERCISES: CPT | Mod: GP | Performed by: PHYSICAL THERAPIST

## 2025-04-22 PROCEDURE — 97140 MANUAL THERAPY 1/> REGIONS: CPT | Mod: GP | Performed by: PHYSICAL THERAPIST

## 2025-04-28 ENCOUNTER — ANCILLARY PROCEDURE (OUTPATIENT)
Dept: CT IMAGING | Facility: CLINIC | Age: 50
End: 2025-04-28
Attending: NEUROLOGICAL SURGERY
Payer: COMMERCIAL

## 2025-04-28 ENCOUNTER — TELEPHONE (OUTPATIENT)
Dept: PHYSICAL MEDICINE AND REHAB | Facility: CLINIC | Age: 50
End: 2025-04-28

## 2025-04-28 ENCOUNTER — ANCILLARY PROCEDURE (OUTPATIENT)
Dept: GENERAL RADIOLOGY | Facility: CLINIC | Age: 50
End: 2025-04-28
Attending: NEUROLOGICAL SURGERY
Payer: COMMERCIAL

## 2025-04-28 ENCOUNTER — OFFICE VISIT (OUTPATIENT)
Dept: NEUROSURGERY | Facility: CLINIC | Age: 50
End: 2025-04-28
Attending: NEUROLOGICAL SURGERY
Payer: COMMERCIAL

## 2025-04-28 VITALS
SYSTOLIC BLOOD PRESSURE: 110 MMHG | BODY MASS INDEX: 22.79 KG/M2 | HEIGHT: 65 IN | HEART RATE: 86 BPM | DIASTOLIC BLOOD PRESSURE: 61 MMHG | OXYGEN SATURATION: 99 % | WEIGHT: 136.8 LBS

## 2025-04-28 DIAGNOSIS — M54.16 LUMBAR RADICULOPATHY: Primary | ICD-10-CM

## 2025-04-28 DIAGNOSIS — M54.16 LUMBAR RADICULOPATHY: ICD-10-CM

## 2025-04-28 PROCEDURE — 99213 OFFICE O/P EST LOW 20 MIN: CPT | Performed by: NEUROLOGICAL SURGERY

## 2025-04-28 PROCEDURE — 3078F DIAST BP <80 MM HG: CPT | Performed by: NEUROLOGICAL SURGERY

## 2025-04-28 PROCEDURE — 72131 CT LUMBAR SPINE W/O DYE: CPT | Mod: TC | Performed by: RADIOLOGY

## 2025-04-28 PROCEDURE — 3074F SYST BP LT 130 MM HG: CPT | Performed by: NEUROLOGICAL SURGERY

## 2025-04-28 PROCEDURE — 1125F AMNT PAIN NOTED PAIN PRSNT: CPT | Performed by: NEUROLOGICAL SURGERY

## 2025-04-28 PROCEDURE — 72110 X-RAY EXAM L-2 SPINE 4/>VWS: CPT | Mod: TC | Performed by: RADIOLOGY

## 2025-04-28 ASSESSMENT — PAIN SCALES - GENERAL: PAINLEVEL_OUTOF10: SEVERE PAIN (7)

## 2025-04-28 NOTE — PATIENT INSTRUCTIONS
Please review COMPLETE information about your proposed surgery, pre-operative requirements, post-operative course and expectations - available in a folder provided to you in clinic!    Your surgery scheduler will call you within 3 business days to begin the process of scheduling your surgery and appointments.     Pre-Operative    Pre-operative physical / Lab work with primary care physician within 30 days of surgical date. We prefer the pre-op exam to be done 2 weeks prior to surgery.    If all pre-op appointments/test are not completed prior to your surgery date, you will be asked to reschedule your surgery.           As part of preparation for your upcoming procedure your primary care doctor may order a test to rule out a COVID-19 infection. This is no longer a requirement prior to surgery.     Readiness Visits    Prior to surgery, you may have a telephone or in person readiness visit with our RN team to discuss your upcomming surgery, results of your pre-op physical, and lab work.   If you will require a collar/neck brace after surgery, you will be fitted for one at your readiness visit prior to surgery (scheduled by the surgery scheduler).     Shower procedure    Hibiclens shower: Use one packet the night before surgery and one packet the morning of surgery for a whole body shower. Avoid face, hair, and genitals.      Eating/Drinking    Stop all solid foods 8 hours before surgery.  Stop all clear liquids 2 hours prior to arrival time     Clear liquids include water, clear juice, black coffee, or clear tea without milk, Gatorade, clear soda.     Medications - please refer to the pre-operative medication instructions sheet in your folder    Hold Aspirin, NSAIDs (Advil/Ibuprofen, Indocin, Naproxen,Nuprin,Relafen/Nabumetone, Diclofenac,Meloxicam, Aleve, Celebrex) and all vitamins and supplements x 7-10 days prior to surgical date  You can take Tylenol (Acetaminophen) for pain up until the date of your surgery   Do  not exceed 3,000 mg per day   Any other medications prescribed, please discuss with your primary care provider at your pre-operative physical. Please discuss when/if it is safe for you to stop taking blood thinners with your primary care provider.   We will NOT provide pain medications prior to surgery. We will prescribe post-op pain medications for up to 6 weeks after surgery.       FMLA/Short-term disability    If you are currently employed, you will likely need to be off work for 4-6 weeks for post-op recovery and healing.  Please fax any FMLA/short term disability paperwork to [phone], mail it into the clinic, drop it off in person, or send via a [employer] message.   You may also call our clinic with the date in which you'd like to return to work, and we can provide a work letter to your employer  We will support Short-Term Disability up to 12 weeks, beginning the date of your surgery. We do NOT support Long-Term Disability/Social Security Benefits.     Pain Management after surgery    Dr. Goins will refill pain medications for up to 2 weeks after the date of surgery. If you still need pain medication at that point you will have to go through your primary care provider.    Dealing with pain    As your body heals, you might feel a stabbing, burning, or aching pain. You may also have some numbness.  Everyone feels pain differently, we may ask you to rate your pain using a pain scale. This will let us know how much pain you feel.   Keep in mind that medicine won't take away all of your pain. It helps to try other ways to relax and ease pain.     Things to help with pain    After surgery, we will give you medicine for your pain. These medications work well, but they can make you drowsy, itchy, or sick to your stomach, and constipated. Try to take narcotics with food if they cause nausea.   For mild to moderate pain, you can take medication such as Tylenol or Ibuprofen. These can be used with narcotics and muscle  relaxants. *If you have had a fusion: do NOT use NSAIDs for 6 months after surgery.   Do NOT drive while taking narcotic pain medication  Do NOT drink alcohol while using narcotic pain medication  You can utilize ice as needed (no longer than 20 minutes at one time) you may apply this over your covered incision  Utilize heat for muscle spasms, do not apply heat over your incision  If a muscle relaxer is prescribed, please do NOT take it at the same time as your narcotic pain medication. Take them at least 90 minutes apart.   You may also use pain cream/patches on sore muscles. Do NOT apply these on your incision. Patches may be cut in 1/2 if needed.     *After your surgery, if you will be staying in-patient, a nursing team will be monitoring you closely throughout your stay and communicate your health status to your surgeon and other providers.  You will be seen by Advanced Practice Providers (e.g., nurse practitioners, clinic nurse specialists, and physician assistants) who will check on you regularly to assess the status of your surgical recovery.     Incision Care    Look at your incision site every day. You  may need a mirror, or family member to help you.   Do not submerge your incision in water such as pools, hot tubs, baths for at least 6 weeks or until incision is healed  You may get your incision wet in the shower. Allow water and soap to run over incision, and gently pat dry.   Remove the dressing the day after you are discharged from the hospital. Keep the incision clean and dry at all times. This may require several bandage changes.   Contact us right away if you have:   Fever over 101 degrees farenheit  Green or yellow drainage (pus) from your incision or increased bloody drainage   Redness, swelling, or warmth at your surgery site   Notify the clinic [phone].    Activity Restrictions    For the first 6 weeks, no lifting,pushing, or pulling > 5-10 pounds, no bending, twisting.  Use the stairs in  moderation   Walking: Walking is the best way to start exercise after surgery. Take short frequent walks. You may gradually increase the distance as tolerated. If you feel pain, decrease your activity, but DO NOT stop walking. Walking will help you regain strength.  Avoid prolonged positioning for longer than 30 minutes (change positions frequently while awake)  No contact sports until after follow up visit  No high impact activities such as; running/jogging, snowmobile or 4 reeves riding or any other recreational vehicles until deemed safe by your surgeon/care team.   Please call the clinic if you develop any of the following symptoms:  Swelling and/or warmth in one or both legs  Pain or tenderness in your leg, ankle, foot, or arm   Red or discolored/pale skin     Post-Op Follow Up Appointments    We will call you to schedule these appointments after your discharge from the hospital.   Incision assessment within 2 weeks with a Registered Nurse   6 week post-op with a Nurse Practitioner/Physician Assistant. Your surgeon will be available on this day.

## 2025-04-28 NOTE — LETTER
"4/28/2025      Sosa Patel  [address]   [place] [address]      Dear Colleague,    Thank you for referring your patient, Sosa Patel, to the [facility]. Please see a copy of my visit note below.    NEUROSURGERY FOLLOWUP  NOTE    Sosa Patel comes today in f/u approximately 11 months s/p 5/29/2024 L4-5 decompressive lumbar laminectomy, medial facetectomy foraminotomy and resection of epidural synovial cyst.  Final pathology:L4/L5 epidural cyst, excision, Consistent with benign synovial cyst.Patient did very well following the decompression and resection of the synovial cyst for last 10 months or so with almost 90% relief of her symptoms.  She started noticing current pain in her low back radiating along the posterior aspect of her thigh leg and up to the sole involving her right lower extremity for 2 months or so.  She was recommended conservative treatment during her last visit.    Patient is in the clinic today with conservative treatment, EMG/NCV studies, x-ray lumbar spine and CT lumbar spine.      She denies improvement in her symptoms with conservative treatment including physical therapy.  She also underwent bilateral L5-S1 transforaminal epidural steroid injections on 4/2/2025 with no improvement in her symptoms.  She denies any new onset neurological symptoms during her clinic visit today.  She continues to report low back pain and pain radiating to her right lower extremity along the posterior aspect.    She denies new onset weakness or bladder or bowel symptoms.    She is a non-smoker.  She vapes.    PHYSICAL EXAM:   Constitutional: /61   Pulse 86   Ht 5' 5\" (1.651 m)   Wt 136 lb 12.8 oz (62.1 kg)   LMP 02/05/2022   SpO2 99%   BMI 22.76 kg/m       Mental Status: A & O in no acute distress.  Affect is appropriate.  Speech is fluent.  Recent and remote memory are intact.  Attention span and concentration are normal.     Motor: No pronator drift of " upper extremity.   Normal bulk and tone all muscle groups of upper and lower extremities.       Delt Bi Tri Hand Flex/  Ext Iliopsoas Quadriceps Tibialis Anterior EHL Gastroc     C5 C6 C7 C8/T1 L2 L3 L4 L5 S1   R 5 5 5 5 5 5 5 4+ 4+   L 5 5 5 5 5 5 5 5 5        Sensory: Sensation intact bilaterally to light touch.      Coordination:  Heel/toe/ gait intact.  Intact tandem gait      Reflexes; supinator, biceps, triceps, knee/ ankle jerk intact.  No hoffmans/clonus.     Incision healed well.    IMAGING:   I personally reviewed all radiographic images and agree with the radiology report of multilevel degenerative disc disease primarily at L4-5 with progression of L4-5 degenerative spondylolisthesis.  There is no evidence of dynamic instability on x-ray lumbar spine flexion-extension views.      4/28/2025 CT lumbar spine:  IMPRESSION:  1.  Moderate narrowing of the thecal sac at L4-L5.  2.  Mild spinal canal narrowing at L3-L4.  3.  Moderate to severe right and mild to moderate left neuroforaminal narrowing at L4-L5. Probable impingement of the exiting right L4 nerve root.   4.  Small ill-defined soft opacity in the medial left lower lobe. This is suspicious for area of infection. Recommend correlation with clinical findings.       4/28/2025 x-ray lumbar spine flexion-extension views:   IMPRESSION: Vertebral body heights are maintained. Grade 1 anterolisthesis of L4 on L5 is worsened from the 5/13/2024 exam. There is no abnormal motion on flexion or extension views. Trace anterolisthesis of L5 on S1 without abnormal motion. Mild posterior element degenerative changes.      3/11/2025 MRI lumbar spine without contrast:    IMPRESSION:  1.  Multilevel degenerative changes as detailed above.  2.  Postsurgical changes at L5 with associated peridural enhancement.    4/8/2025 EMG/NCV studies:  Interpretation: Abnormal: There is electrodiagnostic evidence of: Study     1.  Absent right tibial H reflex with prolonged motor unit  potential duration on needle EMG of the right medial gastrocnemius muscle.  These findings in isolation are nonspecific and nondiagnostic.  Under the correct clinical condition, they can be observed in a chronic right S1 radiculopathy.      2.  There is no electrodiagnostic evidence of lumbosacral radiculopathy in the left lower extremity or focal neuropathy in the bilateral lower extremities.       CONSULTATION ASSESSMENT AND PLAN:    Ms Patel is a 48-year-old right-handed female with significant past medical history of bipolar disorder, drug dependence, suicide attempt initially presented to the clinic in May 2024 for evaluation of low back pain and bilateral leg pain right more than the left since November 2023 without preceding trauma. She has clinical symptoms of neurogenic claudication with intermittent episodes of bladder/bowel incontinence.She underwent open L4-5 decompressive laminectomy and resection of the synovial cyst on 5/29/2024.  Patient had significant improvement in her symptoms following the decompression.  She reports almost 90% improvement in her preoperative symptoms.  However for last 2 months or so she started noticing worsening of her low back pain radiating to her right lower extremity along the posterior aspect likely along right L5 /S1 dermatomal distribution.  She also reports pain radiating up to her left knee along the posterior aspect.  She has mild right foot dorsiflexion weakness.    She is in the clinic today with conservative treatment, CT lumbar spine and x-ray lumbar spine flexion-extension views with EMG/NCV studies.  She also underwent bilateral L5-S1 transforaminal epidural steroid injections on 4/2/2025.  She denies any significant improvement in her symptoms following conservative treatment and epidural steroid injections.    I explained the CT lumbar spine, x-ray lumbar spine and MRI lumbar spine to the patient and showed her the images.  I explained her the presence of  multilevel degenerative disc disease primarily at L4-5 and L5-S1.  I explained to her that she has severe right and left neural foraminal narrowing at L4-5 level.  I also explained with the presence of moderate to severe right and moderate left neural foraminal narrowing at L5-S1.  I explained to her that grade 1 degenerative spondylolisthesis at L4-5 has progressed compared to her upright x-rays in May 2024.    I discussed the management options including continuation of conservative management and surgical intervention including decompression alone and decompression with fusion.  I discussed the risk and benefits of all the management options.    Given that patient has not improved with conservative treatment, I would recommend right L4-5 transforaminal epidural steroid injection as a diagnostic tool to further confirm the pain generator.  I would recommend right L4-5 transforaminal interbody fusion robot-assisted.  I discussed the procedure in detail.  I discussed the risk and benefits of the surgery including but not limited to bleeding, infection, incomplete relief of symptoms, CSF leak, need for additional surgery, adjacent segment disease requiring further intervention, need for rehabilitation, DVT/PE, MI and others.  Following extensive discussion of the risk and benefits of surgery patient would like to proceed.    We will schedule the surgery and give her a call.  I also explained to the patient that we need clearance from her primary care physician for the surgery.    Patient agreed with the plan.  All the questions were answered and patient sounded understanding.  She can contact us if there are any further questions or concerns or worsening neurological deficits.I spent more than 20 minutes in this apt, examining the pt, reviewing the scans, reviewing notes from chart, discussing treatment options with risks and benefits and coordinating care. This note was created in part by the use of Dragon voice  recognition system. Inadvertent grammatical errors and typographical errors may have occurred due to inherent limitation of voice recognition software.  Reasonable attempts made to avoid errors, but this document may contain an error not identified before finalizing.  Please contact me for any clarification needed.        Aureliano Goins MD      CC:     Cynthia Scott  [address] [facility] [address]      Again, thank you for allowing me to participate in the care of your patient.        Sincerely,        Aureliano Goins MD    Electronically signed

## 2025-04-28 NOTE — TELEPHONE ENCOUNTER
"Procedure ordered? YES    What insurance are we billing for this procedure?  P/F  IF SCHEDULING AT [facility] PAIN OR SPINE PLEASE SCHEDULE AT LEAST 7-10 BUSINESS DAYS OUT SO A PA CAN BE OBTAINED    Is a \"PAIN\" order available to link to injection appointment or does one need to be transcribed?  YES: YES   If needed, route to CN to assist in doing so.    Is  needed?: No   If YES, please initiate in-person  request.    Will patient have a ?  Yes    All fluoro procedures require a .  These US procedures also require a : piriformis, pudendal, scalene, & carpal tunnel.    Is patient taking any blood thinners (i.e. Plavix, coumadin, jantoven, warfarin, heparin, Xarelto, Pradaxa, Eliquis, Brilinta, or Effient, etc)? No   If YES, schedule out 2 weeks, and route to RN pool to determine if medication hold is needed.    Is patient taking aspirin? No   For CERVICAL or INTERLAMINAR procedures, route message to Care Navigation so they can seek approval from managing provider to hold aspirin for 6 days prior to the procedure.    Does patient have an allergy to contrast dye or iodine?  No  If YES, OK to schedule. Route to RN pool AND add allergy information to appointment notes    Is patient diabetic? No If YES, blood glucose level will be checked on day of procedure and will need to be 300mg/dL or below (for steroid injections).    Does patient have an active infection or treated for one within the past week? No   If YES, do NOT schedule and route to Care Navigation.     Is patient currently taking any antibiotics or have an active infection?  No  For patients on chronic, preventative, or prophylactic antibiotics, procedures may be scheduled.   For patients on antibiotics for active or recent infection: antibiotic course must have been completed and symptom-free of infection to safely proceed with injection.  Send to Care Navigation if unsure.    Is patient actively being treated for cancer " or immunocompromised? No  If YES, do NOT schedule and route to RN pool.     Any chance of pregnancy? NO   If YES, do NOT schedule and route to RN pool.    Have you had any vaccines in the last 2 weeks? NO  If YES, please route to Care Navigation to discuss before scheduling.     Reminders:  -  If you are started on any steroids or antibiotics between now and your appointment, you must contact us because it may affect our ability to perform your procedure.   reviewed    -  Informed patient that it is OK to take normal medications before the procedure and must hold blood thinners as instructed.  reviewed    -  Patients scheduled for MBBs should not take pain meds prior to injection and pain rating needs to be 5/10 or greater the day of the procedure.    -  Informed cervical injection patients that an IV will be placed as a precautionary measure.  reviewed    -  Patients should eat a light meal prior to their procedure appointment.  reviewed    -  All radiofrequency ablations are in a 40 minute time slot and not to be scheduled until in-basket message has been sent by the procedure team that the PA has been  received.  reviewed     -  Spinal cord stimulator trial scheduling is coordinated by the procedure team.     -  Care Navigation (#[phone]) is available to assist patients with additional questions.  Reviewed        -  Cervical TFESIs with Dr. Roth only.

## 2025-04-28 NOTE — NURSING NOTE
"Sosa Patel is a 49 year old female who presents for:  Chief Complaint   Patient presents with    RECHECK     Patient has pain in the lower back, through the butt and down the right leg to the toes. Right foot is getting numb. And says her right foot is dropping. Lvl 7.        Initial Vitals:  /61   Pulse 86   Ht 5' 5\" (1.651 m)   Wt 136 lb 12.8 oz (62.1 kg)   LMP 02/05/2022   SpO2 99%   BMI 22.76 kg/m   Estimated body mass index is 22.76 kg/m  as calculated from the following:    Height as of this encounter: 5' 5\" (1.651 m).    Weight as of this encounter: 136 lb 12.8 oz (62.1 kg).. Body surface area is 1.69 meters squared. BP completed using cuff size: regular  Severe Pain (7)    Nursing Comments:       Precious Schneider    "

## 2025-04-28 NOTE — PROGRESS NOTES
"NEUROSURGERY FOLLOWUP  NOTE    Sosa Patel comes today in f/u approximately 11 months s/p 5/29/2024 L4-5 decompressive lumbar laminectomy, medial facetectomy foraminotomy and resection of epidural synovial cyst.  Final pathology:L4/L5 epidural cyst, excision, Consistent with benign synovial cyst.Patient did very well following the decompression and resection of the synovial cyst for last 10 months or so with almost 90% relief of her symptoms.  She started noticing current pain in her low back radiating along the posterior aspect of her thigh leg and up to the sole involving her right lower extremity for 2 months or so.  She was recommended conservative treatment during her last visit.    Patient is in the clinic today with conservative treatment, EMG/NCV studies, x-ray lumbar spine and CT lumbar spine.      She denies improvement in her symptoms with conservative treatment including physical therapy.  She also underwent bilateral L5-S1 transforaminal epidural steroid injections on 4/2/2025 with no improvement in her symptoms.  She denies any new onset neurological symptoms during her clinic visit today.  She continues to report low back pain and pain radiating to her right lower extremity along the posterior aspect.    She denies new onset weakness or bladder or bowel symptoms.    She is a non-smoker.  She vapes.    PHYSICAL EXAM:   Constitutional: /61   Pulse 86   Ht 5' 5\" (1.651 m)   Wt 136 lb 12.8 oz (62.1 kg)   LMP 02/05/2022   SpO2 99%   BMI 22.76 kg/m       Mental Status: A & O in no acute distress.  Affect is appropriate.  Speech is fluent.  Recent and remote memory are intact.  Attention span and concentration are normal.     Motor: No pronator drift of upper extremity.   Normal bulk and tone all muscle groups of upper and lower extremities.       Delt Bi Tri Hand Flex/  Ext Iliopsoas Quadriceps Tibialis Anterior EHL Gastroc     C5 C6 C7 C8/T1 L2 L3 L4 L5 S1   R 5 5 5 5 5 5 5 4+ 4+   L 5 5 5 " 5 5 5 5 5 5        Sensory: Sensation intact bilaterally to light touch.      Coordination:  Heel/toe/ gait intact.  Intact tandem gait      Reflexes; supinator, biceps, triceps, knee/ ankle jerk intact.  No hoffmans/clonus.     Incision healed well.    IMAGING:   I personally reviewed all radiographic images and agree with the radiology report of multilevel degenerative disc disease primarily at L4-5 with progression of L4-5 degenerative spondylolisthesis.  There is no evidence of dynamic instability on x-ray lumbar spine flexion-extension views.      4/28/2025 CT lumbar spine:  IMPRESSION:  1.  Moderate narrowing of the thecal sac at L4-L5.  2.  Mild spinal canal narrowing at L3-L4.  3.  Moderate to severe right and mild to moderate left neuroforaminal narrowing at L4-L5. Probable impingement of the exiting right L4 nerve root.   4.  Small ill-defined soft opacity in the medial left lower lobe. This is suspicious for area of infection. Recommend correlation with clinical findings.       4/28/2025 x-ray lumbar spine flexion-extension views:   IMPRESSION: Vertebral body heights are maintained. Grade 1 anterolisthesis of L4 on L5 is worsened from the 5/13/2024 exam. There is no abnormal motion on flexion or extension views. Trace anterolisthesis of L5 on S1 without abnormal motion. Mild posterior element degenerative changes.      3/11/2025 MRI lumbar spine without contrast:    IMPRESSION:  1.  Multilevel degenerative changes as detailed above.  2.  Postsurgical changes at L5 with associated peridural enhancement.    4/8/2025 EMG/NCV studies:  Interpretation: Abnormal: There is electrodiagnostic evidence of: Study     1.  Absent right tibial H reflex with prolonged motor unit potential duration on needle EMG of the right medial gastrocnemius muscle.  These findings in isolation are nonspecific and nondiagnostic.  Under the correct clinical condition, they can be observed in a chronic right S1 radiculopathy.      2.   There is no electrodiagnostic evidence of lumbosacral radiculopathy in the left lower extremity or focal neuropathy in the bilateral lower extremities.       CONSULTATION ASSESSMENT AND PLAN:    Ms Patel is a 48-year-old right-handed female with significant past medical history of bipolar disorder, drug dependence, suicide attempt initially presented to the clinic in May 2024 for evaluation of low back pain and bilateral leg pain right more than the left since November 2023 without preceding trauma. She has clinical symptoms of neurogenic claudication with intermittent episodes of bladder/bowel incontinence.She underwent open L4-5 decompressive laminectomy and resection of the synovial cyst on 5/29/2024.  Patient had significant improvement in her symptoms following the decompression.  She reports almost 90% improvement in her preoperative symptoms.  However for last 2 months or so she started noticing worsening of her low back pain radiating to her right lower extremity along the posterior aspect likely along right L5 /S1 dermatomal distribution.  She also reports pain radiating up to her left knee along the posterior aspect.  She has mild right foot dorsiflexion weakness.    She is in the clinic today with conservative treatment, CT lumbar spine and x-ray lumbar spine flexion-extension views with EMG/NCV studies.  She also underwent bilateral L5-S1 transforaminal epidural steroid injections on 4/2/2025.  She denies any significant improvement in her symptoms following conservative treatment and epidural steroid injections.    I explained the CT lumbar spine, x-ray lumbar spine and MRI lumbar spine to the patient and showed her the images.  I explained her the presence of multilevel degenerative disc disease primarily at L4-5 and L5-S1.  I explained to her that she has severe right and left neural foraminal narrowing at L4-5 level.  I also explained with the presence of moderate to severe right and moderate left  neural foraminal narrowing at L5-S1.  I explained to her that grade 1 degenerative spondylolisthesis at L4-5 has progressed compared to her upright x-rays in May 2024.    I discussed the management options including continuation of conservative management and surgical intervention including decompression alone and decompression with fusion.  I discussed the risk and benefits of all the management options.    Given that patient has not improved with conservative treatment, I would recommend right L4-5 transforaminal epidural steroid injection as a diagnostic tool to further confirm the pain generator.  I would recommend right L4-5 transforaminal interbody fusion robot-assisted.  I discussed the procedure in detail.  I discussed the risk and benefits of the surgery including but not limited to bleeding, infection, incomplete relief of symptoms, CSF leak, need for additional surgery, adjacent segment disease requiring further intervention, need for rehabilitation, DVT/PE, MI and others.  Following extensive discussion of the risk and benefits of surgery patient would like to proceed.    We will schedule the surgery and give her a call.  I also explained to the patient that we need clearance from her primary care physician for the surgery.    Patient agreed with the plan.  All the questions were answered and patient sounded understanding.  She can contact us if there are any further questions or concerns or worsening neurological deficits.I spent more than 20 minutes in this apt, examining the pt, reviewing the scans, reviewing notes from chart, discussing treatment options with risks and benefits and coordinating care. This note was created in part by the use of Dragon voice recognition system. Inadvertent grammatical errors and typographical errors may have occurred due to inherent limitation of voice recognition software.  Reasonable attempts made to avoid errors, but this document may contain an error not identified  before finalizing.  Please contact me for any clarification needed.        Aureliano Goins MD      CC:     Cynthia Scott  [address] [facility] [id number]

## 2025-04-29 ENCOUNTER — TELEPHONE (OUTPATIENT)
Dept: NEUROSURGERY | Facility: CLINIC | Age: 50
End: 2025-04-29
Payer: COMMERCIAL

## 2025-04-30 NOTE — TELEPHONE ENCOUNTER
Called patient to let her know that  stated we should wait until after she has her injection to schedule surgery. I got her vm so I left a message asking her to call me back.

## 2025-05-07 ENCOUNTER — THERAPY VISIT (OUTPATIENT)
Dept: PHYSICAL THERAPY | Facility: CLINIC | Age: 50
End: 2025-05-07
Payer: COMMERCIAL

## 2025-05-07 DIAGNOSIS — G89.29 CHRONIC BILATERAL LOW BACK PAIN WITH BILATERAL SCIATICA: Primary | ICD-10-CM

## 2025-05-07 DIAGNOSIS — M54.41 CHRONIC BILATERAL LOW BACK PAIN WITH BILATERAL SCIATICA: Primary | ICD-10-CM

## 2025-05-07 DIAGNOSIS — M54.42 CHRONIC BILATERAL LOW BACK PAIN WITH BILATERAL SCIATICA: Primary | ICD-10-CM

## 2025-05-07 PROCEDURE — 97530 THERAPEUTIC ACTIVITIES: CPT | Mod: GP | Performed by: PHYSICAL THERAPIST

## 2025-05-07 PROCEDURE — 97110 THERAPEUTIC EXERCISES: CPT | Mod: GP | Performed by: PHYSICAL THERAPIST

## 2025-05-12 ENCOUNTER — THERAPY VISIT (OUTPATIENT)
Dept: PHYSICAL THERAPY | Facility: CLINIC | Age: 50
End: 2025-05-12
Payer: COMMERCIAL

## 2025-05-12 DIAGNOSIS — M54.41 CHRONIC BILATERAL LOW BACK PAIN WITH BILATERAL SCIATICA: Primary | ICD-10-CM

## 2025-05-12 DIAGNOSIS — M54.42 CHRONIC BILATERAL LOW BACK PAIN WITH BILATERAL SCIATICA: Primary | ICD-10-CM

## 2025-05-12 DIAGNOSIS — G89.29 CHRONIC BILATERAL LOW BACK PAIN WITH BILATERAL SCIATICA: Primary | ICD-10-CM

## 2025-05-12 PROCEDURE — 97110 THERAPEUTIC EXERCISES: CPT | Mod: GP | Performed by: PHYSICAL THERAPIST

## 2025-05-20 DIAGNOSIS — M32.9 SYSTEMIC LUPUS ERYTHEMATOSUS, UNSPECIFIED SLE TYPE, UNSPECIFIED ORGAN INVOLVEMENT STATUS (H): ICD-10-CM

## 2025-05-20 NOTE — TELEPHONE ENCOUNTER
Medication:   Methotrexate Sodium 2.5mg  Last written on:   02/25/2025  Quantity:   78    Refills:   0    Last office visit:   02/25/2025  Next office visit:   05/28/2025  Last labs:   02/25/2025  Next lab:  05/21/2025

## 2025-05-21 RX ORDER — METHOTREXATE 2.5 MG/1
15 TABLET ORAL WEEKLY
Qty: 78 TABLET | Refills: 0 | OUTPATIENT
Start: 2025-05-21

## 2025-05-21 NOTE — TELEPHONE ENCOUNTER
After chart review and based on prior discussion with MD it was noted that this is NOT appropriate for a refill. Needs labs and missed appt this am    TAMICA OsorioN RN Specialty Triage 5/21/2025 11:00 AM

## 2025-05-22 ENCOUNTER — LAB (OUTPATIENT)
Dept: LAB | Facility: CLINIC | Age: 50
End: 2025-05-22
Payer: COMMERCIAL

## 2025-05-22 DIAGNOSIS — M32.9 SYSTEMIC LUPUS ERYTHEMATOSUS, UNSPECIFIED SLE TYPE, UNSPECIFIED ORGAN INVOLVEMENT STATUS (H): ICD-10-CM

## 2025-05-22 DIAGNOSIS — Z79.899 HIGH RISK MEDICATION USE: ICD-10-CM

## 2025-05-22 LAB
BASOPHILS # BLD AUTO: 0 10E3/UL (ref 0–0.2)
BASOPHILS NFR BLD AUTO: 0 %
EOSINOPHIL # BLD AUTO: 0.2 10E3/UL (ref 0–0.7)
EOSINOPHIL NFR BLD AUTO: 4 %
ERYTHROCYTE [DISTWIDTH] IN BLOOD BY AUTOMATED COUNT: 13.1 % (ref 10–15)
ERYTHROCYTE [SEDIMENTATION RATE] IN BLOOD BY WESTERGREN METHOD: 7 MM/HR (ref 0–30)
HCT VFR BLD AUTO: 36.2 % (ref 35–47)
HGB BLD-MCNC: 12.1 G/DL (ref 11.7–15.7)
IMM GRANULOCYTES # BLD: 0 10E3/UL
IMM GRANULOCYTES NFR BLD: 0 %
LYMPHOCYTES # BLD AUTO: 1.4 10E3/UL (ref 0.8–5.3)
LYMPHOCYTES NFR BLD AUTO: 26 %
MCH RBC QN AUTO: 29.4 PG (ref 26.5–33)
MCHC RBC AUTO-ENTMCNC: 33.4 G/DL (ref 31.5–36.5)
MCV RBC AUTO: 88 FL (ref 78–100)
MONOCYTES # BLD AUTO: 0.5 10E3/UL (ref 0–1.3)
MONOCYTES NFR BLD AUTO: 10 %
NEUTROPHILS # BLD AUTO: 3.1 10E3/UL (ref 1.6–8.3)
NEUTROPHILS NFR BLD AUTO: 60 %
PLATELET # BLD AUTO: 222 10E3/UL (ref 150–450)
RBC # BLD AUTO: 4.11 10E6/UL (ref 3.8–5.2)
WBC # BLD AUTO: 5.2 10E3/UL (ref 4–11)

## 2025-05-22 PROCEDURE — 85652 RBC SED RATE AUTOMATED: CPT

## 2025-05-22 PROCEDURE — 85025 COMPLETE CBC W/AUTO DIFF WBC: CPT

## 2025-05-22 PROCEDURE — 86160 COMPLEMENT ANTIGEN: CPT

## 2025-05-22 PROCEDURE — 86140 C-REACTIVE PROTEIN: CPT

## 2025-05-22 PROCEDURE — 86225 DNA ANTIBODY NATIVE: CPT

## 2025-05-22 PROCEDURE — 36415 COLL VENOUS BLD VENIPUNCTURE: CPT

## 2025-05-22 PROCEDURE — 80053 COMPREHEN METABOLIC PANEL: CPT

## 2025-05-23 LAB
ALBUMIN SERPL BCG-MCNC: 4.1 G/DL (ref 3.5–5.2)
ALBUMIN UR-MCNC: NEGATIVE MG/DL
ALP SERPL-CCNC: 31 U/L (ref 40–150)
ALT SERPL W P-5'-P-CCNC: 17 U/L (ref 0–50)
ANION GAP SERPL CALCULATED.3IONS-SCNC: 9 MMOL/L (ref 7–15)
APPEARANCE UR: CLEAR
AST SERPL W P-5'-P-CCNC: 18 U/L (ref 0–45)
BACTERIA #/AREA URNS HPF: ABNORMAL /HPF
BILIRUB SERPL-MCNC: 0.2 MG/DL
BILIRUB UR QL STRIP: NEGATIVE
BUN SERPL-MCNC: 24.9 MG/DL (ref 6–20)
C3 SERPL-MCNC: 105 MG/DL (ref 81–157)
C4 SERPL-MCNC: 23 MG/DL (ref 13–39)
CALCIUM SERPL-MCNC: 9 MG/DL (ref 8.8–10.4)
CHLORIDE SERPL-SCNC: 106 MMOL/L (ref 98–107)
COLOR UR AUTO: YELLOW
CREAT SERPL-MCNC: 0.66 MG/DL (ref 0.51–0.95)
CRP SERPL-MCNC: <3 MG/L
EGFRCR SERPLBLD CKD-EPI 2021: >90 ML/MIN/1.73M2
GLUCOSE SERPL-MCNC: 91 MG/DL (ref 70–99)
GLUCOSE UR STRIP-MCNC: NEGATIVE MG/DL
HCO3 SERPL-SCNC: 26 MMOL/L (ref 22–29)
HGB UR QL STRIP: NEGATIVE
KETONES UR STRIP-MCNC: NEGATIVE MG/DL
LEUKOCYTE ESTERASE UR QL STRIP: ABNORMAL
NITRATE UR QL: POSITIVE
PH UR STRIP: 6 [PH] (ref 5–7)
POTASSIUM SERPL-SCNC: 3.9 MMOL/L (ref 3.4–5.3)
PROT SERPL-MCNC: 6.2 G/DL (ref 6.4–8.3)
RBC #/AREA URNS AUTO: ABNORMAL /HPF
SODIUM SERPL-SCNC: 141 MMOL/L (ref 135–145)
SP GR UR STRIP: 1.01 (ref 1–1.03)
SQUAMOUS #/AREA URNS AUTO: ABNORMAL /LPF
UROBILINOGEN UR STRIP-ACNC: 0.2 E.U./DL
WBC #/AREA URNS AUTO: ABNORMAL /HPF

## 2025-05-23 PROCEDURE — 87086 URINE CULTURE/COLONY COUNT: CPT

## 2025-05-23 PROCEDURE — 84156 ASSAY OF PROTEIN URINE: CPT

## 2025-05-23 PROCEDURE — 87186 SC STD MICRODIL/AGAR DIL: CPT

## 2025-05-23 PROCEDURE — 81001 URINALYSIS AUTO W/SCOPE: CPT

## 2025-05-24 LAB
ALBUMIN MFR UR ELPH: <6 MG/DL
BACTERIA UR CULT: ABNORMAL
CREAT UR-MCNC: 37.5 MG/DL
PROT/CREAT 24H UR: NORMAL MG/G{CREAT}

## 2025-05-28 ENCOUNTER — OFFICE VISIT (OUTPATIENT)
Dept: RHEUMATOLOGY | Facility: CLINIC | Age: 50
End: 2025-05-28
Payer: COMMERCIAL

## 2025-05-28 VITALS
RESPIRATION RATE: 16 BRPM | OXYGEN SATURATION: 100 % | SYSTOLIC BLOOD PRESSURE: 119 MMHG | WEIGHT: 136.8 LBS | HEART RATE: 84 BPM | DIASTOLIC BLOOD PRESSURE: 76 MMHG | BODY MASS INDEX: 22.42 KG/M2

## 2025-05-28 DIAGNOSIS — Z79.899 HIGH RISK MEDICATION USE: ICD-10-CM

## 2025-05-28 DIAGNOSIS — M32.9 SYSTEMIC LUPUS ERYTHEMATOSUS, UNSPECIFIED SLE TYPE, UNSPECIFIED ORGAN INVOLVEMENT STATUS (H): Primary | ICD-10-CM

## 2025-05-28 DIAGNOSIS — M35.00 SECONDARY SJOGREN'S SYNDROME: ICD-10-CM

## 2025-05-28 PROCEDURE — G2211 COMPLEX E/M VISIT ADD ON: HCPCS | Performed by: INTERNAL MEDICINE

## 2025-05-28 PROCEDURE — 3074F SYST BP LT 130 MM HG: CPT | Performed by: INTERNAL MEDICINE

## 2025-05-28 PROCEDURE — 99214 OFFICE O/P EST MOD 30 MIN: CPT | Performed by: INTERNAL MEDICINE

## 2025-05-28 PROCEDURE — 3078F DIAST BP <80 MM HG: CPT | Performed by: INTERNAL MEDICINE

## 2025-05-28 RX ORDER — PILOCARPINE HYDROCHLORIDE 5 MG/1
TABLET, FILM COATED ORAL
Qty: 90 TABLET | Refills: 3 | Status: SHIPPED | OUTPATIENT
Start: 2025-05-28

## 2025-05-28 RX ORDER — METHOTREXATE 2.5 MG/1
15 TABLET ORAL WEEKLY
Qty: 78 TABLET | Refills: 2 | Status: SHIPPED | OUTPATIENT
Start: 2025-05-28

## 2025-05-28 RX ORDER — CYCLOSPORINE 0.5 MG/ML
1 EMULSION OPHTHALMIC 2 TIMES DAILY
Qty: 5.5 ML | Refills: 2 | Status: SHIPPED | OUTPATIENT
Start: 2025-05-28

## 2025-05-28 NOTE — PROGRESS NOTES
Rheumatology Clinic Visit      Sosa Patel  MRN# [id number]   YOB: 1975 Age: 49 year old      Date of visit: 5/28/25   PCP: Cynthia Scott    Chief Complaint   Patient presents with:  Systemic lupus erythematosus    Assessment and Plan     1. Systemic Lupus Erythematosus (JESSE+, dsDNA+, arthritis, oral sores [SLE versus HSV related? - One-time had a culture that was negative], proteinuria, photosensitivity [fatigue]): Dx'd 10/2016; initially with arthritis that improved with HCQ.  Previously on MTX (fatigue, effective for arthritis), MMF (resolved arthritis, rash, and brain fog; arthritis persisted though and eventually changed back to leflunomide), leflunomide (effective for arthritis).  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  Currently doing well with regard to inflammatory arthritis, and patient reports no rash.  Worsening joint pain if she misses a dose of methotrexate.  Mild synovial hypertrophy without active synovitis of the bilateral 2nd-3rd MCPs.  chronic illness.     - Continue Benlysta SQ every 7 days   - Continue hydroxychloroquine from 200mg daily with an additional 200 mg every other day (last eye exam completed on 8/30/2024 with Dr. Contreras)  - Continue methotrexate 15 mg once weekly   - Continue folic acid 2 mg daily  - Labs in 3 months: CBC, CMP, ESR, CRP, C3, C4, dsDNA UA, Uprotein:creatinine    High risk medication requiring intensive toxicity monitoring at least quarterly.      # Pregnancy Plans: on birth control    2.  Secondary Sjogren's syndrome: Mild symptoms that may be due to other factors such as caffeine intake, smoking, etc., but secondary Sjogren's Syndrome is possible.  We  discussed the importance of good oral hygiene, frequent sips of water, avoiding high sugar foods and liquids, avoiding oral irritants such as coffee, alcohol, and nicotine, and avoiding acidic drinks such as carbonated and high sugar beverages. She is already seeing a dentist regularly. Dry eyes improved with preservative free artificial tears and Restasis.  Worsening dry mouth.  We discussed nonpharmacologic treatment for dry mouth and she will proceed with this; she would also like to try pilocarpine.  She verbalized understanding about potential risks and side effects of pilocarpine that was discussed in detail today, and she may choose to start pilocarpine now or after her upcoming spine surgery.  Chronic illness  - Continue artificial tears and twice daily Restasis  - Start pilocarpine 5 mg daily x 7 days, then 5 mg twice daily x 7 days, then 5 mg 3 times daily thereafter    3. 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  Lesions on the dorsal aspect of her hands and she is going to see dermatology for this.    4.  Bilateral sciatica: No low back pain.  Improves with stretching exercises that she does on her own at home.  Advised more frequent stretching exercises throughout the day.  Also advised physical therapy but she declined.    5.  Excessive daytime sleepiness, fatigue: Wakes up tired, reports witnessed apneic episodes at night.  Snores.  Referred for sleep evaluation previously but she has not scheduled this appointment.  Advised previously and again today that she call to schedule the sleep evaluation.    6.  Vaccinations: Vaccinations reviewed with Ms. Patel.    - Influenza: Encouraged yearly vaccination  - [id number]: up to date  - [id number]: up to date  - Shingrix: Up to date  - COVID-19: Advised keeping updated, and to hold Benlysta and leflunomide for 1 week afterward    7.  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  Improved with methotrexate.    8.  Chronic low back  pain, radiating to each leg that improved with L4-5 bilateral laminectomy but is worsened recently and she is following with her pain clinic and spine surgeon.  Planning for spine surgery in mid-June 2025.    In anticipation of back surgery, the following is the perioperative DMARD guidance:   Benlysta should not be taken at least 2 weeks prior to the surgery; and restarted no sooner than 2 weeks after the surgery is completed and only in the absence of infection, absence of delayed wound healing, and only after given approval to restart by the surgeon.   Methotrexate and hydroxychloroquine are continued perioperatively  Also review this with your surgeon to make sure the surgeon is in agreement with this plan      Total minutes spent in evaluation with patient, documentation, , and review of pertinent studies and chart notes: 18  The longitudinal plan of care for the rheumatology problem(s) were addressed during this visit.  Due to added complexity of care, we will continue to support the patient and the subsequent management of this condition with ongoing continuity of care.      Sosa Patel verbalized agreement with and understanding of the rational for the diagnosis and treatment plan.  All questions were answered to best of my ability and the patient's satisfaction. Ms. Wolf was advised to contact the clinic with any questions that may arise after the clinic visit.      Thank you for involving me in the care of the patient    Return to clinic: 3-4 months    HPI   Sosa Patel  is a 49 year old female with a medical history significant for tobacco use history, genital warts, and HSV type II who presents for follow-up of systemic lupus erythematosus.    7/15/2022: Felt more depressed and therefore forgot to refill Benlysta; missed Benlysta for 3-4 weeks and started to have more pain.  Pain was at the MCPs and MTPs.  Prednisone was started and she is feeling much better.  Currently mid-course of the  prednisone, taking 10 mg daily at this time.  Also with upper lumbar spine/lower thoracic spine pain that radiates to the left lower back, worse with activity, and improves with rest; reportedly this pain is chronic, and does not radiate to the legs.  Did not see GYN because menstrual bleeding has ceased.  Melatonin helps her sleep; she has not scheduled a sleep evaluation.    10/26/2022: patient cancelled appointment    Mid-Nov 2022: would have run out of leflunomide around this time.  Not refilled because labs are overdue.     12/6/2022: prednisone course given for PRN arthritis flare (patient requested to have on hand while traveling)    2/15/2023: currently doing well.  Taking leflunomide, Benlysta, and hydroxychloroquine as prescribed.  States that she needs an eye exam completed and will call to schedule this. Has puffiness of her hands but no pain or swelling.  Bony hypertrophy at the DIPs.  Morning stiffness for less than 20 minutes.  Some pain at the end of the day on her hands.  She would like to remain on her current medication regimen because it is effective.    5/24/2023: Patient canceled appointment    8/8/2023: Patient reports doing poorly at this time because she has been out of leflunomide for 3-4 months.  She states that she canceled her last appointment and did not get labs.  Labs are overdue.  Worsening joint pain at the MCPs, PIPs, and wrists that is worse in the morning and improves with time and activity.  Morning stiffness for at least 2 hours.  Positive gelling phenomenon.  Would like to restart leflunomide because she felt better while on it.  Has not scheduled an appointment with sleep medicine.  Has a history of lichen sclerosis and diffuse body pruritus; has 2 erythematous lesions just proximal to the bilateral second MCPs, on the dorsal aspect of the hands.    11/16/2023: Generally doing well.  Still has the raised erythematous lesions on the dorsal aspect of her hands just proximal to  the second MCPs, unchanged since previous, and she has scheduled an appointment with dermatology for evaluation.  Bilateral sciatica that improves with stretching exercises that she does once daily.  Otherwise doing well.  No oral or nasal sores.    2/28/2024: Reports that lupus is doing well.  Was unable to make the dermatology appointment but has another appointment scheduled soon with Dr. Beaulieu to evaluate the lesions on her hands.  Since last seen she had acute back pain with numbness and tingling going down her legs; now followed in pain clinic for spinal stenosis and planning to have a steroid injection in her back.  Back pain is worse when she is standing upright for prolonged period of time and feels better when she flexes at the waist.    9/30/2024: Currently doing well with regard to arthritis/lupus.  No joint pain or swelling.  No morning stiffness or gelling phenomenon.  No oral or nasal sores.  Granuloma annulare improved with topical steroids from dermatology but there was question from dermatology if she could change leflunomide to methotrexate to improve the granuloma annulare on the hands.  Upper respiratory infection that is improving; states that she has a cold.  No UTI symptoms.    1/8/2025: Patient canceled appointment    2/25/2025: Last rheumatology labs were in September 2024.  Methotrexate toxicity monitoring labs have not been completed since September 2024.  Patient reports that her joint symptoms are well-controlled with methotrexate.  Granuloma annulare also responded well to methotrexate.  Overall happy with how well she is doing.  No oral or nasal sores.  No stomach upset.  Still dealing with chronic degenerative back pain despite having had surgery in the past and is following with her spine surgeon and pain clinic.    Today, 5/28/2025: Feels like she is doing well with methotrexate.  The granuloma annual RA on her hands has responded well to methotrexate.  Only with mild hand pain  if she misses a dose of methotrexate but otherwise feels like she is doing well.  Hand pain worse at the PIPs and DIPs that is worse at the end of the day and improves with rest.  Morning stiffness for about 20 minutes.  Benlysta has been very helpful for fatigue.  Using Restasis for dry eyes and this is very helpful.  Continues to use artificial tears 3-4 times per day.  Dry mouth is worsening and she would like to try something for dry mouth.  No UTI symptoms.    Denies fevers, chills, nausea, vomiting, constipation, diarrhea. No abdominal pain. No LE swelling. No neck pain.  No photosensitivity or photophobia.  No eye pain or redness.    Tobacco: No longer smoking cigarettes; now vaping.  Advised that she wean off of vaping as well  EtOH: rare, sober since 5/16/2010  Drugs: cocaine history; now sober  Occupation: counselor for mentally disabled people; adult foster care    ROS   12 point review of system was completed and negative except as noted in the HPI     Active Problem List     Patient Active Problem List   Diagnosis    Herpes simplex type 2 infection    Genital warts    H/O intravenous drug use in remission    JESSE positive    Multiple joint pain    Cocaine use    Systemic lupus erythematosus (H)    High risk medications (not anticoagulants) long-term use    Sicca syndrome    Lichen sclerosus    Chronic bilateral low back pain with bilateral sciatica    Recurrent major depressive disorder, in partial remission    Gallstones    Borderline personality disorder (H)    Chronic, continuous use of opioids     Past Medical History     Past Medical History:   Diagnosis Date    Arthritis 2013    Bipolar disorder (H) 12/2004    Dx'd    Combinations of drug dependence excluding opioid type drug (H) 05/04/2022    In remission     H/O: depression     SUICIDE ATTEMPT AGE 15    HPV (human papillomavirus)     Lupus erythematosus     Mood disorder 04/09/2019    Other psychoactive substance dependence, uncomplicated (H)  04/18/2025     Past Surgical History     Past Surgical History:   Procedure Laterality Date    BIOPSY      BUNIONECTOMY RT/LT      CHOLECYSTECTOMY      DAVINCI LAPAROSCOPIC CHOLECYSTECTOMY WITHOUT GRAMS N/A 10/09/2023    Procedure: CHOLECYSTECTOMY, ROBOT-ASSISTED, LAPAROSCOPIC, WITHOUT CHOLANGIOGRAM;  Surgeon: Demetris Peoples MD;  Location: [facility] OR    HC ENLARGE BREAST WITH IMPLANT  01/01/2000    LAMINECTOMY LUMBAR TWO LEVELS Bilateral 5/29/2024    Procedure: LUMBAR 4, LUMBAR 5 DECOMPRESSIVE BILATERAL LAMINECTOMY, MEDIAL FACETECTOMY WITH EXCISION OF CYST;  Surgeon: Aureliano Goins MD;  Location: [facility] OR     Allergy   No Known Allergies  Current Medication List     Current Outpatient Medications   Medication Sig Dispense Refill    acetaminophen (TYLENOL) 325 MG tablet Take 650 mg by mouth 2 times daily. Pt takes twice a day      ARIPiprazole (ABILIFY) 10 MG tablet Take 10 mg by mouth daily.      aspirin-acetaminophen-caffeine (EXCEDRIN MIGRAINE) 250-250-65 MG tablet Take 1 tablet by mouth daily as needed for headaches      augmented betamethasone dipropionate (DIPROLENE-AF) 0.05 % external ointment Apply topically 2 times daily. 50 g 3    belimumab (BENLYSTA) subcutaneous injection (prefilled autoinjector) Inject 1 mL (200 mg) subcutaneously every 7 days. . Hold for signs of infection and seek medical attention.   Refills at rheumatology appointments. 4 mL 7    cetirizine (ZYRTEC) 10 MG tablet TAKE 2 TABLETS BY MOUTH DAILY 180 tablet 0    clobetasol (TEMOVATE) 0.05 % external ointment Apply topically twice a week.      cyclobenzaprine (FLEXERIL) 10 MG tablet Take 1 tablet (10 mg) by mouth 2 times daily. 180 tablet 1    FLUoxetine (PROZAC) 40 MG capsule TAKE ONE CAPSULE BY MOUTH ONCE DAILY 90 capsule 1    folic acid (FOLVITE) 1 MG tablet Take 2 tablets (2 mg) by mouth daily. 180 tablet 2    gabapentin (NEURONTIN) 600 MG tablet 2 tabs in am and 3 at bedtime 150 tablet 2    hydroquinone (RUFUS) 4 % external cream  Apply topically at bedtime 28.35 g 11    hydroxychloroquine (PLAQUENIL) 200 MG tablet Hydroxychloroquine 200 mg daily with an additional 200 mg every other day.  Ophthalmology toxicity monitoring eye exam, that includes 10-2 VF and SD-OCT, is required yearly. 135 tablet 2    ibuprofen (ADVIL/MOTRIN) 200 MG tablet Take 200 mg by mouth every 4 hours as needed for pain.      lidocaine (XYLOCAINE) 5 % external ointment Apply topically as needed for moderate pain 50 g 1    methotrexate 2.5 MG tablet Take 6 tablets (15 mg) by mouth once a week. Methotrexate is a once weekly medication, taken on the same day of each week. 78 tablet 0    naproxen (NAPROSYN) 250 MG tablet Take 1 tablet (250 mg) by mouth 2 times daily (with meals). 60 tablet 2    oxyCODONE (ROXICODONE) 5 MG tablet Take 2 tablets (10 mg) by mouth 2 times daily as needed for pain. 60 tablet 0    valACYclovir (VALTREX) 1000 mg tablet Take 1 tablet (1,000 mg) by mouth 2 times daily for 10 days 40 tablet 1     No current facility-administered medications for this visit.     Social History   See HPI    Family History     Family History   Problem Relation Age of Onset    Cancer Father         lung cancer    Other Cancer Father     Diabetes Maternal Grandmother     Depression Maternal Grandmother     Lipids Maternal Grandmother     Cerebrovascular Disease Maternal Grandfather     Lipids Maternal Grandfather     Circulatory Maternal Grandfather         MI in 50's    Depression Brother         OCD    Gastrointestinal Disease Brother         IBS    Depression Mother     Anxiety Disorder Daughter     Thyroid Disease No family hx of     Asthma No family hx of      Denies family history of autoimmune disease     Physical Exam     Temp Readings from Last 3 Encounters:   05/23/25 97.9  F (36.6  C)   05/09/25 97.6  F (36.4  C) (Temporal)   04/18/25 98.1  F (36.7  C) (Temporal)     BP Readings from Last 5 Encounters:   05/23/25 100/70   05/09/25 116/78   04/28/25 110/61  "  04/18/25 109/70   04/02/25 94/60     Pulse Readings from Last 1 Encounters:   05/23/25 81     Resp Readings from Last 1 Encounters:   05/23/25 16     Estimated body mass index is 22.12 kg/m  as calculated from the following:    Height as of 5/23/25: 1.664 m (5' 5.5\").    Weight as of 5/23/25: 61.2 kg (135 lb).    GEN: NAD.   HEENT: Dry mucous membranes.  Anicteric, noninjected sclera. No obvious external lesions of the ear and nose. Hearing intact.  CV: S1, S2. RRR. No m/r/g  PULM: No increased work of breathing.  No wheezes/crackles  MSK: Synovial hypertrophy without tenderness to palpation, increased warmth, or overlying erythema of the bilateral second over third MCPs.  Other MCPs without swelling or tenderness to palpation.  PIPs and DIPs without swelling or tenderness to palpation.  Wrists without swelling or tenderness to palpation.  Heberden's nodes present..   Elbows and shoulders without swelling or tenderness to palpation.  Knees, ankles, and MTPs without swelling or tenderness to palpation.  LYMPH: No LE edema  PSYCH: Alert. Appropriate.      Labs / Imaging (select studies)     dsDNA  Recent Labs   Lab Test 02/25/25  0824 11/09/23  0717 08/08/23  1618 02/14/23  0856 07/12/22  1302 03/23/22  1204 11/30/21  1245 08/19/21  1056 04/20/21  1241   DNA 3.7 4.2 8.0 7.1 7.6 7.2 13.0* 11.0* 11*     C3/C4  Recent Labs   Lab Test 05/22/25  1612 02/25/25  0824 11/09/23  0717 08/08/23  1618 02/14/23  0856 07/12/22  1302 03/23/22  1204 11/30/21  1245 08/19/21  1056   G4NLKHM 105 104 129 124 147 129 103 120 113   [id number] 23 24 30 29 34 24 25 30 28     CBC  Recent Labs   Lab Test 05/22/25  1612 02/25/25  0824 09/25/24  0723   WBC 5.2 5.8 6.5   RBC 4.11 4.26 4.51   HGB 12.1 12.5 12.4   HCT 36.2 38.0 38.3   MCV 88 89 85   RDW 13.1 12.9 12.4    232 254   MCH 29.4 29.3 27.5   MCHC 33.4 32.9 32.4   NEUTROPHIL 60 57 59   LYMPH 26 27 26   MONOCYTE 10 9 11   EOSINOPHIL 4 6 4   BASOPHIL 0 1 1   ANEU 3.1 3.3 3.8   ALYM 1.4 " 1.6 1.7   DAQUAN 0.5 0.5 0.7   AEOS 0.2 0.3 0.2   ABAS 0.0 0.0 0.0     CMP  Recent Labs   Lab Test 05/22/25  1612 02/25/25  0824 09/25/24  0723 06/19/24  1411 05/30/24  0635 05/20/24  0925 08/19/21  1056 04/20/21  1241 01/11/21  1315 09/10/20  1308 10/16/19  1708    140 140 142  --  137   < > 138 139 138 139   POTASSIUM 3.9 3.9 3.7 3.9  --  3.8   < > 3.6 3.9 4.1 3.9   CHLORIDE 106 104 106 107  --  104   < > 108 110* 106 109   CO2 26 26 22 23  --  21*   < > 23 26 28 24   ANIONGAP 9 10 12 12  --  12   < > 7 3 4 6   GLC 91 80 85 105* 135* 89   < > 117* 78 85 83   BUN 24.9* 22.9* 31.7* 25.1*  --  25.9*   < > 25 15 17 17   CR 0.66 0.60 0.87 0.70  --  0.67   < > 0.95 0.67 0.77 0.75   GFRESTIMATED >90 >90 81 >90  --  >90   < > 72 >90 >90 >90   GFRESTBLACK  --   --   --   --   --   --   --  83 >90 >90 >90   RICHARD 9.0 9.1 8.9 9.5  --  9.5   < > 8.3* 8.7 8.9 9.1   BILITOTAL 0.2 0.2 0.3 0.3  --   --    < > 0.4 0.3 0.3 0.3   ALBUMIN 4.1 4.2 4.1 4.3  --   --    < > 3.8 4.0 3.8 4.2   PROTTOTAL 6.2* 6.2* 6.4 6.6  --   --    < > 6.6* 6.9 6.9 7.3   ALKPHOS 31* 27* 36* 35*  --   --    < > 24* 22* 28* 23*   AST 18 20 30 23  --   --    < > 13 18 18 18   ALT 17 15 24 26  --   --    < > 23 35 32 23    < > = values in this interval not displayed.     Calcium/VitaminD  Recent Labs   Lab Test 05/22/25  1612 02/25/25  0824 09/25/24  0723   RICHARD 9.0 9.1 8.9     ESR/CRP  Recent Labs   Lab Test 05/22/25  1612 02/25/25  0824 06/19/24  1411 07/12/22  1302 03/23/22  1204 11/30/21  1245 08/19/21  1057 08/19/21  1056   SED 7 4 7   < > 18 8   < >  --    CRP  --   --   --   --  <2.9 <2.9  --  <2.9   CRPI <3.00 <3.00 <3.00   < >  --   --   --   --     < > = values in this interval not displayed.     CK/Aldolase  Recent Labs   Lab Test 11/09/23  0717 07/12/22  1302 08/19/21  1056    32 106     Lipid Panel  Recent Labs   Lab Test 07/12/22  1302   CHOL 222*   TRIG 170*   HDL 70   *   NHDL 152*     Hepatitis B  Recent Labs   Lab Test  02/26/21  0927   HBCAB Nonreactive   HEPBANG Nonreactive     Hepatitis C  Recent Labs   Lab Test 02/26/21 0927   HCVAB Nonreactive     Tuberculosis Screening  Recent Labs   Lab Test 02/25/25  0824 07/12/22  1302 06/28/21  1700 02/26/21  0927   TBRES Negative Negative  --   --    TBRST  --   --  Negative Negative     UA  Recent Labs   Lab Test 05/23/25  1105 02/25/25  0900 09/25/24  0731 06/19/24  1413 09/10/20  1315 11/04/19  1747 04/04/19  1625 12/13/18  1355 09/21/17  0815 05/01/17  0954   COLOR Yellow Yellow Yellow Yellow   < > Yellow   < > Yellow Yellow Yellow   APPEARANCE Clear Clear Clear Slightly Cloudy*   < > Clear   < > Clear Clear Clear   URINEGLC Negative Negative Negative Negative   < > Negative   < > Negative Negative Negative   URINEBILI Negative Negative Negative Negative   < > Negative   < > Negative Negative Small  This is an unconfirmed screening test result. A positive result may be false.  *   SG 1.015 >=1.030 >=1.030 >=1.030   < > 1.015   < > >1.030 >1.030 >1.030   URINEPH 6.0 5.5 6.0 5.5   < > 8.0*   < > 5.5 6.0 6.0   PROTEIN Negative Negative Trace* Negative   < > Trace*   < > Negative Negative Trace*   UROBILINOGEN 0.2 0.2 0.2 0.2   < > 0.2   < > 0.2 0.2 0.2   NITRITE Positive* Negative Negative Negative   < > Negative   < > Negative Negative Negative   UBLD Negative Negative Negative Negative   < > Negative   < > Small* Trace* Small*   LEUKEST Moderate* Negative Negative Trace*   < > Negative   < > Negative Negative Negative   WBCU 0-5  --  0-5 0-5   < > 0 - 5  --  0 - 5 O - 2 O - 2   RBCU 0-2  --  None Seen 0-2   < > O - 2  --  O - 2 O - 2 O - 2   SQUAMOUSEPI  --   --   --   --   --  Few  --  Moderate* Few Few   BACTERIA Many*  --  Few* Few*   < > Few*  --   --   --   --    MUCOUS  --   --   --   --   --   --   --   --   --  Present*    < > = values in this interval not displayed.     Urine Microscopic  Recent Labs   Lab Test 05/23/25  1105 09/25/24  0731 06/19/24  1413 09/19/23  1213  11/04/19  1747 12/13/18  1355 09/21/17  0815 05/01/17  0954   WBCU 0-5 0-5 0-5   < > 0 - 5 0 - 5 O - 2 O - 2   RBCU 0-2 None Seen 0-2   < > O - 2 O - 2 O - 2 O - 2   SQUAMOUSEPI  --   --   --   --  Few Moderate* Few Few   BACTERIA Many* Few* Few*   < > Few*  --   --   --    MUCOUS  --   --   --   --   --   --   --  Present*    < > = values in this interval not displayed.     Urine Protein  GHUTP and UTP= Urine protein (random), GHUTPG and UTPG = urine protein:creatinine ratio (random), UCRR = urine creatinine (random)  Recent Labs   Lab Test 05/23/25  1105 02/25/25  0900 09/25/24  0731 06/19/24  1413 07/12/22  1302 08/19/21  1058 09/10/20  1315 11/04/19  1747   GHUTP <6.0 10.3 18.3 18.8   < >  --   --   --    UTP  --   --   --   --   --  0.16 0.15 0.21   GHUTPG  --  0.11 0.10 0.11   < >  --   --   --    UTPG  --   --   --   --   --  0.12 0.17 0.16   UCRR 37.5 94.7 189.0 166.0   < > 137 88  --     < > = values in this interval not displayed.     Immunization History     Immunization History   Administered Date(s) Administered    COVID-19 12+ (Pfizer) 02/09/2024    COVID-19 Bivalent 12+ (Pfizer) 11/30/2022    COVID-19 MONOVALENT 12+ (Pfizer) 02/12/2021, 05/20/2021, 12/31/2021    COVID-19 Monovalent 12+ (Pfizer 2022) 05/04/2022    Flu, Unspecified 11/01/2020, 12/01/2021    HepB, Unspecified 03/21/1997    Hepatitis A (VAQTA)(ADULT 19+) 11/30/2022    Influenza (IIV3) PF 10/23/2010    Influenza Vaccine >6 months,quad, PF 12/19/2014, 10/16/2015, 11/04/2016, 12/13/2018, 11/07/2019, 11/30/2022, 09/29/2023    Influenza Vaccine, 6+MO IM (QUADRIVALENT W/PRESERVATIVES) 10/09/2020    Mantoux Tuberculin Skin Test 07/30/2018    Pneumo Conj 13-V (2010&after) 12/13/2018    Pneumococcal 23 valent 04/04/2019    TD,PF 7+ (Tenivac) 02/23/2005, 04/09/2019    TDAP (Adacel,Boostrix) 07/21/2016    TDAP Vaccine (Adacel) 04/14/2009    Td (Adult), Adsorbed 02/23/2005    Zoster recombinant adjuvanted (Shingrix) 12/21/2018, 04/04/2019           Chart documentation done in part with Dragon Voice recognition Software. Although reviewed after completion, some word and grammatical error may remain.    Jack Arvizu MD

## 2025-05-28 NOTE — NURSING NOTE
RAPID3 (0-30) Cumulative Score  9.7          RAPID3 Weighted Score (divide #4 by 3 and that is the weighted score)  3.2

## 2025-05-28 NOTE — PATIENT INSTRUCTIONS
In anticipation of back surgery, the following is the perioperative DMARD guidance:   Benlysta should not be taken at least 2 weeks prior to the surgery; and restarted no sooner than 2 weeks after the surgery is completed and only in the absence of infection, absence of delayed wound healing, and only after given approval to restart by the surgeon.   Methotrexate and hydroxychloroquine are continued perioperatively  Also review this with your surgeon to make sure the surgeon is in agreement with this plan  _____________________________________    RHEUMATOLOGY    [address] [address]    Phone number: [phone]  Fax number: [phone]    If you need a medication refill, please contact us as you may need lab work and/or a follow up visit prior to your refill.      Thank you for choosing  [facility]!    Bailey Resendez [facility] Rheumatology

## 2025-05-29 DIAGNOSIS — M32.9 SYSTEMIC LUPUS ERYTHEMATOSUS, UNSPECIFIED SLE TYPE, UNSPECIFIED ORGAN INVOLVEMENT STATUS (H): ICD-10-CM

## 2025-05-29 LAB — DSDNA AB SER-ACNC: 3.2 IU/ML

## 2025-05-29 RX ORDER — METHOTREXATE 2.5 MG/1
15 TABLET ORAL WEEKLY
Qty: 78 TABLET | Refills: 2 | OUTPATIENT
Start: 2025-05-29

## 2025-05-29 NOTE — TELEPHONE ENCOUNTER
After chart review and based on prior discussion with MD it was noted that this is not appropriate for a refill. Was approved yesterday    JOSE Osorio RN Specialty Triage 5/29/2025 5:01 PM

## 2025-06-02 ENCOUNTER — RESULTS FOLLOW-UP (OUTPATIENT)
Dept: FAMILY MEDICINE | Facility: CLINIC | Age: 50
End: 2025-06-02

## 2025-06-02 ENCOUNTER — OFFICE VISIT (OUTPATIENT)
Dept: FAMILY MEDICINE | Facility: CLINIC | Age: 50
End: 2025-06-02
Payer: COMMERCIAL

## 2025-06-02 VITALS
TEMPERATURE: 98.3 F | RESPIRATION RATE: 16 BRPM | HEART RATE: 88 BPM | OXYGEN SATURATION: 97 % | BODY MASS INDEX: 23.16 KG/M2 | SYSTOLIC BLOOD PRESSURE: 117 MMHG | HEIGHT: 65 IN | WEIGHT: 139 LBS | DIASTOLIC BLOOD PRESSURE: 79 MMHG

## 2025-06-02 DIAGNOSIS — Z12.4 CERVICAL CANCER SCREENING: Primary | ICD-10-CM

## 2025-06-02 DIAGNOSIS — N76.0 BV (BACTERIAL VAGINOSIS): ICD-10-CM

## 2025-06-02 DIAGNOSIS — B96.89 BV (BACTERIAL VAGINOSIS): ICD-10-CM

## 2025-06-02 DIAGNOSIS — Z12.31 VISIT FOR SCREENING MAMMOGRAM: ICD-10-CM

## 2025-06-02 DIAGNOSIS — N89.8 VAGINAL ODOR: ICD-10-CM

## 2025-06-02 DIAGNOSIS — Z12.11 SCREEN FOR COLON CANCER: ICD-10-CM

## 2025-06-02 LAB
C TRACH DNA SPEC QL NAA+PROBE: NEGATIVE
CLUE CELLS: PRESENT
N GONORRHOEA DNA SPEC QL NAA+PROBE: NEGATIVE
SPECIMEN TYPE: NORMAL
SPECIMEN TYPE: NORMAL
TRICHOMONAS, WET PREP: ABNORMAL
WBC'S/HIGH POWER FIELD, WET PREP: ABNORMAL
YEAST, WET PREP: ABNORMAL

## 2025-06-02 PROCEDURE — G2211 COMPLEX E/M VISIT ADD ON: HCPCS | Performed by: FAMILY MEDICINE

## 2025-06-02 PROCEDURE — 3074F SYST BP LT 130 MM HG: CPT | Performed by: FAMILY MEDICINE

## 2025-06-02 PROCEDURE — 87591 N.GONORRHOEAE DNA AMP PROB: CPT | Performed by: FAMILY MEDICINE

## 2025-06-02 PROCEDURE — 3078F DIAST BP <80 MM HG: CPT | Performed by: FAMILY MEDICINE

## 2025-06-02 PROCEDURE — 99214 OFFICE O/P EST MOD 30 MIN: CPT | Performed by: FAMILY MEDICINE

## 2025-06-02 PROCEDURE — 87210 SMEAR WET MOUNT SALINE/INK: CPT | Performed by: FAMILY MEDICINE

## 2025-06-02 PROCEDURE — 87491 CHLMYD TRACH DNA AMP PROBE: CPT | Performed by: FAMILY MEDICINE

## 2025-06-02 PROCEDURE — 1126F AMNT PAIN NOTED NONE PRSNT: CPT | Performed by: FAMILY MEDICINE

## 2025-06-02 RX ORDER — METRONIDAZOLE 500 MG/1
500 TABLET ORAL 2 TIMES DAILY
Qty: 14 TABLET | Refills: 0 | Status: SHIPPED | OUTPATIENT
Start: 2025-06-02 | End: 2025-06-09

## 2025-06-02 ASSESSMENT — PAIN SCALES - GENERAL: PAINLEVEL_OUTOF10: NO PAIN (0)

## 2025-06-02 NOTE — PROGRESS NOTES
"  Assessment & Plan     Vaginal odor    - Wet prep - Clinic Collect  - NEISSERIA GONORRHOEA PCR  - CHLAMYDIA TRACHOMATIS PCR    BV (bacterial vaginosis)  SEE EPIC care orders  The potential side effects of this medication have been discussed with the patient.  Call if any significant problems with these are experienced.  Discussed cannot drink alcohol with this medicines   Follow up if not better  - metroNIDAZOLE (FLAGYL) 500 MG tablet; Take 1 tablet (500 mg) by mouth 2 times daily for 7 days.    Screen for colon cancer  Advised colonoscopy or   - COLOGUARD([employer]); Future    Visit for screening mammogram  Advised   - MA Screen Bilateral w/Jim; Future    Cervical cancer screening  Advised she is due for pap and make appointment with PCP for a Physical        Subjective   Sosa is a 49 year old, presenting for the following health issues:  Vaginal Problem      6/2/2025     7:40 AM   Additional Questions   Roomed by Patience     Vaginal Problem     History of Present Illness       Reason for visit:  Possible bv bacterial infection  Symptom onset:  1-2 weeks ago  Symptoms include:  Vaginal ordor greenish brown discharge    She eats 4 or more servings of fruits and vegetables daily.She consumes 0 sweetened beverage(s) daily.She exercises with enough effort to increase her heart rate 30 to 60 minutes per day.  She exercises with enough effort to increase her heart rate 4 days per week. She is missing 2 dose(s) of medications per week.  She is not taking prescribed medications regularly due to remembering to take.    Has a odor  No pelvic pain  No Urinary symptoms  Pt zully Known History of SLE  History of chlamydia in the past              Review of Systems  Rest of the ROS is Negative except see above and Problem list [stable]        Objective    /79   Pulse 88   Temp 98.3  F (36.8  C) (Temporal)   Resp 16   Ht 1.645 m (5' 4.76\")   Wt 63 kg (139 lb)   LMP 02/05/2022   SpO2 97%   BMI 23.30 kg/m    Body " mass index is 23.3 kg/m .  Physical Exam   GENERAL: alert and no distress  RESP: lungs clear to auscultation - no rales, rhonchi or wheezes  CV: regular rate and rhythm, normal S1 S2, no S3 or S4, no murmur, click or rub, no peripheral edema  ABDOMEN: soft, nontender, no hepatosplenomegaly, no masses and bowel sounds normal  MS: no gross musculoskeletal defects noted, no edema    Results for orders placed or performed in visit on 06/02/25   Wet prep - Clinic Collect     Status: Abnormal    Specimen: Vagina; Swab   Result Value Ref Range    Trichomonas Absent Absent    Yeast Absent Absent    Clue Cells Present (A) Absent    WBCs/high power field 3+ (A) None           Signed Electronically by: Angelita Ramirez MD

## 2025-06-03 ENCOUNTER — TELEPHONE (OUTPATIENT)
Dept: NEUROSURGERY | Facility: CLINIC | Age: 50
End: 2025-06-03
Payer: COMMERCIAL

## 2025-06-03 NOTE — TELEPHONE ENCOUNTER
Left Voicemail (1st Attempt) for the patient to call back and schedule the following:    Location:  Neurosurgery  Provider: Dr. Goins   Appointment type: Return Adult Neurosurgery  Appointment mode: Video  Return date: Next available     Specialty phone number: [phone]    Is Imaging Needed: No    Additional Notes: Dr. Goins requests virtual visit to review MAIN from 5/23/25 and next steps.

## 2025-06-03 NOTE — TELEPHONE ENCOUNTER
[facility] Call Center    Phone Message    May a detailed message be left on voicemail: yes     Reason for Call: Other: clinic instructions to schedule pt for virtual w/ Dr Goins. Surgical spine video appt not available under [facility], scheduled 6/9/25 with provider as in person, needs changed to virtual. Writer unable.      Action Taken: Other: [facility] Neurosurgery     Travel Screening: Not Applicable     Date of Service:

## 2025-06-09 ENCOUNTER — VIRTUAL VISIT (OUTPATIENT)
Dept: NEUROSURGERY | Facility: CLINIC | Age: 50
End: 2025-06-09
Payer: COMMERCIAL

## 2025-06-09 DIAGNOSIS — M54.16 LUMBAR RADICULOPATHY: Primary | ICD-10-CM

## 2025-06-09 PROCEDURE — 98013 SYNCH AUDIO-ONLY EST LOW 20: CPT | Performed by: NEUROLOGICAL SURGERY

## 2025-06-09 NOTE — NURSING NOTE
"Sosa Patel is a 49 year old female who presents for:  Chief Complaint   Patient presents with    RECHECK     Patient is having this appt today because of her shot. Patient has pain in the lower back. Pain level 4.         Initial Vitals:  [facility] 02/05/2022  Estimated body mass index is 23.3 kg/m  as calculated from the following:    Height as of 6/2/25: 5' 4.76\" (1.645 m).    Weight as of 6/2/25: 139 lb (63 kg). There is no height or weight on file to calculate BSA. BP completed using cuff size: NA (Not Taken)  Data Unavailable    Sosa Patel is a 49 year old female who is here for a virtual visit, presents for:  Chief Complaint   Patient presents with    RECHECK     Patient is having this appt today because of her shot. Patient has pain in the lower back. Pain level 4.         Initial Vitals:  LMP 02/05/2022  Estimated body mass index is 23.3 kg/m  as calculated from the following:    Height as of 6/2/25: 5' 4.76\" (1.645 m).    Weight as of 6/2/25: 139 lb (63 kg). There is no height or weight on file to calculate BSA. BP completed using cuff size: NA (Not Taken)  Data Unavailable    The patient is currently in [place], [place] at the time of the visit.      Precious Schneider    "

## 2025-06-09 NOTE — H&P (VIEW-ONLY)
Telephone-Visit Details     Type of service: Telephone visit   Time: 2:45 PM to 3:00 pm  Originating Location (pt. Location): Home  Distant Location (provider location):  On-site  Platform used for phone visit: [facility]      Neurosurgery Clinic Note     Sosa Patel comes today in f/u approximately 13 months s/p 5/29/2024 L4-5 decompressive lumbar laminectomy, medial facetectomy foraminotomy and resection of epidural synovial cyst.  Final pathology:L4/L5 epidural cyst, excision, Consistent with benign synovial cyst.Patient did very well following the decompression and resection of the synovial cyst for 10 months or so with almost 90% relief of her symptoms following the surgery.  She started noticing current pain in her low back radiating along the posterior aspect of her thigh leg and up to the sole involving her right lower extremity for 4 months or so.  She is in the clinic today following repeat right L5-S1 transforaminal epidural steroid injection.    Patient reports approximately 30 to 40% improvement in her symptoms following the injection on 5/23/2025.  She continues to do physical therapy.  Patient had no improvement with transforaminal steroid injection on 4/2/2025.     She denies any new onset neurological symptoms during her clinic visit today.  She continues to report low back pain and pain radiating to her right lower extremity along the posterior aspect.She denies new onset weakness or bladder or bowel symptoms.     She does not smoke.     No new images today      AP: Ms Patel is a 48-year-old right-handed female with significant past medical history of bipolar disorder, drug dependence, suicide attempt initially presented to the clinic in May 2024 for evaluation of low back pain and bilateral leg pain right more than the left since November 2023 without preceding trauma. She has clinical symptoms of neurogenic claudication with intermittent episodes of bladder/bowel incontinence.She underwent open  L4-5 decompressive laminectomy and resection of the synovial cyst on 5/29/2024.  Patient had significant improvement in her symptoms following the decompression.  She reports almost 90% improvement in her preoperative symptoms.  However for last 4 months or so she started noticing worsening of her low back pain radiating to her right lower extremity along the posterior aspect likely along right L5 /S1 dermatomal distribution.  She also reports pain radiating up to her left knee along the posterior aspect.  She has mild right foot dorsiflexion weakness.    She is in the clinic today following right transforaminal L5-S1 epidural steroid injection.  She reports approximately 30 to 40% improvement in her symptoms.  She denies any new onset neurological symptoms during her virtual telephone visit today.     I explained the MRI lumbar spine, CT lumbar spine, x-ray lumbar spine and EMG/NCV studies to the patient over the telephone.    I explained her the presence of multilevel degenerative disc disease primarily at L4-5 and L5-S1.  I explained to her that grade 1 degenerative spondylolisthesis at L4-5 has progressed compared to her upright x-rays in May 2024.  I again discussed the management options including continuation of conservative treatment and surgical interventions including decompression alone and decompression with fusion.  I discussed the risk and benefits of all the management options.  I explained to the patient that she has moderate to severe right and moderate left foraminal narrowing which may be contributing to some of her symptoms.  However given that majority of her symptoms are likely coming from L4-5 level I will address this level first for the majority of her symptoms.    I would recommend right L4-5 transforaminal interbody fusion robot-assisted.  I discussed the procedure in detail.  I discussed the risk and benefits of the surgery including but not limited to bleeding, infection, incomplete  "relief of symptoms, CSF leak, need for additional surgery, adjacent segment disease requiring further intervention, need for rehabilitation, DVT/PE, MI and others.  Following extensive discussion of the risk and benefits of surgery patient would like to proceed.     We will schedule the surgery and give her a call.  I also explained to the patient that we need clearance from her primary care physician for the surgery.    She can contact us if there are any further questions or concerns or worsening neurological deficits.I spent more than 20 minutes in this apt, examining the pt, reviewing the scans, reviewing notes from chart, discussing treatment options with risks and benefits and coordinating care. This note was created in part by the use of Dragon voice recognition system. Inadvertent grammatical errors and typographical errors may have occurred due to inherent limitation of voice recognition software.  Reasonable attempts made to avoid errors, but this document may contain an error not identified before finalizing.  Please contact me for any clarification needed.   \"I spent 20 minutes on the date of encounter of which 10 minutes spent in medical discussion.\"    "

## 2025-06-09 NOTE — LETTER
6/9/2025      Sosa Patel  [address]   [place] [address]      Dear Colleague,    Thank you for referring your patient, Sosa Patel, to the [facility]. Please see a copy of my visit note below.    Telephone-Visit Details     Type of service: Telephone visit   Time: 2:45 PM to 3:00 pm  Originating Location (pt. Location): Home  Distant Location (provider location):  On-site  Platform used for phone visit: [facility]      Neurosurgery Clinic Note     Sosa Patel comes today in f/u approximately 13 months s/p 5/29/2024 L4-5 decompressive lumbar laminectomy, medial facetectomy foraminotomy and resection of epidural synovial cyst.  Final pathology:L4/L5 epidural cyst, excision, Consistent with benign synovial cyst.Patient did very well following the decompression and resection of the synovial cyst for 10 months or so with almost 90% relief of her symptoms following the surgery.  She started noticing current pain in her low back radiating along the posterior aspect of her thigh leg and up to the sole involving her right lower extremity for 4 months or so.  She is in the clinic today following repeat right L5-S1 transforaminal epidural steroid injection.    Patient reports approximately 30 to 40% improvement in her symptoms following the injection on 5/23/2025.  She continues to do physical therapy.  Patient had no improvement with transforaminal steroid injection on 4/2/2025.     She denies any new onset neurological symptoms during her clinic visit today.  She continues to report low back pain and pain radiating to her right lower extremity along the posterior aspect.She denies new onset weakness or bladder or bowel symptoms.     She does not smoke.     No new images today      AP: Ms Patel is a 48-year-old right-handed female with significant past medical history of bipolar disorder, drug dependence, suicide attempt initially presented to the clinic in May 2024 for evaluation  of low back pain and bilateral leg pain right more than the left since November 2023 without preceding trauma. She has clinical symptoms of neurogenic claudication with intermittent episodes of bladder/bowel incontinence.She underwent open L4-5 decompressive laminectomy and resection of the synovial cyst on 5/29/2024.  Patient had significant improvement in her symptoms following the decompression.  She reports almost 90% improvement in her preoperative symptoms.  However for last 4 months or so she started noticing worsening of her low back pain radiating to her right lower extremity along the posterior aspect likely along right L5 /S1 dermatomal distribution.  She also reports pain radiating up to her left knee along the posterior aspect.  She has mild right foot dorsiflexion weakness.    She is in the clinic today following right transforaminal L5-S1 epidural steroid injection.  She reports approximately 30 to 40% improvement in her symptoms.  She denies any new onset neurological symptoms during her virtual telephone visit today.     I explained the MRI lumbar spine, CT lumbar spine, x-ray lumbar spine and EMG/NCV studies to the patient over the telephone.    I explained her the presence of multilevel degenerative disc disease primarily at L4-5 and L5-S1.  I explained to her that grade 1 degenerative spondylolisthesis at L4-5 has progressed compared to her upright x-rays in May 2024.  I again discussed the management options including continuation of conservative treatment and surgical interventions including decompression alone and decompression with fusion.  I discussed the risk and benefits of all the management options.  I explained to the patient that she has moderate to severe right and moderate left foraminal narrowing which may be contributing to some of her symptoms.  However given that majority of her symptoms are likely coming from L4-5 level I will address this level first for the majority of her  "symptoms.    I would recommend right L4-5 transforaminal interbody fusion robot-assisted.  I discussed the procedure in detail.  I discussed the risk and benefits of the surgery including but not limited to bleeding, infection, incomplete relief of symptoms, CSF leak, need for additional surgery, adjacent segment disease requiring further intervention, need for rehabilitation, DVT/PE, MI and others.  Following extensive discussion of the risk and benefits of surgery patient would like to proceed.     We will schedule the surgery and give her a call.  I also explained to the patient that we need clearance from her primary care physician for the surgery.    She can contact us if there are any further questions or concerns or worsening neurological deficits.I spent more than 20 minutes in this apt, examining the pt, reviewing the scans, reviewing notes from chart, discussing treatment options with risks and benefits and coordinating care. This note was created in part by the use of Dragon voice recognition system. Inadvertent grammatical errors and typographical errors may have occurred due to inherent limitation of voice recognition software.  Reasonable attempts made to avoid errors, but this document may contain an error not identified before finalizing.  Please contact me for any clarification needed.   \"I spent 20 minutes on the date of encounter of which 10 minutes spent in medical discussion.\"      Again, thank you for allowing me to participate in the care of your patient.        Sincerely,        Aureliano Goins MD    Electronically signed"

## 2025-06-10 ENCOUNTER — TELEPHONE (OUTPATIENT)
Dept: NEUROSURGERY | Facility: CLINIC | Age: 50
End: 2025-06-10
Payer: COMMERCIAL

## 2025-06-10 NOTE — TELEPHONE ENCOUNTER
Per 's request , I called patient to schedule surgery. She decided on 7/2 for surgery.Advised her I would get scheduled and call her back surgery details.

## 2025-06-11 NOTE — TELEPHONE ENCOUNTER
Called patient to give her surgery details for 7/2. Patient was out of town at an airport and could not take the info down. She asked me to call her back Monday when she will be back in town.

## 2025-06-23 ENCOUNTER — OFFICE VISIT (OUTPATIENT)
Dept: FAMILY MEDICINE | Facility: CLINIC | Age: 50
End: 2025-06-23
Payer: COMMERCIAL

## 2025-06-23 ENCOUNTER — MYC MEDICAL ADVICE (OUTPATIENT)
Dept: FAMILY MEDICINE | Facility: CLINIC | Age: 50
End: 2025-06-23

## 2025-06-23 VITALS
RESPIRATION RATE: 16 BRPM | HEART RATE: 92 BPM | BODY MASS INDEX: 23.32 KG/M2 | SYSTOLIC BLOOD PRESSURE: 124 MMHG | TEMPERATURE: 97.8 F | OXYGEN SATURATION: 95 % | HEIGHT: 65 IN | WEIGHT: 140 LBS | DIASTOLIC BLOOD PRESSURE: 82 MMHG

## 2025-06-23 DIAGNOSIS — M54.41 CHRONIC BILATERAL LOW BACK PAIN WITH BILATERAL SCIATICA: ICD-10-CM

## 2025-06-23 DIAGNOSIS — Z01.818 PREOP GENERAL PHYSICAL EXAM: Primary | ICD-10-CM

## 2025-06-23 DIAGNOSIS — M54.42 CHRONIC BILATERAL LOW BACK PAIN WITH BILATERAL SCIATICA: ICD-10-CM

## 2025-06-23 DIAGNOSIS — G89.29 CHRONIC BILATERAL LOW BACK PAIN WITH BILATERAL SCIATICA: ICD-10-CM

## 2025-06-23 DIAGNOSIS — F11.90 CHRONIC, CONTINUOUS USE OF OPIOIDS: ICD-10-CM

## 2025-06-23 DIAGNOSIS — Z12.31 ENCOUNTER FOR SCREENING MAMMOGRAM FOR BREAST CANCER: ICD-10-CM

## 2025-06-23 DIAGNOSIS — M54.16 LUMBAR RADICULOPATHY: ICD-10-CM

## 2025-06-23 PROBLEM — K80.20 GALLSTONES: Status: RESOLVED | Noted: 2023-09-21 | Resolved: 2025-06-23

## 2025-06-23 LAB
ANION GAP SERPL CALCULATED.3IONS-SCNC: 10 MMOL/L (ref 7–15)
BUN SERPL-MCNC: 22.1 MG/DL (ref 6–20)
CALCIUM SERPL-MCNC: 9.3 MG/DL (ref 8.8–10.4)
CHLORIDE SERPL-SCNC: 105 MMOL/L (ref 98–107)
CREAT SERPL-MCNC: 0.71 MG/DL (ref 0.51–0.95)
EGFRCR SERPLBLD CKD-EPI 2021: >90 ML/MIN/1.73M2
ERYTHROCYTE [DISTWIDTH] IN BLOOD BY AUTOMATED COUNT: 13.1 % (ref 10–15)
GLUCOSE SERPL-MCNC: 93 MG/DL (ref 70–99)
HCO3 SERPL-SCNC: 26 MMOL/L (ref 22–29)
HCT VFR BLD AUTO: 37.6 % (ref 35–47)
HGB BLD-MCNC: 12 G/DL (ref 11.7–15.7)
MCH RBC QN AUTO: 29.1 PG (ref 26.5–33)
MCHC RBC AUTO-ENTMCNC: 31.9 G/DL (ref 31.5–36.5)
MCV RBC AUTO: 91 FL (ref 78–100)
PLATELET # BLD AUTO: 237 10E3/UL (ref 150–450)
POTASSIUM SERPL-SCNC: 3.8 MMOL/L (ref 3.4–5.3)
RBC # BLD AUTO: 4.12 10E6/UL (ref 3.8–5.2)
SODIUM SERPL-SCNC: 141 MMOL/L (ref 135–145)
WBC # BLD AUTO: 5.3 10E3/UL (ref 4–11)

## 2025-06-23 PROCEDURE — 99214 OFFICE O/P EST MOD 30 MIN: CPT | Performed by: PHYSICIAN ASSISTANT

## 2025-06-23 PROCEDURE — 90677 PCV20 VACCINE IM: CPT | Performed by: PHYSICIAN ASSISTANT

## 2025-06-23 PROCEDURE — G2211 COMPLEX E/M VISIT ADD ON: HCPCS | Performed by: PHYSICIAN ASSISTANT

## 2025-06-23 PROCEDURE — 3074F SYST BP LT 130 MM HG: CPT | Performed by: PHYSICIAN ASSISTANT

## 2025-06-23 PROCEDURE — 85027 COMPLETE CBC AUTOMATED: CPT | Performed by: PHYSICIAN ASSISTANT

## 2025-06-23 PROCEDURE — G0009 ADMIN PNEUMOCOCCAL VACCINE: HCPCS | Performed by: PHYSICIAN ASSISTANT

## 2025-06-23 PROCEDURE — 91320 SARSCV2 VAC 30MCG TRS-SUC IM: CPT | Performed by: PHYSICIAN ASSISTANT

## 2025-06-23 PROCEDURE — 1125F AMNT PAIN NOTED PAIN PRSNT: CPT | Performed by: PHYSICIAN ASSISTANT

## 2025-06-23 PROCEDURE — 36415 COLL VENOUS BLD VENIPUNCTURE: CPT | Performed by: PHYSICIAN ASSISTANT

## 2025-06-23 PROCEDURE — 80048 BASIC METABOLIC PNL TOTAL CA: CPT | Performed by: PHYSICIAN ASSISTANT

## 2025-06-23 PROCEDURE — 90480 ADMN SARSCOV2 VAC 1/ONLY CMP: CPT | Performed by: PHYSICIAN ASSISTANT

## 2025-06-23 PROCEDURE — 3079F DIAST BP 80-89 MM HG: CPT | Performed by: PHYSICIAN ASSISTANT

## 2025-06-23 RX ORDER — OXYCODONE HYDROCHLORIDE 5 MG/1
5-10 TABLET ORAL 2 TIMES DAILY PRN
Qty: 60 TABLET | Refills: 0 | Status: SHIPPED | OUTPATIENT
Start: 2025-06-23

## 2025-06-23 ASSESSMENT — PAIN SCALES - GENERAL: PAINLEVEL_OUTOF10: MODERATE PAIN (4)

## 2025-06-23 NOTE — PROGRESS NOTES
Preoperative Evaluation  [facility]  [address] [place] [address]  [place] [address]  Phone: [phone]  Primary Provider: Cynthia Scott PA-C  Pre-op Performing Provider: Cynthia Scott PA-C  Jun 23, 2025 6/23/2025   Surgical Information   What procedure is being done? Spinal fusion of L4 & L5   Facility or Hospital where procedure/surgery will be performed: [facility]   Who is doing the procedure / surgery? Dr Goins   Date of surgery / procedure: July 2   Time of surgery / procedure: Not sure   Where do you plan to recover after surgery? at home with family     Fax number for surgical facility: Note does not need to be faxed, will be available electronically in Epic.    Assessment & Plan     The proposed surgical procedure is considered INTERMEDIATE risk.    Chronic, continuous use of opioids  Refilled until surgery   Follow up after surgery for pain management if needed.  Expect surgeon to manage immediatly post op.      Preop general physical exam      Chronic bilateral low back pain with bilateral sciatica  As above  - oxyCODONE (ROXICODONE) 5 MG tablet; Take 1-2 tablets (5-10 mg) by mouth 2 times daily as needed for pain.    Lumbar radiculopathy  As above  - Basic metabolic panel  (Ca, Cl, CO2, Creat, Gluc, K, Na, BUN)  - CBC with platelets    Encounter for screening mammogram for breast cancer    - MA Screening Bilateral w/ Jim; Future           Risks and Recommendations  The patient has the following additional risks and recommendations for perioperative complications:  Social and Substance:    - Patient is taking medications for chronic pain    Preoperative Medication Instructions  Antiplatelet or Anticoagulation Medication Instructions   - We reviewed the medication list and the patient is not on an antiplatelet or anticoagulation medications.    Additional Medication Instructions  Take all scheduled medications on the day of surgery EXCEPT for  modifications listed below:  Hold Belimumab 4 weeks -she will clarify with surgeon if ok to hold 2 weeks, otherwise will need to delay surgery.     - ibuprofen (Advil, Motrin): DO NOT TAKE 1 day before surgery.     Recommendation  Approval given to proceed with proposed procedure, without further diagnostic evaluation.        Wendy Swan is a 49 year old, presenting for the following:  Pre-Op Exam          6/23/2025    10:28 AM   Additional Questions   Roomed by Jeri MILLAN: low back pain down legs.  Taking 4 oxycodone a day.             6/23/2025   Pre-Op Questionnaire   Have you ever had a heart attack or stroke? No   Have you ever had surgery on your heart or blood vessels, such as a stent placement, a coronary artery bypass, or surgery on an artery in your head, neck, heart, or legs? No   Do you have chest pain with activity? No   Do you have a history of heart failure? No   Do you currently have a cold, bronchitis or symptoms of other infection? No   Do you have a cough, shortness of breath, or wheezing? No   Do you or anyone in your family have previous history of blood clots? No   Do you or does anyone in your family have a serious bleeding problem such as prolonged bleeding following surgeries or cuts? No   Have you ever had problems with anemia or been told to take iron pills? (!) YES several years ago due to menstruation    Have you had any abnormal blood loss such as black, tarry or bloody stools, or abnormal vaginal bleeding? No   Have you ever had a blood transfusion? No   Are you willing to have a blood transfusion if it is medically needed before, during, or after your surgery? Yes   Have you or any of your relatives ever had problems with anesthesia? No   Do you have sleep apnea, excessive snoring or daytime drowsiness? No   Do you have any artifical heart valves or other implanted medical devices like a pacemaker, defibrillator, or continuous glucose monitor? No   Do you have artificial  joints? No   Are you allergic to latex? No     Advance Care Planning    Discussed advance care planning with patient; informed AVS has link to [employer] [employer].    Preoperative Review of    reviewed - controlled substances reflected in medication list.          Patient Active Problem List    Diagnosis Date Noted    Borderline personality disorder (H) 04/18/2025     Priority: Medium    Chronic, continuous use of opioids 04/18/2025     Priority: Medium    Gallstones 09/21/2023     Priority: Medium    Recurrent major depressive disorder, in partial remission 08/31/2022     Priority: Medium    Chronic bilateral low back pain with bilateral sciatica 07/27/2022     Priority: Medium    Lichen sclerosus 09/10/2019     Priority: Medium    High risk medications (not anticoagulants) long-term use 09/21/2017     Priority: Medium    Sicca syndrome 09/21/2017     Priority: Medium    Systemic lupus erythematosus (H) 01/18/2017     Priority: Medium    JESSE positive 10/11/2016     Priority: Medium    Multiple joint pain 10/11/2016     Priority: Medium    Cocaine use 10/11/2016     Priority: Medium     In remission       H/O intravenous drug use in remission 03/06/2013     Priority: Medium    Herpes simplex type 2 infection 06/18/2010     Priority: Medium    Genital warts      Priority: Medium     (Problem list name updated by automated process. Provider to review and confirm.)        Past Medical History:   Diagnosis Date    Arthritis 2013    Bipolar disorder (H) 12/2004    Dx'd    Combinations of drug dependence excluding opioid type drug (H) 05/04/2022    In remission     H/O: depression     SUICIDE ATTEMPT AGE 15    HPV (human papillomavirus)     Lupus erythematosus     Mood disorder 04/09/2019    Other psychoactive substance dependence, uncomplicated (H) 04/18/2025     Past Surgical History:   Procedure Laterality Date    ABDOMEN SURGERY      BIOPSY      BUNIONECTOMY RT/LT      CHOLECYSTECTOMY      DAVINCI LAPAROSCOPIC  CHOLECYSTECTOMY WITHOUT GRAMS N/A 10/09/2023    Procedure: CHOLECYSTECTOMY, ROBOT-ASSISTED, LAPAROSCOPIC, WITHOUT CHOLANGIOGRAM;  Surgeon: Demetris Peoples MD;  Location: [facility] OR     ENLARGE BREAST WITH IMPLANT  01/01/2000    LAMINECTOMY LUMBAR TWO LEVELS Bilateral 05/29/2024    Procedure: LUMBAR 4, LUMBAR 5 DECOMPRESSIVE BILATERAL LAMINECTOMY, MEDIAL FACETECTOMY WITH EXCISION OF CYST;  Surgeon: Aureliano Goins MD;  Location:  OR     Current Outpatient Medications   Medication Sig Dispense Refill    acetaminophen (TYLENOL) 325 MG tablet Take 650 mg by mouth 2 times daily. Pt takes twice a day      ARIPiprazole (ABILIFY) 10 MG tablet Take 10 mg by mouth daily.      aspirin-acetaminophen-caffeine (EXCEDRIN MIGRAINE) 250-250-65 MG tablet Take 1 tablet by mouth daily as needed for headaches      augmented betamethasone dipropionate (DIPROLENE-AF) 0.05 % external ointment Apply topically 2 times daily. 50 g 3    belimumab (BENLYSTA) subcutaneous injection (prefilled autoinjector) Inject 1 mL (200 mg) subcutaneously every 7 days. . Hold for signs of infection and seek medical attention.   Refills at rheumatology appointments. 4 mL 7    cetirizine (ZYRTEC) 10 MG tablet TAKE 2 TABLETS BY MOUTH DAILY 180 tablet 0    clobetasol (TEMOVATE) 0.05 % external ointment Apply topically twice a week.      cyclobenzaprine (FLEXERIL) 10 MG tablet Take 1 tablet (10 mg) by mouth 2 times daily. 180 tablet 1    cycloSPORINE (RESTASIS) 0.05 % ophthalmic emulsion Place 1 drop into both eyes 2 times daily. 5.5 mL 2    FLUoxetine (PROZAC) 40 MG capsule TAKE ONE CAPSULE BY MOUTH ONCE DAILY 90 capsule 1    folic acid (FOLVITE) 1 MG tablet Take 2 tablets (2 mg) by mouth daily. 180 tablet 2    gabapentin (NEURONTIN) 600 MG tablet 2 tabs in am and 3 at bedtime 150 tablet 2    hydroquinone (RUFUS) 4 % external cream Apply topically at bedtime 28.35 g 11    hydroxychloroquine (PLAQUENIL) 200 MG tablet Hydroxychloroquine 200 mg daily with  "an additional 200 mg every other day.  Ophthalmology toxicity monitoring eye exam, that includes 10-2 VF and SD-OCT, is required yearly. 135 tablet 2    ibuprofen (ADVIL/MOTRIN) 200 MG tablet Take 200 mg by mouth every 4 hours as needed for pain.      lidocaine (XYLOCAINE) 5 % external ointment Apply topically as needed for moderate pain 50 g 1    methotrexate 2.5 MG tablet Take 6 tablets (15 mg) by mouth once a week. Methotrexate is a once weekly medication, taken on the same day of each week. 78 tablet 2    naproxen (NAPROSYN) 250 MG tablet Take 1 tablet (250 mg) by mouth 2 times daily (with meals). 60 tablet 2    oxyCODONE (ROXICODONE) 5 MG tablet Take 1-2 tablets (5-10 mg) by mouth 2 times daily as needed for pain. 60 tablet 0    pilocarpine (SALAGEN) 5 MG tablet Pilocarpine 5mg daily x7days, then 5mg twice daily x7days, then 5mg three times daily thereafter. 90 tablet 3    valACYclovir (VALTREX) 1000 mg tablet Take 1 tablet (1,000 mg) by mouth 2 times daily for 10 days 40 tablet 1       No Known Allergies     Social History     Tobacco Use    Smoking status: Some Days     Average packs/day: 0.3 packs/day for 41.4 years (11.5 ttl pk-yrs)     Types: Cigarettes     Start date: 3/3/1990     Last attempt to quit: 3/3/2013     Years since quittin.3     Passive exposure: Past    Smokeless tobacco: Current    Tobacco comments:     Currently vapes   Substance Use Topics    Alcohol use: Yes     Comment: rare       History   Drug Use Unknown     Comment: Cocaine               Objective    /82   Pulse 92   Temp 97.8  F (36.6  C) (Oral)   Resp 16   Ht 1.657 m (5' 5.25\")   Wt 63.5 kg (140 lb)   LMP 2022   SpO2 95%   BMI 23.12 kg/m     Estimated body mass index is 23.12 kg/m  as calculated from the following:    Height as of this encounter: 1.657 m (5' 5.25\").    Weight as of this encounter: 63.5 kg (140 lb).  Physical Exam  GENERAL: alert and no distress  HENT: ear canals and TM's normal, nose and " mouth without ulcers or lesions  NECK: no adenopathy, no asymmetry, masses, or scars  RESP: lungs clear to auscultation - no rales, rhonchi or wheezes  CV: regular rate and rhythm, normal S1 S2, no S3 or S4, no murmur, click or rub, no peripheral edema  ABDOMEN: soft, nontender, no hepatosplenomegaly, no masses and bowel sounds normal  MS: no gross musculoskeletal defects noted, no edema  NEURO: Normal strength and tone, mentation intact and speech normal  Comprehensive back pain exam:  Tenderness of low back , Lower extremity strength functional and equal on both sides, and Lower extremity reflexes within normal limits bilaterally  PSYCH: mentation appears normal, affect normal/bright    Recent Labs   Lab Test 05/22/25  1612 02/25/25  0824   HGB 12.1 12.5    232    140   POTASSIUM 3.9 3.9   CR 0.66 0.60        Diagnostics  Labs pending at this time.  Results will be reviewed when available.   No EKG required, no history of coronary heart disease, significant arrhythmia, peripheral arterial disease or other structural heart disease.    Revised Cardiac Risk Index (RCRI)  The patient has the following serious cardiovascular risks for perioperative complications:   - No serious cardiac risks = 0 points     RCRI Interpretation: 0 points: Class I (very low risk - 0.4% complication rate)         Signed Electronically by: Cynthia Scott PA-C  A copy of this evaluation report is provided to the requesting physician.

## 2025-06-23 NOTE — PATIENT INSTRUCTIONS
How to Take Your Medication Before Surgery  Preoperative Medication Instructions   Antiplatelet or Anticoagulation Medication Instructions   - We reviewed the medication list and the patient is not on an antiplatelet or anticoagulation medications.    Additional Medication Instructions  Take all scheduled medications on the day of surgery EXCEPT for modifications listed below:  Hold Belimumab 4 weeks    - ibuprofen (Advil, Motrin): DO NOT TAKE 1 day before surgery.    Naproxen 2 weeks before   Avoid Excedrin one week before surgery      Patient Education   Preparing for Your Surgery  For Adults  Getting started  In most cases, a nurse will call to review your health history and instructions. They will give you an arrival time based on your scheduled surgery time. Please be ready to share:  Your doctor's clinic name and phone number  Your medical, surgical, and anesthesia history  A list of allergies and sensitivities  A list of medicines, including herbal treatments and over-the-counter drugs  Whether the patient has a legal guardian (ask how to send us the papers in advance)  Note: You may not receive a call if you were seen at our PAC (Preoperative Assessment Center).  Please tell us if you're pregnant--or if there's any chance you might be pregnant. Some surgeries may injure a fetus (unborn baby), so they require a pregnancy test. Surgeries that are safe for a fetus don't always need a test, and you can choose whether to have one.   Preparing for surgery  Within 10 to 30 days of surgery: Have a pre-op exam (sometimes called an H&P, or History and Physical). This can be done at a clinic or pre-operative center.  If you're having a , you may not need this exam. Talk to your care team.  At your pre-op exam, talk to your care team about all medicines you take. (This includes CBD oil and any drugs, such as THC, marijuana, and other forms of cannabis.) If you need to stop any medicine before surgery, ask when  to start taking it again.  This is for your safety. Many medicines and drugs can make you bleed too much during surgery. Some change how well surgery (anesthesia) drugs work.  Call your insurance company to let them know you're having surgery. (If you don't have insurance, call [phone].)  Call your clinic if there's any change in your health. This includes a scrape or scratch near the surgery site, or any signs of a cold (sore throat, runny nose, cough, rash, fever).  Eating and drinking guidelines  For your safety: Unless your surgeon tells you otherwise, follow the guidelines below.  Eat and drink as normal until 8 hours before you arrive for surgery. After that, no food or milk. You can spit out gum when you arrive.  Drink clear liquids until 2 hours before you arrive. These are liquids you can see through, like water, Gatorade, and Propel Water. They also include plain black coffee and tea (no cream or milk).  No alcohol for 24 hours before you arrive. The night before surgery, stop any drinks that contain THC.  If your care team tells you to take medicine on the morning of surgery, it's okay to take it with a sip of water. No other medicines or drugs are allowed (including CBD oil)--follow your care team's instructions.  If you have questions the day of surgery, call your hospital or surgery center.   Preventing infection  Shower or bathe the night before and the morning of surgery. Follow the instructions your clinic gave you. (If no instructions, use regular soap.)  Don't shave or clip hair near your surgery site. We'll remove the hair if needed.  Don't smoke or vape the morning of surgery. No chewing tobacco for 6 hours before you arrive. A nicotine patch is okay. You may spit out nicotine gum when you arrive.  For some surgeries, the surgeon will tell you to fully quit smoking and nicotine.  We will make every effort to keep you safe from infection. We will:  Clean our hands often with soap and water  (or an alcohol-based hand rub).  Clean the skin at your surgery site with a special soap that kills germs.  Give you a special gown to keep you warm. (Cold raises the risk of infection.)  Wear hair covers, masks, gowns, and gloves during surgery.  Give antibiotic medicine, if prescribed. Not all surgeries need this medicine.  What to bring on the day of surgery  Photo ID and insurance card  Copy of your health care directive, if you have one  Glasses and hearing aids (bring cases)  You can't wear contacts during surgery  Inhaler and eye drops, if you use them (tell us about these when you arrive)  CPAP machine or breathing device, if you use them  A few personal items, if spending the night  If you have . . .  A pacemaker, ICD (cardiac defibrillator), or other implant: Bring the ID card.  An implanted stimulator: Bring the remote control.  A legal guardian: Bring a copy of the certified (court-stamped) guardianship papers.  Please remove any jewelry, including body piercings. Leave jewelry and other valuables at home.  If you're going home the day of surgery  You must have a support person drive you home. They should stay with you overnight, and they may need to help with your self-care.  If you don't have a support person, please tells us as soon as possible. We can help.  After surgery  If it's hard to control your pain or you need more pain medicine, please call your surgeon's office.  Questions?   If you have any questions for your care team, list them here:   ____________________________________________________________________________________________________________________________________________________________________________________________________________________________________________________________  For informational purposes only. Not to replace the advice of your health care provider. Copyright   2003, 2019 [facility]. All rights reserved. Clinically reviewed by Jose Ferris MD.  [employer] [id number] - REV 02/25.

## 2025-06-26 NOTE — PROGRESS NOTES
PTA medications updated by Medication Scribe prior to surgery via phone call with patient (last doses completed by Nurse)     Medication history sources: Patient, Surescripts, and H&P  In the past week, patient estimated taking medication this percent of the time: Greater than 90%      Significant changes made to the medication list:  None      Additional medication history information:   Patient was advised to bring: cycloSPORINE (RESTASIS) 0.05 % ophthalmic emulsion    Medication reconciliation completed by provider prior to medication history? No    Time spent in this activity: 35 MINUTES    The information provided in this note is only as accurate as the sources available at the time of update(s)      Prior to Admission medications    Medication Sig Last Dose Taking? Auth Provider Long Term End Date   ARIPiprazole (ABILIFY) 10 MG tablet Take 10 mg by mouth daily. Morning Yes Reported, Patient No    aspirin-acetaminophen-caffeine (EXCEDRIN MIGRAINE) 250-250-65 MG tablet Take 1 tablet by mouth daily as needed for headaches  Yes Beena Burgess PA-C     augmented betamethasone dipropionate (DIPROLENE-AF) 0.05 % external ointment Apply topically 2 times daily.  Yes Louis Beaulieu MD     belimumab (BENLYSTA) subcutaneous injection (prefilled autoinjector) Inject 1 mL (200 mg) subcutaneously every 7 days. . Hold for signs of infection and seek medical attention.   Refills at rheumatology appointments.  Yes Jack Arvizu MD Yes    cetirizine (ZYRTEC) 10 MG tablet TAKE 2 TABLETS BY MOUTH DAILY Morning Yes Cynthia Scott PA-C     clobetasol (TEMOVATE) 0.05 % external ointment Apply topically twice a week.  Yes Geetha Ocasio,      cyclobenzaprine (FLEXERIL) 10 MG tablet Take 1 tablet (10 mg) by mouth 2 times daily.  Yes Cynthia Scott PA-C No    cycloSPORINE (RESTASIS) 0.05 % ophthalmic emulsion Place 1 drop into both eyes 2 times daily.  Yes Jack Arvizu MD No    FLUoxetine  (PROZAC) 40 MG capsule TAKE ONE CAPSULE BY MOUTH ONCE DAILY Morning Yes Cynthia Scott PA-C Yes    folic acid (FOLVITE) 1 MG tablet Take 2 tablets (2 mg) by mouth daily. Morning Yes Jack Arvizu MD     gabapentin (NEURONTIN) 600 MG tablet 2 tabs in am and 3 at bedtime Morning Yes Cynthia Scott PA-C Yes    hydroquinone (RUFUS) 4 % external cream Apply topically at bedtime  Yes Louis Beaulieu MD     hydroxychloroquine (PLAQUENIL) 200 MG tablet Hydroxychloroquine 200 mg daily with an additional 200 mg every other day.  Ophthalmology toxicity monitoring eye exam, that includes 10-2 VF and SD-OCT, is required yearly.  Yes Jack Arvizu MD     lidocaine (XYLOCAINE) 5 % external ointment Apply topically as needed for moderate pain  Yes Cynthia Scott PA-C     methotrexate 2.5 MG tablet Take 6 tablets (15 mg) by mouth once a week. Methotrexate is a once weekly medication, taken on the same day of each week.  Yes Jack Arvizu MD Yes    naproxen (NAPROSYN) 250 MG tablet Take 1 tablet (250 mg) by mouth 2 times daily (with meals).  Yes Cynthia Scott PA-C     oxyCODONE (ROXICODONE) 5 MG tablet Take 1-2 tablets (5-10 mg) by mouth 2 times daily as needed for pain. Morning Yes Cynthia Scott PA-C No    pilocarpine (SALAGEN) 5 MG tablet Pilocarpine 5mg daily x7days, then 5mg twice daily x7days, then 5mg three times daily thereafter.  Yes Jack Arvizu MD         Medication history completed by: Jany Yun

## 2025-06-30 ENCOUNTER — TELEPHONE (OUTPATIENT)
Dept: NEUROSURGERY | Facility: CLINIC | Age: 50
End: 2025-06-30
Payer: COMMERCIAL

## 2025-06-30 NOTE — TELEPHONE ENCOUNTER
Reviewed H&P from 06/26/2025 - cleared for surgery  Labs - WNL    MRI done on 03/11/2025 - in [place]    To OR as planned.     Chaya Orosco, RN, CNRN

## 2025-07-01 ENCOUNTER — ANESTHESIA EVENT (OUTPATIENT)
Dept: SURGERY | Facility: CLINIC | Age: 50
End: 2025-07-01
Payer: COMMERCIAL

## 2025-07-02 ENCOUNTER — HOSPITAL ENCOUNTER (INPATIENT)
Facility: CLINIC | Age: 50
End: 2025-07-02
Attending: NEUROLOGICAL SURGERY | Admitting: NEUROLOGICAL SURGERY
Payer: COMMERCIAL

## 2025-07-02 ENCOUNTER — DOCUMENTATION ONLY (OUTPATIENT)
Dept: ORTHOPEDICS | Facility: CLINIC | Age: 50
End: 2025-07-02

## 2025-07-02 ENCOUNTER — ANESTHESIA (OUTPATIENT)
Dept: SURGERY | Facility: CLINIC | Age: 50
End: 2025-07-02
Payer: COMMERCIAL

## 2025-07-02 ENCOUNTER — APPOINTMENT (OUTPATIENT)
Dept: GENERAL RADIOLOGY | Facility: CLINIC | Age: 50
End: 2025-07-02
Attending: NEUROLOGICAL SURGERY
Payer: COMMERCIAL

## 2025-07-02 DIAGNOSIS — Z98.1 S/P LUMBAR SPINAL FUSION: Primary | ICD-10-CM

## 2025-07-02 DIAGNOSIS — D62 ANEMIA DUE TO BLOOD LOSS, ACUTE: ICD-10-CM

## 2025-07-02 LAB
ABO + RH BLD: NORMAL
ANION GAP SERPL CALCULATED.3IONS-SCNC: 10 MMOL/L (ref 7–15)
APTT PPP: 29 SECONDS (ref 22–38)
BLD GP AB SCN SERPL QL: NEGATIVE
BUN SERPL-MCNC: 20.5 MG/DL (ref 6–20)
CALCIUM SERPL-MCNC: 9.6 MG/DL (ref 8.8–10.4)
CHLORIDE SERPL-SCNC: 102 MMOL/L (ref 98–107)
CREAT SERPL-MCNC: 0.68 MG/DL (ref 0.51–0.95)
EGFRCR SERPLBLD CKD-EPI 2021: >90 ML/MIN/1.73M2
ERYTHROCYTE [DISTWIDTH] IN BLOOD BY AUTOMATED COUNT: 13.2 % (ref 10–15)
GLUCOSE SERPL-MCNC: 77 MG/DL (ref 70–99)
HCO3 SERPL-SCNC: 29 MMOL/L (ref 22–29)
HCT VFR BLD AUTO: 37.1 % (ref 35–47)
HGB BLD-MCNC: 12.6 G/DL (ref 11.7–15.7)
INR PPP: 1.17 (ref 0.85–1.15)
MCH RBC QN AUTO: 29.5 PG (ref 26.5–33)
MCHC RBC AUTO-ENTMCNC: 34 G/DL (ref 31.5–36.5)
MCV RBC AUTO: 87 FL (ref 78–100)
PLATELET # BLD AUTO: 254 10E3/UL (ref 150–450)
POTASSIUM SERPL-SCNC: 4 MMOL/L (ref 3.4–5.3)
PROTHROMBIN TIME: 14.6 SECONDS (ref 11.8–14.8)
RBC # BLD AUTO: 4.27 10E6/UL (ref 3.8–5.2)
SODIUM SERPL-SCNC: 141 MMOL/L (ref 135–145)
SPECIMEN EXP DATE BLD: NORMAL
WBC # BLD AUTO: 10.1 10E3/UL (ref 4–11)

## 2025-07-02 PROCEDURE — 360N000087 HC SURGERY LEVEL 7 W/ FLUORO, PER MIN: Performed by: NEUROLOGICAL SURGERY

## 2025-07-02 PROCEDURE — 85730 THROMBOPLASTIN TIME PARTIAL: CPT

## 2025-07-02 PROCEDURE — S2900 ROBOTIC SURGICAL SYSTEM: HCPCS | Mod: GC | Performed by: NEUROLOGICAL SURGERY

## 2025-07-02 PROCEDURE — 250N000011 HC RX IP 250 OP 636

## 2025-07-02 PROCEDURE — 250N000011 HC RX IP 250 OP 636: Performed by: ANESTHESIOLOGY

## 2025-07-02 PROCEDURE — 250N000011 HC RX IP 250 OP 636: Performed by: NEUROLOGICAL SURGERY

## 2025-07-02 PROCEDURE — 0SG00AJ FUSION OF LUMBAR VERTEBRAL JOINT WITH INTERBODY FUSION DEVICE, POSTERIOR APPROACH, ANTERIOR COLUMN, OPEN APPROACH: ICD-10-PCS | Performed by: NEUROLOGICAL SURGERY

## 2025-07-02 PROCEDURE — 710N000009 HC RECOVERY PHASE 1, LEVEL 1, PER MIN: Performed by: NEUROLOGICAL SURGERY

## 2025-07-02 PROCEDURE — 250N000011 HC RX IP 250 OP 636: Performed by: REGISTERED NURSE

## 2025-07-02 PROCEDURE — 258N000003 HC RX IP 258 OP 636: Performed by: REGISTERED NURSE

## 2025-07-02 PROCEDURE — 22853 INSJ BIOMECHANICAL DEVICE: CPT | Mod: GC | Performed by: NEUROLOGICAL SURGERY

## 2025-07-02 PROCEDURE — 01NB0ZZ RELEASE LUMBAR NERVE, OPEN APPROACH: ICD-10-PCS | Performed by: NEUROLOGICAL SURGERY

## 2025-07-02 PROCEDURE — 0ST20ZZ RESECTION OF LUMBAR VERTEBRAL DISC, OPEN APPROACH: ICD-10-PCS | Performed by: NEUROLOGICAL SURGERY

## 2025-07-02 PROCEDURE — 250N000013 HC RX MED GY IP 250 OP 250 PS 637

## 2025-07-02 PROCEDURE — L8699 PROSTHETIC IMPLANT NOS: HCPCS | Performed by: NEUROLOGICAL SURGERY

## 2025-07-02 PROCEDURE — 80048 BASIC METABOLIC PNL TOTAL CA: CPT

## 2025-07-02 PROCEDURE — 272N000001 HC OR GENERAL SUPPLY STERILE: Performed by: NEUROLOGICAL SURGERY

## 2025-07-02 PROCEDURE — 999N000141 HC STATISTIC PRE-PROCEDURE NURSING ASSESSMENT: Performed by: NEUROLOGICAL SURGERY

## 2025-07-02 PROCEDURE — C1713 ANCHOR/SCREW BN/BN,TIS/BN: HCPCS | Performed by: NEUROLOGICAL SURGERY

## 2025-07-02 PROCEDURE — 63052 LAM FACETC/FRMT ARTHRD LUM 1: CPT | Mod: GC | Performed by: NEUROLOGICAL SURGERY

## 2025-07-02 PROCEDURE — 250N000009 HC RX 250: Performed by: NEUROLOGICAL SURGERY

## 2025-07-02 PROCEDURE — L0637 LSO SC R ANT/POS PNL PRE CST: HCPCS

## 2025-07-02 PROCEDURE — 36415 COLL VENOUS BLD VENIPUNCTURE: CPT

## 2025-07-02 PROCEDURE — 20936 SP BONE AGRFT LOCAL ADD-ON: CPT | Mod: GC | Performed by: NEUROLOGICAL SURGERY

## 2025-07-02 PROCEDURE — 250N000025 HC SEVOFLURANE, PER MIN: Performed by: NEUROLOGICAL SURGERY

## 2025-07-02 PROCEDURE — 370N000017 HC ANESTHESIA TECHNICAL FEE, PER MIN: Performed by: NEUROLOGICAL SURGERY

## 2025-07-02 PROCEDURE — 999N000179 XR SURGERY CARM FLUORO LESS THAN 5 MIN W STILLS

## 2025-07-02 PROCEDURE — 86900 BLOOD TYPING SEROLOGIC ABO: CPT

## 2025-07-02 PROCEDURE — 8E0W0CZ ROBOTIC ASSISTED PROCEDURE OF TRUNK REGION, OPEN APPROACH: ICD-10-PCS | Performed by: NEUROLOGICAL SURGERY

## 2025-07-02 PROCEDURE — 250N000009 HC RX 250: Performed by: REGISTERED NURSE

## 2025-07-02 PROCEDURE — 85610 PROTHROMBIN TIME: CPT

## 2025-07-02 PROCEDURE — 22840 INSERT SPINE FIXATION DEVICE: CPT | Mod: GC | Performed by: NEUROLOGICAL SURGERY

## 2025-07-02 PROCEDURE — 82310 ASSAY OF CALCIUM: CPT

## 2025-07-02 PROCEDURE — 0SG0071 FUSION OF LUMBAR VERTEBRAL JOINT WITH AUTOLOGOUS TISSUE SUBSTITUTE, POSTERIOR APPROACH, POSTERIOR COLUMN, OPEN APPROACH: ICD-10-PCS | Performed by: NEUROLOGICAL SURGERY

## 2025-07-02 PROCEDURE — 22633 ARTHRD CMBN 1NTRSPC LUMBAR: CPT | Mod: GC | Performed by: NEUROLOGICAL SURGERY

## 2025-07-02 PROCEDURE — 258N000003 HC RX IP 258 OP 636

## 2025-07-02 PROCEDURE — 258N000003 HC RX IP 258 OP 636: Performed by: STUDENT IN AN ORGANIZED HEALTH CARE EDUCATION/TRAINING PROGRAM

## 2025-07-02 PROCEDURE — 85014 HEMATOCRIT: CPT

## 2025-07-02 PROCEDURE — 20930 SP BONE ALGRFT MORSEL ADD-ON: CPT | Mod: GC | Performed by: NEUROLOGICAL SURGERY

## 2025-07-02 PROCEDURE — 250N000005 HC OR RX SURGIFLO HEMOSTATIC MATRIX 10ML 199102S OPNP: Performed by: NEUROLOGICAL SURGERY

## 2025-07-02 PROCEDURE — 250N000013 HC RX MED GY IP 250 OP 250 PS 637: Performed by: INTERNAL MEDICINE

## 2025-07-02 PROCEDURE — 120N000001 HC R&B MED SURG/OB

## 2025-07-02 PROCEDURE — 85041 AUTOMATED RBC COUNT: CPT

## 2025-07-02 DEVICE — GRAFT BONE INFUSE BMP SM 7510200: Type: IMPLANTABLE DEVICE | Site: SPINE LUMBAR | Status: FUNCTIONAL

## 2025-07-02 DEVICE — SHANK 55904506545 NANO OG 6.5X45MM
Type: IMPLANTABLE DEVICE | Site: SPINE LUMBAR | Status: FUNCTIONAL
Brand: CD HORIZON™ MODULEX™ SPINAL SYSTEM

## 2025-07-02 DEVICE — BONE GRAFT PUTTY MAGNETOS EASYPACK PUTTY 10CC 703-053-US: Type: IMPLANTABLE DEVICE | Site: SPINE LUMBAR | Status: FUNCTIONAL

## 2025-07-02 DEVICE — IMPLANTABLE DEVICE: Type: IMPLANTABLE DEVICE | Site: SPINE LUMBAR | Status: FUNCTIONAL

## 2025-07-02 DEVICE — CAGE B241C11122209 3D PL 11X12DGX22X9MM
Type: IMPLANTABLE DEVICE | Site: SPINE LUMBAR | Status: FUNCTIONAL
Brand: IB3D™ PL SPINAL SYSTEM

## 2025-07-02 RX ORDER — ONDANSETRON 2 MG/ML
4 INJECTION INTRAMUSCULAR; INTRAVENOUS EVERY 6 HOURS PRN
Status: ACTIVE | OUTPATIENT
Start: 2025-07-02

## 2025-07-02 RX ORDER — NALOXONE HYDROCHLORIDE 0.4 MG/ML
0.1 INJECTION, SOLUTION INTRAMUSCULAR; INTRAVENOUS; SUBCUTANEOUS
Status: DISCONTINUED | OUTPATIENT
Start: 2025-07-02 | End: 2025-07-02 | Stop reason: HOSPADM

## 2025-07-02 RX ORDER — PILOCARPINE HYDROCHLORIDE 5 MG/1
5 TABLET, FILM COATED ORAL 3 TIMES DAILY
Status: DISPENSED | OUTPATIENT
Start: 2025-07-02

## 2025-07-02 RX ORDER — CEFAZOLIN SODIUM 1 G/3ML
1 INJECTION, POWDER, FOR SOLUTION INTRAMUSCULAR; INTRAVENOUS EVERY 8 HOURS
Status: COMPLETED | OUTPATIENT
Start: 2025-07-02 | End: 2025-07-03

## 2025-07-02 RX ORDER — HYDROMORPHONE HCL IN WATER/PF 6 MG/30 ML
0.2 PATIENT CONTROLLED ANALGESIA SYRINGE INTRAVENOUS
Status: DISPENSED | OUTPATIENT
Start: 2025-07-02

## 2025-07-02 RX ORDER — CEFAZOLIN SODIUM/WATER 2 G/20 ML
2 SYRINGE (ML) INTRAVENOUS
Status: COMPLETED | OUTPATIENT
Start: 2025-07-02 | End: 2025-07-02

## 2025-07-02 RX ORDER — ONDANSETRON 4 MG/1
4 TABLET, ORALLY DISINTEGRATING ORAL EVERY 30 MIN PRN
Status: DISCONTINUED | OUTPATIENT
Start: 2025-07-02 | End: 2025-07-02 | Stop reason: HOSPADM

## 2025-07-02 RX ORDER — ARIPIPRAZOLE 5 MG/1
10 TABLET ORAL DAILY
Status: DISPENSED | OUTPATIENT
Start: 2025-07-02

## 2025-07-02 RX ORDER — GABAPENTIN 300 MG/1
1200 CAPSULE ORAL EVERY MORNING
Status: DISPENSED | OUTPATIENT
Start: 2025-07-03

## 2025-07-02 RX ORDER — ACETAMINOPHEN 325 MG/1
975 TABLET ORAL ONCE
Status: COMPLETED | OUTPATIENT
Start: 2025-07-02 | End: 2025-07-02

## 2025-07-02 RX ORDER — GABAPENTIN 600 MG/1
1800 TABLET ORAL AT BEDTIME
Status: DISPENSED | OUTPATIENT
Start: 2025-07-02

## 2025-07-02 RX ORDER — METHOCARBAMOL 500 MG/1
500 TABLET, FILM COATED ORAL EVERY 6 HOURS
Status: DISPENSED | OUTPATIENT
Start: 2025-07-02

## 2025-07-02 RX ORDER — ONDANSETRON 2 MG/ML
4 INJECTION INTRAMUSCULAR; INTRAVENOUS EVERY 30 MIN PRN
Status: DISCONTINUED | OUTPATIENT
Start: 2025-07-02 | End: 2025-07-02 | Stop reason: HOSPADM

## 2025-07-02 RX ORDER — PROCHLORPERAZINE MALEATE 10 MG
10 TABLET ORAL EVERY 6 HOURS PRN
Status: ACTIVE | OUTPATIENT
Start: 2025-07-02

## 2025-07-02 RX ORDER — SODIUM CHLORIDE, SODIUM LACTATE, POTASSIUM CHLORIDE, CALCIUM CHLORIDE 600; 310; 30; 20 MG/100ML; MG/100ML; MG/100ML; MG/100ML
INJECTION, SOLUTION INTRAVENOUS CONTINUOUS
Status: DISCONTINUED | OUTPATIENT
Start: 2025-07-02 | End: 2025-07-02 | Stop reason: HOSPADM

## 2025-07-02 RX ORDER — NALOXONE HYDROCHLORIDE 0.4 MG/ML
0.4 INJECTION, SOLUTION INTRAMUSCULAR; INTRAVENOUS; SUBCUTANEOUS
Status: ACTIVE | OUTPATIENT
Start: 2025-07-02

## 2025-07-02 RX ORDER — GABAPENTIN 300 MG/1
300 CAPSULE ORAL
Status: COMPLETED | OUTPATIENT
Start: 2025-07-02 | End: 2025-07-02

## 2025-07-02 RX ORDER — CEFAZOLIN SODIUM/WATER 2 G/20 ML
2 SYRINGE (ML) INTRAVENOUS SEE ADMIN INSTRUCTIONS
Status: DISCONTINUED | OUTPATIENT
Start: 2025-07-02 | End: 2025-07-02 | Stop reason: HOSPADM

## 2025-07-02 RX ORDER — OXYCODONE HYDROCHLORIDE 5 MG/1
10 TABLET ORAL EVERY 4 HOURS PRN
Status: DISPENSED | OUTPATIENT
Start: 2025-07-02

## 2025-07-02 RX ORDER — SODIUM CHLORIDE, SODIUM LACTATE, POTASSIUM CHLORIDE, CALCIUM CHLORIDE 600; 310; 30; 20 MG/100ML; MG/100ML; MG/100ML; MG/100ML
INJECTION, SOLUTION INTRAVENOUS CONTINUOUS PRN
Status: DISCONTINUED | OUTPATIENT
Start: 2025-07-02 | End: 2025-07-02

## 2025-07-02 RX ORDER — NALOXONE HYDROCHLORIDE 0.4 MG/ML
0.2 INJECTION, SOLUTION INTRAMUSCULAR; INTRAVENOUS; SUBCUTANEOUS
Status: ACTIVE | OUTPATIENT
Start: 2025-07-02

## 2025-07-02 RX ORDER — HYDROMORPHONE HCL IN WATER/PF 6 MG/30 ML
0.2 PATIENT CONTROLLED ANALGESIA SYRINGE INTRAVENOUS EVERY 5 MIN PRN
Refills: 0 | Status: DISCONTINUED | OUTPATIENT
Start: 2025-07-02 | End: 2025-07-02 | Stop reason: HOSPADM

## 2025-07-02 RX ORDER — FENTANYL CITRATE 0.05 MG/ML
25 INJECTION, SOLUTION INTRAMUSCULAR; INTRAVENOUS EVERY 5 MIN PRN
Refills: 0 | Status: DISCONTINUED | OUTPATIENT
Start: 2025-07-02 | End: 2025-07-02 | Stop reason: HOSPADM

## 2025-07-02 RX ORDER — LIDOCAINE HYDROCHLORIDE 20 MG/ML
INJECTION, SOLUTION INFILTRATION; PERINEURAL PRN
Status: DISCONTINUED | OUTPATIENT
Start: 2025-07-02 | End: 2025-07-02

## 2025-07-02 RX ORDER — AMOXICILLIN 250 MG
1 CAPSULE ORAL 2 TIMES DAILY
Status: DISPENSED | OUTPATIENT
Start: 2025-07-02

## 2025-07-02 RX ORDER — POLYETHYLENE GLYCOL 3350 17 G/17G
17 POWDER, FOR SOLUTION ORAL DAILY
Status: DISPENSED | OUTPATIENT
Start: 2025-07-03

## 2025-07-02 RX ORDER — OXYCODONE HYDROCHLORIDE 5 MG/1
5 TABLET ORAL EVERY 4 HOURS PRN
Status: DISPENSED | OUTPATIENT
Start: 2025-07-02

## 2025-07-02 RX ORDER — FENTANYL CITRATE 0.05 MG/ML
50 INJECTION, SOLUTION INTRAMUSCULAR; INTRAVENOUS EVERY 5 MIN PRN
Refills: 0 | Status: DISCONTINUED | OUTPATIENT
Start: 2025-07-02 | End: 2025-07-02 | Stop reason: HOSPADM

## 2025-07-02 RX ORDER — SODIUM CHLORIDE 9 MG/ML
INJECTION, SOLUTION INTRAVENOUS CONTINUOUS
Status: ACTIVE | OUTPATIENT
Start: 2025-07-02

## 2025-07-02 RX ORDER — DEXAMETHASONE SODIUM PHOSPHATE 4 MG/ML
4 INJECTION, SOLUTION INTRA-ARTICULAR; INTRALESIONAL; INTRAMUSCULAR; INTRAVENOUS; SOFT TISSUE
Status: DISCONTINUED | OUTPATIENT
Start: 2025-07-02 | End: 2025-07-02 | Stop reason: HOSPADM

## 2025-07-02 RX ORDER — HYDROMORPHONE HCL IN WATER/PF 6 MG/30 ML
0.4 PATIENT CONTROLLED ANALGESIA SYRINGE INTRAVENOUS
Status: DISPENSED | OUTPATIENT
Start: 2025-07-02

## 2025-07-02 RX ORDER — HYDROMORPHONE HCL IN WATER/PF 6 MG/30 ML
0.4 PATIENT CONTROLLED ANALGESIA SYRINGE INTRAVENOUS EVERY 5 MIN PRN
Refills: 0 | Status: DISCONTINUED | OUTPATIENT
Start: 2025-07-02 | End: 2025-07-02 | Stop reason: HOSPADM

## 2025-07-02 RX ORDER — HYDROXYCHLOROQUINE SULFATE 200 MG/1
200 TABLET, FILM COATED ORAL DAILY
Status: DISPENSED | OUTPATIENT
Start: 2025-07-03

## 2025-07-02 RX ORDER — KETAMINE HYDROCHLORIDE 10 MG/ML
INJECTION INTRAMUSCULAR; INTRAVENOUS PRN
Status: DISCONTINUED | OUTPATIENT
Start: 2025-07-02 | End: 2025-07-02

## 2025-07-02 RX ORDER — ONDANSETRON 4 MG/1
4 TABLET, ORALLY DISINTEGRATING ORAL EVERY 6 HOURS PRN
Status: ACTIVE | OUTPATIENT
Start: 2025-07-02

## 2025-07-02 RX ORDER — FOLIC ACID 1 MG/1
2 TABLET ORAL DAILY
Status: DISPENSED | OUTPATIENT
Start: 2025-07-02

## 2025-07-02 RX ORDER — CETIRIZINE HYDROCHLORIDE 10 MG/1
20 TABLET ORAL DAILY
Status: DISPENSED | OUTPATIENT
Start: 2025-07-02

## 2025-07-02 RX ORDER — MEPERIDINE HYDROCHLORIDE 25 MG/ML
12.5 INJECTION INTRAMUSCULAR; INTRAVENOUS; SUBCUTANEOUS EVERY 5 MIN PRN
Refills: 0 | Status: DISCONTINUED | OUTPATIENT
Start: 2025-07-02 | End: 2025-07-02 | Stop reason: HOSPADM

## 2025-07-02 RX ORDER — ONDANSETRON 2 MG/ML
INJECTION INTRAMUSCULAR; INTRAVENOUS PRN
Status: DISCONTINUED | OUTPATIENT
Start: 2025-07-02 | End: 2025-07-02

## 2025-07-02 RX ORDER — LIDOCAINE 40 MG/G
CREAM TOPICAL
Status: ACTIVE | OUTPATIENT
Start: 2025-07-02

## 2025-07-02 RX ORDER — DEXAMETHASONE SODIUM PHOSPHATE 4 MG/ML
INJECTION, SOLUTION INTRA-ARTICULAR; INTRALESIONAL; INTRAMUSCULAR; INTRAVENOUS; SOFT TISSUE PRN
Status: DISCONTINUED | OUTPATIENT
Start: 2025-07-02 | End: 2025-07-02

## 2025-07-02 RX ORDER — ACETAMINOPHEN 325 MG/1
975 TABLET ORAL 3 TIMES DAILY
Status: DISPENSED | OUTPATIENT
Start: 2025-07-02

## 2025-07-02 RX ORDER — BISACODYL 10 MG
10 SUPPOSITORY, RECTAL RECTAL DAILY PRN
Status: ACTIVE | OUTPATIENT
Start: 2025-07-05

## 2025-07-02 RX ORDER — PROPOFOL 10 MG/ML
INJECTION, EMULSION INTRAVENOUS PRN
Status: DISCONTINUED | OUTPATIENT
Start: 2025-07-02 | End: 2025-07-02

## 2025-07-02 RX ORDER — PROPOFOL 10 MG/ML
INJECTION, EMULSION INTRAVENOUS CONTINUOUS PRN
Status: DISCONTINUED | OUTPATIENT
Start: 2025-07-02 | End: 2025-07-02

## 2025-07-02 RX ORDER — METHOCARBAMOL 100 MG/ML
1000 INJECTION, SOLUTION INTRAMUSCULAR; INTRAVENOUS ONCE
Status: COMPLETED | OUTPATIENT
Start: 2025-07-02 | End: 2025-07-02

## 2025-07-02 RX ORDER — CYCLOSPORINE 0.5 MG/ML
1 EMULSION OPHTHALMIC 2 TIMES DAILY
Status: DISCONTINUED | OUTPATIENT
Start: 2025-07-02 | End: 2025-07-02

## 2025-07-02 RX ADMIN — SODIUM CHLORIDE, SODIUM LACTATE, POTASSIUM CHLORIDE, AND CALCIUM CHLORIDE: .6; .31; .03; .02 INJECTION, SOLUTION INTRAVENOUS at 10:38

## 2025-07-02 RX ADMIN — DEXMEDETOMIDINE HYDROCHLORIDE 8 MCG: 100 INJECTION, SOLUTION INTRAVENOUS at 13:00

## 2025-07-02 RX ADMIN — HYDROMORPHONE HYDROCHLORIDE 0.2 MG: 0.2 INJECTION, SOLUTION INTRAMUSCULAR; INTRAVENOUS; SUBCUTANEOUS at 15:21

## 2025-07-02 RX ADMIN — GABAPENTIN 300 MG: 300 CAPSULE ORAL at 06:50

## 2025-07-02 RX ADMIN — ACETAMINOPHEN 975 MG: 325 TABLET, FILM COATED ORAL at 06:50

## 2025-07-02 RX ADMIN — METHOCARBAMOL 1000 MG: 100 INJECTION, SOLUTION INTRAMUSCULAR; INTRAVENOUS at 12:05

## 2025-07-02 RX ADMIN — PHENYLEPHRINE HYDROCHLORIDE 100 MCG: 10 INJECTION INTRAVENOUS at 09:23

## 2025-07-02 RX ADMIN — PILOCARPINE HYDROCHLORIDE 5 MG: 5 TABLET, FILM COATED ORAL at 22:39

## 2025-07-02 RX ADMIN — PHENYLEPHRINE HYDROCHLORIDE 50 MCG: 10 INJECTION INTRAVENOUS at 09:10

## 2025-07-02 RX ADMIN — Medication 200 MG: at 13:08

## 2025-07-02 RX ADMIN — SODIUM CHLORIDE, POTASSIUM CHLORIDE, SODIUM LACTATE AND CALCIUM CHLORIDE: 600; 310; 30; 20 INJECTION, SOLUTION INTRAVENOUS at 07:07

## 2025-07-02 RX ADMIN — ONDANSETRON 4 MG: 2 INJECTION INTRAMUSCULAR; INTRAVENOUS at 12:46

## 2025-07-02 RX ADMIN — ACETAMINOPHEN 975 MG: 325 TABLET ORAL at 22:38

## 2025-07-02 RX ADMIN — ROCURONIUM BROMIDE 50 MG: 50 INJECTION, SOLUTION INTRAVENOUS at 08:15

## 2025-07-02 RX ADMIN — Medication 2 G: at 12:12

## 2025-07-02 RX ADMIN — HYDROMORPHONE HYDROCHLORIDE 0.2 MG: 0.2 INJECTION, SOLUTION INTRAMUSCULAR; INTRAVENOUS; SUBCUTANEOUS at 14:51

## 2025-07-02 RX ADMIN — FENTANYL CITRATE 25 MCG: 50 INJECTION INTRAMUSCULAR; INTRAVENOUS at 13:53

## 2025-07-02 RX ADMIN — FLUOXETINE HYDROCHLORIDE 40 MG: 20 CAPSULE ORAL at 20:23

## 2025-07-02 RX ADMIN — FENTANYL CITRATE 50 MCG: 50 INJECTION, SOLUTION INTRAMUSCULAR; INTRAVENOUS at 08:49

## 2025-07-02 RX ADMIN — DEXAMETHASONE SODIUM PHOSPHATE 4 MG: 4 INJECTION, SOLUTION INTRA-ARTICULAR; INTRALESIONAL; INTRAMUSCULAR; INTRAVENOUS; SOFT TISSUE at 08:46

## 2025-07-02 RX ADMIN — PROPOFOL 30 MCG/KG/MIN: 10 INJECTION, EMULSION INTRAVENOUS at 08:22

## 2025-07-02 RX ADMIN — ROCURONIUM BROMIDE 20 MG: 50 INJECTION, SOLUTION INTRAVENOUS at 10:56

## 2025-07-02 RX ADMIN — CEFAZOLIN 1 G: 1 INJECTION, POWDER, FOR SOLUTION INTRAMUSCULAR; INTRAVENOUS at 20:26

## 2025-07-02 RX ADMIN — ARIPIPRAZOLE 10 MG: 5 TABLET ORAL at 20:23

## 2025-07-02 RX ADMIN — Medication 10 MG: at 09:53

## 2025-07-02 RX ADMIN — LIDOCAINE HYDROCHLORIDE 100 MG: 20 INJECTION, SOLUTION INFILTRATION; PERINEURAL at 08:15

## 2025-07-02 RX ADMIN — PHENYLEPHRINE HYDROCHLORIDE 100 MCG: 10 INJECTION INTRAVENOUS at 09:16

## 2025-07-02 RX ADMIN — OXYCODONE HYDROCHLORIDE 10 MG: 5 TABLET ORAL at 23:37

## 2025-07-02 RX ADMIN — FENTANYL CITRATE 50 MCG: 50 INJECTION, SOLUTION INTRAMUSCULAR; INTRAVENOUS at 08:15

## 2025-07-02 RX ADMIN — FENTANYL CITRATE 25 MCG: 50 INJECTION INTRAMUSCULAR; INTRAVENOUS at 14:04

## 2025-07-02 RX ADMIN — PHENYLEPHRINE HYDROCHLORIDE 50 MCG: 10 INJECTION INTRAVENOUS at 08:37

## 2025-07-02 RX ADMIN — SODIUM CHLORIDE: 9 INJECTION, SOLUTION INTRAVENOUS at 17:57

## 2025-07-02 RX ADMIN — ROCURONIUM BROMIDE 10 MG: 50 INJECTION, SOLUTION INTRAVENOUS at 11:37

## 2025-07-02 RX ADMIN — PHENYLEPHRINE HYDROCHLORIDE 50 MCG: 10 INJECTION INTRAVENOUS at 09:03

## 2025-07-02 RX ADMIN — PHENYLEPHRINE HYDROCHLORIDE 50 MCG: 10 INJECTION INTRAVENOUS at 08:57

## 2025-07-02 RX ADMIN — FOLIC ACID 2 MG: 1 TABLET ORAL at 20:23

## 2025-07-02 RX ADMIN — Medication 20 MG: at 08:46

## 2025-07-02 RX ADMIN — HYDROMORPHONE HYDROCHLORIDE 0.2 MG: 0.2 INJECTION, SOLUTION INTRAMUSCULAR; INTRAVENOUS; SUBCUTANEOUS at 17:08

## 2025-07-02 RX ADMIN — PHENYLEPHRINE HYDROCHLORIDE 50 MCG: 10 INJECTION INTRAVENOUS at 08:44

## 2025-07-02 RX ADMIN — HYDROMORPHONE HYDROCHLORIDE 0.2 MG: 0.2 INJECTION, SOLUTION INTRAMUSCULAR; INTRAVENOUS; SUBCUTANEOUS at 20:33

## 2025-07-02 RX ADMIN — METHOCARBAMOL 500 MG: 500 TABLET ORAL at 17:55

## 2025-07-02 RX ADMIN — ONDANSETRON 4 MG: 2 INJECTION, SOLUTION INTRAMUSCULAR; INTRAVENOUS at 14:22

## 2025-07-02 RX ADMIN — OXYCODONE HYDROCHLORIDE 5 MG: 5 TABLET ORAL at 18:46

## 2025-07-02 RX ADMIN — PHENYLEPHRINE HYDROCHLORIDE 100 MCG: 10 INJECTION INTRAVENOUS at 08:20

## 2025-07-02 RX ADMIN — CETIRIZINE HYDROCHLORIDE 20 MG: 10 TABLET, FILM COATED ORAL at 20:23

## 2025-07-02 RX ADMIN — DEXMEDETOMIDINE HYDROCHLORIDE 12 MCG: 100 INJECTION, SOLUTION INTRAVENOUS at 12:23

## 2025-07-02 RX ADMIN — PROPOFOL 200 MG: 10 INJECTION, EMULSION INTRAVENOUS at 08:15

## 2025-07-02 RX ADMIN — ROCURONIUM BROMIDE 20 MG: 50 INJECTION, SOLUTION INTRAVENOUS at 09:01

## 2025-07-02 RX ADMIN — PHENYLEPHRINE HYDROCHLORIDE 0.2 MCG/KG/MIN: 10 INJECTION INTRAVENOUS at 09:13

## 2025-07-02 RX ADMIN — ACETAMINOPHEN 975 MG: 325 TABLET ORAL at 17:55

## 2025-07-02 RX ADMIN — HYDROMORPHONE HYDROCHLORIDE 0.5 MG: 1 INJECTION, SOLUTION INTRAMUSCULAR; INTRAVENOUS; SUBCUTANEOUS at 11:02

## 2025-07-02 RX ADMIN — SODIUM CHLORIDE, SODIUM LACTATE, POTASSIUM CHLORIDE, AND CALCIUM CHLORIDE: .6; .31; .03; .02 INJECTION, SOLUTION INTRAVENOUS at 08:11

## 2025-07-02 RX ADMIN — DOCUSATE SODIUM 50 MG AND SENNOSIDES 8.6 MG 1 TABLET: 8.6; 5 TABLET, FILM COATED ORAL at 20:23

## 2025-07-02 RX ADMIN — MIDAZOLAM 2 MG: 1 INJECTION INTRAMUSCULAR; INTRAVENOUS at 08:12

## 2025-07-02 RX ADMIN — GABAPENTIN 1800 MG: 600 TABLET, FILM COATED ORAL at 22:39

## 2025-07-02 RX ADMIN — Medication 2 G: at 08:12

## 2025-07-02 RX ADMIN — METHOCARBAMOL 500 MG: 500 TABLET ORAL at 23:37

## 2025-07-02 RX ADMIN — PHENYLEPHRINE HYDROCHLORIDE 100 MCG: 10 INJECTION INTRAVENOUS at 11:11

## 2025-07-02 ASSESSMENT — LIFESTYLE VARIABLES: TOBACCO_USE: 1

## 2025-07-02 ASSESSMENT — ACTIVITIES OF DAILY LIVING (ADL)
ADLS_ACUITY_SCORE: 30
ADLS_ACUITY_SCORE: 32
ADLS_ACUITY_SCORE: 32
ADLS_ACUITY_SCORE: 30
ADLS_ACUITY_SCORE: 30
ADLS_ACUITY_SCORE: 35
ADLS_ACUITY_SCORE: 30
ADLS_ACUITY_SCORE: 30
ADLS_ACUITY_SCORE: 32
ADLS_ACUITY_SCORE: 35
ADLS_ACUITY_SCORE: 30
ADLS_ACUITY_SCORE: 32
ADLS_ACUITY_SCORE: 33
ADLS_ACUITY_SCORE: 32
ADLS_ACUITY_SCORE: 33
ADLS_ACUITY_SCORE: 30
ADLS_ACUITY_SCORE: 35
ADLS_ACUITY_SCORE: 53

## 2025-07-02 NOTE — ANESTHESIA PROCEDURE NOTES
Airway       Patient location during procedure: OR       Procedure Start/Stop Times: 7/2/2025 8:18 AM  Staff -        CRNA: Delia Montero APRN CRNA       Performed By: CRNA  Consent for Airway        Urgency: elective  Indications and Patient Condition       Indications for airway management: claudia-procedural       Induction type:intravenous       Mask difficulty assessment: 1 - vent by mask    Final Airway Details       Final airway type: endotracheal airway       Successful airway: ETT - single  Endotracheal Airway Details        ETT size (mm): 7.0       Cuffed: yes       Successful intubation technique: direct laryngoscopy       DL Blade Type: MAC 3       Grade View of Cords: 1       Adjucts: stylet       Position: Right       Measured from: gums/teeth       Secured at (cm): 21       Bite block used: Soft    Post intubation assessment        Placement verified by: capnometry, equal breath sounds and chest rise        Number of attempts at approach: 1       Number of other approaches attempted: 0       Secured with: tape       Ease of procedure: easy       Dentition: Intact and Unchanged    Medication(s) Administered   Medication Administration Time: 7/2/2025 8:18 AM

## 2025-07-02 NOTE — OP NOTE
Date of surgery: 07/02/25     Surgeon: Aureliano Goins MD     Assistant: Amanda Baig APRN CNP - Assisting   (Note: The assistant was present for and assisted with the entire surgery, and his/her role as an assistant was crucial for aid in positioning, exposure, suctioning, retraction, and closure).  Qualified resident was available for the assistance.     Preoperative diagnosis:   L4-5 spinal stenosis with degenerative spondylolisthesis.      Postoperative diagnosis: Same     Procedure:  1.  L4-5 insertion of bilateral pedicle screws and rods  (Medtronic Module X)  2.  L4-5 bilateral laminectomies for decompression of stenosis  3.  Bilateral L4-5 complete facetectomy, transforaminal discectomy, and interbody arthrodesis  4.  L4-5 insertion of bilateral titanium IB3D cages (11 x 22 with 12 degree lordosis) with magnetos and infuse allograft.    5.  L4-L5 bilateral transverse process posterolateral arthrodesis and fusion with autograft and allograft  6.  Use of Solidarium surgical robot with navigation  7.  Use of intraoperative microscope and fluoroscopy     EBL: 100 mL     Indications:Ms Patel is a 48-year-old right-handed female with significant past medical history of bipolar disorder, drug dependence, suicide attempt initially presented to the clinic in May 2024 for evaluation of low back pain and bilateral leg pain right more than the left since November 2023 without preceding trauma. She has clinical symptoms of neurogenic claudication with intermittent episodes of bladder/bowel incontinence.She underwent open L4-5 decompressive laminectomy and resection of the synovial cyst on 5/29/2024.  Patient had significant improvement in her symptoms following the decompression.  She reports almost 90% improvement in her preoperative symptoms.  However for last 4 months or so she started noticing worsening of her low back pain radiating to her right lower extremity along the posterior aspect likely along right L5 /S1  dermatomal distribution.  She also reports pain radiating up to her left knee along the posterior aspect.  She has mild right foot dorsiflexion weakness.  Her MRI showed degenerative spondylolisthesis at L4-5 which has progressed compared to her upright x-rays in May 2024.  Patient failed conservative management.  Therefore open bilateral L4-5 transforaminal interbody fusion with bilateral decompressive laminectomies at L4-5 was recommended.  Risks, benefits, indications, and alternatives were discussed with the patient and family in detail, and they wished to proceed.      Description of surgery:      The patient was positioned prone on the Miky table with appropriate padding of pressure points.  Sterile prepping and draping procedures were performed.  Antibiotics were administered and timeout was performed.  The MediSensor robot was registered after placing pain along left PSIS.  A midline lumbar incision was performed along the previous scar and the skin flaps were elevated using monopolar cautery to expose the fascia.  The MediSensor robot was registered with fluoroscopy and under robotic guidance bilateral pedicle screws were inserted at L4 and L5 via stab incision through the fascia.  Fluoroscopy was used to check appropriate screw positioning.       Following satisfactory position of bilateral pedicle screws, bilateral decompressive laminectomy was performed at L4-5.  There was  stenosis at this level with thickening of ligamentum flavum and dense adhesions to the dura.  Complete decompression and dissection of the ligamentum flavum from the dura matter was performed under high magnification.  Bilateral complete facetectomies were performed at L4-5.  The microscope was brought into the field and under high magnification using high-speed drill complete facetectomy on the right side was performed.  The L4-5 disc space exposed and discectomy was performed using 15 blade.  A complete discectomy was performed which  sharonda, pituitary rongeurs and curettes.  Following satisfactory complete discectomy from the right side the implant trial was inserted and I then proceeded with complete facetectomy on the left side.  Complete facetectomy was then performed on the left side and L4-5 disc space was exposed from the left side.  Steps were repeated from the left side and complete discectomy was achieved with preparation of the endplates using sharonda and curettes.     Following appropriate trial under intraoperative fluoroscopy,  an intervertebral graft was inserted into the disk space at L4-5 with allograft and BMP.  Following satisfactory position of the graft from the left side, I then proceeded to the right side with removal of the trial implant and placement of the final interbody graft.  Following satisfactory position of the interbody grafts from both sides with adequate alignment and reduction of the spondylolisthesis, I proceeded with placement of setscrews and rods and performed further reduction at L4-5.       Satisfactory placement of hardware and decompression was achieved.  Antibiotic irrigation was performed.  Posterolateral arthrodesis was performed and autograft and magnetos allograft were packed for posterolateral fusion bilaterally at L4-5.  Hemostasis was achieved.  Fluoroscopy demonstrated excellent positioning of the hardware.  The fascia was closed with 0-Vicryl sutures, and the dermal layer was closed with 2-0 vicryl sutures and skin was closed with 3-0 Monocryl.  #10 [place] Hemovac drain was placed in the subfascial space.     There were no intraprocedural complications.  I updated patient's family after the surgery     Aureliano Goins MD

## 2025-07-02 NOTE — ANESTHESIA POSTPROCEDURE EVALUATION
Patient: Sosa Patel    Procedure: Procedure(s):  LUMBAR 4, LUMBAR 5 TRANSFORAMINAL INTERBODY FUSION USING OPTICAL TRACKING SYSTEM, ROBOT ASSISTED, USE OF ALLOGRAFT       Anesthesia Type:  General    Note:  Disposition: Admission   Postop Pain Control: Uneventful            Sign Out: Well controlled pain   PONV: No   Neuro/Psych: Uneventful            Sign Out: Acceptable/Baseline neuro status   Airway/Respiratory: Uneventful            Sign Out: Acceptable/Baseline resp. status   CV/Hemodynamics: Uneventful            Sign Out: Acceptable CV status   Other NRE: NONE   DID A NON-ROUTINE EVENT OCCUR? No           Last vitals:  Vitals Value Taken Time   /64 07/02/25 15:15   Temp 37.1  C (98.78  F) 07/02/25 15:35   Pulse 92 07/02/25 15:44   Resp 14 07/02/25 15:44   SpO2 98 % 07/02/25 15:44   Vitals shown include unfiled device data.    Electronically Signed By: Jonny Galicia MD  July 2, 2025  3:46 PM

## 2025-07-02 NOTE — PROGRESS NOTES
S: Sosa was seen today at the [facility] PACU pool room OR for a fitting of a lumbar sacral orthosis. The patient is s/p of a lumbar fusion    O: The patient s waist circumference was measured to be 30 inches. An [place] Horizon LSO was set to the patient s waist size and fit to the patient. This brace features a belt that facilitates both compression and spinal support.    A: The patient was fit with the orthosis with the help of the nurse log rolling the patient. I explained to the patient the purpose and function of the brace as well as donning and doffing. I also provided the patient with the written use and care instructions from the .    P: If there is a need to adjust the brace, the patient nurse should contact the [facility]. I will follow up as necessary    G: This orthosis applies compression to the patient s lumbosacral region of the spine. Compression of the patient s soft tissues serves to unload the vertebrae and reduces pressure, reducing pain. Lumbar support is needed in order to decompress the affected areas of the spine. The orthosis also increases medial-lateral and anterior-posterior stability of the back/spine. The goal of this orthosis is to reduce pain and increase the comfort while the patient performs their ADLs.    Electronically signed by Iván Garza CPO

## 2025-07-02 NOTE — BRIEF OP NOTE
[facility]    Brief Operative Note    Pre-operative diagnosis: Lumbar radiculopathy [M54.16]  Post-operative diagnosis Same as pre-operative diagnosis    Procedure: LUMBAR 4, LUMBAR 5 TRANSFORAMINAL INTERBODY FUSION USING OPTICAL TRACKING SYSTEM, ROBOT ASSISTED, USE OF ALLOGRAFT, Bilateral - Spine    Surgeon: Surgeons and Role:     * Aureliano Goins MD - Primary     * Amanda Baig APRN CNP - Assisting  Anesthesia: General   Estimated Blood Loss: 100 mL from 7/2/2025  8:12 AM to 7/2/2025  1:20 PM      Drains: Hemovac  Specimens: * No specimens in log *  Findings:   Satisfactory decompression and instrumentation achieved.  Complications: None.  Implants:   Implant Name Type Inv. Item Serial No.  Lot No. LRB No. Used Action   SCREW BN 50MM 6.5MM SHNK REBECCA OSTEOGRIP SPNE - [id number] Metallic Hardware/[place] SCREW BN 50MM 6.5MM SHNK REBECCA OSTEOGRIP SPNE  [employer] [id number] N/A 1 Implanted   SCREW BN 45MM 6.5MM SHNK REBECCA OSTEOGRIP SPNE - [id number] Metallic Hardware/[place] SCREW BN 45MM 6.5MM SHNK REBECCA OSTEOGRIP SPNE  [employer] [id number] N/A 2 Implanted   SCREW BN 50MM 6.5MM SHNK REBECCA OSTEOGRIP SPNE - [id number] Metallic Hardware/[place] SCREW BN 50MM 6.5MM SHNK REBECCA OSTEOGRIP SPNE  [employer] [id number] N/A 1 Implanted   BONE GRAFT PUTTY MAGNETOS EASYPACK PUTTY [facility] [id number] - [id number] Bone/Tissue Synthetic BONE GRAFT PUTTY MAGNETOS EASYPACK PUTTY [facility] [phone]  Mister Bell  N/A 1 Implanted   GRAFT BONE INFUSE BMP  [id number] - [id number] Bone/Tissue Synthetic GRAFT BONE INFUSE BMP  [id number]  [employer] [id number] N/A 1 Implanted   IMP CAGE UNID ALIF 11MM ANT 12 X 22MM 9MM W [id number] - [id number] Metallic Hardware/[place] IMP CAGE UNID ALIF 11MM ANT 12 X 22MM 9MM W [id number]  [employer] [id number] N/A 1 Implanted   IMP CAGE UNID ALIF 11MM ANT 12 X 22MM 9MM W [id number] - [id number] Metallic Hardware/[place] IMP CAGE UNID ALIF 11MM ANT 12  X 22MM 9MM W [id number]  [employer] [id number] N/A 1 Implanted   SCREW BN PEEK SS MA STRL SPNE 5.5/6 MM EMANUEL [id number] - [id number] Metallic Hardware/[place] SCREW BN PEEK SS MA STRL SPNE 5.5/6 MM EMANUEL [id number]  [employer] [id number] N/A 2 Implanted   SCREW BN PEEK SS MA STRL SPNE 5.5/6 MM EMANUEL [id number] - [id number] Metallic Hardware/[place] SCREW BN PEEK SS MA STRL SPNE 5.5/6 MM EMANUEL [id number]  [employer] [id number] N/A 2 Implanted   35mm Ti Emanuel Metallic Hardware/[place]   MEDTRONIC 41 07 LOAD 01 JUL 2025 N/A 2 Implanted

## 2025-07-02 NOTE — PROGRESS NOTES
Patient ambulation status: not oob yet. Wears brace when OOb  Patient able to stand for X-ray:NA  Standing/upright x-ray complete: No  Patient using oral analgesics:yes. Schedule Tyl and Robaxin, PRN Oxy  Voiding spontaneously:no. Nguyen in place  Drains discontinued:no. Hemovac in place  Incision clean and dry:yes  Bowel status:BS active, abd soft. On bowel regimen    Pt arrived to this unit at ~1730. A&O x4, VSS.RA. NS infusing. CMS intact

## 2025-07-02 NOTE — PROGRESS NOTES
LUMBAR 4, LUMBAR 5 TRANSFORAMINAL INTERBODY FUSION USING OPTICAL TRACKING SYSTEM, ROBOT ASSISTED, USE OF ALLOGRAFT (Bilateral: Spine) with Aureliano Goins MD  * Day of Surgery *    Pre-operative exam:  Low back pain radiating along posterior aspect of her R thigh and R leg distally to foot  Mild right foot DF weakness    Plan:   - Hemovac drain x 1, monitor and record output, even if 0  - Imaging: Upright lumbar XR in AM   - STACY Nguyen to remove POD1 when ambulating   - Abx x 3 doses   - LSO when OOB or upright at 90 degrees  - Bone growth stimulator ordered from clinic  - Hold anticoagulation including ASA and NSAIDs until follow up visit   - Pain control measures  - ADAT  - Activity as tolerated  - IS q1H while awake  - Scheduled bowel regimen   - Consults: PT/OT, hospitalist  - Dispo: pending therapies, follow ups scheduled     Amanda Baig CNP  [facility]  [address] [place] [id number]  Tel [phone]  Fax [phone]

## 2025-07-02 NOTE — ANESTHESIA CARE TRANSFER NOTE
Patient: Sosa Patel    Procedure: Procedure(s):  LUMBAR 4, LUMBAR 5 TRANSFORAMINAL INTERBODY FUSION USING OPTICAL TRACKING SYSTEM, ROBOT ASSISTED, USE OF ALLOGRAFT       Diagnosis: Lumbar radiculopathy [M54.16]  Diagnosis Additional Information: No value filed.    Anesthesia Type:   General     Note:    Oropharynx: oropharynx clear of all foreign objects and spontaneously breathing  Level of Consciousness: awake  Oxygen Supplementation: face mask  Level of Supplemental Oxygen (L/min / FiO2): 6  Independent Airway: airway patency satisfactory and stable  Dentition: dentition unchanged  Vital Signs Stable: post-procedure vital signs reviewed and stable  Report to RN Given: handoff report given  Patient transferred to: PACU    Handoff Report: Identifed the Patient, Identified the Reponsible Provider, Reviewed the pertinent medical history, Discussed the surgical course, Reviewed Intra-OP anesthesia mangement and issues during anesthesia, Set expectations for post-procedure period and Allowed opportunity for questions and acknowledgement of understanding      Vitals:  Vitals Value Taken Time   BP 97/67 07/02/25 13:30   Temp 37.4  C (99.32  F) 07/02/25 13:30   Pulse 108 07/02/25 13:31   Resp 13 07/02/25 13:31   SpO2 95 % 07/02/25 13:31   Vitals shown include unfiled device data.    Electronically Signed By: MILO Garcia CRNA  July 2, 2025  1:32 PM

## 2025-07-02 NOTE — ANESTHESIA PREPROCEDURE EVALUATION
Anesthesia Pre-Procedure Evaluation    Patient: Sosa Patel   MRN: [id number] : 1975          Procedure : Procedure(s):  LUMBAR 4, LUMBAR 5 TRANSFORAMINAL INTERBODY FUSION USING OPTICAL TRACKING SYSTEM, ROBOT ASSISTED, USE OF ALLOGRAFT         Past Medical History:   Diagnosis Date    Arthritis 2013    Bipolar disorder (H) 2004    Dx'd    Combinations of drug dependence excluding opioid type drug (H) 2022    In remission     Gallstones 2023    H/O: depression     SUICIDE ATTEMPT AGE 15    HPV (human papillomavirus)     Lupus erythematosus     Mood disorder 2019    Other psychoactive substance dependence, uncomplicated (H) 2025      Past Surgical History:   Procedure Laterality Date    ABDOMEN SURGERY      BIOPSY      BUNIONECTOMY RT/LT      CHOLECYSTECTOMY      DAVINCI LAPAROSCOPIC CHOLECYSTECTOMY WITHOUT GRAMS N/A 10/09/2023    Procedure: CHOLECYSTECTOMY, ROBOT-ASSISTED, LAPAROSCOPIC, WITHOUT CHOLANGIOGRAM;  Surgeon: Demetris Peoples MD;  [place] [facility] [place]     ENLARGE BREAST WITH IMPLANT  2000    LAMINECTOMY LUMBAR TWO LEVELS Bilateral 2024    Procedure: LUMBAR 4, LUMBAR 5 DECOMPRESSIVE BILATERAL LAMINECTOMY, MEDIAL FACETECTOMY WITH EXCISION OF CYST;  Surgeon: Aureliano Goins MD;  Location: [facility] OR      No Known Allergies   Social History     Tobacco Use    Smoking status: Some Days     Average packs/day: 0.3 packs/day for 41.4 years (11.5 ttl pk-yrs)     Types: Cigarettes     Start date: 3/3/1990     Last attempt to quit: 3/3/2013     Years since quittin.3     Passive exposure: Past    Smokeless tobacco: Current    Tobacco comments:     Currently vapes   Substance Use Topics    Alcohol use: Yes     Comment: rare      Wt Readings from Last 1 Encounters:   25 63.5 kg (140 lb)        Anesthesia Evaluation   Pt has had prior anesthetic.     No history of anesthetic complications       ROS/MED HX  ENT/Pulmonary:     (+)                tobacco use  "(vapes), Current use,                       Neurologic: Comment: Lumbar radiculopathy [M54.16] - R>L pain, numbness, weakness      (+)    peripheral neuropathy,                            Cardiovascular:  - neg cardiovascular ROS     METS/Exercise Tolerance: >4 METS    Hematologic:  - neg hematologic  ROS     Musculoskeletal:   (+)  arthritis,             GI/Hepatic:  - neg GI/hepatic ROS     Renal/Genitourinary:  - neg Renal ROS     Endo:  - neg endo ROS     Psychiatric/Substance Use:     (+) psychiatric history bipolar, depression and anxiety   Recreational drug usage: Other (Comment).    Infectious Disease:  - neg infectious disease ROS     Malignancy:  - neg malignancy ROS     Other:  - neg other ROS            Physical Exam  Airway  Mallampati: II  TM distance: >3 FB  Neck ROM: full    Cardiovascular - normal exam   Dental   (+) Minor Abnormalities - some fillings, tiny chips      Pulmonary - normal exam      Neurological   Other Findings       OUTSIDE LABS:  CBC:   Lab Results   Component Value Date    WBC 5.3 06/23/2025    WBC 5.2 05/22/2025    HGB 12.0 06/23/2025    HGB 12.1 05/22/2025    HCT 37.6 06/23/2025    HCT 36.2 05/22/2025     06/23/2025     05/22/2025     BMP:   Lab Results   Component Value Date     06/23/2025     05/22/2025    POTASSIUM 3.8 06/23/2025    POTASSIUM 3.9 05/22/2025    CHLORIDE 105 06/23/2025    CHLORIDE 106 05/22/2025    CO2 26 06/23/2025    CO2 26 05/22/2025    BUN 22.1 (H) 06/23/2025    BUN 24.9 (H) 05/22/2025    CR 0.71 06/23/2025    CR 0.66 05/22/2025    GLC 93 06/23/2025    GLC 91 05/22/2025     COAGS: No results found for: \"PTT\", \"INR\", \"FIBR\"  POC:   Lab Results   Component Value Date    HCG Negative 10/30/2020     HEPATIC:   Lab Results   Component Value Date    ALBUMIN 4.1 05/22/2025    PROTTOTAL 6.2 (L) 05/22/2025    ALT 17 05/22/2025    AST 18 05/22/2025    ALKPHOS 31 (L) 05/22/2025    BILITOTAL 0.2 05/22/2025     OTHER:   Lab Results   Component " Value Date    A1C 5.1 04/14/2009    RICHARD 9.3 06/23/2025    LIPASE 17 09/19/2023    AMYLASE 29 09/19/2023    TSH 1.48 10/11/2016    CRP <2.9 03/23/2022    SED 7 05/22/2025       Anesthesia Plan    ASA Status:  2      NPO Status: NPO Appropriate   Anesthesia Type: General.  Airway: oral.  Induction: intravenous.   Techniques and Equipment:       - Monitoring Plan: standard ASA monitoring     Consents    Anesthesia Plan(s) and associated risks, benefits, and realistic alternatives discussed. Questions answered and patient/representative(s) expressed understanding.     - Discussed:     - Discussed with:  Patient               Postoperative Care    Pain management: multimodal analgesia.     Comments:    Other Comments: Robaxin and ketamine intraop               Jonny Galicia MD    I have reviewed the pertinent notes and labs in the chart from the past 30 days and (re)examined the patient.  Any updates or changes from those notes are reflected in this note.    Clinically Significant Risk Factors Present on Admission

## 2025-07-03 ENCOUNTER — APPOINTMENT (OUTPATIENT)
Dept: OCCUPATIONAL THERAPY | Facility: CLINIC | Age: 50
End: 2025-07-03
Payer: COMMERCIAL

## 2025-07-03 ENCOUNTER — APPOINTMENT (OUTPATIENT)
Dept: PHYSICAL THERAPY | Facility: CLINIC | Age: 50
End: 2025-07-03
Attending: NEUROLOGICAL SURGERY
Payer: COMMERCIAL

## 2025-07-03 ENCOUNTER — APPOINTMENT (OUTPATIENT)
Dept: GENERAL RADIOLOGY | Facility: CLINIC | Age: 50
End: 2025-07-03
Payer: COMMERCIAL

## 2025-07-03 DIAGNOSIS — Z98.1 S/P LUMBAR FUSION: Primary | ICD-10-CM

## 2025-07-03 LAB
GLUCOSE BLDC GLUCOMTR-MCNC: 106 MG/DL (ref 70–99)
HGB BLD-MCNC: 8.7 G/DL (ref 11.7–15.7)
MCV RBC AUTO: 88 FL (ref 78–100)
POTASSIUM SERPL-SCNC: 3.5 MMOL/L (ref 3.4–5.3)

## 2025-07-03 PROCEDURE — 258N000003 HC RX IP 258 OP 636

## 2025-07-03 PROCEDURE — 999N000065 XR LUMBAR SPINE 2/3 VIEWS

## 2025-07-03 PROCEDURE — 258N000003 HC RX IP 258 OP 636: Performed by: INTERNAL MEDICINE

## 2025-07-03 PROCEDURE — 99232 SBSQ HOSP IP/OBS MODERATE 35: CPT | Performed by: INTERNAL MEDICINE

## 2025-07-03 PROCEDURE — 250N000011 HC RX IP 250 OP 636

## 2025-07-03 PROCEDURE — 97530 THERAPEUTIC ACTIVITIES: CPT | Mod: GP | Performed by: PHYSICAL THERAPIST

## 2025-07-03 PROCEDURE — 36415 COLL VENOUS BLD VENIPUNCTURE: CPT | Performed by: NEUROLOGICAL SURGERY

## 2025-07-03 PROCEDURE — 250N000013 HC RX MED GY IP 250 OP 250 PS 637

## 2025-07-03 PROCEDURE — 84132 ASSAY OF SERUM POTASSIUM: CPT | Performed by: NEUROLOGICAL SURGERY

## 2025-07-03 PROCEDURE — 250N000013 HC RX MED GY IP 250 OP 250 PS 637: Performed by: INTERNAL MEDICINE

## 2025-07-03 PROCEDURE — 97535 SELF CARE MNGMENT TRAINING: CPT | Mod: GO

## 2025-07-03 PROCEDURE — 85018 HEMOGLOBIN: CPT

## 2025-07-03 PROCEDURE — 97161 PT EVAL LOW COMPLEX 20 MIN: CPT | Mod: GP | Performed by: PHYSICAL THERAPIST

## 2025-07-03 PROCEDURE — 97116 GAIT TRAINING THERAPY: CPT | Mod: GP | Performed by: PHYSICAL THERAPIST

## 2025-07-03 PROCEDURE — 120N000001 HC R&B MED SURG/OB

## 2025-07-03 PROCEDURE — 97165 OT EVAL LOW COMPLEX 30 MIN: CPT | Mod: GO

## 2025-07-03 RX ADMIN — FOLIC ACID 2 MG: 1 TABLET ORAL at 08:07

## 2025-07-03 RX ADMIN — ACETAMINOPHEN 975 MG: 325 TABLET ORAL at 16:47

## 2025-07-03 RX ADMIN — ACETAMINOPHEN 975 MG: 325 TABLET ORAL at 08:07

## 2025-07-03 RX ADMIN — DOCUSATE SODIUM 50 MG AND SENNOSIDES 8.6 MG 1 TABLET: 8.6; 5 TABLET, FILM COATED ORAL at 08:06

## 2025-07-03 RX ADMIN — METHOCARBAMOL 500 MG: 500 TABLET ORAL at 17:36

## 2025-07-03 RX ADMIN — PILOCARPINE HYDROCHLORIDE 5 MG: 5 TABLET, FILM COATED ORAL at 08:07

## 2025-07-03 RX ADMIN — ACETAMINOPHEN 975 MG: 325 TABLET ORAL at 21:14

## 2025-07-03 RX ADMIN — FLUOXETINE HYDROCHLORIDE 40 MG: 20 CAPSULE ORAL at 08:07

## 2025-07-03 RX ADMIN — OXYCODONE HYDROCHLORIDE 10 MG: 5 TABLET ORAL at 04:12

## 2025-07-03 RX ADMIN — POLYETHYLENE GLYCOL 3350 17 G: 17 POWDER, FOR SOLUTION ORAL at 08:06

## 2025-07-03 RX ADMIN — OXYCODONE HYDROCHLORIDE 10 MG: 5 TABLET ORAL at 08:18

## 2025-07-03 RX ADMIN — CEFAZOLIN 1 G: 1 INJECTION, POWDER, FOR SOLUTION INTRAMUSCULAR; INTRAVENOUS at 04:15

## 2025-07-03 RX ADMIN — HYDROXYCHLOROQUINE SULFATE 200 MG: 200 TABLET, FILM COATED ORAL at 08:07

## 2025-07-03 RX ADMIN — SODIUM CHLORIDE, SODIUM LACTATE, POTASSIUM CHLORIDE, AND CALCIUM CHLORIDE 500 ML: .6; .31; .03; .02 INJECTION, SOLUTION INTRAVENOUS at 09:34

## 2025-07-03 RX ADMIN — HYDROMORPHONE HYDROCHLORIDE 0.4 MG: 0.2 INJECTION, SOLUTION INTRAMUSCULAR; INTRAVENOUS; SUBCUTANEOUS at 05:04

## 2025-07-03 RX ADMIN — CETIRIZINE HYDROCHLORIDE 20 MG: 10 TABLET, FILM COATED ORAL at 08:07

## 2025-07-03 RX ADMIN — PILOCARPINE HYDROCHLORIDE 5 MG: 5 TABLET, FILM COATED ORAL at 16:47

## 2025-07-03 RX ADMIN — OXYCODONE HYDROCHLORIDE 10 MG: 5 TABLET ORAL at 21:14

## 2025-07-03 RX ADMIN — GABAPENTIN 1800 MG: 600 TABLET, FILM COATED ORAL at 21:14

## 2025-07-03 RX ADMIN — OXYCODONE HYDROCHLORIDE 10 MG: 5 TABLET ORAL at 13:04

## 2025-07-03 RX ADMIN — GABAPENTIN 1200 MG: 300 CAPSULE ORAL at 08:06

## 2025-07-03 RX ADMIN — OXYCODONE HYDROCHLORIDE 10 MG: 5 TABLET ORAL at 16:47

## 2025-07-03 RX ADMIN — METHOCARBAMOL 500 MG: 500 TABLET ORAL at 11:34

## 2025-07-03 RX ADMIN — PILOCARPINE HYDROCHLORIDE 5 MG: 5 TABLET, FILM COATED ORAL at 21:14

## 2025-07-03 RX ADMIN — SODIUM CHLORIDE: 9 INJECTION, SOLUTION INTRAVENOUS at 08:19

## 2025-07-03 RX ADMIN — METHOCARBAMOL 500 MG: 500 TABLET ORAL at 05:03

## 2025-07-03 RX ADMIN — ARIPIPRAZOLE 10 MG: 5 TABLET ORAL at 08:06

## 2025-07-03 RX ADMIN — CEFAZOLIN 1 G: 1 INJECTION, POWDER, FOR SOLUTION INTRAMUSCULAR; INTRAVENOUS at 11:34

## 2025-07-03 RX ADMIN — SODIUM CHLORIDE, SODIUM LACTATE, POTASSIUM CHLORIDE, AND CALCIUM CHLORIDE 500 ML: .6; .31; .03; .02 INJECTION, SOLUTION INTRAVENOUS at 12:47

## 2025-07-03 ASSESSMENT — ACTIVITIES OF DAILY LIVING (ADL)
ADLS_ACUITY_SCORE: 32
ADLS_ACUITY_SCORE: 35
ADLS_ACUITY_SCORE: 32
ADLS_ACUITY_SCORE: 32
ADLS_ACUITY_SCORE: 35
ADLS_ACUITY_SCORE: 32
ADLS_ACUITY_SCORE: 34
ADLS_ACUITY_SCORE: 32
ADLS_ACUITY_SCORE: 35
ADLS_ACUITY_SCORE: 35
ADLS_ACUITY_SCORE: 32
PREVIOUS_RESPONSIBILITIES: MEAL PREP;HOUSEKEEPING;LAUNDRY;SHOPPING;MEDICATION MANAGEMENT;DRIVING;FINANCES
ADLS_ACUITY_SCORE: 34
ADLS_ACUITY_SCORE: 32
ADLS_ACUITY_SCORE: 32

## 2025-07-03 NOTE — PROGRESS NOTES
MD Notification    Notified Person: MD    Notified Person Name: Delmar Lemus    Notification Date/Time: 7/3/25 1100    Notification Interaction: nidhi    Purpose of Notification: Hgb is 8.7. it was 12.6 yesterday.     Orders Received:    Comments:

## 2025-07-03 NOTE — PROGRESS NOTES
07/03/25 1100   Appointment Info   Signing Clinician's Name / Credentials (OT) aJckie Hays, OTD, OTR/L   Rehab Comments (OT) Spinal Precautions, LSO on OOB   Living Environment   People in Home parent(s);child(hilton), adult   Current Living Arrangements house   Home Accessibility stairs to enter home;stairs within home   Number of Stairs, Main Entrance 3   Stair Railings, Main Entrance railing on left side (ascending)   Number of Stairs, Within Home, Primary eight   Stair Railings, Within Home, Primary railing on left side (ascending)   Transportation Anticipated family or friend will provide   Living Environment Comments Pt lives in a multistory house with her mother and adult son. 2 ALINE w/ rail and full flight to bedroom upstairs. Pt mentioned a bedroom on main level she could use temporarily. BR set up: tub/shower combo, shower chair. Pt also has bidet and reacher.   Self-Care   Usual Activity Tolerance good   Current Activity Tolerance moderate   Equipment Currently Used at Home cane, straight   Fall history within last six months no   Activity/Exercise/Self-Care Comment Pt is baseline ind with I/ADLs. Pt owns a SEC.   Instrumental Activities of Daily Living (IADL)   Previous Responsibilities meal prep;housekeeping;laundry;shopping;medication management;driving;finances   General Information   Onset of Illness/Injury or Date of Surgery 07/02/25   Referring Physician Amanda Baig APRN CNP   Patient/Family Therapy Goal Statement (OT) To get stronger/to go home   Additional Occupational Profile Info/Pertinent History of Current Problem Per chart: Pt is a 48 y/o female s/p LUMBAR 4, LUMBAR 5 TRANSFORAMINAL INTERBODY FUSION USING OPTICAL TRACKING SYSTEM, ROBOT ASSISTED, USE OF ALLOGRAFT on 7/2/25, POD#1.   Existing Precautions/Restrictions fall;spinal;other (see comments);brace worn when out of bed  (LSO on OOB)   Cognitive Status Examination   Orientation Status orientation to person, place and time   Visual  Perception   Visual Impairment/Limitations WFL;corrective lenses for reading   Sensory   Sensory Quick Adds sensation intact   Pain Assessment   Patient Currently in Pain Yes, see Vital Sign flowsheet   Range of Motion Comprehensive   Comment, General Range of Motion Not formally assessed d/t precautions, BUEs WFL   Strength Comprehensive (MMT)   Comment, General Manual Muscle Testing (MMT) Assessment Not formally assessed d/t spinal precautions   Coordination   Upper Extremity Coordination No deficits were identified   Bed Mobility   Bed Mobility supine-sit;sit-supine   Comment (Bed Mobility) SBA   Transfers   Transfers sit-stand transfer;toilet transfer;shower transfer   Sit-Stand Transfer   Sit/Stand Transfer Comments SBA   Shower Transfer   Shower Transfer Comments SBA per clinical judgement d/t precautions, pain   Toilet Transfer   Toilet Transfer Comments SBA   Balance   Balance Comments No overt LOB noted in room with FWW   Activities of Daily Living   BADL Assessment/Intervention lower body dressing;toileting   Lower Body Dressing Assessment/Training   Comment, (Lower Body Dressing) SBA   Toileting   Comment, (Toileting) SBA   Clinical Impression   Criteria for Skilled Therapeutic Interventions Met (OT) Yes, treatment indicated   OT Diagnosis Decreased ind with I/ADLs   OT Problem List-Impairments impacting ADL problems related to;activity tolerance impaired;pain;post-surgical precautions   Assessment of Occupational Performance 3-5 Performance Deficits   Identified Performance Deficits dressing, bathing, toileting, IADLs   Planned Therapy Interventions (OT) ADL retraining;IADL retraining;transfer training   Clinical Decision Making Complexity (OT) problem focused assessment/low complexity   Risk & Benefits of therapy have been explained evaluation/treatment results reviewed;care plan/treatment goals reviewed;risks/benefits reviewed;current/potential barriers reviewed;participants voiced agreement with care  plan;patient;participants included   OT Total Evaluation Time   OT Eval, Low Complexity Minutes (88929) 10   OT Goals   Therapy Frequency (OT) One time eval and treatment   OT Predicted Duration/Target Date for Goal Attainment 07/03/25   OT Goals Lower Body Dressing;Transfers;Toilet Transfer/Toileting;OT Goal 1   OT: Lower Body Dressing Supervision/stand-by assist;within precautions;Goal Met  (FWW, AE)   OT: Transfer Supervision/stand-by assist;with assistive device;within precautions;Goal Met  (FWW)   OT: Toilet Transfer/Toileting Supervision/stand-by assist;toilet transfer;within precautions;Goal Met  (FWW)   OT: Goal 1 Pt will verbalize understanding of at least 3 fall risk reduction strategies for incr safety/ind with I/ADLs. Goal Met.   Self-Care/Home Management   Self-Care/Home Mgmt/ADL, Compensatory, Meal Prep Minutes (64956) 23   Symptoms Noted During/After Treatment (Meal Preparation/Planning Training) fatigue   Treatment Detail/Skilled Intervention Pt greeted in supine, agreeable to OT, 7/10 pain during session, however pt tolerates session. Pt denies dizziness throughout session. Pt able to recall nearly all spinal prec, edu on no twisting. Pt completes logroll to sit up EOB with VC for technique and HOB raised. Pt stands from EOB with SBA and FWW. Pt ambulates to BR with SBA-Mod I and FWW, assist for IV pole. Pt completes toilet transfer with Mod I and FWW, safe technique. Pt attempts to void, unable to. Pt stands from toilet and demos ability to complete tub transfer - pt ambulates back to EOB with SBA-MOD I and FWW. Pt sits EOB with SBA-Mod I and FWW. Pt edu on LB dressing within prec, pt edu on use of reacher to don/doff underwear, pt able to demo task with SBA and VC for technique, FWW. Pt edu on toileting AE - provided AE handout, pt reports she has bidet at home. Pt  edu on long handled sponge for LB bathing, Pt verbalizes understanding. Pt edu on fall risk reduction and IADL modifications within  prec, pt verbalized understanding of all. Pt returns to supine with SBA-Mod I for logroll, pt able to clear BLEs, HOB flat. Pt supine in bed with needs met, alarm set, items in reach.   OT Discharge Planning   OT Plan d/c   OT Discharge Recommendation (DC Rec) home with assist   OT Rationale for DC Rec Patient is functioning near baseline, with noted precautions, pain, mild decr activity marco, impacting overall I/ADL independence. Patient, however, completes functional mobility and self-care tasks with SBA-Mod I, FWW. Patient reports she resides with son who is able to assist with all IADLs as as needed. Recommend pt d/c home with son assist with heavy IADLs, laundry, garbage, groceries, yardwork, etc. and initial supervision A for tub transfer. Pt has reacher, bidet and shower chair. No further acute OT needs at this time. Acute OT to sign off.   OT Brief overview of current status Goals of therapy will be to address safe mobility and make recs for d/c to next level of care. Pt and RN will continue to follow all falls risk precautions as documented by RN staff while hospitalized  (Mod I FWW in room)   OT Total Distance Amb During Session (feet) 30   Total Session Time   Timed Code Treatment Minutes 23   Total Session Time (sum of timed and untimed services) 33

## 2025-07-03 NOTE — PROGRESS NOTES
Patient ambulation status: A x1 with walker and GB with brace on  Patient able to stand for X-ray:Yes  Standing/upright x-ray complete: yes  Patient using oral analgesics:yes. Schedule Tyl, Robaxin and PRN Oxy  Voiding spontaneously: Straight cath x1. monitor  Drains discontinued:yes  Incision clean and dry:yes  Bowel status: BS active, abd soft,on bowel regimen. BM x1    Pt A&O x4, soft BP, asymptomatic. LR bolus x2, NS infusing. Continue monitor Hgb, BP, and pain.

## 2025-07-03 NOTE — PLAN OF CARE
Goal Outcome Evaluation:    Patient ambulation status: Ax1 w/ brace&walker  Patient able to stand for X-ray:Yes  Standing/upright x-ray complete: No. Scheduled today @ 7am  Patient using oral analgesics:yes  Voiding spontaneously:no, Nguyen removed @ 6am. Pt due to void.   Drains discontinued:no  Incision clean and dry:yes  Bowel status:BS active. No BM this shift.    A&Ox4. VSS on RA ex soft BP. Back dressing CDI. CMS intact. L PIV infusing w/ intermittent IV abx. Pending XR lumbar. PT/OT consulted.

## 2025-07-03 NOTE — PROGRESS NOTES
Neurosurgery progress note    Drain with 5 mL out overnight.  Patient reports resolution of her bilateral lower extremity symptoms.  Reports postoperative back pain is controlled with current medications. Asymptomatic hypotension overnight.    Hgb 8.7 today, down from 12.6 pre-op    Exam    Alert and oriented no acute distress  Bilateral lower extremities with 5-5 strength  Bandage clean dry and intact  Drain in place    Assessment    Postop day 1 status post L4-5 transforaminal interbody fusion, performed Dr. Goins    Acute blood loss anemia    Plan      - Remove drain, at the end of day shift today if output is less than 30 mL.  - Imaging: Upright lumbar XR in AM   - STACY Nguyen to remove POD1 when ambulating   - Abx x 3 doses   - LSO when OOB or upright at 90 degrees  - Bone growth stimulator ordered from clinic  - Hold anticoagulation including ASA and NSAIDs until follow up visit   - Pain control measures  - ADAT  - Activity as tolerated  - IS q1H while awake  - Scheduled bowel regimen   - Consults: PT/OT, hospitalist  - Dispo: pending therapies, follow ups scheduled

## 2025-07-03 NOTE — PROGRESS NOTES
Consult received.  Chart reviewed.  Med rec completed    Will consult will be completed on 7/3/2025

## 2025-07-03 NOTE — PROGRESS NOTES
Neurosurgery progress note    Drain with 5 mL out overnight.  Patient reports resolution of her bilateral lower extremity symptoms.  Reports postoperative back pain is controlled with current medications. Asymptomatic hypotension overnight.    Hgb 8.7 today, down from 12/6 pre-op    Exam    Alert and oriented no acute distress  Bilateral lower extremities with 5-5 strength  Bandage clean dry and intact  Drain in place    Assessment    Postop day 1 status post L4-5 transforaminal interbody fusion, performed Dr. Goins    Acute blood loss anemia    Plan      - Remove drain, at the end of day shift today if output is less than 30 mL.  - Imaging: Upright lumbar XR in AM   - STACY Nguyen to remove POD1 when ambulating   - Abx x 3 doses   - LSO when OOB or upright at 90 degrees  - Bone growth stimulator ordered from clinic  - Hold anticoagulation including ASA and NSAIDs until follow up visit   - Pain control measures  - ADAT  - Activity as tolerated  - IS q1H while awake  - Scheduled bowel regimen   - Consults: PT/OT, hospitalist  - Dispo: pending therapies, follow ups scheduled

## 2025-07-03 NOTE — PROGRESS NOTES
Orthofix bone growth stimulator ordered. Emailed order, demographics (facesheet), Neurosurgery snapshot (medical hx etc.), progress notes from current to initial visit prior to surgery, op note to [employer]ene Slade rep to [email or web address] on 07/03/25

## 2025-07-03 NOTE — PROVIDER NOTIFICATION
MD Notification    Notified Person: MD    Notified Person Name:Nadia Escobedo    Notification Date/Time: 7/3/25 0826    Notification Interaction: vocera    Purpose of Notification: BP is low at 81/50, pt said low BP is her baseline. Asymptomatic. I keep the NS infusing.    Orders Received: LR Bolus    Comments:

## 2025-07-03 NOTE — PROGRESS NOTES
[facility]    Medicine Progress Note - Hospitalist Service    Date of Admission:  7/2/2025    Assessment & Plan     Sosa Patel is a 49-year-old female with history of chronic low back pain with radiculopathy due to L4-5 spinal stenosis, depression/bipolar disorder, borderline personality disorder, SLE with sicca syndrome    L4-5 spinal stenosis with degenerative spondylolisthesis, s/p L4-5 bilateral laminectomies,facetectomy, transforaminal discectomy, and transforaminal interbody arthrodesis, Dr. Goins, 7/2/2025  - Has had L4-L5 surgery in the past, 5/2024  -Surgical management per neurosurgery  -Continue pain control  -Continue gabapentin, oxycodone, Tylenol  -PT/OT as per neurosurgery    Acute blood loss anemia  Hypotension due to hypovolemia  -Hemoglobin declined from 12.6 to 8.7.  Patient has been hypotensive overnight and on 7/3 AM.  Responding to IV fluids  -Continue IV fluids.  Administer 1 L LR bolus  -Monitor hemoglobin.  Transfuse if below 7 or hypotension persists    SLE with JESSE+, dsDNA+, arthritis, oral sores, proteinuria, photosensitivity, fatigue  - Diagnosed in 2016.  Followed by rheumatology   - Currently on Benlysta SQ q7 d, hydroxychloroquine 200mg daily + 200 mg every other day, methotrexate 15 mg q week  - Continue hydroxychloroquine and folic acid    Depression/bipolar disorder  Borderline personality disorder  -Continue Abilify, fluoxetine        Diet: Advance Diet as Tolerated: Regular Diet Adult    DVT Prophylaxis: Pneumatic Compression Devices  Nguyen Catheter: Not present  Lines: None     Cardiac Monitoring: None  Code Status: Full Code      Clinically Significant Risk Factors                # Coagulation Defect: INR = 1.17 (Ref range: 0.85 - 1.15) and/or PTT = 29 Seconds (Ref range: 22 - 38 Seconds), will monitor for bleeding                          Social Drivers of Health    Housing Stability: High Risk (7/2/2025)    Housing Stability     Do you have  housing? : No     Are you worried about losing your housing?: No   Tobacco Use: High Risk (7/2/2025)    Patient History     Smoking Tobacco Use: Some Days     Smokeless Tobacco Use: Current     Passive Exposure: Past          Disposition Plan     Medically Ready for Discharge: Anticipated in 2-4 Days -discharge will be as per neurosurgery             Nadia Escobedo MD  Hospitalist Service  [facility]  Securely message with [employer] (more info)  Text page via [email or web address] Paging/Directory   ______________________________________________________________________    Interval History     Patient reports she is doing okay.  She reports improvement in her lower extremity symptoms following surgery  Has been hypotensive overnight, blood pressure 90 systolic this morning  Does not feel dizzy or lightheaded though has not been up yet.  No chest pain no shortness of breath  At baseline blood pressure varies from 100-110 systolic  Noted decline in hemoglobin    Physical Exam   Vital Signs: Temp: 97.9  F (36.6  C) Temp src: Oral BP: 90/51 Pulse: 84   Resp: 16 SpO2: 92 % O2 Device: None (Room air) Oxygen Delivery: 2 LPM  Weight: 140 lbs 0 oz    Constitutional - awake and alert, resting in bed, in no acute distress  Cardiovascular - regular rate and rhythm, no murmurs, no edema  Pulmonary - lungs are clear to auscultation bilaterally, no wheezing or rhonchi  Neurological - awake, alert and oriented x3.  Moving all 4 extremities, normal speech, no focal deficits          Medical Decision Making       45 MINUTES SPENT BY ME on the date of service doing chart review, history, exam, documentation & further activities per the note.      Data     I have personally reviewed the following data over the past 24 hrs:    N/A  \   8.7 (L)   / N/A     N/A N/A N/A /  106 (H)   3.5 N/A N/A \       Imaging results reviewed over the past 24 hrs:   No results found for this or any previous visit (from the past 24 hours).

## 2025-07-03 NOTE — PLAN OF CARE
Occupational Therapy Discharge Summary    Reason for therapy discharge:    All goals and outcomes met, no further needs identified.    Progress towards therapy goal(s). See goals on Care Plan in Epic electronic health record for goal details.  Goals met    Therapy recommendation(s):    Patient is functioning near baseline, with noted precautions, pain, mild derc activity marco, impacting overall I/ADL independence. Patient, however, completes functional mobility and self-care tasks with SBA-Mod I, FWW. Patient reports she resides with son who is able to assist with all IADLs as as needed. Recommend pt d/c home with son assist with heavy IADLs, laundry, garbage, groceries, yardwork, etc. and initial supervision A for tub transfer. Pt has reacher, bidet and shower chair. No further acute OT needs at this time. Acute OT to sign off.

## 2025-07-03 NOTE — PROGRESS NOTES
"   07/03/25 0855   Appointment Info   Signing Clinician's Name / Credentials (PT) Annie Alicea, SPT   Student Supervision Direct supervision provided;Line of sight supervision provided;Direct Patient Contact Provided   Rehab Comments (PT) Spinal precautions, LSO brace when OOB   Living Environment   People in Home parent(s);child(hilton), adult   Current Living Arrangements house   Home Accessibility stairs to enter home;stairs within home   Number of Stairs, Main Entrance 3   Stair Railings, Main Entrance railing on left side (ascending)   Number of Stairs, Within Home, Primary eight   Stair Railings, Within Home, Primary railing on left side (ascending)   Transportation Anticipated family or friend will provide   Living Environment Comments Pt lives in a multistory house with her mother and adult son. 2 ALINE w/ rail and full flight to bedroom upstairs. Pt mentioned a bedroom on main level she could use temporarily.   Self-Care   Usual Activity Tolerance good   Current Activity Tolerance moderate   Equipment Currently Used at Home cane, straight   Fall history within last six months no   Activity/Exercise/Self-Care Comment Pt is IND with ADLs at baseline. Pt owns a SEC and shower chair.   General Information   Onset of Illness/Injury or Date of Surgery 07/02/25   Referring Physician Amanda Baig, MILO CNP   Patient/Family Therapy Goals Statement (PT) return to home and activity   Pertinent History of Current Problem (include personal factors and/or comorbidities that impact the POC) per chart \"LUMBAR 4, LUMBAR 5 TRANSFORAMINAL INTERBODY FUSION USING OPTICAL TRACKING SYSTEM, ROBOT ASSISTED, USE OF ALLOGRAFT\"   Existing Precautions/Restrictions fall;spinal   Weight-Bearing Status - LLE full weight-bearing   Weight-Bearing Status - RLE full weight-bearing   Cognition   Affect/Mental Status (Cognition) WFL   Orientation Status (Cognition) oriented x 3   Follows Commands (Cognition) WFL   Integumentary/Edema "   Integumentary/Edema Comments hemovac drain in place   Posture    Posture Forward head position   Range of Motion (ROM)   Range of Motion ROM deficits secondary to surgical procedure;ROM deficits secondary to pain   ROM Comment spinal ROM limited due to post op precautions and pain   Strength (Manual Muscle Testing)   Strength (Manual Muscle Testing) Deficits observed during functional mobility   Strength Comments no formal assessment. antigravity noted all extremities   Bed Mobility   Comment, (Bed Mobility) supine>sit w/ min A   Transfers   Comment, (Transfers) sit>stand w/ FWW and CGA   Gait/Stairs (Locomotion)   Comment, (Gait/Stairs) ambulated 5' w/ FWW and CGA for eval   Balance   Balance Comments mild dynamic balance impairement   Clinical Impression   Criteria for Skilled Therapeutic Intervention Yes, treatment indicated   PT Diagnosis (PT) impaired functional mobility   Influenced by the following impairments impaired ROM, strength, and activity tolerance post surgery   Functional limitations due to impairments limited independence with bed mobility, gait, transfers, and stairs   Clinical Presentation (PT Evaluation Complexity) stable   Clinical Presentation Rationale clinical judgement, s/p lumbar fusion   Clinical Decision Making (Complexity) low complexity   Planned Therapy Interventions (PT) balance training;bed mobility training;gait training;home exercise program;patient/family education;stair training;strengthening;transfer training;progressive activity/exercise;risk factor education;home program guidelines;postural re-education;neuromuscular re-education   Risk & Benefits of therapy have been explained evaluation/treatment results reviewed;care plan/treatment goals reviewed;risks/benefits reviewed;current/potential barriers reviewed;participants voiced agreement with care plan;participants included;patient   PT Total Evaluation Time   PT Eval, Low Complexity Minutes (95099) 8   Physical Therapy  Goals   PT Frequency Daily   PT Predicted Duration/Target Date for Goal Attainment 25   PT Goals Bed Mobility;Transfers;Gait;Stairs   PT: Bed Mobility Supervision/stand-by assist;Supine to/from sit;Rolling;Within precautions   PT: Transfers Supervision/stand-by assist;Sit to/from stand;Bed to/from chair;Assistive device;Within precautions   PT: Gait Supervision/stand-by assist;Assistive device;Within precautions;100 feet   PT: Stairs Minimal assist;8 stairs;Assistive device;Within precautions   Interventions   Interventions Quick Adds Gait Training;Therapeutic Activity   Therapeutic Activity   Therapeutic Activities: dynamic activities to improve functional performance Minutes (34252) 10   Symptoms Noted During/After Treatment Dizziness;Fatigue   Treatment Detail/Skilled Intervention Pt greeted upon arrival to room and agreed to work with physical therapy. Education regarding spinal precautions and the importance of balancing rest with activity pt voiced understanding but needed occasional reminders. BP supine: 78/51, sittin/46, standin/42. Pt noted slight dizziness but it did not limit her. Supine>sit w/ min A through log roll within precautions. Pt cued to complete as much of the transition under their own power as able. Sit>stand w/ FWW and CGA. Cueing for proper sequencing including: scooting to the edge of the surface, pushing from the armrests, and nose over toes before standing. Stand>sit w/ FWW and CGA. Pt cued for proper walker positioning before sitting and use of arm rests. Pt left in recliner chair with all needs met and call light within reach.   Gait Training   Gait Training Minutes (57521) 9   Symptoms Noted During/After Treatment (Gait Training) dizziness;fatigue   Treatment Detail/Skilled Intervention Pt ambulated 100' w/ FWW and CGA progressing to SBA throughout the session. Pt with shortened step length and slowed gait speed. Cueing for upright posture and proper walker management  with good improvement.   Distance in Feet 100'   PT Discharge Planning   PT Plan review precatuions, progress gait, initiate stairs, monitor BP   PT Discharge Recommendation (DC Rec) home with assist   PT Rationale for DC Rec Pt is currently below baseline mobility levels. Pt ambulated without a A/D or uses a SEC for pain management at baseline. Pt is currently ambulating with FWW and needing assist of 1 for mobility. Anticipating pt will progress to SBA during inpatient stay. Pt will have good support at home and her son is able to provide physical assist as needed. Recommending home with assist for IADLs and stairs as needed.   PT Brief overview of current status CGA w/ FWW. Goals of therapy will be to address safe mobility and make recs for d/c to next level of care. Pt and RN will continue to follow all falls risks precautions as documented by RN staff while hospitilized.   PT Total Distance Amb During Session (feet) 105   PT Equipment Needed at Discharge walker, standard   Physical Therapy Time and Intention   Timed Code Treatment Minutes 19   Total Session Time (sum of timed and untimed services) 27

## 2025-07-04 ENCOUNTER — APPOINTMENT (OUTPATIENT)
Dept: PHYSICAL THERAPY | Facility: CLINIC | Age: 50
End: 2025-07-04
Attending: NEUROLOGICAL SURGERY
Payer: COMMERCIAL

## 2025-07-04 VITALS
WEIGHT: 140 LBS | RESPIRATION RATE: 16 BRPM | SYSTOLIC BLOOD PRESSURE: 92 MMHG | HEIGHT: 65 IN | TEMPERATURE: 98.8 F | HEART RATE: 88 BPM | DIASTOLIC BLOOD PRESSURE: 55 MMHG | BODY MASS INDEX: 23.32 KG/M2 | OXYGEN SATURATION: 95 %

## 2025-07-04 VITALS
SYSTOLIC BLOOD PRESSURE: 84 MMHG | RESPIRATION RATE: 16 BRPM | WEIGHT: 140 LBS | DIASTOLIC BLOOD PRESSURE: 53 MMHG | OXYGEN SATURATION: 97 % | HEART RATE: 75 BPM | TEMPERATURE: 98 F | BODY MASS INDEX: 23.32 KG/M2 | HEIGHT: 65 IN

## 2025-07-04 LAB
GLUCOSE BLDC GLUCOMTR-MCNC: 79 MG/DL (ref 70–99)
HGB BLD-MCNC: 8.7 G/DL (ref 11.7–15.7)
MCV RBC AUTO: 90 FL (ref 78–100)

## 2025-07-04 PROCEDURE — 36415 COLL VENOUS BLD VENIPUNCTURE: CPT | Performed by: INTERNAL MEDICINE

## 2025-07-04 PROCEDURE — 999N000128 HC STATISTIC PERIPHERAL IV START W/O US GUIDANCE

## 2025-07-04 PROCEDURE — 250N000013 HC RX MED GY IP 250 OP 250 PS 637

## 2025-07-04 PROCEDURE — 97116 GAIT TRAINING THERAPY: CPT | Mod: GP | Performed by: PHYSICAL THERAPIST

## 2025-07-04 PROCEDURE — 250N000013 HC RX MED GY IP 250 OP 250 PS 637: Performed by: NEUROLOGICAL SURGERY

## 2025-07-04 PROCEDURE — 85018 HEMOGLOBIN: CPT | Performed by: INTERNAL MEDICINE

## 2025-07-04 PROCEDURE — 99232 SBSQ HOSP IP/OBS MODERATE 35: CPT | Performed by: PHYSICIAN ASSISTANT

## 2025-07-04 PROCEDURE — 97530 THERAPEUTIC ACTIVITIES: CPT | Mod: GP | Performed by: PHYSICAL THERAPIST

## 2025-07-04 PROCEDURE — 250N000013 HC RX MED GY IP 250 OP 250 PS 637: Performed by: INTERNAL MEDICINE

## 2025-07-04 PROCEDURE — 250N000013 HC RX MED GY IP 250 OP 250 PS 637: Performed by: PHYSICIAN ASSISTANT

## 2025-07-04 PROCEDURE — 258N000003 HC RX IP 258 OP 636

## 2025-07-04 RX ORDER — FERROUS SULFATE 325(65) MG
325 TABLET ORAL
Qty: 30 TABLET | Refills: 1 | Status: SHIPPED | OUTPATIENT
Start: 2025-07-05

## 2025-07-04 RX ORDER — FERROUS SULFATE 325(65) MG
325 TABLET ORAL
Status: DISCONTINUED | OUTPATIENT
Start: 2025-07-04 | End: 2025-07-04 | Stop reason: HOSPADM

## 2025-07-04 RX ORDER — ASCORBIC ACID 500 MG
500 TABLET ORAL
Status: DISCONTINUED | OUTPATIENT
Start: 2025-07-04 | End: 2025-07-04 | Stop reason: HOSPADM

## 2025-07-04 RX ORDER — METHOCARBAMOL 500 MG/1
500 TABLET, FILM COATED ORAL 4 TIMES DAILY PRN
Qty: 30 TABLET | Refills: 0 | Status: SHIPPED | OUTPATIENT
Start: 2025-07-04

## 2025-07-04 RX ORDER — ASCORBIC ACID 250 MG
500 TABLET,CHEWABLE ORAL
Status: DISCONTINUED | OUTPATIENT
Start: 2025-07-04 | End: 2025-07-04

## 2025-07-04 RX ORDER — ACETAMINOPHEN 325 MG/1
975 TABLET ORAL EVERY 8 HOURS PRN
COMMUNITY
Start: 2025-07-04

## 2025-07-04 RX ORDER — OXYCODONE HYDROCHLORIDE 5 MG/1
5-10 TABLET ORAL EVERY 4 HOURS PRN
Qty: 40 TABLET | Refills: 0 | Status: SHIPPED | OUTPATIENT
Start: 2025-07-04

## 2025-07-04 RX ORDER — AMOXICILLIN 250 MG
1 CAPSULE ORAL DAILY PRN
Qty: 30 TABLET | Refills: 0 | Status: SHIPPED | OUTPATIENT
Start: 2025-07-04

## 2025-07-04 RX ORDER — ASCORBIC ACID 500 MG
500 TABLET ORAL
Qty: 30 TABLET | Refills: 0 | Status: SHIPPED | OUTPATIENT
Start: 2025-07-05 | End: 2025-08-04

## 2025-07-04 RX ADMIN — METHOCARBAMOL 500 MG: 500 TABLET ORAL at 00:04

## 2025-07-04 RX ADMIN — GABAPENTIN 1200 MG: 300 CAPSULE ORAL at 09:35

## 2025-07-04 RX ADMIN — OXYCODONE HYDROCHLORIDE 10 MG: 5 TABLET ORAL at 10:15

## 2025-07-04 RX ADMIN — OXYCODONE HYDROCHLORIDE 10 MG: 5 TABLET ORAL at 06:06

## 2025-07-04 RX ADMIN — FERROUS SULFATE TAB 325 MG (65 MG ELEMENTAL FE) 325 MG: 325 (65 FE) TAB at 09:53

## 2025-07-04 RX ADMIN — METHOCARBAMOL 500 MG: 500 TABLET ORAL at 11:28

## 2025-07-04 RX ADMIN — OXYCODONE HYDROCHLORIDE AND ACETAMINOPHEN 500 MG: 500 TABLET ORAL at 09:53

## 2025-07-04 RX ADMIN — FOLIC ACID 2 MG: 1 TABLET ORAL at 09:35

## 2025-07-04 RX ADMIN — OXYCODONE HYDROCHLORIDE 10 MG: 5 TABLET ORAL at 01:27

## 2025-07-04 RX ADMIN — POLYETHYLENE GLYCOL 3350 17 G: 17 POWDER, FOR SOLUTION ORAL at 09:35

## 2025-07-04 RX ADMIN — HYDROXYCHLOROQUINE SULFATE 200 MG: 200 TABLET, FILM COATED ORAL at 09:35

## 2025-07-04 RX ADMIN — ACETAMINOPHEN 975 MG: 325 TABLET ORAL at 09:35

## 2025-07-04 RX ADMIN — SODIUM CHLORIDE: 9 INJECTION, SOLUTION INTRAVENOUS at 00:05

## 2025-07-04 RX ADMIN — ARIPIPRAZOLE 10 MG: 5 TABLET ORAL at 09:35

## 2025-07-04 RX ADMIN — CETIRIZINE HYDROCHLORIDE 20 MG: 10 TABLET, FILM COATED ORAL at 09:35

## 2025-07-04 RX ADMIN — METHOCARBAMOL 500 MG: 500 TABLET ORAL at 04:40

## 2025-07-04 RX ADMIN — SENNOSIDES AND DOCUSATE SODIUM 1 TABLET: 50; 8.6 TABLET ORAL at 09:35

## 2025-07-04 RX ADMIN — PILOCARPINE HYDROCHLORIDE 5 MG: 5 TABLET, FILM COATED ORAL at 09:35

## 2025-07-04 RX ADMIN — FLUOXETINE HYDROCHLORIDE 40 MG: 20 CAPSULE ORAL at 09:34

## 2025-07-04 ASSESSMENT — ACTIVITIES OF DAILY LIVING (ADL)
ADLS_ACUITY_SCORE: 32
ADLS_ACUITY_SCORE: 36
ADLS_ACUITY_SCORE: 32
ADLS_ACUITY_SCORE: 36
ADLS_ACUITY_SCORE: 32
ADLS_ACUITY_SCORE: 36
ADLS_ACUITY_SCORE: 32
ADLS_ACUITY_SCORE: 36

## 2025-07-04 NOTE — PLAN OF CARE
Goal Outcome Evaluation:      Plan of Care Reviewed With: patient    Shift Summary 1900-0700    Admitting Diagnosis: Lumbar radiculopathy [M54.16]  S/P lumbar spinal fusion [Z98.1]     POD#: 2 L4-L5 interbody fusion.   Mental Status: A/Ox4.   Activity/dangle: A1/GB/W.   Diet: regular.   Pain: 7-8/10, managed with prn oxy and scheduled meds.   Nguyen/Voiding: continent to the BR.   Tele/Restraints/Iso: NA   02/LDA: RA. L PIV: NS 75 ml/hr.   D/C Date: TBD.     Other Info: VSS on RA, x soft BP.

## 2025-07-04 NOTE — PROGRESS NOTES
[facility]  H[facility]ist / [place] Progress Note  Date Admitted: 7/2/2025  5:48 AM  Date of Service: 07/04/2025              Assessment and Plan:                                         This is a 49 year old, female, w a PMH of chronic low back pain with radiculopathy due to L4-5 spinal stenosis, depression/bipolar disorder, borderline personality disorder, SLE with sicca syndrome , who was admitted on 7/2/2025, for elective lumbar decompression L4-5 with Dr. Goins    Active Problems:    S/P lumbar spinal fusion      PLAN  L4-5 spinal stenosis with degenerative spondylolisthesis, s/p L4-5 bilateral laminectomies,facetectomy, transforaminal discectomy, and transforaminal interbody arthrodesis, Dr. Goins, 7/2/2025  - Has had L4-L5 surgery in the past, 5/2024  -Status post decompression 7/2, see note  -PT/OT as per neurosurgery  --Discharge meds pain meds as per NS  - Discharging to home 7/4 per plan     Acute blood loss anemia, perioperative  Hypotension due to hypovolemia  -Hemoglobin declined from preop 12.6 to 8.7.  This is likely to operative blood losses.  Patient has been hypotensive overnight and on 7/3 AM.  Responding to IV fluids.  As of a.m. of 7/4 was asymptomatic but BP was noted to be 84/53 , But patient was denying resting dizziness and RNs did check orthostatics denying any symptoms.  Recheck hemoglobin remained stable and 8.7.  Will start iron plus vitamin C at time of discharge.  I told the patient she should call her primary care and make sure she has hemoglobin rechecked in 2 weeks and do follow-up.     SLE with JSESE+, dsDNA+, arthritis, oral sores, proteinuria, photosensitivity, fatigue  - Diagnosed in 2016.  Followed by rheumatology   - Currently on Benlysta SQ q7 d, hydroxychloroquine 200mg daily + 200 mg every other day, methotrexate 15 mg q week  - Continue hydroxychloroquine and folic acid     Depression/bipolar disorder  Borderline personality disorder  -Continue  Abilify, fluoxetine    Code Status:Full Code  NUTRITION Advance Diet as Tolerated: Regular Diet Adult       DVT Prophylaxis: Defer to primary service   Nguyen Catheter: Not present  Lines: None     Cardiac Monitoring: None    NURSING COMMUNICATION: d/w RN via [place] x 3    Disposition Plan      Expected Discharge Date: 07/04/2025      Destination: home with family        Entered: Marcelino Kaplan PA-C 07/04/2025, 8:36 AM      DISCHARGE PLANNING:   Criteria for discharge from the hospitalist perspective is per NS clearance, postop course     Medically Ready for Discharge: Today to home      Additional comments:   Discussed the patient's care and collaborative tx plan in detail w Dr URIEL Fatima MD, cosigning Hospitalist Provider. The above assessment and plan is the product of our joint decision making.       Marcelino Kaplan PA-C  Hospitalist [place]  [facility]   Securely message with the Vocera Web Console (learn more here)  Text page via [employer] Paging/Directory      Please note this documentation was partially completed using Dragon medical dictation transcription software. It was proofread, but transcriptions errors can be inadvertently missed, if there are any questions on content including transcription, diagnosis, or plan, please contact me immediately for clarification.          SUBJECTIVE:     INTERVAL EVENTS Reviewed-      Today's chief complaint-controlled low back pain    Met patient for the first time today.  New provider.  She overall reports her back pain is controlled on current pain meds.  We talked about her anemia and the need to start iron.  Will start iron plus vitamin C.  I am holding off on Venna for her.  Recommended a 2-week recheck with the patient going to her primary care if neurosurgery does not recheck hemoglobin at their appointment.  She reports she constipation, still no postop bowel movement.  We talked about how important it is to increase her bowel meds to make sure she  "does.  She stated she understood and will increase her senna and MiraLAX at home.  Also encouraged her to use the lowest amount of opiates available.  Denied any abdominal pain.  Did not offer any other new symptoms.  Denies current fever chills chest pain or shortness of breath.  Denies any dizziness when I saw her.    Later I noticed after neurosurgery put in discharge orders that she still remained hypotensive 84/53.  RN is to recheck blood pressure and orthostatics.    Last patient does still vape nicotine daily.  I discussed that lumbar fusion success has been shown to be impaired by people who smoke cigarettes and I would not expect vaping to be particularly healthier.  Strongly encouraged to quit vaping.  And to follow-up with her primary care.  She understood and will follow up.           Physical Exam:     Vitals:    25 1611 25 2104 25 0003 25 0441   BP: (!) 87/43 95/53 92/55 95/58   BP Location: Left arm Left arm Left arm Right arm   Patient Position:  Right side Semi-Pearl's    Cuff Size:  Adult Regular Adult Regular    Pulse: 89 86 88 74   Resp: 18 16 16    Temp: 97.9  F (36.6  C) 98.8  F (37.1  C) 98.8  F (37.1  C)    TempSrc: Oral Oral Oral    SpO2: 95% 95% 95%    Weight:       Height:         BP 95/58 (BP Location: Right arm)   Pulse 74   Temp 98.8  F (37.1  C) (Oral)   Resp 16   Ht 1.657 m (5' 5.25\")   Wt 63.5 kg (140 lb)   LMP 2022   SpO2 95%   BMI 23.12 kg/m     Temp (24hrs), Av.5  F (36.9  C), Min:97.9  F (36.6  C), Max:98.8  F (37.1  C)      Intake/Output Summary (Last 24 hours) at 2025 0836  Last data filed at 7/3/2025 1800  Gross per 24 hour   Intake 360 ml   Output 650 ml   Net -290 ml     Patient Vitals for the past 120 hrs:   Weight   25 0644 63.5 kg (140 lb)       LINES: PIV,   GENERAL APPEARANCE: Alert, supine in bed, in no acute distress.  HEENT: normocephalic, normal external exam.  NECK:  No JVD  CARDIOVASCULAR: Regular rate and " rhythm, s1, s2  no murmur appreciated.    RESPIRATORY: On RA/ O2 Clear bilaterally without wheezes, fine rales or coarse rhonchi/crackles.   GASTROINTESTINAL: sounds normoactive.  Soft, non-tender,  No masses/organomegaly  NEUROLOGIC: nonfocal, UE movements appear grossly intact.  EXTREMITIES: not walked, MAEW, No peripheral edema not  SKIN: No rashes or lesions.  PSYCHIATRIC: Alert and appears fully oriented (to baseline). friendly, seems to have good understanding of and agreement w plan for the day         Meds (inpt)   Current inpatient meds reviewed.  See epic.  Current Facility-Administered Medications   Medication Dose Route Frequency Provider Last Rate Last Admin    acetaminophen (TYLENOL) tablet 975 mg  975 mg Oral TID Amanda Baig APRN CNP   975 mg at 07/03/25 2114    ARIPiprazole (ABILIFY) tablet 10 mg  10 mg Oral Daily Nadia Escobedo MD   10 mg at 07/03/25 0806    cetirizine (zyrTEC) tablet 20 mg  20 mg Oral Daily Nadia Escobedo MD   20 mg at 07/03/25 0807    FLUoxetine (PROzac) capsule 40 mg  40 mg Oral Daily Nadia Escobedo MD   40 mg at 07/03/25 0807    folic acid (FOLVITE) tablet 2 mg  2 mg Oral Daily Nadia Escobedo MD   2 mg at 07/03/25 0807    gabapentin (NEURONTIN) capsule 1,200 mg  1,200 mg Oral QAM Nadia Escobedo MD   1,200 mg at 07/03/25 0806    gabapentin (NEURONTIN) tablet 1,800 mg  1,800 mg Oral At Bedtime Nadia Escobedo MD   1,800 mg at 07/03/25 2114    hydroxychloroquine (PLAQUENIL) tablet 200 mg  200 mg Oral Daily Nadia Escobedo MD   200 mg at 07/03/25 0807    methocarbamol (ROBAXIN) tablet 500 mg  500 mg Oral Q6H Amanda Baig APRN CNP   500 mg at 07/04/25 0440    pilocarpine (SALAGEN) tablet 5 mg  5 mg Oral TID Nadia Escobedo MD   5 mg at 07/03/25 2114    polyethylene glycol (MIRALAX) Packet 17 g  17 g Oral Daily Amanda Baig APRN CNP   17 g at 07/03/25 0806    senna-docusate (SENOKOT-S/PERICOLACE) 8.6-50 MG per tablet 1 tablet  1 tablet Oral BID Amanda Baig,  MILO CNP   1 tablet at 07/03/25 0806    sodium chloride (PF) 0.9% PF flush 3 mL  3 mL Intracatheter Q8H Amanda Glass APRN CNP                Allergy:   No Known Allergies         Data/Labs:   I reviewed most recent labs in past 24hrs, and historical comparisons if available and personally reviewed any new imaging results    CBC with Diff:  Recent Labs   Lab Test 07/04/25  0757 07/03/25  1029 07/02/25  0637 06/23/25  1130 05/22/25  1612   WBC  --   --  10.1 5.3 5.2   HGB 8.7* 8.7* 12.6 12.0 12.1   MCV 90 88 87 91 88   PLT  --   --  254 237 222        BMP:  Recent Labs   Lab Test 07/04/25  0613 07/03/25  1029 07/03/25  0548 07/02/25  0637 06/23/25  1130 05/22/25  1612   NA  --   --   --  141 141 141   POTASSIUM  --  3.5  --  4.0 3.8 3.9   CHLORIDE  --   --   --  102 105 106   CO2  --   --   --  29 26 26   ANIONGAP  --   --   --  10 10 9   GLC 79  --  106* 77 93 91   BUN  --   --   --  20.5* 22.1* 24.9*   CR  --   --   --  0.68 0.71 0.66   GFRESTIMATED  --   --   --  >90 >90 >90   RICHARD  --   --   --  9.6 9.3 9.0        Diabetes:  Recent Labs   Lab 07/04/25  0613 07/03/25  0548 07/02/25  0637   GLC 79 106* 77     Lab Results   Component Value Date    A1C 5.1 04/14/2009       Micro results:    30-Day Micro Results       No results found for the last 720 hours.               IMAGING:   IMAGING, During Current Admission:  XR Lumbar Spine 2/3 Views: 7/3/2025   INDICATION: Post op Lumbar Surgery; upright COMPARISON: CT 4/28/2025.   IMPRESSION: No fracture. Instrumented posterior spinal fusion and intervertebral disc prosthesis placement at L4-5. Surgical hardware is intact and well-positioned. Grade 1 anterolisthesis at L4-5. Broad right convex curvature. Normal vertebral heights. Normal disc spaces and facets for age. Normal extraspinal structures.    XR Surgery JEVON Fluoro Less Than 5 Min w Stills  Result Date: 7/2/2025  This exam was marked as non-reportable because it will not be read by a radiologist or a  [facility] non-radiologist provider.        IMAGING, prior to admit    CT LUMBAR SPINE W/O CONTRAST - PRATIBHA   4/28/2025   IMPRESSION:  1.  Moderate narrowing of the thecal sac at L4-L5.  2.  Mild spinal canal narrowing at L3-L4.  3.  Moderate to severe right and mild to moderate left neuroforaminal narrowing at L4-L5. Probable impingement of the exiting right L4 nerve root.   4.  Small ill-defined soft opacity in the medial left lower lobe. This is suspicious for area of infection. Recommend correlation with clinical findings         Procedures:   During Current Admission:None  7/2- L4/5 lumbar decompression    Medical Decision Making       Please see A&P for additional details of medical decision making.  MANAGEMENT DISCUSSED with the following over the past 24 hours:     NOTE(S)/MEDICAL RECORDS REVIEWED over the past 24 hours:           Time - I spent 45 minutes w greater than 50% spent in face to face counseling and education re dx, rx, plan and also including coordination of care, time documenting, and family updates if applicable.

## 2025-07-04 NOTE — PLAN OF CARE
Goal Outcome Evaluation:  Shift Summary [id number]    Admitting Diagnosis: Lumbar radiculopathy [M54.16]  S/P lumbar spinal fusion [Z98.1]   Vitals. Blood pressure slightly hypotension   Pain 9/10. Taking oxycodone 10 mg  PRN. Last dose  A&Ox4   Voiding. Bathroom   Mobility. 1 assist hbw   Tele  CMS. Intact   Lung Sounds clear on Ra via   GI. WNL   Dressing. New dressing applied to back incision site     Orders Placed This Encounter      Advance Diet as Tolerated: Regular Diet Adult      Diet       Plan: Patient blood pressure was 83/53 , Md was informed  and he told me to asked patient if she was experiencing any orthostatically dizzy when she stand up she denies, Per patient statement she always has low blood pressure, MD order Vit c and folic acid patient could not wait for pharmacy to complete the order. Patient stated she will get them from over the counter . All IV access was removed , new dressing applied to back incision site,discharged education provided with all information. Patient was taken by boyfriend.

## 2025-07-04 NOTE — DISCHARGE SUMMARY
[facility]    DISCHARGE SUMMARY   [facility] Neurosurgery    Patient ID:   Sosa Patel   [id number]   49 year old   1975     Admit date: 7/2/2025   Discharge date: 7/4/2025     Admission Diagnoses: Lumbar radiculopathy [M54.16]  S/P lumbar spinal fusion [Z98.1]     Procedure:   1.  L4-5 insertion of bilateral pedicle screws and rods  (Medtronic Module X)  2.  L4-5 bilateral laminectomies for decompression of stenosis  3.  Bilateral L4-5 complete facetectomy, transforaminal discectomy, and interbody arthrodesis  4.  L4-5 insertion of bilateral titanium IB3D cages (11 x 22 with 12 degree lordosis) with magnetos and infuse allograft.    5.  L4-L5 bilateral transverse process posterolateral arthrodesis and fusion with autograft and allograft  6.  Use of Videojug surgical robot with navigation  7.  Use of intraoperative microscope and fluoroscopy     Surgeon: Dr. Goins      Post op Diagnosis:   L4-5 spinal stenosis with degenerative spondylolisthesis    HPI:  Sosa Patel is an 49 year old female who underwent L4-5 transforaminal interbody fusion with Dr. Goins for management of lumbar radiculopathy. The procedure went well and without complication.        Hospital course:   Sosa Patel has done well post-operatively. At the time of hospital discharge, pain adequately managed with oral medications. Had flare-up in incisional back pain earlier this morning that resolved with position changes/ambulating. Tolerating normal diet.  She has participated in physical therapy and occupational therapy. Recommendations for walker, which she has at home. Reviewed activity restrictions, post-operative care plan, and follow-up appointments. She is to be discharged home in stable condition on 7/4/25.      DISCHARGE PLAN:   - Therapies evaluated and recommend discharge to home with assist  -Pt has walker at home    - Routine post op care plan discussed  - Routine wound care and activity  restrictions reviewed   - Pain medications prescribed at discharge (oxycodone, methocarbamol, Senna)   - No aspirin or NSAIDS until seen for follow-up  - Bone growth stimulator ordered from clinic   - LSO brace when upright and out of bed   - Follow up with NSGY clinic as scheduled       Chantelle Ashford PA-C  [facility] Neurosurgery        Imaging:     Lumbar Spine XR (7/3/25)  IMPRESSION: No fracture. Instrumented posterior spinal fusion and intervertebral disc prosthesis placement at L4-5. Surgical hardware is intact and well-positioned. Grade 1 anterolisthesis at L4-5. Broad right convex curvature. Normal vertebral heights.   Normal disc spaces and facets for age. Normal extraspinal structures.     Vital signs with ranges:   Temp:  [98  F (36.7  C)-98.8  F (37.1  C)] 98  F (36.7  C)  Pulse:  [74-88] 75  Resp:  [16] 16  BP: (84-97)/(53-60) 84/53  SpO2:  [95 %-97 %] 97 %  I/O last 3 completed shifts:  In: 720 [P.O.:720]  Out: 650 [Urine:650]    Exam:   Pt in bed. She appears comfortable and in no apparent distress, moving all extremities.   CN II-XII intact, alert and appropriate with conversation and following commands.   Bilateral lower extremities with appropriate strength. Sensation intact throughout.   Calves soft and non-tender bilaterally.   Lumbar back dressing changed. Incision intact. Absent for edema, erythema or drainage.   She is ambulating with a steady gait, therapy recommending walker. Tolerating regular diet without nausea or vomiting. Pain managed with oral medication. Voiding without difficulty. She is on room air with O2 sats greater than 90%.     Disposition: home with assist      Sosa Patle verbalizes understanding and is in agreement with discharge.     Done while pt still in hospital bed       Review of your medicines        PAUSE taking these medications        Dose / Directions   aspirin-acetaminophen-caffeine 250-250-65 MG tablet  Wait to take this until your doctor or other  care provider tells you to start again.  DO NOT TAKE aspirin until cleared by neurosurgery   Commonly known as: EXCEDRIN MIGRAINE      Dose: 1 tablet  Take 1 tablet by mouth daily as needed for headaches  Refills: 0     naproxen 250 MG tablet  Wait to take this until your doctor or other care provider tells you to start again.  DO NOT RESTART until cleared by Neurosurgery   Commonly known as: NAPROSYN  Used for: Lumbar radiculopathy      Dose: 250 mg  Take 1 tablet (250 mg) by mouth 2 times daily (with meals).  Quantity: 60 tablet  Refills: 2            START taking        Dose / Directions   acetaminophen 325 MG tablet  Commonly known as: TYLENOL  Used for: S/P lumbar spinal fusion      Dose: 975 mg  Take 3 tablets (975 mg) by mouth every 8 hours as needed for mild pain.  Refills: 0     ferrous sulfate 325 (65 Fe) MG tablet  Commonly known as: FEROSUL  Used for: Anemia due to blood loss, acute      Dose: 325 mg  Start taking on: July 5, 2025  Take 1 tablet (325 mg) by mouth every morning (before breakfast).  Quantity: 30 tablet  Refills: 1     methocarbamol 500 MG tablet  Commonly known as: ROBAXIN  Used for: S/P lumbar spinal fusion      Dose: 500 mg  Take 1 tablet (500 mg) by mouth 4 times daily as needed for muscle spasms. Do NOT take if using cyclobenzaprine (Flexeril)  Quantity: 30 tablet  Refills: 0     senna-docusate 8.6-50 MG tablet  Commonly known as: SENOKOT-S/PERICOLACE  Used for: S/P lumbar spinal fusion      Dose: 1 tablet  Take 1 tablet by mouth daily as needed for constipation. Take while using oxycodone  Quantity: 30 tablet  Refills: 0     vitamin C 500 MG tablet  Commonly known as: ASCORBIC ACID  Used for: Anemia due to blood loss, acute      Dose: 500 mg  Start taking on: July 5, 2025  Take 1 tablet (500 mg) by mouth every morning (before breakfast).  Quantity: 30 tablet  Refills: 0            CONTINUE these medicines which may have CHANGED, or have new prescriptions. If we are uncertain of the  size of tablets/capsules you have at home, strength may be listed as something that might have changed.        Dose / Directions   oxyCODONE 5 MG tablet  Commonly known as: ROXICODONE  This may have changed:   when to take this  reasons to take this  Used for: S/P lumbar spinal fusion      Dose: 5-10 mg  Take 1-2 tablets (5-10 mg) by mouth every 4 hours as needed for moderate pain.  Quantity: 40 tablet  Refills: 0            CONTINUE these medicines which have NOT CHANGED        Dose / Directions   ARIPiprazole 10 MG tablet  Commonly known as: ABILIFY      Dose: 10 mg  Take 10 mg by mouth daily.  Refills: 0     augmented betamethasone dipropionate 0.05 % external ointment  Commonly known as: DIPROLENE-AF  Used for: Granuloma annulare      Apply topically 2 times daily.  Quantity: 50 g  Refills: 3     belimumab subcutaneous injection (prefilled autoinjector)  Commonly known as: BENLYSTA  Used for: Systemic lupus erythematosus, unspecified SLE type, unspecified organ involvement status (H)      Dose: 200 mg  Inject 1 mL (200 mg) subcutaneously every 7 days. . Hold for signs of infection and seek medical attention.   Refills at rheumatology appointments.  Quantity: 4 mL  Refills: 7     cetirizine 10 MG tablet  Commonly known as: zyrTEC  Used for: Chronic rhinitis      Dose: 20 mg  TAKE 2 TABLETS BY MOUTH DAILY  Quantity: 180 tablet  Refills: 0     clobetasol 0.05 % external ointment  Commonly known as: TEMOVATE      Apply topically twice a week.  Refills: 0     cycloSPORINE 0.05 % ophthalmic emulsion  Commonly known as: Restasis  Used for: Secondary Sjogren's syndrome      Dose: 1 drop  Place 1 drop into both eyes 2 times daily.  Quantity: 5.5 mL  Refills: 2     FLUoxetine 40 MG capsule  Commonly known as: PROzac  Used for: Recurrent major depressive disorder, in partial remission      TAKE ONE CAPSULE BY MOUTH ONCE DAILY  Quantity: 90 capsule  Refills: 1     folic acid 1 MG tablet  Commonly known as: FOLVITE  Used  for: Systemic lupus erythematosus, unspecified SLE type, unspecified organ involvement status (H)      Dose: 2 mg  Take 2 tablets (2 mg) by mouth daily.  Quantity: 180 tablet  Refills: 2     gabapentin 600 MG tablet  Commonly known as: NEURONTIN  Used for: Chronic bilateral low back pain with bilateral sciatica, Lumbar radiculopathy      2 tabs in am and 3 at bedtime  Quantity: 150 tablet  Refills: 2     hydroquinone 4 % external cream  Commonly known as: RUFUS  Used for: Postinflammatory hyperpigmentation, Solar lentigo      Apply topically at bedtime  Quantity: 28.35 g  Refills: 11     hydroxychloroquine 200 MG tablet  Commonly known as: PLAQUENIL  Used for: Systemic lupus erythematosus, unspecified SLE type, unspecified organ involvement status (H)      Hydroxychloroquine 200 mg daily with an additional 200 mg every other day.  Ophthalmology toxicity monitoring eye exam, that includes 10-2 VF and SD-OCT, is required yearly.  Quantity: 135 tablet  Refills: 2     lidocaine 5 % external ointment  Commonly known as: XYLOCAINE  Used for: Herpes simplex type 2 infection, Lichen sclerosus      Apply topically as needed for moderate pain  Quantity: 50 g  Refills: 1     methotrexate 2.5 MG tablet  Used for: Systemic lupus erythematosus, unspecified SLE type, unspecified organ involvement status (H)      Dose: 15 mg  Take 6 tablets (15 mg) by mouth once a week. Methotrexate is a once weekly medication, taken on the same day of each week.  Quantity: 78 tablet  Refills: 2     pilocarpine 5 MG tablet  Commonly known as: SALAGEN  Used for: Secondary Sjogren's syndrome      Pilocarpine 5mg daily x7days, then 5mg twice daily x7days, then 5mg three times daily thereafter.  Quantity: 90 tablet  Refills: 3            STOP taking      cyclobenzaprine 10 MG tablet  Commonly known as: FLEXERIL                  Where to get your medicines        These medications were sent to [facility] [place]  [place], [place] - [address]  [place] [address]  [address] [facility], [place] [address]      Phone: [phone]   ferrous sulfate 325 (65 Fe) MG tablet  methocarbamol 500 MG tablet  oxyCODONE 5 MG tablet  senna-docusate 8.6-50 MG tablet  vitamin C 500 MG tablet       Some of these will need a paper prescription and others can be bought over the counter. Ask your nurse if you have questions.    You don't need a prescription for these medications  acetaminophen 325 MG tablet          Discharge Procedure Orders   Walker Order for DME - ONLY FOR DME     Order Specific Question Answer Comments   Medical Equipment (DME) Supplier: [facility]    PATIENT INSTRUCTIONS: If you did not receive this ordered item today, please contact [facility] for availability ([place] Locations: [phone], [place]: [phone]).    Start Date: 7/3/2025    Walker Type: Standard (2 Wheel)    Diagnosis: R26.89 - Impaired Gait and Mobility

## 2025-07-05 NOTE — PLAN OF CARE
Physical Therapy Discharge Summary    Reason for therapy discharge:    Discharged to home.    Progress towards therapy goal(s). See goals on Care Plan in [facility] electronic health record for goal details.  Goals met    Therapy recommendation(s):    No further therapy is recommended.

## 2025-07-07 ENCOUNTER — MYC MEDICAL ADVICE (OUTPATIENT)
Dept: FAMILY MEDICINE | Facility: CLINIC | Age: 50
End: 2025-07-07

## 2025-07-09 NOTE — TELEPHONE ENCOUNTER
Contacted  pharmacy [place], to discuss medication.  Spoke with the pharmacy states that the medication oxycodone that was ordered from the surgeon has not been filled, it was a refill too soon per pt's insurance. The pharmacy tried running the medication again through her insurance and is now able to be picked up today qty #40.   Contacted the patient to notify her, she is aware.

## 2025-07-14 ENCOUNTER — ALLIED HEALTH/NURSE VISIT (OUTPATIENT)
Dept: NEUROSURGERY | Facility: CLINIC | Age: 50
End: 2025-07-14
Payer: COMMERCIAL

## 2025-07-14 VITALS — DIASTOLIC BLOOD PRESSURE: 64 MMHG | SYSTOLIC BLOOD PRESSURE: 116 MMHG | RESPIRATION RATE: 18 BRPM | HEART RATE: 72 BPM

## 2025-07-14 DIAGNOSIS — Z98.1 S/P LUMBAR FUSION: Primary | ICD-10-CM

## 2025-07-14 PROCEDURE — 99024 POSTOP FOLLOW-UP VISIT: CPT

## 2025-07-14 PROCEDURE — 3078F DIAST BP <80 MM HG: CPT

## 2025-07-14 PROCEDURE — 3074F SYST BP LT 130 MM HG: CPT

## 2025-07-14 NOTE — PROGRESS NOTES
Sosa Patel is status post L4-5 insertion of bilateral pedicle screws and rods, L4-5 bilateral laminectomies for decompression of stenosis, Bilateral L4-5 complete facetectomy, transforaminal discectomy, and interbody arthrodesis, L4-L5 bilateral transverse process posterolateral arthrodesis and fusion with autograft and allograft, use of [employer] surgical robot with navigation on 07/02/2025 with Dr. Goins.  Preoperatively presented with low back pain and bilateral leg pain right more than the left.  Today Sosa presents in follow up for wound check. She is very pleased with her outcome - reports resolved pain in legs, denies sensory or motor issues. Endorses intermittent mild pain in bilateral low back region well controlled with Tylenol and Robaxin prn. Gait and balance are normal.  Wears a Softec brace for comfort.     Surgical wound WNL - CDI, no signs of infection or skin breakdown.  Incision well-healed: good skin approximation, no redness or visible/palpable edema, no tenderness to palpation.  PT. AF, denies fever, chills or sweats.  Pt. reports that the symptoms are improved from pre-op.    Chaya Orosco, RN, CNRN

## 2025-07-15 DIAGNOSIS — M54.16 LUMBAR RADICULOPATHY: ICD-10-CM

## 2025-07-15 DIAGNOSIS — Z98.1 S/P LUMBAR FUSION: Primary | ICD-10-CM

## 2025-07-15 DIAGNOSIS — J31.0 CHRONIC RHINITIS: ICD-10-CM

## 2025-07-15 RX ORDER — CETIRIZINE HYDROCHLORIDE 10 MG/1
20 TABLET ORAL DAILY
Qty: 180 TABLET | Refills: 0 | Status: SHIPPED | OUTPATIENT
Start: 2025-07-15

## 2025-07-16 ENCOUNTER — TELEPHONE (OUTPATIENT)
Dept: NEUROSURGERY | Facility: CLINIC | Age: 50
End: 2025-07-16
Payer: COMMERCIAL

## 2025-07-16 DIAGNOSIS — Z98.1 S/P LUMBAR FUSION: Primary | ICD-10-CM

## 2025-07-16 DIAGNOSIS — M54.16 LUMBAR RADICULOPATHY: ICD-10-CM

## 2025-07-16 RX ORDER — METHOCARBAMOL 500 MG/1
500 TABLET, FILM COATED ORAL 4 TIMES DAILY PRN
Qty: 40 TABLET | Refills: 0 | Status: SHIPPED | OUTPATIENT
Start: 2025-07-16

## 2025-07-16 NOTE — TELEPHONE ENCOUNTER
[facility] Call Center    Phone Message    May a detailed message be left on voicemail: yes     Reason for Call: Medication Refill Request    Has the patient contacted the pharmacy for the refill? Yes   Name of medication being requested: methocarbamol (ROBAXIN) 500 MG tablet   Provider who prescribed the medication: Buster   Pharmacy:   [facility] [place] [place] - [address]     Date medication is needed: ASAP. Pt stated he she only has one or two left.       Patient called in and also requested a letter stating she did have a spinal fusion on 7/2 for her employer. Please call to discuss     Action Taken: Message routed to:  Other: Neurosurgery    Travel Screening: Not Applicable     Date of Service:

## 2025-07-17 PROBLEM — M54.41 CHRONIC BILATERAL LOW BACK PAIN WITH BILATERAL SCIATICA: Status: RESOLVED | Noted: 2022-07-27 | Resolved: 2025-07-17

## 2025-07-17 PROBLEM — M54.42 CHRONIC BILATERAL LOW BACK PAIN WITH BILATERAL SCIATICA: Status: RESOLVED | Noted: 2022-07-27 | Resolved: 2025-07-17

## 2025-07-17 PROBLEM — G89.29 CHRONIC BILATERAL LOW BACK PAIN WITH BILATERAL SCIATICA: Status: RESOLVED | Noted: 2022-07-27 | Resolved: 2025-07-17

## 2025-07-20 ENCOUNTER — MYC MEDICAL ADVICE (OUTPATIENT)
Dept: NEUROSURGERY | Facility: CLINIC | Age: 50
End: 2025-07-20
Payer: COMMERCIAL

## 2025-08-08 PROBLEM — Z79.899 HIGH RISK MEDICATIONS (NOT ANTICOAGULANTS) LONG-TERM USE: Status: RESOLVED | Noted: 2017-09-21 | Resolved: 2025-08-08

## 2025-08-19 ENCOUNTER — HOSPITAL ENCOUNTER (OUTPATIENT)
Dept: GENERAL RADIOLOGY | Facility: HOSPITAL | Age: 50
Discharge: HOME | End: 2025-08-19
Attending: NEUROLOGICAL SURGERY
Payer: COMMERCIAL

## 2025-08-19 ENCOUNTER — OFFICE VISIT (OUTPATIENT)
Dept: NEUROSURGERY | Facility: CLINIC | Age: 50
End: 2025-08-19
Payer: COMMERCIAL

## 2025-08-19 VITALS — SYSTOLIC BLOOD PRESSURE: 119 MMHG | HEART RATE: 97 BPM | OXYGEN SATURATION: 97 % | DIASTOLIC BLOOD PRESSURE: 77 MMHG

## 2025-08-19 DIAGNOSIS — Z98.1 S/P LUMBAR SPINAL FUSION: Primary | ICD-10-CM

## 2025-08-19 DIAGNOSIS — Z98.1 S/P LUMBAR FUSION: ICD-10-CM

## 2025-08-19 PROCEDURE — 72100 X-RAY EXAM L-S SPINE 2/3 VWS: CPT

## 2025-08-19 ASSESSMENT — PAIN SCALES - GENERAL: PAINLEVEL_OUTOF10: MILD PAIN (3)

## 2025-08-28 ENCOUNTER — LAB (OUTPATIENT)
Dept: LAB | Facility: CLINIC | Age: 50
End: 2025-08-28
Payer: COMMERCIAL

## 2025-09-02 ENCOUNTER — TELEPHONE (OUTPATIENT)
Dept: FAMILY MEDICINE | Facility: CLINIC | Age: 50
End: 2025-09-02

## (undated) DEVICE — SUCTION FRAZIER 12FR W/HANDLE K73

## (undated) DEVICE — PACK SPINE SM CUSTOM SNE15SSFSK

## (undated) DEVICE — CATH TRAY FOLEY SURESTEP 16FR W/URINE MTR STATLK LF A303416A

## (undated) DEVICE — TOOL DISSECT MIDAS MR8 14CM MATCH HEAD 3MM MR8-14MH30

## (undated) DEVICE — LINEN TOWEL PACK X5 5464

## (undated) DEVICE — SU VICRYL 0 CT-1 CR 8X18" J740D

## (undated) DEVICE — WIPES FOLEY CARE SURESTEP PROVON DFC100

## (undated) DEVICE — DAVINCI XI CLIP APPLIER LG HEM-O-CLIP 8MM 470230

## (undated) DEVICE — CUSHION INSERT LG PRONE VIEW JACKSON TABLE

## (undated) DEVICE — Device

## (undated) DEVICE — SPONGE COTTONOID 1/2X1 1/2" 1-STRING 80-1404

## (undated) DEVICE — MARKER SPHERES PASSIVE MEDT PACK 5 8801075

## (undated) DEVICE — PACK UNIVERSAL SPLIT 29131

## (undated) DEVICE — SOL WATER IRRIG 1000ML BOTTLE 2F7114

## (undated) DEVICE — TAPE MEDIPORE 4"X2YD 2864

## (undated) DEVICE — TAPE MEDIPORE 6"X10YD 2966

## (undated) DEVICE — SYR 10ML LL W/O NDL 302995

## (undated) DEVICE — DECANTER VIAL 2006S

## (undated) DEVICE — DRSG KERLIX FLUFFS X5

## (undated) DEVICE — SU VICRYL 0 CT-2 27" J334H

## (undated) DEVICE — PACK LAP CHOLE CUSTOM ASC

## (undated) DEVICE — ESU GROUND PAD UNIVERSAL W/O CORD

## (undated) DEVICE — DRSG TELFA ISLAND 4X8" 7541

## (undated) DEVICE — ENDO TROCAR FIRST ENTRY KII FIOS Z-THRD 05X100MM CTF03

## (undated) DEVICE — DRILL TWIST MIDAS REX MEDTRONIC 30X3MM STERILE MR8-31TD3030

## (undated) DEVICE — GLOVE PROTEXIS BLUE W/NEU-THERA 7.0  2D73EB70

## (undated) DEVICE — SPONGE COTTONOID NEURO 1/2"X1/2" 30-054

## (undated) DEVICE — DRAPE COVER C-ARM SEAMLESS SNAP-KAP 03-KP26 LATEX FREE

## (undated) DEVICE — DAVINCI XI DRAPE COLUMN 470341

## (undated) DEVICE — SU MONOCRYL 3-0 PS-2 27" Y427H

## (undated) DEVICE — DAVINCI XI CAUTERY HOOK 470183

## (undated) DEVICE — SURGICEL HEMOSTAT 2X3" 1953

## (undated) DEVICE — BLADE BONE MILL STRK 3.2MM FINE 5400-702-000

## (undated) DEVICE — ESU ELEC BLADE 6" COATED/INSULATED E1455-6

## (undated) DEVICE — DRAPE C-ARM 60X42" 1013

## (undated) DEVICE — DAVINCI XI DRAPE ARM 470015

## (undated) DEVICE — CLIP ENDO HEMO-LOC PURPLE LG 544240

## (undated) DEVICE — ESU GROUND PAD ADULT W/CORD E7507

## (undated) DEVICE — KIT PATIENT JACKSON 1 SPK10118

## (undated) DEVICE — KIT MAZOR X ROBOTIC SPINE DISP KIT0574

## (undated) DEVICE — PREP CHLORAPREP 26ML TINTED HI-LITE ORANGE 930815

## (undated) DEVICE — DAVINCI XI FCP BIPOLAR FENESTRATED 470205

## (undated) DEVICE — PREP CHLORAPREP 26ML TINTED ORANGE  260815

## (undated) DEVICE — SU VICRYL 2-0 CT-2 CR 8X18" J726D

## (undated) DEVICE — GLOVE BIOGEL PI MICRO INDICATOR UNDERGLOVE SZ 7.0 48970

## (undated) DEVICE — GOWN XXL 9575

## (undated) DEVICE — DRSG STERI STRIP 1/2X4" R1547

## (undated) DEVICE — PACK LARGE SPINE SNE15LSFSE

## (undated) DEVICE — GLOVE BIOGEL PI MICRO SZ 7.5 48575

## (undated) DEVICE — POSITIONER PT PRONESAFE HEAD REST W/DERMAPROX INSERT 40599

## (undated) DEVICE — DRAPE MICROSCOPE LEICA 54X150" AR8033650

## (undated) DEVICE — DRAPE MAYO STAND 23X54 8337

## (undated) DEVICE — SOL WATER IRRIG 500ML BOTTLE 2F7113

## (undated) DEVICE — NDL SPINAL 18GA 3.5" 405184

## (undated) DEVICE — ESU PENCIL W/HOLSTER

## (undated) DEVICE — SPONGE COTTONOID 1/2X3" 80-1407

## (undated) DEVICE — DAVINCI XI SEAL UNIVERSAL 5-8MM 470361

## (undated) DEVICE — SYR BULB IRRIG DOVER 60 ML LATEX FREE 67000

## (undated) DEVICE — SU MONOCRYL 4-0 PS-2 18" UND Y496G

## (undated) DEVICE — RX SURGIFLO HEMOSTATIC MATRIX 10ML 199102S

## (undated) DEVICE — TUBING SUCTION MEDI-VAC SOFT 3/16"X20' N520A

## (undated) DEVICE — SU ETHILON 3-0 PS-2 18" 1669H

## (undated) DEVICE — GLOVE BIOGEL PI MICRO INDICATOR UNDERGLOVE SZ 7.5 48975

## (undated) DEVICE — MANIFOLD NEPTUNE 4 PORT 700-20

## (undated) DEVICE — RX SURGIFLO HEMOSTATIC MATRIX 8ML 2991

## (undated) DEVICE — ESU HOLSTER PLASTIC DISP E2400

## (undated) DEVICE — SYR 50ML SLIP TIP W/O NDL 309654

## (undated) DEVICE — NDL BLUNT 15GA 1.5"

## (undated) DEVICE — TOOL DISSECT MIDAS MR8 14CM BALL 5MM DIA MR8-14BA50

## (undated) DEVICE — KIT DRAIN CLOSED WOUND SUCTION MED 400ML RESVR

## (undated) DEVICE — GOWN XLG DISP 9545

## (undated) DEVICE — DRAPE SHEET REV FOLD 3/4 9349

## (undated) DEVICE — SU MONOCRYL 4-0 PS-2 27" UND Y426H

## (undated) DEVICE — SPECIMEN CONTAINER W/10% BUFFERED FORMALIN 120ML 591201

## (undated) DEVICE — BLADE CLIPPER SGL USE 9680

## (undated) DEVICE — SU DERMABOND ADVANCED .7ML DNX12

## (undated) DEVICE — GLOVE BIOGEL PI MICRO SZ 7.0 48570

## (undated) DEVICE — ESU ELEC BLADE 2.75" COATED/INSULATED E1455

## (undated) RX ORDER — ONDANSETRON 2 MG/ML
INJECTION INTRAMUSCULAR; INTRAVENOUS
Status: DISPENSED
Start: 2024-05-29

## (undated) RX ORDER — KETOROLAC TROMETHAMINE 30 MG/ML
INJECTION, SOLUTION INTRAMUSCULAR; INTRAVENOUS
Status: DISPENSED
Start: 2023-10-09

## (undated) RX ORDER — FENTANYL CITRATE 50 UG/ML
INJECTION, SOLUTION INTRAMUSCULAR; INTRAVENOUS
Status: DISPENSED
Start: 2023-10-09

## (undated) RX ORDER — FENTANYL CITRATE 50 UG/ML
INJECTION, SOLUTION INTRAMUSCULAR; INTRAVENOUS
Status: DISPENSED
Start: 2024-05-29

## (undated) RX ORDER — FENTANYL CITRATE 0.05 MG/ML
INJECTION, SOLUTION INTRAMUSCULAR; INTRAVENOUS
Status: DISPENSED
Start: 2024-05-29

## (undated) RX ORDER — HYDROMORPHONE HYDROCHLORIDE 1 MG/ML
INJECTION, SOLUTION INTRAMUSCULAR; INTRAVENOUS; SUBCUTANEOUS
Status: DISPENSED
Start: 2024-05-29

## (undated) RX ORDER — LIDOCAINE HYDROCHLORIDE 40 MG/ML
SOLUTION TOPICAL
Status: DISPENSED
Start: 2023-10-09

## (undated) RX ORDER — PROPOFOL 10 MG/ML
INJECTION, EMULSION INTRAVENOUS
Status: DISPENSED
Start: 2025-07-02

## (undated) RX ORDER — BUPIVACAINE HYDROCHLORIDE AND EPINEPHRINE 2.5; 5 MG/ML; UG/ML
INJECTION, SOLUTION EPIDURAL; INFILTRATION; INTRACAUDAL; PERINEURAL
Status: DISPENSED
Start: 2024-05-29

## (undated) RX ORDER — GABAPENTIN 300 MG/1
CAPSULE ORAL
Status: DISPENSED
Start: 2024-05-29

## (undated) RX ORDER — HYDROMORPHONE HCL IN WATER/PF 6 MG/30 ML
PATIENT CONTROLLED ANALGESIA SYRINGE INTRAVENOUS
Status: DISPENSED
Start: 2025-07-02

## (undated) RX ORDER — HYDROMORPHONE HYDROCHLORIDE 1 MG/ML
INJECTION, SOLUTION INTRAMUSCULAR; INTRAVENOUS; SUBCUTANEOUS
Status: DISPENSED
Start: 2023-10-09

## (undated) RX ORDER — ONDANSETRON 2 MG/ML
INJECTION INTRAMUSCULAR; INTRAVENOUS
Status: DISPENSED
Start: 2023-10-09

## (undated) RX ORDER — BUPIVACAINE HYDROCHLORIDE AND EPINEPHRINE 5; 5 MG/ML; UG/ML
INJECTION, SOLUTION EPIDURAL; INTRACAUDAL; PERINEURAL
Status: DISPENSED
Start: 2025-07-02

## (undated) RX ORDER — OXYCODONE HYDROCHLORIDE 5 MG/1
TABLET ORAL
Status: DISPENSED
Start: 2023-10-09

## (undated) RX ORDER — GLYCOPYRROLATE 0.2 MG/ML
INJECTION, SOLUTION INTRAMUSCULAR; INTRAVENOUS
Status: DISPENSED
Start: 2024-05-29

## (undated) RX ORDER — ONDANSETRON 2 MG/ML
INJECTION INTRAMUSCULAR; INTRAVENOUS
Status: DISPENSED
Start: 2025-07-02

## (undated) RX ORDER — PROPOFOL 10 MG/ML
INJECTION, EMULSION INTRAVENOUS
Status: DISPENSED
Start: 2024-05-29

## (undated) RX ORDER — GABAPENTIN 300 MG/1
CAPSULE ORAL
Status: DISPENSED
Start: 2025-07-02

## (undated) RX ORDER — FENTANYL CITRATE 0.05 MG/ML
INJECTION, SOLUTION INTRAMUSCULAR; INTRAVENOUS
Status: DISPENSED
Start: 2025-07-02

## (undated) RX ORDER — DEXAMETHASONE SODIUM PHOSPHATE 4 MG/ML
INJECTION, SOLUTION INTRA-ARTICULAR; INTRALESIONAL; INTRAMUSCULAR; INTRAVENOUS; SOFT TISSUE
Status: DISPENSED
Start: 2024-05-29

## (undated) RX ORDER — DEXAMETHASONE SODIUM PHOSPHATE 4 MG/ML
INJECTION, SOLUTION INTRA-ARTICULAR; INTRALESIONAL; INTRAMUSCULAR; INTRAVENOUS; SOFT TISSUE
Status: DISPENSED
Start: 2023-10-09

## (undated) RX ORDER — FENTANYL CITRATE 50 UG/ML
INJECTION, SOLUTION INTRAMUSCULAR; INTRAVENOUS
Status: DISPENSED
Start: 2025-07-02

## (undated) RX ORDER — CEFAZOLIN SODIUM 2 G/50ML
SOLUTION INTRAVENOUS
Status: DISPENSED
Start: 2023-10-09

## (undated) RX ORDER — INDOCYANINE GREEN AND WATER 25 MG
KIT INJECTION
Status: DISPENSED
Start: 2023-10-09

## (undated) RX ORDER — CEFAZOLIN SODIUM 1 G/3ML
INJECTION, POWDER, FOR SOLUTION INTRAMUSCULAR; INTRAVENOUS
Status: DISPENSED
Start: 2024-05-29

## (undated) RX ORDER — PROPOFOL 10 MG/ML
INJECTION, EMULSION INTRAVENOUS
Status: DISPENSED
Start: 2023-10-09

## (undated) RX ORDER — HYDROMORPHONE HYDROCHLORIDE 1 MG/ML
INJECTION, SOLUTION INTRAMUSCULAR; INTRAVENOUS; SUBCUTANEOUS
Status: DISPENSED
Start: 2025-07-02

## (undated) RX ORDER — ACETAMINOPHEN 325 MG/1
TABLET ORAL
Status: DISPENSED
Start: 2023-10-09

## (undated) RX ORDER — METHYLPREDNISOLONE ACETATE 40 MG/ML
INJECTION, SUSPENSION INTRA-ARTICULAR; INTRALESIONAL; INTRAMUSCULAR; SOFT TISSUE
Status: DISPENSED
Start: 2024-05-29

## (undated) RX ORDER — ACETAMINOPHEN 325 MG/1
TABLET ORAL
Status: DISPENSED
Start: 2025-07-02